# Patient Record
Sex: MALE | Race: BLACK OR AFRICAN AMERICAN | NOT HISPANIC OR LATINO | Employment: UNEMPLOYED | URBAN - NONMETROPOLITAN AREA
[De-identification: names, ages, dates, MRNs, and addresses within clinical notes are randomized per-mention and may not be internally consistent; named-entity substitution may affect disease eponyms.]

---

## 2021-04-08 ENCOUNTER — TELEPHONE (OUTPATIENT)
Dept: FAMILY MEDICINE | Facility: CLINIC | Age: 71
End: 2021-04-08

## 2021-04-08 RX ORDER — LISINOPRIL 5 MG/1
5 TABLET ORAL DAILY
Qty: 90 TABLET | Refills: 0 | Status: CANCELLED | OUTPATIENT
Start: 2021-04-08

## 2021-04-08 RX ORDER — LISINOPRIL 5 MG/1
5 TABLET ORAL DAILY
COMMUNITY
Start: 2021-04-08 | End: 2021-10-12 | Stop reason: SDUPTHER

## 2021-07-16 RX ORDER — AMLODIPINE BESYLATE 10 MG/1
10 TABLET ORAL DAILY
COMMUNITY
End: 2021-10-12 | Stop reason: SDUPTHER

## 2021-07-16 RX ORDER — ATORVASTATIN CALCIUM 40 MG/1
1 TABLET, FILM COATED ORAL DAILY
COMMUNITY
Start: 2021-03-09 | End: 2021-10-12 | Stop reason: SDUPTHER

## 2021-07-16 RX ORDER — METOPROLOL SUCCINATE 25 MG/1
25 TABLET, EXTENDED RELEASE ORAL DAILY
COMMUNITY
Start: 2021-03-09 | End: 2021-10-12 | Stop reason: SDUPTHER

## 2021-07-16 RX ORDER — ASPIRIN 81 MG/1
81 TABLET ORAL DAILY
Status: ON HOLD | COMMUNITY
End: 2023-08-16 | Stop reason: HOSPADM

## 2021-07-16 RX ORDER — FUROSEMIDE 40 MG/1
1 TABLET ORAL 2 TIMES DAILY
COMMUNITY
Start: 2021-04-08 | End: 2021-10-12 | Stop reason: SDUPTHER

## 2021-10-12 ENCOUNTER — OFFICE VISIT (OUTPATIENT)
Dept: FAMILY MEDICINE | Facility: CLINIC | Age: 71
End: 2021-10-12
Payer: COMMERCIAL

## 2021-10-12 VITALS
WEIGHT: 230 LBS | TEMPERATURE: 97 F | BODY MASS INDEX: 36.1 KG/M2 | HEIGHT: 67 IN | HEART RATE: 97 BPM | DIASTOLIC BLOOD PRESSURE: 80 MMHG | SYSTOLIC BLOOD PRESSURE: 140 MMHG | OXYGEN SATURATION: 98 %

## 2021-10-12 DIAGNOSIS — I25.10 CORONARY ARTERY DISEASE INVOLVING NATIVE CORONARY ARTERY OF NATIVE HEART WITHOUT ANGINA PECTORIS: ICD-10-CM

## 2021-10-12 DIAGNOSIS — I50.22 CHRONIC SYSTOLIC CONGESTIVE HEART FAILURE: Primary | ICD-10-CM

## 2021-10-12 DIAGNOSIS — I34.0 SEVERE MITRAL REGURGITATION: ICD-10-CM

## 2021-10-12 DIAGNOSIS — I10 ESSENTIAL HYPERTENSION: ICD-10-CM

## 2021-10-12 LAB
ANION GAP SERPL CALCULATED.3IONS-SCNC: 8 MMOL/L (ref 7–16)
ANISOCYTOSIS BLD QL SMEAR: ABNORMAL
BASOPHILS # BLD AUTO: 0.06 K/UL (ref 0–0.2)
BASOPHILS NFR BLD AUTO: 0.8 % (ref 0–1)
BUN SERPL-MCNC: 20 MG/DL (ref 7–18)
BUN/CREAT SERPL: 13 (ref 6–20)
CALCIUM SERPL-MCNC: 9.4 MG/DL (ref 8.5–10.1)
CHLORIDE SERPL-SCNC: 105 MMOL/L (ref 98–107)
CHOLEST SERPL-MCNC: 104 MG/DL (ref 0–200)
CHOLEST/HDLC SERPL: 2.7 {RATIO}
CO2 SERPL-SCNC: 32 MMOL/L (ref 21–32)
CREAT SERPL-MCNC: 1.53 MG/DL (ref 0.7–1.3)
DIFFERENTIAL METHOD BLD: ABNORMAL
EOSINOPHIL # BLD AUTO: 0.07 K/UL (ref 0–0.5)
EOSINOPHIL NFR BLD AUTO: 0.9 % (ref 1–4)
ERYTHROCYTE [DISTWIDTH] IN BLOOD BY AUTOMATED COUNT: 18.8 % (ref 11.5–14.5)
GLUCOSE SERPL-MCNC: 121 MG/DL (ref 74–106)
HCT VFR BLD AUTO: 50.8 % (ref 40–54)
HDLC SERPL-MCNC: 39 MG/DL (ref 40–60)
HGB BLD-MCNC: 16.7 G/DL (ref 13.5–18)
IMM GRANULOCYTES # BLD AUTO: 0.03 K/UL (ref 0–0.04)
IMM GRANULOCYTES NFR BLD: 0.4 % (ref 0–0.4)
LDLC SERPL CALC-MCNC: 47 MG/DL
LDLC/HDLC SERPL: 1.2 {RATIO}
LYMPHOCYTES # BLD AUTO: 1.35 K/UL (ref 1–4.8)
LYMPHOCYTES NFR BLD AUTO: 17.4 % (ref 27–41)
MCH RBC QN AUTO: 30.2 PG (ref 27–31)
MCHC RBC AUTO-ENTMCNC: 32.9 G/DL (ref 32–36)
MCV RBC AUTO: 91.9 FL (ref 80–96)
MONOCYTES # BLD AUTO: 0.54 K/UL (ref 0–0.8)
MONOCYTES NFR BLD AUTO: 6.9 % (ref 2–6)
MPC BLD CALC-MCNC: 13.5 FL (ref 9.4–12.4)
NEUTROPHILS # BLD AUTO: 5.72 K/UL (ref 1.8–7.7)
NEUTROPHILS NFR BLD AUTO: 73.6 % (ref 53–65)
NONHDLC SERPL-MCNC: 65 MG/DL
NRBC # BLD AUTO: 0 X10E3/UL
NRBC, AUTO (.00): 0 %
PLATELET # BLD AUTO: 139 K/UL (ref 150–400)
PLATELET MORPHOLOGY: ABNORMAL
POTASSIUM SERPL-SCNC: 3.6 MMOL/L (ref 3.5–5.1)
RBC # BLD AUTO: 5.53 M/UL (ref 4.6–6.2)
SODIUM SERPL-SCNC: 141 MMOL/L (ref 136–145)
TRIGL SERPL-MCNC: 88 MG/DL (ref 35–150)
VLDLC SERPL-MCNC: 18 MG/DL
WBC # BLD AUTO: 7.77 K/UL (ref 4.5–11)

## 2021-10-12 PROCEDURE — 85025 CBC WITH DIFFERENTIAL: ICD-10-PCS | Mod: ,,, | Performed by: CLINICAL MEDICAL LABORATORY

## 2021-10-12 PROCEDURE — 85025 COMPLETE CBC W/AUTO DIFF WBC: CPT | Mod: ,,, | Performed by: CLINICAL MEDICAL LABORATORY

## 2021-10-12 PROCEDURE — 80048 BASIC METABOLIC PNL TOTAL CA: CPT | Mod: ,,, | Performed by: CLINICAL MEDICAL LABORATORY

## 2021-10-12 PROCEDURE — 99214 OFFICE O/P EST MOD 30 MIN: CPT | Mod: ,,, | Performed by: FAMILY MEDICINE

## 2021-10-12 PROCEDURE — 80061 LIPID PANEL: CPT | Mod: ,,, | Performed by: CLINICAL MEDICAL LABORATORY

## 2021-10-12 PROCEDURE — 99214 PR OFFICE/OUTPT VISIT, EST, LEVL IV, 30-39 MIN: ICD-10-PCS | Mod: ,,, | Performed by: FAMILY MEDICINE

## 2021-10-12 PROCEDURE — 80061 LIPID PANEL: ICD-10-PCS | Mod: ,,, | Performed by: CLINICAL MEDICAL LABORATORY

## 2021-10-12 PROCEDURE — 80048 BASIC METABOLIC PANEL: ICD-10-PCS | Mod: ,,, | Performed by: CLINICAL MEDICAL LABORATORY

## 2021-10-12 RX ORDER — ATORVASTATIN CALCIUM 40 MG/1
40 TABLET, FILM COATED ORAL DAILY
Qty: 90 TABLET | Refills: 0 | Status: SHIPPED | OUTPATIENT
Start: 2021-10-12 | End: 2022-02-07 | Stop reason: SDUPTHER

## 2021-10-12 RX ORDER — FUROSEMIDE 40 MG/1
40 TABLET ORAL 2 TIMES DAILY
Qty: 180 TABLET | Refills: 0 | Status: SHIPPED | OUTPATIENT
Start: 2021-10-12 | End: 2022-02-07 | Stop reason: SDUPTHER

## 2021-10-12 RX ORDER — LISINOPRIL 5 MG/1
5 TABLET ORAL DAILY
Qty: 90 TABLET | Refills: 0 | Status: SHIPPED | OUTPATIENT
Start: 2021-10-12 | End: 2022-02-07 | Stop reason: SDUPTHER

## 2021-10-12 RX ORDER — AMLODIPINE BESYLATE 10 MG/1
10 TABLET ORAL DAILY
Qty: 90 TABLET | Refills: 0 | Status: ON HOLD | OUTPATIENT
Start: 2021-10-12 | End: 2023-06-09 | Stop reason: HOSPADM

## 2021-10-12 RX ORDER — METOPROLOL SUCCINATE 25 MG/1
25 TABLET, EXTENDED RELEASE ORAL DAILY
Qty: 90 TABLET | Refills: 0 | Status: SHIPPED | OUTPATIENT
Start: 2021-10-12 | End: 2022-02-07 | Stop reason: SDUPTHER

## 2021-10-14 ENCOUNTER — TELEPHONE (OUTPATIENT)
Dept: FAMILY MEDICINE | Facility: CLINIC | Age: 71
End: 2021-10-14
Payer: COMMERCIAL

## 2021-10-21 ENCOUNTER — TELEPHONE (OUTPATIENT)
Dept: FAMILY MEDICINE | Facility: CLINIC | Age: 71
End: 2021-10-21

## 2022-02-07 ENCOUNTER — OFFICE VISIT (OUTPATIENT)
Dept: FAMILY MEDICINE | Facility: CLINIC | Age: 72
End: 2022-02-07
Payer: MEDICARE

## 2022-02-07 VITALS
HEIGHT: 69 IN | WEIGHT: 219 LBS | BODY MASS INDEX: 32.44 KG/M2 | DIASTOLIC BLOOD PRESSURE: 84 MMHG | HEART RATE: 93 BPM | SYSTOLIC BLOOD PRESSURE: 120 MMHG | OXYGEN SATURATION: 94 % | TEMPERATURE: 97 F

## 2022-02-07 DIAGNOSIS — I50.22 CHRONIC SYSTOLIC CONGESTIVE HEART FAILURE: Primary | ICD-10-CM

## 2022-02-07 DIAGNOSIS — I10 ESSENTIAL HYPERTENSION: ICD-10-CM

## 2022-02-07 DIAGNOSIS — I34.0 SEVERE MITRAL REGURGITATION: ICD-10-CM

## 2022-02-07 LAB
ANION GAP SERPL CALCULATED.3IONS-SCNC: 10 MMOL/L (ref 7–16)
BUN SERPL-MCNC: 23 MG/DL (ref 7–18)
BUN/CREAT SERPL: 15 (ref 6–20)
CALCIUM SERPL-MCNC: 8.8 MG/DL (ref 8.5–10.1)
CHLORIDE SERPL-SCNC: 107 MMOL/L (ref 98–107)
CHOLEST SERPL-MCNC: 128 MG/DL (ref 0–200)
CHOLEST/HDLC SERPL: 2.2 {RATIO}
CO2 SERPL-SCNC: 29 MMOL/L (ref 21–32)
CREAT SERPL-MCNC: 1.56 MG/DL (ref 0.7–1.3)
GLUCOSE SERPL-MCNC: 102 MG/DL (ref 74–106)
HDLC SERPL-MCNC: 58 MG/DL (ref 40–60)
LDLC SERPL CALC-MCNC: 56 MG/DL
LDLC/HDLC SERPL: 1 {RATIO}
NONHDLC SERPL-MCNC: 70 MG/DL
POTASSIUM SERPL-SCNC: 3.6 MMOL/L (ref 3.5–5.1)
SODIUM SERPL-SCNC: 142 MMOL/L (ref 136–145)
TRIGL SERPL-MCNC: 72 MG/DL (ref 35–150)
VLDLC SERPL-MCNC: 14 MG/DL

## 2022-02-07 PROCEDURE — 80048 BASIC METABOLIC PANEL: ICD-10-PCS | Mod: ,,, | Performed by: CLINICAL MEDICAL LABORATORY

## 2022-02-07 PROCEDURE — 99214 OFFICE O/P EST MOD 30 MIN: CPT | Mod: ,,, | Performed by: FAMILY MEDICINE

## 2022-02-07 PROCEDURE — 99214 PR OFFICE/OUTPT VISIT, EST, LEVL IV, 30-39 MIN: ICD-10-PCS | Mod: ,,, | Performed by: FAMILY MEDICINE

## 2022-02-07 PROCEDURE — 80061 LIPID PANEL: ICD-10-PCS | Mod: ,,, | Performed by: CLINICAL MEDICAL LABORATORY

## 2022-02-07 PROCEDURE — 80048 BASIC METABOLIC PNL TOTAL CA: CPT | Mod: ,,, | Performed by: CLINICAL MEDICAL LABORATORY

## 2022-02-07 PROCEDURE — 80061 LIPID PANEL: CPT | Mod: ,,, | Performed by: CLINICAL MEDICAL LABORATORY

## 2022-02-07 RX ORDER — METOPROLOL SUCCINATE 25 MG/1
25 TABLET, EXTENDED RELEASE ORAL DAILY
Qty: 90 TABLET | Refills: 0 | Status: ON HOLD | OUTPATIENT
Start: 2022-02-07 | End: 2024-03-26 | Stop reason: HOSPADM

## 2022-02-07 RX ORDER — LISINOPRIL 5 MG/1
5 TABLET ORAL DAILY
Qty: 90 TABLET | Refills: 0 | Status: ON HOLD | OUTPATIENT
Start: 2022-02-07 | End: 2023-06-09 | Stop reason: HOSPADM

## 2022-02-07 RX ORDER — ATORVASTATIN CALCIUM 40 MG/1
40 TABLET, FILM COATED ORAL DAILY
Qty: 90 TABLET | Refills: 0 | Status: SHIPPED | OUTPATIENT
Start: 2022-02-07

## 2022-02-07 RX ORDER — FUROSEMIDE 40 MG/1
40 TABLET ORAL 2 TIMES DAILY
Qty: 180 TABLET | Refills: 0 | Status: ON HOLD | OUTPATIENT
Start: 2022-02-07 | End: 2023-06-09 | Stop reason: HOSPADM

## 2022-02-07 NOTE — PATIENT INSTRUCTIONS
- Take medications as prescribed  - Notify clinic or proceed to ED if symptoms persist or worsen  - Follow up with cardiology

## 2022-02-07 NOTE — PROGRESS NOTES
[facility]    Chief Complaint      Chief Complaint   Patient presents with    Medication Refill       History of Present Illness      Esthela Contreras is a 71 y.o. male with chronic conditions of CHF (EF=40% in 2019), CAD, HTN, severe mitral regurg, dyslipidemia and CVA (2018) who presents today for med refills. Pt states he took last of antihypertensives yesterday. Pt was referred to cardiology as last visit, but states he does not having working phone and never heard about appt. Noted worsening dyspnea with exertion and lower extremity edema.    Past Medical History:  Past Medical History:   Diagnosis Date    CHF (congestive heart failure)     Hyperlipidemia     Hypertension        Past Surgical History:   has a past surgical history that includes Appendectomy.    Social History:  Social History     Tobacco Use    Smoking status: Former Smoker    Smokeless tobacco: Never Used   Substance Use Topics    Alcohol use: Not Currently       I personally reviewed all past medical, surgical, and social.     Review of Systems   Constitutional: Negative for fatigue and fever.   HENT: Negative for ear pain.    Eyes: Negative for pain and visual disturbance.   Respiratory: Positive for shortness of breath. Negative for chest tightness.    Cardiovascular: Positive for leg swelling. Negative for chest pain.   Gastrointestinal: Negative for abdominal pain.   Genitourinary: Negative for difficulty urinating.   Musculoskeletal: Negative for gait problem and myalgias.   Skin: Negative for rash.   Neurological: Negative for dizziness and light-headedness.   Hematological: Does not bruise/bleed easily.        Medications:  Outpatient Encounter Medications as of 2/7/2022   Medication Sig Dispense Refill    amLODIPine (NORVASC) 10 MG tablet Take 1 tablet (10 mg total) by mouth once daily. 90 tablet 0    aspirin (ECOTRIN) 81 MG EC tablet Take 81 mg by mouth once daily.      [DISCONTINUED] atorvastatin (LIPITOR) 40 MG  "tablet Take 1 tablet (40 mg total) by mouth once daily. 90 tablet 0    [DISCONTINUED] furosemide (LASIX) 40 MG tablet Take 1 tablet (40 mg total) by mouth 2 (two) times a day. 180 tablet 0    [DISCONTINUED] lisinopriL (PRINIVIL,ZESTRIL) 5 MG tablet Take 1 tablet (5 mg total) by mouth once daily. 90 tablet 0    [DISCONTINUED] metoprolol succinate (TOPROL-XL) 25 MG 24 hr tablet Take 1 tablet (25 mg total) by mouth once daily. 90 tablet 0    atorvastatin (LIPITOR) 40 MG tablet Take 1 tablet (40 mg total) by mouth once daily. 90 tablet 0    furosemide (LASIX) 40 MG tablet Take 1 tablet (40 mg total) by mouth 2 (two) times a day. 180 tablet 0    lisinopriL (PRINIVIL,ZESTRIL) 5 MG tablet Take 1 tablet (5 mg total) by mouth once daily. 90 tablet 0    metoprolol succinate (TOPROL-XL) 25 MG 24 hr tablet Take 1 tablet (25 mg total) by mouth once daily. 90 tablet 0     No facility-administered encounter medications on file as of 2/7/2022.       Allergies:  Review of patient's allergies indicates:  No Known Allergies    Health Maintenance:    There is no immunization history on file for this patient.   Health Maintenance   Topic Date Due    Hepatitis C Screening  Never done    TETANUS VACCINE  Never done    Abdominal Aortic Aneurysm Screening  Never done    Lipid Panel  10/12/2022    High Dose Statin  02/07/2023        Physical Exam      Vital Signs  Temp: 97.1 °F (36.2 °C)  Pulse: 93  SpO2: (!) 94 %  BP: 120/84  BP Location: Left arm  Patient Position: Sitting  Height and Weight  Height: 5' 9" (175.3 cm)  Weight: 99.3 kg (219 lb)  BSA (Calculated - sq m): 2.2 sq meters  BMI (Calculated): 32.3  Weight in (lb) to have BMI = 25: 168.9]    Physical Exam  Constitutional:       Appearance: Normal appearance.   HENT:      Head: Normocephalic.   Eyes:      Conjunctiva/sclera: Conjunctivae normal.      Pupils: Pupils are equal, round, and reactive to light.   Cardiovascular:      Rate and Rhythm: Normal rate and regular " rhythm.      Pulses: Normal pulses.      Heart sounds: Murmur heard.       Pulmonary:      Effort: Pulmonary effort is normal.      Breath sounds: Normal breath sounds.   Abdominal:      General: Bowel sounds are normal. There is no distension.      Palpations: Abdomen is soft.   Musculoskeletal:         General: Normal range of motion.      Right lower le+ Edema present.      Left lower le+ Edema present.   Skin:     General: Skin is warm and dry.   Neurological:      General: No focal deficit present.      Mental Status: He is alert and oriented to person, place, and time.   Psychiatric:         Mood and Affect: Mood normal.          Laboratory:  CBC:  Recent Labs   Lab 10/12/21  08   WBC 7.77   RBC 5.53   Hemoglobin 16.7   Hematocrit 50.8   Platelet Count 139 L   MCV 91.9   MCH 30.2   MCHC 32.9     CMP:  Recent Labs   Lab 10/12/21  08   Glucose 121 H   Calcium 9.4   Sodium 141   Potassium 3.6   CO2 32   Chloride 105   BUN 20 H     LIPIDS:  Recent Labs   Lab 10/12/21  08   HDL Cholesterol 39 L   Cholesterol 104   Triglycerides 88   LDL Calculated 47   Cholesterol/HDL Ratio (Risk Factor) 2.7   Non-HDL 65     TSH:      A1C:        Assessment/Plan     Esthela Contreras is a 71 y.o.male with:     1. Chronic systolic congestive heart failure  - Concern for worsening CHF with his hx of severe MR  - Discussed importance of med compliance  - Advised to proceed to ED for evaluation if symptoms worsen  - Will have referral clerk send letter to pt with appt date and location with cardiology  - X-Ray Chest PA And Lateral; Future  - Ambulatory referral/consult to Cardiology; Future    2. Essential hypertension  - Basic Metabolic Panel; Future  - Lipid Panel; Future  - Ambulatory referral/consult to Cardiology; Future  - Basic Metabolic Panel  - Lipid Panel    3. Severe mitral regurgitation  - Ambulatory referral/consult to Cardiology; Future       Total time spent face-to-face and non-face-to-face coordinating care  for this encounter was: 30 min    Chronic conditions status updated as per HPI.  Other than changes above, cont current medications and maintain follow up with specialists.  Return to clinic in 3 months.    Flaco Aviles DO  [facility]

## 2022-02-10 ENCOUNTER — TELEPHONE (OUTPATIENT)
Dept: FAMILY MEDICINE | Facility: CLINIC | Age: 72
End: 2022-02-10
Payer: MEDICAID

## 2022-02-10 NOTE — TELEPHONE ENCOUNTER
----- Message from Flaco Aviles DO sent at 2/10/2022  8:35 AM CST -----  CXR shows small amount of fluid on lungs; needs to make sure he follows up with cardiology. Labwork normal.

## 2022-02-14 ENCOUNTER — TELEPHONE (OUTPATIENT)
Dept: FAMILY MEDICINE | Facility: CLINIC | Age: 72
End: 2022-02-14
Payer: MEDICAID

## 2022-02-25 ENCOUNTER — OFFICE VISIT (OUTPATIENT)
Dept: FAMILY MEDICINE | Facility: CLINIC | Age: 72
End: 2022-02-25
Payer: MEDICARE

## 2022-02-25 VITALS
TEMPERATURE: 98 F | HEART RATE: 83 BPM | OXYGEN SATURATION: 97 % | WEIGHT: 225 LBS | HEIGHT: 63 IN | DIASTOLIC BLOOD PRESSURE: 80 MMHG | BODY MASS INDEX: 39.87 KG/M2 | SYSTOLIC BLOOD PRESSURE: 134 MMHG

## 2022-02-25 DIAGNOSIS — I50.22 CHRONIC SYSTOLIC CONGESTIVE HEART FAILURE: ICD-10-CM

## 2022-02-25 DIAGNOSIS — I89.0 LYMPHEDEMA: ICD-10-CM

## 2022-02-25 DIAGNOSIS — L03.116 CELLULITIS OF LEFT LEG: Primary | ICD-10-CM

## 2022-02-25 PROCEDURE — 99214 PR OFFICE/OUTPT VISIT, EST, LEVL IV, 30-39 MIN: ICD-10-PCS | Mod: ,,, | Performed by: FAMILY MEDICINE

## 2022-02-25 PROCEDURE — 99214 OFFICE O/P EST MOD 30 MIN: CPT | Mod: ,,, | Performed by: FAMILY MEDICINE

## 2022-02-25 RX ORDER — SULFAMETHOXAZOLE AND TRIMETHOPRIM 800; 160 MG/1; MG/1
1 TABLET ORAL 2 TIMES DAILY
Qty: 14 TABLET | Refills: 0 | Status: ON HOLD | OUTPATIENT
Start: 2022-02-25 | End: 2023-06-09 | Stop reason: HOSPADM

## 2022-02-25 NOTE — PROGRESS NOTES
[facility]    Chief Complaint    No chief complaint on file.      History of Present Illness      Esthela Contreras is a 71 y.o. male with chronic conditions of CHF (EF=40% in 2019), CAD, HTN, severe mitral regurg, dyslipidemia and CVA (2018) who presents today for leg swelling and wound. Pt noted ulcerated skin lesion on lateral aspect of left leg. States wound developed after last visit 3 weeks ago. States he thought wound was related to Lasix so he's reduced dose down to one half tab daily. Denies fever, chills, n/v. Pt was to be referred to cardiology at last visit, but states he did not receive letter with appt date.    Past Medical History:  Past Medical History:   Diagnosis Date    CHF (congestive heart failure)     Hyperlipidemia     Hypertension        Past Surgical History:   has a past surgical history that includes Appendectomy.    Social History:  Social History     Tobacco Use    Smoking status: Former Smoker    Smokeless tobacco: Never Used   Substance Use Topics    Alcohol use: Not Currently       I personally reviewed all past medical, surgical, and social.     Review of Systems   Constitutional: Negative for fatigue and fever.   HENT: Negative for ear pain.    Eyes: Negative for pain and visual disturbance.   Respiratory: Negative for chest tightness and shortness of breath.    Cardiovascular: Positive for leg swelling. Negative for chest pain.   Gastrointestinal: Negative for abdominal pain.   Genitourinary: Negative for difficulty urinating.   Musculoskeletal: Negative for gait problem and myalgias.   Skin: Positive for wound. Negative for rash.   Neurological: Negative for dizziness and light-headedness.   Hematological: Does not bruise/bleed easily.        Medications:  Outpatient Encounter Medications as of 2/25/2022   Medication Sig Dispense Refill    amLODIPine (NORVASC) 10 MG tablet Take 1 tablet (10 mg total) by mouth once daily. 90 tablet 0    aspirin (ECOTRIN) 81 MG EC  "tablet Take 81 mg by mouth once daily.      atorvastatin (LIPITOR) 40 MG tablet Take 1 tablet (40 mg total) by mouth once daily. 90 tablet 0    furosemide (LASIX) 40 MG tablet Take 1 tablet (40 mg total) by mouth 2 (two) times a day. 180 tablet 0    lisinopriL (PRINIVIL,ZESTRIL) 5 MG tablet Take 1 tablet (5 mg total) by mouth once daily. 90 tablet 0    metoprolol succinate (TOPROL-XL) 25 MG 24 hr tablet Take 1 tablet (25 mg total) by mouth once daily. 90 tablet 0    sulfamethoxazole-trimethoprim 800-160mg (BACTRIM DS) 800-160 mg Tab Take 1 tablet by mouth 2 (two) times daily. 14 tablet 0     No facility-administered encounter medications on file as of 2/25/2022.       Allergies:  Review of patient's allergies indicates:  No Known Allergies    Health Maintenance:    There is no immunization history on file for this patient.   Health Maintenance   Topic Date Due    Hepatitis C Screening  Never done    TETANUS VACCINE  Never done    Abdominal Aortic Aneurysm Screening  Never done    Lipid Panel  02/07/2023    High Dose Statin  02/25/2023        Physical Exam      Vital Signs  Temp: 97.6 °F (36.4 °C)  Pulse: 83  SpO2: 97 %  BP: 134/80  BP Location: Left arm  Patient Position: Sitting  Height and Weight  Height: 5' 3" (160 cm)  Weight: 102.1 kg (225 lb)  BSA (Calculated - sq m): 2.13 sq meters  BMI (Calculated): 39.9  Weight in (lb) to have BMI = 25: 140.8]    Physical Exam  Constitutional:       Appearance: Normal appearance.   HENT:      Head: Normocephalic.   Eyes:      Conjunctiva/sclera: Conjunctivae normal.      Pupils: Pupils are equal, round, and reactive to light.   Cardiovascular:      Rate and Rhythm: Normal rate and regular rhythm.      Pulses: Normal pulses.   Pulmonary:      Effort: Pulmonary effort is normal.      Breath sounds: Normal breath sounds.   Abdominal:      General: Bowel sounds are normal. There is no distension.      Palpations: Abdomen is soft.   Musculoskeletal:         General: " Normal range of motion.      Right lower leg: 3+ Edema present.      Left lower leg: 3+ Edema present.   Skin:     General: Skin is warm and dry.          Neurological:      General: No focal deficit present.      Mental Status: He is alert and oriented to person, place, and time.   Psychiatric:         Mood and Affect: Mood normal.          Laboratory:  CBC:  Recent Labs   Lab 10/12/21  0805   WBC 7.77   RBC 5.53   Hemoglobin 16.7   Hematocrit 50.8   Platelet Count 139 L   MCV 91.9   MCH 30.2   MCHC 32.9     CMP:  Recent Labs   Lab 02/07/22  0746   Glucose 102   Calcium 8.8   Sodium 142   Potassium 3.6   CO2 29   Chloride 107   BUN 23 H     LIPIDS:  Recent Labs   Lab 10/12/21  0805 02/07/22  0746   HDL Cholesterol 39 L 58   Cholesterol 104 128   Triglycerides 88 72   LDL Calculated 47 56   Cholesterol/HDL Ratio (Risk Factor) 2.7 2.2   Non-HDL 65 70     TSH:      A1C:        Assessment/Plan     Esthela Contreras is a 71 y.o.male with:     1. Cellulitis of left leg  - Bactrim DS BID x7 days  - Advised to proceed to ED if symptoms worsen    2. Chronic systolic congestive heart failure  - Will follow up on cardiology referral    3. Lymphedema  - Ambulatory referral/consult to [facility]; Future       Total time spent face-to-face and non-face-to-face coordinating care for this encounter was: 30 min    Chronic conditions status updated as per HPI.  Other than changes above, cont current medications and maintain follow up with specialists.  Return to clinic as scheduled.    Flaco Aviles DO  [facility]

## 2022-11-09 DIAGNOSIS — Z71.89 COMPLEX CARE COORDINATION: ICD-10-CM

## 2023-04-14 ENCOUNTER — HOSPITAL ENCOUNTER (EMERGENCY)
Facility: HOSPITAL | Age: 73
Discharge: HOME | End: 2023-04-14
Payer: MEDICARE

## 2023-04-14 VITALS
OXYGEN SATURATION: 100 % | DIASTOLIC BLOOD PRESSURE: 89 MMHG | SYSTOLIC BLOOD PRESSURE: 136 MMHG | TEMPERATURE: 97 F | HEART RATE: 103 BPM | BODY MASS INDEX: 33.74 KG/M2 | HEIGHT: 67 IN | RESPIRATION RATE: 20 BRPM | WEIGHT: 215 LBS

## 2023-04-14 DIAGNOSIS — L08.9 WOUND INFECTION: ICD-10-CM

## 2023-04-14 DIAGNOSIS — I83.009 VENOUS STASIS ULCER, UNSPECIFIED SITE, UNSPECIFIED ULCER STAGE, UNSPECIFIED WHETHER VARICOSE VEINS PRESENT: Primary | ICD-10-CM

## 2023-04-14 DIAGNOSIS — R52 PAIN: ICD-10-CM

## 2023-04-14 DIAGNOSIS — M79.606 LEG PAIN: ICD-10-CM

## 2023-04-14 DIAGNOSIS — L97.909 VENOUS STASIS ULCER, UNSPECIFIED SITE, UNSPECIFIED ULCER STAGE, UNSPECIFIED WHETHER VARICOSE VEINS PRESENT: Primary | ICD-10-CM

## 2023-04-14 DIAGNOSIS — T14.8XXA WOUND INFECTION: ICD-10-CM

## 2023-04-14 DIAGNOSIS — R60.9 SWELLING: ICD-10-CM

## 2023-04-14 LAB
ALBUMIN SERPL BCP-MCNC: 2.6 G/DL (ref 3.5–5)
ALBUMIN/GLOB SERPL: 0.5 {RATIO}
ALP SERPL-CCNC: 122 U/L (ref 45–115)
ALT SERPL W P-5'-P-CCNC: 18 U/L (ref 16–61)
ANION GAP SERPL CALCULATED.3IONS-SCNC: 20 MMOL/L (ref 7–16)
AST SERPL W P-5'-P-CCNC: 26 U/L (ref 15–37)
BACTERIA #/AREA URNS HPF: ABNORMAL /HPF
BASOPHILS # BLD AUTO: 0.07 K/UL (ref 0–0.2)
BASOPHILS NFR BLD AUTO: 0.8 % (ref 0–1)
BILIRUB SERPL-MCNC: 1 MG/DL (ref ?–1.2)
BILIRUB UR QL STRIP: NEGATIVE
BUN SERPL-MCNC: 30 MG/DL (ref 7–18)
BUN/CREAT SERPL: 21 (ref 6–20)
CALCIUM SERPL-MCNC: 8.4 MG/DL (ref 8.5–10.1)
CHLORIDE SERPL-SCNC: 108 MMOL/L (ref 98–107)
CLARITY UR: CLEAR
CO2 SERPL-SCNC: 17 MMOL/L (ref 21–32)
COLOR UR: YELLOW
CREAT SERPL-MCNC: 1.43 MG/DL (ref 0.7–1.3)
CRP SERPL-MCNC: 5.4 MG/DL (ref 0–0.8)
DIFFERENTIAL METHOD BLD: ABNORMAL
EGFR (NO RACE VARIABLE) (RUSH/TITUS): 51 ML/MIN/1.73M²
EOSINOPHIL # BLD AUTO: 0.22 K/UL (ref 0–0.5)
EOSINOPHIL NFR BLD AUTO: 2.6 % (ref 1–4)
ERYTHROCYTE [DISTWIDTH] IN BLOOD BY AUTOMATED COUNT: 18.3 % (ref 11.5–14.5)
GLOBULIN SER-MCNC: 5.1 G/DL (ref 2–4)
GLUCOSE SERPL-MCNC: 77 MG/DL (ref 74–106)
GLUCOSE UR STRIP-MCNC: NORMAL MG/DL
HCT VFR BLD AUTO: 47.2 % (ref 40–54)
HGB BLD-MCNC: 14.2 G/DL (ref 13.5–18)
HYALINE CASTS #/AREA URNS LPF: ABNORMAL /LPF
IMM GRANULOCYTES # BLD AUTO: 0.08 K/UL (ref 0–0.04)
IMM GRANULOCYTES NFR BLD: 0.9 % (ref 0–0.4)
KETONES UR STRIP-SCNC: NEGATIVE MG/DL
LEUKOCYTE ESTERASE UR QL STRIP: NEGATIVE
LYMPHOCYTES # BLD AUTO: 1.15 K/UL (ref 1–4.8)
LYMPHOCYTES NFR BLD AUTO: 13.4 % (ref 27–41)
MCH RBC QN AUTO: 28.3 PG (ref 27–31)
MCHC RBC AUTO-ENTMCNC: 30.1 G/DL (ref 32–36)
MCV RBC AUTO: 94 FL (ref 80–96)
MONOCYTES # BLD AUTO: 0.7 K/UL (ref 0–0.8)
MONOCYTES NFR BLD AUTO: 8.2 % (ref 2–6)
MPC BLD CALC-MCNC: 9.4 FL (ref 9.4–12.4)
MUCOUS THREADS #/AREA URNS HPF: ABNORMAL /HPF
NEUTROPHILS # BLD AUTO: 6.34 K/UL (ref 1.8–7.7)
NEUTROPHILS NFR BLD AUTO: 74.1 % (ref 53–65)
NITRITE UR QL STRIP: NEGATIVE
NRBC # BLD AUTO: 0 X10E3/UL
NRBC, AUTO (.00): 0 %
NT-PROBNP SERPL-MCNC: 9028 PG/ML (ref 1–450)
PH UR STRIP: 5.5 PH UNITS
PLATELET # BLD AUTO: 216 K/UL (ref 150–400)
POTASSIUM SERPL-SCNC: 4.8 MMOL/L (ref 3.5–5.1)
PROT SERPL-MCNC: 7.7 G/DL (ref 6.4–8.2)
PROT UR QL STRIP: 50
RBC # BLD AUTO: 5.02 M/UL (ref 4.6–6.2)
RBC # UR STRIP: NEGATIVE /UL
RBC #/AREA URNS HPF: ABNORMAL /HPF
SARS-COV-2 RDRP RESP QL NAA+PROBE: NEGATIVE
SODIUM SERPL-SCNC: 140 MMOL/L (ref 136–145)
SP GR UR STRIP: 1.03
SQUAMOUS #/AREA URNS LPF: ABNORMAL /LPF
UROBILINOGEN UR STRIP-ACNC: 2 MG/DL
WBC # BLD AUTO: 8.56 K/UL (ref 4.5–11)
WBC #/AREA URNS HPF: ABNORMAL /HPF

## 2023-04-14 PROCEDURE — 80053 COMPREHEN METABOLIC PANEL: CPT | Performed by: NURSE PRACTITIONER

## 2023-04-14 PROCEDURE — 86140 C-REACTIVE PROTEIN: CPT | Performed by: NURSE PRACTITIONER

## 2023-04-14 PROCEDURE — 99285 EMERGENCY DEPT VISIT HI MDM: CPT | Mod: 25

## 2023-04-14 PROCEDURE — 96361 HYDRATE IV INFUSION ADD-ON: CPT

## 2023-04-14 PROCEDURE — 99285 PR EMERGENCY DEPT VISIT,LEVEL V: ICD-10-PCS | Mod: ,,, | Performed by: NURSE PRACTITIONER

## 2023-04-14 PROCEDURE — 25000003 PHARM REV CODE 250: Performed by: NURSE PRACTITIONER

## 2023-04-14 PROCEDURE — 83880 ASSAY OF NATRIURETIC PEPTIDE: CPT | Performed by: NURSE PRACTITIONER

## 2023-04-14 PROCEDURE — 96365 THER/PROPH/DIAG IV INF INIT: CPT

## 2023-04-14 PROCEDURE — 81001 URINALYSIS AUTO W/SCOPE: CPT | Performed by: NURSE PRACTITIONER

## 2023-04-14 PROCEDURE — 87635 SARS-COV-2 COVID-19 AMP PRB: CPT | Performed by: NURSE PRACTITIONER

## 2023-04-14 PROCEDURE — 85025 COMPLETE CBC W/AUTO DIFF WBC: CPT | Performed by: NURSE PRACTITIONER

## 2023-04-14 PROCEDURE — 99285 EMERGENCY DEPT VISIT HI MDM: CPT | Mod: ,,, | Performed by: NURSE PRACTITIONER

## 2023-04-14 RX ORDER — CLINDAMYCIN PHOSPHATE 600 MG/50ML
600 INJECTION, SOLUTION INTRAVENOUS
Status: COMPLETED | OUTPATIENT
Start: 2023-04-14 | End: 2023-04-14

## 2023-04-14 RX ORDER — CLINDAMYCIN HYDROCHLORIDE 150 MG/1
300 CAPSULE ORAL 4 TIMES DAILY
Qty: 56 CAPSULE | Refills: 0 | Status: SHIPPED | OUTPATIENT
Start: 2023-04-14 | End: 2023-04-21

## 2023-04-14 RX ORDER — HYDROCODONE BITARTRATE AND ACETAMINOPHEN 7.5; 325 MG/1; MG/1
1 TABLET ORAL EVERY 6 HOURS PRN
Qty: 15 TABLET | Refills: 0 | Status: ON HOLD | OUTPATIENT
Start: 2023-04-14 | End: 2023-06-09 | Stop reason: HOSPADM

## 2023-04-14 RX ORDER — SODIUM CHLORIDE 9 MG/ML
500 INJECTION, SOLUTION INTRAVENOUS
Status: COMPLETED | OUTPATIENT
Start: 2023-04-14 | End: 2023-04-14

## 2023-04-14 RX ADMIN — SODIUM CHLORIDE 500 ML: 9 INJECTION, SOLUTION INTRAVENOUS at 05:04

## 2023-04-14 RX ADMIN — CLINDAMYCIN PHOSPHATE 600 MG: 600 INJECTION, SOLUTION INTRAVENOUS at 05:04
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
## 2023-04-14 NOTE — LETTER
Patient: Esthela Contreras  YOB: 1950  Date: 4/14/2023 Time: 7:44 PM  Location: [facility]    Leaving the Hospital Against Medical Advice    Chart #:[id number]    This will certify that I, the undersigned,    ______________________________________________________________________    A patient in the above named medical center, having requested discharge and removal from the medical [place] against the advice of my attending physician(s), hereby release [facility], its physicians, officers and employees, severally and individually, from any and all liability of any nature whatsoever for any injury or harm or complication of any kind that may result directly or indirectly, by reason of my terminating my stay as a patient at [facility] and my departure from [facility], and hereby waive any and all rights of action I may now have or later acquire as a result of my voluntary departure from [facility] and the termination of my stay as a patient therein.    This release is made with the full knowledge of the danger that may result from the action which I am taking.      Date:_______________________                         ___________________________                                                                                    Patient/Legal Representative    Witness:        ____________________________                          ___________________________  Nurse                                                                        Physician

## 2023-06-01 ENCOUNTER — HOSPITAL ENCOUNTER (INPATIENT)
Facility: HOSPITAL | Age: 73
LOS: 8 days | Discharge: NURSING, REHAB OR LONG-TERM CARE | DRG: 872 | End: 2023-06-09
Attending: EMERGENCY MEDICINE | Admitting: INTERNAL MEDICINE
Payer: MEDICARE

## 2023-06-01 DIAGNOSIS — R00.0 TACHYCARDIA: ICD-10-CM

## 2023-06-01 DIAGNOSIS — I50.9 CHF (CONGESTIVE HEART FAILURE): ICD-10-CM

## 2023-06-01 DIAGNOSIS — R07.9 CHEST PAIN: ICD-10-CM

## 2023-06-01 DIAGNOSIS — A41.9 SEVERE SEPSIS WITH ACUTE ORGAN DYSFUNCTION: ICD-10-CM

## 2023-06-01 DIAGNOSIS — A41.9 SEVERE SEPSIS: Primary | ICD-10-CM

## 2023-06-01 DIAGNOSIS — R06.02 SOB (SHORTNESS OF BREATH): ICD-10-CM

## 2023-06-01 DIAGNOSIS — R65.20 SEVERE SEPSIS WITH ACUTE ORGAN DYSFUNCTION: ICD-10-CM

## 2023-06-01 DIAGNOSIS — L89.152 PRESSURE INJURY OF SACRAL REGION, STAGE 2: ICD-10-CM

## 2023-06-01 DIAGNOSIS — R65.20 SEVERE SEPSIS: Primary | ICD-10-CM

## 2023-06-01 PROBLEM — E87.5 HYPERKALEMIA: Status: ACTIVE | Noted: 2023-06-01

## 2023-06-01 PROBLEM — L03.119 CELLULITIS OF LOWER EXTREMITY: Status: ACTIVE | Noted: 2023-06-01

## 2023-06-01 PROBLEM — N17.9 ACUTE RENAL FAILURE: Status: ACTIVE | Noted: 2023-06-01

## 2023-06-01 PROBLEM — I50.20 HFREF (HEART FAILURE WITH REDUCED EJECTION FRACTION): Status: ACTIVE | Noted: 2021-10-12

## 2023-06-01 LAB
ALBUMIN SERPL BCP-MCNC: 3.1 G/DL (ref 3.5–5)
ALBUMIN/GLOB SERPL: 0.4 {RATIO}
ALP SERPL-CCNC: 189 U/L (ref 45–115)
ALT SERPL W P-5'-P-CCNC: 31 U/L (ref 16–61)
ANION GAP SERPL CALCULATED.3IONS-SCNC: 36 MMOL/L (ref 7–16)
ANISOCYTOSIS BLD QL SMEAR: ABNORMAL
AST SERPL W P-5'-P-CCNC: 50 U/L (ref 15–37)
BASOPHILS # BLD AUTO: 0.06 K/UL (ref 0–0.2)
BASOPHILS NFR BLD AUTO: 0.3 % (ref 0–1)
BILIRUB SERPL-MCNC: 2.1 MG/DL (ref ?–1.2)
BUN SERPL-MCNC: 110 MG/DL (ref 7–18)
BUN/CREAT SERPL: 16 (ref 6–20)
CALCIUM SERPL-MCNC: 9.9 MG/DL (ref 8.5–10.1)
CHLORIDE SERPL-SCNC: 92 MMOL/L (ref 98–107)
CO2 SERPL-SCNC: 7 MMOL/L (ref 21–32)
CREAT SERPL-MCNC: 7.03 MG/DL (ref 0.7–1.3)
CRENATED CELLS: ABNORMAL
DIFFERENTIAL METHOD BLD: ABNORMAL
EGFR (NO RACE VARIABLE) (RUSH/TITUS): 8 ML/MIN/1.73M2
EOSINOPHIL # BLD AUTO: 0 K/UL (ref 0–0.5)
EOSINOPHIL NFR BLD AUTO: 0 % (ref 1–4)
ERYTHROCYTE [DISTWIDTH] IN BLOOD BY AUTOMATED COUNT: 18 % (ref 11.5–14.5)
GLOBULIN SER-MCNC: 7 G/DL (ref 2–4)
GLUCOSE SERPL-MCNC: 109 MG/DL (ref 70–105)
GLUCOSE SERPL-MCNC: 169 MG/DL (ref 74–106)
HCO3 UR-SCNC: 4 MMOL/L (ref 21–28)
HCO3 UR-SCNC: 8 MMOL/L (ref 24–28)
HCT VFR BLD AUTO: 59.1 % (ref 40–54)
HCT VFR BLD CALC: 48 % (ref 35–51)
HCT VFR BLD CALC: 61 % (ref 35–51)
HGB BLD-MCNC: 18.4 G/DL (ref 13.5–18)
IMM GRANULOCYTES # BLD AUTO: 0.38 K/UL (ref 0–0.04)
IMM GRANULOCYTES NFR BLD: 1.6 % (ref 0–0.4)
INR BLD: 1.31
LACTATE SERPL-SCNC: 10.7 MMOL/L (ref 0.4–2)
LACTATE SERPL-SCNC: 9.7 MMOL/L (ref 0.4–2)
LDH SERPL L TO P-CCNC: 10.6 MMOL/L (ref 0.3–1.2)
LDH SERPL L TO P-CCNC: 9.1 MMOL/L (ref 0.3–1.2)
LYMPHOCYTES # BLD AUTO: 0.27 K/UL (ref 1–4.8)
LYMPHOCYTES NFR BLD AUTO: 1.1 % (ref 27–41)
LYMPHOCYTES NFR BLD MANUAL: 2 % (ref 27–41)
MCH RBC QN AUTO: 29.4 PG (ref 27–31)
MCHC RBC AUTO-ENTMCNC: 31.1 G/DL (ref 32–36)
MCV RBC AUTO: 94.6 FL (ref 80–96)
MONOCYTES # BLD AUTO: 0.86 K/UL (ref 0–0.8)
MONOCYTES NFR BLD AUTO: 3.6 % (ref 2–6)
MONOCYTES NFR BLD MANUAL: 4 % (ref 2–6)
MPC BLD CALC-MCNC: 10.4 FL (ref 9.4–12.4)
NEUTROPHILS # BLD AUTO: 22.13 K/UL (ref 1.8–7.7)
NEUTROPHILS NFR BLD AUTO: 93.4 % (ref 53–65)
NEUTS BAND NFR BLD MANUAL: 3 % (ref 1–5)
NEUTS SEG NFR BLD MANUAL: 91 % (ref 50–62)
NRBC # BLD AUTO: 0 X10E3/UL
NRBC, AUTO (.00): 0 %
NT-PROBNP SERPL-MCNC: ABNORMAL PG/ML (ref 1–450)
PCO2 BLDA: 12 MMHG (ref 35–48)
PCO2 BLDA: 29 MMHG (ref 41–51)
PH SMN: 7.05 [PH] (ref 7.32–7.42)
PH SMN: 7.13 [PH] (ref 7.35–7.45)
PLATELET # BLD AUTO: 350 K/UL (ref 150–400)
PLATELET MORPHOLOGY: ABNORMAL
PO2 BLDA: 135 MMHG (ref 83–108)
PO2 BLDA: 30 MMHG (ref 25–40)
POC BASE EXCESS: -21.3 MMOL/L (ref -2–3)
POC BASE EXCESS: -22.2 MMOL/L (ref -2–3)
POC CO2: 4.4 MMOL/L
POC CO2: 8.9 MMOL/L
POC IONIZED CALCIUM: 0.89 MMOL/L (ref 1.15–1.35)
POC IONIZED CALCIUM: 1.06 MMOL/L (ref 1.15–1.35)
POC SATURATED O2: 30 % (ref 40–70)
POC SATURATED O2: 98 % (ref 95–98)
POCT GLUCOSE: 160 MG/DL (ref 60–95)
POCT GLUCOSE: 237 MG/DL (ref 60–95)
POTASSIUM BLD-SCNC: 4.2 MMOL/L (ref 3.4–4.5)
POTASSIUM BLD-SCNC: 6.9 MMOL/L (ref 3.4–4.5)
POTASSIUM SERPL-SCNC: 6.9 MMOL/L (ref 3.5–5.1)
PROT SERPL-MCNC: 10.1 G/DL (ref 6.4–8.2)
PROTHROMBIN TIME: 15.8 SECONDS (ref 11.7–14.7)
RBC # BLD AUTO: 6.25 M/UL (ref 4.6–6.2)
SARS-COV-2 RDRP RESP QL NAA+PROBE: NEGATIVE
SODIUM BLD-SCNC: 123 MMOL/L (ref 136–145)
SODIUM BLD-SCNC: 132 MMOL/L (ref 136–145)
SODIUM SERPL-SCNC: 128 MMOL/L (ref 136–145)
TROPONIN I SERPL DL<=0.01 NG/ML-MCNC: 382.4 PG/ML
WBC # BLD AUTO: 23.7 K/UL (ref 4.5–11)

## 2023-06-01 PROCEDURE — 93005 ELECTROCARDIOGRAM TRACING: CPT

## 2023-06-01 PROCEDURE — 11000001 HC ACUTE MED/SURG PRIVATE ROOM

## 2023-06-01 PROCEDURE — 84132 ASSAY OF SERUM POTASSIUM: CPT

## 2023-06-01 PROCEDURE — 85025 COMPLETE CBC W/AUTO DIFF WBC: CPT | Performed by: EMERGENCY MEDICINE

## 2023-06-01 PROCEDURE — 93010 EKG 12-LEAD: ICD-10-PCS | Mod: ,,, | Performed by: INTERNAL MEDICINE

## 2023-06-01 PROCEDURE — 96365 THER/PROPH/DIAG IV INF INIT: CPT

## 2023-06-01 PROCEDURE — 99223 1ST HOSP IP/OBS HIGH 75: CPT | Mod: AI,,, | Performed by: INTERNAL MEDICINE

## 2023-06-01 PROCEDURE — 82947 ASSAY GLUCOSE BLOOD QUANT: CPT

## 2023-06-01 PROCEDURE — 87077 CULTURE AEROBIC IDENTIFY: CPT | Performed by: EMERGENCY MEDICINE

## 2023-06-01 PROCEDURE — 82962 GLUCOSE BLOOD TEST: CPT

## 2023-06-01 PROCEDURE — 99223 PR INITIAL HOSPITAL CARE,LEVL III: ICD-10-PCS | Mod: AI,,, | Performed by: INTERNAL MEDICINE

## 2023-06-01 PROCEDURE — 84295 ASSAY OF SERUM SODIUM: CPT

## 2023-06-01 PROCEDURE — 85610 PROTHROMBIN TIME: CPT | Performed by: EMERGENCY MEDICINE

## 2023-06-01 PROCEDURE — 87635 SARS-COV-2 COVID-19 AMP PRB: CPT

## 2023-06-01 PROCEDURE — 25000242 PHARM REV CODE 250 ALT 637 W/ HCPCS: Performed by: EMERGENCY MEDICINE

## 2023-06-01 PROCEDURE — 82803 BLOOD GASES ANY COMBINATION: CPT

## 2023-06-01 PROCEDURE — 82330 ASSAY OF CALCIUM: CPT

## 2023-06-01 PROCEDURE — 83605 ASSAY OF LACTIC ACID: CPT

## 2023-06-01 PROCEDURE — 83880 ASSAY OF NATRIURETIC PEPTIDE: CPT | Performed by: EMERGENCY MEDICINE

## 2023-06-01 PROCEDURE — 99285 EMERGENCY DEPT VISIT HI MDM: CPT | Mod: ,,, | Performed by: EMERGENCY MEDICINE

## 2023-06-01 PROCEDURE — 85014 HEMATOCRIT: CPT

## 2023-06-01 PROCEDURE — 25000003 PHARM REV CODE 250: Performed by: EMERGENCY MEDICINE

## 2023-06-01 PROCEDURE — 87149 DNA/RNA DIRECT PROBE: CPT | Performed by: EMERGENCY MEDICINE

## 2023-06-01 PROCEDURE — 96375 TX/PRO/DX INJ NEW DRUG ADDON: CPT

## 2023-06-01 PROCEDURE — 99285 EMERGENCY DEPT VISIT HI MDM: CPT | Mod: 25

## 2023-06-01 PROCEDURE — 84484 ASSAY OF TROPONIN QUANT: CPT | Performed by: EMERGENCY MEDICINE

## 2023-06-01 PROCEDURE — 96374 THER/PROPH/DIAG INJ IV PUSH: CPT | Mod: 59

## 2023-06-01 PROCEDURE — 83605 ASSAY OF LACTIC ACID: CPT | Performed by: EMERGENCY MEDICINE

## 2023-06-01 PROCEDURE — 84443 ASSAY THYROID STIM HORMONE: CPT | Performed by: INTERNAL MEDICINE

## 2023-06-01 PROCEDURE — 63600175 PHARM REV CODE 636 W HCPCS: Performed by: EMERGENCY MEDICINE

## 2023-06-01 PROCEDURE — 99285 PR EMERGENCY DEPT VISIT,LEVEL V: ICD-10-PCS | Mod: ,,, | Performed by: EMERGENCY MEDICINE

## 2023-06-01 PROCEDURE — 93010 ELECTROCARDIOGRAM REPORT: CPT | Mod: ,,, | Performed by: INTERNAL MEDICINE

## 2023-06-01 PROCEDURE — 87076 CULTURE ANAEROBE IDENT EACH: CPT | Performed by: EMERGENCY MEDICINE

## 2023-06-01 PROCEDURE — 80053 COMPREHEN METABOLIC PANEL: CPT | Performed by: EMERGENCY MEDICINE

## 2023-06-01 RX ORDER — SODIUM BICARBONATE 1 MEQ/ML
50 SYRINGE (ML) INTRAVENOUS
Status: COMPLETED | OUTPATIENT
Start: 2023-06-01 | End: 2023-06-01

## 2023-06-01 RX ORDER — ATORVASTATIN CALCIUM 40 MG/1
40 TABLET, FILM COATED ORAL DAILY
Status: DISCONTINUED | OUTPATIENT
Start: 2023-06-02 | End: 2023-06-09 | Stop reason: HOSPADM

## 2023-06-01 RX ORDER — TALC
6 POWDER (GRAM) TOPICAL NIGHTLY PRN
Status: DISCONTINUED | OUTPATIENT
Start: 2023-06-01 | End: 2023-06-09 | Stop reason: HOSPADM

## 2023-06-01 RX ORDER — CALCIUM GLUCONATE 20 MG/ML
1 INJECTION, SOLUTION INTRAVENOUS ONCE
Status: COMPLETED | OUTPATIENT
Start: 2023-06-01 | End: 2023-06-01

## 2023-06-01 RX ORDER — INSULIN ASPART 100 [IU]/ML
0-5 INJECTION, SOLUTION INTRAVENOUS; SUBCUTANEOUS
Status: DISCONTINUED | OUTPATIENT
Start: 2023-06-01 | End: 2023-06-09 | Stop reason: HOSPADM

## 2023-06-01 RX ORDER — ASPIRIN 81 MG/1
81 TABLET ORAL DAILY
Status: DISCONTINUED | OUTPATIENT
Start: 2023-06-02 | End: 2023-06-09 | Stop reason: HOSPADM

## 2023-06-01 RX ORDER — DEXTROSE 40 %
30 GEL (GRAM) ORAL
Status: DISCONTINUED | OUTPATIENT
Start: 2023-06-01 | End: 2023-06-09 | Stop reason: HOSPADM

## 2023-06-01 RX ORDER — SODIUM CHLORIDE 0.9 % (FLUSH) 0.9 %
10 SYRINGE (ML) INJECTION EVERY 12 HOURS PRN
Status: DISCONTINUED | OUTPATIENT
Start: 2023-06-01 | End: 2023-06-02

## 2023-06-01 RX ORDER — DEXTROSE 40 %
15 GEL (GRAM) ORAL
Status: DISCONTINUED | OUTPATIENT
Start: 2023-06-01 | End: 2023-06-09 | Stop reason: HOSPADM

## 2023-06-01 RX ORDER — GLUCAGON 1 MG
1 KIT INJECTION
Status: DISCONTINUED | OUTPATIENT
Start: 2023-06-01 | End: 2023-06-09 | Stop reason: HOSPADM

## 2023-06-01 RX ORDER — NALOXONE HCL 0.4 MG/ML
0.02 VIAL (ML) INJECTION
Status: DISCONTINUED | OUTPATIENT
Start: 2023-06-01 | End: 2023-06-09 | Stop reason: HOSPADM

## 2023-06-01 RX ORDER — HEPARIN SODIUM 5000 [USP'U]/ML
5000 INJECTION, SOLUTION INTRAVENOUS; SUBCUTANEOUS EVERY 12 HOURS
Status: DISCONTINUED | OUTPATIENT
Start: 2023-06-01 | End: 2023-06-02

## 2023-06-01 RX ORDER — ALBUTEROL SULFATE 0.83 MG/ML
2.5 SOLUTION RESPIRATORY (INHALATION)
Status: COMPLETED | OUTPATIENT
Start: 2023-06-01 | End: 2023-06-01

## 2023-06-01 RX ORDER — CALCIUM GLUCONATE 98 MG/ML
1 INJECTION, SOLUTION INTRAVENOUS ONCE
Status: DISCONTINUED | OUTPATIENT
Start: 2023-06-01 | End: 2023-06-01 | Stop reason: CLARIF

## 2023-06-01 RX ADMIN — DEXTROSE MONOHYDRATE 250 ML: 100 INJECTION, SOLUTION INTRAVENOUS at 09:06

## 2023-06-01 RX ADMIN — SODIUM BICARBONATE 50 MEQ: 84 INJECTION, SOLUTION INTRAVENOUS at 09:06

## 2023-06-01 RX ADMIN — SODIUM CHLORIDE, POTASSIUM CHLORIDE, SODIUM LACTATE AND CALCIUM CHLORIDE 2925 ML: 600; 310; 30; 20 INJECTION, SOLUTION INTRAVENOUS at 08:06

## 2023-06-01 RX ADMIN — CALCIUM GLUCONATE 1 G: 20 INJECTION, SOLUTION INTRAVENOUS at 09:06

## 2023-06-01 RX ADMIN — HUMAN INSULIN 10 UNITS: 100 INJECTION, SOLUTION SUBCUTANEOUS at 09:06

## 2023-06-01 RX ADMIN — PIPERACILLIN AND TAZOBACTAM 4.5 G: 4; .5 INJECTION, POWDER, FOR SOLUTION INTRAVENOUS; PARENTERAL at 09:06

## 2023-06-01 RX ADMIN — ALBUTEROL SULFATE 2.5 MG: 2.5 SOLUTION RESPIRATORY (INHALATION) at 09:06

## 2023-06-01 NOTE — Clinical Note
Diagnosis: SOB (shortness of breath) [979724]   Admitting Provider:: AMANDA WONG [[id number]]   Future Attending Provider: AMANDA WONG [[id number]]   Reason for IP Medical Treatment  (Clinical interventions that can only be accomplished in the IP setting? ) :: Life threatening medical illness   I certify that Inpatient services for greater than or equal to 2 midnights are medically necessary:: Yes   Plans for Post-Acute care--if anticipated (pick the single best option):: A. No post acute care anticipated at this time   Special Needs:: No Special Needs [1]

## 2023-06-02 PROBLEM — J44.9 COPD (CHRONIC OBSTRUCTIVE PULMONARY DISEASE): Status: ACTIVE | Noted: 2023-06-02

## 2023-06-02 PROBLEM — R06.02 SOB (SHORTNESS OF BREATH): Status: ACTIVE | Noted: 2023-06-02

## 2023-06-02 PROBLEM — E87.20 LACTIC ACIDOSIS: Status: ACTIVE | Noted: 2023-06-02

## 2023-06-02 PROBLEM — E87.29 HIGH ANION GAP METABOLIC ACIDOSIS: Status: ACTIVE | Noted: 2023-06-02

## 2023-06-02 PROBLEM — I48.91 NEW ONSET A-FIB: Status: ACTIVE | Noted: 2023-06-02

## 2023-06-02 LAB
ALBUMIN SERPL BCP-MCNC: 2 G/DL (ref 3.5–5)
ALBUMIN SERPL BCP-MCNC: 2.1 G/DL (ref 3.5–5)
ALBUMIN/GLOB SERPL: 0.5 {RATIO}
ALBUMIN/GLOB SERPL: 0.5 {RATIO}
ALP SERPL-CCNC: 146 U/L (ref 45–115)
ALP SERPL-CCNC: 156 U/L (ref 45–115)
ALT SERPL W P-5'-P-CCNC: 31 U/L (ref 16–61)
ALT SERPL W P-5'-P-CCNC: 35 U/L (ref 16–61)
ANION GAP SERPL CALCULATED.3IONS-SCNC: 25 MMOL/L (ref 7–16)
ANION GAP SERPL CALCULATED.3IONS-SCNC: 29 MMOL/L (ref 7–16)
ANISOCYTOSIS BLD QL SMEAR: ABNORMAL
ANISOCYTOSIS BLD QL SMEAR: ABNORMAL
AORTIC ROOT ANNULUS: 2.5 CM
AORTIC VALVE CUSP SEPERATION: 1.81 CM
AST SERPL W P-5'-P-CCNC: 64 U/L (ref 15–37)
AST SERPL W P-5'-P-CCNC: 83 U/L (ref 15–37)
AV INDEX (PROSTH): 0.75
AV MEAN GRADIENT: 2 MMHG
AV PEAK GRADIENT: 3 MMHG
AV VALVE AREA: 1.91 CM2
AV VELOCITY RATIO: 0.88
BACTERIA #/AREA URNS HPF: ABNORMAL /HPF
BASOPHILS # BLD AUTO: 0.05 K/UL (ref 0–0.2)
BASOPHILS # BLD AUTO: 0.05 K/UL (ref 0–0.2)
BASOPHILS NFR BLD AUTO: 0.2 % (ref 0–1)
BASOPHILS NFR BLD AUTO: 0.2 % (ref 0–1)
BILIRUB SERPL-MCNC: 1.5 MG/DL (ref ?–1.2)
BILIRUB SERPL-MCNC: 1.9 MG/DL (ref ?–1.2)
BILIRUB UR QL STRIP: NEGATIVE
BSA FOR ECHO PROCEDURE: 1.82 M2
BUN SERPL-MCNC: 109 MG/DL (ref 7–18)
BUN SERPL-MCNC: 111 MG/DL (ref 7–18)
BUN/CREAT SERPL: 19 (ref 6–20)
BUN/CREAT SERPL: 19 (ref 6–20)
CALCIUM SERPL-MCNC: 7.7 MG/DL (ref 8.5–10.1)
CALCIUM SERPL-MCNC: 8 MG/DL (ref 8.5–10.1)
CHLORIDE SERPL-SCNC: 95 MMOL/L (ref 98–107)
CHLORIDE SERPL-SCNC: 96 MMOL/L (ref 98–107)
CLARITY UR: ABNORMAL
CO2 SERPL-SCNC: 14 MMOL/L (ref 21–32)
CO2 SERPL-SCNC: 17 MMOL/L (ref 21–32)
COLOR UR: YELLOW
CREAT SERPL-MCNC: 5.75 MG/DL (ref 0.7–1.3)
CREAT SERPL-MCNC: 5.88 MG/DL (ref 0.7–1.3)
CV ECHO LV RWT: 0.47 CM
DIFFERENTIAL METHOD BLD: ABNORMAL
DIFFERENTIAL METHOD BLD: ABNORMAL
DOP CALC AO PEAK VEL: 0.8 M/S
DOP CALC AO VTI: 12 CM
DOP CALC LVOT AREA: 2.5 CM2
DOP CALC LVOT DIAMETER: 1.8 CM
DOP CALC LVOT PEAK VEL: 0.7 M/S
DOP CALC LVOT STROKE VOLUME: 22.89 CM3
DOP CALCLVOT PEAK VEL VTI: 9 CM
E WAVE DECELERATION TIME: 100 MSEC
ECHO EF ESTIMATED: 35 %
ECHO LV POSTERIOR WALL: 1.13 CM (ref 0.6–1.1)
EGFR (NO RACE VARIABLE) (RUSH/TITUS): 10 ML/MIN/1.73M2
EGFR (NO RACE VARIABLE) (RUSH/TITUS): 9 ML/MIN/1.73M2
EJECTION FRACTION: 35 %
EOSINOPHIL # BLD AUTO: 0 K/UL (ref 0–0.5)
EOSINOPHIL # BLD AUTO: 0 K/UL (ref 0–0.5)
EOSINOPHIL NFR BLD AUTO: 0 % (ref 1–4)
EOSINOPHIL NFR BLD AUTO: 0 % (ref 1–4)
ERYTHROCYTE [DISTWIDTH] IN BLOOD BY AUTOMATED COUNT: 16.5 % (ref 11.5–14.5)
ERYTHROCYTE [DISTWIDTH] IN BLOOD BY AUTOMATED COUNT: 16.8 % (ref 11.5–14.5)
EST. AVERAGE GLUCOSE BLD GHB EST-MCNC: 97 MG/DL
FRACTIONAL SHORTENING: 11 % (ref 28–44)
GLOBULIN SER-MCNC: 3.9 G/DL (ref 2–4)
GLOBULIN SER-MCNC: 4.2 G/DL (ref 2–4)
GLUCOSE SERPL-MCNC: 101 MG/DL (ref 74–106)
GLUCOSE SERPL-MCNC: 115 MG/DL (ref 74–106)
GLUCOSE SERPL-MCNC: 58 MG/DL (ref 70–105)
GLUCOSE SERPL-MCNC: 67 MG/DL (ref 70–105)
GLUCOSE SERPL-MCNC: 72 MG/DL (ref 70–105)
GLUCOSE SERPL-MCNC: 74 MG/DL (ref 70–105)
GLUCOSE SERPL-MCNC: 83 MG/DL (ref 70–105)
GLUCOSE UR STRIP-MCNC: NORMAL MG/DL
HBA1C MFR BLD HPLC: 5.5 % (ref 4.5–6.6)
HBV CORE IGM SER QL: NORMAL
HBV SURFACE AB SER-ACNC: NORMAL M[IU]/ML
HBV SURFACE AG SERPL QL IA: NORMAL
HCT VFR BLD AUTO: 45.7 % (ref 40–54)
HCT VFR BLD AUTO: 49 % (ref 40–54)
HCV AB SER QL: NORMAL
HGB BLD-MCNC: 15.2 G/DL (ref 13.5–18)
HGB BLD-MCNC: 15.8 G/DL (ref 13.5–18)
HIV 1+O+2 AB SERPL QL: NORMAL
IMM GRANULOCYTES # BLD AUTO: 0.21 K/UL (ref 0–0.04)
IMM GRANULOCYTES # BLD AUTO: 0.23 K/UL (ref 0–0.04)
IMM GRANULOCYTES NFR BLD: 0.7 % (ref 0–0.4)
IMM GRANULOCYTES NFR BLD: 0.8 % (ref 0–0.4)
INTERVENTRICULAR SEPTUM: 1.18 CM (ref 0.6–1.1)
IVC OSTIUM: 1.1 CM
KETONES UR STRIP-SCNC: NEGATIVE MG/DL
LACTATE SERPL-SCNC: 2.3 MMOL/L (ref 0.4–2)
LACTATE SERPL-SCNC: 4.6 MMOL/L (ref 0.4–2)
LACTATE SERPL-SCNC: 7.8 MMOL/L (ref 0.4–2)
LACTATE SERPL-SCNC: 8.7 MMOL/L (ref 0.4–2)
LEFT ATRIUM SIZE: 2.3 CM
LEFT INTERNAL DIMENSION IN SYSTOLE: 4.31 CM (ref 2.1–4)
LEFT VENTRICLE DIASTOLIC VOLUME INDEX: 60.55 ML/M2
LEFT VENTRICLE DIASTOLIC VOLUME: 109.6 ML
LEFT VENTRICLE MASS INDEX: 116 G/M2
LEFT VENTRICLE SYSTOLIC VOLUME INDEX: 46.1 ML/M2
LEFT VENTRICLE SYSTOLIC VOLUME: 83.5 ML
LEFT VENTRICULAR INTERNAL DIMENSION IN DIASTOLE: 4.84 CM (ref 3.5–6)
LEFT VENTRICULAR MASS: 210.39 G
LEUKOCYTE ESTERASE UR QL STRIP: ABNORMAL
LVOT MG: 1 MMHG
LYMPHOCYTES # BLD AUTO: 0.43 K/UL (ref 1–4.8)
LYMPHOCYTES # BLD AUTO: 0.52 K/UL (ref 1–4.8)
LYMPHOCYTES NFR BLD AUTO: 1.5 % (ref 27–41)
LYMPHOCYTES NFR BLD AUTO: 1.7 % (ref 27–41)
LYMPHOCYTES NFR BLD MANUAL: 1 % (ref 27–41)
LYMPHOCYTES NFR BLD MANUAL: 4 % (ref 27–41)
MCH RBC QN AUTO: 29.6 PG (ref 27–31)
MCH RBC QN AUTO: 29.8 PG (ref 27–31)
MCHC RBC AUTO-ENTMCNC: 32.2 G/DL (ref 32–36)
MCHC RBC AUTO-ENTMCNC: 33.3 G/DL (ref 32–36)
MCV RBC AUTO: 89.1 FL (ref 80–96)
MCV RBC AUTO: 92.3 FL (ref 80–96)
MONOCYTES # BLD AUTO: 1 K/UL (ref 0–0.8)
MONOCYTES # BLD AUTO: 1.13 K/UL (ref 0–0.8)
MONOCYTES NFR BLD AUTO: 3.6 % (ref 2–6)
MONOCYTES NFR BLD AUTO: 3.6 % (ref 2–6)
MONOCYTES NFR BLD MANUAL: 2 % (ref 2–6)
MONOCYTES NFR BLD MANUAL: 5 % (ref 2–6)
MPC BLD CALC-MCNC: 10.6 FL (ref 9.4–12.4)
MPC BLD CALC-MCNC: 9.6 FL (ref 9.4–12.4)
MV PEAK E VEL: 1.02 M/S
MYELOCYTES NFR BLD MANUAL: 1 %
NEUTROPHILS # BLD AUTO: 26.12 K/UL (ref 1.8–7.7)
NEUTROPHILS # BLD AUTO: 29.29 K/UL (ref 1.8–7.7)
NEUTROPHILS NFR BLD AUTO: 93.8 % (ref 53–65)
NEUTROPHILS NFR BLD AUTO: 93.9 % (ref 53–65)
NEUTS BAND NFR BLD MANUAL: 3 % (ref 1–5)
NEUTS BAND NFR BLD MANUAL: 4 % (ref 1–5)
NEUTS SEG NFR BLD MANUAL: 89 % (ref 50–62)
NEUTS SEG NFR BLD MANUAL: 91 % (ref 50–62)
NITRITE UR QL STRIP: NEGATIVE
NRBC # BLD AUTO: 0 X10E3/UL
NRBC # BLD AUTO: 0 X10E3/UL
NRBC, AUTO (.00): 0 %
NRBC, AUTO (.00): 0 %
OVALOCYTES BLD QL SMEAR: ABNORMAL
OVALOCYTES BLD QL SMEAR: ABNORMAL
PH UR STRIP: 5 PH UNITS
PISA TR MAX VEL: 2.2 M/S
PLATELET # BLD AUTO: 177 K/UL (ref 150–400)
PLATELET # BLD AUTO: 192 K/UL (ref 150–400)
PLATELET MORPHOLOGY: ABNORMAL
PLATELET MORPHOLOGY: NORMAL
POTASSIUM SERPL-SCNC: 4.8 MMOL/L (ref 3.5–5.1)
POTASSIUM SERPL-SCNC: 5.8 MMOL/L (ref 3.5–5.1)
PROT SERPL-MCNC: 5.9 G/DL (ref 6.4–8.2)
PROT SERPL-MCNC: 6.3 G/DL (ref 6.4–8.2)
PROT UR QL STRIP: 30
RA MAJOR: 2.9 CM
RA PRESSURE: 3 MMHG
RBC # BLD AUTO: 5.13 M/UL (ref 4.6–6.2)
RBC # BLD AUTO: 5.31 M/UL (ref 4.6–6.2)
RBC # UR STRIP: ABNORMAL /UL
RBC #/AREA URNS HPF: ABNORMAL /HPF
RIGHT VENTRICULAR END-DIASTOLIC DIMENSION: 3.1 CM
SODIUM SERPL-SCNC: 132 MMOL/L (ref 136–145)
SODIUM SERPL-SCNC: 133 MMOL/L (ref 136–145)
SP GR UR STRIP: 1.02
SQUAMOUS #/AREA URNS LPF: ABNORMAL /LPF
SYPHILIS AB INTERPRETATION: NORMAL
TR MAX PG: 19 MMHG
TRICUSPID ANNULAR PLANE SYSTOLIC EXCURSION: 1.8 CM
TROPONIN I SERPL DL<=0.01 NG/ML-MCNC: 377 PG/ML
TROPONIN I SERPL DL<=0.01 NG/ML-MCNC: 392.1 PG/ML
TSH SERPL DL<=0.005 MIU/L-ACNC: 4.1 UIU/ML (ref 0.36–3.74)
TV REST PULMONARY ARTERY PRESSURE: 22 MMHG
UROBILINOGEN UR STRIP-ACNC: NORMAL MG/DL
VERIGENE RESULT: ABNORMAL
WBC # BLD AUTO: 27.81 K/UL (ref 4.5–11)
WBC # BLD AUTO: 31.22 K/UL (ref 4.5–11)
WBC #/AREA URNS HPF: ABNORMAL /HPF
YEAST #/AREA URNS HPF: ABNORMAL /HPF

## 2023-06-02 PROCEDURE — 25000003 PHARM REV CODE 250: Performed by: INTERNAL MEDICINE

## 2023-06-02 PROCEDURE — 83036 HEMOGLOBIN GLYCOSYLATED A1C: CPT | Performed by: INTERNAL MEDICINE

## 2023-06-02 PROCEDURE — 99291 CRITICAL CARE FIRST HOUR: CPT | Mod: ,,, | Performed by: INTERNAL MEDICINE

## 2023-06-02 PROCEDURE — 99291 PR CRITICAL CARE, E/M 30-74 MINUTES: ICD-10-PCS | Mod: ,,, | Performed by: INTERNAL MEDICINE

## 2023-06-02 PROCEDURE — 86706 HEP B SURFACE ANTIBODY: CPT | Performed by: INTERNAL MEDICINE

## 2023-06-02 PROCEDURE — 86705 HEP B CORE ANTIBODY IGM: CPT | Performed by: INTERNAL MEDICINE

## 2023-06-02 PROCEDURE — 80053 COMPREHEN METABOLIC PANEL: CPT | Performed by: INTERNAL MEDICINE

## 2023-06-02 PROCEDURE — 63600175 PHARM REV CODE 636 W HCPCS

## 2023-06-02 PROCEDURE — 81001 URINALYSIS AUTO W/SCOPE: CPT | Performed by: EMERGENCY MEDICINE

## 2023-06-02 PROCEDURE — 27000190 HC CPAP FULL FACE MASK W/VALVE

## 2023-06-02 PROCEDURE — 63600175 PHARM REV CODE 636 W HCPCS: Performed by: INTERNAL MEDICINE

## 2023-06-02 PROCEDURE — A4217 STERILE WATER/SALINE, 500 ML: HCPCS | Performed by: INTERNAL MEDICINE

## 2023-06-02 PROCEDURE — 83605 ASSAY OF LACTIC ACID: CPT | Performed by: EMERGENCY MEDICINE

## 2023-06-02 PROCEDURE — 86803 HEPATITIS C AB TEST: CPT | Performed by: INTERNAL MEDICINE

## 2023-06-02 PROCEDURE — 20000000 HC ICU ROOM

## 2023-06-02 PROCEDURE — 86707 HEPATITIS BE ANTIBODY: CPT | Mod: 90 | Performed by: INTERNAL MEDICINE

## 2023-06-02 PROCEDURE — 99900035 HC TECH TIME PER 15 MIN (STAT)

## 2023-06-02 PROCEDURE — 99900031 HC PATIENT EDUCATION (STAT)

## 2023-06-02 PROCEDURE — 85025 COMPLETE CBC W/AUTO DIFF WBC: CPT | Performed by: INTERNAL MEDICINE

## 2023-06-02 PROCEDURE — 27000221 HC OXYGEN, UP TO 24 HOURS

## 2023-06-02 PROCEDURE — 87340 HEPATITIS B SURFACE AG IA: CPT | Performed by: INTERNAL MEDICINE

## 2023-06-02 PROCEDURE — 82962 GLUCOSE BLOOD TEST: CPT

## 2023-06-02 PROCEDURE — 94660 CPAP INITIATION&MGMT: CPT

## 2023-06-02 PROCEDURE — 87389 HIV-1 AG W/HIV-1&-2 AB AG IA: CPT | Performed by: INTERNAL MEDICINE

## 2023-06-02 PROCEDURE — 25000003 PHARM REV CODE 250: Performed by: SURGERY

## 2023-06-02 PROCEDURE — 25000003 PHARM REV CODE 250

## 2023-06-02 PROCEDURE — 86780 TREPONEMA PALLIDUM: CPT | Performed by: INTERNAL MEDICINE

## 2023-06-02 PROCEDURE — 84484 ASSAY OF TROPONIN QUANT: CPT | Performed by: INTERNAL MEDICINE

## 2023-06-02 PROCEDURE — 94761 N-INVAS EAR/PLS OXIMETRY MLT: CPT

## 2023-06-02 PROCEDURE — 87086 URINE CULTURE/COLONY COUNT: CPT | Performed by: EMERGENCY MEDICINE

## 2023-06-02 RX ORDER — MORPHINE SULFATE 2 MG/ML
2 INJECTION, SOLUTION INTRAMUSCULAR; INTRAVENOUS EVERY 4 HOURS PRN
Status: DISCONTINUED | OUTPATIENT
Start: 2023-06-02 | End: 2023-06-09 | Stop reason: HOSPADM

## 2023-06-02 RX ORDER — SODIUM CHLORIDE 0.9 % (FLUSH) 0.9 %
10 SYRINGE (ML) INJECTION
Status: DISCONTINUED | OUTPATIENT
Start: 2023-06-02 | End: 2023-06-09 | Stop reason: HOSPADM

## 2023-06-02 RX ORDER — OXYCODONE HYDROCHLORIDE 5 MG/1
5 TABLET ORAL EVERY 4 HOURS PRN
Status: DISCONTINUED | OUTPATIENT
Start: 2023-06-02 | End: 2023-06-09 | Stop reason: HOSPADM

## 2023-06-02 RX ORDER — MUPIROCIN 20 MG/G
OINTMENT TOPICAL 2 TIMES DAILY
Status: COMPLETED | OUTPATIENT
Start: 2023-06-02 | End: 2023-06-06

## 2023-06-02 RX ORDER — SILVER SULFADIAZINE 10 G/1000G
CREAM TOPICAL DAILY
Status: DISCONTINUED | OUTPATIENT
Start: 2023-06-02 | End: 2023-06-09 | Stop reason: HOSPADM

## 2023-06-02 RX ORDER — METOPROLOL SUCCINATE 25 MG/1
25 TABLET, EXTENDED RELEASE ORAL DAILY
Status: DISCONTINUED | OUTPATIENT
Start: 2023-06-02 | End: 2023-06-09 | Stop reason: HOSPADM

## 2023-06-02 RX ORDER — PROCHLORPERAZINE EDISYLATE 5 MG/ML
5 INJECTION INTRAMUSCULAR; INTRAVENOUS EVERY 6 HOURS PRN
Status: DISCONTINUED | OUTPATIENT
Start: 2023-06-02 | End: 2023-06-09 | Stop reason: HOSPADM

## 2023-06-02 RX ORDER — ONDANSETRON 2 MG/ML
4 INJECTION INTRAMUSCULAR; INTRAVENOUS EVERY 8 HOURS PRN
Status: DISCONTINUED | OUTPATIENT
Start: 2023-06-02 | End: 2023-06-08

## 2023-06-02 RX ORDER — IPRATROPIUM BROMIDE AND ALBUTEROL SULFATE 2.5; .5 MG/3ML; MG/3ML
3 SOLUTION RESPIRATORY (INHALATION) EVERY 4 HOURS PRN
Status: DISCONTINUED | OUTPATIENT
Start: 2023-06-02 | End: 2023-06-09 | Stop reason: HOSPADM

## 2023-06-02 RX ADMIN — PIPERACILLIN AND TAZOBACTAM 4.5 G: 4; .5 INJECTION, POWDER, FOR SOLUTION INTRAVENOUS; PARENTERAL at 05:06

## 2023-06-02 RX ADMIN — MUPIROCIN: 20 OINTMENT TOPICAL at 08:06

## 2023-06-02 RX ADMIN — VASOPRESSIN: 20 INJECTION, SOLUTION INTRAVENOUS at 12:06

## 2023-06-02 RX ADMIN — APIXABAN 5 MG: 5 TABLET, FILM COATED ORAL at 08:06

## 2023-06-02 RX ADMIN — VASOPRESSIN: 20 INJECTION, SOLUTION INTRAVENOUS at 05:06

## 2023-06-02 RX ADMIN — MORPHINE SULFATE 2 MG: 2 INJECTION, SOLUTION INTRAMUSCULAR; INTRAVENOUS at 03:06

## 2023-06-02 RX ADMIN — VANCOMYCIN HYDROCHLORIDE: 1 INJECTION, POWDER, LYOPHILIZED, FOR SOLUTION INTRAVENOUS at 01:06

## 2023-06-02 RX ADMIN — SILVER SULFADIAZINE: 10 CREAM TOPICAL at 12:06

## 2023-06-02 RX ADMIN — OXYCODONE HYDROCHLORIDE 5 MG: 5 TABLET ORAL at 08:06

## 2023-06-02 RX ADMIN — ASPIRIN 81 MG: 81 TABLET, COATED ORAL at 08:06

## 2023-06-02 RX ADMIN — HEPARIN SODIUM 5000 UNITS: 5000 INJECTION, SOLUTION INTRAVENOUS; SUBCUTANEOUS at 12:06

## 2023-06-02 RX ADMIN — PIPERACILLIN AND TAZOBACTAM 4.5 G: 4; .5 INJECTION, POWDER, FOR SOLUTION INTRAVENOUS; PARENTERAL at 06:06

## 2023-06-02 RX ADMIN — VASOPRESSIN: 20 INJECTION, SOLUTION INTRAVENOUS at 09:06

## 2023-06-02 RX ADMIN — OXYCODONE HYDROCHLORIDE 5 MG: 5 TABLET ORAL at 12:06

## 2023-06-02 RX ADMIN — ATORVASTATIN CALCIUM 40 MG: 40 TABLET, FILM COATED ORAL at 08:06

## 2023-06-02 NOTE — ASSESSMENT & PLAN NOTE
Likely due to a combination of lactic acidosis and acute on chronic renal failure.  Will monitor closely.
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
## 2023-06-02 NOTE — ASSESSMENT & PLAN NOTE
Likely related to extra respiratory efforts to compensate for a significant metabolic acidosis.  Patient has O2 dependent COPD but chest x-ray was clear and physical examination was unremarkable.  Patient will be started on DuoNeb on PRN basis.

## 2023-06-02 NOTE — HPI
Patient is a 72-year-old male with a history of unspecified CHF in the setting of CAD, essential hypertension, diabetes, oxygen-dependent COPD with baseline supplemental oxygen requirement of 2 L/min and BiPAP QHS along with CKD stage IIIA who presented to the emergency room complaining of dyspnea which started just a few days ago.  Patient otherwise denied any fever chills cough hemoptysis PND orthopnea chest pain palpitation or lower extremity edema in association.  Patient's niece stated that he has chronic wounds on both legs and felt that he has not been given proper care to address this.  Patient lives alone and when his niece visited him today, she found him slumped over on a recliner dyspneic prompting ED visit.     On initial presentation, patient was tachycardic and tachypneic but vital signs were otherwise stable and patient was afebrile.  Workup was notable for what appeared to be a new onset atrial fibrillation with RVR with elevated troponin and proBNP, elevation in total bilirubin level with mildly elevated transaminases and alkaline phosphatase, leukocytosis with left shift with WBC count of 23,000 and high anion gap metabolic acidosis with anion gap of 29 and bicarbonate of 7, acute on chronic renal failure with BUN of 110 and creatinine of 7.03 from a baseline serum creatinine of 1.43, significantly elevated lactic acid level with initial lactic acid level of 9.7 to 10.7 even after receiving 30 cc/kg of crystalloid IV fluid bolus.  CT of bilateral lower extremity demonstrated a presence of bilateral cellulitis without evidence of drainable abscess or necrotizing fasciitis. Patient will be admitted for further evaluation and intervention

## 2023-06-02 NOTE — ASSESSMENT & PLAN NOTE
>>ASSESSMENT AND PLAN FOR SEPSIS WRITTEN ON 6/2/2023 12:51 AM BY AMANDA WONG MD    This patient does have evidence of infective focus  My overall impression is sepsis.  Source: Skin and Soft Tissue (location Bilateral lower extremities )  Antibiotics given-Zosyn  Antibiotics (72h ago, onward)    Vancomycin and Zosyn        Latest lactate reviewed-  Recent Labs   Lab 06/01/23 1939   LACTATE 9.7*     Organ dysfunction indicated by Acute kidney injury     Fluid challenge Actual Body weight- Patient will receive 30ml/kg actual body weight to calculate fluid bolus for treatment of septic shock.     Post- resuscitation assessment Yes Perfusion exam was performed within 6 hours of septic shock presentation after bolus shows Adequate tissue perfusion assessed by non-invasive monitoring       Will Not start Pressors- Levophed for MAP of 65  Source control achieved by: Empiric ABX

## 2023-06-02 NOTE — HPI
72-year-old man presented with shortness of breath.  He was noted to have cellulitis of both lower extremities.  He was tachycardic and noted to have metabolic acidosis hyperkalemia and acute renal failure.

## 2023-06-02 NOTE — ASSESSMENT & PLAN NOTE
ARF secondary to volume depletion, sepsis and hypotension.  Continue IV fluid including bicarbonate.  Infection is being treated empirically.  Discontinue vancomycin if MRSA not cultured.

## 2023-06-02 NOTE — ASSESSMENT & PLAN NOTE
Potassium was 6.9 on admission.  Patient was treated in usual customary manner and currently potassium is down to 4.2.  Will monitor potassium level closely

## 2023-06-02 NOTE — ASSESSMENT & PLAN NOTE
Acute renal failure renal has been consulted he is getting fluids will review lab today and make a decision where we go from here

## 2023-06-02 NOTE — ASSESSMENT & PLAN NOTE
>>ASSESSMENT AND PLAN FOR SEPSIS WRITTEN ON 6/2/2023  7:03 AM BY TONY MUHAMMAD MD    In reviewing previous note thought to have a focus related to the legs will have surgery sees on vancomycin and Zosyn .

## 2023-06-02 NOTE — PROGRESS NOTES
06/02/23 1137   Wound Care Follow Up   Wound Care Follow-up? Yes   Wound Care- Next Visit Date 06/06/23   Follow Up Plan Yajaira POC
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
## 2023-06-02 NOTE — ED TRIAGE NOTES
Pt reports on ems with reported shortness of breath with hypoxeima. Typenic on triage in Afib rvr on montior with appears to be wounds on bl

## 2023-06-02 NOTE — PLAN OF CARE
[facility]  Initial Discharge Assessment       Primary Care Provider: Lor Patel MD    Admission Diagnosis: CHF (congestive heart failure) [I50.9]  SOB (shortness of breath) [R06.02]  Tachycardia [R00.0]  Chest pain [R07.9]  Severe sepsis [A41.9, R65.20]  Severe sepsis with acute organ dysfunction [A41.9, R65.20]    Admission Date: 6/1/2023  Expected Discharge Date:     Transition of Care Barriers: None    Payor: [employer] MEDICARE / Plan: [employer] HMO PPO SPECIAL NEEDS / Product Type: Medicare Advantage /     Extended Emergency Contact Information  Primary Emergency Contact: Hilda Bagley  Mobile Phone: [phone]  Relation: Relative    Discharge Plan A: Home  Discharge Plan B: Home       [facility] - [place], [place] - [address]  [address] [place] [address]  Phone: [phone] Fax: [phone]     [facility] [place] [address]  [address] [place] [address]  Phone: [phone] Fax: [phone]      Initial Assessment (most recent)       Adult Discharge Assessment - 06/02/23 1333          Discharge Assessment    Assessment Type Discharge Planning Assessment     Confirmed/corrected address, phone number and insurance Yes     Confirmed Demographics Correct on Facesheet     Source of Information patient;family     If unable to respond/provide information was family/caregiver contacted? Yes     Contact Name/Number Shelby Contreras  [phone] Aunt     Communicated MAKENNA with patient/caregiver Date not available/Unable to determine     People in Home other relative(s)     Facility Arrived From: home     Do you expect to return to your current living situation? Yes     Do you have help at home or someone to help you manage your care at home? No     Prior to hospitilization cognitive status: Unable to Assess     Current cognitive status: Unable to Assess     Home Layout Able to live on 1st floor     Equipment Currently Used at  Home oxygen     Readmission within 30 days? No     Patient currently being followed by outpatient case management? No     Do you currently have service(s) that help you manage your care at home? No     Who is going to help you get home at discharge? possible family     How do you get to doctors appointments? family or friend will provide     Are you on dialysis? No     Do you take coumadin? No     Discharge Plan A Home     Discharge Plan B Home     DME Needed Upon Discharge  none     Discharge Plan discussed with: --   spoke with Aunt Shelby Contreras    Transition of Care Barriers None                   Spoke with pt in room he is very hard to understand speech is slurred. Two family members came in while doing assessment, they assisted in answering questions. His Aunt Shelby said she is the primary contact for him and to please call her if any questions, pt agreed. Her number is [phone]. Per pt he would like to go home at dc, family is unsure. Will follow dc needs as arise.

## 2023-06-02 NOTE — ASSESSMENT & PLAN NOTE
At baseline patient is supplemental oxygen requirement is 4 liters/minute via nasal cannula with BiPAP q.h.s. currently patient is requiring 4 liters/minute via nasal cannula to achieve SpO2 of greater than 92%.  Continue present management with DuoNeb on PRN basis

## 2023-06-02 NOTE — PROGRESS NOTES
[facility]  Pulmonology  Progress Note    Patient Name: Esthela Contreras  MRN: [id number]  Admission Date: 6/1/2023  Hospital Length of Stay: 1 days  Code Status: Full Code  Attending Provider: Mak Brown MD  Primary Care Provider: Lor Patel MD   Principal Problem: Severe sepsis    Subjective:     Interval History:  Patient awake and alert       Objective:     Vital Signs (Most Recent):  Temp: 97 °F (36.1 °C) (06/02/23 0630)  Pulse: 95 (06/02/23 0630)  Resp: 14 (06/02/23 0630)  BP: (!) 93/57 (06/02/23 0630)  SpO2: 100 % (06/02/23 0630) Vital Signs (24h Range):  Temp:  [96.4 °F (35.8 °C)-99.1 °F (37.3 °C)] 97 °F (36.1 °C)  Pulse:  [] 95  Resp:  [9-37] 14  SpO2:  [95 %-100 %] 100 %  BP: ()/(51-80) 93/57     Weight: 70.2 kg (154 lb 12.2 oz)  Body mass index is 24.24 kg/m².      Intake/Output Summary (Last 24 hours) at 6/2/2023 0658  Last data filed at 6/2/2023 0050  Gross per 24 hour   Intake 3518.25 ml   Output --   Net 3518.25 ml        Physical Exam  Vitals reviewed.   Constitutional:       Appearance: Normal appearance.      Interventions: He is not intubated.  HENT:      Head: Normocephalic and atraumatic.      Nose: Nose normal.      Mouth/Throat:      Mouth: Mucous membranes are dry.      Pharynx: Oropharynx is clear.   Eyes:      Extraocular Movements: Extraocular movements intact.      Conjunctiva/sclera: Conjunctivae normal.      Pupils: Pupils are equal, round, and reactive to light.   Cardiovascular:      Rate and Rhythm: Normal rate.      Heart sounds: Normal heart sounds. No murmur heard.  Pulmonary:      Effort: Pulmonary effort is normal. He is not intubated.      Breath sounds: Normal breath sounds.   Abdominal:      General: Abdomen is flat. Bowel sounds are normal.      Palpations: Abdomen is soft.   Musculoskeletal:         General: Normal range of motion.      Cervical back: Normal range of motion and neck supple.      Right lower leg: No edema.       Left lower leg: No edema.   Skin:     General: Skin is warm and dry.      Capillary Refill: Capillary refill takes less than 2 seconds.   Neurological:      General: No focal deficit present.      Mental Status: He is alert and oriented to person, place, and time.   Psychiatric:         Mood and Affect: Mood normal.         Behavior: Behavior normal.         Review of Systems    Vents:  Oxygen Concentration (%): 30 (06/02/23 0400)    Lines/Drains/Airways       Peripheral Intravenous Line  Duration                  Peripheral IV - Single Lumen 06/01/23 2002 20 G Left Hand <1 day         Peripheral IV - Single Lumen 06/01/23 2042 20 G Right Antecubital <1 day                    Significant Labs:    CBC/Anemia Profile:  Recent Labs   Lab 06/01/23  1923 06/01/23  1939 06/01/23  2241   WBC  --  23.70*  --    HGB  --  18.4*  --    HCT 61* 59.1* 48   PLT  --  350  --    MCV  --  94.6  --    RDW  --  18.0*  --         Chemistries:  Recent Labs   Lab 06/01/23 1939   *   K 6.9*   CL 92*   CO2 7*   *   CREATININE 7.03*   CALCIUM 9.9   ALBUMIN 3.1*   PROT 10.1*   BILITOT 2.1*   ALKPHOS 189*   ALT 31   AST 50*       Recent Lab Results  (Last 5 results in the past 24 hours)        06/02/23  0342   06/02/23  0154   06/02/23  0114   06/02/23  0027   06/01/23  2348        ID NOW COVID-19, ([facility])               Estimated Avg Glucose 97               Hemoglobin A1C External 5.5  Comment:   Normal:               <5.7%  Pre-Diabetic:       5.7% to 6.4%  Diabetic:             >6.4%  Diabetic Goal:     <7%               Lactate, Telly 4.6  Comment: A repeat order for Lactic Acid has been placed for collection in 2 hours.     8.7  Comment: A repeat order for Lactic Acid has been placed for collection in 2 hours.     7.8  Comment: A repeat order for Lactic Acid has been placed for collection in 2 hours.       POC Glucose   72             Troponin I High Sensitivity 377.0       392.1                                Significant  Imaging:  I have reviewed all pertinent imaging results/findings within the past 24 hours.    Assessment/Plan:     Cardiac/Vascular  HFrEF (heart failure with reduced ejection fraction)  No signs of heart failure on x-ray    Renal/  High anion gap metabolic acidosis  Currently on bicarb  infusion repeat lab    Hyperkalemia  Treat with bicarbonate because acidosis and and calcium    Acute renal failure  Acute renal failure renal has been consulted he is getting fluids will review lab today and make a decision where we go from here    ID  * Severe sepsis  In reviewing previous note thought to have a focus related to the legs will have surgery sees on vancomycin and Zosyn .        31 minutes of critical care time including documentation medical record review physical examined bedside evaluation       Mak Brown MD  Pulmonology  [facility]

## 2023-06-02 NOTE — ASSESSMENT & PLAN NOTE
This patient does have evidence of infective focus  My overall impression is sepsis.  Source: Skin and Soft Tissue (location Bilateral lower extremities )  Antibiotics given-Zosyn  Antibiotics (72h ago, onward)    Vancomycin and Zosyn        Latest lactate reviewed-  Recent Labs   Lab 06/01/23 1939   LACTATE 9.7*     Organ dysfunction indicated by Acute kidney injury     Fluid challenge Actual Body weight- Patient will receive 30ml/kg actual body weight to calculate fluid bolus for treatment of septic shock.     Post- resuscitation assessment Yes Perfusion exam was performed within 6 hours of septic shock presentation after bolus shows Adequate tissue perfusion assessed by non-invasive monitoring       Will Not start Pressors- Levophed for MAP of 65  Source control achieved by: Empiric ABX
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
## 2023-06-02 NOTE — ASSESSMENT & PLAN NOTE
Continue home aspirin, BB, and high-intensity statin.  Patient was found to have elevated troponin level which is likely to represent type 2 MI. Will continue to trend troponin levels and obtain echocardiogram in a.m.

## 2023-06-02 NOTE — ASSESSMENT & PLAN NOTE
>>ASSESSMENT AND PLAN FOR CELLULITIS OF LOWER EXTREMITY WRITTEN ON 6/2/2023 12:54 AM BY AMANDA WONG MD      Patient has a history of chronic bilateral lower extremity wound complicated by frequent cellulitis.  Today, wound appears to be significantly lichenified with foul odoring and purulent drainage.  DP/PT could not be palpated secondary to presence of significant lichenification.  Will order bilateral lower extremity arterial Doppler to rule out a presence of PAD.  In the meantime patient will be started on empiric antibiotic regimen consisting of vancomycin and Zosyn

## 2023-06-02 NOTE — ASSESSMENT & PLAN NOTE
Per chart review, last ECHO in 2019 showed EF 40%.  Patient's proBNP was significantly elevated but initially patient appeared volume depleted.  Patient has tolerated 30 cc/kg of crystalloid IV fluid without any problems.  Will continue present management with isotonic bicarbonate infusion

## 2023-06-02 NOTE — CONSULTS
Pharmacy consulted to dose Vancomycin.    Based on pt of 97.5 kg and current acute renal failure, pharmacy to dose Vancomycin 2000 mg IV x 1 dose for now.    Pharmacy to follow up on labs and give further doses of Vancomycin as indicated.    Thank you.

## 2023-06-02 NOTE — ASSESSMENT & PLAN NOTE
Patient with new onset atrial fibrillation which is controlled currently with Beta Blocker. Patient is currently in atrial fibrillation.[id number] Score: 2. HASBLED Score: 2. Anticoagulation indicated. Anticoagulation done with Eliquis.

## 2023-06-02 NOTE — PLAN OF CARE
Problem: Adult Inpatient Plan of Care  Goal: Plan of Care Review  Outcome: Ongoing, Progressing  Goal: Patient-Specific Goal (Individualized)  Outcome: Ongoing, Progressing  Goal: Absence of Hospital-Acquired Illness or Injury  Outcome: Ongoing, Progressing  Goal: Optimal Comfort and Wellbeing  Outcome: Ongoing, Progressing  Goal: Readiness for Transition of Care  Outcome: Ongoing, Progressing     Problem: Adjustment to Illness (Sepsis/Septic Shock)  Goal: Optimal Coping  Outcome: Ongoing, Progressing     Problem: Bleeding (Sepsis/Septic Shock)  Goal: Absence of Bleeding  Outcome: Ongoing, Progressing     Problem: Glycemic Control Impaired (Sepsis/Septic Shock)  Goal: Blood Glucose Level Within Desired Range  Outcome: Ongoing, Progressing     Problem: Infection Progression (Sepsis/Septic Shock)  Goal: Absence of Infection Signs and Symptoms  Outcome: Ongoing, Progressing     Problem: Nutrition Impaired (Sepsis/Septic Shock)  Goal: Optimal Nutrition Intake  Outcome: Ongoing, Progressing     Problem: Fluid and Electrolyte Imbalance (Acute Kidney Injury/Impairment)  Goal: Fluid and Electrolyte Balance  Outcome: Ongoing, Progressing     Problem: Oral Intake Inadequate (Acute Kidney Injury/Impairment)  Goal: Optimal Nutrition Intake  Outcome: Ongoing, Progressing     Problem: Renal Function Impairment (Acute Kidney Injury/Impairment)  Goal: Effective Renal Function  Outcome: Ongoing, Progressing     Problem: Impaired Wound Healing  Goal: Optimal Wound Healing  Outcome: Ongoing, Progressing     Problem: Skin Injury Risk Increased  Goal: Skin Health and Integrity  Outcome: Ongoing, Progressing

## 2023-06-02 NOTE — HPI
72-year-old male with a history of CHF and COPD on home O2 presents to the emergency room limiting dyspnea which started a few days ago.  Also has problems with chronic leg wounds.  Workup in the ER showed white count 42369 high gap metabolic acidosis acute on top of chronic renal failure significantly elevated lactic acid the did not respond to fluids.  This morning he is more alert less short of breath

## 2023-06-02 NOTE — ASSESSMENT & PLAN NOTE
In reviewing previous note thought to have a focus related to the legs will have surgery sees on vancomycin and Zosyn .

## 2023-06-02 NOTE — SUBJECTIVE & OBJECTIVE
Interval History:  Patient awake and alert       Objective:     Vital Signs (Most Recent):  Temp: 97 °F (36.1 °C) (06/02/23 0630)  Pulse: 95 (06/02/23 0630)  Resp: 14 (06/02/23 0630)  BP: (!) 93/57 (06/02/23 0630)  SpO2: 100 % (06/02/23 0630) Vital Signs (24h Range):  Temp:  [96.4 °F (35.8 °C)-99.1 °F (37.3 °C)] 97 °F (36.1 °C)  Pulse:  [] 95  Resp:  [9-37] 14  SpO2:  [95 %-100 %] 100 %  BP: ()/(51-80) 93/57     Weight: 70.2 kg (154 lb 12.2 oz)  Body mass index is 24.24 kg/m².      Intake/Output Summary (Last 24 hours) at 6/2/2023 0658  Last data filed at 6/2/2023 0050  Gross per 24 hour   Intake 3518.25 ml   Output --   Net 3518.25 ml        Physical Exam  Vitals reviewed.   Constitutional:       Appearance: Normal appearance.      Interventions: He is not intubated.  HENT:      Head: Normocephalic and atraumatic.      Nose: Nose normal.      Mouth/Throat:      Mouth: Mucous membranes are dry.      Pharynx: Oropharynx is clear.   Eyes:      Extraocular Movements: Extraocular movements intact.      Conjunctiva/sclera: Conjunctivae normal.      Pupils: Pupils are equal, round, and reactive to light.   Cardiovascular:      Rate and Rhythm: Normal rate.      Heart sounds: Normal heart sounds. No murmur heard.  Pulmonary:      Effort: Pulmonary effort is normal. He is not intubated.      Breath sounds: Normal breath sounds.   Abdominal:      General: Abdomen is flat. Bowel sounds are normal.      Palpations: Abdomen is soft.   Musculoskeletal:         General: Normal range of motion.      Cervical back: Normal range of motion and neck supple.      Right lower leg: No edema.      Left lower leg: No edema.   Skin:     General: Skin is warm and dry.      Capillary Refill: Capillary refill takes less than 2 seconds.   Neurological:      General: No focal deficit present.      Mental Status: He is alert and oriented to person, place, and time.   Psychiatric:         Mood and Affect: Mood normal.         Behavior:  Behavior normal.         Review of Systems    Vents:  Oxygen Concentration (%): 30 (06/02/23 0400)    Lines/Drains/Airways       Peripheral Intravenous Line  Duration                  Peripheral IV - Single Lumen 06/01/23 2002 20 G Left Hand <1 day         Peripheral IV - Single Lumen 06/01/23 2042 20 G Right Antecubital <1 day                    Significant Labs:    CBC/Anemia Profile:  Recent Labs   Lab 06/01/23  1923 06/01/23  1939 06/01/23  2241   WBC  --  23.70*  --    HGB  --  18.4*  --    HCT 61* 59.1* 48   PLT  --  350  --    MCV  --  94.6  --    RDW  --  18.0*  --         Chemistries:  Recent Labs   Lab 06/01/23 1939   *   K 6.9*   CL 92*   CO2 7*   *   CREATININE 7.03*   CALCIUM 9.9   ALBUMIN 3.1*   PROT 10.1*   BILITOT 2.1*   ALKPHOS 189*   ALT 31   AST 50*       Recent Lab Results  (Last 5 results in the past 24 hours)        06/02/23  0342   06/02/23  0154   06/02/23  0114   06/02/23  0027   06/01/23  2348        ID NOW COVID-19, ([facility])               Estimated Avg Glucose 97               Hemoglobin A1C External 5.5  Comment:   Normal:               <5.7%  Pre-Diabetic:       5.7% to 6.4%  Diabetic:             >6.4%  Diabetic Goal:     <7%               Lactate, Telly 4.6  Comment: A repeat order for Lactic Acid has been placed for collection in 2 hours.     8.7  Comment: A repeat order for Lactic Acid has been placed for collection in 2 hours.     7.8  Comment: A repeat order for Lactic Acid has been placed for collection in 2 hours.       POC Glucose   72             Troponin I High Sensitivity 377.0       392.1                                Significant Imaging:  I have reviewed all pertinent imaging results/findings within the past 24 hours.
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
## 2023-06-02 NOTE — ASSESSMENT & PLAN NOTE
Patient has a history of chronic bilateral lower extremity wound complicated by frequent cellulitis.  Today, wound appears to be significantly lichenified with foul odoring and purulent drainage.  DP/PT could not be palpated secondary to presence of significant lichenification.  Will order bilateral lower extremity arterial Doppler to rule out a presence of PAD.  In the meantime patient will be started on empiric antibiotic regimen consisting of vancomycin and Zosyn

## 2023-06-02 NOTE — ASSESSMENT & PLAN NOTE
Patient has a significant lactic acidosis with lactic acid level of 9.7, bicarb level of 7 and anion gap of 29.  When bicarb level was repeated after having received 30 cc/kg of crystalloid IV fluid, patient's lactic acid level went up to 10.7 even with normal post resuscitation perfusion examination. With pH of 7.05.  Will start patient on isotonic bicarb infusion.

## 2023-06-02 NOTE — ASSESSMENT & PLAN NOTE
>>ASSESSMENT AND PLAN FOR SEPSIS WRITTEN ON 6/2/2023  5:01 PM BY MARCO HE MD      Antibiotics given-   Antibiotics (72h ago, onward)    Start     Stop Route Frequency Ordered    06/02/23 0930  silver sulfADIAZINE 1% cream         -- Top Daily 06/02/23 0815    06/02/23 0930  mupirocin 2 % ointment         06/07 0859 Nasl 2 times daily 06/02/23 0816    06/02/23 0500  piperacillin-tazobactam (ZOSYN) 4.5 g in dextrose 5 % in water (D5W) 5 % 100 mL IVPB (MB+)         -- IV Every 12 hours (non-standard times) 06/02/23 0048    06/02/23 0207  vancomycin - pharmacy to dose         -- IV pharmacy to manage frequency 06/02/23 0107        Latest lactate reviewed-  Recent Labs   Lab 06/02/23  0114 06/02/23  0342 06/02/23  0840   LACTATE 8.7* 4.6* 2.3*     Organ dysfunction indicated by Acute kidney injury

## 2023-06-03 PROBLEM — R78.81 GRAM-POSITIVE BACTEREMIA: Status: ACTIVE | Noted: 2023-06-03

## 2023-06-03 PROBLEM — I95.9 HYPOTENSION: Status: ACTIVE | Noted: 2023-06-03

## 2023-06-03 PROBLEM — S81.809D: Status: ACTIVE | Noted: 2023-06-03

## 2023-06-03 PROBLEM — E87.29 HIGH ANION GAP METABOLIC ACIDOSIS: Status: RESOLVED | Noted: 2023-06-02 | Resolved: 2023-06-03

## 2023-06-03 LAB
ALBUMIN SERPL BCP-MCNC: 1.7 G/DL (ref 3.5–5)
ALBUMIN/GLOB SERPL: 0.4 {RATIO}
ALP SERPL-CCNC: 135 U/L (ref 45–115)
ALT SERPL W P-5'-P-CCNC: 31 U/L (ref 16–61)
ANION GAP SERPL CALCULATED.3IONS-SCNC: 15 MMOL/L (ref 7–16)
ANISOCYTOSIS BLD QL SMEAR: ABNORMAL
AST SERPL W P-5'-P-CCNC: 62 U/L (ref 15–37)
BASOPHILS # BLD AUTO: 0.03 K/UL (ref 0–0.2)
BASOPHILS NFR BLD AUTO: 0.1 % (ref 0–1)
BILIRUB SERPL-MCNC: 1.2 MG/DL (ref ?–1.2)
BUN SERPL-MCNC: 94 MG/DL (ref 7–18)
BUN/CREAT SERPL: 22 (ref 6–20)
CALCIUM SERPL-MCNC: 7.6 MG/DL (ref 8.5–10.1)
CHLORIDE SERPL-SCNC: 95 MMOL/L (ref 98–107)
CO2 SERPL-SCNC: 28 MMOL/L (ref 21–32)
CREAT SERPL-MCNC: 4.29 MG/DL (ref 0.7–1.3)
CRENATED CELLS: ABNORMAL
DIFFERENTIAL METHOD BLD: ABNORMAL
EGFR (NO RACE VARIABLE) (RUSH/TITUS): 14 ML/MIN/1.73M2
EOSINOPHIL # BLD AUTO: 0 K/UL (ref 0–0.5)
EOSINOPHIL NFR BLD AUTO: 0 % (ref 1–4)
ERYTHROCYTE [DISTWIDTH] IN BLOOD BY AUTOMATED COUNT: 16.9 % (ref 11.5–14.5)
GLOBULIN SER-MCNC: 4.4 G/DL (ref 2–4)
GLUCOSE SERPL-MCNC: 102 MG/DL (ref 70–105)
GLUCOSE SERPL-MCNC: 103 MG/DL (ref 74–106)
GLUCOSE SERPL-MCNC: 105 MG/DL (ref 70–105)
GLUCOSE SERPL-MCNC: 124 MG/DL (ref 70–105)
GLUCOSE SERPL-MCNC: 51 MG/DL (ref 70–105)
GLUCOSE SERPL-MCNC: 59 MG/DL (ref 70–105)
GLUCOSE SERPL-MCNC: 59 MG/DL (ref 70–105)
GLUCOSE SERPL-MCNC: 63 MG/DL (ref 70–105)
GLUCOSE SERPL-MCNC: 88 MG/DL (ref 70–105)
GLUCOSE SERPL-MCNC: 91 MG/DL (ref 70–105)
GLUCOSE SERPL-MCNC: 92 MG/DL (ref 70–105)
GLUCOSE SERPL-MCNC: 97 MG/DL (ref 70–105)
HCT VFR BLD AUTO: 43.8 % (ref 40–54)
HGB BLD-MCNC: 14.2 G/DL (ref 13.5–18)
IMM GRANULOCYTES # BLD AUTO: 0.15 K/UL (ref 0–0.04)
IMM GRANULOCYTES NFR BLD: 0.7 % (ref 0–0.4)
INR BLD: 1.4
LYMPHOCYTES # BLD AUTO: 0.54 K/UL (ref 1–4.8)
LYMPHOCYTES NFR BLD AUTO: 2.6 % (ref 27–41)
LYMPHOCYTES NFR BLD MANUAL: 2 % (ref 27–41)
MAGNESIUM SERPL-MCNC: 2.5 MG/DL (ref 1.7–2.3)
MCH RBC QN AUTO: 29.5 PG (ref 27–31)
MCHC RBC AUTO-ENTMCNC: 32.4 G/DL (ref 32–36)
MCV RBC AUTO: 91.1 FL (ref 80–96)
MONOCYTES # BLD AUTO: 0.91 K/UL (ref 0–0.8)
MONOCYTES NFR BLD AUTO: 4.4 % (ref 2–6)
MONOCYTES NFR BLD MANUAL: 3 % (ref 2–6)
MPC BLD CALC-MCNC: 11.1 FL (ref 9.4–12.4)
NEUTROPHILS # BLD AUTO: 18.99 K/UL (ref 1.8–7.7)
NEUTROPHILS NFR BLD AUTO: 92.2 % (ref 53–65)
NEUTS BAND NFR BLD MANUAL: 2 % (ref 1–5)
NEUTS SEG NFR BLD MANUAL: 93 % (ref 50–62)
NRBC # BLD AUTO: 0 X10E3/UL
NRBC, AUTO (.00): 0 %
PLATELET # BLD AUTO: 111 K/UL (ref 150–400)
PLATELET MORPHOLOGY: ABNORMAL
POTASSIUM SERPL-SCNC: 4.3 MMOL/L (ref 3.5–5.1)
PROT SERPL-MCNC: 6.1 G/DL (ref 6.4–8.2)
PROTHROMBIN TIME: 16.6 SECONDS (ref 11.7–14.7)
RBC # BLD AUTO: 4.81 M/UL (ref 4.6–6.2)
SODIUM SERPL-SCNC: 134 MMOL/L (ref 136–145)
WBC # BLD AUTO: 20.62 K/UL (ref 4.5–11)

## 2023-06-03 PROCEDURE — 99291 PR CRITICAL CARE, E/M 30-74 MINUTES: ICD-10-PCS | Mod: ,,, | Performed by: NURSE PRACTITIONER

## 2023-06-03 PROCEDURE — 99291 CRITICAL CARE FIRST HOUR: CPT | Mod: ,,, | Performed by: NURSE PRACTITIONER

## 2023-06-03 PROCEDURE — 63600175 PHARM REV CODE 636 W HCPCS: Performed by: NURSE PRACTITIONER

## 2023-06-03 PROCEDURE — A4217 STERILE WATER/SALINE, 500 ML: HCPCS | Performed by: INTERNAL MEDICINE

## 2023-06-03 PROCEDURE — 83735 ASSAY OF MAGNESIUM: CPT | Performed by: INTERNAL MEDICINE

## 2023-06-03 PROCEDURE — 94660 CPAP INITIATION&MGMT: CPT

## 2023-06-03 PROCEDURE — 85610 PROTHROMBIN TIME: CPT | Performed by: NURSE PRACTITIONER

## 2023-06-03 PROCEDURE — 99900035 HC TECH TIME PER 15 MIN (STAT)

## 2023-06-03 PROCEDURE — 82962 GLUCOSE BLOOD TEST: CPT

## 2023-06-03 PROCEDURE — 20000000 HC ICU ROOM

## 2023-06-03 PROCEDURE — 27000221 HC OXYGEN, UP TO 24 HOURS

## 2023-06-03 PROCEDURE — 25000003 PHARM REV CODE 250: Performed by: INTERNAL MEDICINE

## 2023-06-03 PROCEDURE — 25000003 PHARM REV CODE 250: Performed by: EMERGENCY MEDICINE

## 2023-06-03 PROCEDURE — 25000003 PHARM REV CODE 250

## 2023-06-03 PROCEDURE — 25000003 PHARM REV CODE 250: Performed by: NURSE PRACTITIONER

## 2023-06-03 PROCEDURE — 63600175 PHARM REV CODE 636 W HCPCS: Mod: JG

## 2023-06-03 PROCEDURE — 63600175 PHARM REV CODE 636 W HCPCS: Performed by: INTERNAL MEDICINE

## 2023-06-03 PROCEDURE — 80053 COMPREHEN METABOLIC PANEL: CPT | Performed by: INTERNAL MEDICINE

## 2023-06-03 PROCEDURE — 85025 COMPLETE CBC W/AUTO DIFF WBC: CPT | Performed by: INTERNAL MEDICINE

## 2023-06-03 PROCEDURE — 94761 N-INVAS EAR/PLS OXIMETRY MLT: CPT

## 2023-06-03 RX ORDER — NOREPINEPHRINE BITARTRATE/D5W 4MG/250ML
0-3 PLASTIC BAG, INJECTION (ML) INTRAVENOUS CONTINUOUS
Status: DISCONTINUED | OUTPATIENT
Start: 2023-06-03 | End: 2023-06-07

## 2023-06-03 RX ORDER — PHENTOLAMINE MESYLATE 5 MG/1
0.5 INJECTION INTRAMUSCULAR; INTRAVENOUS ONCE
Status: COMPLETED | OUTPATIENT
Start: 2023-06-03 | End: 2023-06-03

## 2023-06-03 RX ORDER — DEXTROSE MONOHYDRATE AND SODIUM CHLORIDE 5; .9 G/100ML; G/100ML
INJECTION, SOLUTION INTRAVENOUS CONTINUOUS
Status: DISCONTINUED | OUTPATIENT
Start: 2023-06-03 | End: 2023-06-09 | Stop reason: HOSPADM

## 2023-06-03 RX ADMIN — MUPIROCIN: 20 OINTMENT TOPICAL at 09:06

## 2023-06-03 RX ADMIN — MUPIROCIN: 20 OINTMENT TOPICAL at 08:06

## 2023-06-03 RX ADMIN — PHENTOLAMINE MESYLATE 0.5 ML: 5 INJECTION INTRAMUSCULAR; INTRAVENOUS at 05:06

## 2023-06-03 RX ADMIN — DEXTROSE MONOHYDRATE 125 ML: 100 INJECTION, SOLUTION INTRAVENOUS at 03:06

## 2023-06-03 RX ADMIN — DEXTROSE AND SODIUM CHLORIDE: 5; 900 INJECTION, SOLUTION INTRAVENOUS at 11:06

## 2023-06-03 RX ADMIN — PIPERACILLIN AND TAZOBACTAM 4.5 G: 4; .5 INJECTION, POWDER, FOR SOLUTION INTRAVENOUS; PARENTERAL at 05:06

## 2023-06-03 RX ADMIN — APIXABAN 5 MG: 5 TABLET, FILM COATED ORAL at 08:06

## 2023-06-03 RX ADMIN — DEXTROSE AND SODIUM CHLORIDE: 5; 900 INJECTION, SOLUTION INTRAVENOUS at 07:06

## 2023-06-03 RX ADMIN — SILVER SULFADIAZINE: 10 CREAM TOPICAL at 09:06

## 2023-06-03 RX ADMIN — ATORVASTATIN CALCIUM 40 MG: 40 TABLET, FILM COATED ORAL at 09:06

## 2023-06-03 RX ADMIN — VASOPRESSIN: 20 INJECTION, SOLUTION INTRAVENOUS at 12:06

## 2023-06-03 RX ADMIN — DEXTROSE MONOHYDRATE 125 ML: 100 INJECTION, SOLUTION INTRAVENOUS at 08:06

## 2023-06-03 RX ADMIN — VASOPRESSIN: 20 INJECTION, SOLUTION INTRAVENOUS at 09:06

## 2023-06-03 RX ADMIN — MORPHINE SULFATE 2 MG: 2 INJECTION, SOLUTION INTRAMUSCULAR; INTRAVENOUS at 05:06

## 2023-06-03 RX ADMIN — METOPROLOL SUCCINATE 25 MG: 25 TABLET, EXTENDED RELEASE ORAL at 08:06

## 2023-06-03 RX ADMIN — DEXTROSE MONOHYDRATE 125 ML: 100 INJECTION, SOLUTION INTRAVENOUS at 09:06

## 2023-06-03 RX ADMIN — ASPIRIN 81 MG: 81 TABLET, COATED ORAL at 08:06

## 2023-06-03 RX ADMIN — NOREPINEPHRINE BITARTRATE 0.02 MCG/KG/MIN: 4 INJECTION, SOLUTION INTRAVENOUS at 01:06

## 2023-06-03 NOTE — PROGRESS NOTES
[facility]  Pulmonology  Progress Note    Patient Name: Esthela Contreras  MRN: [id number]  Admission Date: 6/1/2023  Hospital Length of Stay: 2 days  Code Status: Full Code  Attending Provider: Mak Brown MD  Primary Care Provider: Lor Patel MD   Principal Problem: Severe sepsis    Subjective:     Interval History:  BP on lower side today, issues with hypoglycemia. No complaints       Objective:     Vital Signs (Most Recent):  Temp: 97.7 °F (36.5 °C) (06/03/23 1300)  Pulse: 75 (06/03/23 1300)  Resp: 11 (06/03/23 1300)  BP: (!) 86/41 (06/03/23 1300)  SpO2: 100 % (06/03/23 1300) Vital Signs (24h Range):  Temp:  [97.3 °F (36.3 °C)-98.6 °F (37 °C)] 97.7 °F (36.5 °C)  Pulse:  [] 75  Resp:  [7-19] 11  SpO2:  [94 %-100 %] 100 %  BP: ()/(38-67) 86/41     Weight: 68.2 kg (150 lb 5.7 oz)  Body mass index is 23.55 kg/m².      Intake/Output Summary (Last 24 hours) at 6/3/2023 1602  Last data filed at 6/3/2023 1215  Gross per 24 hour   Intake 5165.71 ml   Output 625 ml   Net 4540.71 ml        Physical Exam  Vitals reviewed.   HENT:      Right Ear: External ear normal.      Left Ear: External ear normal.      Mouth/Throat:      Mouth: Mucous membranes are dry.   Eyes:      Pupils: Pupils are equal, round, and reactive to light.   Cardiovascular:      Rate and Rhythm: Normal rate and regular rhythm.   Pulmonary:      Effort: No respiratory distress.      Breath sounds: No wheezing or rales.   Abdominal:      Palpations: Abdomen is soft.      Tenderness: There is no abdominal tenderness. There is no guarding.   Musculoskeletal:         General: Normal range of motion.      Cervical back: Normal range of motion and neck supple.      Comments: Chronic wounds both lower extremities are dressed   Skin:     General: Skin is warm and dry.   Neurological:      General: No focal deficit present.      Mental Status: He is alert.         Review of Systems    Vents:  Oxygen Concentration (%): 30  (06/03/23 0350)    Lines/Drains/Airways       Peripheral Intravenous Line  Duration                  Peripheral IV - Single Lumen 06/01/23 2002 20 G Left Hand 1 day         Peripheral IV - Single Lumen 06/01/23 2042 20 G Right Antecubital 1 day         Peripheral IV - Single Lumen 06/03/23 1354 18 G Left Antecubital <1 day         Peripheral IV - Single Lumen 06/03/23 1355 18 G Anterior;Right Upper Arm <1 day                    Significant Labs:    CBC/Anemia Profile:  Recent Labs   Lab 06/02/23  0723 06/02/23  0840 06/03/23  0906   WBC 27.81* 31.22* 20.62*   HGB 15.8 15.2 14.2   HCT 49.0 45.7 43.8    192 111*   MCV 92.3 89.1 91.1   RDW 16.8* 16.5* 16.9*        Chemistries:  Recent Labs   Lab 06/02/23  0723 06/02/23  1008 06/03/23  0906   * 133* 134*   K 5.8* 4.8 4.3   CL 95* 96* 95*   CO2 14* 17* 28   * 111* 94*   CREATININE 5.88* 5.75* 4.29*   CALCIUM 8.0* 7.7* 7.6*   ALBUMIN 2.1* 2.0* 1.7*   PROT 6.3* 5.9* 6.1*   BILITOT 1.9* 1.5* 1.2   ALKPHOS 156* 146* 135*   ALT 35 31 31   AST 83* 64* 62*   MG  --   --  2.5*       All pertinent labs within the past 24 hours have been reviewed.    Significant Imaging:  I have reviewed all pertinent imaging results/findings within the past 24 hours.    Assessment/Plan:     Pulmonary  COPD (chronic obstructive pulmonary disease)  Stable     Cardiac/Vascular  Hypotension  Secondary to sepsis   Treating with IVFs - order levophed if needed     New onset a-fib  Hear rate controlled     Coronary artery disease involving native coronary artery of native heart without angina pectoris  Stable     HFrEF (heart failure with reduced ejection fraction)  No signs of heart failure on x-ray    Renal/  Lactic acidosis  Trended down     Acute renal failure  Improving with IV fluid hydration - Cr down to 4.29  - nephrology following     Hyperkalemia  Resolved - K 4.3 today     ID  * Severe sepsis  Secondary to bacteremia     Gram-positive bacteremia  BC x 2 with GPB -  continue IV antibiotics and follow cultures     Other  Multiple open wounds of lower extremity, unspecified laterality, subsequent encounter  Seen by surgery, recs reviewed       This includes 32 minutes of critical care time spent evaluating and managing patient. This includes time spent at bedside, reviewing labs, data, xrays and monitoring for acute decompensation.            Eli Lowery, AG-ACNP  Pulmonology  [facility]

## 2023-06-03 NOTE — CARE UPDATE
Entered room and noticed IV to left AC swollen. This was the IV levophed was infusing so we immediately disconnected and removed cannula and notified NP and pharmacy. Order set for extravascation placed and Phentolamine mixed and given SQ as ordered. Extremity elevated and warm compress applied as ordered. Will continue to monitor closely and report to oncoming shift about incident.

## 2023-06-03 NOTE — ASSESSMENT & PLAN NOTE
>>ASSESSMENT AND PLAN FOR SEPSIS WRITTEN ON 6/3/2023  4:05 PM BY NASH MUELLER AG-ACNP    Secondary to bacteremia
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
## 2023-06-03 NOTE — HOSPITAL COURSE
SS wounds on bilateral lower extremities from knee down he is a fairly superficial no fluctuance there is no crepitance appears to be no areas of undrained infection or abscess cavities

## 2023-06-03 NOTE — PLAN OF CARE
Problem: Adult Inpatient Plan of Care  Goal: Plan of Care Review  6/3/2023 0917 by Carie Holliday RN  Outcome: Ongoing, Progressing  6/3/2023 0915 by Carie Holliday RN  Outcome: Ongoing, Progressing  Goal: Patient-Specific Goal (Individualized)  6/3/2023 0917 by Carie Holliday RN  Outcome: Ongoing, Progressing  6/3/2023 0915 by Carie Holliday RN  Outcome: Ongoing, Progressing  Goal: Absence of Hospital-Acquired Illness or Injury  6/3/2023 0917 by Carie Holliday RN  Outcome: Ongoing, Progressing  6/3/2023 0915 by Carie Holliday RN  Outcome: Ongoing, Progressing  Goal: Optimal Comfort and Wellbeing  6/3/2023 0917 by Carie Holliday RN  Outcome: Ongoing, Progressing  6/3/2023 0915 by Carie Holliday RN  Outcome: Ongoing, Progressing  Goal: Readiness for Transition of Care  6/3/2023 0917 by Carie Holliday RN  Outcome: Ongoing, Progressing  6/3/2023 0915 by Carie Holliday RN  Outcome: Ongoing, Progressing     Problem: Adjustment to Illness (Sepsis/Septic Shock)  Goal: Optimal Coping  6/3/2023 0917 by Carie Holliday RN  Outcome: Ongoing, Progressing  6/3/2023 0915 by Carie Holliday RN  Outcome: Ongoing, Progressing     Problem: Bleeding (Sepsis/Septic Shock)  Goal: Absence of Bleeding  6/3/2023 0917 by Carie Holliday RN  Outcome: Ongoing, Progressing  6/3/2023 0915 by Carie Holliday RN  Outcome: Ongoing, Progressing     Problem: Glycemic Control Impaired (Sepsis/Septic Shock)  Goal: Blood Glucose Level Within Desired Range  6/3/2023 0917 by Carie Holliday RN  Outcome: Ongoing, Progressing  6/3/2023 0915 by Carie Holliday RN  Outcome: Ongoing, Progressing     Problem: Infection Progression (Sepsis/Septic Shock)  Goal: Absence of Infection Signs and Symptoms  6/3/2023 0917 by Carie Holliday RN  Outcome: Ongoing, Progressing  6/3/2023 0915 by Carie Holliday RN  Outcome: Ongoing, Progressing     Problem: Nutrition Impaired (Sepsis/Septic Shock)  Goal: Optimal Nutrition Intake  6/3/2023 0917 by Carie BYRNE  ANAHI Holliday  Outcome: Ongoing, Progressing  6/3/2023 0915 by Carie Holliday RN  Outcome: Ongoing, Progressing     Problem: Fluid and Electrolyte Imbalance (Acute Kidney Injury/Impairment)  Goal: Fluid and Electrolyte Balance  6/3/2023 0917 by Carie Holliday RN  Outcome: Ongoing, Progressing  6/3/2023 0915 by Carie Holliday RN  Outcome: Ongoing, Progressing     Problem: Oral Intake Inadequate (Acute Kidney Injury/Impairment)  Goal: Optimal Nutrition Intake  6/3/2023 0917 by Carie Holliday RN  Outcome: Ongoing, Progressing  6/3/2023 0915 by Carie Holliday RN  Outcome: Ongoing, Progressing     Problem: Renal Function Impairment (Acute Kidney Injury/Impairment)  Goal: Effective Renal Function  6/3/2023 0917 by Carie Holliday RN  Outcome: Ongoing, Progressing  6/3/2023 0915 by Carie Holliday RN  Outcome: Ongoing, Progressing     Problem: Impaired Wound Healing  Goal: Optimal Wound Healing  6/3/2023 0917 by Carie Holliday RN  Outcome: Ongoing, Progressing  6/3/2023 0915 by Carie Holliday RN  Outcome: Ongoing, Progressing     Problem: Skin Injury Risk Increased  Goal: Skin Health and Integrity  6/3/2023 0917 by Carie Holliday RN  Outcome: Ongoing, Progressing  6/3/2023 0915 by Carie Holliday RN  Outcome: Ongoing, Progressing

## 2023-06-03 NOTE — ASSESSMENT & PLAN NOTE
>>ASSESSMENT AND PLAN FOR CELLULITIS OF LOWER EXTREMITY WRITTEN ON 6/3/2023  2:22 PM BY MARCO EH MD    Continue antibiotics and wound care
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
## 2023-06-03 NOTE — ASSESSMENT & PLAN NOTE
>>ASSESSMENT AND PLAN FOR SEPSIS WRITTEN ON 6/3/2023  2:22 PM BY MARCO HE MD      Antibiotics given-   Antibiotics (72h ago, onward)    Start     Stop Route Frequency Ordered    06/02/23 0930  silver sulfADIAZINE 1% cream         -- Top Daily 06/02/23 0815    06/02/23 0930  mupirocin 2 % ointment         06/07 0859 Nasl 2 times daily 06/02/23 0816    06/02/23 0500  piperacillin-tazobactam (ZOSYN) 4.5 g in dextrose 5 % in water (D5W) 5 % 100 mL IVPB (MB+)         -- IV Every 12 hours (non-standard times) 06/02/23 0048        Latest lactate reviewed-  Recent Labs   Lab 06/02/23  0114 06/02/23  0342 06/02/23  0840   LACTATE 8.7* 4.6* 2.3*     Organ dysfunction indicated by Acute kidney injury

## 2023-06-03 NOTE — SUBJECTIVE & OBJECTIVE
Interval History:  Mental status unchanged from yesterday.  He is hypotensive, requiring pressors.    Review of patient's allergies indicates:  No Known Allergies  Current Facility-Administered Medications   Medication Frequency    albuterol-ipratropium 2.5 mg-0.5 mg/3 mL nebulizer solution 3 mL Q4H PRN    apixaban tablet 5 mg BID    aspirin EC tablet 81 mg Daily    atorvastatin tablet 40 mg Daily    dextrose 10% bolus 125 mL 125 mL PRN    dextrose 10% bolus 250 mL 250 mL PRN    dextrose 40 % gel 15,000 mg PRN    dextrose 40 % gel 30,000 mg PRN    dextrose 5 % and 0.9 % NaCl infusion Continuous    glucagon (human recombinant) injection 1 mg PRN    insulin aspart U-100 injection 0-5 Units QID (AC + HS) PRN    melatonin tablet 6 mg Nightly PRN    metoprolol succinate (TOPROL-XL) 24 hr tablet 25 mg Daily    morphine injection 2 mg Q4H PRN    mupirocin 2 % ointment BID    naloxone 0.4 mg/mL injection 0.02 mg PRN    NORepinephrine 4 mg in dextrose 5% 250 mL infusion (premix) (titrating) Continuous    ondansetron injection 4 mg Q8H PRN    oxyCODONE immediate release tablet 5 mg Q4H PRN    piperacillin-tazobactam (ZOSYN) 4.5 g in dextrose 5 % in water (D5W) 5 % 100 mL IVPB (MB+) Q12H    prochlorperazine injection Soln 5 mg Q6H PRN    silver sulfADIAZINE 1% cream Daily    sodium chloride 0.9% flush 10 mL PRN       Objective:     Vital Signs (Most Recent):  Temp: 97.7 °F (36.5 °C) (06/03/23 1300)  Pulse: 75 (06/03/23 1300)  Resp: 11 (06/03/23 1300)  BP: (!) 86/41 (06/03/23 1300)  SpO2: 100 % (06/03/23 1300) Vital Signs (24h Range):  Temp:  [97.3 °F (36.3 °C)-98.6 °F (37 °C)] 97.7 °F (36.5 °C)  Pulse:  [] 75  Resp:  [7-20] 11  SpO2:  [94 %-100 %] 100 %  BP: ()/(38-67) 86/41     Weight: 68.2 kg (150 lb 5.7 oz) (06/03/23 0245)  Body mass index is 23.55 kg/m².  Body surface area is 1.8 meters squared.    I/O last 3 completed shifts:  In: 7929 [P.O.:390; I.V.:3704.2; IV Piggyback:3834.8]  Out: 300 [Urine:300]      Physical Exam  HENT:      Mouth/Throat:      Mouth: Mucous membranes are dry.   Eyes:      Pupils: Pupils are equal, round, and reactive to light.   Cardiovascular:      Rate and Rhythm: Regular rhythm. Tachycardia present.   Pulmonary:      Effort: No respiratory distress.      Breath sounds: No wheezing or rales.   Abdominal:      Palpations: Abdomen is soft.      Tenderness: There is no abdominal tenderness. There is no guarding.   Musculoskeletal:      Cervical back: Neck supple.      Comments: Chronic wounds both lower extremities are dressed   Neurological:      General: No focal deficit present.      Mental Status: He is alert.        Significant Labs:  BMP:   Recent Labs   Lab 06/03/23  0906      *   K 4.3   CL 95*   CO2 28   BUN 94*   CREATININE 4.29*   CALCIUM 7.6*   MG 2.5*     CBC:   Recent Labs   Lab 06/03/23  0906   WBC 20.62*   RBC 4.81   HGB 14.2   HCT 43.8   *   MCV 91.1   MCH 29.5   MCHC 32.4        Significant Imaging:

## 2023-06-03 NOTE — ASSESSMENT & PLAN NOTE
>>ASSESSMENT AND PLAN FOR CELLULITIS OF LOWER EXTREMITY WRITTEN ON 6/3/2023  8:12 AM BY SOLE DUTTA MD    Recommend clean b.i.d. soap water scrub brush    Cover open areas with Silvadene

## 2023-06-03 NOTE — PROGRESS NOTES
[facility]  Nephrology  Progress Note    Patient Name: Esthela Contreras  MRN: [id number]  Admission Date: 6/1/2023  Hospital Length of Stay: 2 days  Attending Provider: Mak Brown MD   Primary Care Physician: Lor Patel MD  Principal Problem:Severe sepsis    Subjective:     HPI: 72-year-old man presented with shortness of breath.  He was noted to have cellulitis of both lower extremities.  He was tachycardic and noted to have metabolic acidosis hyperkalemia and acute renal failure.      Interval History:  Mental status unchanged from yesterday.  He is hypotensive, requiring pressors.    Review of patient's allergies indicates:  No Known Allergies  Current Facility-Administered Medications   Medication Frequency    albuterol-ipratropium 2.5 mg-0.5 mg/3 mL nebulizer solution 3 mL Q4H PRN    apixaban tablet 5 mg BID    aspirin EC tablet 81 mg Daily    atorvastatin tablet 40 mg Daily    dextrose 10% bolus 125 mL 125 mL PRN    dextrose 10% bolus 250 mL 250 mL PRN    dextrose 40 % gel 15,000 mg PRN    dextrose 40 % gel 30,000 mg PRN    dextrose 5 % and 0.9 % NaCl infusion Continuous    glucagon (human recombinant) injection 1 mg PRN    insulin aspart U-100 injection 0-5 Units QID (AC + HS) PRN    melatonin tablet 6 mg Nightly PRN    metoprolol succinate (TOPROL-XL) 24 hr tablet 25 mg Daily    morphine injection 2 mg Q4H PRN    mupirocin 2 % ointment BID    naloxone 0.4 mg/mL injection 0.02 mg PRN    NORepinephrine 4 mg in dextrose 5% 250 mL infusion (premix) (titrating) Continuous    ondansetron injection 4 mg Q8H PRN    oxyCODONE immediate release tablet 5 mg Q4H PRN    piperacillin-tazobactam (ZOSYN) 4.5 g in dextrose 5 % in water (D5W) 5 % 100 mL IVPB (MB+) Q12H    prochlorperazine injection Soln 5 mg Q6H PRN    silver sulfADIAZINE 1% cream Daily    sodium chloride 0.9% flush 10 mL PRN       Objective:     Vital Signs (Most Recent):  Temp: 97.7 °F (36.5 °C)  (06/03/23 1300)  Pulse: 75 (06/03/23 1300)  Resp: 11 (06/03/23 1300)  BP: (!) 86/41 (06/03/23 1300)  SpO2: 100 % (06/03/23 1300) Vital Signs (24h Range):  Temp:  [97.3 °F (36.3 °C)-98.6 °F (37 °C)] 97.7 °F (36.5 °C)  Pulse:  [] 75  Resp:  [7-20] 11  SpO2:  [94 %-100 %] 100 %  BP: ()/(38-67) 86/41     Weight: 68.2 kg (150 lb 5.7 oz) (06/03/23 0245)  Body mass index is 23.55 kg/m².  Body surface area is 1.8 meters squared.    I/O last 3 completed shifts:  In: 7929 [P.O.:390; I.V.:3704.2; IV Piggyback:3834.8]  Out: 300 [Urine:300]     Physical Exam  HENT:      Mouth/Throat:      Mouth: Mucous membranes are dry.   Eyes:      Pupils: Pupils are equal, round, and reactive to light.   Cardiovascular:      Rate and Rhythm: Regular rhythm. Tachycardia present.   Pulmonary:      Effort: No respiratory distress.      Breath sounds: No wheezing or rales.   Abdominal:      Palpations: Abdomen is soft.      Tenderness: There is no abdominal tenderness. There is no guarding.   Musculoskeletal:      Cervical back: Neck supple.      Comments: Chronic wounds both lower extremities are dressed   Neurological:      General: No focal deficit present.      Mental Status: He is alert.        Significant Labs:  BMP:   Recent Labs   Lab 06/03/23  0906      *   K 4.3   CL 95*   CO2 28   BUN 94*   CREATININE 4.29*   CALCIUM 7.6*   MG 2.5*     CBC:   Recent Labs   Lab 06/03/23  0906   WBC 20.62*   RBC 4.81   HGB 14.2   HCT 43.8   *   MCV 91.1   MCH 29.5   MCHC 32.4        Significant Imaging:      Assessment/Plan:     Cardiac/Vascular  Coronary artery disease involving native coronary artery of native heart without angina pectoris  No angina    HFrEF (heart failure with reduced ejection fraction)  No signs of decompensation    Renal/  High anion gap metabolic acidosis  Improved.  IV bicarbonate discontinued    Acute renal failure  Renal function beginning to improve.  Acidosis and hyperkalemia  improved.    Hyperkalemia  Resolved    ID  * Severe sepsis    Antibiotics given-   Antibiotics (72h ago, onward)    Start     Stop Route Frequency Ordered    06/02/23 0930  silver sulfADIAZINE 1% cream         -- Top Daily 06/02/23 0815    06/02/23 0930  mupirocin 2 % ointment         06/07 0859 Nasl 2 times daily 06/02/23 0816    06/02/23 0500  piperacillin-tazobactam (ZOSYN) 4.5 g in dextrose 5 % in water (D5W) 5 % 100 mL IVPB (MB+)         -- IV Every 12 hours (non-standard times) 06/02/23 0048        Latest lactate reviewed-  Recent Labs   Lab 06/02/23  0114 06/02/23  0342 06/02/23  0840   LACTATE 8.7* 4.6* 2.3*     Organ dysfunction indicated by Acute kidney injury        Cellulitis of lower extremity  Continue antibiotics and wound care        Thank you for your consult.     Dakota Cruz MD  Nephrology  [facility]

## 2023-06-03 NOTE — ASSESSMENT & PLAN NOTE
Antibiotics given-   Antibiotics (72h ago, onward)    Start     Stop Route Frequency Ordered    06/02/23 0930  silver sulfADIAZINE 1% cream         -- Top Daily 06/02/23 0815    06/02/23 0930  mupirocin 2 % ointment         06/07 0859 Nasl 2 times daily 06/02/23 0816    06/02/23 0500  piperacillin-tazobactam (ZOSYN) 4.5 g in dextrose 5 % in water (D5W) 5 % 100 mL IVPB (MB+)         -- IV Every 12 hours (non-standard times) 06/02/23 0048        Latest lactate reviewed-  Recent Labs   Lab 06/02/23  0114 06/02/23  0342 06/02/23  0840   LACTATE 8.7* 4.6* 2.3*     Organ dysfunction indicated by Acute kidney injury       Detailed message left for patient.  Referral made to cardiology.        ----- Message from Nilesh Montes De Oca MD sent at 11/22/2021 12:01 PM CST -----  Mild pulmonary HTN-needs Cardiology referral

## 2023-06-03 NOTE — SUBJECTIVE & OBJECTIVE
Interval History:  BP on lower side today, issues with hypoglycemia. No complaints       Objective:     Vital Signs (Most Recent):  Temp: 97.7 °F (36.5 °C) (06/03/23 1300)  Pulse: 75 (06/03/23 1300)  Resp: 11 (06/03/23 1300)  BP: (!) 86/41 (06/03/23 1300)  SpO2: 100 % (06/03/23 1300) Vital Signs (24h Range):  Temp:  [97.3 °F (36.3 °C)-98.6 °F (37 °C)] 97.7 °F (36.5 °C)  Pulse:  [] 75  Resp:  [7-19] 11  SpO2:  [94 %-100 %] 100 %  BP: ()/(38-67) 86/41     Weight: 68.2 kg (150 lb 5.7 oz)  Body mass index is 23.55 kg/m².      Intake/Output Summary (Last 24 hours) at 6/3/2023 1602  Last data filed at 6/3/2023 1215  Gross per 24 hour   Intake 5165.71 ml   Output 625 ml   Net 4540.71 ml        Physical Exam  Vitals reviewed.   HENT:      Right Ear: External ear normal.      Left Ear: External ear normal.      Mouth/Throat:      Mouth: Mucous membranes are dry.   Eyes:      Pupils: Pupils are equal, round, and reactive to light.   Cardiovascular:      Rate and Rhythm: Normal rate and regular rhythm.   Pulmonary:      Effort: No respiratory distress.      Breath sounds: No wheezing or rales.   Abdominal:      Palpations: Abdomen is soft.      Tenderness: There is no abdominal tenderness. There is no guarding.   Musculoskeletal:         General: Normal range of motion.      Cervical back: Normal range of motion and neck supple.      Comments: Chronic wounds both lower extremities are dressed   Skin:     General: Skin is warm and dry.   Neurological:      General: No focal deficit present.      Mental Status: He is alert.         Review of Systems    Vents:  Oxygen Concentration (%): 30 (06/03/23 0350)    Lines/Drains/Airways       Peripheral Intravenous Line  Duration                  Peripheral IV - Single Lumen 06/01/23 2002 20 G Left Hand 1 day         Peripheral IV - Single Lumen 06/01/23 2042 20 G Right Antecubital 1 day         Peripheral IV - Single Lumen 06/03/23 1354 18 G Left Antecubital <1 day          Peripheral IV - Single Lumen 06/03/23 1355 18 G Anterior;Right Upper Arm <1 day                    Significant Labs:    CBC/Anemia Profile:  Recent Labs   Lab 06/02/23  0723 06/02/23  0840 06/03/23  0906   WBC 27.81* 31.22* 20.62*   HGB 15.8 15.2 14.2   HCT 49.0 45.7 43.8    192 111*   MCV 92.3 89.1 91.1   RDW 16.8* 16.5* 16.9*        Chemistries:  Recent Labs   Lab 06/02/23  0723 06/02/23  1008 06/03/23  0906   * 133* 134*   K 5.8* 4.8 4.3   CL 95* 96* 95*   CO2 14* 17* 28   * 111* 94*   CREATININE 5.88* 5.75* 4.29*   CALCIUM 8.0* 7.7* 7.6*   ALBUMIN 2.1* 2.0* 1.7*   PROT 6.3* 5.9* 6.1*   BILITOT 1.9* 1.5* 1.2   ALKPHOS 156* 146* 135*   ALT 35 31 31   AST 83* 64* 62*   MG  --   --  2.5*       All pertinent labs within the past 24 hours have been reviewed.    Significant Imaging:  I have reviewed all pertinent imaging results/findings within the past 24 hours.
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
## 2023-06-04 LAB
GLUCOSE SERPL-MCNC: 101 MG/DL (ref 70–105)
GLUCOSE SERPL-MCNC: 104 MG/DL (ref 70–105)
GLUCOSE SERPL-MCNC: 117 MG/DL (ref 70–105)
GLUCOSE SERPL-MCNC: 131 MG/DL (ref 70–105)
UA COMPLETE W REFLEX CULTURE PNL UR: NO GROWTH

## 2023-06-04 PROCEDURE — 25000003 PHARM REV CODE 250

## 2023-06-04 PROCEDURE — 63600175 PHARM REV CODE 636 W HCPCS: Performed by: NURSE PRACTITIONER

## 2023-06-04 PROCEDURE — 99291 PR CRITICAL CARE, E/M 30-74 MINUTES: ICD-10-PCS | Mod: ,,, | Performed by: NURSE PRACTITIONER

## 2023-06-04 PROCEDURE — 25000003 PHARM REV CODE 250: Performed by: INTERNAL MEDICINE

## 2023-06-04 PROCEDURE — 82962 GLUCOSE BLOOD TEST: CPT

## 2023-06-04 PROCEDURE — 99900035 HC TECH TIME PER 15 MIN (STAT)

## 2023-06-04 PROCEDURE — 20000000 HC ICU ROOM

## 2023-06-04 PROCEDURE — 94660 CPAP INITIATION&MGMT: CPT

## 2023-06-04 PROCEDURE — 27000221 HC OXYGEN, UP TO 24 HOURS

## 2023-06-04 PROCEDURE — 94761 N-INVAS EAR/PLS OXIMETRY MLT: CPT

## 2023-06-04 PROCEDURE — 63600175 PHARM REV CODE 636 W HCPCS: Performed by: INTERNAL MEDICINE

## 2023-06-04 PROCEDURE — 25000003 PHARM REV CODE 250: Performed by: NURSE PRACTITIONER

## 2023-06-04 PROCEDURE — 99291 CRITICAL CARE FIRST HOUR: CPT | Mod: ,,, | Performed by: NURSE PRACTITIONER

## 2023-06-04 RX ORDER — LACTULOSE 10 G/15ML
20 SOLUTION ORAL 3 TIMES DAILY
Status: DISCONTINUED | OUTPATIENT
Start: 2023-06-04 | End: 2023-06-04

## 2023-06-04 RX ADMIN — APIXABAN 5 MG: 5 TABLET, FILM COATED ORAL at 08:06

## 2023-06-04 RX ADMIN — ATORVASTATIN CALCIUM 40 MG: 40 TABLET, FILM COATED ORAL at 08:06

## 2023-06-04 RX ADMIN — MUPIROCIN: 20 OINTMENT TOPICAL at 08:06

## 2023-06-04 RX ADMIN — NOREPINEPHRINE BITARTRATE 0.08 MCG/KG/MIN: 4 INJECTION, SOLUTION INTRAVENOUS at 03:06

## 2023-06-04 RX ADMIN — METOPROLOL SUCCINATE 25 MG: 25 TABLET, EXTENDED RELEASE ORAL at 08:06

## 2023-06-04 RX ADMIN — PIPERACILLIN AND TAZOBACTAM 4.5 G: 4; .5 INJECTION, POWDER, FOR SOLUTION INTRAVENOUS; PARENTERAL at 04:06

## 2023-06-04 RX ADMIN — SILVER SULFADIAZINE: 10 CREAM TOPICAL at 08:06

## 2023-06-04 RX ADMIN — ASPIRIN 81 MG: 81 TABLET, COATED ORAL at 08:06

## 2023-06-04 RX ADMIN — PIPERACILLIN AND TAZOBACTAM 4.5 G: 4; .5 INJECTION, POWDER, FOR SOLUTION INTRAVENOUS; PARENTERAL at 05:06

## 2023-06-04 RX ADMIN — MORPHINE SULFATE 2 MG: 2 INJECTION, SOLUTION INTRAMUSCULAR; INTRAVENOUS at 04:06

## 2023-06-04 RX ADMIN — DEXTROSE AND SODIUM CHLORIDE: 5; 900 INJECTION, SOLUTION INTRAVENOUS at 03:06

## 2023-06-04 NOTE — SUBJECTIVE & OBJECTIVE
Interval History:  He is alert.  He denies shortness of breath or nausea.  He is requiring pressors.    Review of patient's allergies indicates:  No Known Allergies  Current Facility-Administered Medications   Medication Frequency    albuterol-ipratropium 2.5 mg-0.5 mg/3 mL nebulizer solution 3 mL Q4H PRN    apixaban tablet 5 mg BID    aspirin EC tablet 81 mg Daily    atorvastatin tablet 40 mg Daily    dextrose 10% bolus 125 mL 125 mL PRN    dextrose 10% bolus 250 mL 250 mL PRN    dextrose 40 % gel 15,000 mg PRN    dextrose 40 % gel 30,000 mg PRN    dextrose 5 % and 0.9 % NaCl infusion Continuous    glucagon (human recombinant) injection 1 mg PRN    insulin aspart U-100 injection 0-5 Units QID (AC + HS) PRN    melatonin tablet 6 mg Nightly PRN    metoprolol succinate (TOPROL-XL) 24 hr tablet 25 mg Daily    morphine injection 2 mg Q4H PRN    mupirocin 2 % ointment BID    naloxone 0.4 mg/mL injection 0.02 mg PRN    NORepinephrine 4 mg in dextrose 5% 250 mL infusion (premix) (titrating) Continuous    ondansetron injection 4 mg Q8H PRN    oxyCODONE immediate release tablet 5 mg Q4H PRN    piperacillin-tazobactam (ZOSYN) 4.5 g in dextrose 5 % in water (D5W) 5 % 100 mL IVPB (MB+) Q12H    prochlorperazine injection Soln 5 mg Q6H PRN    silver sulfADIAZINE 1% cream Daily    sodium chloride 0.9% flush 10 mL PRN       Objective:     Vital Signs (Most Recent):  Temp: 97.6 °F (36.4 °C) (06/04/23 1130)  Pulse: 67 (06/04/23 1600)  Resp: 11 (06/04/23 1600)  BP: 96/64 (06/04/23 1600)  SpO2: 99 % (06/04/23 1600) Vital Signs (24h Range):  Temp:  [97.2 °F (36.2 °C)-98.6 °F (37 °C)] 97.6 °F (36.4 °C)  Pulse:  [61-95] 67  Resp:  [7-22] 11  SpO2:  [97 %-100 %] 99 %  BP: ()/(47-67) 96/64     Weight: 66.9 kg (147 lb 7.8 oz) (06/04/23 0230)  Body mass index is 23.1 kg/m².  Body surface area is 1.78 meters squared.    I/O last 3 completed shifts:  In: 4118.5 [I.V.:3686.5; IV Piggyback:432]  Out: 1365 [Urine:1365]     Physical  Exam  HENT:      Mouth/Throat:      Mouth: Mucous membranes are dry.   Eyes:      Pupils: Pupils are equal, round, and reactive to light.   Cardiovascular:      Rate and Rhythm: Regular rhythm. Tachycardia present.   Pulmonary:      Effort: No respiratory distress.      Breath sounds: No wheezing or rales.   Abdominal:      Palpations: Abdomen is soft.      Tenderness: There is no abdominal tenderness. There is no guarding.   Musculoskeletal:      Cervical back: Neck supple.      Comments: Chronic wounds both lower extremities are dressed   Neurological:      General: No focal deficit present.      Mental Status: He is alert.        Significant Labs:  BMP:   Recent Labs   Lab 06/03/23  0906      *   K 4.3   CL 95*   CO2 28   BUN 94*   CREATININE 4.29*   CALCIUM 7.6*   MG 2.5*        Significant Imaging:

## 2023-06-04 NOTE — PLAN OF CARE
Problem: Adult Inpatient Plan of Care  Goal: Plan of Care Review  Outcome: Ongoing, Progressing  Goal: Patient-Specific Goal (Individualized)  Outcome: Ongoing, Progressing  Goal: Absence of Hospital-Acquired Illness or Injury  Outcome: Ongoing, Progressing  Intervention: Identify and Manage Fall Risk  Flowsheets (Taken 6/4/2023 0605)  Safety Promotion/Fall Prevention:   room near unit station   bed alarm set  Intervention: Prevent Skin Injury  Flowsheets (Taken 6/4/2023 0605)  Skin Protection:   skin-to-skin areas padded   skin-to-device areas padded  Goal: Optimal Comfort and Wellbeing  Outcome: Ongoing, Progressing  Intervention: Provide Person-Centered Care  Flowsheets (Taken 6/4/2023 0605)  Trust Relationship/Rapport: care explained  Goal: Readiness for Transition of Care  Outcome: Ongoing, Progressing

## 2023-06-04 NOTE — PROGRESS NOTES
[facility]  Nephrology  Progress Note    Patient Name: Esthela Contreras  MRN: [id number]  Admission Date: 6/1/2023  Hospital Length of Stay: 3 days  Attending Provider: Mak Brown MD   Primary Care Physician: Lor Patel MD  Principal Problem:Severe sepsis    Subjective:     HPI: 72-year-old man presented with shortness of breath.  He was noted to have cellulitis of both lower extremities.  He was tachycardic and noted to have metabolic acidosis hyperkalemia and acute renal failure.      Interval History:  He is alert.  He denies shortness of breath or nausea.  He is requiring pressors.    Review of patient's allergies indicates:  No Known Allergies  Current Facility-Administered Medications   Medication Frequency    albuterol-ipratropium 2.5 mg-0.5 mg/3 mL nebulizer solution 3 mL Q4H PRN    apixaban tablet 5 mg BID    aspirin EC tablet 81 mg Daily    atorvastatin tablet 40 mg Daily    dextrose 10% bolus 125 mL 125 mL PRN    dextrose 10% bolus 250 mL 250 mL PRN    dextrose 40 % gel 15,000 mg PRN    dextrose 40 % gel 30,000 mg PRN    dextrose 5 % and 0.9 % NaCl infusion Continuous    glucagon (human recombinant) injection 1 mg PRN    insulin aspart U-100 injection 0-5 Units QID (AC + HS) PRN    melatonin tablet 6 mg Nightly PRN    metoprolol succinate (TOPROL-XL) 24 hr tablet 25 mg Daily    morphine injection 2 mg Q4H PRN    mupirocin 2 % ointment BID    naloxone 0.4 mg/mL injection 0.02 mg PRN    NORepinephrine 4 mg in dextrose 5% 250 mL infusion (premix) (titrating) Continuous    ondansetron injection 4 mg Q8H PRN    oxyCODONE immediate release tablet 5 mg Q4H PRN    piperacillin-tazobactam (ZOSYN) 4.5 g in dextrose 5 % in water (D5W) 5 % 100 mL IVPB (MB+) Q12H    prochlorperazine injection Soln 5 mg Q6H PRN    silver sulfADIAZINE 1% cream Daily    sodium chloride 0.9% flush 10 mL PRN       Objective:     Vital Signs (Most Recent):  Temp: 97.6 °F (36.4 °C)  (06/04/23 1130)  Pulse: 67 (06/04/23 1600)  Resp: 11 (06/04/23 1600)  BP: 96/64 (06/04/23 1600)  SpO2: 99 % (06/04/23 1600) Vital Signs (24h Range):  Temp:  [97.2 °F (36.2 °C)-98.6 °F (37 °C)] 97.6 °F (36.4 °C)  Pulse:  [61-95] 67  Resp:  [7-22] 11  SpO2:  [97 %-100 %] 99 %  BP: ()/(47-67) 96/64     Weight: 66.9 kg (147 lb 7.8 oz) (06/04/23 0230)  Body mass index is 23.1 kg/m².  Body surface area is 1.78 meters squared.    I/O last 3 completed shifts:  In: 4118.5 [I.V.:3686.5; IV Piggyback:432]  Out: 1365 [Urine:1365]     Physical Exam  HENT:      Mouth/Throat:      Mouth: Mucous membranes are dry.   Eyes:      Pupils: Pupils are equal, round, and reactive to light.   Cardiovascular:      Rate and Rhythm: Regular rhythm. Tachycardia present.   Pulmonary:      Effort: No respiratory distress.      Breath sounds: No wheezing or rales.   Abdominal:      Palpations: Abdomen is soft.      Tenderness: There is no abdominal tenderness. There is no guarding.   Musculoskeletal:      Cervical back: Neck supple.      Comments: Chronic wounds both lower extremities are dressed   Neurological:      General: No focal deficit present.      Mental Status: He is alert.        Significant Labs:  BMP:   Recent Labs   Lab 06/03/23  0906      *   K 4.3   CL 95*   CO2 28   BUN 94*   CREATININE 4.29*   CALCIUM 7.6*   MG 2.5*        Significant Imaging:      Assessment/Plan:     Cardiac/Vascular  Coronary artery disease involving native coronary artery of native heart without angina pectoris  No angina    HFrEF (heart failure with reduced ejection fraction)  No signs of decompensation    Renal/  Acute renal failure  Renal function has been improving.  Lab today pending.  Urine output 1365 mL    Hyperkalemia  Resolved    ID  * Severe sepsis    Antibiotics given-   Antibiotics (72h ago, onward)    Start     Stop Route Frequency Ordered    06/02/23 0930  silver sulfADIAZINE 1% cream         -- Top Daily 06/02/23 0815     06/02/23 0930  mupirocin 2 % ointment         06/07 0859 Nasl 2 times daily 06/02/23 0816    06/02/23 0500  piperacillin-tazobactam (ZOSYN) 4.5 g in dextrose 5 % in water (D5W) 5 % 100 mL IVPB (MB+)         -- IV Every 12 hours (non-standard times) 06/02/23 0048        Latest lactate reviewed-  Recent Labs   Lab 06/02/23  0114 06/02/23  0342 06/02/23  0840   LACTATE 8.7* 4.6* 2.3*     Organ dysfunction indicated by Acute kidney injury        Cellulitis of lower extremity  Continue antibiotics and wound care        Thank you for your consult.     Dakota Cruz MD  Nephrology  [facility]

## 2023-06-04 NOTE — ASSESSMENT & PLAN NOTE
Improving with IV fluid hydration - Cr down to 4.29  - nephrology following   -6/3 - lab pending today - UOP 1365cc in last 24 hours

## 2023-06-04 NOTE — SUBJECTIVE & OBJECTIVE
Interval History:  awake, denies any complaints or needs. On low dose levo       Objective:     Vital Signs (Most Recent):  Temp: 97.3 °F (36.3 °C) (06/04/23 0845)  Pulse: 88 (06/04/23 0845)  Resp: 12 (06/04/23 0845)  BP: (!) 90/49 (06/04/23 0845)  SpO2: 100 % (06/04/23 0845) Vital Signs (24h Range):  Temp:  [97.2 °F (36.2 °C)-98.6 °F (37 °C)] 97.3 °F (36.3 °C)  Pulse:  [61-95] 88  Resp:  [6-22] 12  SpO2:  [92 %-100 %] 100 %  BP: ()/(41-66) 90/49     Weight: 66.9 kg (147 lb 7.8 oz)  Body mass index is 23.1 kg/m².      Intake/Output Summary (Last 24 hours) at 6/4/2023 1130  Last data filed at 6/4/2023 0928  Gross per 24 hour   Intake 1811.51 ml   Output 1490 ml   Net 321.51 ml        Physical Exam  Vitals reviewed.   HENT:      Right Ear: External ear normal.      Left Ear: External ear normal.      Mouth/Throat:      Mouth: Mucous membranes are moist.   Eyes:      Pupils: Pupils are equal, round, and reactive to light.   Cardiovascular:      Rate and Rhythm: Normal rate and regular rhythm.   Pulmonary:      Effort: Pulmonary effort is normal.      Breath sounds: Normal breath sounds.   Abdominal:      Palpations: Abdomen is soft.   Musculoskeletal:         General: Normal range of motion.      Cervical back: Normal range of motion and neck supple.      Comments: Chronic wounds both lower extremities are dressed   Skin:     General: Skin is warm and dry.   Neurological:      General: No focal deficit present.      Mental Status: He is alert.   Psychiatric:         Mood and Affect: Mood normal.         Review of Systems    Vents:  Oxygen Concentration (%): 30 (06/03/23 0350)    Lines/Drains/Airways       Peripheral Intravenous Line  Duration                  Peripheral IV - Single Lumen 06/01/23 2042 20 G Right Antecubital 2 days         Peripheral IV - Single Lumen 06/03/23 1709 20 G Anterior;Left <1 day                    Significant Labs:    CBC/Anemia Profile:  Recent Labs   Lab 06/03/23  0906   WBC 20.62*    HGB 14.2   HCT 43.8   *   MCV 91.1   RDW 16.9*        Chemistries:  Recent Labs   Lab 06/03/23  0906   *   K 4.3   CL 95*   CO2 28   BUN 94*   CREATININE 4.29*   CALCIUM 7.6*   ALBUMIN 1.7*   PROT 6.1*   BILITOT 1.2   ALKPHOS 135*   ALT 31   AST 62*   MG 2.5*       All pertinent labs within the past 24 hours have been reviewed.    Significant Imaging:  I have reviewed all pertinent imaging results/findings within the past 24 hours.

## 2023-06-04 NOTE — ASSESSMENT & PLAN NOTE
>>ASSESSMENT AND PLAN FOR CELLULITIS OF LOWER EXTREMITY WRITTEN ON 6/4/2023  4:19 PM BY MARCO HE MD    Continue antibiotics and wound care

## 2023-06-04 NOTE — ASSESSMENT & PLAN NOTE
>>ASSESSMENT AND PLAN FOR SEPSIS WRITTEN ON 6/4/2023  4:18 PM BY MARCO HE MD      Antibiotics given-   Antibiotics (72h ago, onward)    Start     Stop Route Frequency Ordered    06/02/23 0930  silver sulfADIAZINE 1% cream         -- Top Daily 06/02/23 0815    06/02/23 0930  mupirocin 2 % ointment         06/07 0859 Nasl 2 times daily 06/02/23 0816    06/02/23 0500  piperacillin-tazobactam (ZOSYN) 4.5 g in dextrose 5 % in water (D5W) 5 % 100 mL IVPB (MB+)         -- IV Every 12 hours (non-standard times) 06/02/23 0048        Latest lactate reviewed-  Recent Labs   Lab 06/02/23  0114 06/02/23  0342 06/02/23  0840   LACTATE 8.7* 4.6* 2.3*     Organ dysfunction indicated by Acute kidney injury

## 2023-06-04 NOTE — ASSESSMENT & PLAN NOTE
>>ASSESSMENT AND PLAN FOR CELLULITIS OF LOWER EXTREMITY WRITTEN ON 6/4/2023 11:33 AM BY NASH MUELLER AG-ACNP    On antibiotics - evaluated by surgery

## 2023-06-04 NOTE — ASSESSMENT & PLAN NOTE
Antibiotics given-   Antibiotics (72h ago, onward)    Start     Stop Route Frequency Ordered    06/02/23 0930  silver sulfADIAZINE 1% cream         -- Top Daily 06/02/23 0815    06/02/23 0930  mupirocin 2 % ointment         06/07 0859 Nasl 2 times daily 06/02/23 0816    06/02/23 0500  piperacillin-tazobactam (ZOSYN) 4.5 g in dextrose 5 % in water (D5W) 5 % 100 mL IVPB (MB+)         -- IV Every 12 hours (non-standard times) 06/02/23 0048        Latest lactate reviewed-  Recent Labs   Lab 06/02/23  0114 06/02/23  0342 06/02/23  0840   LACTATE 8.7* 4.6* 2.3*     Organ dysfunction indicated by Acute kidney injury

## 2023-06-04 NOTE — ASSESSMENT & PLAN NOTE
1/2 BC with GPB -   1/2 BC with GNB   continue IV antibiotics and follow cultures   Repeat bc in AM

## 2023-06-04 NOTE — HOSPITAL COURSE
Admitted for sepsis and CHANDNI   6/6- restarted on levo overnight; adding midodrine today   6/7- levo stopped this AM; BP stable at present; continue midodrine   6/8- levo remains off; BP stable; continue midodrine- transfer to floor

## 2023-06-04 NOTE — ASSESSMENT & PLAN NOTE
>>ASSESSMENT AND PLAN FOR SEPSIS WRITTEN ON 6/4/2023 11:33 AM BY NASH MUELLER AG-ACNP    Secondary to bacteremia

## 2023-06-04 NOTE — PROGRESS NOTES
[facility]  Pulmonology  Progress Note    Patient Name: Esthela Contreras  MRN: [id number]  Admission Date: 6/1/2023  Hospital Length of Stay: 3 days  Code Status: Full Code  Attending Provider: Mak Brown MD  Primary Care Provider: Lor Patel MD   Principal Problem: Severe sepsis    Subjective:     Interval History:  awake, denies any complaints or needs. On low dose levo       Objective:     Vital Signs (Most Recent):  Temp: 97.3 °F (36.3 °C) (06/04/23 0845)  Pulse: 88 (06/04/23 0845)  Resp: 12 (06/04/23 0845)  BP: (!) 90/49 (06/04/23 0845)  SpO2: 100 % (06/04/23 0845) Vital Signs (24h Range):  Temp:  [97.2 °F (36.2 °C)-98.6 °F (37 °C)] 97.3 °F (36.3 °C)  Pulse:  [61-95] 88  Resp:  [6-22] 12  SpO2:  [92 %-100 %] 100 %  BP: ()/(41-66) 90/49     Weight: 66.9 kg (147 lb 7.8 oz)  Body mass index is 23.1 kg/m².      Intake/Output Summary (Last 24 hours) at 6/4/2023 1130  Last data filed at 6/4/2023 0928  Gross per 24 hour   Intake 1811.51 ml   Output 1490 ml   Net 321.51 ml        Physical Exam  Vitals reviewed.   HENT:      Right Ear: External ear normal.      Left Ear: External ear normal.      Mouth/Throat:      Mouth: Mucous membranes are moist.   Eyes:      Pupils: Pupils are equal, round, and reactive to light.   Cardiovascular:      Rate and Rhythm: Normal rate and regular rhythm.   Pulmonary:      Effort: Pulmonary effort is normal.      Breath sounds: Normal breath sounds.   Abdominal:      Palpations: Abdomen is soft.   Musculoskeletal:         General: Normal range of motion.      Cervical back: Normal range of motion and neck supple.      Comments: Chronic wounds both lower extremities are dressed   Skin:     General: Skin is warm and dry.   Neurological:      General: No focal deficit present.      Mental Status: He is alert.   Psychiatric:         Mood and Affect: Mood normal.         Review of Systems    Vents:  Oxygen Concentration (%): 30 (06/03/23  0350)    Lines/Drains/Airways       Peripheral Intravenous Line  Duration                  Peripheral IV - Single Lumen 06/01/23 2042 20 G Right Antecubital 2 days         Peripheral IV - Single Lumen 06/03/23 1709 20 G Anterior;Left <1 day                    Significant Labs:    CBC/Anemia Profile:  Recent Labs   Lab 06/03/23  0906   WBC 20.62*   HGB 14.2   HCT 43.8   *   MCV 91.1   RDW 16.9*        Chemistries:  Recent Labs   Lab 06/03/23  0906   *   K 4.3   CL 95*   CO2 28   BUN 94*   CREATININE 4.29*   CALCIUM 7.6*   ALBUMIN 1.7*   PROT 6.1*   BILITOT 1.2   ALKPHOS 135*   ALT 31   AST 62*   MG 2.5*       All pertinent labs within the past 24 hours have been reviewed.    Significant Imaging:  I have reviewed all pertinent imaging results/findings within the past 24 hours.    Assessment/Plan:     Pulmonary  COPD (chronic obstructive pulmonary disease)  Stable     SOB (shortness of breath)  Resolved     Cardiac/Vascular  Hypotension  Secondary to sepsis   Started on low dose levo.     New onset a-fib  Hear rate controlled --currently in SR     HFrEF (heart failure with reduced ejection fraction)  Monitor     Renal/  Lactic acidosis  Trended down     Acute renal failure  Improving with IV fluid hydration - Cr down to 4.29  - nephrology following   -6/3 - lab pending today - UOP 1365cc in last 24 hours     ID  * Severe sepsis  Secondary to bacteremia     Gram-positive bacteremia  1/2 BC with GPB -   1/2 BC with GNB   continue IV antibiotics and follow cultures   Repeat bc in AM     Cellulitis of lower extremity  On antibiotics - evaluated by surgery     Other  Multiple open wounds of lower extremity, unspecified laterality, subsequent encounter  Seen by surgery, recs reviewed   This includes 32 minutes of critical care time spent evaluating and managing patient. This includes time spent at bedside, reviewing labs, data, xrays and monitoring for acute decompensation.                Eli Lowery,  AG-ACNP  Pulmonology  [facility]

## 2023-06-05 LAB
ALBUMIN SERPL BCP-MCNC: 1.3 G/DL (ref 3.5–5)
ALBUMIN/GLOB SERPL: 0.3 {RATIO}
ALP SERPL-CCNC: 99 U/L (ref 45–115)
ALT SERPL W P-5'-P-CCNC: 29 U/L (ref 16–61)
ANION GAP SERPL CALCULATED.3IONS-SCNC: 13 MMOL/L (ref 7–16)
ANISOCYTOSIS BLD QL SMEAR: ABNORMAL
AST SERPL W P-5'-P-CCNC: 44 U/L (ref 15–37)
BASOPHILS # BLD AUTO: 0.01 K/UL (ref 0–0.2)
BASOPHILS NFR BLD AUTO: 0.1 % (ref 0–1)
BILIRUB SERPL-MCNC: 0.7 MG/DL (ref ?–1.2)
BUN SERPL-MCNC: 60 MG/DL (ref 7–18)
BUN/CREAT SERPL: 37 (ref 6–20)
CALCIUM SERPL-MCNC: 7.1 MG/DL (ref 8.5–10.1)
CHLORIDE SERPL-SCNC: 104 MMOL/L (ref 98–107)
CO2 SERPL-SCNC: 26 MMOL/L (ref 21–32)
CREAT SERPL-MCNC: 1.63 MG/DL (ref 0.7–1.3)
CRENATED CELLS: ABNORMAL
DIFFERENTIAL METHOD BLD: ABNORMAL
EGFR (NO RACE VARIABLE) (RUSH/TITUS): 44 ML/MIN/1.73M2
EOSINOPHIL # BLD AUTO: 0.18 K/UL (ref 0–0.5)
EOSINOPHIL NFR BLD AUTO: 1.9 % (ref 1–4)
EOSINOPHIL NFR BLD MANUAL: 2 % (ref 1–4)
ERYTHROCYTE [DISTWIDTH] IN BLOOD BY AUTOMATED COUNT: 16.3 % (ref 11.5–14.5)
GLOBULIN SER-MCNC: 3.9 G/DL (ref 2–4)
GLUCOSE SERPL-MCNC: 125 MG/DL (ref 70–105)
GLUCOSE SERPL-MCNC: 125 MG/DL (ref 70–105)
GLUCOSE SERPL-MCNC: 94 MG/DL (ref 70–105)
GLUCOSE SERPL-MCNC: 98 MG/DL (ref 70–105)
GLUCOSE SERPL-MCNC: 98 MG/DL (ref 74–106)
HCT VFR BLD AUTO: 39.3 % (ref 40–54)
HGB BLD-MCNC: 12.6 G/DL (ref 13.5–18)
IMM GRANULOCYTES # BLD AUTO: 0.04 K/UL (ref 0–0.04)
IMM GRANULOCYTES NFR BLD: 0.4 % (ref 0–0.4)
LYMPHOCYTES # BLD AUTO: 0.53 K/UL (ref 1–4.8)
LYMPHOCYTES NFR BLD AUTO: 5.7 % (ref 27–41)
LYMPHOCYTES NFR BLD MANUAL: 5 % (ref 27–41)
MCH RBC QN AUTO: 30.2 PG (ref 27–31)
MCHC RBC AUTO-ENTMCNC: 32.1 G/DL (ref 32–36)
MCV RBC AUTO: 94.2 FL (ref 80–96)
MONOCYTES # BLD AUTO: 0.9 K/UL (ref 0–0.8)
MONOCYTES NFR BLD AUTO: 9.6 % (ref 2–6)
MONOCYTES NFR BLD MANUAL: 9 % (ref 2–6)
MPC BLD CALC-MCNC: 9.7 FL (ref 9.4–12.4)
NEUTROPHILS # BLD AUTO: 7.67 K/UL (ref 1.8–7.7)
NEUTROPHILS NFR BLD AUTO: 82.3 % (ref 53–65)
NEUTS BAND NFR BLD MANUAL: 2 % (ref 1–5)
NEUTS SEG NFR BLD MANUAL: 82 % (ref 50–62)
NRBC # BLD AUTO: 0 X10E3/UL
NRBC, AUTO (.00): 0 %
PLATELET # BLD AUTO: 145 K/UL (ref 150–400)
PLATELET MORPHOLOGY: ABNORMAL
POTASSIUM SERPL-SCNC: 3.5 MMOL/L (ref 3.5–5.1)
PROT SERPL-MCNC: 5.2 G/DL (ref 6.4–8.2)
RBC # BLD AUTO: 4.17 M/UL (ref 4.6–6.2)
SODIUM SERPL-SCNC: 139 MMOL/L (ref 136–145)
VANCOMYCIN SERPL-MCNC: 9.7 ΜG/ML (ref 0–20)
WBC # BLD AUTO: 9.33 K/UL (ref 4.5–11)

## 2023-06-05 PROCEDURE — 25000003 PHARM REV CODE 250: Performed by: NURSE PRACTITIONER

## 2023-06-05 PROCEDURE — 82962 GLUCOSE BLOOD TEST: CPT

## 2023-06-05 PROCEDURE — 80202 ASSAY OF VANCOMYCIN: CPT | Performed by: INTERNAL MEDICINE

## 2023-06-05 PROCEDURE — 25000003 PHARM REV CODE 250: Performed by: INTERNAL MEDICINE

## 2023-06-05 PROCEDURE — 99233 SBSQ HOSP IP/OBS HIGH 50: CPT | Mod: ,,, | Performed by: INTERNAL MEDICINE

## 2023-06-05 PROCEDURE — 63600175 PHARM REV CODE 636 W HCPCS: Performed by: INTERNAL MEDICINE

## 2023-06-05 PROCEDURE — 87040 BLOOD CULTURE FOR BACTERIA: CPT | Performed by: NURSE PRACTITIONER

## 2023-06-05 PROCEDURE — 80053 COMPREHEN METABOLIC PANEL: CPT | Performed by: NURSE PRACTITIONER

## 2023-06-05 PROCEDURE — 25000003 PHARM REV CODE 250

## 2023-06-05 PROCEDURE — 99900035 HC TECH TIME PER 15 MIN (STAT)

## 2023-06-05 PROCEDURE — 20000000 HC ICU ROOM

## 2023-06-05 PROCEDURE — 99233 PR SUBSEQUENT HOSPITAL CARE,LEVL III: ICD-10-PCS | Mod: ,,, | Performed by: INTERNAL MEDICINE

## 2023-06-05 PROCEDURE — 63600175 PHARM REV CODE 636 W HCPCS: Performed by: NURSE PRACTITIONER

## 2023-06-05 PROCEDURE — 85025 COMPLETE CBC W/AUTO DIFF WBC: CPT | Performed by: NURSE PRACTITIONER

## 2023-06-05 PROCEDURE — 94761 N-INVAS EAR/PLS OXIMETRY MLT: CPT

## 2023-06-05 RX ADMIN — APIXABAN 5 MG: 5 TABLET, FILM COATED ORAL at 08:06

## 2023-06-05 RX ADMIN — APIXABAN 5 MG: 5 TABLET, FILM COATED ORAL at 09:06

## 2023-06-05 RX ADMIN — PIPERACILLIN AND TAZOBACTAM 4.5 G: 4; .5 INJECTION, POWDER, FOR SOLUTION INTRAVENOUS; PARENTERAL at 12:06

## 2023-06-05 RX ADMIN — DEXTROSE AND SODIUM CHLORIDE: 5; 900 INJECTION, SOLUTION INTRAVENOUS at 02:06

## 2023-06-05 RX ADMIN — DEXTROSE AND SODIUM CHLORIDE: 5; 900 INJECTION, SOLUTION INTRAVENOUS at 12:06

## 2023-06-05 RX ADMIN — PIPERACILLIN AND TAZOBACTAM 4.5 G: 4; .5 INJECTION, POWDER, FOR SOLUTION INTRAVENOUS; PARENTERAL at 04:06

## 2023-06-05 RX ADMIN — ASPIRIN 81 MG: 81 TABLET, COATED ORAL at 08:06

## 2023-06-05 RX ADMIN — MUPIROCIN: 20 OINTMENT TOPICAL at 09:06

## 2023-06-05 RX ADMIN — OXYCODONE HYDROCHLORIDE 5 MG: 5 TABLET ORAL at 04:06

## 2023-06-05 RX ADMIN — MUPIROCIN: 20 OINTMENT TOPICAL at 08:06

## 2023-06-05 RX ADMIN — NOREPINEPHRINE BITARTRATE 0.02 MCG/KG/MIN: 4 INJECTION, SOLUTION INTRAVENOUS at 10:06

## 2023-06-05 RX ADMIN — SILVER SULFADIAZINE: 10 CREAM TOPICAL at 08:06

## 2023-06-05 RX ADMIN — ATORVASTATIN CALCIUM 40 MG: 40 TABLET, FILM COATED ORAL at 08:06

## 2023-06-05 RX ADMIN — MORPHINE SULFATE 2 MG: 2 INJECTION, SOLUTION INTRAMUSCULAR; INTRAVENOUS at 08:06

## 2023-06-05 RX ADMIN — NOREPINEPHRINE BITARTRATE 0.06 MCG/KG/MIN: 4 INJECTION, SOLUTION INTRAVENOUS at 01:06

## 2023-06-05 RX ADMIN — PIPERACILLIN AND TAZOBACTAM 4.5 G: 4; .5 INJECTION, POWDER, FOR SOLUTION INTRAVENOUS; PARENTERAL at 09:06

## 2023-06-05 RX ADMIN — MORPHINE SULFATE 2 MG: 2 INJECTION, SOLUTION INTRAMUSCULAR; INTRAVENOUS at 02:06

## 2023-06-05 NOTE — SUBJECTIVE & OBJECTIVE
Interval History:  He is alert.  He denies shortness of breath or nausea.    Review of patient's allergies indicates:  No Known Allergies  Current Facility-Administered Medications   Medication Frequency    albuterol-ipratropium 2.5 mg-0.5 mg/3 mL nebulizer solution 3 mL Q4H PRN    apixaban tablet 5 mg BID    aspirin EC tablet 81 mg Daily    atorvastatin tablet 40 mg Daily    dextrose 10% bolus 125 mL 125 mL PRN    dextrose 10% bolus 250 mL 250 mL PRN    dextrose 40 % gel 15,000 mg PRN    dextrose 40 % gel 30,000 mg PRN    dextrose 5 % and 0.9 % NaCl infusion Continuous    glucagon (human recombinant) injection 1 mg PRN    insulin aspart U-100 injection 0-5 Units QID (AC + HS) PRN    melatonin tablet 6 mg Nightly PRN    metoprolol succinate (TOPROL-XL) 24 hr tablet 25 mg Daily    morphine injection 2 mg Q4H PRN    mupirocin 2 % ointment BID    naloxone 0.4 mg/mL injection 0.02 mg PRN    NORepinephrine 4 mg in dextrose 5% 250 mL infusion (premix) (titrating) Continuous    ondansetron injection 4 mg Q8H PRN    oxyCODONE immediate release tablet 5 mg Q4H PRN    piperacillin-tazobactam (ZOSYN) 4.5 g in dextrose 5 % in water (D5W) 5 % 100 mL IVPB (MB+) Q8H    prochlorperazine injection Soln 5 mg Q6H PRN    silver sulfADIAZINE 1% cream Daily    sodium chloride 0.9% flush 10 mL PRN       Objective:     Vital Signs (Most Recent):  Temp: 97.7 °F (36.5 °C) (06/05/23 1115)  Pulse: 89 (06/05/23 1200)  Resp: 12 (06/05/23 1200)  BP: 109/63 (06/05/23 1200)  SpO2: 100 % (06/05/23 1200) Vital Signs (24h Range):  Temp:  [97.6 °F (36.4 °C)-98.2 °F (36.8 °C)] 97.7 °F (36.5 °C)  Pulse:  [64-90] 89  Resp:  [0-16] 12  SpO2:  [97 %-100 %] 100 %  BP: ()/(39-67) 109/63     Weight: 65.8 kg (145 lb 1 oz) (06/05/23 0215)  Body mass index is 22.72 kg/m².  Body surface area is 1.76 meters squared.    I/O last 3 completed shifts:  In: 4077.5 [I.V.:3812.5; IV Piggyback:265]  Out: 2255 [Urine:2255]     Physical Exam  HENT:      Mouth/Throat:       Mouth: Mucous membranes are dry.   Eyes:      Pupils: Pupils are equal, round, and reactive to light.   Cardiovascular:      Rate and Rhythm: Regular rhythm. Tachycardia present.   Pulmonary:      Effort: No respiratory distress.      Breath sounds: No wheezing or rales.   Abdominal:      Palpations: Abdomen is soft.      Tenderness: There is no abdominal tenderness. There is no guarding.   Musculoskeletal:      Cervical back: Neck supple.      Comments: Chronic wounds both lower extremities are dressed   Neurological:      General: No focal deficit present.      Mental Status: He is alert.        Significant Labs:  BMP:   Recent Labs   Lab 06/03/23  0906 06/05/23  0720    98   * 139   K 4.3 3.5   CL 95* 104   CO2 28 26   BUN 94* 60*   CREATININE 4.29* 1.63*   CALCIUM 7.6* 7.1*   MG 2.5*  --         Significant Imaging:

## 2023-06-05 NOTE — PLAN OF CARE
Problem: Adjustment to Illness (Sepsis/Septic Shock)  Goal: Optimal Coping  Outcome: Ongoing, Progressing  Intervention: Optimize Psychosocial Adjustment to Illness  Flowsheets (Taken 6/4/2023 2232)  Supportive Measures: active listening utilized  Family/Support System Care: caregiver stress acknowledged     Problem: Renal Function Impairment (Acute Kidney Injury/Impairment)  Goal: Effective Renal Function  Outcome: Ongoing, Progressing  Intervention: Monitor and Support Renal Function  Flowsheets (Taken 6/4/2023 2232)  Stabilization Measures: airway opened  Medication Review/Management: medications reviewed

## 2023-06-05 NOTE — ASSESSMENT & PLAN NOTE
Antibiotics given-   Antibiotics (72h ago, onward)    Start     Stop Route Frequency Ordered    06/05/23 1224  piperacillin-tazobactam (ZOSYN) 4.5 g in dextrose 5 % in water (D5W) 5 % 100 mL IVPB (MB+)         -- IV Every 8 hours (non-standard times) 06/05/23 0916    06/02/23 0930  silver sulfADIAZINE 1% cream         -- Top Daily 06/02/23 0815    06/02/23 0930  mupirocin 2 % ointment         06/07 0859 Nasl 2 times daily 06/02/23 0816        Latest lactate reviewed-  No results for input(s): LACTATE in the last 72 hours.  Organ dysfunction indicated by Acute kidney injury

## 2023-06-05 NOTE — ASSESSMENT & PLAN NOTE
1/2 BC with GPB -   1/2 BC with GNB   continue IV antibiotics and follow cultures   Repeat bc today

## 2023-06-05 NOTE — PROGRESS NOTES
Pharmacist Renal Dose Adjustment Note    Esthela Contreras is a 72 y.o. male being treated with the medication Zosyn.    Patient Data:    Vital Signs (Most Recent):  Temp: 97.6 °F (36.4 °C) (06/05/23 0701)  Pulse: 88 (06/05/23 0815)  Resp: 12 (06/05/23 0851)  BP: (!) 92/54 (06/05/23 0815)  SpO2: 100 % (06/05/23 0815) Vital Signs (72h Range):  Temp:  [97.2 °F (36.2 °C)-98.6 °F (37 °C)]   Pulse:  []   Resp:  [0-29]   BP: ()/(38-75)   SpO2:  [92 %-100 %]      Recent Labs   Lab 06/02/23  1008 06/03/23  0906 06/05/23  0720   CREATININE 5.75* 4.29* 1.63*     Serum creatinine: 1.63 mg/dL (H) 06/05/23 0720  Estimated creatinine clearance: 37 mL/min (A)    Medication:Zosyn dose: 4.5 g frequency every 12 hours will be changed to medication:Zosyn dose:4.5 g frequency:every 8 hours.    Pharmacist's Name: Yuridia Maldonado  Pharmacist's Extension: [phone]

## 2023-06-05 NOTE — ASSESSMENT & PLAN NOTE
>>ASSESSMENT AND PLAN FOR SEPSIS WRITTEN ON 6/5/2023  3:31 PM BY NASH MUELLER AG-ACNP    Secondary to bacteremia

## 2023-06-05 NOTE — ASSESSMENT & PLAN NOTE
>>ASSESSMENT AND PLAN FOR SEPSIS WRITTEN ON 6/5/2023 12:37 PM BY MARCO HE MD      Antibiotics given-   Antibiotics (72h ago, onward)    Start     Stop Route Frequency Ordered    06/05/23 1224  piperacillin-tazobactam (ZOSYN) 4.5 g in dextrose 5 % in water (D5W) 5 % 100 mL IVPB (MB+)         -- IV Every 8 hours (non-standard times) 06/05/23 0916    06/02/23 0930  silver sulfADIAZINE 1% cream         -- Top Daily 06/02/23 0815    06/02/23 0930  mupirocin 2 % ointment         06/07 0859 Nasl 2 times daily 06/02/23 0816        Latest lactate reviewed-  No results for input(s): LACTATE in the last 72 hours.  Organ dysfunction indicated by Acute kidney injury

## 2023-06-05 NOTE — ASSESSMENT & PLAN NOTE
Improving with IV fluid hydration - Cr down to 4.29  - nephrology following   -6/3 - lab pending today - UOP 1365cc in last 24 hours   06/05- Cr down to 1.63 - UOP 1755cc

## 2023-06-05 NOTE — PROGRESS NOTES
[facility]  Nephrology  Progress Note    Patient Name: Esthela Contreras  MRN: [id number]  Admission Date: 6/1/2023  Hospital Length of Stay: 4 days  Attending Provider: Mak Brown MD   Primary Care Physician: Lor Patel MD  Principal Problem:Severe sepsis    Subjective:     HPI: 72-year-old man presented with shortness of breath.  He was noted to have cellulitis of both lower extremities.  He was tachycardic and noted to have metabolic acidosis hyperkalemia and acute renal failure.      Interval History:  He is alert.  He denies shortness of breath or nausea.    Review of patient's allergies indicates:  No Known Allergies  Current Facility-Administered Medications   Medication Frequency    albuterol-ipratropium 2.5 mg-0.5 mg/3 mL nebulizer solution 3 mL Q4H PRN    apixaban tablet 5 mg BID    aspirin EC tablet 81 mg Daily    atorvastatin tablet 40 mg Daily    dextrose 10% bolus 125 mL 125 mL PRN    dextrose 10% bolus 250 mL 250 mL PRN    dextrose 40 % gel 15,000 mg PRN    dextrose 40 % gel 30,000 mg PRN    dextrose 5 % and 0.9 % NaCl infusion Continuous    glucagon (human recombinant) injection 1 mg PRN    insulin aspart U-100 injection 0-5 Units QID (AC + HS) PRN    melatonin tablet 6 mg Nightly PRN    metoprolol succinate (TOPROL-XL) 24 hr tablet 25 mg Daily    morphine injection 2 mg Q4H PRN    mupirocin 2 % ointment BID    naloxone 0.4 mg/mL injection 0.02 mg PRN    NORepinephrine 4 mg in dextrose 5% 250 mL infusion (premix) (titrating) Continuous    ondansetron injection 4 mg Q8H PRN    oxyCODONE immediate release tablet 5 mg Q4H PRN    piperacillin-tazobactam (ZOSYN) 4.5 g in dextrose 5 % in water (D5W) 5 % 100 mL IVPB (MB+) Q8H    prochlorperazine injection Soln 5 mg Q6H PRN    silver sulfADIAZINE 1% cream Daily    sodium chloride 0.9% flush 10 mL PRN       Objective:     Vital Signs (Most Recent):  Temp: 97.7 °F (36.5 °C) (06/05/23 1115)  Pulse: 89  (06/05/23 1200)  Resp: 12 (06/05/23 1200)  BP: 109/63 (06/05/23 1200)  SpO2: 100 % (06/05/23 1200) Vital Signs (24h Range):  Temp:  [97.6 °F (36.4 °C)-98.2 °F (36.8 °C)] 97.7 °F (36.5 °C)  Pulse:  [64-90] 89  Resp:  [0-16] 12  SpO2:  [97 %-100 %] 100 %  BP: ()/(39-67) 109/63     Weight: 65.8 kg (145 lb 1 oz) (06/05/23 0215)  Body mass index is 22.72 kg/m².  Body surface area is 1.76 meters squared.    I/O last 3 completed shifts:  In: 4077.5 [I.V.:3812.5; IV Piggyback:265]  Out: 2255 [Urine:2255]     Physical Exam  HENT:      Mouth/Throat:      Mouth: Mucous membranes are dry.   Eyes:      Pupils: Pupils are equal, round, and reactive to light.   Cardiovascular:      Rate and Rhythm: Regular rhythm. Tachycardia present.   Pulmonary:      Effort: No respiratory distress.      Breath sounds: No wheezing or rales.   Abdominal:      Palpations: Abdomen is soft.      Tenderness: There is no abdominal tenderness. There is no guarding.   Musculoskeletal:      Cervical back: Neck supple.      Comments: Chronic wounds both lower extremities are dressed   Neurological:      General: No focal deficit present.      Mental Status: He is alert.        Significant Labs:  BMP:   Recent Labs   Lab 06/03/23  0906 06/05/23  0720    98   * 139   K 4.3 3.5   CL 95* 104   CO2 28 26   BUN 94* 60*   CREATININE 4.29* 1.63*   CALCIUM 7.6* 7.1*   MG 2.5*  --         Significant Imaging:      Assessment/Plan:     Cardiac/Vascular  Coronary artery disease involving native coronary artery of native heart without angina pectoris  No angina    HFrEF (heart failure with reduced ejection fraction)  No signs of decompensation    Renal/  Acute renal failure  Renal function continues to improve    Hyperkalemia  Resolved    ID  * Severe sepsis    Antibiotics given-   Antibiotics (72h ago, onward)    Start     Stop Route Frequency Ordered    06/05/23 1224  piperacillin-tazobactam (ZOSYN) 4.5 g in dextrose 5 % in water (D5W) 5 % 100  mL IVPB (MB+)         -- IV Every 8 hours (non-standard times) 06/05/23 0916    06/02/23 0930  silver sulfADIAZINE 1% cream         -- Top Daily 06/02/23 0815    06/02/23 0930  mupirocin 2 % ointment         06/07 0859 Nasl 2 times daily 06/02/23 0816        Latest lactate reviewed-  No results for input(s): LACTATE in the last 72 hours.  Organ dysfunction indicated by Acute kidney injury        Cellulitis of lower extremity  Continue antibiotics and wound care        Thank you for your consult.     Dakota Cruz MD  Nephrology  [facility]

## 2023-06-05 NOTE — SUBJECTIVE & OBJECTIVE
Interval History:  no complaints today. Weaned off levo at 4AM.       Objective:     Vital Signs (Most Recent):  Temp: 97.7 °F (36.5 °C) (06/05/23 1115)  Pulse: 68 (06/05/23 1330)  Resp: 10 (06/05/23 1330)  BP: 109/60 (06/05/23 1330)  SpO2: 100 % (06/05/23 1330) Vital Signs (24h Range):  Temp:  [97.6 °F (36.4 °C)-98.2 °F (36.8 °C)] 97.7 °F (36.5 °C)  Pulse:  [64-90] 68  Resp:  [0-16] 10  SpO2:  [97 %-100 %] 100 %  BP: ()/(39-65) 109/60     Weight: 65.8 kg (145 lb 1 oz)  Body mass index is 22.72 kg/m².      Intake/Output Summary (Last 24 hours) at 6/5/2023 1527  Last data filed at 6/5/2023 1300  Gross per 24 hour   Intake 2442.31 ml   Output 1775 ml   Net 667.31 ml        Physical Exam  HENT:      Right Ear: External ear normal.      Left Ear: External ear normal.      Mouth/Throat:      Mouth: Mucous membranes are moist.   Eyes:      Pupils: Pupils are equal, round, and reactive to light.   Cardiovascular:      Rate and Rhythm: Normal rate and regular rhythm.   Pulmonary:      Effort: No respiratory distress.      Breath sounds: No wheezing or rales.   Abdominal:      Palpations: Abdomen is soft.      Tenderness: There is no abdominal tenderness. There is no guarding.   Musculoskeletal:         General: Normal range of motion.      Cervical back: Neck supple.      Comments: Chronic wounds both lower extremities are dressed   Skin:     General: Skin is warm and dry.   Neurological:      General: No focal deficit present.      Mental Status: He is alert.   Psychiatric:         Mood and Affect: Mood normal.         Review of Systems    Vents:  Oxygen Concentration (%): 21 (06/05/23 1100)    Lines/Drains/Airways       Drain  Duration             Male External Urinary Catheter 06/05/23 0230 Small <1 day              Peripheral Intravenous Line  Duration                  Peripheral IV - Single Lumen 06/03/23 1709 20 G Anterior;Left 1 day         Peripheral IV - Single Lumen 06/05/23 0330 22 G Anterior;Right Upper  Arm <1 day                    Significant Labs:    CBC/Anemia Profile:  No results for input(s): WBC, HGB, HCT, PLT, MCV, RDW, IRON, FERRITIN, RETIC, FOLATE, [id number], OCCULTBLOOD in the last 48 hours.     Chemistries:  Recent Labs   Lab 06/05/23  0720      K 3.5      CO2 26   BUN 60*   CREATININE 1.63*   CALCIUM 7.1*   ALBUMIN 1.3*   PROT 5.2*   BILITOT 0.7   ALKPHOS 99   ALT 29   AST 44*       All pertinent labs within the past 24 hours have been reviewed.    Significant Imaging:  I have reviewed all pertinent imaging results/findings within the past 24 hours.

## 2023-06-05 NOTE — ASSESSMENT & PLAN NOTE
>>ASSESSMENT AND PLAN FOR CELLULITIS OF LOWER EXTREMITY WRITTEN ON 6/5/2023  3:30 PM BY NASH MUELLER AG-ACNP    On antibiotics - evaluated by surgery

## 2023-06-05 NOTE — PROGRESS NOTES
[facility]  Pulmonology  Progress Note    Patient Name: Esthela Contreras  MRN: [id number]  Admission Date: 6/1/2023  Hospital Length of Stay: 4 days  Code Status: Full Code  Attending Provider: Mak Brown MD  Primary Care Provider: Lor Patel MD   Principal Problem: Severe sepsis    Subjective:     Interval History:  no complaints today. Weaned off levo at 4AM.       Objective:     Vital Signs (Most Recent):  Temp: 97.7 °F (36.5 °C) (06/05/23 1115)  Pulse: 68 (06/05/23 1330)  Resp: 10 (06/05/23 1330)  BP: 109/60 (06/05/23 1330)  SpO2: 100 % (06/05/23 1330) Vital Signs (24h Range):  Temp:  [97.6 °F (36.4 °C)-98.2 °F (36.8 °C)] 97.7 °F (36.5 °C)  Pulse:  [64-90] 68  Resp:  [0-16] 10  SpO2:  [97 %-100 %] 100 %  BP: ()/(39-65) 109/60     Weight: 65.8 kg (145 lb 1 oz)  Body mass index is 22.72 kg/m².      Intake/Output Summary (Last 24 hours) at 6/5/2023 1527  Last data filed at 6/5/2023 1300  Gross per 24 hour   Intake 2442.31 ml   Output 1775 ml   Net 667.31 ml        Physical Exam  HENT:      Right Ear: External ear normal.      Left Ear: External ear normal.      Mouth/Throat:      Mouth: Mucous membranes are moist.   Eyes:      Pupils: Pupils are equal, round, and reactive to light.   Cardiovascular:      Rate and Rhythm: Normal rate and regular rhythm.   Pulmonary:      Effort: No respiratory distress.      Breath sounds: No wheezing or rales.   Abdominal:      Palpations: Abdomen is soft.      Tenderness: There is no abdominal tenderness. There is no guarding.   Musculoskeletal:         General: Normal range of motion.      Cervical back: Neck supple.      Comments: Chronic wounds both lower extremities are dressed   Skin:     General: Skin is warm and dry.   Neurological:      General: No focal deficit present.      Mental Status: He is alert.   Psychiatric:         Mood and Affect: Mood normal.         Review of Systems    Vents:  Oxygen Concentration (%): 21 (06/05/23  1100)    Lines/Drains/Airways       Drain  Duration             Male External Urinary Catheter 06/05/23 0230 Small <1 day              Peripheral Intravenous Line  Duration                  Peripheral IV - Single Lumen 06/03/23 1709 20 G Anterior;Left 1 day         Peripheral IV - Single Lumen 06/05/23 0330 22 G Anterior;Right Upper Arm <1 day                    Significant Labs:    CBC/Anemia Profile:  No results for input(s): WBC, HGB, HCT, PLT, MCV, RDW, IRON, FERRITIN, RETIC, FOLATE, [id number], OCCULTBLOOD in the last 48 hours.     Chemistries:  Recent Labs   Lab 06/05/23  0720      K 3.5      CO2 26   BUN 60*   CREATININE 1.63*   CALCIUM 7.1*   ALBUMIN 1.3*   PROT 5.2*   BILITOT 0.7   ALKPHOS 99   ALT 29   AST 44*       All pertinent labs within the past 24 hours have been reviewed.    Significant Imaging:  I have reviewed all pertinent imaging results/findings within the past 24 hours.    Assessment/Plan:     Pulmonary  COPD (chronic obstructive pulmonary disease)  Stable     Cardiac/Vascular  Hypotension  Secondary to sepsis   Weaned off levo at 4AM today     New onset a-fib  Hear rate controlled --currently in SR - on eliquis     Coronary artery disease involving native coronary artery of native heart without angina pectoris  Stable     HFrEF (heart failure with reduced ejection fraction)  Monitor     Renal/  Lactic acidosis  Trended down     Acute renal failure  Improving with IV fluid hydration - Cr down to 4.29  - nephrology following   -6/3 - lab pending today - UOP 1365cc in last 24 hours   06/05- Cr down to 1.63 - UOP 1755cc    ID  * Severe sepsis  Secondary to bacteremia     Gram-positive bacteremia  1/2 BC with GPB -   1/2 BC with GNB   continue IV antibiotics and follow cultures   Repeat bc today     Cellulitis of lower extremity  On antibiotics - evaluated by surgery     Other  Multiple open wounds of lower extremity, unspecified laterality, subsequent encounter  Seen by surgery,  recs reviewed       If BP remains stable through the day he can transfer to floor            CATA Ayon-ACNP  Pulmonology  [facility]

## 2023-06-05 NOTE — ASSESSMENT & PLAN NOTE
>>ASSESSMENT AND PLAN FOR CELLULITIS OF LOWER EXTREMITY WRITTEN ON 6/5/2023 12:37 PM BY MARCO HE MD    Continue antibiotics and wound care

## 2023-06-06 LAB
ALBUMIN SERPL BCP-MCNC: 1.3 G/DL (ref 3.5–5)
ALBUMIN/GLOB SERPL: 0.4 {RATIO}
ALP SERPL-CCNC: 93 U/L (ref 45–115)
ALT SERPL W P-5'-P-CCNC: 29 U/L (ref 16–61)
ANION GAP SERPL CALCULATED.3IONS-SCNC: 13 MMOL/L (ref 7–16)
ANISOCYTOSIS BLD QL SMEAR: ABNORMAL
AST SERPL W P-5'-P-CCNC: 28 U/L (ref 15–37)
BACTERIA BLD CULT: ABNORMAL
BACTERIA BLD CULT: ABNORMAL
BASOPHILS # BLD AUTO: 0.02 K/UL (ref 0–0.2)
BASOPHILS NFR BLD AUTO: 0.2 % (ref 0–1)
BILIRUB SERPL-MCNC: 0.7 MG/DL (ref ?–1.2)
BUN SERPL-MCNC: 40 MG/DL (ref 7–18)
BUN/CREAT SERPL: 36 (ref 6–20)
CALCIUM SERPL-MCNC: 6.5 MG/DL (ref 8.5–10.1)
CHLORIDE SERPL-SCNC: 107 MMOL/L (ref 98–107)
CO2 SERPL-SCNC: 25 MMOL/L (ref 21–32)
CREAT SERPL-MCNC: 1.1 MG/DL (ref 0.7–1.3)
DIFFERENTIAL METHOD BLD: ABNORMAL
EGFR (NO RACE VARIABLE) (RUSH/TITUS): 70 ML/MIN/1.73M2
EOSINOPHIL # BLD AUTO: 0.22 K/UL (ref 0–0.5)
EOSINOPHIL NFR BLD AUTO: 2.4 % (ref 1–4)
EOSINOPHIL NFR BLD MANUAL: 1 % (ref 1–4)
ERYTHROCYTE [DISTWIDTH] IN BLOOD BY AUTOMATED COUNT: 16.4 % (ref 11.5–14.5)
GLOBULIN SER-MCNC: 3.1 G/DL (ref 2–4)
GLUCOSE SERPL-MCNC: 104 MG/DL (ref 70–105)
GLUCOSE SERPL-MCNC: 105 MG/DL (ref 70–105)
GLUCOSE SERPL-MCNC: 109 MG/DL (ref 74–106)
GLUCOSE SERPL-MCNC: 128 MG/DL (ref 70–105)
GLUCOSE SERPL-MCNC: 88 MG/DL (ref 70–105)
GRAM STN SPEC: ABNORMAL
GRAM STN SPEC: ABNORMAL
HBV E IGG SER QL IA: NEGATIVE
HCT VFR BLD AUTO: 39.6 % (ref 40–54)
HGB BLD-MCNC: 12.5 G/DL (ref 13.5–18)
HYPOCHROMIA BLD QL SMEAR: ABNORMAL
IMM GRANULOCYTES # BLD AUTO: 0.03 K/UL (ref 0–0.04)
IMM GRANULOCYTES NFR BLD: 0.3 % (ref 0–0.4)
LYMPHOCYTES # BLD AUTO: 0.66 K/UL (ref 1–4.8)
LYMPHOCYTES NFR BLD AUTO: 7.3 % (ref 27–41)
LYMPHOCYTES NFR BLD MANUAL: 9 % (ref 27–41)
MCH RBC QN AUTO: 29.8 PG (ref 27–31)
MCHC RBC AUTO-ENTMCNC: 31.6 G/DL (ref 32–36)
MCV RBC AUTO: 94.5 FL (ref 80–96)
MONOCYTES # BLD AUTO: 0.94 K/UL (ref 0–0.8)
MONOCYTES NFR BLD AUTO: 10.5 % (ref 2–6)
MONOCYTES NFR BLD MANUAL: 10 % (ref 2–6)
MPC BLD CALC-MCNC: 10.9 FL (ref 9.4–12.4)
NEUTROPHILS # BLD AUTO: 7.12 K/UL (ref 1.8–7.7)
NEUTROPHILS NFR BLD AUTO: 79.3 % (ref 53–65)
NEUTS SEG NFR BLD MANUAL: 80 % (ref 50–62)
NRBC # BLD AUTO: 0 X10E3/UL
NRBC, AUTO (.00): 0 %
PLATELET # BLD AUTO: 118 K/UL (ref 150–400)
PLATELET MORPHOLOGY: ABNORMAL
POTASSIUM SERPL-SCNC: 3.2 MMOL/L (ref 3.5–5.1)
PROT SERPL-MCNC: 4.4 G/DL (ref 6.4–8.2)
RBC # BLD AUTO: 4.19 M/UL (ref 4.6–6.2)
SODIUM SERPL-SCNC: 142 MMOL/L (ref 136–145)
WBC # BLD AUTO: 8.99 K/UL (ref 4.5–11)

## 2023-06-06 PROCEDURE — 80053 COMPREHEN METABOLIC PANEL: CPT | Performed by: NURSE PRACTITIONER

## 2023-06-06 PROCEDURE — 97162 PT EVAL MOD COMPLEX 30 MIN: CPT

## 2023-06-06 PROCEDURE — 27000221 HC OXYGEN, UP TO 24 HOURS

## 2023-06-06 PROCEDURE — 97165 OT EVAL LOW COMPLEX 30 MIN: CPT

## 2023-06-06 PROCEDURE — 63600175 PHARM REV CODE 636 W HCPCS: Performed by: NURSE PRACTITIONER

## 2023-06-06 PROCEDURE — 85025 COMPLETE CBC W/AUTO DIFF WBC: CPT | Performed by: NURSE PRACTITIONER

## 2023-06-06 PROCEDURE — 20000000 HC ICU ROOM

## 2023-06-06 PROCEDURE — 25000003 PHARM REV CODE 250: Performed by: INTERNAL MEDICINE

## 2023-06-06 PROCEDURE — 25000003 PHARM REV CODE 250: Performed by: NURSE PRACTITIONER

## 2023-06-06 PROCEDURE — 82962 GLUCOSE BLOOD TEST: CPT

## 2023-06-06 PROCEDURE — 99233 SBSQ HOSP IP/OBS HIGH 50: CPT | Mod: ,,, | Performed by: INTERNAL MEDICINE

## 2023-06-06 PROCEDURE — 99233 PR SUBSEQUENT HOSPITAL CARE,LEVL III: ICD-10-PCS | Mod: ,,, | Performed by: INTERNAL MEDICINE

## 2023-06-06 PROCEDURE — 25000003 PHARM REV CODE 250

## 2023-06-06 PROCEDURE — 63600175 PHARM REV CODE 636 W HCPCS: Performed by: INTERNAL MEDICINE

## 2023-06-06 PROCEDURE — 94761 N-INVAS EAR/PLS OXIMETRY MLT: CPT

## 2023-06-06 RX ORDER — MIDODRINE HYDROCHLORIDE 5 MG/1
5 TABLET ORAL
Status: DISCONTINUED | OUTPATIENT
Start: 2023-06-06 | End: 2023-06-09 | Stop reason: HOSPADM

## 2023-06-06 RX ADMIN — APIXABAN 5 MG: 5 TABLET, FILM COATED ORAL at 09:06

## 2023-06-06 RX ADMIN — METOPROLOL SUCCINATE 25 MG: 25 TABLET, EXTENDED RELEASE ORAL at 09:06

## 2023-06-06 RX ADMIN — DEXTROSE AND SODIUM CHLORIDE: 5; 900 INJECTION, SOLUTION INTRAVENOUS at 05:06

## 2023-06-06 RX ADMIN — MORPHINE SULFATE 2 MG: 2 INJECTION, SOLUTION INTRAMUSCULAR; INTRAVENOUS at 02:06

## 2023-06-06 RX ADMIN — MUPIROCIN: 20 OINTMENT TOPICAL at 09:06

## 2023-06-06 RX ADMIN — PIPERACILLIN AND TAZOBACTAM 4.5 G: 4; .5 INJECTION, POWDER, FOR SOLUTION INTRAVENOUS; PARENTERAL at 08:06

## 2023-06-06 RX ADMIN — APIXABAN 5 MG: 5 TABLET, FILM COATED ORAL at 08:06

## 2023-06-06 RX ADMIN — MUPIROCIN: 20 OINTMENT TOPICAL at 08:06

## 2023-06-06 RX ADMIN — PIPERACILLIN AND TAZOBACTAM 4.5 G: 4; .5 INJECTION, POWDER, FOR SOLUTION INTRAVENOUS; PARENTERAL at 11:06

## 2023-06-06 RX ADMIN — ATORVASTATIN CALCIUM 40 MG: 40 TABLET, FILM COATED ORAL at 09:06

## 2023-06-06 RX ADMIN — NOREPINEPHRINE BITARTRATE 0.04 MCG/KG/MIN: 4 INJECTION, SOLUTION INTRAVENOUS at 11:06

## 2023-06-06 RX ADMIN — PIPERACILLIN AND TAZOBACTAM 4.5 G: 4; .5 INJECTION, POWDER, FOR SOLUTION INTRAVENOUS; PARENTERAL at 04:06

## 2023-06-06 RX ADMIN — POTASSIUM BICARBONATE 10 MEQ: 391 TABLET, EFFERVESCENT ORAL at 09:06

## 2023-06-06 RX ADMIN — MIDODRINE HYDROCHLORIDE 5 MG: 5 TABLET ORAL at 11:06

## 2023-06-06 RX ADMIN — MIDODRINE HYDROCHLORIDE 5 MG: 5 TABLET ORAL at 05:06

## 2023-06-06 RX ADMIN — SILVER SULFADIAZINE: 10 CREAM TOPICAL at 09:06

## 2023-06-06 RX ADMIN — ASPIRIN 81 MG: 81 TABLET, COATED ORAL at 09:06

## 2023-06-06 RX ADMIN — DEXTROSE AND SODIUM CHLORIDE: 5; 900 INJECTION, SOLUTION INTRAVENOUS at 03:06

## 2023-06-06 NOTE — SUBJECTIVE & OBJECTIVE
Interval History: Pt resting in bed. No complaints or needs voiced. Levo restarted overnight- adding midodrine today       Objective:     Vital Signs (Most Recent):  Temp: 99 °F (37.2 °C) (06/06/23 0715)  Pulse: 84 (06/06/23 0930)  Resp: 15 (06/06/23 0930)  BP: (!) 119/59 (06/06/23 0930)  SpO2: 100 % (06/06/23 0930) Vital Signs (24h Range):  Temp:  [97.4 °F (36.3 °C)-99 °F (37.2 °C)] 99 °F (37.2 °C)  Pulse:  [66-93] 84  Resp:  [8-19] 15  SpO2:  [97 %-100 %] 100 %  BP: ()/(43-75) 119/59     Weight: 80.4 kg (177 lb 4 oz)  Body mass index is 27.76 kg/m².      Intake/Output Summary (Last 24 hours) at 6/6/2023 1006  Last data filed at 6/6/2023 0800  Gross per 24 hour   Intake 2482.41 ml   Output 2150 ml   Net 332.41 ml        Physical Exam  Vitals and nursing note reviewed.   HENT:      Right Ear: External ear normal.      Left Ear: External ear normal.      Mouth/Throat:      Mouth: Mucous membranes are moist.   Eyes:      Pupils: Pupils are equal, round, and reactive to light.   Cardiovascular:      Rate and Rhythm: Normal rate and regular rhythm.   Pulmonary:      Effort: No respiratory distress.      Breath sounds: No wheezing or rales.   Abdominal:      Palpations: Abdomen is soft.      Tenderness: There is no abdominal tenderness. There is no guarding.   Musculoskeletal:         General: Normal range of motion.      Cervical back: Neck supple.      Comments: Chronic wounds both lower extremities are dressed   Skin:     General: Skin is warm and dry.   Neurological:      General: No focal deficit present.      Mental Status: He is alert.   Psychiatric:         Mood and Affect: Mood normal.         Review of Systems    Vents:  Oxygen Concentration (%): 21 (06/05/23 2100)    Lines/Drains/Airways       Drain  Duration             Male External Urinary Catheter 06/05/23 0230 Small 1 day              Peripheral Intravenous Line  Duration                  Peripheral IV - Single Lumen 06/03/23 1709 20 G Anterior;Left  2 days         Peripheral IV - Single Lumen 06/05/23 0330 22 G Anterior;Right Upper Arm 1 day                    Significant Labs:    CBC/Anemia Profile:  Recent Labs   Lab 06/05/23  2141 06/06/23  0437   WBC 9.33 8.99   HGB 12.6* 12.5*   HCT 39.3* 39.6*   * 118*   MCV 94.2 94.5   RDW 16.3* 16.4*        Chemistries:  Recent Labs   Lab 06/05/23  0720 06/06/23 0437    142   K 3.5 3.2*    107   CO2 26 25   BUN 60* 40*   CREATININE 1.63* 1.10   CALCIUM 7.1* 6.5*   ALBUMIN 1.3* 1.3*   PROT 5.2* 4.4*   BILITOT 0.7 0.7   ALKPHOS 99 93   ALT 29 29   AST 44* 28       All pertinent labs within the past 24 hours have been reviewed.    Significant Imaging:  I have reviewed all pertinent imaging results/findings within the past 24 hours.

## 2023-06-06 NOTE — ASSESSMENT & PLAN NOTE
>>ASSESSMENT AND PLAN FOR SEPSIS WRITTEN ON 6/6/2023  1:19 PM BY MARCO HE MD      Antibiotics given-   Antibiotics (72h ago, onward)    Start     Stop Route Frequency Ordered    06/05/23 1224  piperacillin-tazobactam (ZOSYN) 4.5 g in dextrose 5 % in water (D5W) 5 % 100 mL IVPB (MB+)         -- IV Every 8 hours (non-standard times) 06/05/23 0916    06/02/23 0930  silver sulfADIAZINE 1% cream         -- Top Daily 06/02/23 0815    06/02/23 0930  mupirocin 2 % ointment         06/07 0859 Nasl 2 times daily 06/02/23 0816        Latest lactate reviewed-  No results for input(s): LACTATE in the last 72 hours.  Organ dysfunction indicated by Acute kidney injury

## 2023-06-06 NOTE — ASSESSMENT & PLAN NOTE
>>ASSESSMENT AND PLAN FOR CELLULITIS OF LOWER EXTREMITY WRITTEN ON 6/6/2023 10:08 AM BY JUDITH SEBASTIAN FNP-LUIS CARLOS    On antibiotics - evaluated by surgery

## 2023-06-06 NOTE — ASSESSMENT & PLAN NOTE
Improving with IV fluid hydration - Cr down to 4.29  - nephrology following   -6/3 - lab pending today - UOP 1365cc in last 24 hours   06/05- Cr down to 1.63 - UOP 1755cc  Resolved

## 2023-06-06 NOTE — ASSESSMENT & PLAN NOTE
Resolved.  Urine output adequate.  Volume status normal.  I will sign off.  Please recall [email or web address].

## 2023-06-06 NOTE — PLAN OF CARE
Problem: Adult Inpatient Plan of Care  Goal: Plan of Care Review  Outcome: Ongoing, Progressing  Goal: Patient-Specific Goal (Individualized)  Outcome: Ongoing, Progressing  Goal: Absence of Hospital-Acquired Illness or Injury  Outcome: Ongoing, Progressing  Goal: Optimal Comfort and Wellbeing  Outcome: Ongoing, Progressing  Goal: Readiness for Transition of Care  Outcome: Ongoing, Progressing     Problem: Adjustment to Illness (Sepsis/Septic Shock)  Goal: Optimal Coping  Outcome: Ongoing, Progressing     Problem: Bleeding (Sepsis/Septic Shock)  Goal: Absence of Bleeding  Outcome: Ongoing, Progressing     Problem: Infection Progression (Sepsis/Septic Shock)  Goal: Absence of Infection Signs and Symptoms  Outcome: Ongoing, Progressing     Problem: Nutrition Impaired (Sepsis/Septic Shock)  Goal: Optimal Nutrition Intake  Outcome: Ongoing, Progressing     Problem: Fluid and Electrolyte Imbalance (Acute Kidney Injury/Impairment)  Goal: Fluid and Electrolyte Balance  Outcome: Ongoing, Progressing     Problem: Oral Intake Inadequate (Acute Kidney Injury/Impairment)  Goal: Optimal Nutrition Intake  Outcome: Ongoing, Progressing     Problem: Renal Function Impairment (Acute Kidney Injury/Impairment)  Goal: Effective Renal Function  Outcome: Ongoing, Progressing     Problem: Impaired Wound Healing  Goal: Optimal Wound Healing  Outcome: Ongoing, Progressing     Problem: Skin Injury Risk Increased  Goal: Skin Health and Integrity  Outcome: Ongoing, Progressing

## 2023-06-06 NOTE — ASSESSMENT & PLAN NOTE
>>ASSESSMENT AND PLAN FOR CELLULITIS OF LOWER EXTREMITY WRITTEN ON 6/6/2023  1:20 PM BY MARCO HE MD    Continue antibiotics and wound care

## 2023-06-06 NOTE — PROGRESS NOTES
[facility]  Pulmonology  Progress Note    Patient Name: Esthela Contreras  MRN: [id number]  Admission Date: 6/1/2023  Hospital Length of Stay: 5 days  Code Status: Full Code  Attending Provider: Milan Obrien DO  Primary Care Provider: Lor Patel MD   Principal Problem: Severe sepsis    Subjective:     Interval History: Pt resting in bed. No complaints or needs voiced. Levo restarted overnight- adding midodrine today       Objective:     Vital Signs (Most Recent):  Temp: 99 °F (37.2 °C) (06/06/23 0715)  Pulse: 84 (06/06/23 0930)  Resp: 15 (06/06/23 0930)  BP: (!) 119/59 (06/06/23 0930)  SpO2: 100 % (06/06/23 0930) Vital Signs (24h Range):  Temp:  [97.4 °F (36.3 °C)-99 °F (37.2 °C)] 99 °F (37.2 °C)  Pulse:  [66-93] 84  Resp:  [8-19] 15  SpO2:  [97 %-100 %] 100 %  BP: ()/(43-75) 119/59     Weight: 80.4 kg (177 lb 4 oz)  Body mass index is 27.76 kg/m².      Intake/Output Summary (Last 24 hours) at 6/6/2023 1006  Last data filed at 6/6/2023 0800  Gross per 24 hour   Intake 2482.41 ml   Output 2150 ml   Net 332.41 ml        Physical Exam  Vitals and nursing note reviewed.   HENT:      Right Ear: External ear normal.      Left Ear: External ear normal.      Mouth/Throat:      Mouth: Mucous membranes are moist.   Eyes:      Pupils: Pupils are equal, round, and reactive to light.   Cardiovascular:      Rate and Rhythm: Normal rate and regular rhythm.   Pulmonary:      Effort: No respiratory distress.      Breath sounds: No wheezing or rales.   Abdominal:      Palpations: Abdomen is soft.      Tenderness: There is no abdominal tenderness. There is no guarding.   Musculoskeletal:         General: Normal range of motion.      Cervical back: Neck supple.      Comments: Chronic wounds both lower extremities are dressed   Skin:     General: Skin is warm and dry.   Neurological:      General: No focal deficit present.      Mental Status: He is alert.   Psychiatric:         Mood and Affect: Mood  normal.         Review of Systems    Vents:  Oxygen Concentration (%): 21 (06/05/23 2100)    Lines/Drains/Airways       Drain  Duration             Male External Urinary Catheter 06/05/23 0230 Small 1 day              Peripheral Intravenous Line  Duration                  Peripheral IV - Single Lumen 06/03/23 1709 20 G Anterior;Left 2 days         Peripheral IV - Single Lumen 06/05/23 0330 22 G Anterior;Right Upper Arm 1 day                    Significant Labs:    CBC/Anemia Profile:  Recent Labs   Lab 06/05/23  2141 06/06/23  0437   WBC 9.33 8.99   HGB 12.6* 12.5*   HCT 39.3* 39.6*   * 118*   MCV 94.2 94.5   RDW 16.3* 16.4*        Chemistries:  Recent Labs   Lab 06/05/23  0720 06/06/23  0437    142   K 3.5 3.2*    107   CO2 26 25   BUN 60* 40*   CREATININE 1.63* 1.10   CALCIUM 7.1* 6.5*   ALBUMIN 1.3* 1.3*   PROT 5.2* 4.4*   BILITOT 0.7 0.7   ALKPHOS 99 93   ALT 29 29   AST 44* 28       All pertinent labs within the past 24 hours have been reviewed.    Significant Imaging:  I have reviewed all pertinent imaging results/findings within the past 24 hours.    Assessment/Plan:     Pulmonary  COPD (chronic obstructive pulmonary disease)  Stable     Cardiac/Vascular  Hypotension  Secondary to sepsis   Levo restarted overnight  Adding midodrine today     New onset a-fib  Hear rate controlled --currently in SR - on eliquis     Coronary artery disease involving native coronary artery of native heart without angina pectoris  Stable     HFrEF (heart failure with reduced ejection fraction)  Monitor, stable at present     Renal/  Acute renal failure  Improving with IV fluid hydration - Cr down to 4.29  - nephrology following   -6/3 - lab pending today - UOP 1365cc in last 24 hours   06/05- Cr down to 1.63 - UOP 1755cc  Resolved     ID  * Severe sepsis  Secondary to bacteremia     Gram-positive bacteremia  1/2 BC with GPB -   1/2 BC with GNB   continue IV antibiotics and follow cultures   Repeat bc today    1/2 BC with alpha streptoccous- likely contaminant   Repeat BC pending      Cellulitis of lower extremity  On antibiotics - evaluated by surgery     Other  Multiple open wounds of lower extremity, unspecified laterality, subsequent encounter  Seen by surgery, recs reviewed                  CHRISTIANE Sebastian-LUIS CARLOS  Pulmonology  [facility]

## 2023-06-06 NOTE — ASSESSMENT & PLAN NOTE
1/2 BC with GPB -   1/2 BC with GNB   continue IV antibiotics and follow cultures   Repeat bc today   1/2 BC with alpha streptoccous- likely contaminant   Repeat BC pending

## 2023-06-06 NOTE — PLAN OF CARE
Problem: Physical Therapy  Goal: Physical Therapy Goal  Description: Short term goals:  1. Supine to sit with Contact Guard Assistance  2. Rolling to Left and Right with Contact Guard Assistance.  3. Bed to chair transfer with Minimal Assistance using Slideboard  4. Sitting at edge of bed x15 minutes with [place]    Long term goals:  1. Supine to sit with Modified [place]  2. Rolling to Left and Right with Modified [place].  3. Bed to chair transfer with Modified [place] using Slideboard  4. Wheelchair propulsion x100 feet with Contact Guard Assistance using bilateral uppper extremities    Outcome: Ongoing, Progressing

## 2023-06-06 NOTE — PLAN OF CARE
Spoke with pt and aunt binh. Choice obtained for [facility] for [facility]. Awaiting PT/OT evaluation. Will follow.

## 2023-06-06 NOTE — ASSESSMENT & PLAN NOTE
>>ASSESSMENT AND PLAN FOR SEPSIS WRITTEN ON 6/6/2023 10:10 AM BY JUDITH SEBASTIAN FNP-AGACNP    Secondary to bacteremia
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
## 2023-06-06 NOTE — PROGRESS NOTES
[facility]  Nephrology  Progress Note    Patient Name: Esthela Contreras  MRN: [id number]  Admission Date: 6/1/2023  Hospital Length of Stay: 5 days  Attending Provider: Milan Obrien DO   Primary Care Physician: Lor Patel MD  Principal Problem:Severe sepsis    Subjective:     HPI: 72-year-old man presented with shortness of breath.  He was noted to have cellulitis of both lower extremities.  He was tachycardic and noted to have metabolic acidosis hyperkalemia and acute renal failure.      Interval History:  No SOB.  No new symptoms.  Pressors have been tapered off    Review of patient's allergies indicates:  No Known Allergies  Current Facility-Administered Medications   Medication Frequency    albuterol-ipratropium 2.5 mg-0.5 mg/3 mL nebulizer solution 3 mL Q4H PRN    apixaban tablet 5 mg BID    aspirin EC tablet 81 mg Daily    atorvastatin tablet 40 mg Daily    dextrose 10% bolus 125 mL 125 mL PRN    dextrose 10% bolus 250 mL 250 mL PRN    dextrose 40 % gel 15,000 mg PRN    dextrose 40 % gel 30,000 mg PRN    dextrose 5 % and 0.9 % NaCl infusion Continuous    glucagon (human recombinant) injection 1 mg PRN    insulin aspart U-100 injection 0-5 Units QID (AC + HS) PRN    melatonin tablet 6 mg Nightly PRN    metoprolol succinate (TOPROL-XL) 24 hr tablet 25 mg Daily    midodrine tablet 5 mg TID WM    morphine injection 2 mg Q4H PRN    mupirocin 2 % ointment BID    naloxone 0.4 mg/mL injection 0.02 mg PRN    NORepinephrine 4 mg in dextrose 5% 250 mL infusion (premix) (titrating) Continuous    ondansetron injection 4 mg Q8H PRN    oxyCODONE immediate release tablet 5 mg Q4H PRN    piperacillin-tazobactam (ZOSYN) 4.5 g in dextrose 5 % in water (D5W) 5 % 100 mL IVPB (MB+) Q8H    prochlorperazine injection Soln 5 mg Q6H PRN    silver sulfADIAZINE 1% cream Daily    sodium chloride 0.9% flush 10 mL PRN       Objective:     Vital Signs (Most Recent):  Temp: 98.2 °F (36.8  °C) (06/06/23 1101)  Pulse: 74 (06/06/23 1245)  Resp: 13 (06/06/23 1245)  BP: (!) 105/55 (06/06/23 1245)  SpO2: 100 % (06/06/23 1245) Vital Signs (24h Range):  Temp:  [97.4 °F (36.3 °C)-99 °F (37.2 °C)] 98.2 °F (36.8 °C)  Pulse:  [67-93] 74  Resp:  [8-19] 13  SpO2:  [97 %-100 %] 100 %  BP: ()/(48-75) 105/55     Weight: 80.4 kg (177 lb 4 oz) (06/06/23 0308)  Body mass index is 27.76 kg/m².  Body surface area is 1.95 meters squared.    I/O last 3 completed shifts:  In: 3850.2 [P.O.:490; I.V.:2947.2; IV Piggyback:412.9]  Out: 2675 [Urine:2675]     Physical Exam  HENT:      Mouth/Throat:      Mouth: Mucous membranes are dry.   Eyes:      Pupils: Pupils are equal, round, and reactive to light.   Cardiovascular:      Rate and Rhythm: Regular rhythm. Tachycardia present.   Pulmonary:      Effort: No respiratory distress.      Breath sounds: No wheezing or rales.   Abdominal:      Palpations: Abdomen is soft.      Tenderness: There is no abdominal tenderness. There is no guarding.   Musculoskeletal:      Cervical back: Neck supple.      Comments: Chronic wounds both lower extremities are dressed   Neurological:      General: No focal deficit present.      Mental Status: He is alert.        Significant Labs:  BMP:   Recent Labs   Lab 06/03/23  0906 06/05/23  0720 06/06/23  0437      < > 109*   *   < > 142   K 4.3   < > 3.2*   CL 95*   < > 107   CO2 28   < > 25   BUN 94*   < > 40*   CREATININE 4.29*   < > 1.10   CALCIUM 7.6*   < > 6.5*   MG 2.5*  --   --     < > = values in this interval not displayed.        Significant Imaging:      Assessment/Plan:     Cardiac/Vascular  Coronary artery disease involving native coronary artery of native heart without angina pectoris  No angina    HFrEF (heart failure with reduced ejection fraction)  No signs of decompensation    Renal/  Acute renal failure  Resolved.  Urine output adequate.  Volume status normal.  I will sign off.  Please recall  p.r.n.    Hyperkalemia  Resolved    ID  * Severe sepsis    Antibiotics given-   Antibiotics (72h ago, onward)    Start     Stop Route Frequency Ordered    06/05/23 1224  piperacillin-tazobactam (ZOSYN) 4.5 g in dextrose 5 % in water (D5W) 5 % 100 mL IVPB (MB+)         -- IV Every 8 hours (non-standard times) 06/05/23 0916    06/02/23 0930  silver sulfADIAZINE 1% cream         -- Top Daily 06/02/23 0815    06/02/23 0930  mupirocin 2 % ointment         06/07 0859 Nasl 2 times daily 06/02/23 0816        Latest lactate reviewed-  No results for input(s): LACTATE in the last 72 hours.  Organ dysfunction indicated by Acute kidney injury        Cellulitis of lower extremity  Continue antibiotics and wound care        Thank you for your consult. I will sign off. Please contact us if you have any additional questions.    Dakota Cruz MD  Nephrology  [facility]

## 2023-06-07 PROBLEM — L89.229 PRESSURE INJURY OF SKIN OF LEFT HIP: Status: ACTIVE | Noted: 2023-06-07

## 2023-06-07 PROBLEM — L89.152 PRESSURE INJURY OF SACRAL REGION, STAGE 2: Status: ACTIVE | Noted: 2023-06-07

## 2023-06-07 LAB
ALBUMIN SERPL BCP-MCNC: 1.4 G/DL (ref 3.5–5)
ALBUMIN/GLOB SERPL: 0.4 {RATIO}
ALP SERPL-CCNC: 93 U/L (ref 45–115)
ALT SERPL W P-5'-P-CCNC: 25 U/L (ref 16–61)
ANION GAP SERPL CALCULATED.3IONS-SCNC: 13 MMOL/L (ref 7–16)
ANISOCYTOSIS BLD QL SMEAR: ABNORMAL
AST SERPL W P-5'-P-CCNC: 17 U/L (ref 15–37)
BASOPHILS # BLD AUTO: 0.04 K/UL (ref 0–0.2)
BASOPHILS NFR BLD AUTO: 0.5 % (ref 0–1)
BILIRUB SERPL-MCNC: 0.6 MG/DL (ref ?–1.2)
BUN SERPL-MCNC: 26 MG/DL (ref 7–18)
BUN/CREAT SERPL: 25 (ref 6–20)
CALCIUM SERPL-MCNC: 6.3 MG/DL (ref 8.5–10.1)
CHLORIDE SERPL-SCNC: 106 MMOL/L (ref 98–107)
CO2 SERPL-SCNC: 25 MMOL/L (ref 21–32)
CREAT SERPL-MCNC: 1.05 MG/DL (ref 0.7–1.3)
DIFFERENTIAL METHOD BLD: ABNORMAL
EGFR (NO RACE VARIABLE) (RUSH/TITUS): 74 ML/MIN/1.73M2
EOSINOPHIL # BLD AUTO: 0.23 K/UL (ref 0–0.5)
EOSINOPHIL NFR BLD AUTO: 2.7 % (ref 1–4)
ERYTHROCYTE [DISTWIDTH] IN BLOOD BY AUTOMATED COUNT: 16.2 % (ref 11.5–14.5)
GLOBULIN SER-MCNC: 3.4 G/DL (ref 2–4)
GLUCOSE SERPL-MCNC: 111 MG/DL (ref 70–105)
GLUCOSE SERPL-MCNC: 117 MG/DL (ref 74–106)
GLUCOSE SERPL-MCNC: 70 MG/DL (ref 70–105)
GLUCOSE SERPL-MCNC: 93 MG/DL (ref 70–105)
GLUCOSE SERPL-MCNC: 95 MG/DL (ref 70–105)
HCT VFR BLD AUTO: 41.7 % (ref 40–54)
HGB BLD-MCNC: 12.8 G/DL (ref 13.5–18)
HYPOCHROMIA BLD QL SMEAR: ABNORMAL
IMM GRANULOCYTES # BLD AUTO: 0.03 K/UL (ref 0–0.04)
IMM GRANULOCYTES NFR BLD: 0.3 % (ref 0–0.4)
LYMPHOCYTES # BLD AUTO: 0.7 K/UL (ref 1–4.8)
LYMPHOCYTES NFR BLD AUTO: 8.1 % (ref 27–41)
MACROCYTES BLD QL SMEAR: ABNORMAL
MAGNESIUM SERPL-MCNC: 1.4 MG/DL (ref 1.7–2.3)
MCH RBC QN AUTO: 29.6 PG (ref 27–31)
MCHC RBC AUTO-ENTMCNC: 30.7 G/DL (ref 32–36)
MCV RBC AUTO: 96.3 FL (ref 80–96)
MONOCYTES # BLD AUTO: 0.82 K/UL (ref 0–0.8)
MONOCYTES NFR BLD AUTO: 9.5 % (ref 2–6)
MPC BLD CALC-MCNC: 11.8 FL (ref 9.4–12.4)
NEUTROPHILS # BLD AUTO: 6.78 K/UL (ref 1.8–7.7)
NEUTROPHILS NFR BLD AUTO: 78.9 % (ref 53–65)
NRBC # BLD AUTO: 0 X10E3/UL
NRBC, AUTO (.00): 0 %
OVALOCYTES BLD QL SMEAR: ABNORMAL
PLATELET # BLD AUTO: 123 K/UL (ref 150–400)
PLATELET MORPHOLOGY: ABNORMAL
POTASSIUM SERPL-SCNC: 3.3 MMOL/L (ref 3.5–5.1)
PROT SERPL-MCNC: 4.8 G/DL (ref 6.4–8.2)
RBC # BLD AUTO: 4.33 M/UL (ref 4.6–6.2)
SODIUM SERPL-SCNC: 141 MMOL/L (ref 136–145)
WBC # BLD AUTO: 8.6 K/UL (ref 4.5–11)

## 2023-06-07 PROCEDURE — P9047 ALBUMIN (HUMAN), 25%, 50ML: HCPCS | Mod: JZ,JG | Performed by: NURSE PRACTITIONER

## 2023-06-07 PROCEDURE — 97110 THERAPEUTIC EXERCISES: CPT

## 2023-06-07 PROCEDURE — 25000003 PHARM REV CODE 250

## 2023-06-07 PROCEDURE — 63600175 PHARM REV CODE 636 W HCPCS: Performed by: NURSE PRACTITIONER

## 2023-06-07 PROCEDURE — 83735 ASSAY OF MAGNESIUM: CPT | Performed by: NURSE PRACTITIONER

## 2023-06-07 PROCEDURE — 25000003 PHARM REV CODE 250: Performed by: NURSE PRACTITIONER

## 2023-06-07 PROCEDURE — 63600175 PHARM REV CODE 636 W HCPCS: Performed by: INTERNAL MEDICINE

## 2023-06-07 PROCEDURE — 94761 N-INVAS EAR/PLS OXIMETRY MLT: CPT

## 2023-06-07 PROCEDURE — 97530 THERAPEUTIC ACTIVITIES: CPT

## 2023-06-07 PROCEDURE — 80053 COMPREHEN METABOLIC PANEL: CPT | Performed by: NURSE PRACTITIONER

## 2023-06-07 PROCEDURE — 82962 GLUCOSE BLOOD TEST: CPT

## 2023-06-07 PROCEDURE — 20000000 HC ICU ROOM

## 2023-06-07 PROCEDURE — 25000003 PHARM REV CODE 250: Performed by: INTERNAL MEDICINE

## 2023-06-07 PROCEDURE — 99233 PR SUBSEQUENT HOSPITAL CARE,LEVL III: ICD-10-PCS | Mod: ,,, | Performed by: INTERNAL MEDICINE

## 2023-06-07 PROCEDURE — 85025 COMPLETE CBC W/AUTO DIFF WBC: CPT | Performed by: NURSE PRACTITIONER

## 2023-06-07 PROCEDURE — 99233 SBSQ HOSP IP/OBS HIGH 50: CPT | Mod: ,,, | Performed by: INTERNAL MEDICINE

## 2023-06-07 PROCEDURE — 63600175 PHARM REV CODE 636 W HCPCS: Mod: JZ,JG | Performed by: NURSE PRACTITIONER

## 2023-06-07 PROCEDURE — 25000003 PHARM REV CODE 250: Performed by: SURGERY

## 2023-06-07 RX ORDER — CALCIUM GLUCONATE 20 MG/ML
1 INJECTION, SOLUTION INTRAVENOUS
Status: COMPLETED | OUTPATIENT
Start: 2023-06-07 | End: 2023-06-07

## 2023-06-07 RX ORDER — MAGNESIUM SULFATE HEPTAHYDRATE 40 MG/ML
2 INJECTION, SOLUTION INTRAVENOUS ONCE
Status: COMPLETED | OUTPATIENT
Start: 2023-06-07 | End: 2023-06-07

## 2023-06-07 RX ORDER — NOREPINEPHRINE BITARTRATE/D5W 4MG/250ML
0-3 PLASTIC BAG, INJECTION (ML) INTRAVENOUS CONTINUOUS
Status: DISCONTINUED | OUTPATIENT
Start: 2023-06-07 | End: 2023-06-07

## 2023-06-07 RX ORDER — ALBUMIN HUMAN 250 G/1000ML
25 SOLUTION INTRAVENOUS ONCE
Status: COMPLETED | OUTPATIENT
Start: 2023-06-07 | End: 2023-06-07

## 2023-06-07 RX ADMIN — MIDODRINE HYDROCHLORIDE 5 MG: 5 TABLET ORAL at 11:06

## 2023-06-07 RX ADMIN — CALCIUM GLUCONATE 1 G: 20 INJECTION, SOLUTION INTRAVENOUS at 08:06

## 2023-06-07 RX ADMIN — PIPERACILLIN AND TAZOBACTAM 4.5 G: 4; .5 INJECTION, POWDER, FOR SOLUTION INTRAVENOUS; PARENTERAL at 01:06

## 2023-06-07 RX ADMIN — ATORVASTATIN CALCIUM 40 MG: 40 TABLET, FILM COATED ORAL at 08:06

## 2023-06-07 RX ADMIN — MIDODRINE HYDROCHLORIDE 5 MG: 5 TABLET ORAL at 05:06

## 2023-06-07 RX ADMIN — POTASSIUM BICARBONATE 20 MEQ: 782 TABLET, EFFERVESCENT ORAL at 08:06

## 2023-06-07 RX ADMIN — ALBUMIN (HUMAN) 25 G: 12.5 SOLUTION INTRAVENOUS at 08:06

## 2023-06-07 RX ADMIN — PIPERACILLIN AND TAZOBACTAM 4.5 G: 4; .5 INJECTION, POWDER, FOR SOLUTION INTRAVENOUS; PARENTERAL at 08:06

## 2023-06-07 RX ADMIN — APIXABAN 5 MG: 5 TABLET, FILM COATED ORAL at 08:06

## 2023-06-07 RX ADMIN — DEXTROSE AND SODIUM CHLORIDE: 5; 900 INJECTION, SOLUTION INTRAVENOUS at 11:06

## 2023-06-07 RX ADMIN — CALCIUM GLUCONATE 1 G: 20 INJECTION, SOLUTION INTRAVENOUS at 10:06

## 2023-06-07 RX ADMIN — SILVER SULFADIAZINE: 10 CREAM TOPICAL at 09:06

## 2023-06-07 RX ADMIN — ASPIRIN 81 MG: 81 TABLET, COATED ORAL at 08:06

## 2023-06-07 RX ADMIN — DEXTROSE AND SODIUM CHLORIDE: 5; 900 INJECTION, SOLUTION INTRAVENOUS at 05:06

## 2023-06-07 RX ADMIN — PIPERACILLIN AND TAZOBACTAM 4.5 G: 4; .5 INJECTION, POWDER, FOR SOLUTION INTRAVENOUS; PARENTERAL at 04:06

## 2023-06-07 RX ADMIN — MAGNESIUM SULFATE HEPTAHYDRATE 2 G: 40 INJECTION, SOLUTION INTRAVENOUS at 11:06

## 2023-06-07 RX ADMIN — MIDODRINE HYDROCHLORIDE 5 MG: 5 TABLET ORAL at 07:06

## 2023-06-07 NOTE — PROGRESS NOTES
[facility]  Pulmonology  Progress Note    Patient Name: Esthela Contreras  MRN: [id number]  Admission Date: 6/1/2023  Hospital Length of Stay: 6 days  Code Status: Full Code  Attending Provider: Milan Obrien DO  Primary Care Provider: Lor Patel MD   Principal Problem: Severe sepsis    Subjective:     Interval History: Pt resting in bed. Denies any needs or complaints. Levo has been off since this AM. Tolerating diet.       Objective:     Vital Signs (Most Recent):  Temp: 98.5 °F (36.9 °C) (06/07/23 0715)  Pulse: 70 (06/07/23 0915)  Resp: 12 (06/07/23 0915)  BP: (!) 101/49 (06/07/23 0915)  SpO2: 99 % (06/07/23 0915) Vital Signs (24h Range):  Temp:  [97.4 °F (36.3 °C)-98.8 °F (37.1 °C)] 98.5 °F (36.9 °C)  Pulse:  [60-92] 70  Resp:  [9-19] 12  SpO2:  [92 %-100 %] 99 %  BP: ()/(48-75) 101/49     Weight: 79.8 kg (175 lb 14.8 oz)  Body mass index is 27.55 kg/m².      Intake/Output Summary (Last 24 hours) at 6/7/2023 1032  Last data filed at 6/7/2023 0600  Gross per 24 hour   Intake 3271.08 ml   Output 1150 ml   Net 2121.08 ml        Physical Exam  Vitals and nursing note reviewed.   HENT:      Right Ear: External ear normal.      Left Ear: External ear normal.      Mouth/Throat:      Mouth: Mucous membranes are moist.   Eyes:      Pupils: Pupils are equal, round, and reactive to light.   Cardiovascular:      Rate and Rhythm: Normal rate and regular rhythm.   Pulmonary:      Effort: No respiratory distress.      Breath sounds: No wheezing or rales.   Abdominal:      Palpations: Abdomen is soft.      Tenderness: There is no abdominal tenderness. There is no guarding.   Musculoskeletal:         General: Normal range of motion.      Cervical back: Neck supple.      Comments: Chronic wounds both lower extremities are dressed   Skin:     General: Skin is warm and dry.   Neurological:      General: No focal deficit present.      Mental Status: He is alert.   Psychiatric:         Mood and  Affect: Mood normal.         Review of Systems    Vents:  Oxygen Concentration (%): 21 (06/07/23 0824)    Lines/Drains/Airways       Drain  Duration             Male External Urinary Catheter 06/05/23 0230 Small 2 days              Peripheral Intravenous Line  Duration                  Peripheral IV - Single Lumen 06/03/23 1709 20 G Anterior;Left 3 days         Peripheral IV - Single Lumen 06/05/23 0330 22 G Anterior;Right Upper Arm 2 days                    Significant Labs:    CBC/Anemia Profile:  Recent Labs   Lab 06/05/23  2141 06/06/23  0437 06/07/23  0440   WBC 9.33 8.99 8.60   HGB 12.6* 12.5* 12.8*   HCT 39.3* 39.6* 41.7   * 118* 123*   MCV 94.2 94.5 96.3*   RDW 16.3* 16.4* 16.2*        Chemistries:  Recent Labs   Lab 06/06/23 0437 06/07/23  0440    141   K 3.2* 3.3*    106   CO2 25 25   BUN 40* 26*   CREATININE 1.10 1.05   CALCIUM 6.5* 6.3*   ALBUMIN 1.3* 1.4*   PROT 4.4* 4.8*   BILITOT 0.7 0.6   ALKPHOS 93 93   ALT 29 25   AST 28 17   MG  --  1.4*       All pertinent labs within the past 24 hours have been reviewed.    Significant Imaging:  I have reviewed all pertinent imaging results/findings within the past 24 hours.    Assessment/Plan:     Pulmonary  COPD (chronic obstructive pulmonary disease)  Stable     Cardiac/Vascular  Hypotension  Secondary to sepsis   Levo restarted overnight  Adding midodrine today  -- levo has been weaned off this morning    Hypotension does not appear to be secondary to septic shock     New onset a-fib  Hear rate controlled --currently in SR - on eliquis     Coronary artery disease involving native coronary artery of native heart without angina pectoris  Stable     HFrEF (heart failure with reduced ejection fraction)  Monitor, stable at present     Renal/  Acute renal failure  Improving with IV fluid hydration - Cr down to 4.29  - nephrology following   -6/3 - lab pending today - UOP 1365cc in last 24 hours   06/05- Cr down to 1.63 - UOP 1755cc  Resolved      Hyperkalemia  Resolved - K 4.3 today     ID  * Severe sepsis  Secondary to bacteremia     Secondary to bacteremia     Gram-positive bacteremia  1/2 BC with GPB -   1/2 BC with GNB   continue IV antibiotics and follow cultures   Repeat bc today   1/2 BC with alpha streptoccous- likely contaminant   Repeat BC pending  NGTD at 24hrs       Cellulitis of lower extremity  On antibiotics - evaluated by surgery     Orthopedic  Pressure injury of skin of left hip  Wound care     Pressure injury of sacral region, stage 2  Wound care     Other  Multiple open wounds of lower extremity, unspecified laterality, subsequent encounter  Seen by surgery, recs reviewed                  CHRISTIANE Sebastian-AGACNP  Pulmonology  [facility]

## 2023-06-07 NOTE — ASSESSMENT & PLAN NOTE
1/2 BC with GPB -   1/2 BC with GNB   continue IV antibiotics and follow cultures   Repeat bc today   1/2 BC with alpha streptoccous- likely contaminant   Repeat BC pending  NGTD at 24hrs
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
## 2023-06-07 NOTE — PLAN OF CARE
Problem: Impaired Wound Healing  Goal: Optimal Wound Healing  Outcome: Ongoing, Progressing  Intervention: Promote Wound Healing  Flowsheets (Taken 6/6/2023 1945)  Oral Nutrition Promotion: rest periods promoted  Sleep/Rest Enhancement:   awakenings minimized   natural light exposure provided   room darkened   relaxation techniques promoted  Activity Management:   Arm raise - L1   Ankle pumps - L1  Pain Management Interventions:   position adjusted   pillow support provided   care clustered   medication offered   relaxation techniques promoted     Problem: Skin Injury Risk Increased  Goal: Skin Health and Integrity  Outcome: Ongoing, Progressing  Intervention: Optimize Skin Protection  Flowsheets (Taken 6/6/2023 1945)  Pressure Reduction Techniques: frequent weight shift encouraged  Pressure Reduction Devices: specialty bed utilized  Skin Protection:   adhesive use limited   skin-to-device areas padded   skin-to-skin areas padded  Head of Bed (HOB) Positioning: HOB at 30-45 degrees

## 2023-06-07 NOTE — ASSESSMENT & PLAN NOTE
>>ASSESSMENT AND PLAN FOR SEPSIS WRITTEN ON 6/7/2023 10:49 AM BY JUDITH SEBASTIAN FNP-AGACNP    Secondary to bacteremia     >>ASSESSMENT AND PLAN FOR SEPSIS WRITTEN ON 6/7/2023  1:19 PM BY JUDITH SEBASTIAN FNP-AGACNP    Secondary to bacteremia

## 2023-06-07 NOTE — ASSESSMENT & PLAN NOTE
>>ASSESSMENT AND PLAN FOR CELLULITIS OF LOWER EXTREMITY WRITTEN ON 6/7/2023 10:48 AM BY JUDITH SEBASTIAN FNP-LUIS CARLOS    On antibiotics - evaluated by surgery

## 2023-06-07 NOTE — ASSESSMENT & PLAN NOTE
Secondary to sepsis   Levo restarted overnight  Adding midodrine today  -- levo has been weaned off this morning    Hypotension does not appear to be secondary to septic shock

## 2023-06-07 NOTE — PLAN OF CARE
Problem: Occupational Therapy  Goal: Occupational Therapy Goal  Description: STG: (in 2 weeks)  Pt will perform grooming with setup  Pt will bathe with mod(A)  Pt will perform UE dressing with setup  Pt will perform LE dressing with moderate assistance  Pt will sit EOB x 7 min with SBA  Pt will transfer bed/chair/bsc with mod(A) with sliding board  Pt will tolerate 20 minutes of tx without fatigue      LTG: (In 6 weeks)  1.Restore to max I with self care and mobility.     Outcome: Ongoing, Progressing

## 2023-06-07 NOTE — PROGRESS NOTES
[facility]  Pulmonology  Progress Note    Patient Name: Esthela Contreras  MRN: [id number]  Admission Date: 6/1/2023  Hospital Length of Stay: 6 days  Code Status: Full Code  Attending Provider: Milan Obrien DO  Primary Care Provider: Lor Patel MD   Principal Problem: Severe sepsis    Subjective:     Interval History: Pt resting in bed. Denies any needs or complaints. Levo has been off since this AM. Tolerating diet.       Objective:     Vital Signs (Most Recent):  Temp: 98.5 °F (36.9 °C) (06/07/23 0715)  Pulse: 70 (06/07/23 0915)  Resp: 12 (06/07/23 0915)  BP: (!) 101/49 (06/07/23 0915)  SpO2: 99 % (06/07/23 0915) Vital Signs (24h Range):  Temp:  [97.4 °F (36.3 °C)-98.8 °F (37.1 °C)] 98.5 °F (36.9 °C)  Pulse:  [60-92] 70  Resp:  [9-19] 12  SpO2:  [92 %-100 %] 99 %  BP: ()/(48-75) 101/49     Weight: 79.8 kg (175 lb 14.8 oz)  Body mass index is 27.55 kg/m².      Intake/Output Summary (Last 24 hours) at 6/7/2023 1032  Last data filed at 6/7/2023 0600  Gross per 24 hour   Intake 3271.08 ml   Output 1150 ml   Net 2121.08 ml        Physical Exam  Vitals and nursing note reviewed.   HENT:      Right Ear: External ear normal.      Left Ear: External ear normal.      Mouth/Throat:      Mouth: Mucous membranes are moist.   Eyes:      Pupils: Pupils are equal, round, and reactive to light.   Cardiovascular:      Rate and Rhythm: Normal rate and regular rhythm.   Pulmonary:      Effort: No respiratory distress.      Breath sounds: No wheezing or rales.   Abdominal:      Palpations: Abdomen is soft.      Tenderness: There is no abdominal tenderness. There is no guarding.   Musculoskeletal:         General: Normal range of motion.      Cervical back: Neck supple.      Comments: Chronic wounds both lower extremities are dressed   Skin:     General: Skin is warm and dry.   Neurological:      General: No focal deficit present.      Mental Status: He is alert.   Psychiatric:         Mood and  Affect: Mood normal.         Review of Systems    Vents:  Oxygen Concentration (%): 21 (06/07/23 0824)    Lines/Drains/Airways       Drain  Duration             Male External Urinary Catheter 06/05/23 0230 Small 2 days              Peripheral Intravenous Line  Duration                  Peripheral IV - Single Lumen 06/03/23 1709 20 G Anterior;Left 3 days         Peripheral IV - Single Lumen 06/05/23 0330 22 G Anterior;Right Upper Arm 2 days                    Significant Labs:    CBC/Anemia Profile:  Recent Labs   Lab 06/05/23  2141 06/06/23  0437 06/07/23  0440   WBC 9.33 8.99 8.60   HGB 12.6* 12.5* 12.8*   HCT 39.3* 39.6* 41.7   * 118* 123*   MCV 94.2 94.5 96.3*   RDW 16.3* 16.4* 16.2*        Chemistries:  Recent Labs   Lab 06/06/23 0437 06/07/23  0440    141   K 3.2* 3.3*    106   CO2 25 25   BUN 40* 26*   CREATININE 1.10 1.05   CALCIUM 6.5* 6.3*   ALBUMIN 1.3* 1.4*   PROT 4.4* 4.8*   BILITOT 0.7 0.6   ALKPHOS 93 93   ALT 29 25   AST 28 17   MG  --  1.4*       All pertinent labs within the past 24 hours have been reviewed.    Significant Imaging:  I have reviewed all pertinent imaging results/findings within the past 24 hours.    Assessment/Plan:     Pulmonary  COPD (chronic obstructive pulmonary disease)  Stable     Cardiac/Vascular  Hypotension  Secondary to sepsis   Levo restarted overnight  Adding midodrine today  -- levo has been weaned off this morning      Coronary artery disease involving native coronary artery of native heart without angina pectoris  Stable     HFrEF (heart failure with reduced ejection fraction)  Monitor, stable at present     Renal/  Acute renal failure  Improving with IV fluid hydration - Cr down to 4.29  - nephrology following   -6/3 - lab pending today - UOP 1365cc in last 24 hours   06/05- Cr down to 1.63 - UOP 1755cc  Resolved     ID  * Severe sepsis  Secondary to bacteremia     Gram-positive bacteremia  1/2 BC with GPB -   1/2 BC with GNB   continue IV  antibiotics and follow cultures   Repeat bc today   1/2 BC with alpha streptoccous- likely contaminant   Repeat BC pending  NGTD at 24hrs       Cellulitis of lower extremity  On antibiotics - evaluated by surgery     Other  Multiple open wounds of lower extremity, unspecified laterality, subsequent encounter  Seen by surgery, recs reviewed           CHRISTIANE Sebastian-AGACNP  Pulmonology  [facility]

## 2023-06-07 NOTE — SUBJECTIVE & OBJECTIVE
Interval History: Pt resting in bed. Denies any needs or complaints. Levo has been off since this AM. Tolerating diet.       Objective:     Vital Signs (Most Recent):  Temp: 98.5 °F (36.9 °C) (06/07/23 0715)  Pulse: 70 (06/07/23 0915)  Resp: 12 (06/07/23 0915)  BP: (!) 101/49 (06/07/23 0915)  SpO2: 99 % (06/07/23 0915) Vital Signs (24h Range):  Temp:  [97.4 °F (36.3 °C)-98.8 °F (37.1 °C)] 98.5 °F (36.9 °C)  Pulse:  [60-92] 70  Resp:  [9-19] 12  SpO2:  [92 %-100 %] 99 %  BP: ()/(48-75) 101/49     Weight: 79.8 kg (175 lb 14.8 oz)  Body mass index is 27.55 kg/m².      Intake/Output Summary (Last 24 hours) at 6/7/2023 1032  Last data filed at 6/7/2023 0600  Gross per 24 hour   Intake 3271.08 ml   Output 1150 ml   Net 2121.08 ml        Physical Exam  Vitals and nursing note reviewed.   HENT:      Right Ear: External ear normal.      Left Ear: External ear normal.      Mouth/Throat:      Mouth: Mucous membranes are moist.   Eyes:      Pupils: Pupils are equal, round, and reactive to light.   Cardiovascular:      Rate and Rhythm: Normal rate and regular rhythm.   Pulmonary:      Effort: No respiratory distress.      Breath sounds: No wheezing or rales.   Abdominal:      Palpations: Abdomen is soft.      Tenderness: There is no abdominal tenderness. There is no guarding.   Musculoskeletal:         General: Normal range of motion.      Cervical back: Neck supple.      Comments: Chronic wounds both lower extremities are dressed   Skin:     General: Skin is warm and dry.   Neurological:      General: No focal deficit present.      Mental Status: He is alert.   Psychiatric:         Mood and Affect: Mood normal.         Review of Systems    Vents:  Oxygen Concentration (%): 21 (06/07/23 0824)    Lines/Drains/Airways       Drain  Duration             Male External Urinary Catheter 06/05/23 0230 Small 2 days              Peripheral Intravenous Line  Duration                  Peripheral IV - Single Lumen 06/03/23 1709 20 G  Anterior;Left 3 days         Peripheral IV - Single Lumen 06/05/23 0330 22 G Anterior;Right Upper Arm 2 days                    Significant Labs:    CBC/Anemia Profile:  Recent Labs   Lab 06/05/23  2141 06/06/23  0437 06/07/23  0440   WBC 9.33 8.99 8.60   HGB 12.6* 12.5* 12.8*   HCT 39.3* 39.6* 41.7   * 118* 123*   MCV 94.2 94.5 96.3*   RDW 16.3* 16.4* 16.2*        Chemistries:  Recent Labs   Lab 06/06/23 0437 06/07/23  0440    141   K 3.2* 3.3*    106   CO2 25 25   BUN 40* 26*   CREATININE 1.10 1.05   CALCIUM 6.5* 6.3*   ALBUMIN 1.3* 1.4*   PROT 4.4* 4.8*   BILITOT 0.7 0.6   ALKPHOS 93 93   ALT 29 25   AST 28 17   MG  --  1.4*       All pertinent labs within the past 24 hours have been reviewed.    Significant Imaging:  I have reviewed all pertinent imaging results/findings within the past 24 hours.

## 2023-06-07 NOTE — PT/OT/SLP PROGRESS
Physical Therapy Treatment    Patient Name:  Esthela Contreras   MRN:  [id number]    Recommendations:     Discharge Recommendations: LTACH (long-term acute care hospital), nursing facility, skilled  Discharge Equipment Recommendations: other (see comments) (to be determined)  Barriers to discharge: Inaccessible home and Decreased caregiver support    Assessment:     Esthela Contreras is a 72 y.o. male admitted with a medical diagnosis of Severe sepsis.  He presents with the following impairments/functional limitations: weakness, impaired functional mobility, impaired self care skills, impaired balance, decreased lower extremity function, pain, impaired skin, impaired cardiopulmonary response to activity Pt demonstrated better mobility and had less pain at rest. However, pt had more pain with LE in dependent position and began bleeding through LE dressing. He was able to stand with rolling walker but unable to transfer to chair. Will continue to progress mobility per pt tolerance.    Rehab Prognosis: Fair; patient would benefit from acute skilled PT services to address these deficits and reach maximum level of function.    Recent Surgery: * No surgery found *      Plan:     During this hospitalization, patient to be seen 5 x/week to address the identified rehab impairments via gait training, therapeutic activities, therapeutic exercises, neuromuscular re-education, wheelchair management/training and progress toward the following goals:    Plan of Care Expires:  07/06/23    Subjective     Chief Complaint: LE wounds  Patient/Family Comments/goals: Pt is agreeable to PT  Pain/Comfort:  Pain Rating 1: 0/10  Location - Side 1: Bilateral  Location - Orientation 1: distal  Location 1: leg  Pain Addressed 1: Cessation of Activity  Pain Rating Post-Intervention 1: 5/10      Objective:     Communicated with KALPESH Holliday RN prior to session.  Patient found supine with peripheral IV, pulse ox (continuous), telemetry, blood pressure cuff,  Condom Catheter upon PT entry to room.     General Precautions: Standard, fall  Orthopedic Precautions: N/A  Braces: N/A  Respiratory Status: Room air     Functional Mobility:  Bed Mobility:     Scooting: minimum assistance  Supine to Sit: minimum assistance  Sit to Supine: moderate assistance  Transfers:     Sit to Stand:  minimum assistance with rolling walker  Balance: good      AM-PAC 6 CLICK MOBILITY  Turning over in bed (including adjusting bedclothes, sheets and blankets)?: 3  Sitting down on and standing up from a chair with arms (e.g., wheelchair, bedside commode, etc.): 3  Moving from lying on back to sitting on the side of the bed?: 3  Moving to and from a bed to a chair (including a wheelchair)?: 2  Need to walk in hospital room?: 1  Climbing 3-5 steps with a railing?: 1  Basic Mobility Total Score: 13       Treatment & Education:  Pt performed bilateral LE: ankle pumps, short arc quads, heel slides, and hip abduction/adduction x 30 each  Supine to sit with minimal assistance for LE management, painful sitting at edge of bed, minimal assistance to stand with rolling walker, bled through LE dressing so returned to bed with minimal assistance     Patient left HOB elevated with all lines intact and call button in reach..    GOALS:   Multidisciplinary Problems       Physical Therapy Goals          Problem: Physical Therapy    Goal Priority Disciplines Outcome Goal Variances Interventions   Physical Therapy Goal     PT, PT/OT Ongoing, Progressing     Description: Short term goals:  1. Supine to sit with Contact Guard Assistance  2. Rolling to Left and Right with Contact Guard Assistance.  3. Bed to chair transfer with Minimal Assistance using Slideboard  4. Sitting at edge of bed x15 minutes with [place]    Long term goals:  1. Supine to sit with Modified [place]  2. Rolling to Left and Right with Modified [place].  3. Bed to chair transfer with Modified [place] using Slideboard  4.  Wheelchair propulsion x100 feet with Contact Guard Assistance using bilateral uppper extremities                         Time Tracking:     PT Received On: 06/07/23  PT Start Time: 1005     PT Stop Time: 1033  PT Total Time (min): 28 min     Billable Minutes: Therapeutic Activity 13 and Therapeutic Exercise 15    Treatment Type: Treatment  PT/PTA: PT     Number of PTA visits since last PT visit: 0     06/07/2023

## 2023-06-07 NOTE — PT/OT/SLP PROGRESS
Occupational Therapy      Patient Name:  Esthela Contreras   MRN:  [id number]    Patient not seen today secondary to Patient unwilling to participate, Patient fatigue (Pt states he was very fatiged after working with PT earlier today and declined all offers for treatment.). Will follow-up 6/8/2023.    6/7/2023

## 2023-06-08 LAB
ALBUMIN SERPL BCP-MCNC: 1.4 G/DL (ref 3.5–5)
ALBUMIN/GLOB SERPL: 0.4 {RATIO}
ALP SERPL-CCNC: 77 U/L (ref 45–115)
ALT SERPL W P-5'-P-CCNC: 16 U/L (ref 16–61)
ANION GAP SERPL CALCULATED.3IONS-SCNC: 9 MMOL/L (ref 7–16)
ANISOCYTOSIS BLD QL SMEAR: ABNORMAL
AST SERPL W P-5'-P-CCNC: 17 U/L (ref 15–37)
BASOPHILS # BLD AUTO: 0.06 K/UL (ref 0–0.2)
BASOPHILS NFR BLD AUTO: 1 % (ref 0–1)
BASOPHILS NFR BLD MANUAL: 1 % (ref 0–1)
BILIRUB SERPL-MCNC: 0.7 MG/DL (ref ?–1.2)
BUN SERPL-MCNC: 16 MG/DL (ref 7–18)
BUN/CREAT SERPL: 17 (ref 6–20)
CALCIUM SERPL-MCNC: 6.4 MG/DL (ref 8.5–10.1)
CHLORIDE SERPL-SCNC: 106 MMOL/L (ref 98–107)
CO2 SERPL-SCNC: 27 MMOL/L (ref 21–32)
CREAT SERPL-MCNC: 0.93 MG/DL (ref 0.7–1.3)
DIFFERENTIAL METHOD BLD: ABNORMAL
EGFR (NO RACE VARIABLE) (RUSH/TITUS): 86 ML/MIN/1.73M2
EOSINOPHIL # BLD AUTO: 0.3 K/UL (ref 0–0.5)
EOSINOPHIL NFR BLD AUTO: 4.8 % (ref 1–4)
EOSINOPHIL NFR BLD MANUAL: 7 % (ref 1–4)
ERYTHROCYTE [DISTWIDTH] IN BLOOD BY AUTOMATED COUNT: 16 % (ref 11.5–14.5)
GLOBULIN SER-MCNC: 3.2 G/DL (ref 2–4)
GLUCOSE SERPL-MCNC: 57 MG/DL (ref 70–105)
GLUCOSE SERPL-MCNC: 85 MG/DL (ref 70–105)
GLUCOSE SERPL-MCNC: 88 MG/DL (ref 74–106)
GLUCOSE SERPL-MCNC: 93 MG/DL (ref 70–105)
HCT VFR BLD AUTO: 38.3 % (ref 40–54)
HGB BLD-MCNC: 12.1 G/DL (ref 13.5–18)
IMM GRANULOCYTES # BLD AUTO: 0.06 K/UL (ref 0–0.04)
IMM GRANULOCYTES NFR BLD: 1 % (ref 0–0.4)
LYMPHOCYTES # BLD AUTO: 0.83 K/UL (ref 1–4.8)
LYMPHOCYTES NFR BLD AUTO: 13.3 % (ref 27–41)
LYMPHOCYTES NFR BLD MANUAL: 10 % (ref 27–41)
MCH RBC QN AUTO: 30 PG (ref 27–31)
MCHC RBC AUTO-ENTMCNC: 31.6 G/DL (ref 32–36)
MCV RBC AUTO: 95 FL (ref 80–96)
MONOCYTES # BLD AUTO: 0.77 K/UL (ref 0–0.8)
MONOCYTES NFR BLD AUTO: 12.3 % (ref 2–6)
MONOCYTES NFR BLD MANUAL: 9 % (ref 2–6)
MPC BLD CALC-MCNC: 9.9 FL (ref 9.4–12.4)
NEUTROPHILS # BLD AUTO: 4.22 K/UL (ref 1.8–7.7)
NEUTROPHILS NFR BLD AUTO: 67.6 % (ref 53–65)
NEUTS SEG NFR BLD MANUAL: 73 % (ref 50–62)
NRBC # BLD AUTO: 0 X10E3/UL
NRBC, AUTO (.00): 0 %
PLATELET # BLD AUTO: 134 K/UL (ref 150–400)
PLATELET MORPHOLOGY: ABNORMAL
POTASSIUM SERPL-SCNC: 3.1 MMOL/L (ref 3.5–5.1)
PROT SERPL-MCNC: 4.6 G/DL (ref 6.4–8.2)
RBC # BLD AUTO: 4.03 M/UL (ref 4.6–6.2)
SODIUM SERPL-SCNC: 139 MMOL/L (ref 136–145)
WBC # BLD AUTO: 6.24 K/UL (ref 4.5–11)

## 2023-06-08 PROCEDURE — 25000003 PHARM REV CODE 250: Performed by: INTERNAL MEDICINE

## 2023-06-08 PROCEDURE — 11000001 HC ACUTE MED/SURG PRIVATE ROOM

## 2023-06-08 PROCEDURE — 25000003 PHARM REV CODE 250: Performed by: HOSPITALIST

## 2023-06-08 PROCEDURE — 25000003 PHARM REV CODE 250: Performed by: NURSE PRACTITIONER

## 2023-06-08 PROCEDURE — 80053 COMPREHEN METABOLIC PANEL: CPT | Performed by: NURSE PRACTITIONER

## 2023-06-08 PROCEDURE — 63600175 PHARM REV CODE 636 W HCPCS: Performed by: INTERNAL MEDICINE

## 2023-06-08 PROCEDURE — 94761 N-INVAS EAR/PLS OXIMETRY MLT: CPT

## 2023-06-08 PROCEDURE — 63600175 PHARM REV CODE 636 W HCPCS: Performed by: NURSE PRACTITIONER

## 2023-06-08 PROCEDURE — 25000003 PHARM REV CODE 250

## 2023-06-08 PROCEDURE — 99900035 HC TECH TIME PER 15 MIN (STAT)

## 2023-06-08 PROCEDURE — 25000003 PHARM REV CODE 250: Performed by: EMERGENCY MEDICINE

## 2023-06-08 PROCEDURE — 97535 SELF CARE MNGMENT TRAINING: CPT

## 2023-06-08 PROCEDURE — 85025 COMPLETE CBC W/AUTO DIFF WBC: CPT | Performed by: NURSE PRACTITIONER

## 2023-06-08 PROCEDURE — 82962 GLUCOSE BLOOD TEST: CPT

## 2023-06-08 PROCEDURE — 99233 PR SUBSEQUENT HOSPITAL CARE,LEVL III: ICD-10-PCS | Mod: ,,, | Performed by: INTERNAL MEDICINE

## 2023-06-08 PROCEDURE — 99233 SBSQ HOSP IP/OBS HIGH 50: CPT | Mod: ,,, | Performed by: INTERNAL MEDICINE

## 2023-06-08 RX ORDER — TRAZODONE HYDROCHLORIDE 50 MG/1
50 TABLET ORAL NIGHTLY PRN
Status: DISCONTINUED | OUTPATIENT
Start: 2023-06-08 | End: 2023-06-09 | Stop reason: HOSPADM

## 2023-06-08 RX ORDER — ADHESIVE BANDAGE
30 BANDAGE TOPICAL DAILY PRN
Status: DISCONTINUED | OUTPATIENT
Start: 2023-06-08 | End: 2023-06-09 | Stop reason: HOSPADM

## 2023-06-08 RX ORDER — BISACODYL 5 MG
10 TABLET, DELAYED RELEASE (ENTERIC COATED) ORAL DAILY PRN
Status: DISCONTINUED | OUTPATIENT
Start: 2023-06-08 | End: 2023-06-09 | Stop reason: HOSPADM

## 2023-06-08 RX ORDER — ACETAMINOPHEN 500 MG
1000 TABLET ORAL EVERY 6 HOURS PRN
Status: DISCONTINUED | OUTPATIENT
Start: 2023-06-08 | End: 2023-06-09 | Stop reason: HOSPADM

## 2023-06-08 RX ORDER — SIMETHICONE 80 MG
1 TABLET,CHEWABLE ORAL 3 TIMES DAILY PRN
Status: DISCONTINUED | OUTPATIENT
Start: 2023-06-08 | End: 2023-06-09 | Stop reason: HOSPADM

## 2023-06-08 RX ORDER — ONDANSETRON 2 MG/ML
8 INJECTION INTRAMUSCULAR; INTRAVENOUS EVERY 6 HOURS PRN
Status: DISCONTINUED | OUTPATIENT
Start: 2023-06-08 | End: 2023-06-09 | Stop reason: HOSPADM

## 2023-06-08 RX ORDER — GUAIFENESIN/DEXTROMETHORPHAN 100-10MG/5
10 SYRUP ORAL EVERY 6 HOURS PRN
Status: DISCONTINUED | OUTPATIENT
Start: 2023-06-08 | End: 2023-06-09 | Stop reason: HOSPADM

## 2023-06-08 RX ORDER — POLYETHYLENE GLYCOL 3350 17 G/17G
17 POWDER, FOR SOLUTION ORAL DAILY
Status: DISCONTINUED | OUTPATIENT
Start: 2023-06-08 | End: 2023-06-09 | Stop reason: HOSPADM

## 2023-06-08 RX ADMIN — MIDODRINE HYDROCHLORIDE 5 MG: 5 TABLET ORAL at 08:06

## 2023-06-08 RX ADMIN — OXYCODONE HYDROCHLORIDE 5 MG: 5 TABLET ORAL at 04:06

## 2023-06-08 RX ADMIN — PIPERACILLIN AND TAZOBACTAM 4.5 G: 4; .5 INJECTION, POWDER, FOR SOLUTION INTRAVENOUS; PARENTERAL at 12:06

## 2023-06-08 RX ADMIN — APIXABAN 5 MG: 5 TABLET, FILM COATED ORAL at 08:06

## 2023-06-08 RX ADMIN — POTASSIUM BICARBONATE 40 MEQ: 782 TABLET, EFFERVESCENT ORAL at 08:06

## 2023-06-08 RX ADMIN — MAGNESIUM HYDROXIDE 2400 MG: 400 SUSPENSION ORAL at 03:06

## 2023-06-08 RX ADMIN — POLYETHYLENE GLYCOL 3350 17 G: 17 POWDER, FOR SOLUTION ORAL at 08:06

## 2023-06-08 RX ADMIN — ASPIRIN 81 MG: 81 TABLET, COATED ORAL at 08:06

## 2023-06-08 RX ADMIN — PIPERACILLIN AND TAZOBACTAM 4.5 G: 4; .5 INJECTION, POWDER, FOR SOLUTION INTRAVENOUS; PARENTERAL at 08:06

## 2023-06-08 RX ADMIN — MIDODRINE HYDROCHLORIDE 5 MG: 5 TABLET ORAL at 11:06

## 2023-06-08 RX ADMIN — ATORVASTATIN CALCIUM 40 MG: 40 TABLET, FILM COATED ORAL at 08:06

## 2023-06-08 RX ADMIN — SILVER SULFADIAZINE: 10 CREAM TOPICAL at 02:06

## 2023-06-08 RX ADMIN — TRAZODONE HYDROCHLORIDE 50 MG: 50 TABLET ORAL at 08:06

## 2023-06-08 RX ADMIN — MIDODRINE HYDROCHLORIDE 5 MG: 5 TABLET ORAL at 04:06

## 2023-06-08 RX ADMIN — METOPROLOL SUCCINATE 25 MG: 25 TABLET, EXTENDED RELEASE ORAL at 08:06

## 2023-06-08 RX ADMIN — DEXTROSE MONOHYDRATE 125 ML: 100 INJECTION, SOLUTION INTRAVENOUS at 11:06

## 2023-06-08 RX ADMIN — DEXTROSE AND SODIUM CHLORIDE: 5; 900 INJECTION, SOLUTION INTRAVENOUS at 08:06

## 2023-06-08 RX ADMIN — PIPERACILLIN AND TAZOBACTAM 4.5 G: 4; .5 INJECTION, POWDER, FOR SOLUTION INTRAVENOUS; PARENTERAL at 03:06

## 2023-06-08 RX ADMIN — OXYCODONE HYDROCHLORIDE 5 MG: 5 TABLET ORAL at 10:06

## 2023-06-08 NOTE — ASSESSMENT & PLAN NOTE
>>ASSESSMENT AND PLAN FOR CELLULITIS OF LOWER EXTREMITY WRITTEN ON 6/8/2023  9:17 AM BY JUDITH SEBASTIAN FNP-LUIS CARLOS    On antibiotics - evaluated by surgery

## 2023-06-08 NOTE — ASSESSMENT & PLAN NOTE
Improving with IV fluid hydration - Cr down to 4.29  - nephrology following   -6/3 - lab pending today - UOP 1365cc in last 24 hours   06/05- Cr down to 1.63 - UOP 1755cc  Resolved    Patient:  Li Celeste; 47 year old   MRN#:  [id number]  Date of Service:  5/18/2022  Primary Provider:  Verify Pcp      Chief Complaint:  Anxiety    Informants:  The patient, who is considered a fair historian, Staff, family, as well the patient's medical record.  Patient's medical, social and personal history are reviewed.  Pertinent labs and medication changes are noted.    Interval History/Subjective:  Patient reports she was able to sleep previous evening and anxiety is much improved. Feels more hopeful.     Psychiatric Review of Symptoms: reviewed 5/18/2022  Regarding symptoms of DEPRESSION, the patient endorses depressed mood, issues with sleep, anhedonia, feelings of guilt, low energy, poor concentration, change in appetite, psychomotor slowing.  Pt denies suicidal ideation.     Regarding symptoms of ROMANA, the patient denies decreased need for sleep, grandiosity, distractibility, flight of ideas, increased goal directed activities, pressured speech and inappropriate behavior.     With respect to PSYCHOSIS, the patient denies auditory hallucinations, visual hallucinations, delusions, ideas of reference, thought insertion and thought broadcasting.     When asked about symptoms of GENERALIZED ANXIETY, the patient denies increased worry, muscle tension, restlessness, poor concentration, and irritability.     Regarding episodes of PANIC, the patient denies episodes of impending doom, lost control or feelings of going crazy.  Furthermore, the patient denies symptoms of sweating, trembling, derealization, racing heart, shortness of breath, chest pain, nausea and tingling.     Addressing POST TRAUMATIC STRESS DISORDER, the patient denies a history of trauma with recurrent episodes of nightmares, flashbacks, hypervigilance, easy startle and elevated fear.     Regarding OBSESSIVE COMPULSIVE DISORDER, the patient denies a history of intrusive thoughts, repetitive behaviors used to allay intrusive thoughts, and  marked distress associated with intrusive thoughts.  VITALS  Visit Vitals  BP (!) 156/88 (BP Location: LUE - Left upper extremity, Patient Position: Semi-[place]'s)   Pulse 69   Temp 98.1 °F (36.7 °C) (Oral)   Resp 18   Ht 4' 11\" (1.499 m)   Wt 69.9 kg (154 lb 1.6 oz)   LMP 08/15/2015 (Approximate) Comment: irregular   SpO2 97%   BMI 31.12 kg/m²       Allergies:  ALLERGIES:   Allergen Reactions   • Naproxen    • Amoxicillin-Pot Clavulanate Other (See Comments)     Skin irritation, no appetite   • Cortisone SWELLING       Medications:  Current Facility-Administered Medications   Medication Dose Route Frequency Provider Last Rate Last Admin   • loperamide (IMODIUM) capsule 2 mg  2 mg Oral Q4H PRN Be Rowland MD   2 mg at 05/18/22 1059   • cefUROXime (CEFTIN) tablet 500 mg  500 mg Oral 2 times per day Ather H MD Edward       • vancomycin (VANCOCIN) 50 MG/ML (compounded) solution 125 mg  125 mg Oral 2 times per day Kassidy Stokes APNP   125 mg at 05/18/22 0925   • busPIRone (BUSPAR) tablet 5 mg  5 mg Oral TID POOL Knight   5 mg at 05/18/22 0924   • hydrOXYzine (ATARAX) tablet 50 mg  50 mg Oral Q6H PRN MARTELL KnightNP   50 mg at 05/18/22 1059   • metroNIDAZOLE (FLAGYL) tablet 500 mg  500 mg Oral TID Ather H MD Edward   500 mg at 05/18/22 0924   • gabapentin (NEURONTIN) capsule 900 mg  900 mg Oral BID Gagan Graham MD   900 mg at 05/18/22 0924   • cloNIDine (CATAPRES) tablet 0.1 mg  0.1 mg Oral 2 times per day Gagan Graham MD   0.1 mg at 05/18/22 0924   • amLODIPine (NORVASC) tablet 5 mg  5 mg Oral Daily Gagan Graham MD   5 mg at 05/18/22 0924   • sodium chloride (PF) 0.9 % injection 10 mL  10 mL Injection PRN Gagan Graham MD       • sodium chloride (PF) 0.9 % injection 10 mL  10 mL Injection 2 times per day Gagan Graham MD   10 mL at 05/18/22 0936   • carvedilol (COREG) tablet 6.25 mg  6.25 mg Oral 2 times per day Gagan Graham MD   6.25 mg at 05/18/22 0924   • albuterol inhaler 2 puff  2  puff Inhalation Q4H PRN Ezekiel Barr MD       • pantoprazole (PROTONIX) EC tablet 40 mg  40 mg Oral QAM AC Ezekiel Barr MD   40 mg at 05/18/22 0455   • sucralfate (CARAFATE) tablet 1 g  1 g Oral 4x Daily AC & HS Ezekiel Barr MD   1 g at 05/18/22 1059   • trimethobenzamide (TIGAN) injection 200 mg  200 mg Intramuscular Q6H PRN Ezekiel Barr MD   200 mg at 05/13/22 0545   • acetaminophen (TYLENOL) tablet 650 mg  650 mg Oral Q4H PRN Ezekiel Barr MD   650 mg at 05/14/22 0236   • docusate sodium-sennosides (SENOKOT S) 50-8.6 MG 2 tablet  2 tablet Oral Daily PRN Ezekiel Barr MD       • bisacodyl (DULCOLAX) suppository 10 mg  10 mg Rectal Daily PRN Ezekiel Barr MD       • sodium chloride 0.9 % flush bag 25 mL  25 mL Intravenous PRN Ezekiel Barr MD       • sodium chloride (NORMAL SALINE) 0.9 % bolus 500 mL  500 mL Intravenous PRN Ezekiel Barr MD       • oxyCODONE (IMM REL) (ROXICODONE) tablet 5 mg  5 mg Oral Q4H PRN Zahra Dumont MD   5 mg at 05/18/22 0924   • ondansetron (ZOFRAN) injection 4 mg  4 mg Intravenous Q12H PRN Ezekiel Barr MD   4 mg at 05/17/22 1051   • PARoxetine (PAXIL) tablet 20 mg  20 mg Oral QAM Ezekiel Barr MD   20 mg at 05/18/22 0925   • hydrALAZINE (APRESOLINE) injection 10 mg  10 mg Intravenous Q6H PRN Ezekiel Barr MD   10 mg at 05/13/22 1029   • sodium chloride 0.9 % flush bag 25 mL  25 mL Intravenous PRN Zahra Dumont MD       • Potassium Standard Replacement Protocol   Does not apply See Admin Instructions Zahra Dumont MD       • Magnesium Standard Replacement Protocol   Does not apply See Admin Instructions Zahra Dumont MD       • Phosphorus Standard Replacement Protocol   Does not apply See Admin Instructions Zahra Dumont MD       • traMADol (ULTRAM) tablet 50 mg  50 mg Oral Q6H PRN Donovan Eckert MD   50 mg at 05/18/22 0749   • sodium chloride 0.9 % flush bag 25 mL  25 mL Intravenous PRN Ezekiel Barr MD       • sodium  chloride (PF) 0.9 % injection 2 mL  2 mL Intracatheter 2 times per day Ezekiel Barr MD   2 mL at 05/17/22 1052       Mental Status Exam:  Orientation:  Alert and oriented to person, place and situation   Appearance:  Appropriately groomed and casually dressed   Speech:  Appropriate rate, rhythm, volume and inflection   Eye contact:  good  Behavior:  Cooperative   Psychomotor:  No agitation, tic, tremor, slowing or abnormal movement noted   Mood:  \" much better \"  Affect:  euthymic  Thought Process:  Linear, logical, goal oriented   Thought Content:  no homicidal ideation, no auditory and visual hallucinations, not actively responding to internal stimuli   Comprehensive Suicide Assessment:  completed  Prior Attempts: denies  Access to lethal means: none  Plan: none  Family history of completed suicides: none  Insight:  fair  Judgment:  fair  Sensorium:  Grossly intact   Cognition:  Was not formally tested and found to be without appreciable deficit   Concentration:  Fair   Attention: fair    Biopsychosocial Assessment:  Li Celeste is a 47 year old female Per consult note:  who presented to the ED via police transport on 5/12/2022 seeking evaluation of suicidal ideation, right foot pain and infection.  Pt has a history of right foot incision and drainage procedure.  Pt's foot appeared red and swollen.  Pt has a history of fibromyalgia, anxiety, bipolar disorder, alcohol use disorder.  Pt was medically admitted for further treatment of right foot cellulitis of fourth digit, suicidal ideation, hypokalemia.     Psychiatry is consulted regarding suicidal ideation, medication management, depression, anxiety.  Pt is known to the psychiatry team from past hospitalization, seen last in March.  Pt reports to staff on admission, that she was having suicidal ideation, but denies plan and intent to harm self.  Pt states she made those statements due to feeling \"overwhelmed\" related to problems with her family and  medical needs.5/17/2022: continues to feel that overwhelmed and feels the hydroxyzine was somewhat helpful but not long enough. Discussed trial of Buspar to assist with anxiety and depression. 5/18/2022: Patient with improvement in sleep and anxiety. No new sh         Diagnoses:    Unspecified Depression  Alcohol Use Disorder moderate    Plan:  1. New psychiatric provider: Dr Cole 7/20  2. [facility]: 5/24  3. New therapist: Jessi Mcdonald 8/02  4. Continue hydroxyzine 50 mg every 6 hours PRN  5. Continue Buspar 5 mg TID     Thank you for involving me in the care of this patient.  Please call [phone] with any questions.        Park LANZA, PMHNP-BC  Psychiatry

## 2023-06-08 NOTE — ASSESSMENT & PLAN NOTE
>>ASSESSMENT AND PLAN FOR SEPSIS WRITTEN ON 6/8/2023  9:21 AM BY JUDITH SEBASTIAN FNP-AGACNP    Secondary to bacteremia

## 2023-06-08 NOTE — PT/OT/SLP PROGRESS
Physical Therapy      Patient Name:  Esthela Contreras   MRN:  [id number]    Patient not seen today secondary to Patient unwilling to participate, Other (Comment) (Pt stated he took laxative this morning and has been having frequent bowel movements. States he does not want to attempt LE exercises for fear of initiating another BM). Will follow-up 6/9/23.

## 2023-06-08 NOTE — PROGRESS NOTES
[facility]  Pulmonology  Progress Note    Patient Name: Esthela Contreras  MRN: [id number]  Admission Date: 6/1/2023  Hospital Length of Stay: 7 days  Code Status: Full Code  Attending Provider: Milan Obrien DO  Primary Care Provider: Lor Patel MD   Principal Problem: Severe sepsis    Subjective:     Interval History: Pt resting in bed. Remains off levophed infusion. Denies any needs or complaints at present. NAEO.       Objective:     Vital Signs (Most Recent):  Temp: 98.2 °F (36.8 °C) (06/08/23 0315)  Pulse: 69 (06/08/23 0501)  Resp: 15 (06/08/23 0501)  BP: (!) 106/46 (06/08/23 0501)  SpO2: 99 % (06/08/23 0501) Vital Signs (24h Range):  Temp:  [97 °F (36.1 °C)-98.2 °F (36.8 °C)] 98.2 °F (36.8 °C)  Pulse:  [57-94] 69  Resp:  [12-31] 15  SpO2:  [96 %-100 %] 99 %  BP: ()/(31-67) 106/46     Weight: 82.7 kg (182 lb 5.1 oz)  Body mass index is 28.56 kg/m².      Intake/Output Summary (Last 24 hours) at 6/8/2023 0916  Last data filed at 6/8/2023 0600  Gross per 24 hour   Intake 2669.62 ml   Output 1200 ml   Net 1469.62 ml        Physical Exam  Vitals and nursing note reviewed.   HENT:      Right Ear: External ear normal.      Left Ear: External ear normal.      Mouth/Throat:      Mouth: Mucous membranes are moist.   Eyes:      Pupils: Pupils are equal, round, and reactive to light.   Cardiovascular:      Rate and Rhythm: Normal rate and regular rhythm.   Pulmonary:      Effort: No respiratory distress.      Breath sounds: No wheezing or rales.   Abdominal:      Palpations: Abdomen is soft.      Tenderness: There is no abdominal tenderness. There is no guarding.   Musculoskeletal:         General: Normal range of motion.      Cervical back: Neck supple.      Comments: Chronic wounds both lower extremities are dressed  Wounds to sacrum and L hip    Skin:     General: Skin is warm and dry.   Neurological:      General: No focal deficit present.      Mental Status: He is alert.    Psychiatric:         Mood and Affect: Mood normal.         Review of Systems    Vents:  Oxygen Concentration (%): 21 (06/07/23 0824)    Lines/Drains/Airways       Drain  Duration             Male External Urinary Catheter 06/05/23 0230 Small 3 days              Peripheral Intravenous Line  Duration                  Peripheral IV - Single Lumen 06/03/23 1709 20 G Anterior;Left 4 days         Peripheral IV - Single Lumen 06/05/23 0330 22 G Anterior;Right Upper Arm 3 days                    Significant Labs:    CBC/Anemia Profile:  Recent Labs   Lab 06/07/23 0440 06/08/23 0724   WBC 8.60 6.24   HGB 12.8* 12.1*   HCT 41.7 38.3*   * 134*   MCV 96.3* 95.0   RDW 16.2* 16.0*        Chemistries:  Recent Labs   Lab 06/07/23 0440 06/08/23 0523    139   K 3.3* 3.1*    106   CO2 25 27   BUN 26* 16   CREATININE 1.05 0.93   CALCIUM 6.3* 6.4*   ALBUMIN 1.4* 1.4*   PROT 4.8* 4.6*   BILITOT 0.6 0.7   ALKPHOS 93 77   ALT 25 16   AST 17 17   MG 1.4*  --        All pertinent labs within the past 24 hours have been reviewed.    Significant Imaging:  I have reviewed all pertinent imaging results/findings within the past 24 hours.    Assessment/Plan:     Pulmonary  COPD (chronic obstructive pulmonary disease)  Stable     Cardiac/Vascular  Hypotension  Secondary to sepsis   Levo restarted overnight  Adding midodrine today  -- levo has been weaned off this morning    Hypotension does not appear to be secondary to septic shock   Levo remains off at present--- continue midodrine     New onset a-fib  Hear rate controlled --currently in SR - on eliquis     Coronary artery disease involving native coronary artery of native heart without angina pectoris  Stable     HFrEF (heart failure with reduced ejection fraction)  Monitor, stable at present     Renal/  Acute renal failure  Improving with IV fluid hydration - Cr down to 4.29  - nephrology following   -6/3 - lab pending today - UOP 1365cc in last 24 hours   06/05- Cr  down to 1.63 - UOP 1755cc  Resolved     ID  * Severe sepsis  Secondary to bacteremia     Gram-positive bacteremia  1/2 BC with GPB -   1/2 BC with GNB   continue IV antibiotics and follow cultures   Repeat bc today   1/2 BC with alpha streptoccous- likely contaminant   Repeat BC pending  NGTD at 24hrs       Cellulitis of lower extremity  On antibiotics - evaluated by surgery     Orthopedic  Pressure injury of skin of left hip  Wound care     Pressure injury of sacral region, stage 2  Wound care     Other  Multiple open wounds of lower extremity, unspecified laterality, subsequent encounter  Seen by surgery, recs reviewed       Transfer to floor with hospitalist to assume care in the AM.         CHRISTIANE Sebastian-AGACNP  Pulmonology  [facility]

## 2023-06-08 NOTE — PLAN OF CARE
Problem: Impaired Wound Healing  Goal: Optimal Wound Healing  Outcome: Ongoing, Progressing  Intervention: Promote Wound Healing  Flowsheets (Taken 6/7/2023 1937)  Oral Nutrition Promotion: rest periods promoted  Sleep/Rest Enhancement:   awakenings minimized   consistent schedule promoted   noise level reduced  Activity Management:   Arm raise - L1   Ankle pumps - L1  Pain Management Interventions:   care clustered   position adjusted     Problem: Skin Injury Risk Increased  Goal: Skin Health and Integrity  Outcome: Ongoing, Progressing  Intervention: Optimize Skin Protection  Flowsheets (Taken 6/7/2023 1937)  Pressure Reduction Techniques: frequent weight shift encouraged  Pressure Reduction Devices: positioning supports utilized  Skin Protection: adhesive use limited  Head of Bed (HOB) Positioning: HOB at 30-45 degrees

## 2023-06-08 NOTE — PT/OT/SLP PROGRESS
Occupational Therapy   Treatment    Name: Esthela Contreras  MRN: [id number]  Admitting Diagnosis:  Severe sepsis       Recommendations:     Discharge Recommendations: LTACH (long-term acute care hospital), nursing facility, skilled  Discharge Equipment Recommendations:   (to be determined)  Barriers to discharge:  None    Assessment:     Esthela Contreras is a 72 y.o. male with a medical diagnosis of Severe sepsis.  He presents with alert and agreeable to performing ADLS. Performance deficits affecting function are weakness, impaired cardiopulmonary response to activity, impaired coordination, impaired endurance, impaired functional mobility, impaired self care skills, impaired skin, pain.     Rehab Prognosis:  Good; patient would benefit from acute skilled OT services to address these deficits and reach maximum level of function.       Plan:     Patient to be seen 5 x/week to address the above listed problems via self-care/home management, therapeutic activities, therapeutic exercises  Plan of Care Expires:    Plan of Care Reviewed with: patient    Subjective     Chief Complaint: (B) LE wounds  Patient/Family Comments/goals: Pt  is agreeable to treatment/ ADLs  Pain/Comfort:  Pain Rating 1: 3/10  Location - Side 1: Bilateral  Location - Orientation 1: distal  Location 1: leg  Pain Addressed 1: Cessation of Activity  Pain Rating Post-Intervention 1: 3/10    Objective:     Communicated with: ANAHI Holliday prior to session.  Patient found HOB elevated with peripheral IV, telemetry, blood pressure cuff, pulse ox (continuous), Condom Catheter upon OT entry to room.    General Precautions: Standard, fall    Orthopedic Precautions:N/A  Braces: N/A  Respiratory Status: Room air     Occupational Performance:     Bed Mobility:    Patient completed Rolling/Turning to Left with  minimum assistance  Patient completed Rolling/Turning to Right with minimum assistance     Functional Mobility/Transfers:  NT    Activities of Daily  Living:  Bathing: Pt washed upper body anterior with setup, perineum from bed level with setup, lower body with max(A)- Total (A) for posterior body  Upper Body Dressing: moderate assistance due to lines  Lower Body Dressing: NT    Toileting: dependence        [facility] 6 Click ADL: 13    Treatment & Education:  Pt participated well, was having loose stools due to medication, so pt opted to work from bed level.    Patient left HOB elevated with all lines intact, call button in reach, and RN Carie notified    GOALS:   Multidisciplinary Problems       Occupational Therapy Goals          Problem: Occupational Therapy    Goal Priority Disciplines Outcome Interventions   Occupational Therapy Goal     OT, PT/OT Ongoing, Progressing    Description: STG: (in 2 weeks)  Pt will perform grooming with setup  Pt will bathe with mod(A)  Pt will perform UE dressing with setup  Pt will perform LE dressing with moderate assistance  Pt will sit EOB x 7 min with SBA  Pt will transfer bed/chair/bsc with mod(A) with sliding board  Pt will tolerate 20 minutes of tx without fatigue      LTG: (In 6 weeks)  1.Restore to max I with self care and mobility.                          Time Tracking:     OT Date of Treatment: 06/08/23  OT Start Time: 1038  OT Stop Time: 1124  OT Total Time (min): 46 min    Billable Minutes:Self Care/Home Management 46 min    OT/KO: OT          6/8/2023

## 2023-06-08 NOTE — SUBJECTIVE & OBJECTIVE
Interval History: Pt resting in bed. Remains off levophed infusion. Denies any needs or complaints at present. NAEO.       Objective:     Vital Signs (Most Recent):  Temp: 98.2 °F (36.8 °C) (06/08/23 0315)  Pulse: 69 (06/08/23 0501)  Resp: 15 (06/08/23 0501)  BP: (!) 106/46 (06/08/23 0501)  SpO2: 99 % (06/08/23 0501) Vital Signs (24h Range):  Temp:  [97 °F (36.1 °C)-98.2 °F (36.8 °C)] 98.2 °F (36.8 °C)  Pulse:  [57-94] 69  Resp:  [12-31] 15  SpO2:  [96 %-100 %] 99 %  BP: ()/(31-67) 106/46     Weight: 82.7 kg (182 lb 5.1 oz)  Body mass index is 28.56 kg/m².      Intake/Output Summary (Last 24 hours) at 6/8/2023 0916  Last data filed at 6/8/2023 0600  Gross per 24 hour   Intake 2669.62 ml   Output 1200 ml   Net 1469.62 ml        Physical Exam  Vitals and nursing note reviewed.   HENT:      Right Ear: External ear normal.      Left Ear: External ear normal.      Mouth/Throat:      Mouth: Mucous membranes are moist.   Eyes:      Pupils: Pupils are equal, round, and reactive to light.   Cardiovascular:      Rate and Rhythm: Normal rate and regular rhythm.   Pulmonary:      Effort: No respiratory distress.      Breath sounds: No wheezing or rales.   Abdominal:      Palpations: Abdomen is soft.      Tenderness: There is no abdominal tenderness. There is no guarding.   Musculoskeletal:         General: Normal range of motion.      Cervical back: Neck supple.      Comments: Chronic wounds both lower extremities are dressed  Wounds to sacrum and L hip    Skin:     General: Skin is warm and dry.   Neurological:      General: No focal deficit present.      Mental Status: He is alert.   Psychiatric:         Mood and Affect: Mood normal.         Review of Systems    Vents:  Oxygen Concentration (%): 21 (06/07/23 0824)    Lines/Drains/Airways       Drain  Duration             Male External Urinary Catheter 06/05/23 0230 Small 3 days              Peripheral Intravenous Line  Duration                  Peripheral IV - Single  Lumen 06/03/23 1709 20 G Anterior;Left 4 days         Peripheral IV - Single Lumen 06/05/23 0330 22 G Anterior;Right Upper Arm 3 days                    Significant Labs:    CBC/Anemia Profile:  Recent Labs   Lab 06/07/23 0440 06/08/23 0724   WBC 8.60 6.24   HGB 12.8* 12.1*   HCT 41.7 38.3*   * 134*   MCV 96.3* 95.0   RDW 16.2* 16.0*        Chemistries:  Recent Labs   Lab 06/07/23 0440 06/08/23 0523    139   K 3.3* 3.1*    106   CO2 25 27   BUN 26* 16   CREATININE 1.05 0.93   CALCIUM 6.3* 6.4*   ALBUMIN 1.4* 1.4*   PROT 4.8* 4.6*   BILITOT 0.6 0.7   ALKPHOS 93 77   ALT 25 16   AST 17 17   MG 1.4*  --        All pertinent labs within the past 24 hours have been reviewed.    Significant Imaging:  I have reviewed all pertinent imaging results/findings within the past 24 hours.

## 2023-06-08 NOTE — ASSESSMENT & PLAN NOTE
Secondary to sepsis   Levo restarted overnight  Adding midodrine today  -- levo has been weaned off this morning    Hypotension does not appear to be secondary to septic shock   Levo remains off at present--- continue midodrine

## 2023-06-08 NOTE — PLAN OF CARE
Problem: Adult Inpatient Plan of Care  Goal: Plan of Care Review  6/8/2023 1333 by Carie Holliday RN  Outcome: Ongoing, Progressing  6/8/2023 1310 by Carie Holliday RN  Outcome: Ongoing, Progressing  Goal: Patient-Specific Goal (Individualized)  6/8/2023 1333 by Carie Holliday RN  Outcome: Ongoing, Progressing  6/8/2023 1310 by Carie Holliday RN  Outcome: Ongoing, Progressing  Goal: Absence of Hospital-Acquired Illness or Injury  6/8/2023 1333 by Carie Holliday RN  Outcome: Ongoing, Progressing  6/8/2023 1310 by Carie Holliday RN  Outcome: Ongoing, Progressing  Goal: Optimal Comfort and Wellbeing  6/8/2023 1333 by Carie Holliday RN  Outcome: Ongoing, Progressing  6/8/2023 1310 by Carie Holliday RN  Outcome: Ongoing, Progressing  Goal: Readiness for Transition of Care  6/8/2023 1333 by Carie Holliday RN  Outcome: Ongoing, Progressing  6/8/2023 1310 by Carie Holliday RN  Outcome: Ongoing, Progressing     Problem: Adjustment to Illness (Sepsis/Septic Shock)  Goal: Optimal Coping  6/8/2023 1333 by Carie Holliday RN  Outcome: Ongoing, Progressing  6/8/2023 1310 by Carie Holliday RN  Outcome: Ongoing, Progressing     Problem: Bleeding (Sepsis/Septic Shock)  Goal: Absence of Bleeding  6/8/2023 1333 by Carie Holliday RN  Outcome: Ongoing, Progressing  6/8/2023 1310 by Carie Holliday RN  Outcome: Ongoing, Progressing     Problem: Glycemic Control Impaired (Sepsis/Septic Shock)  Goal: Blood Glucose Level Within Desired Range  6/8/2023 1333 by Carie Holliday RN  Outcome: Ongoing, Progressing  6/8/2023 1310 by Carie Holliday RN  Outcome: Ongoing, Progressing     Problem: Infection Progression (Sepsis/Septic Shock)  Goal: Absence of Infection Signs and Symptoms  6/8/2023 1333 by Carie Holliday RN  Outcome: Ongoing, Progressing  6/8/2023 1310 by Carie Holliday RN  Outcome: Ongoing, Progressing     Problem: Nutrition Impaired (Sepsis/Septic Shock)  Goal: Optimal Nutrition Intake  6/8/2023 1333 by Carie BYRNE  ANAHI Holliday  Outcome: Ongoing, Progressing  6/8/2023 1310 by Carie Holliday RN  Outcome: Ongoing, Progressing     Problem: Fluid and Electrolyte Imbalance (Acute Kidney Injury/Impairment)  Goal: Fluid and Electrolyte Balance  6/8/2023 1333 by Carie Holliday RN  Outcome: Ongoing, Progressing  6/8/2023 1310 by Carie Holliday RN  Outcome: Ongoing, Progressing     Problem: Oral Intake Inadequate (Acute Kidney Injury/Impairment)  Goal: Optimal Nutrition Intake  6/8/2023 1333 by Carie Holliday RN  Outcome: Ongoing, Progressing  6/8/2023 1310 by Carie Holliday RN  Outcome: Ongoing, Progressing     Problem: Renal Function Impairment (Acute Kidney Injury/Impairment)  Goal: Effective Renal Function  6/8/2023 1333 by Carie Holliday RN  Outcome: Ongoing, Progressing  6/8/2023 1310 by Carie Holliday RN  Outcome: Ongoing, Progressing     Problem: Impaired Wound Healing  Goal: Optimal Wound Healing  6/8/2023 1333 by Carie Holliday RN  Outcome: Ongoing, Progressing  6/8/2023 1310 by Carie Holliday RN  Outcome: Ongoing, Progressing     Problem: Skin Injury Risk Increased  Goal: Skin Health and Integrity  6/8/2023 1333 by Carie Holliday RN  Outcome: Ongoing, Progressing  6/8/2023 1310 by Carie Holliday RN  Outcome: Ongoing, Progressing

## 2023-06-08 NOTE — PLAN OF CARE
[facility] denied pt as they do not feel he meets their criteria. Spoke with Aunt Shelby and pt, choice obtained for [facility] Santacruz. Spoke with Qian and she will start pre cert this day. Will follow.

## 2023-06-09 ENCOUNTER — HOSPITAL ENCOUNTER (INPATIENT)
Facility: HOSPITAL | Age: 73
LOS: 28 days | Discharge: HOME HEALTH | DRG: 603 | End: 2023-07-07
Attending: FAMILY MEDICINE | Admitting: FAMILY MEDICINE
Payer: MEDICARE

## 2023-06-09 VITALS
OXYGEN SATURATION: 97 % | WEIGHT: 182.31 LBS | RESPIRATION RATE: 19 BRPM | SYSTOLIC BLOOD PRESSURE: 100 MMHG | BODY MASS INDEX: 28.61 KG/M2 | DIASTOLIC BLOOD PRESSURE: 60 MMHG | HEIGHT: 67 IN | TEMPERATURE: 98 F | HEART RATE: 80 BPM

## 2023-06-09 DIAGNOSIS — R79.89 ELEVATED TROPONIN: ICD-10-CM

## 2023-06-09 DIAGNOSIS — S81.809D MULTIPLE OPEN WOUNDS OF LOWER EXTREMITY, UNSPECIFIED LATERALITY, SUBSEQUENT ENCOUNTER: ICD-10-CM

## 2023-06-09 DIAGNOSIS — R78.81 GRAM-POSITIVE BACTEREMIA: ICD-10-CM

## 2023-06-09 DIAGNOSIS — R06.00 DYSPNEA: ICD-10-CM

## 2023-06-09 DIAGNOSIS — R60.9 EDEMA: ICD-10-CM

## 2023-06-09 DIAGNOSIS — R53.1 WEAKNESS GENERALIZED: Primary | ICD-10-CM

## 2023-06-09 PROBLEM — R65.20 SEVERE SEPSIS: Status: RESOLVED | Noted: 2023-06-01 | Resolved: 2023-06-09

## 2023-06-09 PROBLEM — E11.9 DIABETES MELLITUS: Status: ACTIVE | Noted: 2023-06-09

## 2023-06-09 PROBLEM — E87.20 LACTIC ACIDOSIS: Status: RESOLVED | Noted: 2023-06-02 | Resolved: 2023-06-09

## 2023-06-09 PROBLEM — E87.5 HYPERKALEMIA: Status: RESOLVED | Noted: 2023-06-01 | Resolved: 2023-06-09

## 2023-06-09 PROBLEM — A41.9 SEVERE SEPSIS: Status: RESOLVED | Noted: 2023-06-01 | Resolved: 2023-06-09

## 2023-06-09 LAB
ANION GAP SERPL CALCULATED.3IONS-SCNC: 9 MMOL/L (ref 7–16)
ATYPICAL LYMPHOCYTES: ABNORMAL
BASOPHILS # BLD AUTO: 0.06 K/UL (ref 0–0.2)
BASOPHILS NFR BLD AUTO: 1.2 % (ref 0–1)
BILIRUB UR QL STRIP: NEGATIVE
BUN SERPL-MCNC: 13 MG/DL (ref 7–18)
BUN/CREAT SERPL: 13 (ref 6–20)
CALCIUM SERPL-MCNC: 6.5 MG/DL (ref 8.5–10.1)
CHLORIDE SERPL-SCNC: 104 MMOL/L (ref 98–107)
CLARITY UR: ABNORMAL
CO2 SERPL-SCNC: 27 MMOL/L (ref 21–32)
COLOR UR: ABNORMAL
CREAT SERPL-MCNC: 1.01 MG/DL (ref 0.7–1.3)
DIFFERENTIAL METHOD BLD: ABNORMAL
EGFR (NO RACE VARIABLE) (RUSH/TITUS): 78 ML/MIN/1.73M2
EOSINOPHIL # BLD AUTO: 0.24 K/UL (ref 0–0.5)
EOSINOPHIL NFR BLD AUTO: 4.8 % (ref 1–4)
EOSINOPHIL NFR BLD MANUAL: 3 % (ref 1–4)
ERYTHROCYTE [DISTWIDTH] IN BLOOD BY AUTOMATED COUNT: 15.9 % (ref 11.5–14.5)
GLUCOSE SERPL-MCNC: 70 MG/DL (ref 70–105)
GLUCOSE SERPL-MCNC: 74 MG/DL (ref 70–105)
GLUCOSE SERPL-MCNC: 77 MG/DL (ref 74–106)
GLUCOSE SERPL-MCNC: 80 MG/DL (ref 70–105)
GLUCOSE SERPL-MCNC: 82 MG/DL (ref 70–105)
GLUCOSE SERPL-MCNC: 85 MG/DL (ref 70–105)
GLUCOSE SERPL-MCNC: 98 MG/DL (ref 70–105)
GLUCOSE UR STRIP-MCNC: NEGATIVE MG/DL
HCT VFR BLD AUTO: 40.3 % (ref 40–54)
HGB BLD-MCNC: 12.5 G/DL (ref 13.5–18)
IMM GRANULOCYTES # BLD AUTO: 0.02 K/UL (ref 0–0.04)
IMM GRANULOCYTES NFR BLD: 0.4 % (ref 0–0.4)
KETONES UR STRIP-SCNC: NEGATIVE MG/DL
LEUKOCYTE ESTERASE UR QL STRIP: NEGATIVE
LYMPHOCYTES # BLD AUTO: 1.02 K/UL (ref 1–4.8)
LYMPHOCYTES NFR BLD AUTO: 20.3 % (ref 27–41)
LYMPHOCYTES NFR BLD MANUAL: 23 % (ref 27–41)
MCH RBC QN AUTO: 29.6 PG (ref 27–31)
MCHC RBC AUTO-ENTMCNC: 31 G/DL (ref 32–36)
MCV RBC AUTO: 95.5 FL (ref 80–96)
MONOCYTES # BLD AUTO: 0.76 K/UL (ref 0–0.8)
MONOCYTES NFR BLD AUTO: 15.1 % (ref 2–6)
MONOCYTES NFR BLD MANUAL: 2 % (ref 2–6)
MPC BLD CALC-MCNC: 12.2 FL (ref 9.4–12.4)
NEUTROPHILS # BLD AUTO: 2.93 K/UL (ref 1.8–7.7)
NEUTROPHILS NFR BLD AUTO: 58.2 % (ref 53–65)
NEUTS BAND NFR BLD MANUAL: 4 % (ref 1–5)
NEUTS SEG NFR BLD MANUAL: 68 % (ref 50–62)
NITRITE UR QL STRIP: NEGATIVE
NRBC # BLD AUTO: 0 X10E3/UL
NRBC, AUTO (.00): 0 %
PH UR STRIP: 6.5 PH UNITS
PLATELET # BLD AUTO: 118 K/UL (ref 150–400)
PLATELET MORPHOLOGY: ABNORMAL
POTASSIUM SERPL-SCNC: 3.1 MMOL/L (ref 3.5–5.1)
PROT UR QL STRIP: 30
RBC # BLD AUTO: 4.22 M/UL (ref 4.6–6.2)
RBC # UR STRIP: ABNORMAL /UL
RBC #/AREA URNS HPF: ABNORMAL /HPF
RBC MORPH BLD: NORMAL
SODIUM SERPL-SCNC: 137 MMOL/L (ref 136–145)
SP GR UR STRIP: 1.02
SQUAMOUS #/AREA URNS LPF: ABNORMAL /LPF
UROBILINOGEN UR STRIP-ACNC: 0.2 MG/DL
WBC # BLD AUTO: 5.03 K/UL (ref 4.5–11)
WBC #/AREA URNS HPF: ABNORMAL /HPF
YEAST #/AREA URNS HPF: ABNORMAL /HPF

## 2023-06-09 PROCEDURE — 63600175 PHARM REV CODE 636 W HCPCS: Performed by: NURSE PRACTITIONER

## 2023-06-09 PROCEDURE — 97110 THERAPEUTIC EXERCISES: CPT

## 2023-06-09 PROCEDURE — 63600175 PHARM REV CODE 636 W HCPCS: Performed by: INTERNAL MEDICINE

## 2023-06-09 PROCEDURE — 82962 GLUCOSE BLOOD TEST: CPT

## 2023-06-09 PROCEDURE — 25000003 PHARM REV CODE 250: Performed by: NURSE PRACTITIONER

## 2023-06-09 PROCEDURE — 94761 N-INVAS EAR/PLS OXIMETRY MLT: CPT

## 2023-06-09 PROCEDURE — 25000003 PHARM REV CODE 250

## 2023-06-09 PROCEDURE — 99239 HOSP IP/OBS DSCHRG MGMT >30: CPT | Mod: ,,, | Performed by: INTERNAL MEDICINE

## 2023-06-09 PROCEDURE — 1111F PR DISCHARGE MEDS RECONCILED W/ CURRENT OUTPATIENT MED LIST: ICD-10-PCS | Mod: CPTII,,, | Performed by: INTERNAL MEDICINE

## 2023-06-09 PROCEDURE — 99239 PR HOSPITAL DISCHARGE DAY,>30 MIN: ICD-10-PCS | Mod: ,,, | Performed by: INTERNAL MEDICINE

## 2023-06-09 PROCEDURE — 25000003 PHARM REV CODE 250: Performed by: INTERNAL MEDICINE

## 2023-06-09 PROCEDURE — 85025 COMPLETE CBC W/AUTO DIFF WBC: CPT | Performed by: NURSE PRACTITIONER

## 2023-06-09 PROCEDURE — 11000004 HC SNF PRIVATE

## 2023-06-09 PROCEDURE — 99900035 HC TECH TIME PER 15 MIN (STAT)

## 2023-06-09 PROCEDURE — 80048 BASIC METABOLIC PNL TOTAL CA: CPT | Performed by: NURSE PRACTITIONER

## 2023-06-09 PROCEDURE — 25000003 PHARM REV CODE 250: Performed by: FAMILY MEDICINE

## 2023-06-09 PROCEDURE — 81001 URINALYSIS AUTO W/SCOPE: CPT | Performed by: NURSE PRACTITIONER

## 2023-06-09 PROCEDURE — 97530 THERAPEUTIC ACTIVITIES: CPT

## 2023-06-09 PROCEDURE — 1111F DSCHRG MED/CURRENT MED MERGE: CPT | Mod: CPTII,,, | Performed by: INTERNAL MEDICINE

## 2023-06-09 PROCEDURE — 27000221 HC OXYGEN, UP TO 24 HOURS

## 2023-06-09 RX ORDER — IBUPROFEN 200 MG
16 TABLET ORAL
Status: DISCONTINUED | OUTPATIENT
Start: 2023-06-09 | End: 2023-06-14

## 2023-06-09 RX ORDER — ASPIRIN 81 MG/1
81 TABLET ORAL DAILY
Status: DISCONTINUED | OUTPATIENT
Start: 2023-06-10 | End: 2023-07-08 | Stop reason: HOSPADM

## 2023-06-09 RX ORDER — CALCIUM CARBONATE 200(500)MG
500 TABLET,CHEWABLE ORAL 2 TIMES DAILY PRN
Status: DISCONTINUED | OUTPATIENT
Start: 2023-06-09 | End: 2023-07-08 | Stop reason: HOSPADM

## 2023-06-09 RX ORDER — MUPIROCIN 20 MG/G
OINTMENT TOPICAL 2 TIMES DAILY
Status: DISPENSED | OUTPATIENT
Start: 2023-06-09 | End: 2023-06-14

## 2023-06-09 RX ORDER — GLUCAGON 1 MG
1 KIT INJECTION
Status: DISCONTINUED | OUTPATIENT
Start: 2023-06-09 | End: 2023-06-14

## 2023-06-09 RX ORDER — ATORVASTATIN CALCIUM 40 MG/1
40 TABLET, FILM COATED ORAL DAILY
Status: DISCONTINUED | OUTPATIENT
Start: 2023-06-10 | End: 2023-07-08 | Stop reason: HOSPADM

## 2023-06-09 RX ORDER — INSULIN ASPART 100 [IU]/ML
0-5 INJECTION, SOLUTION INTRAVENOUS; SUBCUTANEOUS
Status: ON HOLD
Start: 2023-06-09 | End: 2023-06-29 | Stop reason: HOSPADM

## 2023-06-09 RX ORDER — SILVER SULFADIAZINE 10 G/1000G
CREAM TOPICAL DAILY
Status: ON HOLD
Start: 2023-06-10 | End: 2023-06-29 | Stop reason: HOSPADM

## 2023-06-09 RX ORDER — METOPROLOL SUCCINATE 25 MG/1
25 TABLET, EXTENDED RELEASE ORAL DAILY
Status: DISCONTINUED | OUTPATIENT
Start: 2023-06-10 | End: 2023-07-08 | Stop reason: HOSPADM

## 2023-06-09 RX ORDER — POLYETHYLENE GLYCOL 3350 17 G/17G
17 POWDER, FOR SOLUTION ORAL DAILY
Refills: 0 | Status: ON HOLD
Start: 2023-06-10 | End: 2023-06-29 | Stop reason: HOSPADM

## 2023-06-09 RX ORDER — IPRATROPIUM BROMIDE AND ALBUTEROL SULFATE 2.5; .5 MG/3ML; MG/3ML
3 SOLUTION RESPIRATORY (INHALATION) EVERY 4 HOURS PRN
Status: DISCONTINUED | OUTPATIENT
Start: 2023-06-09 | End: 2023-07-08 | Stop reason: HOSPADM

## 2023-06-09 RX ORDER — HYDROCODONE BITARTRATE AND ACETAMINOPHEN 5; 325 MG/1; MG/1
1 TABLET ORAL EVERY 12 HOURS PRN
Status: DISCONTINUED | OUTPATIENT
Start: 2023-06-09 | End: 2023-07-08 | Stop reason: HOSPADM

## 2023-06-09 RX ORDER — SILVER SULFADIAZINE 10 G/1000G
CREAM TOPICAL DAILY
Status: DISCONTINUED | OUTPATIENT
Start: 2023-06-10 | End: 2023-06-16

## 2023-06-09 RX ORDER — ACETAMINOPHEN 325 MG/1
650 TABLET ORAL EVERY 6 HOURS PRN
Status: DISCONTINUED | OUTPATIENT
Start: 2023-06-09 | End: 2023-06-12

## 2023-06-09 RX ORDER — IPRATROPIUM BROMIDE AND ALBUTEROL SULFATE 2.5; .5 MG/3ML; MG/3ML
3 SOLUTION RESPIRATORY (INHALATION) EVERY 4 HOURS PRN
Qty: 75 ML | Refills: 0 | Status: ON HOLD
Start: 2023-06-09 | End: 2023-06-29 | Stop reason: HOSPADM

## 2023-06-09 RX ORDER — POLYETHYLENE GLYCOL 3350 17 G/17G
17 POWDER, FOR SOLUTION ORAL DAILY
Status: DISCONTINUED | OUTPATIENT
Start: 2023-06-10 | End: 2023-07-08 | Stop reason: HOSPADM

## 2023-06-09 RX ORDER — MIDODRINE HYDROCHLORIDE 5 MG/1
5 TABLET ORAL
Qty: 90 TABLET | Refills: 11
Start: 2023-06-09 | End: 2024-06-08

## 2023-06-09 RX ORDER — AMOXICILLIN 250 MG
1 CAPSULE ORAL 2 TIMES DAILY
Status: DISCONTINUED | OUTPATIENT
Start: 2023-06-09 | End: 2023-07-08 | Stop reason: HOSPADM

## 2023-06-09 RX ORDER — MIDODRINE HYDROCHLORIDE 2.5 MG/1
5 TABLET ORAL
Status: DISCONTINUED | OUTPATIENT
Start: 2023-06-10 | End: 2023-07-08 | Stop reason: HOSPADM

## 2023-06-09 RX ORDER — HYDROCODONE BITARTRATE AND ACETAMINOPHEN 10; 325 MG/1; MG/1
1 TABLET ORAL ONCE
Status: COMPLETED | OUTPATIENT
Start: 2023-06-09 | End: 2023-06-09

## 2023-06-09 RX ORDER — POTASSIUM CHLORIDE 20 MEQ/1
40 TABLET, EXTENDED RELEASE ORAL ONCE
Status: COMPLETED | OUTPATIENT
Start: 2023-06-09 | End: 2023-06-09

## 2023-06-09 RX ORDER — INSULIN ASPART 100 [IU]/ML
0-5 INJECTION, SOLUTION INTRAVENOUS; SUBCUTANEOUS
Status: DISCONTINUED | OUTPATIENT
Start: 2023-06-09 | End: 2023-06-14

## 2023-06-09 RX ORDER — TALC
6 POWDER (GRAM) TOPICAL NIGHTLY PRN
Status: DISCONTINUED | OUTPATIENT
Start: 2023-06-09 | End: 2023-07-08 | Stop reason: HOSPADM

## 2023-06-09 RX ORDER — IBUPROFEN 200 MG
24 TABLET ORAL
Status: DISCONTINUED | OUTPATIENT
Start: 2023-06-09 | End: 2023-06-14

## 2023-06-09 RX ADMIN — MIDODRINE HYDROCHLORIDE 5 MG: 5 TABLET ORAL at 08:06

## 2023-06-09 RX ADMIN — MUPIROCIN: 20 OINTMENT TOPICAL at 08:06

## 2023-06-09 RX ADMIN — APIXABAN 5 MG: 5 TABLET, FILM COATED ORAL at 08:06

## 2023-06-09 RX ADMIN — SENNOSIDES AND DOCUSATE SODIUM 1 TABLET: 50; 8.6 TABLET ORAL at 08:06

## 2023-06-09 RX ADMIN — ASPIRIN 81 MG: 81 TABLET, COATED ORAL at 08:06

## 2023-06-09 RX ADMIN — HYDROCODONE BITARTRATE AND ACETAMINOPHEN 1 TABLET: 10; 325 TABLET ORAL at 08:06

## 2023-06-09 RX ADMIN — POLYETHYLENE GLYCOL 3350 17 G: 17 POWDER, FOR SOLUTION ORAL at 08:06

## 2023-06-09 RX ADMIN — APIXABAN 5 MG: 2.5 TABLET, FILM COATED ORAL at 08:06

## 2023-06-09 RX ADMIN — PIPERACILLIN AND TAZOBACTAM 4.5 G: 4; .5 INJECTION, POWDER, FOR SOLUTION INTRAVENOUS; PARENTERAL at 04:06

## 2023-06-09 RX ADMIN — POTASSIUM CHLORIDE 40 MEQ: 1500 TABLET, EXTENDED RELEASE ORAL at 12:06

## 2023-06-09 RX ADMIN — SILVER SULFADIAZINE: 10 CREAM TOPICAL at 08:06

## 2023-06-09 RX ADMIN — METOPROLOL SUCCINATE 25 MG: 25 TABLET, EXTENDED RELEASE ORAL at 08:06

## 2023-06-09 RX ADMIN — ATORVASTATIN CALCIUM 40 MG: 40 TABLET, FILM COATED ORAL at 08:06

## 2023-06-09 RX ADMIN — DEXTROSE AND SODIUM CHLORIDE: 5; 900 INJECTION, SOLUTION INTRAVENOUS at 05:06

## 2023-06-09 RX ADMIN — CEFTRIAXONE SODIUM 2 G: 2 INJECTION, POWDER, FOR SOLUTION INTRAMUSCULAR; INTRAVENOUS at 12:06

## 2023-06-09 RX ADMIN — CEFTRIAXONE 2 G: 2 INJECTION, POWDER, FOR SOLUTION INTRAMUSCULAR; INTRAVENOUS at 11:06

## 2023-06-09 RX ADMIN — MIDODRINE HYDROCHLORIDE 5 MG: 5 TABLET ORAL at 12:06

## 2023-06-09 RX ADMIN — MIDODRINE HYDROCHLORIDE 5 MG: 5 TABLET ORAL at 05:06

## 2023-06-09 RX ADMIN — MELATONIN TAB 3 MG 6 MG: 3 TAB at 08:06

## 2023-06-09 NOTE — PT/OT/SLP PROGRESS
Physical Therapy Treatment    Patient Name:  Esthela Contreras   MRN:  [id number]    Recommendations:     Discharge Recommendations: nursing facility, skilled, LTACH (long-term acute care hospital)  Discharge Equipment Recommendations: other (see comments) (to be determined)  Barriers to discharge: Inaccessible home and Decreased caregiver support    Assessment:     Esthela Contreras is a 72 y.o. male admitted with a medical diagnosis of Severe sepsis.  He presents with the following impairments/functional limitations: weakness, impaired functional mobility, impaired self care skills, impaired balance, decreased lower extremity function, pain, impaired skin, impaired cardiopulmonary response to activity Pt continue to have LE bleeding when sitting at edge of bed and worsens with standing. He was able to stand briefly to change his pad but was limited due to pain. Plans for swingbed today.    Rehab Prognosis: Fair; patient would benefit from acute skilled PT services to address these deficits and reach maximum level of function.    Recent Surgery: * No surgery found *      Plan:     During this hospitalization, patient to be seen 5 x/week to address the identified rehab impairments via gait training, therapeutic activities, therapeutic exercises, neuromuscular re-education, wheelchair management/training and progress toward the following goals:    Plan of Care Expires:  07/06/23    Subjective     Chief Complaint: LE wounds  Patient/Family Comments/goals: Pt agreeable to PT  Pain/Comfort:  Pain Rating 1: 0/10  Location 1: leg  Pain Addressed 1: Reposition, Cessation of Activity  Pain Rating Post-Intervention 1: 4/10      Objective:     Communicated with JASS Lyons RN prior to session.  Patient found HOB elevated with peripheral IV, Condom Catheter, telemetry upon PT entry to room.     General Precautions: Standard, fall  Orthopedic Precautions: N/A  Braces: N/A  Respiratory Status: Room air     Functional Mobility:  Bed  Mobility:     Scooting: minimum assistance  Supine to Sit: minimum assistance  Sit to Supine: minimum assistance  Transfers:     Sit to Stand:  moderate assistance with rolling walker  Balance: fair      AM-PAC 6 CLICK MOBILITY  Turning over in bed (including adjusting bedclothes, sheets and blankets)?: 3  Sitting down on and standing up from a chair with arms (e.g., wheelchair, bedside commode, etc.): 3  Moving from lying on back to sitting on the side of the bed?: 3  Moving to and from a bed to a chair (including a wheelchair)?: 2  Need to walk in hospital room?: 1  Climbing 3-5 steps with a railing?: 1  Basic Mobility Total Score: 13       Treatment & Education:  Pt performed bilateral LE: short arc quads, heel slides, and hip abduction/adduction x 30 each  Edge of bed sitting x 10 minutes with contact guard assistance to promote LE circulation, bleeding noted to BLE    Patient left HOB elevated with all lines intact and call button in reach..    GOALS:   Multidisciplinary Problems       Physical Therapy Goals          Problem: Physical Therapy    Goal Priority Disciplines Outcome Goal Variances Interventions   Physical Therapy Goal     PT, PT/OT Ongoing, Progressing     Description: Short term goals:  1. Supine to sit with Contact Guard Assistance  2. Rolling to Left and Right with Contact Guard Assistance.  3. Bed to chair transfer with Minimal Assistance using Slideboard  4. Sitting at edge of bed x15 minutes with [place]    Long term goals:  1. Supine to sit with Modified [place]  2. Rolling to Left and Right with Modified [place].  3. Bed to chair transfer with Modified [place] using Slideboard  4. Wheelchair propulsion x100 feet with Contact Guard Assistance using bilateral uppper extremities                         Time Tracking:     PT Received On: 06/09/23  PT Start Time: 1315     PT Stop Time: 1340  PT Total Time (min): 25 min     Billable Minutes: Therapeutic Activity 15 and  Therapeutic Exercise 10    Treatment Type: Treatment  PT/PTA: PT     Number of PTA visits since last PT visit: 0     06/09/2023

## 2023-06-09 NOTE — PROGRESS NOTES
[facility] Hospital Medicine  Progress Note    Patient Name: Esthela Contreras  MRN: [id number]  Patient Class: IP- Inpatient   Admission Date: 6/1/2023  Length of Stay: 8 days  Attending Physician: Cortez Rico MD  Primary Care Provider: Lor Patel MD        Subjective:     Principal Problem:Severe sepsis        HPI:  Patient is a 72-year-old male with a history of unspecified CHF in the setting of CAD, essential hypertension, diabetes, oxygen-dependent COPD with baseline supplemental oxygen requirement of 2 L/min and BiPAP QHS along with CKD stage IIIA who presented to the emergency room complaining of dyspnea which started just a few days ago.  Patient otherwise denied any fever chills cough hemoptysis PND orthopnea chest pain palpitation or lower extremity edema in association.  Patient's niece stated that he has chronic wounds on both legs and felt that he has not been given proper care to address this.  Patient lives alone and when his niece visited him today, she found him slumped over on a recliner dyspneic prompting ED visit.     On initial presentation, patient was tachycardic and tachypneic but vital signs were otherwise stable and patient was afebrile.  Workup was notable for what appeared to be a new onset atrial fibrillation with RVR with elevated troponin and proBNP, elevation in total bilirubin level with mildly elevated transaminases and alkaline phosphatase, leukocytosis with left shift with WBC count of 23,000 and high anion gap metabolic acidosis with anion gap of 29 and bicarbonate of 7, acute on chronic renal failure with BUN of 110 and creatinine of 7.03 from a baseline serum creatinine of 1.43, significantly elevated lactic acid level with initial lactic acid level of 9.7 to 10.7 even after receiving 30 cc/kg of crystalloid IV fluid bolus.  CT of bilateral lower extremity demonstrated a presence of bilateral cellulitis without evidence of  drainable abscess or necrotizing fasciitis. Patient will be admitted for further evaluation and intervention      Overview/Hospital Course:  06/09/2023   Patient has no new complaints, leg swelling improving.    T-max 100.3°, blood pressure 93/53 pulse rate 84 respiratory rate 18 temperature 98.1°.  His labs white blood cell count down to 5 hemoglobin 12 platelets count 118 sodium 137 potassium 3.1 creatinine 1 calcium 6.5.    Echocardiogram showed ejection fraction of 35% with left ventricular diastolic dysfunction.    Blood culture on on 06/09/2023 showed no growth to date.    Blood cultures on 06/06/2023 grew Gram-negative bacilli, but chews fragilis, Streptococcus species.    Hypotension resolved, blood pressure borderline but does not need vasopressors.    Sepsis resolved.    New onset AFib, rate is controlled, started on Eliquis anticoagulation.    Acute renal failure presumably   Bacteremia, blood cultures questionable chills contamination, likely related to cellulitis of lower extremities, will continue antibiotics.    Surgery evaluated the patient, recommended to Continue local wound care.    Will deescalate antibiotics to Rocephin 2 g IV piggyback daily.    Patient awaiting placement.              Interval History: Pt resting in bed. Remains off levophed infusion. Denies any needs or complaints at present. NAEO.       Objective:     Vital Signs (Most Recent):  Temp: 98.1 °F (36.7 °C) (06/09/23 1100)  Pulse: 84 (06/09/23 1100)  Resp: 18 (06/09/23 1100)  BP: (!) 93/53 (06/09/23 1100)  SpO2: 97 % (06/09/23 1100) Vital Signs (24h Range):  Temp:  [97.7 °F (36.5 °C)-100.3 °F (37.9 °C)] 98.1 °F (36.7 °C)  Pulse:  [62-98] 84  Resp:  [10-20] 18  SpO2:  [95 %-100 %] 97 %  BP: ()/(44-67) 93/53     Weight: 82.7 kg (182 lb 5.1 oz)  Body mass index is 28.56 kg/m².      Intake/Output Summary (Last 24 hours) at 6/9/2023 1123  Last data filed at 6/9/2023 0856  Gross per 24 hour   Intake 390 ml   Output 900 ml   Net  -510 ml          Physical Exam  Vitals and nursing note reviewed.   HENT:      Right Ear: External ear normal.      Left Ear: External ear normal.      Mouth/Throat:      Mouth: Mucous membranes are moist.   Eyes:      Pupils: Pupils are equal, round, and reactive to light.   Cardiovascular:      Rate and Rhythm: Normal rate and regular rhythm.   Pulmonary:      Effort: No respiratory distress.      Breath sounds: No wheezing or rales.   Abdominal:      Palpations: Abdomen is soft.      Tenderness: There is no abdominal tenderness. There is no guarding.   Musculoskeletal:         General: Normal range of motion.      Cervical back: Neck supple.      Comments: Chronic wounds both lower extremities are dressed  Wounds to sacrum and L hip    Skin:     General: Skin is warm and dry.   Neurological:      General: No focal deficit present.      Mental Status: He is alert.   Psychiatric:         Mood and Affect: Mood normal.         Review of Systems    Vents:  Oxygen Concentration (%): 21 (06/07/23 0824)    Lines/Drains/Airways       Drain  Duration             Male External Urinary Catheter 06/05/23 0230 Small 4 days              Peripheral Intravenous Line  Duration                  Peripheral IV - Single Lumen 06/03/23 1709 20 G Anterior;Left Other 5 days         Peripheral IV - Single Lumen 06/05/23 0330 22 G Anterior;Right Upper Arm 4 days                    Significant Labs:    CBC/Anemia Profile:  Recent Labs   Lab 06/08/23  0724 06/09/23  0328   WBC 6.24 5.03   HGB 12.1* 12.5*   HCT 38.3* 40.3   * 118*   MCV 95.0 95.5   RDW 16.0* 15.9*          Chemistries:  Recent Labs   Lab 06/08/23  0523 06/09/23  0328    137   K 3.1* 3.1*    104   CO2 27 27   BUN 16 13   CREATININE 0.93 1.01   CALCIUM 6.4* 6.5*   ALBUMIN 1.4*  --    PROT 4.6*  --    BILITOT 0.7  --    ALKPHOS 77  --    ALT 16  --    AST 17  --          All pertinent labs within the past 24 hours have been reviewed.    Significant Imaging:  I  have reviewed all pertinent imaging results/findings within the past 24 hours.      Assessment/Plan:      * Severe sepsis  This patient does have evidence of infective focus  My overall impression is sepsis.  Source: Skin and Soft Tissue (location Bilateral lower extremities )  Antibiotics given-Zosyn  Antibiotics (72h ago, onward)    Vancomycin and Zosyn        Latest lactate reviewed-  Recent Labs   Lab 06/01/23 1939   LACTATE 9.7*     Organ dysfunction indicated by Acute kidney injury     Fluid challenge Actual Body weight- Patient will receive 30ml/kg actual body weight to calculate fluid bolus for treatment of septic shock.     Post- resuscitation assessment Yes Perfusion exam was performed within 6 hours of septic shock presentation after bolus shows Adequate tissue perfusion assessed by non-invasive monitoring       Will Not start Pressors- Levophed for MAP of 65  Source control achieved by: Empiric ABX    COPD (chronic obstructive pulmonary disease)    At baseline patient is supplemental oxygen requirement is 4 liters/minute via nasal cannula with BiPAP q.h.s. currently patient is requiring 4 liters/minute via nasal cannula to achieve SpO2 of greater than 92%.  Continue present management with DuoNeb on PRN basis      New onset a-fib  Patient with new onset atrial fibrillation which is controlled currently with Beta Blocker. Patient is currently in atrial fibrillation.[id number] Score: 2. HASBLED Score: 2. Anticoagulation indicated. Anticoagulation done with Eliquis.        SOB (shortness of breath)    Likely related to extra respiratory efforts to compensate for a significant metabolic acidosis.  Patient has O2 dependent COPD but chest x-ray was clear and physical examination was unremarkable.  Patient will be started on DuoNeb on PRN basis.      Lactic acidosis    Patient has a significant lactic acidosis with lactic acid level of 9.7, bicarb level of 7 and anion gap of 29.  When bicarb level was repeated  after having received 30 cc/kg of crystalloid IV fluid, patient's lactic acid level went up to 10.7 even with normal post resuscitation perfusion examination. With pH of 7.05.  Will start patient on isotonic bicarb infusion.       Cellulitis of lower extremity    Patient has a history of chronic bilateral lower extremity wound complicated by frequent cellulitis.  Today, wound appears to be significantly lichenified with foul odoring and purulent drainage.  DP/PT could not be palpated secondary to presence of significant lichenification.  Will order bilateral lower extremity arterial Doppler to rule out a presence of PAD.  In the meantime patient will be started on empiric antibiotic regimen consisting of vancomycin and Zosyn      Acute renal failure  - BUN/Cr 110/7, baseline Cr 1.5  - Will consult nephrology      Hyperkalemia    Potassium was 6.9 on admission.  Patient was treated in usual customary manner and currently potassium is down to 4.2.  Will monitor potassium level closely        Coronary artery disease involving native coronary artery of native heart without angina pectoris    Continue home aspirin, BB, and high-intensity statin.  Patient was found to have elevated troponin level which is likely to represent type 2 MI. Will continue to trend troponin levels and obtain echocardiogram in a.m.      HFrEF (heart failure with reduced ejection fraction)    Per chart review, last ECHO in 2019 showed EF 40%.  Patient's proBNP was significantly elevated but initially patient appeared volume depleted.  Patient has tolerated 30 cc/kg of crystalloid IV fluid without any problems.  Will continue present management with isotonic bicarbonate infusion        VTE Risk Mitigation (From admission, onward)         Ordered     apixaban tablet 5 mg  2 times daily         06/02/23 0120     IP VTE HIGH RISK PATIENT  Once         06/01/23 2220                Discharge Planning   MAKENNA:      Code Status: Full Code   Is the patient  medically ready for discharge?:     Reason for patient still in hospital (select all that apply): Pending disposition  Discharge Plan A: Home                  Cortez Rico MD  Department of Hospital Medicine   [facility]

## 2023-06-09 NOTE — NURSING
Ambulance here to transfer patient per stretcher. No resp distress. No complaint voiced at this time.

## 2023-06-09 NOTE — ASSESSMENT & PLAN NOTE
06/09/23 continue rocephin 2 gm ivpb daily x 12 more days, last dose 06/21/23    Blood culture from 06/01/23 shows   Streptococcus species     Not Specified     Azithromycin >2 µg/ml Resistant     Cefotaxime 0.5 µg/ml Sensitive     Ceftriaxone 0.5 µg/ml Sensitive     Clindamycin <=0.06 µg/ml Sensitive     Erythromycin >0.5 µg/ml Resistant     Levofloxacin 1 µg/ml Sensitive     Tetracycline 1 µg/ml Sensitive     Vancomycin 0.5 µg/ml Sensitive              Repeat blood cultures x from 06/05/23 show no growth

## 2023-06-09 NOTE — ASSESSMENT & PLAN NOTE
>>ASSESSMENT AND PLAN FOR CELLULITIS OF LOWER EXTREMITY WRITTEN ON 6/9/2023  2:54 PM BY MIKE LOBO FNP    06/09/23  wound cleansing with Vashe, apply silvadene daily; Border foam dressings to Trochanter and sacrum .

## 2023-06-09 NOTE — PLAN OF CARE
[facility] Tele[facility]  Discharge Final Note    Primary Care Provider: Lor Patel MD    Expected Discharge Date:     Final Discharge Note (most recent)       Final Note - 06/09/23 1422          Final Note    Assessment Type Final Discharge Note     Anticipated Discharge Disposition Swing Bed        Post-Acute Status    Post-Acute Authorization Placement     Post-Acute Placement Status Set-up Complete/Auth obtained     Patient choice form signed by patient/caregiver List with quality metrics by geographic area provided;List from CMS Compare;List from System Post-Acute Care     Discharge Delays None known at this time                     Pt approved for [facility].  Important Message from Medicare

## 2023-06-09 NOTE — DISCHARGE SUMMARY
[facility] Hospital Medicine  Discharge Summary      Patient Name: Esthela Contreras  MRN: [id number]  [facility]: [id number]  Patient Class: IP- Inpatient  Admission Date: 6/1/2023  Hospital Length of Stay: 8 days  Discharge Date and Time:  06/09/2023 2:30 PM  Attending Physician: Cortez Rico MD   Discharging Provider: Cortez Rico MD  Primary Care Provider: Lor Patel MD    Primary Care Team: Networked reference to record PCT     HPI:   Patient is a 72-year-old male with a history of unspecified CHF in the setting of CAD, essential hypertension, diabetes, oxygen-dependent COPD with baseline supplemental oxygen requirement of 2 L/min and BiPAP QHS along with CKD stage IIIA who presented to the emergency room complaining of dyspnea which started just a few days ago.  Patient otherwise denied any fever chills cough hemoptysis PND orthopnea chest pain palpitation or lower extremity edema in association.  Patient's niece stated that he has chronic wounds on both legs and felt that he has not been given proper care to address this.  Patient lives alone and when his niece visited him today, she found him slumped over on a recliner dyspneic prompting ED visit.     On initial presentation, patient was tachycardic and tachypneic but vital signs were otherwise stable and patient was afebrile.  Workup was notable for what appeared to be a new onset atrial fibrillation with RVR with elevated troponin and proBNP, elevation in total bilirubin level with mildly elevated transaminases and alkaline phosphatase, leukocytosis with left shift with WBC count of 23,000 and high anion gap metabolic acidosis with anion gap of 29 and bicarbonate of 7, acute on chronic renal failure with BUN of 110 and creatinine of 7.03 from a baseline serum creatinine of 1.43, significantly elevated lactic acid level with initial lactic acid level of 9.7 to 10.7 even after receiving 30 cc/kg of crystalloid IV  fluid bolus.  CT of bilateral lower extremity demonstrated a presence of bilateral cellulitis without evidence of drainable abscess or necrotizing fasciitis. Patient will be admitted for further evaluation and intervention      * No surgery found *      Hospital Course:   06/09/2023   Patient has no new complaints, leg swelling improving.    T-max 100.3°, blood pressure 93/53 pulse rate 84 respiratory rate 18 temperature 98.1°.  His labs white blood cell count down to 5 hemoglobin 12 platelets count 118 sodium 137 potassium 3.1 creatinine 1 calcium 6.5.    Echocardiogram showed ejection fraction of 35% with left ventricular diastolic dysfunction.    Blood culture on on 06/09/2023 showed no growth to date.    Blood cultures on 06/06/2023 grew Gram-negative bacilli, but chews fragilis, Streptococcus species.    Hypotension resolved, blood pressure borderline but does not need vasopressors.    Sepsis resolved.    New onset AFib, rate is controlled, started on Eliquis anticoagulation.    Acute renal failure presumably   Bacteremia, blood cultures questionable chills contamination, likely related to cellulitis of lower extremities, will continue antibiotics.    Surgery evaluated the patient, recommended to Continue local wound care.    Will deescalate antibiotics to Rocephin 2 g IV piggyback daily.    Patient awaiting placement.    [facility], we will continue with IV antibiotics, Rocephin 2 g IV piggyback daily for 12 more days, last dose 06/21/2023.    Continue local wound care and continue physical therapy.               Goals of Care Treatment Preferences:  Code Status: Full Code      Consults:   Consults (From admission, onward)        Status Ordering Provider     Inpatient consult to Registered Dietitian/Nutritionist  Once        Provider:  (Not yet assigned)    TONY Serna     Inpatient consult to Nephrology  Once        Provider:  Dakota Cruz MD    Acknowledged  NASH MUELLER     Inpatient consult to Nephrology  Once        Provider:  Beto Lyons MD    Acknowledged AMANDA WONG     Pharmacy to dose Vancomycin consult  Once        Provider:  (Not yet assigned)    Completed JUDITH, MIN S          Pulmonary  SOB (shortness of breath)    Oxygen and prn duonebs     Cardiac/Vascular  New onset a-fib    Patient with new onset atrial fibrillation which is controlled currently with Beta Blocker. Patient is currently in atrial fibrillation.[id number] Score: 2. HASBLED Score: 2. Anticoagulation indicated. Anticoagulation done with Eliquis.                 Final Active Diagnoses:    Diagnosis Date Noted POA    Pressure injury of sacral region, stage 2 [L89.152] 06/07/2023 Unknown    Pressure injury of skin of left hip [L89.229] 06/07/2023 Unknown    Multiple open wounds of lower extremity, unspecified laterality, subsequent encounter [S81.809D] 06/03/2023 Not Applicable    Gram-positive bacteremia [R78.81] 06/03/2023 Yes    Hypotension [I95.9] 06/03/2023 No    SOB (shortness of breath) [R06.02] 06/02/2023 Yes    New onset a-fib [I48.91] 06/02/2023 Yes    COPD (chronic obstructive pulmonary disease) [J44.9] 06/02/2023 Yes    Acute renal failure [N17.9] 06/01/2023 Yes    Cellulitis of lower extremity [L03.119] 06/01/2023 Yes    Coronary artery disease involving native coronary artery of native heart without angina pectoris [I25.10] 10/12/2021 Yes    HFrEF (heart failure with reduced ejection fraction) [I50.20] 10/12/2021 Yes      Problems Resolved During this Admission:    Diagnosis Date Noted Date Resolved POA    PRINCIPAL PROBLEM:  Severe sepsis [A41.9, R65.20] 06/01/2023 06/09/2023 Yes    High anion gap metabolic acidosis [E87.29] 06/02/2023 06/03/2023 Yes    Lactic acidosis [E87.20] 06/02/2023 06/09/2023 Yes    Hyperkalemia [E87.5] 06/01/2023 06/09/2023 Yes       Discharged Condition: good    Disposition: Rehab Facility    Follow Up:   Follow-up Information      Lor Patel MD Follow up in 2 week(s).    Specialty: Family Medicine  Contact information:  [address]  [facility] [place] [address]  [phone]                       Patient Instructions:      Diet diabetic       Significant Diagnostic Studies: N/A    Pending Diagnostic Studies:     Procedure Component Value Units Date/Time    EKG 12-lead [[id number]]     Order Status: Sent Lab Status: No result     EXTRA TUBES [[id number]] Collected: 06/05/23 1912    Order Status: Sent Lab Status: In process Updated: 06/06/23 0520    Specimen: Blood, Venous     Narrative:      The following orders were created for panel order EXTRA TUBES.  Procedure                               Abnormality         Status                     ---------                               -----------         ------                     Light Blue Top Hold[[id number]]                              In process                 Red Top Hold[[id number]]                                     In process                 Light Green Top Hold[[id number]]                                                          Please view results for these tests on the individual orders.    EXTRA TUBES [[id number]] Collected: 06/05/23 1015    Order Status: Sent Lab Status: In process Updated: 06/05/23 1100    Specimen: Blood, Venous     Narrative:      The following orders were created for panel order EXTRA TUBES.  Procedure                               Abnormality         Status                     ---------                               -----------         ------                     Gold Top Hold[[id number]]                                    In process                   Please view results for these tests on the individual orders.         Medications:  Reconciled Home Medications:      Medication List      START taking these medications    albuterol-ipratropium 2.5 mg-0.5 mg/3 mL nebulizer solution  Commonly known as: DUO-NEB  Take 3 mLs by nebulization every 4 (four) hours as  needed for Wheezing. Rescue     apixaban 5 mg Tab  Commonly known as: ELIQUIS  Take 1 tablet (5 mg total) by mouth 2 (two) times daily.     dextrose 5 % in water (D5W) 5 % PgBk 100 mL with cefTRIAXone 2 gram SolR 2 g  Inject 2 g into the vein once daily.     insulin aspart U-100 100 unit/mL injection  Commonly known as: NovoLOG  Inject 0-5 Units into the skin before meals and at bedtime as needed.     midodrine 5 MG Tab  Commonly known as: PROAMATINE  Take 1 tablet (5 mg total) by mouth 3 (three) times daily with meals.     polyethylene glycol 17 gram Pwpk  Commonly known as: GLYCOLAX  Take 17 g by mouth once daily.  Start taking on: Viktoria 10, 2023     silver sulfADIAZINE 1% 1 % cream  Commonly known as: SILVADENE  Apply topically once daily.  Start taking on: Viktoria 10, 2023        CONTINUE taking these medications    aspirin 81 MG EC tablet  Commonly known as: ECOTRIN  Take 81 mg by mouth once daily.     atorvastatin 40 MG tablet  Commonly known as: LIPITOR  Take 1 tablet (40 mg total) by mouth once daily.     metoprolol succinate 25 MG 24 hr tablet  Commonly known as: TOPROL-XL  Take 1 tablet (25 mg total) by mouth once daily.        STOP taking these medications    amLODIPine 10 MG tablet  Commonly known as: NORVASC     furosemide 40 MG tablet  Commonly known as: LASIX     HYDROcodone-acetaminophen 7.5-325 mg per tablet  Commonly known as: NORCO     lisinopriL 5 MG tablet  Commonly known as: PRINIVIL,ZESTRIL     sulfamethoxazole-trimethoprim 800-160mg 800-160 mg Tab  Commonly known as: BACTRIM DS            Indwelling Lines/Drains at time of discharge:   Lines/Drains/Airways     Drain  Duration           Male External Urinary Catheter 06/05/23 0230 Small 4 days                Time spent on the discharge of patient: 45 minutes         Cortez Rico MD  Department of Hospital Medicine  [facility]

## 2023-06-09 NOTE — HPI
HPI:  Patient is a 72-year-old male with a history of unspecified CHF in the setting of CAD, essential hypertension, diabetes, oxygen-dependent COPD with baseline supplemental oxygen requirement of 2 L/min and BiPAP QHS along with CKD stage IIIA who presented to the emergency room complaining of dyspnea which started just a few days ago.  Patient otherwise denied any fever chills cough hemoptysis PND orthopnea chest pain palpitation or lower extremity edema in association.  Patient's niece stated that he has chronic wounds on both legs and felt that he has not been given proper care to address this.  Patient lives alone and when his niece visited him today, she found him slumped over on a recliner dyspneic prompting ED visit.      On initial presentation, patient was tachycardic and tachypneic but vital signs were otherwise stable and patient was afebrile.  Workup was notable for what appeared to be a new onset atrial fibrillation with RVR with elevated troponin and proBNP, elevation in total bilirubin level with mildly elevated transaminases and alkaline phosphatase, leukocytosis with left shift with WBC count of 23,000 and high anion gap metabolic acidosis with anion gap of 29 and bicarbonate of 7, acute on chronic renal failure with BUN of 110 and creatinine of 7.03 from a baseline serum creatinine of 1.43, significantly elevated lactic acid level with initial lactic acid level of 9.7 to 10.7 even after receiving 30 cc/kg of crystalloid IV fluid bolus.  CT of bilateral lower extremity demonstrated a presence of bilateral cellulitis without evidence of drainable abscess or necrotizing fasciitis. Patient will be admitted for further evaluation and intervention        Overview/Hospital Course:  06/09/2023   Patient has no new complaints, leg swelling improving.    T-max 100.3°, blood pressure 93/53 pulse rate 84 respiratory rate 18 temperature 98.1°.  His labs white blood cell count down to 5 hemoglobin 12 platelets  count 118 sodium 137 potassium 3.1 creatinine 1 calcium 6.5.    Echocardiogram showed ejection fraction of 35% with left ventricular diastolic dysfunction.    Blood culture on on 06/09/2023 showed no growth to date.    Blood cultures on 06/06/2023 grew Gram-negative bacilli, but chews fragilis, Streptococcus species.    Hypotension resolved, blood pressure borderline but does not need vasopressors.    Sepsis resolved.    New onset AFib, rate is controlled, started on Eliquis anticoagulation.    Acute renal failure presumably   Bacteremia, blood cultures questionable chills contamination, likely related to cellulitis of lower extremities, will continue antibiotics.    Surgery evaluated the patient, recommended to Continue local wound care.    Will deescalate antibiotics to Rocephin 2 g IV piggyback daily.    Patient awaiting placement.      Will need to continue antibiotics , rocephin 2 g IVPB daily for 12 more days, last dose 06/21/23. Continue local wound care and therapy.    Above information is from [facility] acute care stay 06/01/23- 06/09/23. Mr. Contreras has been accepted to swing bed services at [facility]. His arrival is expected today.     Mr. Contreras is a 72 year-old  male who is admitted to [facility] swing bed services for daily wound care and continuation of IV Rocephin through 6/21/23. Upon arrival here tonight, he is alert and oriented. Dressings from lower legs were removed for assessment. He reported severe pain with manipulation of legs. He states that he has had poor appetite and notes some constipation during hospitalization. Labs done at [facility] today were reviewed and are noted below. His albumin was 1.4 on 6/8. Dietician and wound care are consulted. He was accepted by Dr. Rosario to admission to Dr. Contreras today. DIEGO Alcocer completed medication reconciliation and admission orders.

## 2023-06-09 NOTE — SUBJECTIVE & OBJECTIVE
Interval History: Pt resting in bed. Remains off levophed infusion. Denies any needs or complaints at present. NAEO.       Objective:     Vital Signs (Most Recent):  Temp: 98.1 °F (36.7 °C) (06/09/23 1100)  Pulse: 84 (06/09/23 1100)  Resp: 18 (06/09/23 1100)  BP: (!) 93/53 (06/09/23 1100)  SpO2: 97 % (06/09/23 1100) Vital Signs (24h Range):  Temp:  [97.7 °F (36.5 °C)-100.3 °F (37.9 °C)] 98.1 °F (36.7 °C)  Pulse:  [62-98] 84  Resp:  [10-20] 18  SpO2:  [95 %-100 %] 97 %  BP: ()/(44-67) 93/53     Weight: 82.7 kg (182 lb 5.1 oz)  Body mass index is 28.56 kg/m².      Intake/Output Summary (Last 24 hours) at 6/9/2023 1127  Last data filed at 6/9/2023 0856  Gross per 24 hour   Intake 390 ml   Output 900 ml   Net -510 ml          Physical Exam  Vitals and nursing note reviewed.   HENT:      Right Ear: External ear normal.      Left Ear: External ear normal.      Mouth/Throat:      Mouth: Mucous membranes are moist.   Eyes:      Pupils: Pupils are equal, round, and reactive to light.   Cardiovascular:      Rate and Rhythm: Normal rate and regular rhythm.   Pulmonary:      Effort: No respiratory distress.      Breath sounds: No wheezing or rales.   Abdominal:      Palpations: Abdomen is soft.      Tenderness: There is no abdominal tenderness. There is no guarding.   Musculoskeletal:         General: Normal range of motion.      Cervical back: Neck supple.      Comments: Chronic wounds both lower extremities are dressed  Wounds to sacrum and L hip    Skin:     General: Skin is warm and dry.   Neurological:      General: No focal deficit present.      Mental Status: He is alert.   Psychiatric:         Mood and Affect: Mood normal.         Review of Systems    Vents:  Oxygen Concentration (%): 21 (06/07/23 0824)    Lines/Drains/Airways       Drain  Duration             Male External Urinary Catheter 06/05/23 0230 Small 4 days              Peripheral Intravenous Line  Duration                  Peripheral IV - Single Lumen  06/03/23 1709 20 G Anterior;Left Other 5 days         Peripheral IV - Single Lumen 06/05/23 0330 22 G Anterior;Right Upper Arm 4 days                    Significant Labs:    CBC/Anemia Profile:  Recent Labs   Lab 06/08/23  0724 06/09/23  0328   WBC 6.24 5.03   HGB 12.1* 12.5*   HCT 38.3* 40.3   * 118*   MCV 95.0 95.5   RDW 16.0* 15.9*          Chemistries:  Recent Labs   Lab 06/08/23 0523 06/09/23 0328    137   K 3.1* 3.1*    104   CO2 27 27   BUN 16 13   CREATININE 0.93 1.01   CALCIUM 6.4* 6.5*   ALBUMIN 1.4*  --    PROT 4.6*  --    BILITOT 0.7  --    ALKPHOS 77  --    ALT 16  --    AST 17  --          All pertinent labs within the past 24 hours have been reviewed.    Significant Imaging:  I have reviewed all pertinent imaging results/findings within the past 24 hours.

## 2023-06-09 NOTE — ASSESSMENT & PLAN NOTE
06/09/23  wound cleansing with Vashe, apply silvadene daily; Border foam dressings to Trochanter and sacrum .

## 2023-06-09 NOTE — PT/OT/SLP PROGRESS
Occupational Therapy   Treatment    Name: Esthela Contreras  MRN: [id number]  Admitting Diagnosis:  Severe sepsis       Recommendations:     Discharge Recommendations: LTACH (long-term acute care hospital), nursing facility, skilled  Discharge Equipment Recommendations:   (to be determined)  Barriers to discharge:  None    Assessment:     Esthela Contreras is a 72 y.o. male with a medical diagnosis of Severe sepsis.  He presents with no complaints.Pt reports having worked with PT.  Performance deficits affecting function are weakness, impaired endurance.     Rehab Prognosis:  Good; patient would benefit from acute skilled OT services to address these deficits and reach maximum level of function.       Plan:     Patient to be seen 5 x/week to address the above listed problems via self-care/home management, therapeutic activities, therapeutic exercises  Plan of Care Expires:    Plan of Care Reviewed with: patient    Subjective     Chief Complaint: Severe Sepsis  Patient/Family Comments/goals: Pt agreed to OT treatment  Pain/Comfort:  Pain Rating 1: 0/10  Pain Rating Post-Intervention 1: 0/10    Objective:     Communicated with: ANAHI Lyons prior to session.  Patient found HOB elevated with peripheral IV, telemetry upon OT entry to room.    General Precautions: Standard, fall    Orthopedic Precautions:N/A  Braces: N/A  Respiratory Status: Room air     Occupational Performance:     Bed Mobility:         Functional Mobility/Transfers:    Functional Mobility:     Activities of Daily Living:        [facility] 6 Click ADL:      Treatment & Education:  Pt performed UE strengthening exercises. Red theraband x 2 sets of 15 reps (R) shoulder flexion/extension, adduction/abduction and elbow flexion/extension. AAROM x 2 sets of 15 reps (L) shoulder flexion/extension, adduction/abduction and elbow and wrist flexion/extension. Hand exerciser x 2 sets of 25 reps (B) x 3 bands. 2 lb hand wt x 2 sets of 15 reps (R) elbow and wrist  flexion/extension.    Pt participated well with exercises.Rest breaks provided as needed. (L) UE supported on a pillow at the end of tx. Plan is to continue tx as tolerated.    Patient left HOB elevated with all lines intact and call button in reach    GOALS:   Multidisciplinary Problems       Occupational Therapy Goals          Problem: Occupational Therapy    Goal Priority Disciplines Outcome Interventions   Occupational Therapy Goal     OT, PT/OT Ongoing, Progressing    Description: STG: (in 2 weeks)  Pt will perform grooming with setup  Pt will bathe with mod(A)  Pt will perform UE dressing with setup  Pt will perform LE dressing with moderate assistance  Pt will sit EOB x 7 min with SBA  Pt will transfer bed/chair/bsc with mod(A) with sliding board  Pt will tolerate 20 minutes of tx without fatigue      LTG: (In 6 weeks)  1.Restore to max I with self care and mobility.                          Time Tracking:     OT Date of Treatment: 06/09/23  OT Start Time: 1411  OT Stop Time: 1432  OT Total Time (min): 21 min    Billable Minutes:Therapeutic Exercise 19    OT/KO: OT          6/9/2023

## 2023-06-09 NOTE — PLAN OF CARE
Problem: Adult Inpatient Plan of Care  Goal: Plan of Care Review  Outcome: Adequate for Care Transition  Goal: Patient-Specific Goal (Individualized)  Outcome: Adequate for Care Transition  Goal: Absence of Hospital-Acquired Illness or Injury  Outcome: Adequate for Care Transition  Goal: Optimal Comfort and Wellbeing  Outcome: Adequate for Care Transition  Goal: Readiness for Transition of Care  Outcome: Adequate for Care Transition     Problem: Adjustment to Illness (Sepsis/Septic Shock)  Goal: Optimal Coping  Outcome: Adequate for Care Transition     Problem: Bleeding (Sepsis/Septic Shock)  Goal: Absence of Bleeding  Outcome: Adequate for Care Transition     Problem: Glycemic Control Impaired (Sepsis/Septic Shock)  Goal: Blood Glucose Level Within Desired Range  Outcome: Adequate for Care Transition     Problem: Infection Progression (Sepsis/Septic Shock)  Goal: Absence of Infection Signs and Symptoms  Outcome: Adequate for Care Transition     Problem: Nutrition Impaired (Sepsis/Septic Shock)  Goal: Optimal Nutrition Intake  Outcome: Adequate for Care Transition     Problem: Fluid and Electrolyte Imbalance (Acute Kidney Injury/Impairment)  Goal: Fluid and Electrolyte Balance  Outcome: Adequate for Care Transition     Problem: Oral Intake Inadequate (Acute Kidney Injury/Impairment)  Goal: Optimal Nutrition Intake  Outcome: Adequate for Care Transition     Problem: Renal Function Impairment (Acute Kidney Injury/Impairment)  Goal: Effective Renal Function  Outcome: Adequate for Care Transition     Problem: Impaired Wound Healing  Goal: Optimal Wound Healing  Outcome: Adequate for Care Transition     Problem: Skin Injury Risk Increased  Goal: Skin Health and Integrity  Outcome: Adequate for Care Transition     Problem: Diabetes Comorbidity  Goal: Blood Glucose Level Within Targeted Range  Outcome: Adequate for Care Transition

## 2023-06-09 NOTE — ASSESSMENT & PLAN NOTE
Patient with new onset atrial fibrillation which is controlled currently with Beta Blocker. Patient is currently in atrial fibrillation.[id number] Score: 2. HASBLED Score: 2. Anticoagulation indicated. Anticoagulation done with Eliquis.

## 2023-06-09 NOTE — ASSESSMENT & PLAN NOTE
Summary     The left ventricle is normal in size with mild concentric hypertrophy and moderately decreased systolic function.   The estimated ejection fraction is 35%.   Left ventricular diastolic dysfunction.   Atrial fibrillation not observed.   Normal right ventricular size.   Normal central venous pressure (3 mmHg).   The estimated PA systolic pressure is 22 mmHg.   Trivial pericardial effusion.     Echo done 06/02/23

## 2023-06-09 NOTE — HOSPITAL COURSE
06/09/2023   Patient has no new complaints, leg swelling improving.    T-max 100.3°, blood pressure 93/53 pulse rate 84 respiratory rate 18 temperature 98.1°.  His labs white blood cell count down to 5 hemoglobin 12 platelets count 118 sodium 137 potassium 3.1 creatinine 1 calcium 6.5.    Echocardiogram showed ejection fraction of 35% with left ventricular diastolic dysfunction.    Blood culture on on 06/09/2023 showed no growth to date.    Blood cultures on 06/06/2023 grew Gram-negative bacilli, but chews fragilis, Streptococcus species.    Hypotension resolved, blood pressure borderline but does not need vasopressors.    Sepsis resolved.    New onset AFib, rate is controlled, started on Eliquis anticoagulation.    Acute renal failure presumably   Bacteremia, blood cultures questionable chills contamination, likely related to cellulitis of lower extremities, will continue antibiotics.    Surgery evaluated the patient, recommended to Continue local wound care.    Will deescalate antibiotics to Rocephin 2 g IV piggyback daily.    Patient awaiting placement.    [facility], we will continue with IV antibiotics, Rocephin 2 g IV piggyback daily for 12 more days, last dose 06/21/2023.    Continue local wound care and continue physical therapy.

## 2023-06-10 LAB
GLUCOSE SERPL-MCNC: 100 MG/DL (ref 70–105)
GLUCOSE SERPL-MCNC: 56 MG/DL (ref 70–105)
GLUCOSE SERPL-MCNC: 70 MG/DL (ref 70–105)
GLUCOSE SERPL-MCNC: 79 MG/DL (ref 70–105)
GLUCOSE SERPL-MCNC: 83 MG/DL (ref 70–105)

## 2023-06-10 PROCEDURE — 27000221 HC OXYGEN, UP TO 24 HOURS

## 2023-06-10 PROCEDURE — 99305 1ST NF CARE MODERATE MDM 35: CPT | Mod: AI,,, | Performed by: FAMILY MEDICINE

## 2023-06-10 PROCEDURE — 82962 GLUCOSE BLOOD TEST: CPT

## 2023-06-10 PROCEDURE — 94761 N-INVAS EAR/PLS OXIMETRY MLT: CPT

## 2023-06-10 PROCEDURE — 25000003 PHARM REV CODE 250: Performed by: NURSE PRACTITIONER

## 2023-06-10 PROCEDURE — 11000004 HC SNF PRIVATE

## 2023-06-10 PROCEDURE — 99305 PR NURSING FACILITY CARE, INIT, MOD SEVERITY: ICD-10-PCS | Mod: AI,,, | Performed by: FAMILY MEDICINE

## 2023-06-10 RX ORDER — ASCORBIC ACID 500 MG
500 TABLET ORAL DAILY
Status: DISCONTINUED | OUTPATIENT
Start: 2023-06-10 | End: 2023-07-08 | Stop reason: HOSPADM

## 2023-06-10 RX ADMIN — SENNOSIDES AND DOCUSATE SODIUM 1 TABLET: 50; 8.6 TABLET ORAL at 12:06

## 2023-06-10 RX ADMIN — MIDODRINE HYDROCHLORIDE 5 MG: 2.5 TABLET ORAL at 01:06

## 2023-06-10 RX ADMIN — MUPIROCIN: 20 OINTMENT TOPICAL at 12:06

## 2023-06-10 RX ADMIN — SILVER SULFADIAZINE: 10 CREAM TOPICAL at 12:06

## 2023-06-10 RX ADMIN — ASPIRIN 81 MG: 81 TABLET, COATED ORAL at 12:06

## 2023-06-10 RX ADMIN — MUPIROCIN: 20 OINTMENT TOPICAL at 08:06

## 2023-06-10 RX ADMIN — METOPROLOL SUCCINATE 25 MG: 25 TABLET, FILM COATED, EXTENDED RELEASE ORAL at 12:06

## 2023-06-10 RX ADMIN — ATORVASTATIN CALCIUM 40 MG: 40 TABLET, FILM COATED ORAL at 12:06

## 2023-06-10 RX ADMIN — OXYCODONE HYDROCHLORIDE AND ACETAMINOPHEN 500 MG: 500 TABLET ORAL at 12:06

## 2023-06-10 RX ADMIN — APIXABAN 5 MG: 2.5 TABLET, FILM COATED ORAL at 08:06

## 2023-06-10 RX ADMIN — HYDROCODONE BITARTRATE AND ACETAMINOPHEN 1 TABLET: 5; 325 TABLET ORAL at 08:06

## 2023-06-10 RX ADMIN — SENNOSIDES AND DOCUSATE SODIUM 1 TABLET: 50; 8.6 TABLET ORAL at 08:06

## 2023-06-10 RX ADMIN — MIDODRINE HYDROCHLORIDE 5 MG: 2.5 TABLET ORAL at 04:06

## 2023-06-10 RX ADMIN — POLYETHYLENE GLYCOL 3350 17 G: 17 POWDER, FOR SOLUTION ORAL at 12:06

## 2023-06-10 RX ADMIN — APIXABAN 5 MG: 2.5 TABLET, FILM COATED ORAL at 12:06

## 2023-06-10 RX ADMIN — MIDODRINE HYDROCHLORIDE 5 MG: 2.5 TABLET ORAL at 08:06
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
## 2023-06-10 NOTE — NURSING
Rec'd pt to room 1104 per [facility] from [facility] room 559. PT AAOX3. Resp even. PT c/o pain to Jack lower legs. PT oriented to room and surroundings.Bed low. SR up X 3. CB within reach. Report handoff given to ANAHI Jorge at this time

## 2023-06-10 NOTE — NURSING
Night shift informed me that patients heart monitor has been missing since he discharged yesterday. Patient discharged to [facility] for swing bed. Called them this morning and they do have the monitor. Spoke with Suleiman Villa who says she works here too and she will bring it with her Tuesday when she comes in to work on [employer].

## 2023-06-11 LAB
BACTERIA BLD CULT: NORMAL
BACTERIA BLD CULT: NORMAL
GLUCOSE SERPL-MCNC: 100 MG/DL (ref 70–105)
GLUCOSE SERPL-MCNC: 104 MG/DL (ref 70–105)
GLUCOSE SERPL-MCNC: 69 MG/DL (ref 70–105)
GLUCOSE SERPL-MCNC: 73 MG/DL (ref 70–105)

## 2023-06-11 PROCEDURE — 25000003 PHARM REV CODE 250: Performed by: NURSE PRACTITIONER

## 2023-06-11 PROCEDURE — 63600175 PHARM REV CODE 636 W HCPCS: Performed by: NURSE PRACTITIONER

## 2023-06-11 PROCEDURE — 11000004 HC SNF PRIVATE

## 2023-06-11 PROCEDURE — 82962 GLUCOSE BLOOD TEST: CPT

## 2023-06-11 PROCEDURE — 94761 N-INVAS EAR/PLS OXIMETRY MLT: CPT

## 2023-06-11 PROCEDURE — 27000221 HC OXYGEN, UP TO 24 HOURS

## 2023-06-11 RX ORDER — BISACODYL 10 MG
10 SUPPOSITORY, RECTAL RECTAL DAILY PRN
Status: DISCONTINUED | OUTPATIENT
Start: 2023-06-11 | End: 2023-07-08 | Stop reason: HOSPADM

## 2023-06-11 RX ADMIN — SENNOSIDES AND DOCUSATE SODIUM 1 TABLET: 50; 8.6 TABLET ORAL at 09:06

## 2023-06-11 RX ADMIN — APIXABAN 5 MG: 2.5 TABLET, FILM COATED ORAL at 09:06

## 2023-06-11 RX ADMIN — OXYCODONE HYDROCHLORIDE AND ACETAMINOPHEN 500 MG: 500 TABLET ORAL at 09:06

## 2023-06-11 RX ADMIN — MIDODRINE HYDROCHLORIDE 5 MG: 2.5 TABLET ORAL at 05:06

## 2023-06-11 RX ADMIN — SILVER SULFADIAZINE: 10 CREAM TOPICAL at 09:06

## 2023-06-11 RX ADMIN — MIDODRINE HYDROCHLORIDE 5 MG: 2.5 TABLET ORAL at 08:06

## 2023-06-11 RX ADMIN — CEFTRIAXONE 2 G: 2 INJECTION, POWDER, FOR SOLUTION INTRAMUSCULAR; INTRAVENOUS at 12:06

## 2023-06-11 RX ADMIN — ASPIRIN 81 MG: 81 TABLET, COATED ORAL at 09:06

## 2023-06-11 RX ADMIN — METOPROLOL SUCCINATE 25 MG: 25 TABLET, FILM COATED, EXTENDED RELEASE ORAL at 09:06

## 2023-06-11 RX ADMIN — MUPIROCIN: 20 OINTMENT TOPICAL at 09:06

## 2023-06-11 RX ADMIN — ATORVASTATIN CALCIUM 40 MG: 40 TABLET, FILM COATED ORAL at 09:06

## 2023-06-11 RX ADMIN — ACETAMINOPHEN 650 MG: 325 TABLET ORAL at 09:06

## 2023-06-11 RX ADMIN — MIDODRINE HYDROCHLORIDE 5 MG: 2.5 TABLET ORAL at 01:06

## 2023-06-11 RX ADMIN — POLYETHYLENE GLYCOL 3350 17 G: 17 POWDER, FOR SOLUTION ORAL at 09:06
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
## 2023-06-11 NOTE — PLAN OF CARE
Problem: Adult Inpatient Plan of Care  Goal: Patient-Specific Goal (Individualized)  Outcome: Ongoing, Progressing     Problem: Diabetes Comorbidity  Goal: Blood Glucose Level Within Targeted Range  Outcome: Ongoing, Progressing  Intervention: Monitor and Manage Glycemia  Flowsheets (Taken 6/11/2023 0250)  Glycemic Management:   blood glucose monitored   supplemental insulin given     Problem: Oral Intake Inadequate (Acute Kidney Injury/Impairment)  Goal: Optimal Nutrition Intake  Outcome: Ongoing, Progressing  Intervention: Promote and Optimize Nutrition  Flowsheets (Taken 6/11/2023 0250)  Oral Nutrition Promotion: rest periods promoted     Problem: Impaired Wound Healing  Goal: Optimal Wound Healing  Outcome: Ongoing, Progressing  Intervention: Promote Wound Healing  Flowsheets (Taken 6/11/2023 0250)  Oral Nutrition Promotion: rest periods promoted  Sleep/Rest Enhancement:   noise level reduced   regular sleep/rest pattern promoted  Activity Management: Patient unable to perform activities  Pain Management Interventions: medication offered

## 2023-06-11 NOTE — ASSESSMENT & PLAN NOTE
Summary     The left ventricle is normal in size with mild concentric hypertrophy and moderately decreased systolic function.   The estimated ejection fraction is 35%.   Left ventricular diastolic dysfunction.   Atrial fibrillation not observed.   Normal right ventricular size.   Normal central venous pressure (3 mmHg).   The estimated PA systolic pressure is 22 mmHg.   Trivial pericardial effusion.     Echo done 06/02/23     6/10 - Appears compensated.
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
## 2023-06-12 LAB
25(OH)D3 SERPL-MCNC: 12.9 NG/ML
ANION GAP SERPL CALCULATED.3IONS-SCNC: 13 MMOL/L (ref 7–16)
ANISOCYTOSIS BLD QL SMEAR: ABNORMAL
ATYPICAL LYMPHOCYTES: ABNORMAL
BASOPHILS # BLD AUTO: 0.1 K/UL (ref 0–0.2)
BASOPHILS NFR BLD AUTO: 1.5 % (ref 0–1)
BUN SERPL-MCNC: 12 MG/DL (ref 7–18)
BUN/CREAT SERPL: 14 (ref 6–20)
CALCIUM SERPL-MCNC: 7.1 MG/DL (ref 8.5–10.1)
CHLORIDE SERPL-SCNC: 108 MMOL/L (ref 98–107)
CO2 SERPL-SCNC: 25 MMOL/L (ref 21–32)
CREAT SERPL-MCNC: 0.85 MG/DL (ref 0.7–1.3)
DIFFERENTIAL METHOD BLD: ABNORMAL
EGFR (NO RACE VARIABLE) (RUSH/TITUS): 91 ML/MIN/1.73M2
EOSINOPHIL # BLD AUTO: 0.08 K/UL (ref 0–0.5)
EOSINOPHIL NFR BLD AUTO: 1.2 % (ref 1–4)
EOSINOPHIL NFR BLD MANUAL: 2 % (ref 1–4)
ERYTHROCYTE [DISTWIDTH] IN BLOOD BY AUTOMATED COUNT: 16.2 % (ref 11.5–14.5)
GLUCOSE SERPL-MCNC: 104 MG/DL (ref 70–105)
GLUCOSE SERPL-MCNC: 123 MG/DL (ref 70–105)
GLUCOSE SERPL-MCNC: 70 MG/DL (ref 70–105)
GLUCOSE SERPL-MCNC: 73 MG/DL (ref 70–105)
GLUCOSE SERPL-MCNC: 75 MG/DL (ref 74–106)
HCT VFR BLD AUTO: 33.3 % (ref 40–54)
HGB BLD-MCNC: 10.9 G/DL (ref 13.5–18)
HYPOCHROMIA BLD QL SMEAR: ABNORMAL
LYMPHOCYTES # BLD AUTO: 0.8 K/UL (ref 1–4.8)
LYMPHOCYTES NFR BLD AUTO: 11.8 % (ref 27–41)
LYMPHOCYTES NFR BLD MANUAL: 15 % (ref 27–41)
MAGNESIUM SERPL-MCNC: 1.6 MG/DL (ref 1.7–2.3)
MCH RBC QN AUTO: 29.1 PG (ref 27–31)
MCHC RBC AUTO-ENTMCNC: 32.7 G/DL (ref 32–36)
MCV RBC AUTO: 89 FL (ref 80–96)
MONOCYTES # BLD AUTO: 0.51 K/UL (ref 0–0.8)
MONOCYTES NFR BLD AUTO: 7.5 % (ref 2–6)
MONOCYTES NFR BLD MANUAL: 8 % (ref 2–6)
MPC BLD CALC-MCNC: 11.1 FL (ref 9.4–12.4)
NEUTROPHILS # BLD AUTO: 5.27 K/UL (ref 1.8–7.7)
NEUTROPHILS NFR BLD AUTO: 78 % (ref 53–65)
NEUTS BAND NFR BLD MANUAL: 5 % (ref 1–5)
NEUTS SEG NFR BLD MANUAL: 70 % (ref 50–62)
NRBC BLD MANUAL-RTO: ABNORMAL %
PHOSPHATE SERPL-MCNC: 1.8 MG/DL (ref 2.5–4.5)
PLATELET # BLD AUTO: 97 K/UL (ref 150–400)
PLATELET MORPHOLOGY: ABNORMAL
POTASSIUM SERPL-SCNC: 3.6 MMOL/L (ref 3.5–5.1)
RBC # BLD AUTO: 3.74 M/UL (ref 4.6–6.2)
REACTIVE LYMPHOCYTES: ABNORMAL
ROULEAUX BLD QL SMEAR: ABNORMAL
SODIUM SERPL-SCNC: 142 MMOL/L (ref 136–145)
WBC # BLD AUTO: 6.76 K/UL (ref 4.5–11)

## 2023-06-12 PROCEDURE — 85025 COMPLETE CBC W/AUTO DIFF WBC: CPT | Performed by: NURSE PRACTITIONER

## 2023-06-12 PROCEDURE — 25000003 PHARM REV CODE 250: Performed by: NURSE PRACTITIONER

## 2023-06-12 PROCEDURE — 97162 PT EVAL MOD COMPLEX 30 MIN: CPT

## 2023-06-12 PROCEDURE — 99308 SBSQ NF CARE LOW MDM 20: CPT | Mod: ,,, | Performed by: NURSE PRACTITIONER

## 2023-06-12 PROCEDURE — 94761 N-INVAS EAR/PLS OXIMETRY MLT: CPT

## 2023-06-12 PROCEDURE — 83735 ASSAY OF MAGNESIUM: CPT | Performed by: NURSE PRACTITIONER

## 2023-06-12 PROCEDURE — 11000004 HC SNF PRIVATE

## 2023-06-12 PROCEDURE — 82962 GLUCOSE BLOOD TEST: CPT

## 2023-06-12 PROCEDURE — 84100 ASSAY OF PHOSPHORUS: CPT | Performed by: NURSE PRACTITIONER

## 2023-06-12 PROCEDURE — 27000221 HC OXYGEN, UP TO 24 HOURS

## 2023-06-12 PROCEDURE — 82306 VITAMIN D 25 HYDROXY: CPT | Performed by: NURSE PRACTITIONER

## 2023-06-12 PROCEDURE — 63600175 PHARM REV CODE 636 W HCPCS: Performed by: NURSE PRACTITIONER

## 2023-06-12 PROCEDURE — 27000944

## 2023-06-12 PROCEDURE — 99308 PR NURSING FAC CARE, SUBSEQ, MINOR COMPLIC: ICD-10-PCS | Mod: ,,, | Performed by: NURSE PRACTITIONER

## 2023-06-12 PROCEDURE — 80048 BASIC METABOLIC PNL TOTAL CA: CPT | Performed by: NURSE PRACTITIONER

## 2023-06-12 RX ORDER — BISACODYL 5 MG
10 TABLET, DELAYED RELEASE (ENTERIC COATED) ORAL ONCE
Status: COMPLETED | OUTPATIENT
Start: 2023-06-12 | End: 2023-06-12

## 2023-06-12 RX ORDER — ACETAMINOPHEN 325 MG/1
650 TABLET ORAL EVERY 6 HOURS PRN
Status: DISCONTINUED | OUTPATIENT
Start: 2023-06-12 | End: 2023-06-27

## 2023-06-12 RX ORDER — LACTOBACILLUS ACIDOPHILUS 500MM CELL
1 CAPSULE ORAL
Status: DISCONTINUED | OUTPATIENT
Start: 2023-06-12 | End: 2023-07-08 | Stop reason: HOSPADM

## 2023-06-12 RX ADMIN — Medication 1 CAPSULE: at 12:06

## 2023-06-12 RX ADMIN — OXYCODONE HYDROCHLORIDE AND ACETAMINOPHEN 500 MG: 500 TABLET ORAL at 09:06

## 2023-06-12 RX ADMIN — ATORVASTATIN CALCIUM 40 MG: 40 TABLET, FILM COATED ORAL at 09:06

## 2023-06-12 RX ADMIN — CEFTRIAXONE 2 G: 2 INJECTION, POWDER, FOR SOLUTION INTRAMUSCULAR; INTRAVENOUS at 12:06

## 2023-06-12 RX ADMIN — ASPIRIN 81 MG: 81 TABLET, COATED ORAL at 09:06

## 2023-06-12 RX ADMIN — MIDODRINE HYDROCHLORIDE 5 MG: 2.5 TABLET ORAL at 09:06

## 2023-06-12 RX ADMIN — SILVER SULFADIAZINE: 10 CREAM TOPICAL at 09:06

## 2023-06-12 RX ADMIN — BISACODYL 10 MG: 5 TABLET, COATED ORAL at 12:06

## 2023-06-12 RX ADMIN — CEFTRIAXONE 2 G: 2 INJECTION, POWDER, FOR SOLUTION INTRAMUSCULAR; INTRAVENOUS at 11:06

## 2023-06-12 RX ADMIN — MIDODRINE HYDROCHLORIDE 5 MG: 2.5 TABLET ORAL at 12:06

## 2023-06-12 RX ADMIN — HYDROCODONE BITARTRATE AND ACETAMINOPHEN 1 TABLET: 5; 325 TABLET ORAL at 11:06

## 2023-06-12 RX ADMIN — APIXABAN 5 MG: 2.5 TABLET, FILM COATED ORAL at 09:06

## 2023-06-12 RX ADMIN — HYDROCODONE BITARTRATE AND ACETAMINOPHEN 1 TABLET: 5; 325 TABLET ORAL at 10:06

## 2023-06-12 RX ADMIN — ACETAMINOPHEN 650 MG: 325 TABLET ORAL at 04:06

## 2023-06-12 RX ADMIN — MUPIROCIN: 20 OINTMENT TOPICAL at 09:06

## 2023-06-12 RX ADMIN — METOPROLOL SUCCINATE 25 MG: 25 TABLET, FILM COATED, EXTENDED RELEASE ORAL at 09:06

## 2023-06-12 NOTE — ASSESSMENT & PLAN NOTE
06/09/23 continue rocephin 2 gm ivpb daily x 12 more days, last dose 06/21/23    Blood culture from 06/01/23 shows   Streptococcus species     Not Specified     Azithromycin >2 µg/ml Resistant     Cefotaxime 0.5 µg/ml Sensitive     Ceftriaxone 0.5 µg/ml Sensitive     Clindamycin <=0.06 µg/ml Sensitive     Erythromycin >0.5 µg/ml Resistant     Levofloxacin 1 µg/ml Sensitive     Tetracycline 1 µg/ml Sensitive     Vancomycin 0.5 µg/ml Sensitive              Repeat blood cultures x from 06/05/23 show no growth    06/12/23 continue rocephin

## 2023-06-12 NOTE — HOSPITAL COURSE
"06/12/23 Awake and resting in bed. Complains of having gas but no BM in past 5 days. Complains of occasional pain to ankles. He has dressings intact to BLE from knees down.  Continue rocephin IV and wound care to BLE. Continue therapy for strengthening.  06/13/23 refusing to take po meds. Talked with him re possible adverse effects of doing so and he states "When the good lord takes me, I am ready." Talked with him re code status and he states he wants to be Full code.  06/14/23 continues to refuse po meds- states meds " make him feel funny, just don't feel right ."Talked with him re purpose of meds and he is agreeable to resuming meds. Will monitor on telemetry. CNA reports small amount of bright red blood noted with BM. Talked him re any history of hemorrhoids or constipation. He denies both. Will recheck CBC and check stool for FOB.   06/14/23 Talked with Mr. Contreras re possibility of him being depressed. He allowed me to do PHQ 9 screening. He scored 11 showing moderate depression. He began to cry at end of screen. I talked with him re antidepressant and he is agreeable. Will start remeron 15 mg po daily.  06/15/23 Awake and resting in bed. No complaints. Reports sleeping better last night. H&H stable at 12/37. Talked with him again re positive FOB. He does report having burning and itching to rectum for past few days. No external hemorrhoids noted. Will treat with anusol hc. Resume eliquis. Dressing to bilateral lower legs.   06/19/23 Awake and resting in bed. Reports feeling better and sleeping better. Denies pain. States he has not had BM in 2-3 days but that is normal for him. Denies feeling constipated. Continue rocephin and wound care. Blood glucose levels have improved. Continue to monitor for hypoglycemia.  06/23/23 Awake and sitting up in chair. States he was able to get up and walk with therapy yesterday and his legs did not hurt him. States he plans to walk further today. Continue wound care. BLE " "dressings intact.  06/24/23 Nurse reports that patient has increased swelling in left arm with swelling now to left flank and abdomen noted this am. She also reports that pt has been refusing his "heart" meds including Eliquis because he thinks they make him feel bad. Upon evaluation, Mr. Contreras notes the swelling that is present but reports "I feel fine" and "better than I have been." Pt has PMH of CHF with EF of 35% and has not been on diuretic.  He denies chest pain and SOB. Pitting edema is noted to left flank, abd, and hip with nonpitting to left arm. No open wound, erythema or weeping. His lungs are clear. O2 sat 96-99% on RA. Discussed need for labs and xray today. He refused initially but finally agreed. Troponin 146, d dimer 0.99, BNP > 95770, BUN 13, creatinine 0.84. EKG ordered. It shows LBBB with 1st degree AVB with no afib. No STEMI and no changes from previous on 6/1. DIScussed findings with pt and explained need for CTA chest and doppler left arm and transfer to [facility] for further evaluation. He refused stating that the only place he is going is home. Discussed risk of worsening condition or death due to MI, fluid overload, and VTE. He acknowledged risk stating that it was ok and continues to refuse transfer. He does agree with IV, IV lasix x 1 dose and CTA chest. CTA chest shows no PE, small bilat pleural effusions.  Nurse reports that pt is still refusing medications. Discussed findings again and risk of refusing meds and evaluation with pt. He acknowledges and states that he does not want "all of that." Further discussed code status with pt and he wants to change to DNR status.  06/26/23 Mr. Contreras is awake, alert and resting in bed. Talked with him re CTA findings of no PE. Talked with him re his refusal to take meds. He states eliquis makes him feel funny. Explained to him what it was for and danger of not taking it. Talked with him re alternatives to eliquis and he is not receptive. States "when the " "good lord gets ready to take me , just let me go."   06/28/23 has edema to left arm and to left flank. Talked with him lasix and need to give it to remove fluid. He refuses stating " I do not want that, I do not need that." Just let me go home Friday. Probable dc home Friday.  06/28/23 refused us left arm   06/30/23 discharge is scheduled for today. Mr. Contreras states he wants to appeal and see if he can stay longer. He however refuses to place the call for appeal . Stating to "just discharge me."  Discharge home with home health to follow. Medication reconciliation done.  06/30/23 Mr. Contreras now says that he is going to call for appeal with the help of his niece. Discharge dcd.    07/02/23--17:30--Notified by nursing staff that patient c/o shortness of breath and palpitations after taking "blood thinner." Patient lying in bed watching TV in NAD. He reports that he feels short of breath and experiences palpitations every time he takes a blood thinner and he doesn't want to take anymore. He denies dizziness, nausea, chest pain, or diaphoresis. Explained that we need to obtain troponin and bnp r/t current symptoms to be sure he isn't having any cardiac type episodes. He agreed for labs and EKG to be obtained. Labs noted for elevated troponin --174--and BNP 20,000---he has refused any labs since 6/24/23 but at that time troponin was 146 and bnp 13,000---he has refused lasix several times since then and is essentially non-compliant with his med regimen. I've advised that we need to diurese with lasix and we will need to repeat labs at least once in the next  12 hours or so. He is agreeable with this.  He has significant heart murmur---the elevations are most likely due to muscle stress r/t not taking his medications. We will diurese and trend labs.      07/02/23--19:00--Lying in bed and reports he is feeling better. He has removed his oxygen and says he is breathing " a lot better." Denies chest pain or palpitations at " this time. Advised to leave oxygen on to decrease workload on his heart. Advised that we will need to set him up to see cardiology when he is discharged.     07/05/23 Awake and resting in bed. Continues to complain of edema to left arm. Continues to refuse venous doppler. Refuses meds. Has appealed dc . Awaiting to hear decision re appeal.      07/06/23 case management checked status of appeal and was told there was no appeal done. Discharge home with home health to follow with PT and OT to follow. Appt made for follow up with wound care . Appt made to follow up with PCP of choice within one week. Medication reconciliation done - he has refused meds and therapy while here.

## 2023-06-12 NOTE — SUBJECTIVE & OBJECTIVE
Interval History: BLE wounds, bactremia    Review of Systems   Constitutional:  Negative for appetite change.   Respiratory:  Negative for cough and shortness of breath.    Cardiovascular:  Negative for chest pain.   Gastrointestinal:  Positive for constipation.   Genitourinary:  Negative for difficulty urinating.   Musculoskeletal:  Positive for arthralgias.        Occasional pain to ankles   Skin:  Positive for wound.        Ble wounds - see pics in media   Neurological:  Positive for weakness.   Objective:     Vital Signs (Most Recent):  Temp: 98.7 °F (37.1 °C) (06/12/23 0700)  Pulse: 67 (06/12/23 0700)  Resp: 18 (06/12/23 0700)  BP: (!) 109/53 (06/12/23 0700)  SpO2: 97 % (06/12/23 0700) Vital Signs (24h Range):  Temp:  [98 °F (36.7 °C)-99.6 °F (37.6 °C)] 98.7 °F (37.1 °C)  Pulse:  [67-76] 67  Resp:  [16-20] 18  SpO2:  [97 %-99 %] 97 %  BP: ()/(50-56) 109/53     Weight: 80.3 kg (177 lb)  Body mass index is 24.01 kg/m².    Intake/Output Summary (Last 24 hours) at 6/12/2023 1007  Last data filed at 6/12/2023 0915  Gross per 24 hour   Intake 1180 ml   Output 1200 ml   Net -20 ml         Physical Exam  HENT:      Mouth/Throat:      Mouth: Mucous membranes are moist.   Cardiovascular:      Rate and Rhythm: Normal rate and regular rhythm.   Pulmonary:      Effort: Pulmonary effort is normal.      Breath sounds: Normal breath sounds.   Abdominal:      General: Bowel sounds are normal. There is no distension.      Palpations: Abdomen is soft.      Tenderness: There is no abdominal tenderness.   Skin:     General: Skin is dry.      Comments: Dry , flaky skin to ble - dressings intact   Wounds to ble - see pics in media     Neurological:      Mental Status: He is alert.           Significant Labs: All pertinent labs within the past 24 hours have been reviewed.  Recent Lab Results  (Last 5 results in the past 24 hours)        06/12/23  0740   06/12/23  0605   06/11/23 2029 06/11/23  1711   06/11/23  1143         Anion Gap 13               Aniso 1+               Atypical Lymphocytes Few               Bands 5               Baso # 0.10               Basophil % 1.5               BUN 12               BUN/CREAT RATIO 14               Calcium 7.1               Chloride 108               CO2 25               Creatinine 0.85               Differential Type Manual               eGFR 91               Eos # 0.08               Eosinophil % 1.2                2               Glucose 75               Hematocrit 33.3               Hemoglobin 10.9               Hypo Few               Lymph # 0.80               Lymph % 11.8                15               Magnesium 1.6               MCH 29.1               MCHC 32.7               MCV 89.0               Mono # 0.51               Mono % 7.5                8               MPV 11.1               Neutrophils, Abs 5.27               Neutrophils Relative 78.0               nRBC               Phosphorus 1.8               PLATELET MORPHOLOGY Decreased               Platelets 97               POC Glucose   73   73   100   104       Potassium 3.6               RBC 3.74               RDW 16.2               Reactive Lymphocytes Few               Rouleaux 1+               Segmented Neutrophils, Man % 70               Sodium 142               WBC 6.76                                      Significant Imaging: I have reviewed all pertinent imaging results/findings within the past 24 hours.

## 2023-06-12 NOTE — PLAN OF CARE
Problem: Adult Inpatient Plan of Care  Goal: Plan of Care Review  Outcome: Ongoing, Progressing  Goal: Patient-Specific Goal (Individualized)  Outcome: Ongoing, Progressing  Goal: Absence of Hospital-Acquired Illness or Injury  Outcome: Ongoing, Progressing  Goal: Optimal Comfort and Wellbeing  Outcome: Ongoing, Progressing  Goal: Readiness for Transition of Care  Outcome: Ongoing, Progressing     Problem: Diabetes Comorbidity  Goal: Blood Glucose Level Within Targeted Range  Outcome: Ongoing, Progressing     Problem: Fluid and Electrolyte Imbalance (Acute Kidney Injury/Impairment)  Goal: Fluid and Electrolyte Balance  Outcome: Ongoing, Progressing     Problem: Oral Intake Inadequate (Acute Kidney Injury/Impairment)  Goal: Optimal Nutrition Intake  Outcome: Ongoing, Progressing     Problem: Renal Function Impairment (Acute Kidney Injury/Impairment)  Goal: Effective Renal Function  Outcome: Ongoing, Progressing     Problem: Impaired Wound Healing  Goal: Optimal Wound Healing  Outcome: Ongoing, Progressing     Problem: Skin Injury Risk Increased  Goal: Skin Health and Integrity  Outcome: Ongoing, Progressing     Problem: Fall Injury Risk  Goal: Absence of Fall and Fall-Related Injury  Outcome: Ongoing, Progressing

## 2023-06-12 NOTE — ASSESSMENT & PLAN NOTE
>>ASSESSMENT AND PLAN FOR CELLULITIS OF LOWER EXTREMITY WRITTEN ON 6/12/2023 10:13 AM BY MIKE LOBO FNP    06/09/23  wound cleansing with Vashe, apply silvadene daily; Border foam dressings to Trochanter and sacrum .        06/12/23 continue wound care to BLE

## 2023-06-12 NOTE — PLAN OF CARE
[facility] - Medical Surgical Unit  Initial Discharge Assessment       Primary Care Provider: CHRISTIANE Bardales    Admission Diagnosis: Weakness generalized [R53.1]    Admission Date: 6/9/2023  Expected Discharge Date:     Transition of Care Barriers: (P) None    Payor: [employer] MANAGED MEDICARE / Plan: [employer] SNP HMO PPO SPECIAL NEEDS / Product Type: Medicare Advantage /     Extended Emergency Contact Information  Primary Emergency Contact: Shelby Contreras  Mobile Phone: [phone]  Relation: Relative  Secondary Emergency Contact: Gretchen Bagley  Mobile Phone: [phone]  Relation: Relative    Discharge Plan A: (P) Home  Discharge Plan B: (P) Home, Home Health      [facility] - [place], [place] - [address]  [address]  [place] [address]  Phone: [phone] Fax: [phone]    [facility] [place] [address]  [address]  [place] [address]  Phone: [phone] Fax: [phone]      Initial Assessment (most recent)       Adult Discharge Assessment - 06/12/23 1128          Discharge Assessment    Assessment Type Discharge Planning Assessment (P)      Confirmed/corrected address, phone number and insurance Yes (P)      Confirmed Demographics Correct on Facesheet (P)      Source of Information patient (P)      Contact Name/Number aunt Gutierrez [phone] (P)      Reason For Admission Generalized Weakness (P)      People in Home alone (P)      Do you expect to return to your current living situation? Yes (P)      Do you have help at home or someone to help you manage your care at home? Yes (P)      Who are your caregiver(s) and their phone number(s)? gretchen mattson (P)      Current cognitive status: Alert/Oriented (P)      Home Layout Able to live on 1st floor (P)      Equipment Currently Used at Home bedside commode;walker, rolling;wheelchair (P)      Patient currently being followed by outpatient case management? No (P)      Do you currently  "have service(s) that help you manage your care at home? No (P)      Who is going to help you get home at discharge? Hilda Bagley (P)      How do you get to doctors appointments? family or friend will provide (P)      Discharge Plan A Home (P)      Discharge Plan B Home;Home Health (P)      DME Needed Upon Discharge  none (P)      Discharge Plan discussed with: Patient (P)      Transition of Care Barriers None (P)                    Spoke with Mr. Contreras earlier this morning.  Mr. Contreras states, "My brother was living with me prior to me going to the hospital, but he's been doing things I don't like so I told him to get out of my house.  When I go home my niece,Hilda, and my aunt, Shelby,  will come over and see about me.  They always help me get around and do whatever I need."  Mr. Contreras stated at one point he had home health but did know which agency but they stopped coming because he was noncompliant with wound care.  He has a BSC, walker and w/c at home.             "

## 2023-06-12 NOTE — ASSESSMENT & PLAN NOTE
06/09/23  wound cleansing with Vashe, apply silvadene daily; Border foam dressings to Trochanter and sacrum .        06/12/23 continue wound care to BLE

## 2023-06-12 NOTE — PLAN OF CARE
Problem: Adult Inpatient Plan of Care  Goal: Patient-Specific Goal (Individualized)  Outcome: Ongoing, Progressing     Problem: Diabetes Comorbidity  Goal: Blood Glucose Level Within Targeted Range  Outcome: Ongoing, Progressing  Intervention: Monitor and Manage Glycemia  Flowsheets (Taken 6/12/2023 0348)  Glycemic Management: blood glucose monitored     Problem: Impaired Wound Healing  Goal: Optimal Wound Healing  Outcome: Ongoing, Progressing     Problem: Fall Injury Risk  Goal: Absence of Fall and Fall-Related Injury  Outcome: Ongoing, Progressing  Intervention: Promote Injury-Free Environment  Flowsheets (Taken 6/12/2023 2318)  Safety Promotion/Fall Prevention:   assistive device/personal item within reach   instructed to call staff for mobility   side rails raised x 2
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
## 2023-06-12 NOTE — PLAN OF CARE
Problem: Physical Therapy  Goal: Physical Therapy Goal  Description: Short-term Goals: 1.5 weeks  1. Patient will perform supine to/from sit with minimal assistance to improve independence and safety with bed mobility.  2. Patient will perform a sliding board transfer from the bed to/from the chair/wheelchair with minimal assist to improve independence and safety with transfers.   3. Patient will tolerate 15 minutes of physical therapy intervention to improve endurance and activity tolerance.    Long-term goals: 3 weeks  1. Patient will perform supine to/from sit with contact guard assist to improve independence and safety with bed mobility.  2. Patient will perform a sliding board transfer from the bed to/from the chair/wheelchair with contact guard assist to improve independence and safety with transfers.  3. Patient will tolerate 30 minutes of physical therapy intervention to improve endurance and activity tolerance.    Outcome: Ongoing, Progressing     Raymundo Pride, PT, DPT

## 2023-06-12 NOTE — PROGRESS NOTES
[facility] - Medical Surgical Unit  Hospital Medicine  Progress Note    Patient Name: Esthela Contreras  MRN: [id number]  Patient Class: IP- Swing   Admission Date: 6/9/2023  Length of Stay: 3 days  Attending Physician: Blue Rosario Jr., MD  Primary Care Provider: CHRISTIANE Bardales        Subjective:     Principal Problem:Gram-positive bacteremia        HPI:  HPI:  Patient is a 72-year-old male with a history of unspecified CHF in the setting of CAD, essential hypertension, diabetes, oxygen-dependent COPD with baseline supplemental oxygen requirement of 2 L/min and BiPAP QHS along with CKD stage IIIA who presented to the emergency room complaining of dyspnea which started just a few days ago.  Patient otherwise denied any fever chills cough hemoptysis PND orthopnea chest pain palpitation or lower extremity edema in association.  Patient's niece stated that he has chronic wounds on both legs and felt that he has not been given proper care to address this.  Patient lives alone and when his niece visited him today, she found him slumped over on a recliner dyspneic prompting ED visit.      On initial presentation, patient was tachycardic and tachypneic but vital signs were otherwise stable and patient was afebrile.  Workup was notable for what appeared to be a new onset atrial fibrillation with RVR with elevated troponin and proBNP, elevation in total bilirubin level with mildly elevated transaminases and alkaline phosphatase, leukocytosis with left shift with WBC count of 23,000 and high anion gap metabolic acidosis with anion gap of 29 and bicarbonate of 7, acute on chronic renal failure with BUN of 110 and creatinine of 7.03 from a baseline serum creatinine of 1.43, significantly elevated lactic acid level with initial lactic acid level of 9.7 to 10.7 even after receiving 30 cc/kg of crystalloid IV fluid bolus.  CT of bilateral lower extremity demonstrated a presence of bilateral cellulitis without  evidence of drainable abscess or necrotizing fasciitis. Patient will be admitted for further evaluation and intervention        Overview/Hospital Course:  06/09/2023   Patient has no new complaints, leg swelling improving.    T-max 100.3°, blood pressure 93/53 pulse rate 84 respiratory rate 18 temperature 98.1°.  His labs white blood cell count down to 5 hemoglobin 12 platelets count 118 sodium 137 potassium 3.1 creatinine 1 calcium 6.5.    Echocardiogram showed ejection fraction of 35% with left ventricular diastolic dysfunction.    Blood culture on on 06/09/2023 showed no growth to date.    Blood cultures on 06/06/2023 grew Gram-negative bacilli, but chews fragilis, Streptococcus species.    Hypotension resolved, blood pressure borderline but does not need vasopressors.    Sepsis resolved.    New onset AFib, rate is controlled, started on Eliquis anticoagulation.    Acute renal failure presumably   Bacteremia, blood cultures questionable chills contamination, likely related to cellulitis of lower extremities, will continue antibiotics.    Surgery evaluated the patient, recommended to Continue local wound care.    Will deescalate antibiotics to Rocephin 2 g IV piggyback daily.    Patient awaiting placement.      Will need to continue antibiotics , rocephin 2 g IVPB daily for 12 more days, last dose 06/21/23. Continue local wound care and therapy.    Above information is from [facility] acute care stay 06/01/23- 06/09/23. Mr. Contreras has been accepted to swing bed services at [facility]. His arrival is expected today.     Mr. Contreras is a 72 year-old  male who is admitted to [facility] swing bed services for daily wound care and continuation of IV Rocephin through 6/21/23. Upon arrival here tonight, he is alert and oriented. Dressings from lower legs were removed for assessment. He reported severe pain with manipulation of legs. He states that he has had poor appetite and notes some  constipation during hospitalization. Labs done at [facility] today were reviewed and are noted below. His albumin was 1.4 on 6/8. Dietician and wound care are consulted. He was accepted by Dr. Rosario to admission to Dr. Contreras today. DIEGO Alcocer completed medication reconciliation and admission orders.      Overview/Hospital Course:  06/12/23 Awake and resting in bed. Complains of having gas but no BM in past 5 days. Complains of occasional pain to ankles. He has dressings intact to BLE from knees down.  Continue rocephin IV and wound care to BLE. Continue therapy for strengthening.      Interval History: BLE wounds, bactremia    Review of Systems   Constitutional:  Negative for appetite change.   Respiratory:  Negative for cough and shortness of breath.    Cardiovascular:  Negative for chest pain.   Gastrointestinal:  Positive for constipation.   Genitourinary:  Negative for difficulty urinating.   Musculoskeletal:  Positive for arthralgias.        Occasional pain to ankles   Skin:  Positive for wound.        Ble wounds - see pics in media   Neurological:  Positive for weakness.   Objective:     Vital Signs (Most Recent):  Temp: 98.7 °F (37.1 °C) (06/12/23 0700)  Pulse: 67 (06/12/23 0700)  Resp: 18 (06/12/23 0700)  BP: (!) 109/53 (06/12/23 0700)  SpO2: 97 % (06/12/23 0700) Vital Signs (24h Range):  Temp:  [98 °F (36.7 °C)-99.6 °F (37.6 °C)] 98.7 °F (37.1 °C)  Pulse:  [67-76] 67  Resp:  [16-20] 18  SpO2:  [97 %-99 %] 97 %  BP: ()/(50-56) 109/53     Weight: 80.3 kg (177 lb)  Body mass index is 24.01 kg/m².    Intake/Output Summary (Last 24 hours) at 6/12/2023 1007  Last data filed at 6/12/2023 0915  Gross per 24 hour   Intake 1180 ml   Output 1200 ml   Net -20 ml         Physical Exam  HENT:      Mouth/Throat:      Mouth: Mucous membranes are moist.   Cardiovascular:      Rate and Rhythm: Normal rate and regular rhythm.   Pulmonary:      Effort: Pulmonary effort is normal.      Breath sounds: Normal breath sounds.    Abdominal:      General: Bowel sounds are normal. There is no distension.      Palpations: Abdomen is soft.      Tenderness: There is no abdominal tenderness.   Skin:     General: Skin is dry.      Comments: Dry , flaky skin to ble - dressings intact   Wounds to ble - see pics in media     Neurological:      Mental Status: He is alert.           Significant Labs: All pertinent labs within the past 24 hours have been reviewed.  Recent Lab Results  (Last 5 results in the past 24 hours)        06/12/23  0740   06/12/23  0605   06/11/23  2029   06/11/23  1711   06/11/23  1143        Anion Gap 13               Aniso 1+               Atypical Lymphocytes Few               Bands 5               Baso # 0.10               Basophil % 1.5               BUN 12               BUN/CREAT RATIO 14               Calcium 7.1               Chloride 108               CO2 25               Creatinine 0.85               Differential Type Manual               eGFR 91               Eos # 0.08               Eosinophil % 1.2                2               Glucose 75               Hematocrit 33.3               Hemoglobin 10.9               Hypo Few               Lymph # 0.80               Lymph % 11.8                15               Magnesium 1.6               MCH 29.1               MCHC 32.7               MCV 89.0               Mono # 0.51               Mono % 7.5                8               MPV 11.1               Neutrophils, Abs 5.27               Neutrophils Relative 78.0               nRBC               Phosphorus 1.8               PLATELET MORPHOLOGY Decreased               Platelets 97               POC Glucose   73   73   100   104       Potassium 3.6               RBC 3.74               RDW 16.2               Reactive Lymphocytes Few               Rouleaux 1+               Segmented Neutrophils, Man % 70               Sodium 142               WBC 6.76                                      Significant Imaging: I have reviewed all  pertinent imaging results/findings within the past 24 hours.      Assessment/Plan:      * Gram-positive bacteremia  06/09/23 continue rocephin 2 gm ivpb daily x 12 more days, last dose 06/21/23    Blood culture from 06/01/23 shows   Streptococcus species     Not Specified     Azithromycin >2 µg/ml Resistant     Cefotaxime 0.5 µg/ml Sensitive     Ceftriaxone 0.5 µg/ml Sensitive     Clindamycin <=0.06 µg/ml Sensitive     Erythromycin >0.5 µg/ml Resistant     Levofloxacin 1 µg/ml Sensitive     Tetracycline 1 µg/ml Sensitive     Vancomycin 0.5 µg/ml Sensitive              Repeat blood cultures x from 06/05/23 show no growth    06/12/23 continue rocephin     Coronary artery disease involving native coronary artery of native heart without angina pectoris     06/09/23 Continue home aspirin, BB, and high-intensity statin.           Essential hypertension  06/09/23 continue metoprolol succinate 25 mg daily     06/12/23 stable     SOB (shortness of breath)    06/09/23 oxygen at 2 liters   duonebs every 4 hours prn     HFrEF (heart failure with reduced ejection fraction)    Summary     The left ventricle is normal in size with mild concentric hypertrophy and moderately decreased systolic function.   The estimated ejection fraction is 35%.   Left ventricular diastolic dysfunction.   Atrial fibrillation not observed.   Normal right ventricular size.   Normal central venous pressure (3 mmHg).   The estimated PA systolic pressure is 22 mmHg.   Trivial pericardial effusion.     Echo done 06/02/23     6/10 - Appears compensated.    Diabetes mellitus    Lab Results   Component Value Date    HGBA1C 5.5 06/02/2023     Low dose ssi coverage     Pressure injury of sacral region, stage 2     Border foam dressings to Trochanter and sacrum .     COPD (chronic obstructive pulmonary disease)  06/09/23 02 per nc at 2 liters  duonebs every 4 hours prn       New onset a-fib  06/09/23 eliquis 5 mg po BID       Cellulitis of lower  extremity  06/09/23  wound cleansing with Vashe, apply silvadene daily; Border foam dressings to Trochanter and sacrum .        06/12/23 continue wound care to BLE       VTE Risk Mitigation (From admission, onward)         Ordered     apixaban tablet 5 mg  2 times daily         06/09/23 1945                Discharge Planning   MAKENNA:      Code Status: Full Code   Is the patient medically ready for discharge?:     Reason for patient still in hospital (select all that apply): Treatment                     CHRISTIANE Mcelroy  Department of Hospital Medicine   [facility] - Medical Surgical Unit

## 2023-06-13 LAB
GLUCOSE SERPL-MCNC: 113 MG/DL (ref 70–105)
GLUCOSE SERPL-MCNC: 53 MG/DL (ref 70–105)
GLUCOSE SERPL-MCNC: 59 MG/DL (ref 70–105)
GLUCOSE SERPL-MCNC: 61 MG/DL (ref 70–105)
GLUCOSE SERPL-MCNC: 62 MG/DL (ref 70–105)
GLUCOSE SERPL-MCNC: 66 MG/DL (ref 70–105)
GLUCOSE SERPL-MCNC: 70 MG/DL (ref 70–105)
GLUCOSE SERPL-MCNC: 80 MG/DL (ref 70–105)
GLUCOSE SERPL-MCNC: 84 MG/DL (ref 70–105)

## 2023-06-13 PROCEDURE — 25000003 PHARM REV CODE 250: Performed by: NURSE PRACTITIONER

## 2023-06-13 PROCEDURE — 27000982 HC MATTRESS, MATRIX LAL RENTAL

## 2023-06-13 PROCEDURE — 11000004 HC SNF PRIVATE

## 2023-06-13 PROCEDURE — 94761 N-INVAS EAR/PLS OXIMETRY MLT: CPT

## 2023-06-13 PROCEDURE — 27000944

## 2023-06-13 PROCEDURE — 63600175 PHARM REV CODE 636 W HCPCS: Performed by: NURSE PRACTITIONER

## 2023-06-13 PROCEDURE — 82962 GLUCOSE BLOOD TEST: CPT

## 2023-06-13 RX ADMIN — SILVER SULFADIAZINE: 10 CREAM TOPICAL at 09:06

## 2023-06-13 RX ADMIN — APIXABAN 5 MG: 2.5 TABLET, FILM COATED ORAL at 09:06

## 2023-06-13 RX ADMIN — Medication 1 CAPSULE: at 08:06

## 2023-06-13 RX ADMIN — CEFTRIAXONE 2 G: 2 INJECTION, POWDER, FOR SOLUTION INTRAMUSCULAR; INTRAVENOUS at 11:06

## 2023-06-13 RX ADMIN — MIDODRINE HYDROCHLORIDE 5 MG: 2.5 TABLET ORAL at 11:06

## 2023-06-13 RX ADMIN — MIDODRINE HYDROCHLORIDE 5 MG: 2.5 TABLET ORAL at 08:06

## 2023-06-13 NOTE — PLAN OF CARE
Problem: Fall Injury Risk  Goal: Absence of Fall and Fall-Related Injury  Outcome: Ongoing, Progressing  Intervention: Promote Injury-Free Environment  Flowsheets (Taken 6/13/2023 5327)  Safety Promotion/Fall Prevention:   assistive device/personal item within reach   commode/urinal/bedpan at bedside   side rails raised x 3   instructed to call staff for mobility   nonskid shoes/socks when out of bed

## 2023-06-13 NOTE — PT/OT/SLP PROGRESS
"Physical Therapy      Patient Name:  Esthela Contreras   MRN:  [id number]    Patient not seen today secondary to Patient fatigue, Pain. Patient reported, "I am not feeling myself because I am drowsy from all the medicine I have been taking along with the pain I am having in my legs". Patient reported having 8/10 in bilateral lower extremities. Patient was agreeable to do therapy tomorrow. Will follow-up 06/14/2023.    CAMRYN Low  06/13/2023    "

## 2023-06-13 NOTE — PLAN OF CARE
Problem: Adult Inpatient Plan of Care  Goal: Optimal Comfort and Wellbeing  Outcome: Ongoing, Progressing     Problem: Diabetes Comorbidity  Goal: Blood Glucose Level Within Targeted Range  Outcome: Ongoing, Progressing     Problem: Impaired Wound Healing  Goal: Optimal Wound Healing  Outcome: Ongoing, Progressing     Problem: Skin Injury Risk Increased  Goal: Skin Health and Integrity  Outcome: Ongoing, Progressing     Problem: Fall Injury Risk  Goal: Absence of Fall and Fall-Related Injury  Outcome: Ongoing, Progressing

## 2023-06-14 PROBLEM — F32.A DEPRESSION: Status: ACTIVE | Noted: 2023-06-14

## 2023-06-14 LAB
ANISOCYTOSIS BLD QL SMEAR: ABNORMAL
BASOPHILS # BLD AUTO: 0.1 K/UL (ref 0–0.2)
BASOPHILS NFR BLD AUTO: 1.5 % (ref 0–1)
DIFFERENTIAL METHOD BLD: ABNORMAL
EOSINOPHIL # BLD AUTO: 0.12 K/UL (ref 0–0.5)
EOSINOPHIL NFR BLD AUTO: 1.8 % (ref 1–4)
ERYTHROCYTE [DISTWIDTH] IN BLOOD BY AUTOMATED COUNT: 16.4 % (ref 11.5–14.5)
GLUCOSE SERPL-MCNC: 102 MG/DL (ref 70–105)
GLUCOSE SERPL-MCNC: 50 MG/DL (ref 70–105)
GLUCOSE SERPL-MCNC: 60 MG/DL (ref 70–105)
GLUCOSE SERPL-MCNC: 64 MG/DL (ref 70–105)
GLUCOSE SERPL-MCNC: 69 MG/DL (ref 70–105)
GLUCOSE SERPL-MCNC: 71 MG/DL (ref 70–105)
GLUCOSE SERPL-MCNC: 73 MG/DL (ref 70–105)
GLUCOSE SERPL-MCNC: 82 MG/DL (ref 70–105)
HCT VFR BLD AUTO: 40.5 % (ref 40–54)
HGB BLD-MCNC: 12.7 G/DL (ref 13.5–18)
LYMPHOCYTES # BLD AUTO: 1.05 K/UL (ref 1–4.8)
LYMPHOCYTES NFR BLD AUTO: 15.5 % (ref 27–41)
LYMPHOCYTES NFR BLD MANUAL: 16 % (ref 27–41)
MCH RBC QN AUTO: 29.1 PG (ref 27–31)
MCHC RBC AUTO-ENTMCNC: 31.4 G/DL (ref 32–36)
MCV RBC AUTO: 92.7 FL (ref 80–96)
MONOCYTES # BLD AUTO: 1.05 K/UL (ref 0–0.8)
MONOCYTES NFR BLD AUTO: 15.5 % (ref 2–6)
MONOCYTES NFR BLD MANUAL: 16 % (ref 2–6)
MPC BLD CALC-MCNC: 10.3 FL (ref 9.4–12.4)
NEUTROPHILS # BLD AUTO: 4.45 K/UL (ref 1.8–7.7)
NEUTROPHILS NFR BLD AUTO: 65.7 % (ref 53–65)
NEUTS SEG NFR BLD MANUAL: 68 % (ref 50–62)
NRBC BLD MANUAL-RTO: ABNORMAL %
OCCULT BLOOD: POSITIVE
PLATELET # BLD AUTO: 155 K/UL (ref 150–400)
PLATELET MORPHOLOGY: NORMAL
RBC # BLD AUTO: 4.37 M/UL (ref 4.6–6.2)
WBC # BLD AUTO: 6.77 K/UL (ref 4.5–11)

## 2023-06-14 PROCEDURE — 99900035 HC TECH TIME PER 15 MIN (STAT)

## 2023-06-14 PROCEDURE — 63600175 PHARM REV CODE 636 W HCPCS: Performed by: NURSE PRACTITIONER

## 2023-06-14 PROCEDURE — 85025 COMPLETE CBC W/AUTO DIFF WBC: CPT | Performed by: NURSE PRACTITIONER

## 2023-06-14 PROCEDURE — 84630 ASSAY OF ZINC: CPT | Mod: 90 | Performed by: FAMILY MEDICINE

## 2023-06-14 PROCEDURE — 82962 GLUCOSE BLOOD TEST: CPT

## 2023-06-14 PROCEDURE — 94761 N-INVAS EAR/PLS OXIMETRY MLT: CPT

## 2023-06-14 PROCEDURE — 25000003 PHARM REV CODE 250: Performed by: NURSE PRACTITIONER

## 2023-06-14 PROCEDURE — 27000982 HC MATTRESS, MATRIX LAL RENTAL

## 2023-06-14 PROCEDURE — 82272 OCCULT BLD FECES 1-3 TESTS: CPT | Performed by: NURSE PRACTITIONER

## 2023-06-14 PROCEDURE — 11000004 HC SNF PRIVATE

## 2023-06-14 PROCEDURE — 27000944

## 2023-06-14 RX ORDER — MIRTAZAPINE 15 MG/1
15 TABLET, FILM COATED ORAL NIGHTLY
Status: DISCONTINUED | OUTPATIENT
Start: 2023-06-14 | End: 2023-07-08 | Stop reason: HOSPADM

## 2023-06-14 RX ADMIN — MIDODRINE HYDROCHLORIDE 5 MG: 2.5 TABLET ORAL at 05:06

## 2023-06-14 RX ADMIN — MIRTAZAPINE 15 MG: 15 TABLET, FILM COATED ORAL at 08:06

## 2023-06-14 RX ADMIN — MUPIROCIN: 20 OINTMENT TOPICAL at 11:06

## 2023-06-14 RX ADMIN — CEFTRIAXONE 2 G: 2 INJECTION, POWDER, FOR SOLUTION INTRAMUSCULAR; INTRAVENOUS at 11:06

## 2023-06-14 RX ADMIN — OXYCODONE HYDROCHLORIDE AND ACETAMINOPHEN 500 MG: 500 TABLET ORAL at 11:06

## 2023-06-14 RX ADMIN — ATORVASTATIN CALCIUM 40 MG: 40 TABLET, FILM COATED ORAL at 11:06

## 2023-06-14 RX ADMIN — METOPROLOL SUCCINATE 25 MG: 25 TABLET, FILM COATED, EXTENDED RELEASE ORAL at 10:06

## 2023-06-14 RX ADMIN — SILVER SULFADIAZINE: 10 CREAM TOPICAL at 11:06

## 2023-06-14 RX ADMIN — Medication 1 CAPSULE: at 05:06

## 2023-06-14 RX ADMIN — ASPIRIN 81 MG: 81 TABLET, COATED ORAL at 11:06

## 2023-06-14 NOTE — NURSING
Shift began with patient refusing medications. Stated that he would consent to dressing changes later in the day.     1100 Patient consents to take some morning medications, but not all. See chart for medication administration. Accucheck at 1143 is 50. NP ONUR Alcocre notified. Patient refuses snacks offered and juice, consents to eat two pieces of candy from personal supply. Accucheck at 1208 is 60. Patient eats lunch and is offered a cup of ice cream. Accucheck at 1301 is 82.

## 2023-06-14 NOTE — PLAN OF CARE
[facility] - Medical Surgical Unit - Swing Bed   Interdisciplinary Team Meeting    Patient: Esthela Contreras   Today's Date: 6/14/2023   Estimated D/C Date: 7/3/2023        Physician:  Dr. Blue Rosario Nurse Practitioner: Fang Crowder NP   Pharmacy: Linda Branstetter, PharmD Unit Director: BRYANT Milian   :  Crys Dwyer RN Physical/Occupational Therapy:  Raymundo Pride PT   Speech Therapy: NA Activity Therapy: Swati Espino LPN   Nursing: BRYANT Milian  Respiratory: Ruslan Mandujano, RT Dietary: Gael Aguilar RD  Other:      Nursing  New Symptoms/Problems: Constipation  Last Bowel Movement: 06/13/23  Urine: continent Diarrhea: No   Constipated: Yes Bowel: incontinent Rivero: No   Isolation: No     Device (Oxygen Therapy): room air Flow (L/min): 2  SpO2: 100 %       Speech/Swallowing: No current speech or swallowing issues  Aspiration Precautions: No  Cognition: WNL    Physical Therapy  Physical Therapy/Gait: Patient has been unable to work with therapy since eval ELOS: Plan to DC 7/3/2023    Transfers: Activity did not occur Range of Motion/Restrictions: None     Occupational Therapy  Eating/Grooming: Independent Toileting: Moderate Assistance   Bathing: Moderate Assistance Dressing (Upper Body): Moderate Assistance   Dressing (Lower Body): Moderate Assistance      Activity Therapy  Level of participation: Active participation      Tx Plan/Recommendations reviewed with family and/or patient on (date) 06/12/2023.  Additional family Conference/Training: d/c planning needs  D/C Plan/Recommendations:  Intends to go home alone w/niece bringing meals 2x/day    MAKENNA: 7/3/2023    Pharmacy  Medication Changes (see MD orders in chart): Yes  MD/NP: Fang Crowder NP Labs Reviewed: Yes New Lab Orders: Yes   Lab Concerns: Q M/Th

## 2023-06-14 NOTE — CONSULTS
[facility] - Medical Surgical Unit  Adult Nutrition  Consult Note    SUMMARY     Recommendations    Recommendation/Intervention: 1. Abel one packet mixed with 240ml water po BID 2. MVM daily 3. Mechanical soft diet with chopped meats and gravy 4. Check Zinc level and supplement as needed  Goals: stabilize wt, Meet EEN to aid in healing WNDs and improve nutritional status  Nutrition Goal Status: new    Assessment and Plan    No new Assessment & Plan notes have been filed under this hospital service since the last note was generated.  Service: Nutrition       Malnutrition Assessment  Malnutrition Context: social/environmental circumstances, chronic illness  Malnutrition Level: moderate  Skin (Micronutrient): cuts unhealed, red  Teeth (Micronutrient): broken dentition       Weight Loss (Malnutrition): greater than 20% in 1 year (28% loss)  Energy Intake (Malnutrition): less than or equal to 50% for greater than or equal to 1 month  Subcutaneous Fat (Malnutrition): moderate depletion  Muscle Mass (Malnutrition): moderate depletion   Orbital Region (Subcutaneous Fat Loss): moderate depletion  Upper Arm Region (Subcutaneous Fat Loss): moderate depletion  Thoracic and Lumbar Region: moderate depletion   [place] Region (Muscle Loss): moderate depletion  Clavicle Bone Region (Muscle Loss): moderate depletion                 Reason for Assessment    Reason For Assessment: consult (swing bed pt)  Diagnosis:  (recent sepsis, weakness, WNDs)  Relevant Medical History: DM, COPD, CRF stage 3, CHF, CAD, HTN, A fib  Interdisciplinary Rounds: did not attend  General Information Comments: RDN visited pt this a.m.and reviewed POC. PTA pt had a poor appetite and his mobility was limited thus limiting his access to proper intake.  Meal intake since admit ~60%.  His BG has been WNL, Hgb A1C 5.5 and he is not receiving any routine meds for DM.  He likes Ensure Enlive.  RDN educated pt on role of nutrition and healing.  He  has lost ~60# in a year.  He has poor dentition    Nutrition Risk Screen    Nutrition Risk Screen: large or nonhealing wound, burn or pressure injury, unintentional loss of 10 lbs or more in the past 2 months    Nutrition/Diet History    Patient Reported Diet/Restrictions/Preferences: general  Spiritual, Cultural Beliefs, Mormon Practices, Values that Affect Care: no  Food Allergies: NKFA  Factors Affecting Nutritional Intake: altered taste, chewing difficulties/inability to chew food, decreased appetite    Anthropometrics    Temp: 97.8 °F (36.6 °C)  Height Method: Stated  Height: 6' (182.9 cm)  Height (inches): 72 in  Weight Method: Bed Scale  Weight: 72.1 kg (159 lb)  Weight (lb): 159 lb  Ideal Body Weight (IBW), Male: 178 lb  % Ideal Body Weight, Male (lb): 89.33 %  BMI (Calculated): 21.6  BMI Grade: 18.5-24.9 - normal  Usual Body Weight (UBW), k.5 kg  % Usual Body Weight: 72.64  % Weight Change From Usual Weight: -27.52 %       Lab/Procedures/Meds    Pertinent Labs Reviewed: reviewed  Pertinent Labs Comments: Phos 1.8, Alb 1.4, Mg 1.6,Hgb A1C 5.5, Ca corrected 9.18, B-123  Pertinent Medications Reviewed: reviewed  Pertinent Medications Comments: Vit C, Lipitor, Rocephin, Lactobacillus    Physical Findings/Assessment         Estimated/Assessed Needs    Weight Used For Calorie Calculations: 72.1 kg (159 lb)  Energy Calorie Requirements (kcal): 1937-1705  Energy Need Method: Kcal/kg  Protein Requirements: 72-87  Weight Used For Protein Calculations: 72.1 kg (159 lb)     Estimated Fluid Requirement Method: RDA Method  RDA Method (mL): 2163         Nutrition Prescription Ordered    Current Diet Order: CON CHO  Oral Nutrition Supplement: Ensure Enlive BID    Evaluation of Received Nutrient/Fluid Intake    Energy Calories Required: not meeting needs  Protein Required: not meeting needs  Fluid Required: not meeting needs  Tolerance: not tolerating  % Intake of Estimated Energy Needs: 50 - 75 %  % Meal  Intake: 50 - 75 %    Nutrition Risk    Level of Risk/Frequency of Follow-up: high       Monitor and Evaluation    Food and Nutrient Intake: food and beverage intake  Anthropometric Measurements: weight  Biochemical Data, Medical Tests and Procedures: electrolyte and renal panel  Nutrition-Focused Physical Findings: overall appearance, skin       Nutrition Follow-Up    RD Follow-up?: Yes

## 2023-06-14 NOTE — SUBJECTIVE & OBJECTIVE
"Interval History: 06/14/23 continues to refuse po meds- states meds " make him feel funny, just don't feel right ."Talked with him re purpose of meds and he is agreeable to resuming meds. Will monitor on telemetry. CNA reports small amount of bright red blood noted with BM. Talked him re any history of hemorrhoids or constipation. Will recheck CBC and check stool for FOB.     Review of Systems   Respiratory:  Negative for cough and shortness of breath.    Cardiovascular:  Negative for chest pain.   Gastrointestinal:  Positive for rectal pain. Negative for constipation, diarrhea, nausea and vomiting.        Burning and itching to rectum past few days   Skin:  Positive for wound.        Ble wounds -    Neurological:  Positive for weakness.   Objective:     Vital Signs (Most Recent):  Temp: 97.8 °F (36.6 °C) (06/14/23 0743)  Pulse: 72 (06/14/23 0743)  Resp: 20 (06/14/23 0743)  BP: 122/61 (06/14/23 0743)  SpO2: 100 % (06/14/23 0743) Vital Signs (24h Range):  Temp:  [97.4 °F (36.3 °C)-99.2 °F (37.3 °C)] 97.8 °F (36.6 °C)  Pulse:  [58-89] 72  Resp:  [18-20] 20  SpO2:  [94 %-100 %] 100 %  BP: (101-122)/(49-62) 122/61     Weight: 72.2 kg (159 lb 3.2 oz)  Body mass index is 21.59 kg/m².    Intake/Output Summary (Last 24 hours) at 6/14/2023 1030  Last data filed at 6/13/2023 1715  Gross per 24 hour   Intake 240 ml   Output --   Net 240 ml         Physical Exam  HENT:      Head: Normocephalic.      Mouth/Throat:      Mouth: Mucous membranes are moist.   Cardiovascular:      Rate and Rhythm: Normal rate and regular rhythm.      Pulses: Normal pulses.      Heart sounds: Normal heart sounds.   Pulmonary:      Effort: Pulmonary effort is normal.      Breath sounds: Normal breath sounds.   Abdominal:      General: Bowel sounds are normal.      Palpations: Abdomen is soft.   Musculoskeletal:         General: Normal range of motion.   Skin:     Comments: Dressings to BLE intact   Dry scaly skin  Lymphedema to BLE   See pics in media "   Neurological:      Mental Status: He is alert.   Psychiatric:         Mood and Affect: Mood normal.           Significant Labs: All pertinent labs within the past 24 hours have been reviewed.  Recent Lab Results  (Last 5 results in the past 24 hours)        06/14/23  0612   06/14/23  0305   06/13/23  2112   06/13/23  1957   06/13/23  1817        POC Glucose 73   71   84   61   113                              Significant Imaging: I have reviewed all pertinent imaging results/findings within the past 24 hours.

## 2023-06-14 NOTE — ASSESSMENT & PLAN NOTE
06/09/23 continue rocephin 2 gm ivpb daily x 12 more days, last dose 06/21/23    Blood culture from 06/01/23 shows   Streptococcus species     Not Specified     Azithromycin >2 µg/ml Resistant     Cefotaxime 0.5 µg/ml Sensitive     Ceftriaxone 0.5 µg/ml Sensitive     Clindamycin <=0.06 µg/ml Sensitive     Erythromycin >0.5 µg/ml Resistant     Levofloxacin 1 µg/ml Sensitive     Tetracycline 1 µg/ml Sensitive     Vancomycin 0.5 µg/ml Sensitive              Repeat blood cultures x from 06/05/23 show no growth    06/12/23 continue rocephin       06/14/23 continue rocephin

## 2023-06-14 NOTE — PT/OT/SLP PROGRESS
"Physical Therapy      Patient Name:  Esthela Contreras   MRN:  [id number]    Patient not seen today secondary to Patient unwilling to participate. Therapist with patient from 0904-0926. Patient stated he was receiving stool softeners and has already had "two blowouts this morning" and "is too embarrassed because I do not feel like using the bathroom in front of women is what men do". Patient expressed his worries of performing exercises due to having a "surprised bathroom break" in front of therapist.  Patient reported he will be willing to attempt therapy tomorrow or when "God will give me the ada to take steps". Patient was unwilling to participate with therapist despite maximum encouragement and is only willing to participate after the "stool softener is out" of him. Will follow-up 06/15/2023.    CAMRYN Low  06/14/2023    "

## 2023-06-15 ENCOUNTER — CLINICAL SUPPORT (OUTPATIENT)
Dept: WOUND CARE | Facility: HOSPITAL | Age: 73
End: 2023-06-15
Payer: MEDICARE

## 2023-06-15 DIAGNOSIS — L97.922 NON-PRESSURE CHRONIC ULCER OF LEFT LOWER LEG WITH FAT LAYER EXPOSED: ICD-10-CM

## 2023-06-15 DIAGNOSIS — L89.323 PRESSURE INJURY OF LEFT ISCHIUM, STAGE 3: ICD-10-CM

## 2023-06-15 DIAGNOSIS — L97.421 DIABETIC ULCER OF LEFT HEEL ASSOCIATED WITH TYPE 2 DIABETES MELLITUS, LIMITED TO BREAKDOWN OF SKIN: ICD-10-CM

## 2023-06-15 DIAGNOSIS — I87.2 STASIS DERMATITIS OF BOTH LEGS: ICD-10-CM

## 2023-06-15 DIAGNOSIS — L97.912 NON-PRESSURE CHRONIC ULCER OF RIGHT LOWER LEG WITH FAT LAYER EXPOSED: ICD-10-CM

## 2023-06-15 DIAGNOSIS — E11.621 DIABETIC ULCER OF LEFT HEEL ASSOCIATED WITH TYPE 2 DIABETES MELLITUS, WITH FAT LAYER EXPOSED: ICD-10-CM

## 2023-06-15 DIAGNOSIS — L89.153 PRESSURE INJURY OF SACRAL REGION, STAGE 3: ICD-10-CM

## 2023-06-15 DIAGNOSIS — L97.422 DIABETIC ULCER OF LEFT HEEL ASSOCIATED WITH TYPE 2 DIABETES MELLITUS, WITH FAT LAYER EXPOSED: ICD-10-CM

## 2023-06-15 DIAGNOSIS — E11.621 DIABETIC ULCER OF LEFT HEEL ASSOCIATED WITH TYPE 2 DIABETES MELLITUS, LIMITED TO BREAKDOWN OF SKIN: ICD-10-CM

## 2023-06-15 DIAGNOSIS — L97.312 NON-PRESSURE CHRONIC ULCER OF RIGHT ANKLE WITH FAT LAYER EXPOSED: Primary | ICD-10-CM

## 2023-06-15 PROBLEM — E83.42 HYPOMAGNESEMIA: Status: ACTIVE | Noted: 2023-06-15

## 2023-06-15 PROBLEM — E16.2 HYPOGLYCEMIA: Status: ACTIVE | Noted: 2023-06-15

## 2023-06-15 PROBLEM — E87.6 HYPOKALEMIA: Status: ACTIVE | Noted: 2023-06-15

## 2023-06-15 LAB
ANION GAP SERPL CALCULATED.3IONS-SCNC: 12 MMOL/L (ref 7–16)
BASOPHILS # BLD AUTO: 0.11 K/UL (ref 0–0.2)
BASOPHILS NFR BLD AUTO: 2.3 % (ref 0–1)
BUN SERPL-MCNC: 16 MG/DL (ref 7–18)
BUN/CREAT SERPL: 19 (ref 6–20)
CALCIUM SERPL-MCNC: 7.5 MG/DL (ref 8.5–10.1)
CHLORIDE SERPL-SCNC: 108 MMOL/L (ref 98–107)
CO2 SERPL-SCNC: 25 MMOL/L (ref 21–32)
CREAT SERPL-MCNC: 0.85 MG/DL (ref 0.7–1.3)
DIFFERENTIAL METHOD BLD: ABNORMAL
EGFR (NO RACE VARIABLE) (RUSH/TITUS): 91 ML/MIN/1.73M2
EOSINOPHIL # BLD AUTO: 0.11 K/UL (ref 0–0.5)
EOSINOPHIL NFR BLD AUTO: 2.3 % (ref 1–4)
ERYTHROCYTE [DISTWIDTH] IN BLOOD BY AUTOMATED COUNT: 16.2 % (ref 11.5–14.5)
GLUCOSE SERPL-MCNC: 104 MG/DL (ref 70–105)
GLUCOSE SERPL-MCNC: 112 MG/DL (ref 74–106)
GLUCOSE SERPL-MCNC: 130 MG/DL (ref 70–105)
GLUCOSE SERPL-MCNC: 62 MG/DL (ref 70–105)
GLUCOSE SERPL-MCNC: 90 MG/DL (ref 70–105)
GLUCOSE SERPL-MCNC: 91 MG/DL (ref 70–105)
HCT VFR BLD AUTO: 37.7 % (ref 40–54)
HGB BLD-MCNC: 12 G/DL (ref 13.5–18)
LYMPHOCYTES # BLD AUTO: 1.05 K/UL (ref 1–4.8)
LYMPHOCYTES NFR BLD AUTO: 22 % (ref 27–41)
MAGNESIUM SERPL-MCNC: 1.5 MG/DL (ref 1.7–2.3)
MCH RBC QN AUTO: 29.3 PG (ref 27–31)
MCHC RBC AUTO-ENTMCNC: 31.8 G/DL (ref 32–36)
MCV RBC AUTO: 92 FL (ref 80–96)
MONOCYTES # BLD AUTO: 0.59 K/UL (ref 0–0.8)
MONOCYTES NFR BLD AUTO: 12.3 % (ref 2–6)
MPC BLD CALC-MCNC: 10.9 FL (ref 9.4–12.4)
NEUTROPHILS # BLD AUTO: 2.92 K/UL (ref 1.8–7.7)
NEUTROPHILS NFR BLD AUTO: 61.1 % (ref 53–65)
PHOSPHATE SERPL-MCNC: 2 MG/DL (ref 2.5–4.5)
PLATELET # BLD AUTO: 139 K/UL (ref 150–400)
POTASSIUM SERPL-SCNC: 3.2 MMOL/L (ref 3.5–5.1)
RBC # BLD AUTO: 4.1 M/UL (ref 4.6–6.2)
SODIUM SERPL-SCNC: 142 MMOL/L (ref 136–145)
WBC # BLD AUTO: 4.78 K/UL (ref 4.5–11)

## 2023-06-15 PROCEDURE — 27000944

## 2023-06-15 PROCEDURE — 97598 DBRDMT OPN WND ADDL 20CM/<: CPT

## 2023-06-15 PROCEDURE — 25000003 PHARM REV CODE 250: Performed by: NURSE PRACTITIONER

## 2023-06-15 PROCEDURE — 84100 ASSAY OF PHOSPHORUS: CPT | Performed by: NURSE PRACTITIONER

## 2023-06-15 PROCEDURE — 27000982 HC MATTRESS, MATRIX LAL RENTAL

## 2023-06-15 PROCEDURE — 99308 SBSQ NF CARE LOW MDM 20: CPT | Mod: ,,, | Performed by: NURSE PRACTITIONER

## 2023-06-15 PROCEDURE — 99214 OFFICE O/P EST MOD 30 MIN: CPT | Mod: 25

## 2023-06-15 PROCEDURE — 97110 THERAPEUTIC EXERCISES: CPT | Mod: CQ

## 2023-06-15 PROCEDURE — 82533 TOTAL CORTISOL: CPT | Performed by: NURSE PRACTITIONER

## 2023-06-15 PROCEDURE — 97597 DBRDMT OPN WND 1ST 20 CM/<: CPT

## 2023-06-15 PROCEDURE — 80048 BASIC METABOLIC PNL TOTAL CA: CPT | Performed by: NURSE PRACTITIONER

## 2023-06-15 PROCEDURE — 11000004 HC SNF PRIVATE

## 2023-06-15 PROCEDURE — 63600175 PHARM REV CODE 636 W HCPCS: Performed by: NURSE PRACTITIONER

## 2023-06-15 PROCEDURE — 85025 COMPLETE CBC W/AUTO DIFF WBC: CPT | Performed by: NURSE PRACTITIONER

## 2023-06-15 PROCEDURE — 94761 N-INVAS EAR/PLS OXIMETRY MLT: CPT

## 2023-06-15 PROCEDURE — 82962 GLUCOSE BLOOD TEST: CPT

## 2023-06-15 PROCEDURE — 99308 PR NURSING FAC CARE, SUBSEQ, MINOR COMPLIC: ICD-10-PCS | Mod: ,,, | Performed by: NURSE PRACTITIONER

## 2023-06-15 PROCEDURE — 83735 ASSAY OF MAGNESIUM: CPT | Performed by: NURSE PRACTITIONER

## 2023-06-15 PROCEDURE — 99900035 HC TECH TIME PER 15 MIN (STAT)

## 2023-06-15 RX ORDER — HYDROCORTISONE 25 MG/G
CREAM TOPICAL 2 TIMES DAILY
Status: DISCONTINUED | OUTPATIENT
Start: 2023-06-15 | End: 2023-06-27

## 2023-06-15 RX ORDER — POTASSIUM CHLORIDE 20 MEQ/1
40 TABLET, EXTENDED RELEASE ORAL ONCE
Status: COMPLETED | OUTPATIENT
Start: 2023-06-15 | End: 2023-06-15

## 2023-06-15 RX ORDER — MAGNESIUM SULFATE 1 G/100ML
1 INJECTION INTRAVENOUS ONCE
Status: COMPLETED | OUTPATIENT
Start: 2023-06-15 | End: 2023-06-15

## 2023-06-15 RX ADMIN — Medication 1 CAPSULE: at 11:06

## 2023-06-15 RX ADMIN — POTASSIUM CHLORIDE 40 MEQ: 1500 TABLET, EXTENDED RELEASE ORAL at 11:06

## 2023-06-15 RX ADMIN — APIXABAN 5 MG: 2.5 TABLET, FILM COATED ORAL at 11:06

## 2023-06-15 RX ADMIN — HYDROCORTISONE 2.5%: 25 CREAM TOPICAL at 08:06

## 2023-06-15 RX ADMIN — OXYCODONE HYDROCHLORIDE AND ACETAMINOPHEN 500 MG: 500 TABLET ORAL at 09:06

## 2023-06-15 RX ADMIN — MAGNESIUM SULFATE 1 G: 1 INJECTION INTRAVENOUS at 11:06

## 2023-06-15 RX ADMIN — ASPIRIN 81 MG: 81 TABLET, COATED ORAL at 09:06

## 2023-06-15 RX ADMIN — ATORVASTATIN CALCIUM 40 MG: 40 TABLET, FILM COATED ORAL at 09:06

## 2023-06-15 RX ADMIN — HYDROCORTISONE 2.5%: 25 CREAM TOPICAL at 11:06

## 2023-06-15 RX ADMIN — MIDODRINE HYDROCHLORIDE 5 MG: 2.5 TABLET ORAL at 04:06

## 2023-06-15 RX ADMIN — MIDODRINE HYDROCHLORIDE 5 MG: 2.5 TABLET ORAL at 11:06

## 2023-06-15 RX ADMIN — SILVER SULFADIAZINE: 10 CREAM TOPICAL at 09:06

## 2023-06-15 RX ADMIN — Medication 1 CAPSULE: at 04:06

## 2023-06-15 RX ADMIN — MIDODRINE HYDROCHLORIDE 5 MG: 2.5 TABLET ORAL at 09:06

## 2023-06-15 RX ADMIN — THERA TABS 1 TABLET: TAB at 09:06

## 2023-06-15 RX ADMIN — HYDROCODONE BITARTRATE AND ACETAMINOPHEN 1 TABLET: 5; 325 TABLET ORAL at 01:06

## 2023-06-15 RX ADMIN — Medication 1 CAPSULE: at 09:06

## 2023-06-15 RX ADMIN — MIRTAZAPINE 15 MG: 15 TABLET, FILM COATED ORAL at 08:06

## 2023-06-15 NOTE — ASSESSMENT & PLAN NOTE
>>ASSESSMENT AND PLAN FOR CELLULITIS OF LOWER EXTREMITY WRITTEN ON 6/15/2023 10:27 AM BY MIKE LOBO FNP    06/09/23  wound cleansing with Vashe, apply silvadene daily; Border foam dressings to Trochanter and sacrum .        06/12/23 continue wound care to BLE     06/15/23 wound care consult

## 2023-06-15 NOTE — PLAN OF CARE
"[facility] - [facility] Surgical Unit  Discharge Assessment    Talked with Mr. Contreras about discharge plans.  I asked him would a nursing home be an option for continued therapy and he states, "No, I'll rot at home before I go to a nursing home."  Informed him for short term for continued therapy and he says no, it's not an option.  Will continue to visit with Mr. Contreras and encourage therapy participation.            "

## 2023-06-15 NOTE — PROGRESS NOTES
[facility] - Medical Surgical Unit  Hospital Medicine  Progress Note    Patient Name: Esthela Contreras  MRN: [id number]  Patient Class: IP- Swing   Admission Date: 6/9/2023  Length of Stay: 6 days  Attending Physician: Blue Rosario Jr., MD  Primary Care Provider: CHRISTIANE Bardales        Subjective:     Principal Problem:Gram-positive bacteremia        HPI:  HPI:  Patient is a 72-year-old male with a history of unspecified CHF in the setting of CAD, essential hypertension, diabetes, oxygen-dependent COPD with baseline supplemental oxygen requirement of 2 L/min and BiPAP QHS along with CKD stage IIIA who presented to the emergency room complaining of dyspnea which started just a few days ago.  Patient otherwise denied any fever chills cough hemoptysis PND orthopnea chest pain palpitation or lower extremity edema in association.  Patient's niece stated that he has chronic wounds on both legs and felt that he has not been given proper care to address this.  Patient lives alone and when his niece visited him today, she found him slumped over on a recliner dyspneic prompting ED visit.      On initial presentation, patient was tachycardic and tachypneic but vital signs were otherwise stable and patient was afebrile.  Workup was notable for what appeared to be a new onset atrial fibrillation with RVR with elevated troponin and proBNP, elevation in total bilirubin level with mildly elevated transaminases and alkaline phosphatase, leukocytosis with left shift with WBC count of 23,000 and high anion gap metabolic acidosis with anion gap of 29 and bicarbonate of 7, acute on chronic renal failure with BUN of 110 and creatinine of 7.03 from a baseline serum creatinine of 1.43, significantly elevated lactic acid level with initial lactic acid level of 9.7 to 10.7 even after receiving 30 cc/kg of crystalloid IV fluid bolus.  CT of bilateral lower extremity demonstrated a presence of bilateral cellulitis without  evidence of drainable abscess or necrotizing fasciitis. Patient will be admitted for further evaluation and intervention        Overview/Hospital Course:  06/09/2023   Patient has no new complaints, leg swelling improving.    T-max 100.3°, blood pressure 93/53 pulse rate 84 respiratory rate 18 temperature 98.1°.  His labs white blood cell count down to 5 hemoglobin 12 platelets count 118 sodium 137 potassium 3.1 creatinine 1 calcium 6.5.    Echocardiogram showed ejection fraction of 35% with left ventricular diastolic dysfunction.    Blood culture on on 06/09/2023 showed no growth to date.    Blood cultures on 06/06/2023 grew Gram-negative bacilli, but chews fragilis, Streptococcus species.    Hypotension resolved, blood pressure borderline but does not need vasopressors.    Sepsis resolved.    New onset AFib, rate is controlled, started on Eliquis anticoagulation.    Acute renal failure presumably   Bacteremia, blood cultures questionable chills contamination, likely related to cellulitis of lower extremities, will continue antibiotics.    Surgery evaluated the patient, recommended to Continue local wound care.    Will deescalate antibiotics to Rocephin 2 g IV piggyback daily.    Patient awaiting placement.      Will need to continue antibiotics , rocephin 2 g IVPB daily for 12 more days, last dose 06/21/23. Continue local wound care and therapy.    Above information is from [facility] acute care stay 06/01/23- 06/09/23. Mr. Contreras has been accepted to swing bed services at [facility]. His arrival is expected today.     Mr. Contreras is a 72 year-old  male who is admitted to [facility] swing bed services for daily wound care and continuation of IV Rocephin through 6/21/23. Upon arrival here tonight, he is alert and oriented. Dressings from lower legs were removed for assessment. He reported severe pain with manipulation of legs. He states that he has had poor appetite and notes some  "constipation during hospitalization. Labs done at [facility] today were reviewed and are noted below. His albumin was 1.4 on 6/8. Dietician and wound care are consulted. He was accepted by Dr. Rosario to admission to Dr. Contreras today. DIEGO Alcocer completed medication reconciliation and admission orders.      Overview/Hospital Course:  06/12/23 Awake and resting in bed. Complains of having gas but no BM in past 5 days. Complains of occasional pain to ankles. He has dressings intact to BLE from knees down.  Continue rocephin IV and wound care to BLE. Continue therapy for strengthening.  06/13/23 refusing to take po meds. Talked with him re possible adverse effects of doing so and he states "When the good lord takes me, I am ready." Talked with him re code status and he states he wants to be Full code.  06/14/23 continues to refuse po meds- states meds " make him feel funny, just don't feel right ."Talked with him re purpose of meds and he is agreeable to resuming meds. Will monitor on telemetry. CNA reports small amount of bright red blood noted with BM. Talked him re any history of hemorrhoids or constipation. He denies both. Will recheck CBC and check stool for FOB.   06/14/23 Talked with Mr. Contreras re possibility of him being depressed. He allowed me to do PHQ 9 screening. He scored 11 showing moderate depression. He began to cry at end of screen. I talked with him re antidepressant and he is agreeable. Will start remeron 15 mg po daily.  06/15/23 Awake and resting in bed. No complaints. Reports sleeping better last night. H&H stable at 12/37. Talked with him again re positive FOB. He does report having burning and itching to rectum for past few days. No external hemorrhoids noted. Will treat with anusol hc. Resume eliquis. Dressing to bilateral lower legs.       Interval History: 06/14/23 continues to refuse po meds- states meds " make him feel funny, just don't feel right ."Talked with him re purpose of meds and he is " agreeable to resuming meds. Will monitor on telemetry. CNA reports small amount of bright red blood noted with BM. Talked him re any history of hemorrhoids or constipation. Will recheck CBC and check stool for FOB.     Review of Systems   Respiratory:  Negative for cough and shortness of breath.    Cardiovascular:  Negative for chest pain.   Gastrointestinal:  Positive for rectal pain. Negative for constipation, diarrhea, nausea and vomiting.        Burning and itching to rectum past few days   Skin:  Positive for wound.        Ble wounds -    Neurological:  Positive for weakness.   Objective:     Vital Signs (Most Recent):  Temp: 97.8 °F (36.6 °C) (06/14/23 0743)  Pulse: 72 (06/14/23 0743)  Resp: 20 (06/14/23 0743)  BP: 122/61 (06/14/23 0743)  SpO2: 100 % (06/14/23 0743) Vital Signs (24h Range):  Temp:  [97.4 °F (36.3 °C)-99.2 °F (37.3 °C)] 97.8 °F (36.6 °C)  Pulse:  [58-89] 72  Resp:  [18-20] 20  SpO2:  [94 %-100 %] 100 %  BP: (101-122)/(49-62) 122/61     Weight: 72.2 kg (159 lb 3.2 oz)  Body mass index is 21.59 kg/m².    Intake/Output Summary (Last 24 hours) at 6/14/2023 1030  Last data filed at 6/13/2023 1715  Gross per 24 hour   Intake 240 ml   Output --   Net 240 ml         Physical Exam  HENT:      Head: Normocephalic.      Mouth/Throat:      Mouth: Mucous membranes are moist.   Cardiovascular:      Rate and Rhythm: Normal rate and regular rhythm.      Pulses: Normal pulses.      Heart sounds: Normal heart sounds.   Pulmonary:      Effort: Pulmonary effort is normal.      Breath sounds: Normal breath sounds.   Abdominal:      General: Bowel sounds are normal.      Palpations: Abdomen is soft.   Musculoskeletal:         General: Normal range of motion.   Skin:     Comments: Dressings to BLE intact   Dry scaly skin  Lymphedema to BLE   See pics in media   Neurological:      Mental Status: He is alert.   Psychiatric:         Mood and Affect: Mood normal.           Significant Labs: All pertinent labs within the  past 24 hours have been reviewed.  Recent Lab Results  (Last 5 results in the past 24 hours)        06/14/23  0612   06/14/23  0305   06/13/23  2112   06/13/23  1957   06/13/23  1817        POC Glucose 73   71   84   61   113                              Significant Imaging: I have reviewed all pertinent imaging results/findings within the past 24 hours.      Assessment/Plan:      * Gram-positive bacteremia  06/09/23 continue rocephin 2 gm ivpb daily x 12 more days, last dose 06/21/23    Blood culture from 06/01/23 shows   Streptococcus species     Not Specified     Azithromycin >2 µg/ml Resistant     Cefotaxime 0.5 µg/ml Sensitive     Ceftriaxone 0.5 µg/ml Sensitive     Clindamycin <=0.06 µg/ml Sensitive     Erythromycin >0.5 µg/ml Resistant     Levofloxacin 1 µg/ml Sensitive     Tetracycline 1 µg/ml Sensitive     Vancomycin 0.5 µg/ml Sensitive              Repeat blood cultures x from 06/05/23 show no growth    06/12/23 continue rocephin       06/14/23 continue rocephin     Coronary artery disease involving native coronary artery of native heart without angina pectoris     06/09/23 Continue home aspirin, BB, and high-intensity statin.           Essential hypertension  06/09/23 continue metoprolol succinate 25 mg daily     06/12/23 stable     SOB (shortness of breath)    06/09/23 oxygen at 2 liters   duonebs every 4 hours prn     HFrEF (heart failure with reduced ejection fraction)    Summary     The left ventricle is normal in size with mild concentric hypertrophy and moderately decreased systolic function.   The estimated ejection fraction is 35%.   Left ventricular diastolic dysfunction.   Atrial fibrillation not observed.   Normal right ventricular size.   Normal central venous pressure (3 mmHg).   The estimated PA systolic pressure is 22 mmHg.   Trivial pericardial effusion.     Echo done 06/02/23     6/10 - Appears compensated.    Hypoglycemia    06/15/23 dropping blood glucose into 60s over past few  days  Is not on any hypoglycemics  Started on remeron for depression and to help increase appetite  HS snack ordered.     Hypomagnesemia    06/15/23 magnesium 1.5- one gram magnesium sulfate       Hypokalemia    06/15/23 replacing magnesium -  Replacing potassium po     Depression    06/14/23 PHQ 9 screen shows moderate depression   Reports having trouble falling asleep, feeling down most days   Will start on mirtazapine 15 mg po at hs       Diabetes mellitus    Lab Results   Component Value Date    HGBA1C 5.5 06/02/2023     Low dose ssi coverage     Pressure injury of sacral region, stage 2     Border foam dressings to Trochanter and sacrum .     COPD (chronic obstructive pulmonary disease)  06/09/23 02 per nc at 2 liters  duonebs every 4 hours prn       New onset a-fib  06/09/23 eliquis 5 mg po BID           Cellulitis of lower extremity  06/09/23  wound cleansing with Vashe, apply silvadene daily; Border foam dressings to Trochanter and sacrum .        06/12/23 continue wound care to BLE     06/15/23 wound care consult       VTE Risk Mitigation (From admission, onward)         Ordered     apixaban tablet 5 mg  2 times daily         06/15/23 1030                Discharge Planning   MAKENNA: 7/3/2023     Code Status: Full Code   Is the patient medically ready for discharge?:     Reason for patient still in hospital (select all that apply): Treatment  Discharge Plan A: Home                  CHRISTIANE Mcelroy  Department of Hospital Medicine   [facility] - Medical Surgical Unit

## 2023-06-15 NOTE — ASSESSMENT & PLAN NOTE
06/14/23 PHQ 9 screen shows moderate depression   Reports having trouble falling asleep, feeling down most days   Will start on mirtazapine 15 mg po at hs

## 2023-06-15 NOTE — PT/OT/SLP PROGRESS
"Physical Therapy Treatment    Patient Name:  Esthela Contreras   MRN:  [id number]    Recommendations:     Discharge Recommendations: nursing facility, skilled, home health PT  Discharge Equipment Recommendations: none, slide board  Barriers to discharge: Decreased caregiver support    Assessment:     Esthela Contreras is a 72 y.o. male admitted with a medical diagnosis of Gram-positive bacteremia.  He presents with the following impairments/functional limitations: weakness, impaired endurance, impaired functional mobility, decreased lower extremity function, decreased safety awareness, pain Patient was agreeable to perform therapy today. Patient reported "that is enough for the day, better not do too much on the first day" after performing four exercises. Despite maximum encouragement, patient refused further exercises by stating that it "feels a lot better in theses legs now".    Rehab Prognosis: Fair; patient would benefit from acute skilled PT services to address these deficits and reach maximum level of function.    Recent Surgery: * No surgery found *      Plan:     During this hospitalization, patient to be seen 5 x/week to address the identified rehab impairments via gait training, therapeutic activities, therapeutic exercises, neuromuscular re-education and progress toward the following goals:    Plan of Care Expires:  07/03/23    Subjective     Chief Complaint: weakness and pain  Patient/Family Comments/goals: return home   Pain/Comfort:  Pain Rating 1: other (see comments) (Patient reported having pain, but did not give a rating of pain)      Objective:     Communicated with PT prior to session.  Patient found HOB elevated with   upon PT entry to room.     General Precautions: Standard, fall  Orthopedic Precautions: N/A  Braces: N/A  Respiratory Status: Room air     Functional Mobility:  Bed Mobility:    bed exercises only      AM-PAC 6 CLICK MOBILITY  Turning over in bed (including adjusting bedclothes, sheets " "and blankets)?: 2  Sitting down on and standing up from a chair with arms (e.g., wheelchair, bedside commode, etc.): 1  Moving from lying on back to sitting on the side of the bed?: 2  Moving to and from a bed to a chair (including a wheelchair)?: 2  Need to walk in hospital room?: 1  Climbing 3-5 steps with a railing?: 1  Basic Mobility Total Score: 9       Treatment & Education:  Ankle pumps 2 x 10   Hip flexion 2 x 10  Hip abduction 2 x 10   Hip adduction 2 x 10   Patient refused any further activities by stating, "that is enough for the day, better not do too much on the first day".     Patient left HOB elevated with call button in reach..    GOALS:   Multidisciplinary Problems       Physical Therapy Goals          Problem: Physical Therapy    Goal Priority Disciplines Outcome Goal Variances Interventions   Physical Therapy Goal     PT, PT/OT Ongoing, Progressing     Description: Short-term Goals: 1.5 weeks  1. Patient will perform supine to/from sit with minimal assistance to improve independence and safety with bed mobility.  2. Patient will perform a sliding board transfer from the bed to/from the chair/wheelchair with minimal assist to improve independence and safety with transfers.   3. Patient will tolerate 15 minutes of physical therapy intervention to improve endurance and activity tolerance.    Long-term goals: 3 weeks  1. Patient will perform supine to/from sit with contact guard assist to improve independence and safety with bed mobility.  2. Patient will perform a sliding board transfer from the bed to/from the chair/wheelchair with contact guard assist to improve independence and safety with transfers.  3. Patient will tolerate 30 minutes of physical therapy intervention to improve endurance and activity tolerance.                         Time Tracking:     PT Received On: 06/15/23  PT Start Time: 0941     PT Stop Time: 1006  PT Total Time (min): 25 min     Billable Minutes: Therapeutic Exercise " 25    Treatment Type: Treatment  PT/PTA: PTA     Number of PTA visits since last PT visit: 1   CAMRYN Low   06/15/2023

## 2023-06-15 NOTE — ASSESSMENT & PLAN NOTE
06/09/23  wound cleansing with Vashe, apply silvadene daily; Border foam dressings to Trochanter and sacrum .        06/12/23 continue wound care to BLE     06/15/23 wound care consult

## 2023-06-16 LAB
CORTIS AM PEAK SERPL-MCNC: 19.8 ΜG/DL (ref 4.3–22.4)
GLUCOSE SERPL-MCNC: 102 MG/DL (ref 70–105)
GLUCOSE SERPL-MCNC: 50 MG/DL (ref 70–105)
GLUCOSE SERPL-MCNC: 65 MG/DL (ref 70–105)
GLUCOSE SERPL-MCNC: 91 MG/DL (ref 70–105)
ZINC SERPL-MCNC: 51 MCG/DL (ref 60–106)

## 2023-06-16 PROCEDURE — 27000982 HC MATTRESS, MATRIX LAL RENTAL

## 2023-06-16 PROCEDURE — 97110 THERAPEUTIC EXERCISES: CPT | Mod: CQ

## 2023-06-16 PROCEDURE — 27000944

## 2023-06-16 PROCEDURE — 25000003 PHARM REV CODE 250: Performed by: NURSE PRACTITIONER

## 2023-06-16 PROCEDURE — 97530 THERAPEUTIC ACTIVITIES: CPT | Mod: CQ

## 2023-06-16 PROCEDURE — 94761 N-INVAS EAR/PLS OXIMETRY MLT: CPT

## 2023-06-16 PROCEDURE — 82962 GLUCOSE BLOOD TEST: CPT

## 2023-06-16 PROCEDURE — 11000004 HC SNF PRIVATE

## 2023-06-16 PROCEDURE — 63600175 PHARM REV CODE 636 W HCPCS: Performed by: NURSE PRACTITIONER

## 2023-06-16 RX ADMIN — MIRTAZAPINE 15 MG: 15 TABLET, FILM COATED ORAL at 09:06

## 2023-06-16 RX ADMIN — APIXABAN 5 MG: 2.5 TABLET, FILM COATED ORAL at 09:06

## 2023-06-16 RX ADMIN — Medication 1 CAPSULE: at 04:06

## 2023-06-16 RX ADMIN — THERA TABS 1 TABLET: TAB at 09:06

## 2023-06-16 RX ADMIN — HYDROCODONE BITARTRATE AND ACETAMINOPHEN 1 TABLET: 5; 325 TABLET ORAL at 10:06

## 2023-06-16 RX ADMIN — SENNOSIDES AND DOCUSATE SODIUM 1 TABLET: 50; 8.6 TABLET ORAL at 09:06

## 2023-06-16 RX ADMIN — ASPIRIN 81 MG: 81 TABLET, COATED ORAL at 09:06

## 2023-06-16 RX ADMIN — CEFTRIAXONE 2 G: 2 INJECTION, POWDER, FOR SOLUTION INTRAMUSCULAR; INTRAVENOUS at 12:06

## 2023-06-16 RX ADMIN — ATORVASTATIN CALCIUM 40 MG: 40 TABLET, FILM COATED ORAL at 09:06

## 2023-06-16 RX ADMIN — METOPROLOL SUCCINATE 25 MG: 25 TABLET, FILM COATED, EXTENDED RELEASE ORAL at 09:06

## 2023-06-16 RX ADMIN — MIDODRINE HYDROCHLORIDE 5 MG: 2.5 TABLET ORAL at 08:06

## 2023-06-16 RX ADMIN — HYDROCORTISONE 2.5%: 25 CREAM TOPICAL at 09:06

## 2023-06-16 RX ADMIN — MIDODRINE HYDROCHLORIDE 5 MG: 2.5 TABLET ORAL at 04:06

## 2023-06-16 RX ADMIN — OXYCODONE HYDROCHLORIDE AND ACETAMINOPHEN 500 MG: 500 TABLET ORAL at 09:06

## 2023-06-16 RX ADMIN — Medication 1 CAPSULE: at 08:06

## 2023-06-16 NOTE — PT/OT/SLP PROGRESS
"Physical Therapy Treatment    Patient Name:  Esthela Contreras   MRN:  [id number]    Recommendations:     Discharge Recommendations: nursing facility, skilled, home health PT  Discharge Equipment Recommendations: none, slide board  Barriers to discharge: Decreased caregiver support    Assessment:     Esthela Contreras is a 72 y.o. male admitted with a medical diagnosis of Gram-positive bacteremia.  He presents with the following impairments/functional limitations: weakness, impaired endurance, impaired functional mobility, decreased lower extremity function, decreased safety awareness, pain Patient was agreeable to perform therapy today. Patient reluctantly agreed to sit edge of bed for therapist. Patient was able to sit himself edge of bed with stand by assist from therapist. Patient did require maximum assistance x 2 to return to supine due to patient not wanting to follow commands from therapist and throw himself back saying, "I just cant you going to have to get someone else".     Rehab Prognosis: Fair; patient would benefit from acute skilled PT services to address these deficits and reach maximum level of function.    Recent Surgery: * No surgery found *      Plan:     During this hospitalization, patient to be seen 5 x/week to address the identified rehab impairments via gait training, therapeutic activities, therapeutic exercises, neuromuscular re-education and progress toward the following goals:    Plan of Care Expires:  07/03/23    Subjective     Chief Complaint: weakness and pain  Patient/Family Comments/goals: return home   Pain/Comfort:  Pain Rating 1: 0/10  Pain Rating Post-Intervention 1: 0/10      Objective:     Communicated with PT prior to session.  Patient found HOB elevated with   upon PT entry to room.     General Precautions: Standard, fall  Orthopedic Precautions: N/A  Braces: N/A  Respiratory Status: Room air     Functional Mobility:  Bed Mobility:     Scooting: maximal assistance and of 2 " persons  Supine to Sit: stand by assistance  Sit to Supine: maximal assistance and of 2 persons      AM-PAC 6 CLICK MOBILITY  Turning over in bed (including adjusting bedclothes, sheets and blankets)?: 2  Sitting down on and standing up from a chair with arms (e.g., wheelchair, bedside commode, etc.): 1  Moving from lying on back to sitting on the side of the bed?: 2  Moving to and from a bed to a chair (including a wheelchair)?: 2  Need to walk in hospital room?: 1  Climbing 3-5 steps with a railing?: 1  Basic Mobility Total Score: 9       Treatment & Education:  Transfer training - patient requires constant verbal cues in order to stay focused and follow therapist commands   Ankle pumps 2 x 10   Hip flexion 2 x 10  Hip abduction 3 x 10   Hip adduction 3 x 10   LAQ 3 x 10     Patient left HOB elevated with call button in reach..    GOALS:   Multidisciplinary Problems       Physical Therapy Goals          Problem: Physical Therapy    Goal Priority Disciplines Outcome Goal Variances Interventions   Physical Therapy Goal     PT, PT/OT Ongoing, Progressing     Description: Short-term Goals: 1.5 weeks  1. Patient will perform supine to/from sit with minimal assistance to improve independence and safety with bed mobility.  2. Patient will perform a sliding board transfer from the bed to/from the chair/wheelchair with minimal assist to improve independence and safety with transfers.   3. Patient will tolerate 15 minutes of physical therapy intervention to improve endurance and activity tolerance.    Long-term goals: 3 weeks  1. Patient will perform supine to/from sit with contact guard assist to improve independence and safety with bed mobility.  2. Patient will perform a sliding board transfer from the bed to/from the chair/wheelchair with contact guard assist to improve independence and safety with transfers.  3. Patient will tolerate 30 minutes of physical therapy intervention to improve endurance and activity  tolerance.                         Time Tracking:     PT Received On: 06/16/23  PT Start Time: 1000     PT Stop Time: 1039  PT Total Time (min): 39 min     Billable Minutes: Therapeutic Activity 15 and Therapeutic Exercise 24    Treatment Type: Treatment  PT/PTA: PTA     Number of PTA visits since last PT visit: 2   CAMRYN Low   06/16/2023

## 2023-06-17 LAB
GLUCOSE SERPL-MCNC: 101 MG/DL (ref 70–105)
GLUCOSE SERPL-MCNC: 125 MG/DL (ref 70–105)
GLUCOSE SERPL-MCNC: 66 MG/DL (ref 70–105)
GLUCOSE SERPL-MCNC: 87 MG/DL (ref 70–105)

## 2023-06-17 PROCEDURE — 63600175 PHARM REV CODE 636 W HCPCS: Performed by: NURSE PRACTITIONER

## 2023-06-17 PROCEDURE — 27000944

## 2023-06-17 PROCEDURE — 99900035 HC TECH TIME PER 15 MIN (STAT)

## 2023-06-17 PROCEDURE — 82962 GLUCOSE BLOOD TEST: CPT

## 2023-06-17 PROCEDURE — 11000004 HC SNF PRIVATE

## 2023-06-17 PROCEDURE — 25000003 PHARM REV CODE 250: Performed by: NURSE PRACTITIONER

## 2023-06-17 PROCEDURE — 94761 N-INVAS EAR/PLS OXIMETRY MLT: CPT

## 2023-06-17 RX ADMIN — SENNOSIDES AND DOCUSATE SODIUM 1 TABLET: 50; 8.6 TABLET ORAL at 08:06

## 2023-06-17 RX ADMIN — Medication 1 CAPSULE: at 07:06

## 2023-06-17 RX ADMIN — ASPIRIN 81 MG: 81 TABLET, COATED ORAL at 08:06

## 2023-06-17 RX ADMIN — ACETAMINOPHEN 650 MG: 325 TABLET ORAL at 09:06

## 2023-06-17 RX ADMIN — APIXABAN 5 MG: 2.5 TABLET, FILM COATED ORAL at 09:06

## 2023-06-17 RX ADMIN — HYDROCODONE BITARTRATE AND ACETAMINOPHEN 1 TABLET: 5; 325 TABLET ORAL at 12:06

## 2023-06-17 RX ADMIN — MIDODRINE HYDROCHLORIDE 5 MG: 2.5 TABLET ORAL at 07:06

## 2023-06-17 RX ADMIN — ATORVASTATIN CALCIUM 40 MG: 40 TABLET, FILM COATED ORAL at 08:06

## 2023-06-17 RX ADMIN — MIRTAZAPINE 15 MG: 15 TABLET, FILM COATED ORAL at 09:06

## 2023-06-17 RX ADMIN — APIXABAN 5 MG: 2.5 TABLET, FILM COATED ORAL at 08:06

## 2023-06-17 RX ADMIN — CEFTRIAXONE 2 G: 2 INJECTION, POWDER, FOR SOLUTION INTRAMUSCULAR; INTRAVENOUS at 12:06

## 2023-06-17 RX ADMIN — OXYCODONE HYDROCHLORIDE AND ACETAMINOPHEN 500 MG: 500 TABLET ORAL at 09:06

## 2023-06-17 RX ADMIN — THERA TABS 1 TABLET: TAB at 08:06

## 2023-06-17 RX ADMIN — Medication 1 CAPSULE: at 12:06

## 2023-06-17 NOTE — PLAN OF CARE
Problem: Adult Inpatient Plan of Care  Goal: Optimal Comfort and Wellbeing  Outcome: Ongoing, Progressing     Problem: Diabetes Comorbidity  Goal: Blood Glucose Level Within Targeted Range  Outcome: Ongoing, Progressing     Problem: Fluid and Electrolyte Imbalance (Acute Kidney Injury/Impairment)  Goal: Fluid and Electrolyte Balance  Outcome: Ongoing, Progressing     Problem: Renal Function Impairment (Acute Kidney Injury/Impairment)  Goal: Effective Renal Function  Outcome: Ongoing, Progressing

## 2023-06-18 LAB
GLUCOSE SERPL-MCNC: 110 MG/DL (ref 70–105)
GLUCOSE SERPL-MCNC: 117 MG/DL (ref 70–105)
GLUCOSE SERPL-MCNC: 129 MG/DL (ref 70–105)
GLUCOSE SERPL-MCNC: 74 MG/DL (ref 70–105)

## 2023-06-18 PROCEDURE — 82962 GLUCOSE BLOOD TEST: CPT

## 2023-06-18 PROCEDURE — 11000004 HC SNF PRIVATE

## 2023-06-18 PROCEDURE — 27000982 HC MATTRESS, MATRIX LAL RENTAL

## 2023-06-18 PROCEDURE — 25000003 PHARM REV CODE 250: Performed by: NURSE PRACTITIONER

## 2023-06-18 PROCEDURE — 94761 N-INVAS EAR/PLS OXIMETRY MLT: CPT

## 2023-06-18 PROCEDURE — 63600175 PHARM REV CODE 636 W HCPCS: Performed by: NURSE PRACTITIONER

## 2023-06-18 PROCEDURE — 27000944

## 2023-06-18 RX ADMIN — ACETAMINOPHEN 650 MG: 325 TABLET ORAL at 09:06

## 2023-06-18 RX ADMIN — ATORVASTATIN CALCIUM 40 MG: 40 TABLET, FILM COATED ORAL at 08:06

## 2023-06-18 RX ADMIN — OXYCODONE HYDROCHLORIDE AND ACETAMINOPHEN 500 MG: 500 TABLET ORAL at 08:06

## 2023-06-18 RX ADMIN — ASPIRIN 81 MG: 81 TABLET, COATED ORAL at 08:06

## 2023-06-18 RX ADMIN — SENNOSIDES AND DOCUSATE SODIUM 1 TABLET: 50; 8.6 TABLET ORAL at 08:06

## 2023-06-18 RX ADMIN — ACETAMINOPHEN 650 MG: 325 TABLET ORAL at 08:06

## 2023-06-18 RX ADMIN — APIXABAN 5 MG: 2.5 TABLET, FILM COATED ORAL at 08:06

## 2023-06-18 RX ADMIN — APIXABAN 5 MG: 2.5 TABLET, FILM COATED ORAL at 09:06

## 2023-06-18 RX ADMIN — HYDROCORTISONE 2.5%: 25 CREAM TOPICAL at 09:06

## 2023-06-18 RX ADMIN — CEFTRIAXONE 2 G: 2 INJECTION, POWDER, FOR SOLUTION INTRAMUSCULAR; INTRAVENOUS at 12:06

## 2023-06-18 RX ADMIN — METOPROLOL SUCCINATE 25 MG: 25 TABLET, FILM COATED, EXTENDED RELEASE ORAL at 08:06

## 2023-06-18 RX ADMIN — MIRTAZAPINE 15 MG: 15 TABLET, FILM COATED ORAL at 09:06

## 2023-06-18 RX ADMIN — HYDROCODONE BITARTRATE AND ACETAMINOPHEN 1 TABLET: 5; 325 TABLET ORAL at 02:06

## 2023-06-18 RX ADMIN — THERA TABS 1 TABLET: TAB at 08:06

## 2023-06-18 NOTE — NURSING
Mr. Contreras's accucheck is 72. Gave Ensure to drink. Teaching done on diabetes. Needs further teaching/instruction/education.

## 2023-06-19 LAB
ANION GAP SERPL CALCULATED.3IONS-SCNC: 9 MMOL/L (ref 7–16)
BASOPHILS # BLD AUTO: 0.09 K/UL (ref 0–0.2)
BASOPHILS NFR BLD AUTO: 1.6 % (ref 0–1)
BUN SERPL-MCNC: 16 MG/DL (ref 7–18)
BUN/CREAT SERPL: 16 (ref 6–20)
CALCIUM SERPL-MCNC: 7.3 MG/DL (ref 8.5–10.1)
CHLORIDE SERPL-SCNC: 109 MMOL/L (ref 98–107)
CO2 SERPL-SCNC: 28 MMOL/L (ref 21–32)
CREAT SERPL-MCNC: 1 MG/DL (ref 0.7–1.3)
DIFFERENTIAL METHOD BLD: ABNORMAL
EGFR (NO RACE VARIABLE) (RUSH/TITUS): 79 ML/MIN/1.73M2
EOSINOPHIL # BLD AUTO: 0.17 K/UL (ref 0–0.5)
EOSINOPHIL NFR BLD AUTO: 3 % (ref 1–4)
ERYTHROCYTE [DISTWIDTH] IN BLOOD BY AUTOMATED COUNT: 16.1 % (ref 11.5–14.5)
GLUCOSE SERPL-MCNC: 102 MG/DL (ref 74–106)
GLUCOSE SERPL-MCNC: 105 MG/DL (ref 70–105)
GLUCOSE SERPL-MCNC: 158 MG/DL (ref 70–105)
GLUCOSE SERPL-MCNC: 69 MG/DL (ref 70–105)
GLUCOSE SERPL-MCNC: 94 MG/DL (ref 70–105)
GLUCOSE SERPL-MCNC: 95 MG/DL (ref 70–105)
HCT VFR BLD AUTO: 33.3 % (ref 40–54)
HGB BLD-MCNC: 10.4 G/DL (ref 13.5–18)
LYMPHOCYTES # BLD AUTO: 1.31 K/UL (ref 1–4.8)
LYMPHOCYTES NFR BLD AUTO: 23.1 % (ref 27–41)
MCH RBC QN AUTO: 28.8 PG (ref 27–31)
MCHC RBC AUTO-ENTMCNC: 31.2 G/DL (ref 32–36)
MCV RBC AUTO: 92.2 FL (ref 80–96)
MONOCYTES # BLD AUTO: 0.79 K/UL (ref 0–0.8)
MONOCYTES NFR BLD AUTO: 14 % (ref 2–6)
MPC BLD CALC-MCNC: 11.2 FL (ref 9.4–12.4)
NEUTROPHILS # BLD AUTO: 3.3 K/UL (ref 1.8–7.7)
NEUTROPHILS NFR BLD AUTO: 58.3 % (ref 53–65)
PLATELET # BLD AUTO: 147 K/UL (ref 150–400)
POTASSIUM SERPL-SCNC: 3.8 MMOL/L (ref 3.5–5.1)
RBC # BLD AUTO: 3.61 M/UL (ref 4.6–6.2)
SODIUM SERPL-SCNC: 142 MMOL/L (ref 136–145)
WBC # BLD AUTO: 5.66 K/UL (ref 4.5–11)

## 2023-06-19 PROCEDURE — 97110 THERAPEUTIC EXERCISES: CPT | Mod: CQ

## 2023-06-19 PROCEDURE — 82962 GLUCOSE BLOOD TEST: CPT

## 2023-06-19 PROCEDURE — 25000003 PHARM REV CODE 250: Performed by: NURSE PRACTITIONER

## 2023-06-19 PROCEDURE — 94761 N-INVAS EAR/PLS OXIMETRY MLT: CPT

## 2023-06-19 PROCEDURE — 85025 COMPLETE CBC W/AUTO DIFF WBC: CPT | Performed by: NURSE PRACTITIONER

## 2023-06-19 PROCEDURE — 80048 BASIC METABOLIC PNL TOTAL CA: CPT | Performed by: NURSE PRACTITIONER

## 2023-06-19 PROCEDURE — 63600175 PHARM REV CODE 636 W HCPCS: Performed by: NURSE PRACTITIONER

## 2023-06-19 PROCEDURE — 11000004 HC SNF PRIVATE

## 2023-06-19 PROCEDURE — A6212 FOAM DRG <=16 SQ IN W/BORDER: HCPCS

## 2023-06-19 RX ADMIN — HYDROCORTISONE 2.5%: 25 CREAM TOPICAL at 09:06

## 2023-06-19 RX ADMIN — MIDODRINE HYDROCHLORIDE 5 MG: 2.5 TABLET ORAL at 05:06

## 2023-06-19 RX ADMIN — ASPIRIN 81 MG: 81 TABLET, COATED ORAL at 09:06

## 2023-06-19 RX ADMIN — HYDROCODONE BITARTRATE AND ACETAMINOPHEN 1 TABLET: 5; 325 TABLET ORAL at 06:06

## 2023-06-19 RX ADMIN — CEFTRIAXONE 2 G: 2 INJECTION, POWDER, FOR SOLUTION INTRAMUSCULAR; INTRAVENOUS at 11:06

## 2023-06-19 RX ADMIN — OXYCODONE HYDROCHLORIDE AND ACETAMINOPHEN 500 MG: 500 TABLET ORAL at 09:06

## 2023-06-19 RX ADMIN — MIDODRINE HYDROCHLORIDE 5 MG: 2.5 TABLET ORAL at 09:06

## 2023-06-19 RX ADMIN — ACETAMINOPHEN 650 MG: 325 TABLET ORAL at 09:06

## 2023-06-19 RX ADMIN — MIDODRINE HYDROCHLORIDE 5 MG: 2.5 TABLET ORAL at 12:06

## 2023-06-19 RX ADMIN — CEFTRIAXONE 2 G: 2 INJECTION, POWDER, FOR SOLUTION INTRAMUSCULAR; INTRAVENOUS at 12:06

## 2023-06-19 RX ADMIN — METOPROLOL SUCCINATE 25 MG: 25 TABLET, FILM COATED, EXTENDED RELEASE ORAL at 09:06

## 2023-06-19 RX ADMIN — APIXABAN 5 MG: 2.5 TABLET, FILM COATED ORAL at 09:06

## 2023-06-19 NOTE — PT/OT/SLP PROGRESS
Physical Therapy Treatment    Patient Name:  Esthela Contreras   MRN:  [id number]    Recommendations:     Discharge Recommendations: home health PT, nursing facility, basic  Discharge Equipment Recommendations: none, slide board  Barriers to discharge: Inaccessible home and Decreased caregiver support    Assessment:     Esthela Contreras is a 72 y.o. male admitted with a medical diagnosis of Gram-positive bacteremia.  He presents with the following impairments/functional limitations: weakness, impaired endurance, impaired functional mobility, decreased lower extremity function, decreased safety awareness, pain. Pt. With good participation with bed exercises this date, declines to put his feet on the floor.     Rehab Prognosis: Fair; patient would benefit from acute skilled PT services to address these deficits and reach maximum level of function.    Recent Surgery: * No surgery found *      Plan:     During this hospitalization, patient to be seen 5 x/week to address the identified rehab impairments via gait training, therapeutic activities, therapeutic exercises and progress toward the following goals:    Plan of Care Expires:  07/03/23    Subjective     Chief Complaint: Pt. States wt. Bearing on his feet is too painful.   Patient/Family Comments/goals: Pt. States it feels good to participate in LE exercises, agrees to increase mobility as he is able.    Pain/Comfort:  Pain Rating 1: 0/10      Objective:     Communicated with Raymundo Pride PT for POC and Roxane Petty RN prior to session.  Patient found HOB elevated with peripheral IV, telemetry upon PT entry to room.     General Precautions: Standard, fall  Orthopedic Precautions: N/A  Braces: N/A  Respiratory Status: Room air     Functional Mobility:  Pt. Performed Scooting in bed with Min A.       AM-PAC 6 CLICK MOBILITY  Turning over in bed (including adjusting bedclothes, sheets and blankets)?: 2  Sitting down on and standing up from a chair with arms (e.g.,  wheelchair, bedside commode, etc.): 1  Moving from lying on back to sitting on the side of the bed?: 2  Moving to and from a bed to a chair (including a wheelchair)?: 2  Need to walk in hospital room?: 1  Climbing 3-5 steps with a railing?: 1  Basic Mobility Total Score: 9       Treatment & Education:  Pt. Participated in Bilateral LE PREs in Supine with frequent rests required to complete 2 x 10 reps each of Toe curls, Ankle pumps, hip IE/ ER, SLR, Hip ABD/ ADD with knee ext and Heelslides with MRE.     Patient left HOB elevated with all lines intact, call button in reach, and bed alarm on..    GOALS:   Multidisciplinary Problems       Physical Therapy Goals          Problem: Physical Therapy    Goal Priority Disciplines Outcome Goal Variances Interventions   Physical Therapy Goal     PT, PT/OT Ongoing, Progressing     Description: Short-term Goals: 1.5 weeks  1. Patient will perform supine to/from sit with minimal assistance to improve independence and safety with bed mobility.  2. Patient will perform a sliding board transfer from the bed to/from the chair/wheelchair with minimal assist to improve independence and safety with transfers.   3. Patient will tolerate 15 minutes of physical therapy intervention to improve endurance and activity tolerance.    Long-term goals: 3 weeks  1. Patient will perform supine to/from sit with contact guard assist to improve independence and safety with bed mobility.  2. Patient will perform a sliding board transfer from the bed to/from the chair/wheelchair with contact guard assist to improve independence and safety with transfers.  3. Patient will tolerate 30 minutes of physical therapy intervention to improve endurance and activity tolerance.                         Time Tracking:     PT Received On: 06/19/23  PT Start Time: 0951     PT Stop Time: 1023  PT Total Time (min): 32 min     Billable Minutes: Therapeutic Exercise 30    Treatment Type: Treatment  PT/PTA: PTA     Number  of PTA visits since last PT visit: 3     06/19/2023

## 2023-06-19 NOTE — SUBJECTIVE & OBJECTIVE
Interval History: 06/19/23 Awake and resting in bed. Reports feeling better and sleeping better. Denies pain. States he has not had BM in 2-3 days but that is normal for him. Denies feeling constipated. Continue rocephin and wound care. Blood glucose levels have improved. Continue to monitor for hypoglycemia.    Review of Systems   Respiratory:  Negative for cough and shortness of breath.    Cardiovascular:  Negative for chest pain.   Gastrointestinal:  Negative for constipation, diarrhea, nausea and vomiting.   Skin:         Dressings to BLE    Neurological:  Positive for weakness.   Psychiatric/Behavioral:          Mood improving   Objective:     Vital Signs (Most Recent):  Temp: 98.8 °F (37.1 °C) (06/19/23 1201)  Pulse: 70 (06/19/23 1201)  Resp: 18 (06/19/23 1201)  BP: (!) 99/51 (06/19/23 1201)  SpO2: 97 % (06/19/23 1201) Vital Signs (24h Range):  Temp:  [98.1 °F (36.7 °C)-98.9 °F (37.2 °C)] 98.8 °F (37.1 °C)  Pulse:  [69-86] 70  Resp:  [18-20] 18  SpO2:  [97 %-100 %] 97 %  BP: ()/(47-59) 99/51     Weight: 72.1 kg (159 lb)  Body mass index is 21.56 kg/m².    Intake/Output Summary (Last 24 hours) at 6/19/2023 1231  Last data filed at 6/19/2023 1214  Gross per 24 hour   Intake 720 ml   Output 425 ml   Net 295 ml         Physical Exam  HENT:      Mouth/Throat:      Mouth: Mucous membranes are moist.   Cardiovascular:      Rate and Rhythm: Normal rate and regular rhythm.      Pulses: Normal pulses.      Heart sounds: Normal heart sounds.   Pulmonary:      Effort: Pulmonary effort is normal.      Breath sounds: Normal breath sounds.   Abdominal:      General: Bowel sounds are normal.      Palpations: Abdomen is soft.   Skin:     General: Skin is warm and dry.      Comments: Scaly skin , dresing to BLE- see pics in media   Neurological:      Mental Status: He is alert and oriented to person, place, and time.           Significant Labs: All pertinent labs within the past 24 hours have been reviewed.  Recent Lab  Results  (Last 5 results in the past 24 hours)        06/19/23  1125   06/19/23  0631   06/19/23  0556   06/18/23  2118   06/18/23  1626        POC Glucose 105   95   69   110   129                              Significant Imaging: I have reviewed all pertinent imaging results/findings within the past 24 hours.

## 2023-06-19 NOTE — ASSESSMENT & PLAN NOTE
06/09/23 continue rocephin 2 gm ivpb daily x 12 more days, last dose 06/21/23    Blood culture from 06/01/23 shows   Streptococcus species     Not Specified     Azithromycin >2 µg/ml Resistant     Cefotaxime 0.5 µg/ml Sensitive     Ceftriaxone 0.5 µg/ml Sensitive     Clindamycin <=0.06 µg/ml Sensitive     Erythromycin >0.5 µg/ml Resistant     Levofloxacin 1 µg/ml Sensitive     Tetracycline 1 µg/ml Sensitive     Vancomycin 0.5 µg/ml Sensitive              Repeat blood cultures x from 06/05/23 show no growth    06/12/23 continue rocephin       06/14/23 continue rocephin     06/19/23 continue rocephin

## 2023-06-19 NOTE — PROGRESS NOTES
This note has been moved to another encounter. If you have any questions, please contact HIM Chart Correction at [phone].

## 2023-06-19 NOTE — PROGRESS NOTES
"NEW WOUND CARE CONSULT          Patient Name: Esthela Contreras is a 72 y.o. male       Chief Complaint:  New Wound Care Consult    Review of patient's allergies indicates:  No Known Allergies     I have reviewed the patient's medical, surgical, family and social history.      Subjective:    HPI     71 YO AAM seen today for multiple wounds. Pt is currently in swing bed. He was admitted to [facility] on 6/1/23 due to sepsis and new onset A fib. Arterial U/S on 6/2/23 revealed "trickle flow"to right DP, and left PT and DP. Right PT was monophasic. Otherwise, there were triphasic and biphasic waveforms. Dr. Mo was consulted while at [facility], but no interventions were deemed necessary at that time.   Wounds are as follows:  Right lateral ankle- arterial ulcer  Right posterior lower leg- arterial ulcer  Left plantar heel- diabetic foot ulcer  Left medial heel- diabetic foot ulcer  Left posterior lower leg- arterial ulcer  Sacrum- pressure   Left ischium- pressure    Onset of wounds is unknown, best estimate is 4/1/23. In addition, patient has severe stasis dermatitis to BLE.         Review of Systems   Constitutional:  Positive for activity change. Negative for fever.   Cardiovascular:  Negative for leg swelling.   Musculoskeletal:  Positive for leg pain.   Integumentary:  Positive for rash and wound. Negative for mole/lesion.   Neurological:  Positive for weakness (generalized weakness). Negative for numbness.   Hematological:  Bruises/bleeds easily (anticoagulants).   Psychiatric/Behavioral:  The patient is not nervous/anxious.       Medications:    Current Facility-Administered Medications on File Prior to Visit   Medication Dose Route Frequency Provider Last Rate Last Admin    acetaminophen tablet 650 mg  650 mg Oral Q6H PRN Love L Alcocer, FNP   650 mg at 06/19/23 0922    albuterol-ipratropium 2.5 mg-0.5 mg/3 mL nebulizer solution 3 mL  3 mL Nebulization Q4H PRN Love L Alcocer, FNP        apixaban tablet 5 mg  5 " mg Oral BID Love L Alcocer, FNP   5 mg at 06/19/23 0915    ascorbic acid (vitamin C) tablet 500 mg  500 mg Oral Daily Roseanna Perez, NP   500 mg at 06/19/23 0915    aspirin EC tablet 81 mg  81 mg Oral Daily Love L Alcocer, FNP   81 mg at 06/19/23 0915    atorvastatin tablet 40 mg  40 mg Oral Daily Love L Alcocer, FNP   40 mg at 06/18/23 0845    bisacodyL suppository 10 mg  10 mg Rectal Daily PRN Roseanna Perez, DIEGO        calcium carbonate 200 mg calcium (500 mg) chewable tablet 500 mg  500 mg Oral BID PRN Love L Alcocer, FNP        cefTRIAXone (ROCEPHIN) 2 g in dextrose 5 % in water (D5W) 5 % 100 mL IVPB (MB+)  2 g Intravenous Q24H Love L Alcocer, FNP   Stopped at 06/19/23 0035    HYDROcodone-acetaminophen 5-325 mg per tablet 1 tablet  1 tablet Oral Q12H PRN Roseanna Perez NP   1 tablet at 06/19/23 0606    hydrocortisone 2.5 % rectal cream   Rectal BID Love L Alcocer, FNP   Given at 06/18/23 2124    Lactobacillus acidophilus capsule 1 capsule  1 capsule Oral TID WM Love L Alcocer, FNP   1 capsule at 06/17/23 1210    melatonin tablet 6 mg  6 mg Oral Nightly PRN Love L Alcocer, FNP   6 mg at 06/09/23 2035    metoprolol succinate (TOPROL-XL) 24 hr tablet 25 mg  25 mg Oral Daily Love L Alcocer, FNP   25 mg at 06/19/23 0915    midodrine tablet 5 mg  5 mg Oral TID WM Love L Alcocer, FNP   5 mg at 06/19/23 0915    mirtazapine tablet 15 mg  15 mg Oral QHS Love L Alcocer, FNP   15 mg at 06/18/23 2125    multivitamin tablet  1 tablet Oral Daily Love L Alcocer, FNP   1 tablet at 06/18/23 0845    polyethylene glycol packet 17 g  17 g Oral Daily Love L Alcocer, FNP   17 g at 06/11/23 0940    senna-docusate 8.6-50 mg per tablet 1 tablet  1 tablet Oral BID Love L Alcocer, FNP   1 tablet at 06/18/23 0825     Current Outpatient Medications on File Prior to Visit   Medication Sig Dispense Refill    albuterol-ipratropium (DUO-NEB) 2.5 mg-0.5 mg/3 mL nebulizer solution Take 3 mLs by nebulization every 4 (four)  hours as needed for Wheezing. Rescue 75 mL 0    apixaban (ELIQUIS) 5 mg Tab Take 1 tablet (5 mg total) by mouth 2 (two) times daily.      aspirin (ECOTRIN) 81 MG EC tablet Take 81 mg by mouth once daily.      atorvastatin (LIPITOR) 40 MG tablet Take 1 tablet (40 mg total) by mouth once daily. 90 tablet 0    dextrose 5 % in water (D5W) 5 % PgBk 100 mL with cefTRIAXone 2 gram SolR 2 g Inject 2 g into the vein once daily.      insulin aspart U-100 (NOVOLOG) 100 unit/mL injection Inject 0-5 Units into the skin before meals and at bedtime as needed.      metoprolol succinate (TOPROL-XL) 25 MG 24 hr tablet Take 1 tablet (25 mg total) by mouth once daily. 90 tablet 0    midodrine (PROAMATINE) 5 MG Tab Take 1 tablet (5 mg total) by mouth 3 (three) times daily with meals. 90 tablet 11    polyethylene glycol (GLYCOLAX) 17 gram PwPk Take 17 g by mouth once daily.  0    silver sulfADIAZINE 1% (SILVADENE) 1 % cream Apply topically once daily.            Physical Exam  Vitals reviewed.   Constitutional:       Appearance: Normal appearance.   HENT:      Head: Normocephalic and atraumatic.      Right Ear: External ear normal.      Left Ear: External ear normal.   Cardiovascular:      Rate and Rhythm: Normal rate.      Pulses:           Dorsalis pedis pulses are detected w/ Doppler on the right side and detected w/ Doppler on the left side.   Pulmonary:      Effort: Pulmonary effort is normal.   Abdominal:      Palpations: Abdomen is soft.   Musculoskeletal:      Right lower leg: No edema.      Left lower leg: No edema.        Feet:    Feet:      Left foot:      Skin integrity: Ulcer present.   Skin:     General: Skin is warm and dry.      Findings: Wound present.          Neurological:      Mental Status: He is alert.        Please see Wound Docs for complete Wound Assessment and lower extremity assessment when applicable.    Documentation of any procedures can be viewed in Wound Docs if applicable.         Assessment and  Plan:    1. Non-pressure chronic ulcer of right ankle with fat layer exposed  Clean with mild soap and water or vashe. Apply Xeroform gauze to wounds and cover with dry gauze and roll gauze. Change daily and PRN    2. Non-pressure chronic ulcer of right lower leg with fat layer exposed  Clean with mild soap and water or vashe. Apply Xeroform gauze to wounds and cover with dry gauze and roll gauze. Change daily and PRN    3. Diabetic ulcer of left heel associated with type 2 diabetes mellitus, with fat layer exposed  Clean with mild soap and water or vashe. Apply Xeroform gauze to wounds and cover with dry gauze and roll gauze. Change daily and PRN    4. Diabetic ulcer of left heel associated with type 2 diabetes mellitus, limited to breakdown of skin  Clean with mild soap and water or vashe. Apply Xeroform gauze to wounds and cover with dry gauze and roll gauze. Change daily and PRN    5. Non-pressure chronic ulcer of left lower leg with fat layer exposed  Clean with mild soap and water or vashe. Apply Xeroform gauze to wounds and cover with dry gauze and roll gauze. Change daily and PRN    6. Pressure injury of sacral region, stage 3  Clean with mild soap and water or Vashe, and cover with bordered foam dressing. Change QOD and PRN    7. Pressure injury of left ischium, stage 3  Clean with mild soap and water or Vashe, and cover with bordered foam dressing. Change QOD and PRN    8. Stasis dermatitis of both legs  Urea cream to intact skin areas daily  Wash with mild soap and water daily.          Please see Wound Docs for complete plan including patient instructions.     Follow Up & Goals:     Follow up in about 1 week (around 6/22/2023).     Short term goals  Reduction of devitalized tissue  Reduction of bacterial burden  Stimulate and/or maintain acute phases of wound healing  Promote granulation formation  Promote epithelization  Promote compliance with plan of care  Address factors affecting wound  healing  Optimize nutritional status  Optimize vascular status    Long term goals  Prevent loss of limb  Prevent infection  Conservative Wound Treatment  Prevent recurrent ulcerations  Resolve wounds

## 2023-06-19 NOTE — PLAN OF CARE
Problem: Diabetes Comorbidity  Goal: Blood Glucose Level Within Targeted Range  Outcome: Ongoing, Progressing  Intervention: Monitor and Manage Glycemia  Flowsheets (Taken 6/19/2023 1653)  Glycemic Management: blood glucose monitored     Problem: Impaired Wound Healing  Goal: Optimal Wound Healing  Outcome: Ongoing, Progressing  Intervention: Promote Wound Healing  Flowsheets (Taken 6/19/2023 1653)  Pain Management Interventions:   care clustered   diversional activity provided   pillow support provided     Problem: Fall Injury Risk  Goal: Absence of Fall and Fall-Related Injury  Outcome: Ongoing, Progressing  Intervention: Identify and Manage Contributors  Flowsheets (Taken 6/19/2023 1653)  Self-Care Promotion:   independence encouraged   BADL personal objects within reach   BADL personal routines maintained   meal set-up provided   safe use of adaptive equipment encouraged

## 2023-06-19 NOTE — PROGRESS NOTES
[facility] - Medical Surgical Unit  Hospital Medicine  Progress Note    Patient Name: Esthela Contreras  MRN: [id number]  Patient Class: IP- Swing   Admission Date: 6/9/2023  Length of Stay: 10 days  Attending Physician: Liam Contreras IV, DO  Primary Care Provider: CHRISTIANE Bardales        Subjective:     Principal Problem:Gram-positive bacteremia        HPI:  HPI:  Patient is a 72-year-old male with a history of unspecified CHF in the setting of CAD, essential hypertension, diabetes, oxygen-dependent COPD with baseline supplemental oxygen requirement of 2 L/min and BiPAP QHS along with CKD stage IIIA who presented to the emergency room complaining of dyspnea which started just a few days ago.  Patient otherwise denied any fever chills cough hemoptysis PND orthopnea chest pain palpitation or lower extremity edema in association.  Patient's niece stated that he has chronic wounds on both legs and felt that he has not been given proper care to address this.  Patient lives alone and when his niece visited him today, she found him slumped over on a recliner dyspneic prompting ED visit.      On initial presentation, patient was tachycardic and tachypneic but vital signs were otherwise stable and patient was afebrile.  Workup was notable for what appeared to be a new onset atrial fibrillation with RVR with elevated troponin and proBNP, elevation in total bilirubin level with mildly elevated transaminases and alkaline phosphatase, leukocytosis with left shift with WBC count of 23,000 and high anion gap metabolic acidosis with anion gap of 29 and bicarbonate of 7, acute on chronic renal failure with BUN of 110 and creatinine of 7.03 from a baseline serum creatinine of 1.43, significantly elevated lactic acid level with initial lactic acid level of 9.7 to 10.7 even after receiving 30 cc/kg of crystalloid IV fluid bolus.  CT of bilateral lower extremity demonstrated a presence of bilateral cellulitis without  evidence of drainable abscess or necrotizing fasciitis. Patient will be admitted for further evaluation and intervention        Overview/Hospital Course:  06/09/2023   Patient has no new complaints, leg swelling improving.    T-max 100.3°, blood pressure 93/53 pulse rate 84 respiratory rate 18 temperature 98.1°.  His labs white blood cell count down to 5 hemoglobin 12 platelets count 118 sodium 137 potassium 3.1 creatinine 1 calcium 6.5.    Echocardiogram showed ejection fraction of 35% with left ventricular diastolic dysfunction.    Blood culture on on 06/09/2023 showed no growth to date.    Blood cultures on 06/06/2023 grew Gram-negative bacilli, but chews fragilis, Streptococcus species.    Hypotension resolved, blood pressure borderline but does not need vasopressors.    Sepsis resolved.    New onset AFib, rate is controlled, started on Eliquis anticoagulation.    Acute renal failure presumably   Bacteremia, blood cultures questionable chills contamination, likely related to cellulitis of lower extremities, will continue antibiotics.    Surgery evaluated the patient, recommended to Continue local wound care.    Will deescalate antibiotics to Rocephin 2 g IV piggyback daily.    Patient awaiting placement.      Will need to continue antibiotics , rocephin 2 g IVPB daily for 12 more days, last dose 06/21/23. Continue local wound care and therapy.    Above information is from [facility] acute care stay 06/01/23- 06/09/23. Mr. Contreras has been accepted to swing bed services at [facility]. His arrival is expected today.     Mr. Contreras is a 72 year-old  male who is admitted to [facility] swing bed services for daily wound care and continuation of IV Rocephin through 6/21/23. Upon arrival here tonight, he is alert and oriented. Dressings from lower legs were removed for assessment. He reported severe pain with manipulation of legs. He states that he has had poor appetite and notes some  "constipation during hospitalization. Labs done at [facility] today were reviewed and are noted below. His albumin was 1.4 on 6/8. Dietician and wound care are consulted. He was accepted by Dr. Rosario to admission to Dr. Contreras today. DIEGO Alcocer completed medication reconciliation and admission orders.      Overview/Hospital Course:  06/12/23 Awake and resting in bed. Complains of having gas but no BM in past 5 days. Complains of occasional pain to ankles. He has dressings intact to BLE from knees down.  Continue rocephin IV and wound care to BLE. Continue therapy for strengthening.  06/13/23 refusing to take po meds. Talked with him re possible adverse effects of doing so and he states "When the good lord takes me, I am ready." Talked with him re code status and he states he wants to be Full code.  06/14/23 continues to refuse po meds- states meds " make him feel funny, just don't feel right ."Talked with him re purpose of meds and he is agreeable to resuming meds. Will monitor on telemetry. CNA reports small amount of bright red blood noted with BM. Talked him re any history of hemorrhoids or constipation. He denies both. Will recheck CBC and check stool for FOB.   06/14/23 Talked with Mr. Contreras re possibility of him being depressed. He allowed me to do PHQ 9 screening. He scored 11 showing moderate depression. He began to cry at end of screen. I talked with him re antidepressant and he is agreeable. Will start remeron 15 mg po daily.  06/15/23 Awake and resting in bed. No complaints. Reports sleeping better last night. H&H stable at 12/37. Talked with him again re positive FOB. He does report having burning and itching to rectum for past few days. No external hemorrhoids noted. Will treat with anusol hc. Resume eliquis. Dressing to bilateral lower legs.   06/19/23 Awake and resting in bed. Reports feeling better and sleeping better. Denies pain. States he has not had BM in 2-3 days but that is normal for him. Denies " feeling constipated. Continue rocephin and wound care. Blood glucose levels have improved. Continue to monitor for hypoglycemia.      Interval History: 06/19/23 Awake and resting in bed. Reports feeling better and sleeping better. Denies pain. States he has not had BM in 2-3 days but that is normal for him. Denies feeling constipated. Continue rocephin and wound care. Blood glucose levels have improved. Continue to monitor for hypoglycemia.    Review of Systems   Respiratory:  Negative for cough and shortness of breath.    Cardiovascular:  Negative for chest pain.   Gastrointestinal:  Negative for constipation, diarrhea, nausea and vomiting.   Skin:         Dressings to BLE    Neurological:  Positive for weakness.   Psychiatric/Behavioral:          Mood improving   Objective:     Vital Signs (Most Recent):  Temp: 98.8 °F (37.1 °C) (06/19/23 1201)  Pulse: 70 (06/19/23 1201)  Resp: 18 (06/19/23 1201)  BP: (!) 99/51 (06/19/23 1201)  SpO2: 97 % (06/19/23 1201) Vital Signs (24h Range):  Temp:  [98.1 °F (36.7 °C)-98.9 °F (37.2 °C)] 98.8 °F (37.1 °C)  Pulse:  [69-86] 70  Resp:  [18-20] 18  SpO2:  [97 %-100 %] 97 %  BP: ()/(47-59) 99/51     Weight: 72.1 kg (159 lb)  Body mass index is 21.56 kg/m².    Intake/Output Summary (Last 24 hours) at 6/19/2023 1231  Last data filed at 6/19/2023 1214  Gross per 24 hour   Intake 720 ml   Output 425 ml   Net 295 ml         Physical Exam  HENT:      Mouth/Throat:      Mouth: Mucous membranes are moist.   Cardiovascular:      Rate and Rhythm: Normal rate and regular rhythm.      Pulses: Normal pulses.      Heart sounds: Normal heart sounds.   Pulmonary:      Effort: Pulmonary effort is normal.      Breath sounds: Normal breath sounds.   Abdominal:      General: Bowel sounds are normal.      Palpations: Abdomen is soft.   Skin:     General: Skin is warm and dry.      Comments: Scaly skin , dresing to BLE- see pics in media   Neurological:      Mental Status: He is alert and oriented  to person, place, and time.           Significant Labs: All pertinent labs within the past 24 hours have been reviewed.  Recent Lab Results  (Last 5 results in the past 24 hours)        06/19/23  1125   06/19/23  0631   06/19/23  0556   06/18/23  2118   06/18/23  1626        POC Glucose 105   95   69   110   129                              Significant Imaging: I have reviewed all pertinent imaging results/findings within the past 24 hours.      Assessment/Plan:      * Gram-positive bacteremia  06/09/23 continue rocephin 2 gm ivpb daily x 12 more days, last dose 06/21/23    Blood culture from 06/01/23 shows   Streptococcus species     Not Specified     Azithromycin >2 µg/ml Resistant     Cefotaxime 0.5 µg/ml Sensitive     Ceftriaxone 0.5 µg/ml Sensitive     Clindamycin <=0.06 µg/ml Sensitive     Erythromycin >0.5 µg/ml Resistant     Levofloxacin 1 µg/ml Sensitive     Tetracycline 1 µg/ml Sensitive     Vancomycin 0.5 µg/ml Sensitive              Repeat blood cultures x from 06/05/23 show no growth    06/12/23 continue rocephin       06/14/23 continue rocephin     06/19/23 continue rocephin     Coronary artery disease involving native coronary artery of native heart without angina pectoris     06/09/23 Continue home aspirin, BB, and high-intensity statin.           Essential hypertension  06/09/23 continue metoprolol succinate 25 mg daily     06/12/23 stable     SOB (shortness of breath)    06/09/23 oxygen at 2 liters   duonebs every 4 hours prn     HFrEF (heart failure with reduced ejection fraction)    Summary     The left ventricle is normal in size with mild concentric hypertrophy and moderately decreased systolic function.   The estimated ejection fraction is 35%.   Left ventricular diastolic dysfunction.   Atrial fibrillation not observed.   Normal right ventricular size.   Normal central venous pressure (3 mmHg).   The estimated PA systolic pressure is 22 mmHg.   Trivial pericardial effusion.     Echo  done 06/02/23     6/10 - Appears compensated.    Hypoglycemia    06/15/23 dropping blood glucose into 60s over past few days  Is not on any hypoglycemics  Started on remeron for depression and to help increase appetite  HS snack ordered.     Hypomagnesemia    06/15/23 magnesium 1.5- one gram magnesium sulfate       Hypokalemia    06/15/23 replacing magnesium -  Replacing potassium po     Depression    06/14/23 PHQ 9 screen shows moderate depression   Reports having trouble falling asleep, feeling down most days   Will start on mirtazapine 15 mg po at hs       Diabetes mellitus    Lab Results   Component Value Date    HGBA1C 5.5 06/02/2023     Low dose ssi coverage     Pressure injury of sacral region, stage 2     Border foam dressings to Trochanter and sacrum .     COPD (chronic obstructive pulmonary disease)  06/09/23 02 per nc at 2 liters  duonebs every 4 hours prn       New onset a-fib  06/09/23 eliquis 5 mg po BID           Cellulitis of lower extremity  06/09/23  wound cleansing with Vashe, apply silvadene daily; Border foam dressings to Trochanter and sacrum .        06/12/23 continue wound care to BLE     06/15/23 wound care consult       VTE Risk Mitigation (From admission, onward)         Ordered     apixaban tablet 5 mg  2 times daily         06/15/23 1030                Discharge Planning   MAKENNA: 7/3/2023     Code Status: Full Code   Is the patient medically ready for discharge?:     Reason for patient still in hospital (select all that apply): Treatment  Discharge Plan A: Home                  CHRISTIANE Mcelroy  Department of Hospital Medicine   [facility] - Medical Surgical Unit

## 2023-06-20 LAB
GLUCOSE SERPL-MCNC: 102 MG/DL (ref 70–105)
GLUCOSE SERPL-MCNC: 150 MG/DL (ref 70–105)
GLUCOSE SERPL-MCNC: 156 MG/DL (ref 70–105)
GLUCOSE SERPL-MCNC: 83 MG/DL (ref 70–105)

## 2023-06-20 PROCEDURE — 11000004 HC SNF PRIVATE

## 2023-06-20 PROCEDURE — 82962 GLUCOSE BLOOD TEST: CPT

## 2023-06-20 PROCEDURE — 25000003 PHARM REV CODE 250: Performed by: NURSE PRACTITIONER

## 2023-06-20 PROCEDURE — 94761 N-INVAS EAR/PLS OXIMETRY MLT: CPT

## 2023-06-20 PROCEDURE — 97110 THERAPEUTIC EXERCISES: CPT | Mod: CQ

## 2023-06-20 PROCEDURE — A6212 FOAM DRG <=16 SQ IN W/BORDER: HCPCS

## 2023-06-20 RX ADMIN — ATORVASTATIN CALCIUM 40 MG: 40 TABLET, FILM COATED ORAL at 08:06

## 2023-06-20 RX ADMIN — MIDODRINE HYDROCHLORIDE 5 MG: 2.5 TABLET ORAL at 08:06

## 2023-06-20 RX ADMIN — OXYCODONE HYDROCHLORIDE AND ACETAMINOPHEN 500 MG: 500 TABLET ORAL at 08:06

## 2023-06-20 RX ADMIN — MIDODRINE HYDROCHLORIDE 5 MG: 2.5 TABLET ORAL at 05:06

## 2023-06-20 RX ADMIN — SENNOSIDES AND DOCUSATE SODIUM 1 TABLET: 50; 8.6 TABLET ORAL at 11:06

## 2023-06-20 RX ADMIN — APIXABAN 5 MG: 2.5 TABLET, FILM COATED ORAL at 08:06

## 2023-06-20 RX ADMIN — MIDODRINE HYDROCHLORIDE 5 MG: 2.5 TABLET ORAL at 11:06

## 2023-06-20 RX ADMIN — METOPROLOL SUCCINATE 25 MG: 25 TABLET, FILM COATED, EXTENDED RELEASE ORAL at 08:06

## 2023-06-20 RX ADMIN — ASPIRIN 81 MG: 81 TABLET, COATED ORAL at 08:06

## 2023-06-20 RX ADMIN — Medication 1 CAPSULE: at 08:06

## 2023-06-20 RX ADMIN — SENNOSIDES AND DOCUSATE SODIUM 1 TABLET: 50; 8.6 TABLET ORAL at 08:06

## 2023-06-20 RX ADMIN — THERA TABS 1 TABLET: TAB at 08:06

## 2023-06-20 NOTE — PLAN OF CARE
"[facility] - [facility] Surgical Unit  Discharge Assessment    Spoke with Mr. Proctor this morning.  He states, "I'm not giving up.  I'm going to keep pushing through this! "  Mr. Contreras continues to state that he will be going home alone with family checking in on him and bringing hm food.      "

## 2023-06-20 NOTE — PLAN OF CARE
Problem: Diabetes Comorbidity  Goal: Blood Glucose Level Within Targeted Range  Outcome: Ongoing, Progressing  Intervention: Monitor and Manage Glycemia  Flowsheets (Taken 6/20/2023 1513)  Glycemic Management: blood glucose monitored     Problem: Fall Injury Risk  Goal: Absence of Fall and Fall-Related Injury  Outcome: Ongoing, Progressing  Intervention: Identify and Manage Contributors  Flowsheets (Taken 6/20/2023 1513)  Self-Care Promotion:   independence encouraged   BADL personal objects within reach   BADL personal routines maintained   meal set-up provided   safe use of adaptive equipment encouraged  Intervention: Promote Injury-Free Environment  Flowsheets (Taken 6/20/2023 1513)  Safety Promotion/Fall Prevention:   assistive device/personal item within reach   bed alarm set   diversional activities provided   Fall Risk reviewed with patient/family   Fall Risk signage in place   lighting adjusted   nonskid shoes/socks when out of bed   instructed to call staff for mobility   side rails raised x 3

## 2023-06-20 NOTE — PLAN OF CARE
Problem: Adult Inpatient Plan of Care  Goal: Optimal Comfort and Wellbeing  Outcome: Ongoing, Progressing     Problem: Diabetes Comorbidity  Goal: Blood Glucose Level Within Targeted Range  Outcome: Ongoing, Progressing     Problem: Fluid and Electrolyte Imbalance (Acute Kidney Injury/Impairment)  Goal: Fluid and Electrolyte Balance  Outcome: Ongoing, Progressing     Problem: Oral Intake Inadequate (Acute Kidney Injury/Impairment)  Goal: Optimal Nutrition Intake  Outcome: Ongoing, Progressing     Problem: Impaired Wound Healing  Goal: Optimal Wound Healing  Outcome: Ongoing, Progressing

## 2023-06-21 LAB
GLUCOSE SERPL-MCNC: 110 MG/DL (ref 70–105)
GLUCOSE SERPL-MCNC: 121 MG/DL (ref 70–105)
GLUCOSE SERPL-MCNC: 157 MG/DL (ref 70–105)
GLUCOSE SERPL-MCNC: 95 MG/DL (ref 70–105)

## 2023-06-21 PROCEDURE — 99900035 HC TECH TIME PER 15 MIN (STAT)

## 2023-06-21 PROCEDURE — 94761 N-INVAS EAR/PLS OXIMETRY MLT: CPT

## 2023-06-21 PROCEDURE — 11000004 HC SNF PRIVATE

## 2023-06-21 PROCEDURE — 97530 THERAPEUTIC ACTIVITIES: CPT | Mod: CQ

## 2023-06-21 PROCEDURE — 25000003 PHARM REV CODE 250: Performed by: NURSE PRACTITIONER

## 2023-06-21 PROCEDURE — 27000982 HC MATTRESS, MATRIX LAL RENTAL

## 2023-06-21 PROCEDURE — 27000944

## 2023-06-21 PROCEDURE — 82962 GLUCOSE BLOOD TEST: CPT

## 2023-06-21 PROCEDURE — 97110 THERAPEUTIC EXERCISES: CPT | Mod: CQ

## 2023-06-21 RX ADMIN — HYDROCODONE BITARTRATE AND ACETAMINOPHEN 1 TABLET: 5; 325 TABLET ORAL at 05:06

## 2023-06-21 NOTE — PT/OT/SLP PROGRESS
"Physical Therapy Treatment    Patient Name:  Esthela Contreras   MRN:  [id number]    Recommendations:     Discharge Recommendations: home health PT, nursing facility, basic  Discharge Equipment Recommendations: none, slide board  Barriers to discharge: Decreased caregiver support    Assessment:     Esthela Contreras is a 72 y.o. male admitted with a medical diagnosis of Gram-positive bacteremia.  He presents with the following impairments/functional limitations: weakness, impaired endurance, impaired functional mobility, decreased lower extremity function, decreased safety awareness Patient was agreeable to perform therapy today, but was not agreeable to performing transfers due to "these legs feel heavy, I need to rest, we need to hold off till tomorrow". Patient was unwilling to participate in any more and requested therapy be held till tomorrow.     Rehab Prognosis: Fair; patient would benefit from acute skilled PT services to address these deficits and reach maximum level of function.    Recent Surgery: * No surgery found *      Plan:     During this hospitalization, patient to be seen 5 x/week to address the identified rehab impairments via gait training, therapeutic activities, therapeutic exercises and progress toward the following goals:    Plan of Care Expires:  07/03/23    Subjective     Chief Complaint: weakness and pain  Patient/Family Comments/goals: return home   Pain/Comfort:         Objective:     Communicated with PT prior to session.  Patient found HOB elevated with   upon PT entry to room.     General Precautions: Standard, fall  Orthopedic Precautions: N/A  Braces: N/A  Respiratory Status: Room air     Functional Mobility:  Bed Mobility:   patient only agreeable to perform exercises in supine       AM-PAC 6 CLICK MOBILITY          Treatment & Education:  Ankle pumps 2 x 10   Hip flexion 2 x 10  Hip abduction 2 x 10   Hip adduction 2 x 10   Ankle inversion 2 x 10   Patient reported, "these legs feel " "heavy, I need to rest, we need to hold off till tomorrow"    Patient left HOB elevated with call button in reach..    GOALS:   Multidisciplinary Problems       Physical Therapy Goals          Problem: Physical Therapy    Goal Priority Disciplines Outcome Goal Variances Interventions   Physical Therapy Goal     PT, PT/OT Ongoing, Progressing     Description: Short-term Goals: 1.5 weeks  1. Patient will perform supine to/from sit with minimal assistance to improve independence and safety with bed mobility.  2. Patient will perform a sliding board transfer from the bed to/from the chair/wheelchair with minimal assist to improve independence and safety with transfers.   3. Patient will tolerate 15 minutes of physical therapy intervention to improve endurance and activity tolerance.    Long-term goals: 3 weeks  1. Patient will perform supine to/from sit with contact guard assist to improve independence and safety with bed mobility.  2. Patient will perform a sliding board transfer from the bed to/from the chair/wheelchair with contact guard assist to improve independence and safety with transfers.  3. Patient will tolerate 30 minutes of physical therapy intervention to improve endurance and activity tolerance.                         Time Tracking:     PT Received On: 06/21/23  PT Start Time: 1405     PT Stop Time: 1444  PT Total Time (min): 39 min     Billable Minutes: Therapeutic Exercise 20    Treatment Type: Treatment  PT/PTA: PTA     Number of PTA visits since last PT visit: 4  CAMRYN Low   06/20/2023  "

## 2023-06-21 NOTE — PLAN OF CARE
[facility] - Medical Surgical Unit - Swing Bed   Interdisciplinary Team Meeting    Patient: Esthela Contreras   Today's Date: 6/21/2023   Estimated D/C Date: 7/3/2023        Physician: Liam Contreras MD Nurse Practitioner: Fang Crowder NP   Pharmacy: Linda Almazan, PharmD Unit Director: BRYANT Milian   :  Crys Dwyer RN Physical/Occupational Therapy:  Raymundo Pride PT   Speech Therapy: ANGELIKA Activity Therapy: Swati Espino LPN   Nursing: BRYANT Milian  Respiratory: Ruslan Mandujano, RT Dietary: Gael Aguilar RD  Other:      Nursing  New Symptoms/Problems: N/A  Last Bowel Movement: 06/19/23  Urine: continent Diarrhea: No   Constipated: No Bowel: incontinent Rivero: No   Isolation: No     Device (Oxygen Therapy): room air Flow (L/min): 2  SpO2: 98 %       Speech/Swallowing: No current speech or swallowing issues  Aspiration Precautions: No  Cognition: Memory issues    Physical Therapy  Physical Therapy/Gait: unable ELOS: Plan to DC 7/3/2023    Transfers: Moderate Assistance with lateral scoot from bed to chair Range of Motion/Restrictions: NONE     Occupational Therapy patient doesn't receive Occupational Therapy asst. Given per nursing staff  Eating/Grooming: Supervision or Set-up Assistance Toileting: Supervision or Set-up Assistance   Bathing: Minimal Assistance Dressing (Upper Body): Minimal Assistance   Dressing (Lower Body): Moderate Assistance      Activity Therapy  Level of participation: Active participation      Tx Plan/Recommendations reviewed with family and/or patient on (date) 06/20/2023.  Additional family Conference/Training: Safety with patient at home  D/C Plan/Recommendations: Home with HH and Home with family  MAKENNA: 7/3/2023    Pharmacy  Medication Changes (see MD orders in chart): Yes  MD/NP: Liam Contreras MD Labs Reviewed: Yes New Lab Orders: Yes   Lab Concerns: Q M/Th

## 2023-06-21 NOTE — NURSING
"Attempted to started pt IV antibiotic. PT refused and stated "I tried of you all waking me up at night" Tried to inform pt the importance of his medication. PT refused  "

## 2023-06-21 NOTE — PT/OT/SLP PROGRESS
Physical Therapy Treatment    Patient Name:  Esthela Contreras   MRN:  [id number]    Recommendations:     Discharge Recommendations: home health PT, nursing facility, basic  Discharge Equipment Recommendations: none, slide board  Barriers to discharge: Decreased caregiver support    Assessment:     Esthela Contreras is a 72 y.o. male admitted with a medical diagnosis of Gram-positive bacteremia.  He presents with the following impairments/functional limitations: weakness, impaired endurance, impaired functional mobility, decreased lower extremity function, decreased safety awareness Patient was agreeable to perform therapy today.     Rehab Prognosis: Fair; patient would benefit from acute skilled PT services to address these deficits and reach maximum level of function.    Recent Surgery: * No surgery found *      Plan:     During this hospitalization, patient to be seen 5 x/week to address the identified rehab impairments via gait training, therapeutic activities, therapeutic exercises and progress toward the following goals:    Plan of Care Expires:  07/03/23    Subjective     Chief Complaint: weakness and pain  Patient/Family Comments/goals: return home   Pain/Comfort:  Pain Rating 1: 0/10  Pain Rating Post-Intervention 1: 0/10      Objective:     Communicated with PT prior to session.  Patient found HOB elevated with   upon PT entry to room.     General Precautions: Standard, fall  Orthopedic Precautions: N/A  Braces: N/A  Respiratory Status: Room air     Functional Mobility:  Bed Mobility:     Scooting: stand by assistance  Supine to Sit: modified independence  Sit to Supine: stand by assistance      AM-PAC 6 CLICK MOBILITY  Turning over in bed (including adjusting bedclothes, sheets and blankets)?: 2  Sitting down on and standing up from a chair with arms (e.g., wheelchair, bedside commode, etc.): 1  Moving from lying on back to sitting on the side of the bed?: 2  Moving to and from a bed to a chair (including a  wheelchair)?: 2  Need to walk in hospital room?: 1  Climbing 3-5 steps with a railing?: 1  Basic Mobility Total Score: 9       Treatment & Education:  Transfer training    Ankle pumps 3 x 10   Hip flexion 2 x 10  Hip abduction 3 x 10   Hip adduction 3 x 10   LAQ 3 x 10     Patient left HOB elevated with call button in reach..    GOALS:   Multidisciplinary Problems       Physical Therapy Goals          Problem: Physical Therapy    Goal Priority Disciplines Outcome Goal Variances Interventions   Physical Therapy Goal     PT, PT/OT Ongoing, Progressing     Description: Short-term Goals: 1.5 weeks  1. Patient will perform supine to/from sit with minimal assistance to improve independence and safety with bed mobility.  2. Patient will perform a sliding board transfer from the bed to/from the chair/wheelchair with minimal assist to improve independence and safety with transfers.   3. Patient will tolerate 15 minutes of physical therapy intervention to improve endurance and activity tolerance.    Long-term goals: 3 weeks  1. Patient will perform supine to/from sit with contact guard assist to improve independence and safety with bed mobility.  2. Patient will perform a sliding board transfer from the bed to/from the chair/wheelchair with contact guard assist to improve independence and safety with transfers.  3. Patient will tolerate 30 minutes of physical therapy intervention to improve endurance and activity tolerance.                         Time Tracking:     PT Received On: 06/21/23  PT Start Time: 1405     PT Stop Time: 1444  PT Total Time (min): 39 min     Billable Minutes: Therapeutic Activity 15 and Therapeutic Exercise 24    Treatment Type: Treatment  PT/PTA: PTA     Number of PTA visits since last PT visit: 5   CAMRYN Low   06/21/2023

## 2023-06-22 ENCOUNTER — CLINICAL SUPPORT (OUTPATIENT)
Dept: WOUND CARE | Facility: HOSPITAL | Age: 73
End: 2023-06-22
Payer: MEDICARE

## 2023-06-22 DIAGNOSIS — L97.509 TYPE 2 DIABETES MELLITUS WITH FOOT ULCER, UNSPECIFIED WHETHER LONG TERM INSULIN USE: ICD-10-CM

## 2023-06-22 DIAGNOSIS — L97.421 ULCER OF LEFT HEEL, LIMITED TO BREAKDOWN OF SKIN: ICD-10-CM

## 2023-06-22 DIAGNOSIS — I87.2 CHRONIC VENOUS INSUFFICIENCY: ICD-10-CM

## 2023-06-22 DIAGNOSIS — L97.422 ULCER OF LEFT HEEL, WITH FAT LAYER EXPOSED: ICD-10-CM

## 2023-06-22 DIAGNOSIS — E11.621 TYPE 2 DIABETES MELLITUS WITH FOOT ULCER, UNSPECIFIED WHETHER LONG TERM INSULIN USE: ICD-10-CM

## 2023-06-22 DIAGNOSIS — L97.312 NON-PRESSURE CHRONIC ULCER OF RIGHT ANKLE WITH FAT LAYER EXPOSED: Primary | ICD-10-CM

## 2023-06-22 DIAGNOSIS — L97.912 NON-PRESSURE CHRONIC ULCER OF RIGHT LOWER LEG WITH FAT LAYER EXPOSED: ICD-10-CM

## 2023-06-22 DIAGNOSIS — L97.922 NON-PRESSURE CHRONIC ULCER OF LEFT LOWER LEG WITH FAT LAYER EXPOSED: ICD-10-CM

## 2023-06-22 LAB
ANION GAP SERPL CALCULATED.3IONS-SCNC: 8 MMOL/L (ref 7–16)
ANISOCYTOSIS BLD QL SMEAR: ABNORMAL
BASOPHILS # BLD AUTO: 0.11 K/UL (ref 0–0.2)
BASOPHILS NFR BLD AUTO: 1.7 % (ref 0–1)
BUN SERPL-MCNC: 15 MG/DL (ref 7–18)
BUN/CREAT SERPL: 19 (ref 6–20)
CALCIUM SERPL-MCNC: 7.1 MG/DL (ref 8.5–10.1)
CHLORIDE SERPL-SCNC: 110 MMOL/L (ref 98–107)
CO2 SERPL-SCNC: 27 MMOL/L (ref 21–32)
CREAT SERPL-MCNC: 0.79 MG/DL (ref 0.7–1.3)
DIFFERENTIAL METHOD BLD: ABNORMAL
EGFR (NO RACE VARIABLE) (RUSH/TITUS): 93 ML/MIN/1.73M2
EOSINOPHIL # BLD AUTO: 0.26 K/UL (ref 0–0.5)
EOSINOPHIL NFR BLD AUTO: 4.1 % (ref 1–4)
EOSINOPHIL NFR BLD MANUAL: 5 % (ref 1–4)
ERYTHROCYTE [DISTWIDTH] IN BLOOD BY AUTOMATED COUNT: 16.4 % (ref 11.5–14.5)
GLUCOSE SERPL-MCNC: 103 MG/DL (ref 70–105)
GLUCOSE SERPL-MCNC: 116 MG/DL (ref 70–105)
GLUCOSE SERPL-MCNC: 120 MG/DL (ref 74–106)
GLUCOSE SERPL-MCNC: 127 MG/DL (ref 70–105)
GLUCOSE SERPL-MCNC: 83 MG/DL (ref 70–105)
HCT VFR BLD AUTO: 31.2 % (ref 40–54)
HGB BLD-MCNC: 9.6 G/DL (ref 13.5–18)
LYMPHOCYTES # BLD AUTO: 1.55 K/UL (ref 1–4.8)
LYMPHOCYTES NFR BLD AUTO: 24.5 % (ref 27–41)
LYMPHOCYTES NFR BLD MANUAL: 29 % (ref 27–41)
MCH RBC QN AUTO: 28.8 PG (ref 27–31)
MCHC RBC AUTO-ENTMCNC: 30.8 G/DL (ref 32–36)
MCV RBC AUTO: 93.7 FL (ref 80–96)
MONOCYTES # BLD AUTO: 1.01 K/UL (ref 0–0.8)
MONOCYTES NFR BLD AUTO: 16 % (ref 2–6)
MONOCYTES NFR BLD MANUAL: 15 % (ref 2–6)
MPC BLD CALC-MCNC: 10.2 FL (ref 9.4–12.4)
NEUTROPHILS # BLD AUTO: 3.39 K/UL (ref 1.8–7.7)
NEUTROPHILS NFR BLD AUTO: 53.7 % (ref 53–65)
NEUTS BAND NFR BLD MANUAL: 8 % (ref 1–5)
NEUTS SEG NFR BLD MANUAL: 43 % (ref 50–62)
NRBC BLD MANUAL-RTO: ABNORMAL %
PLATELET # BLD AUTO: 179 K/UL (ref 150–400)
PLATELET MORPHOLOGY: NORMAL
POTASSIUM SERPL-SCNC: 3.3 MMOL/L (ref 3.5–5.1)
RBC # BLD AUTO: 3.33 M/UL (ref 4.6–6.2)
SODIUM SERPL-SCNC: 142 MMOL/L (ref 136–145)
WBC # BLD AUTO: 6.32 K/UL (ref 4.5–11)

## 2023-06-22 PROCEDURE — 94761 N-INVAS EAR/PLS OXIMETRY MLT: CPT

## 2023-06-22 PROCEDURE — 82962 GLUCOSE BLOOD TEST: CPT

## 2023-06-22 PROCEDURE — 80048 BASIC METABOLIC PNL TOTAL CA: CPT | Performed by: NURSE PRACTITIONER

## 2023-06-22 PROCEDURE — 27000982 HC MATTRESS, MATRIX LAL RENTAL

## 2023-06-22 PROCEDURE — 11000004 HC SNF PRIVATE

## 2023-06-22 PROCEDURE — 25000003 PHARM REV CODE 250: Performed by: NURSE PRACTITIONER

## 2023-06-22 PROCEDURE — 85025 COMPLETE CBC W/AUTO DIFF WBC: CPT | Performed by: NURSE PRACTITIONER

## 2023-06-22 PROCEDURE — 99214 OFFICE O/P EST MOD 30 MIN: CPT

## 2023-06-22 PROCEDURE — 99900035 HC TECH TIME PER 15 MIN (STAT)

## 2023-06-22 PROCEDURE — 27000944

## 2023-06-22 PROCEDURE — 97116 GAIT TRAINING THERAPY: CPT

## 2023-06-22 RX ADMIN — MIDODRINE HYDROCHLORIDE 5 MG: 2.5 TABLET ORAL at 07:06

## 2023-06-22 RX ADMIN — HYDROCODONE BITARTRATE AND ACETAMINOPHEN 1 TABLET: 5; 325 TABLET ORAL at 02:06

## 2023-06-22 RX ADMIN — Medication 1 CAPSULE: at 07:06

## 2023-06-22 NOTE — PLAN OF CARE
Problem: Adult Inpatient Plan of Care  Goal: Patient-Specific Goal (Individualized)  Outcome: Ongoing, Progressing     Problem: Diabetes Comorbidity  Goal: Blood Glucose Level Within Targeted Range  Outcome: Ongoing, Progressing  Intervention: Monitor and Manage Glycemia  Flowsheets (Taken 6/22/2023 0445)  Glycemic Management: blood glucose monitored     Problem: Impaired Wound Healing  Goal: Optimal Wound Healing  Outcome: Ongoing, Progressing  Intervention: Promote Wound Healing  Flowsheets (Taken 6/22/2023 0445)  Oral Nutrition Promotion: rest periods promoted  Sleep/Rest Enhancement: awakenings minimized  Pain Management Interventions: care clustered     Problem: Fall Injury Risk  Goal: Absence of Fall and Fall-Related Injury  Outcome: Ongoing, Progressing  Intervention: Promote Injury-Free Environment  Flowsheets (Taken 6/22/2023 0445)  Safety Promotion/Fall Prevention:   side rails raised x 2   instructed to call staff for mobility

## 2023-06-22 NOTE — PT/OT/SLP PROGRESS
"Physical Therapy Treatment    Patient Name:  Esthela Contreras   MRN:  [id number]    Recommendations:     Discharge Recommendations: home health PT, nursing facility, basic  Discharge Equipment Recommendations: none, slide board  Barriers to discharge: Decreased caregiver support    Assessment:     Esthela Contreras is a 72 y.o. male admitted with a medical diagnosis of Gram-positive bacteremia.  He presents with the following impairments/functional limitations: weakness, impaired endurance, impaired functional mobility, decreased lower extremity function, decreased safety awareness. Patient with a willingness (with heavy encouragement from Physical Therapist and ANAHI Helms) to participate in transfer and gait training this treatment and with great performance/effort. Patient was very proud of himself, even emotional, post-treatment. Patient reports he has "heard from the Lord" and seems to have found significantly more intrinsic motivation to improve functionally so that he can return home. Patient reports he is looking forward to physical therapy treatment tomorrow.     Rehab Prognosis: Good; patient would benefit from acute skilled PT services to address these deficits and reach maximum level of function.    Recent Surgery: * No surgery found *      Plan:     During this hospitalization, patient to be seen 5 x/week to address the identified rehab impairments via gait training, therapeutic activities, therapeutic exercises and progress toward the following goals:    Plan of Care Expires:  07/03/23    Subjective     Chief Complaint: bilateral lower extremity pain  Patient/Family Comments/goals: Patient's goal is to return home alone when functionally able.   Pain/Comfort:  Pain Rating 1: 8/10  Location - Side 1: Bilateral  Location 1: leg  Pain Addressed 1: Other (see comments) (functional mobility)  Pain Rating Post-Intervention 1: 6/10    Objective:     Communicated with ANAHI Helms prior to session. Patient found " supine asleep upon PT entry to room.     General Precautions: Standard, fall  Orthopedic Precautions: N/A  Braces: N/A  Respiratory Status: Room air     Functional Mobility:  Bed Mobility:     Supine to Sit: supervision  Transfers:     Sit to Stand:  contact guard assistance with rolling walker x 2 trials  Gait: x 46 feet with rolling walker with contact guard assist; decreased step lengths bilaterally; decreased gait speed; no significant loss of balance    AM-PAC 6 CLICK MOBILITY  Turning over in bed (including adjusting bedclothes, sheets and blankets)?: 4  Sitting down on and standing up from a chair with arms (e.g., wheelchair, bedside commode, etc.): 3  Moving from lying on back to sitting on the side of the bed?: 4  Moving to and from a bed to a chair (including a wheelchair)?: 3  Need to walk in hospital room?: 3  Climbing 3-5 steps with a railing?: 2  Basic Mobility Total Score: 19     Treatment & Education:    Bed mobility, transfers, and gait performed as listed above.     Patient left up in chair with call button in reach.    GOALS:   Multidisciplinary Problems       Physical Therapy Goals          Problem: Physical Therapy    Goal Priority Disciplines Outcome Goal Variances Interventions   Physical Therapy Goal     PT, PT/OT Ongoing, Progressing     Description: Short-term Goals: 1.5 weeks  1. Patient will perform supine to/from sit with minimal assistance to improve independence and safety with bed mobility.  2. Patient will perform a sliding board transfer from the bed to/from the chair/wheelchair with minimal assist to improve independence and safety with transfers.   3. Patient will tolerate 15 minutes of physical therapy intervention to improve endurance and activity tolerance.    Long-term goals: 3 weeks  1. Patient will perform supine to/from sit with contact guard assist to improve independence and safety with bed mobility.  2. Patient will perform a sliding board transfer from the bed to/from  the chair/wheelchair with contact guard assist to improve independence and safety with transfers.  3. Patient will tolerate 30 minutes of physical therapy intervention to improve endurance and activity tolerance.                       Time Tracking:     PT Received On: 06/22/23  PT Start Time: 1441     PT Stop Time: 1459  PT Total Time (min): 18 min     Billable Minutes: Gait Training 18 minutes    Treatment Type: Treatment, 6th Visit  PT/PTA: PT     Number of PTA visits since last PT visit: 0     06/22/2023

## 2023-06-22 NOTE — PROGRESS NOTES
This note has been moved to another encounter. If you have any questions, please contact HIM Chart Correction at [phone].

## 2023-06-22 NOTE — PROGRESS NOTES
Allergies  No Known Drug Allergies    HPI  This information was obtained from the patient    Additional Information  What was the patient's initial hospital admission date?: 6/9/2023  Wound Assessment(s)  Wound #1 Right, Lateral Ankle is a chronic Full Thickness Arterial Ulcer and has received a status of Not Healed. Initial wound encounter measurements are 0.3cm length x 1cm width x 0.1cm depth, with an area of 0.3 sq cm and a volume of 0.03 cubic cm. Adipose is exposed. No tunneling has been noted. No sinus tract has been noted. No undermining has been noted. There is a moderate amount of serosanguineous drainage noted which has no odor. The patient reports a wound pain of level 5/10. The wound margin is well defined. Wound bed has Yes epithelialization, No eschar, Yes slough, Yes pink, firm granulation.  The periwound skin exhibited: Edema, Dry/Scaly. The periwound skin did not exhibit: Brawny Induration, Excoriation, Induration, Callus, Crepitus, Fluctuance, Friable, Rash, Denuded, Moist, Maceration, Atrophie [place], Cyanosis, Ecchymosis, Erythema, Hemosiderosis, Pallor, Rubor. The temperature of the periwound skin is WNL. Periwound skin does not exhibit signs or symptoms of infection. Local Pulse is Weak.    Wound #2 Right, Posterior Leg - lower is a chronic Full Thickness Arterial Ulcer and has received a status of Not Healed. Initial wound encounter measurements are 2.4cm length x 2cm width x 0.1cm depth, with an area of 4.8 sq cm and a volume of 0.48 cubic cm. Adipose is exposed. No tunneling has been noted. No sinus tract has been noted. No undermining has been noted. There is a moderate amount of serosanguineous drainage noted which has no odor. The patient reports a wound pain of level 5/10. The wound margin is well defined. Wound bed has Yes epithelialization, No eschar, Yes slough, Yes pink, firm granulation.  The periwound skin exhibited: Edema, Dry/Scaly. The periwound skin did not exhibit: Brawny  Induration, Excoriation, Induration, Callus, Crepitus, Fluctuance, Friable, Rash, Denuded, Moist, Maceration, Atrophie [place], Cyanosis, Ecchymosis, Erythema, Hemosiderosis, Pallor, Rubor. The temperature of the periwound skin is WNL. Periwound skin does not exhibit signs or symptoms of infection. Local Pulse is Doppler.    Wound #3 Left, Plantar Heel is a chronic [place]azco Grade 2 Diabetic Ulcer and has received an outcome of Healed - no new wound(s). Initial wound encounter measurements are 0cm length x 0cm width x 0cm depth, with an area of 0 sq cm and a volume of 0 cubic cm. No tunneling has been noted. No sinus tract has been noted. No undermining has been noted. There was no drainage noted. Wound bed has Yes epithelialization, No eschar, No slough, No granulation.  The periwound skin exhibited: Edema, Dry/Scaly. The periwound skin did not exhibit: Brawny Induration, Excoriation, Induration, Callus, Crepitus, Fluctuance, Friable, Rash, Denuded, Moist, Maceration, Atrophie [place], Cyanosis, Ecchymosis, Erythema, Hemosiderosis, Pallor, Rubor. The temperature of the periwound skin is Warm. Periwound skin does not exhibit signs or symptoms of infection. Local Pulse is Normal.    Wound #4 Left, Medial Heel is a chronic Velazco Grade 1 Diabetic Ulcer and has received a status of Not Healed. Initial wound encounter measurements are 2.4cm length x 2.3cm width x 0.1cm depth, with an area of 5.52 sq cm and a volume of 0.552 cubic cm. Adipose is exposed. No tunneling has been noted. No sinus tract has been noted. No undermining has been noted. There is a moderate amount of serosanguineous drainage noted which has no odor. The patient reports a wound pain of level 5/10. The wound margin is flat and intact. Wound bed has Yes epithelialization, No eschar, Yes slough, Yes pink, firm granulation.  The periwound skin exhibited: Dry/Scaly. The periwound skin did not exhibit: Brawny Induration, Edema, Excoriation, Induration,  Callus, Crepitus, Fluctuance, Friable, Rash, Denuded, Moist, Maceration, Atrophie [place]ef[place], Cyanosis, Ecchymosis, Erythema, Hemosiderosis, Pallor, Rubor. The temperature of the periwound skin is WNL. Periwound skin does not exhibit signs or symptoms of infection. Local Pulse is Weak.    Wound #5 Left, Posterior Leg - lower is a chronic Full Thickness Arterial Ulcer and has received a status of Not Healed. Initial wound encounter measurements are 4.1cm length x 3.5cm width x 0.1cm depth, with an area of 14.35 sq cm and a volume of 1.435 cubic cm. Adipose is exposed. No tunneling has been noted. No sinus tract has been noted. No undermining has been noted. There is a moderate amount of serosanguineous drainage noted which has no odor. The patient reports a wound pain of level 5/10. The wound margin is flat and intact. Wound bed has Yes epithelialization, No eschar, Yes slough, Yes pink, firm granulation.  The periwound skin exhibited: Dry/Scaly. The periwound skin did not exhibit: Brawny Induration, Edema, Excoriation, Induration, Callus, Crepitus, Fluctuance, Friable, Rash, Denuded, Moist, Maceration, Atrophie [place], Cyanosis, Ecchymosis, Erythema, Hemosiderosis, Pallor, Rubor. The temperature of the periwound skin is WNL. Periwound skin does not exhibit signs or symptoms of infection. Local Pulse is Weak.    Wound #6 Sacral is a chronic Stage 3 Pressure Injury Pressure Ulcer and has received an outcome of Healed - no new wound(s). Initial wound encounter measurements are 0cm length x 0cm width x 0cm depth, with an area of 0 sq cm and a volume of 0 cubic cm. No tunneling has been noted. No sinus tract has been noted. No undermining has been noted. There was no drainage noted. The patient reports a wound pain of level 0/10. Wound bed has Yes epithelialization, No eschar, No slough, No granulation.  The periwound skin did not exhibit: Brawny Induration, Edema, Excoriation, Induration, Callus, Crepitus, Fluctuance,  Friable, Rash, Denuded, Dry/Scaly, Moist, Maceration, Atrophie [place]ef[place], Cyanosis, Ecchymosis, Erythema, Hemosiderosis, Pallor, Rubor. The temperature of the periwound skin is WNL. Periwound skin does not exhibit signs or symptoms of infection. Local Pulse is Normal.    Wound #7 Left Ischial is a chronic Stage 3 Pressure Injury Pressure Ulcer and has received an outcome of Healed - no new wound(s). Initial wound encounter measurements are 0cm length x 0cm width with no measurable depth, with an area of 0 sq cm . No tunneling has been noted. No sinus tract has been noted. No undermining has been noted. There was no drainage noted. The patient reports a wound pain of level 0/10. Wound bed has Yes epithelialization, No eschar, No slough, No granulation.  The periwound skin did not exhibit: Brawny Induration, Edema, Excoriation, Induration, Callus, Crepitus, Fluctuance, Friable, Rash, Denuded, Dry/Scaly, Moist, Maceration, Atrophie [place], Cyanosis, Ecchymosis, Erythema, Hemosiderosis, Pallor, Rubor. The temperature of the periwound skin is WNL. Periwound skin does not exhibit signs or symptoms of infection.    Vitals  Height/Length: 72 in (182.88 cm), Weight: 159 lbs (72.27 kgs), BMI: 21.6, Temperature: 98.4 °F (36.89 °C), Pulse: 85 bpm, Respiratory Rate: 16 breaths/min, Blood Pressure: 105/60 mmHg.    Additional Information  Limited to Skin Breakdown?: No  Wound Orders:  Wound #1 Right, Lateral Ankle    Cleanser  Cleanse Wound with Normal Saline  Cleanse wound with Soap and Water. - Cleanse BLE with mild soap and water daily.    Topical Treatments  Moisturizing lotion to surround skin. - Apply Lac Hydrin 12% to intact skin to BLE twice daily.    Dressings  Apply Primary dressing. - Apply Xeroform gauze to wound bed, cover with dry gauze, conforming roll gauze and secure with paper tape. Change daily and as needed for soiled or dislodged dressing.    Compression/Edema Control  Elevate leg(s) as much as possible. Avoid  standing in one position for more than 10 minutes. Avoid settings with legs down. Do not cross legs when sitting.    Dietary  Increased protein intake  Supplement with daily multivitamin.  Vitamin C 500 mg. twice a day.  Nutritional supplement: Boost, Ensure, Abel or other - Abel one packet by mouth twice daily    Follow-Up Appointments  Return Appointment 1 week  Return for Nurse visit on date noted below for wound assessment, mechanical debridement as necessary, and dressing change as ordered during today's visit: Complete for diagnosis of: - Nurse visit today for wound assessment, mechanical debridement as necessary, and dressing change as ordered during last visit: Complete for diagnosis per problem list:    Hand Hygiene/Smoking Cessation  Wash hands before and after wound care. Call the Wound Center at [phone] if you have signs or symptoms of infection, increased drainage, increasing odor, or unusual redness. After Wound Care hours, please notify your PCP or go to the ER.    Off-Loading  Use/Wear when in Bed: - Pillows or foam wedge for positioning to assist with pressure redistribution  Wheelchair Cushion.  Specialty Bed/Mattress for Pressure Reduction. - TRANG Mattress for pressure reduction  Turn every 2 hours. Avoid position directing pressure to Wound site. Limit side lying to 30 degree tilt. Limit HOB  elevation to 30 degrees in bed.    Physician Review:  Reviewed and evaluated labs.  Reviewed hospital records.    Scribing Attestation  I attest, as the nurse, that I scribed these orders for the physician.  I, as the physician, have reviewed the orders scribed by the center RN's and agree.    Wound #2 Right, Posterior Leg - lower    Cleanser  Cleanse Wound with Normal Saline  Cleanse wound with Soap and Water. - Cleanse BLE with mild soap and water daily.    Topical Treatments  Moisturizing lotion to surround skin. - Apply Lac Hydrin 12% to intact skin to BLE twice daily.    Dressings  Apply Primary  dressing. - Apply Xeroform gauze to wound bed, cover with dry gauze, conforming roll gauze and secure with paper tape. Change daily and as needed for soiled or dislodged dressing.    Compression/Edema Control  Elevate leg(s) as much as possible. Avoid standing in one position for more than 10 minutes. Avoid settings with legs down. Do not cross legs when sitting.    Dietary  Increased protein intake  Supplement with daily multivitamin.  Vitamin C 500 mg. twice a day.  Nutritional supplement: Boost, Ensure, Abel or other - Abel one packet by mouth twice daily    Follow-Up Appointments  Return Appointment 1 week  Return for Nurse visit on date noted below for wound assessment, mechanical debridement as necessary, and dressing change as ordered during today's visit: Complete for diagnosis of: - Nurse visit today for wound assessment, mechanical debridement as necessary, and dressing change as ordered during last visit: Complete for diagnosis per problem list:    Hand Hygiene/Smoking Cessation  Wash hands before and after wound care. Call the Wound Center at [phone] if you have signs or symptoms of infection, increased drainage, increasing odor, or unusual redness. After Wound Care hours, please notify your PCP or go to the ER.    Off-Loading  Use/Wear when in Bed: - Pillows or foam wedge for positioning to assist with pressure redistribution  Wheelchair Cushion.  Specialty Bed/Mattress for Pressure Reduction. - TRANG Mattress for pressure reduction  Turn every 2 hours. Avoid position directing pressure to Wound site. Limit side lying to 30 degree tilt. Limit HOB  elevation to 30 degrees in bed.    Physician Review:  Reviewed and evaluated labs.  Reviewed hospital records.    Scribing Attestation  I attest, as the nurse, that I scribed these orders for the physician.  I, as the physician, have reviewed the orders scribed by the center RN's and agree.    Wound #4 Left, Medial Heel    Cleanser  Cleanse Wound with  Normal Saline  Cleanse wound with Soap and Water. - Cleanse BLE with mild soap and water daily.    Topical Treatments  Moisturizing lotion to surround skin. - Apply Lac Hydrin 12% to intact skin to BLE twice daily.    Dressings  Apply Primary dressing. - Apply Xeroform gauze to wound bed, cover with dry gauze, conforming roll gauze and secure with paper tape. Change daily and as needed for soiled or dislodged dressing.    Compression/Edema Control  Elevate leg(s) as much as possible. Avoid standing in one position for more than 10 minutes. Avoid settings with legs down. Do not cross legs when sitting.    Dietary  Increased protein intake  Supplement with daily multivitamin.  Vitamin C 500 mg. twice a day.  Nutritional supplement: Boost, Ensure, Abel or other - Abel one packet by mouth twice daily    Follow-Up Appointments  Return Appointment 1 week  Return for Nurse visit on date noted below for wound assessment, mechanical debridement as necessary, and dressing change as ordered during today's visit: Complete for diagnosis of: - Nurse visit today for wound assessment, mechanical debridement as necessary, and dressing change as ordered during last visit: Complete for diagnosis per problem list:    Hand Hygiene/Smoking Cessation  Wash hands before and after wound care. Call the Wound Center at [phone] if you have signs or symptoms of infection, increased drainage, increasing odor, or unusual redness. After Wound Care hours, please notify your PCP or go to the ER.    Off-Loading  Use/Wear when in Bed: - Pillows or foam wedge for positioning to assist with pressure redistribution  Wheelchair Cushion.  Specialty Bed/Mattress for Pressure Reduction. - TRANG Mattress for pressure reduction  Turn every 2 hours. Avoid position directing pressure to Wound site. Limit side lying to 30 degree tilt. Limit HOB  elevation to 30 degrees in bed.    Physician Review:  Reviewed and evaluated labs.  Reviewed hospital  records.    Scribing Attestation  I attest, as the nurse, that I scribed these orders for the physician.  I, as the physician, have reviewed the orders scribed by the center RN's and agree.    Wound #5 Left, Posterior Leg - lower    Cleanser  Cleanse Wound with Normal Saline  Cleanse wound with Soap and Water. - Cleanse BLE with mild soap and water daily.    Topical Treatments  Moisturizing lotion to surround skin. - Apply Lac Hydrin 12% to intact skin to BLE twice daily.    Dressings  Apply Primary dressing. - Apply Xeroform gauze to wound bed, cover with dry gauze, conforming roll gauze and secure with paper tape. Change daily and as needed for soiled or dislodged dressing.    Compression/Edema Control  Elevate leg(s) as much as possible. Avoid standing in one position for more than 10 minutes. Avoid settings with legs down. Do not cross legs when sitting.    Dietary  Increased protein intake  Supplement with daily multivitamin.  Vitamin C 500 mg. twice a day.  Nutritional supplement: Boost, Ensure, Abel or other - Abel one packet by mouth twice daily    Follow-Up Appointments  Return Appointment 1 week  Return for Nurse visit on date noted below for wound assessment, mechanical debridement as necessary, and dressing change as ordered during today's visit: Complete for diagnosis of: - Nurse visit today for wound assessment, mechanical debridement as necessary, and dressing change as ordered during last visit: Complete for diagnosis per problem list:    Hand Hygiene/Smoking Cessation  Wash hands before and after wound care. Call the Wound Center at [phone] if you have signs or symptoms of infection, increased drainage, increasing odor, or unusual redness. After Wound Care hours, please notify your PCP or go to the ER.    Off-Loading  Use/Wear when in Bed: - Pillows or foam wedge for positioning to assist with pressure redistribution  Wheelchair Cushion.  Specialty Bed/Mattress for Pressure Reduction. - TRANG  Mattress for pressure reduction  Turn every 2 hours. Avoid position directing pressure to Wound site. Limit side lying to 30 degree tilt. Limit HOB  elevation to 30 degrees in bed.        Scribing Attestation  I attest, as the nurse, that I scribed these orders for the physician.      Additional Orders:    Follow-Up Appointments  Return Appointment 1 week

## 2023-06-23 LAB
GLUCOSE SERPL-MCNC: 103 MG/DL (ref 70–105)
GLUCOSE SERPL-MCNC: 117 MG/DL (ref 70–105)
GLUCOSE SERPL-MCNC: 173 MG/DL (ref 70–105)
GLUCOSE SERPL-MCNC: 79 MG/DL (ref 70–105)

## 2023-06-23 PROCEDURE — 27000944

## 2023-06-23 PROCEDURE — 25000003 PHARM REV CODE 250: Performed by: NURSE PRACTITIONER

## 2023-06-23 PROCEDURE — 99307 PR NURSING FAC CARE, SUBSEQ, IMPROVING: ICD-10-PCS | Mod: ,,, | Performed by: NURSE PRACTITIONER

## 2023-06-23 PROCEDURE — 99307 SBSQ NF CARE SF MDM 10: CPT | Mod: ,,, | Performed by: NURSE PRACTITIONER

## 2023-06-23 PROCEDURE — 99900035 HC TECH TIME PER 15 MIN (STAT)

## 2023-06-23 PROCEDURE — 97110 THERAPEUTIC EXERCISES: CPT

## 2023-06-23 PROCEDURE — 11000004 HC SNF PRIVATE

## 2023-06-23 PROCEDURE — 82962 GLUCOSE BLOOD TEST: CPT

## 2023-06-23 PROCEDURE — 27000982 HC MATTRESS, MATRIX LAL RENTAL

## 2023-06-23 PROCEDURE — 97116 GAIT TRAINING THERAPY: CPT

## 2023-06-23 PROCEDURE — 94761 N-INVAS EAR/PLS OXIMETRY MLT: CPT

## 2023-06-23 RX ORDER — ZINC SULFATE 50(220)MG
220 CAPSULE ORAL DAILY
Status: DISCONTINUED | OUTPATIENT
Start: 2023-06-24 | End: 2023-07-08 | Stop reason: HOSPADM

## 2023-06-23 RX ORDER — POTASSIUM CHLORIDE 20 MEQ/1
40 TABLET, EXTENDED RELEASE ORAL ONCE
Status: COMPLETED | OUTPATIENT
Start: 2023-06-23 | End: 2023-06-23

## 2023-06-23 RX ADMIN — SENNOSIDES AND DOCUSATE SODIUM 1 TABLET: 50; 8.6 TABLET ORAL at 08:06

## 2023-06-23 RX ADMIN — ASPIRIN 81 MG: 81 TABLET, COATED ORAL at 08:06

## 2023-06-23 RX ADMIN — MIDODRINE HYDROCHLORIDE 5 MG: 2.5 TABLET ORAL at 07:06

## 2023-06-23 RX ADMIN — POLYETHYLENE GLYCOL 3350 17 G: 17 POWDER, FOR SOLUTION ORAL at 08:06

## 2023-06-23 RX ADMIN — OXYCODONE HYDROCHLORIDE AND ACETAMINOPHEN 500 MG: 500 TABLET ORAL at 08:06

## 2023-06-23 RX ADMIN — POTASSIUM CHLORIDE 40 MEQ: 1500 TABLET, EXTENDED RELEASE ORAL at 08:06

## 2023-06-23 RX ADMIN — Medication 1 CAPSULE: at 07:06

## 2023-06-23 RX ADMIN — HYDROCORTISONE 2.5%: 25 CREAM TOPICAL at 08:06

## 2023-06-23 RX ADMIN — MIDODRINE HYDROCHLORIDE 5 MG: 2.5 TABLET ORAL at 11:06

## 2023-06-23 RX ADMIN — APIXABAN 5 MG: 2.5 TABLET, FILM COATED ORAL at 08:06

## 2023-06-23 RX ADMIN — ATORVASTATIN CALCIUM 40 MG: 40 TABLET, FILM COATED ORAL at 08:06

## 2023-06-23 RX ADMIN — Medication 1 CAPSULE: at 11:06

## 2023-06-23 RX ADMIN — METOPROLOL SUCCINATE 25 MG: 25 TABLET, FILM COATED, EXTENDED RELEASE ORAL at 08:06

## 2023-06-23 RX ADMIN — THERA TABS 1 TABLET: TAB at 08:06

## 2023-06-23 NOTE — ASSESSMENT & PLAN NOTE
Lab Results   Component Value Date    HGBA1C 5.5 06/02/2023     Low dose ssi coverage     06/23/23 stable

## 2023-06-23 NOTE — ASSESSMENT & PLAN NOTE
06/15/23 replacing magnesium -  Replacing potassium po     06/23/23 potassium is 3.3 - will replace

## 2023-06-23 NOTE — PT/OT/SLP PROGRESS
Physical Therapy Treatment    Patient Name:  Esthela Contreras   MRN:  [id number]    Recommendations:     Discharge Recommendations: home health PT, nursing facility, basic  Discharge Equipment Recommendations: none, slide board  Barriers to discharge: Inaccessible home and Decreased caregiver support    Assessment:     Esthela Contreras is a 72 y.o. male admitted with a medical diagnosis of Gram-positive bacteremia. He presents with the following impairments/functional limitations: weakness, impaired endurance, impaired functional mobility, decreased lower extremity function, decreased safety awareness. Patient with ability to participate in therapeutic exercise this treatment. Patient with decreased gait distance this treatment; however, gait training was completed following therapeutic exercise, and patient's ankles/feet had begun to hurt by that time. Patient, again, remains in good spirits about wanting to improve so that he can return home.     Rehab Prognosis: Good; patient would benefit from acute skilled PT services to address these deficits and reach maximum level of function.    Recent Surgery: * No surgery found *      Plan:     During this hospitalization, patient to be seen 5 x/week to address the identified rehab impairments via gait training, therapeutic activities, therapeutic exercises and progress toward the following goals:    Plan of Care Expires:  07/03/23    Subjective     Chief Complaint: no complaints this morning  Patient/Family Comments/goals: Patient's goal is to return home alone when functionally able.   Pain/Comfort:  Pain Rating 1: 0/10  Location - Side 1: Bilateral  Location 1: foot  Pain Rating Post-Intervention 1: 5/10    Objective:     Communicated with CHRISTIANE Ramos prior to session. Patient found up in chair upon PT entry to room. Patient reports he has been up in the chair since his physical therapy treatment yesterday, by choice.    General Precautions: Standard, fall  Orthopedic  Precautions: N/A  Braces: N/A  Respiratory Status: Room air     Functional Mobility:  Bed Mobility:     Sit to Supine: minimum assistance  Transfers:     Sit to Stand:  contact guard assistance with rolling walker  Bed to Chair: contact guard assistance with  rolling walker  using  Stand Pivot  Gait: x 16 feet and x 8 feet with rolling walker with contact guard assist vs minimal assist; decreased right step length (able to increase with verbal cues); slow gait speed; reports of bilateral ankle/foot pain    AM-PAC 6 CLICK MOBILITY  Turning over in bed (including adjusting bedclothes, sheets and blankets)?: 4  Sitting down on and standing up from a chair with arms (e.g., wheelchair, bedside commode, etc.): 3  Moving from lying on back to sitting on the side of the bed?: 3  Moving to and from a bed to a chair (including a wheelchair)?: 3  Need to walk in hospital room?: 3  Climbing 3-5 steps with a railing?: 2  Basic Mobility Total Score: 18     Treatment & Education:    Bed mobility, transfers, and gait performed as listed above.    NuStep: x 3.5 minutes and x 2.5 minutes    Seated marching: x 20 reps each    Patient left supine with call button in reach and bed alarm on..    GOALS:   Multidisciplinary Problems       Physical Therapy Goals          Problem: Physical Therapy    Goal Priority Disciplines Outcome Goal Variances Interventions   Physical Therapy Goal     PT, PT/OT Ongoing, Progressing     Description: Short-term Goals: 1.5 weeks  1. Patient will perform supine to/from sit with minimal assistance to improve independence and safety with bed mobility.  2. Patient will perform a sliding board transfer from the bed to/from the chair/wheelchair with minimal assist to improve independence and safety with transfers.   3. Patient will tolerate 15 minutes of physical therapy intervention to improve endurance and activity tolerance.    Long-term goals: 3 weeks  1. Patient will perform supine to/from sit with contact  guard assist to improve independence and safety with bed mobility.  2. Patient will perform a sliding board transfer from the bed to/from the chair/wheelchair with contact guard assist to improve independence and safety with transfers.  3. Patient will tolerate 30 minutes of physical therapy intervention to improve endurance and activity tolerance.                         Time Tracking:     PT Received On: 06/23/23  PT Start Time: 1033     PT Stop Time: 1105  PT Total Time (min): 32 min     Billable Minutes: Gait Training 15 minutes and Therapeutic Exercise 17 minutes    Treatment Type: Treatment, 6th Visit  PT/PTA: PT     Number of PTA visits since last PT visit: 0     06/23/2023

## 2023-06-23 NOTE — PROGRESS NOTES
Wt: 160#  Meal intake ~90%.  Pt is R Abel and Ensure.  Multiple WNDs with 2 healed.  Pt has a dx of DM but is not on DM meds. He has been having some hypoglycemia. HS snack ordered.  B-173.  Pt is receiving MVM daily and Vit C BID R/T WNDs.  Zinc level is 51-will supplement.  K 3.3, Hgb 9.6, Hct 31.2 noted.  Last BM .  Rec 1. D/C Abel and Ensure R/T refusal 2. 220mg Zinc sulfate po daily x 1 4 days.

## 2023-06-23 NOTE — PLAN OF CARE
[facility] - Medical Surgical Unit  Discharge Assessment      Received Mr. Contreras sitting up in the recliner.  Mr. Contreras was excited to share with me the fact that he had walked yesterday.  He says I'm going even further today.  Mr. Contreras will be going home alone with his niece and aunt will check in with him.  His niece normally brings him breakfast and dinner.

## 2023-06-23 NOTE — SUBJECTIVE & OBJECTIVE
Interval History: 06/23/23 Awake and sitting up in chair. States he was able to get up and walk with therapy yesterday and his legs did not hurt him. States he plans to walk further today. Continue wound care. BLE dressings intact.    Review of Systems   Respiratory:  Negative for chest tightness and shortness of breath.    Cardiovascular:  Negative for chest pain.   Gastrointestinal:  Negative for constipation, diarrhea, nausea and vomiting.   Genitourinary:  Negative for difficulty urinating.   Musculoskeletal:  Negative for arthralgias.   Skin:         Dressing intact to ble   Objective:     Vital Signs (Most Recent):  Temp: 98.1 °F (36.7 °C) (06/23/23 0715)  Pulse: 90 (06/23/23 0715)  Resp: 18 (06/23/23 0715)  BP: (!) 95/53 (06/23/23 0715)  SpO2: 100 % (06/23/23 0715) Vital Signs (24h Range):  Temp:  [98.1 °F (36.7 °C)-98.4 °F (36.9 °C)] 98.1 °F (36.7 °C)  Pulse:  [85-97] 90  Resp:  [18-20] 18  SpO2:  [96 %-100 %] 100 %  BP: ()/(50-54) 95/53     Weight: 72.7 kg (160 lb 3.2 oz)  Body mass index is 21.73 kg/m².    Intake/Output Summary (Last 24 hours) at 6/23/2023 0824  Last data filed at 6/23/2023 0745  Gross per 24 hour   Intake 1440 ml   Output 801 ml   Net 639 ml         Physical Exam  HENT:      Head: Normocephalic.      Mouth/Throat:      Mouth: Mucous membranes are moist.   Cardiovascular:      Rate and Rhythm: Normal rate and regular rhythm.      Pulses: Normal pulses.      Heart sounds: Normal heart sounds.   Pulmonary:      Effort: Pulmonary effort is normal.      Breath sounds: Normal breath sounds.   Abdominal:      General: Bowel sounds are normal.      Palpations: Abdomen is soft.   Musculoskeletal:         General: Normal range of motion.   Skin:     Comments: Dressing to ble    Neurological:      Mental Status: He is alert and oriented to person, place, and time.   Psychiatric:      Comments: More optimistic- upbeat            Significant Labs: All pertinent labs within the past 24 hours have  been reviewed.  Recent Lab Results  (Last 5 results in the past 24 hours)        06/23/23  0641   06/22/23  2026   06/22/23  1644   06/22/23  1145   06/22/23  1129        Anion Gap         8       Aniso         1+       Bands         8       Baso #         0.11       Basophil %         1.7       BUN         15       BUN/CREAT RATIO         19       Calcium         7.1       Chloride         110       CO2         27       Creatinine         0.79       Differential Type         Manual       eGFR         93       Eos #         0.26       Eosinophil %         4.1                5       Glucose         120       Hematocrit         31.2       Hemoglobin         9.6       Lymph #         1.55       Lymph %         24.5                29       MCH         28.8       MCHC         30.8       MCV         93.7       Mono #         1.01       Mono %         16.0                15       MPV         10.2       Neutrophils, Abs         3.39       Neutrophils Relative         53.7       nRBC               PLATELET MORPHOLOGY         Normal       Platelets         179       POC Glucose 79   103   127   116         Potassium         3.3       RBC         3.33       RDW         16.4       Segmented Neutrophils, Man %         43       Sodium         142       WBC         6.32                              Significant Imaging: I have reviewed all pertinent imaging results/findings within the past 24 hours.

## 2023-06-23 NOTE — ASSESSMENT & PLAN NOTE
06/15/23 dropping blood glucose into 60s over past few days  Is not on any hypoglycemics  Started on remeron for depression and to help increase appetite  HS snack ordered.     06/23/23 appetite has improved , stable

## 2023-06-23 NOTE — PLAN OF CARE
Problem: Adult Inpatient Plan of Care  Goal: Patient-Specific Goal (Individualized)  Outcome: Ongoing, Progressing     Problem: Diabetes Comorbidity  Goal: Blood Glucose Level Within Targeted Range  Outcome: Ongoing, Progressing  Intervention: Monitor and Manage Glycemia  Flowsheets (Taken 6/23/2023 0501)  Glycemic Management: blood glucose monitored     Problem: Fall Injury Risk  Goal: Absence of Fall and Fall-Related Injury  Outcome: Ongoing, Progressing  Intervention: Promote Injury-Free Environment  Flowsheets (Taken 6/23/2023 0501)  Safety Promotion/Fall Prevention:   assistive device/personal item within reach   instructed to call staff for mobility

## 2023-06-23 NOTE — ASSESSMENT & PLAN NOTE
06/14/23 PHQ 9 screen shows moderate depression   Reports having trouble falling asleep, feeling down most days   Will start on mirtazapine 15 mg po at hs       06/23 more optimistic, upbeat   Ready to work with therapy today

## 2023-06-23 NOTE — PROGRESS NOTES
[facility] - Medical Surgical Unit  Hospital Medicine  Progress Note    Patient Name: Esthela Contreras  MRN: [id number]  Patient Class: IP- Swing   Admission Date: 6/9/2023  Length of Stay: 14 days  Attending Physician: Liam Contreras IV, DO  Primary Care Provider: CHRISTIANE Bardales        Subjective:     Principal Problem:Gram-positive bacteremia        HPI:  HPI:  Patient is a 72-year-old male with a history of unspecified CHF in the setting of CAD, essential hypertension, diabetes, oxygen-dependent COPD with baseline supplemental oxygen requirement of 2 L/min and BiPAP QHS along with CKD stage IIIA who presented to the emergency room complaining of dyspnea which started just a few days ago.  Patient otherwise denied any fever chills cough hemoptysis PND orthopnea chest pain palpitation or lower extremity edema in association.  Patient's niece stated that he has chronic wounds on both legs and felt that he has not been given proper care to address this.  Patient lives alone and when his niece visited him today, she found him slumped over on a recliner dyspneic prompting ED visit.      On initial presentation, patient was tachycardic and tachypneic but vital signs were otherwise stable and patient was afebrile.  Workup was notable for what appeared to be a new onset atrial fibrillation with RVR with elevated troponin and proBNP, elevation in total bilirubin level with mildly elevated transaminases and alkaline phosphatase, leukocytosis with left shift with WBC count of 23,000 and high anion gap metabolic acidosis with anion gap of 29 and bicarbonate of 7, acute on chronic renal failure with BUN of 110 and creatinine of 7.03 from a baseline serum creatinine of 1.43, significantly elevated lactic acid level with initial lactic acid level of 9.7 to 10.7 even after receiving 30 cc/kg of crystalloid IV fluid bolus.  CT of bilateral lower extremity demonstrated a presence of bilateral cellulitis without  evidence of drainable abscess or necrotizing fasciitis. Patient will be admitted for further evaluation and intervention        Overview/Hospital Course:  06/09/2023   Patient has no new complaints, leg swelling improving.    T-max 100.3°, blood pressure 93/53 pulse rate 84 respiratory rate 18 temperature 98.1°.  His labs white blood cell count down to 5 hemoglobin 12 platelets count 118 sodium 137 potassium 3.1 creatinine 1 calcium 6.5.    Echocardiogram showed ejection fraction of 35% with left ventricular diastolic dysfunction.    Blood culture on on 06/09/2023 showed no growth to date.    Blood cultures on 06/06/2023 grew Gram-negative bacilli, but chews fragilis, Streptococcus species.    Hypotension resolved, blood pressure borderline but does not need vasopressors.    Sepsis resolved.    New onset AFib, rate is controlled, started on Eliquis anticoagulation.    Acute renal failure presumably   Bacteremia, blood cultures questionable chills contamination, likely related to cellulitis of lower extremities, will continue antibiotics.    Surgery evaluated the patient, recommended to Continue local wound care.    Will deescalate antibiotics to Rocephin 2 g IV piggyback daily.    Patient awaiting placement.      Will need to continue antibiotics , rocephin 2 g IVPB daily for 12 more days, last dose 06/21/23. Continue local wound care and therapy.    Above information is from [facility] acute care stay 06/01/23- 06/09/23. Mr. Contreras has been accepted to swing bed services at [facility]. His arrival is expected today.     Mr. Contreras is a 72 year-old  male who is admitted to [facility] swing bed services for daily wound care and continuation of IV Rocephin through 6/21/23. Upon arrival here tonight, he is alert and oriented. Dressings from lower legs were removed for assessment. He reported severe pain with manipulation of legs. He states that he has had poor appetite and notes some  "constipation during hospitalization. Labs done at [facility] today were reviewed and are noted below. His albumin was 1.4 on 6/8. Dietician and wound care are consulted. He was accepted by Dr. Rosario to admission to Dr. Contreras today. DIEGO Alcocer completed medication reconciliation and admission orders.      Overview/Hospital Course:  06/12/23 Awake and resting in bed. Complains of having gas but no BM in past 5 days. Complains of occasional pain to ankles. He has dressings intact to BLE from knees down.  Continue rocephin IV and wound care to BLE. Continue therapy for strengthening.  06/13/23 refusing to take po meds. Talked with him re possible adverse effects of doing so and he states "When the good lord takes me, I am ready." Talked with him re code status and he states he wants to be Full code.  06/14/23 continues to refuse po meds- states meds " make him feel funny, just don't feel right ."Talked with him re purpose of meds and he is agreeable to resuming meds. Will monitor on telemetry. CNA reports small amount of bright red blood noted with BM. Talked him re any history of hemorrhoids or constipation. He denies both. Will recheck CBC and check stool for FOB.   06/14/23 Talked with Mr. Contreras re possibility of him being depressed. He allowed me to do PHQ 9 screening. He scored 11 showing moderate depression. He began to cry at end of screen. I talked with him re antidepressant and he is agreeable. Will start remeron 15 mg po daily.  06/15/23 Awake and resting in bed. No complaints. Reports sleeping better last night. H&H stable at 12/37. Talked with him again re positive FOB. He does report having burning and itching to rectum for past few days. No external hemorrhoids noted. Will treat with anusol hc. Resume eliquis. Dressing to bilateral lower legs.   06/19/23 Awake and resting in bed. Reports feeling better and sleeping better. Denies pain. States he has not had BM in 2-3 days but that is normal for him. Denies " feeling constipated. Continue rocephin and wound care. Blood glucose levels have improved. Continue to monitor for hypoglycemia.  06/23/23 Awake and sitting up in chair. States he was able to get up and walk with therapy yesterday and his legs did not hurt him. States he plans to walk further today. Continue wound care. BLE dressings intact.      Interval History: 06/23/23 Awake and sitting up in chair. States he was able to get up and walk with therapy yesterday and his legs did not hurt him. States he plans to walk further today. Continue wound care. BLE dressings intact.    Review of Systems   Respiratory:  Negative for chest tightness and shortness of breath.    Cardiovascular:  Negative for chest pain.   Gastrointestinal:  Negative for constipation, diarrhea, nausea and vomiting.   Genitourinary:  Negative for difficulty urinating.   Musculoskeletal:  Negative for arthralgias.   Skin:         Dressing intact to ble   Objective:     Vital Signs (Most Recent):  Temp: 98.1 °F (36.7 °C) (06/23/23 0715)  Pulse: 90 (06/23/23 0715)  Resp: 18 (06/23/23 0715)  BP: (!) 95/53 (06/23/23 0715)  SpO2: 100 % (06/23/23 0715) Vital Signs (24h Range):  Temp:  [98.1 °F (36.7 °C)-98.4 °F (36.9 °C)] 98.1 °F (36.7 °C)  Pulse:  [85-97] 90  Resp:  [18-20] 18  SpO2:  [96 %-100 %] 100 %  BP: ()/(50-54) 95/53     Weight: 72.7 kg (160 lb 3.2 oz)  Body mass index is 21.73 kg/m².    Intake/Output Summary (Last 24 hours) at 6/23/2023 0824  Last data filed at 6/23/2023 0745  Gross per 24 hour   Intake 1440 ml   Output 801 ml   Net 639 ml         Physical Exam  HENT:      Head: Normocephalic.      Mouth/Throat:      Mouth: Mucous membranes are moist.   Cardiovascular:      Rate and Rhythm: Normal rate and regular rhythm.      Pulses: Normal pulses.      Heart sounds: Normal heart sounds.   Pulmonary:      Effort: Pulmonary effort is normal.      Breath sounds: Normal breath sounds.   Abdominal:      General: Bowel sounds are normal.       Palpations: Abdomen is soft.   Musculoskeletal:         General: Normal range of motion.   Skin:     Comments: Dressing to ble    Neurological:      Mental Status: He is alert and oriented to person, place, and time.   Psychiatric:      Comments: More optimistic- upbeat            Significant Labs: All pertinent labs within the past 24 hours have been reviewed.  Recent Lab Results  (Last 5 results in the past 24 hours)        06/23/23  0641   06/22/23  2026   06/22/23  1644   06/22/23  1145   06/22/23  1129        Anion Gap         8       Aniso         1+       Bands         8       Baso #         0.11       Basophil %         1.7       BUN         15       BUN/CREAT RATIO         19       Calcium         7.1       Chloride         110       CO2         27       Creatinine         0.79       Differential Type         Manual       eGFR         93       Eos #         0.26       Eosinophil %         4.1                5       Glucose         120       Hematocrit         31.2       Hemoglobin         9.6       Lymph #         1.55       Lymph %         24.5                29       MCH         28.8       MCHC         30.8       MCV         93.7       Mono #         1.01       Mono %         16.0                15       MPV         10.2       Neutrophils, Abs         3.39       Neutrophils Relative         53.7       nRBC               PLATELET MORPHOLOGY         Normal       Platelets         179       POC Glucose 79   103   127   116         Potassium         3.3       RBC         3.33       RDW         16.4       Segmented Neutrophils, Man %         43       Sodium         142       WBC         6.32                              Significant Imaging: I have reviewed all pertinent imaging results/findings within the past 24 hours.      Assessment/Plan:      * Gram-positive bacteremia  06/09/23 continue rocephin 2 gm ivpb daily x 12 more days, last dose 06/21/23    Blood culture from 06/01/23 shows   Streptococcus species      Not Specified     Azithromycin >2 µg/ml Resistant     Cefotaxime 0.5 µg/ml Sensitive     Ceftriaxone 0.5 µg/ml Sensitive     Clindamycin <=0.06 µg/ml Sensitive     Erythromycin >0.5 µg/ml Resistant     Levofloxacin 1 µg/ml Sensitive     Tetracycline 1 µg/ml Sensitive     Vancomycin 0.5 µg/ml Sensitive              Repeat blood cultures x from 06/05/23 show no growth    06/12/23 continue rocephin       06/14/23 continue rocephin     06/19/23 continue rocephin     Coronary artery disease involving native coronary artery of native heart without angina pectoris     06/09/23 Continue home aspirin, BB, and high-intensity statin.           Essential hypertension  06/09/23 continue metoprolol succinate 25 mg daily     06/12/23 stable     06/23 stable    SOB (shortness of breath)    06/09/23 oxygen at 2 liters   duonebs every 4 hours prn     HFrEF (heart failure with reduced ejection fraction)    Summary     The left ventricle is normal in size with mild concentric hypertrophy and moderately decreased systolic function.   The estimated ejection fraction is 35%.   Left ventricular diastolic dysfunction.   Atrial fibrillation not observed.   Normal right ventricular size.   Normal central venous pressure (3 mmHg).   The estimated PA systolic pressure is 22 mmHg.   Trivial pericardial effusion.     Echo done 06/02/23     6/10 - Appears compensated.    Hypoglycemia    06/15/23 dropping blood glucose into 60s over past few days  Is not on any hypoglycemics  Started on remeron for depression and to help increase appetite  HS snack ordered.     06/23/23 appetite has improved , stable    Hypomagnesemia    06/15/23 magnesium 1.5- one gram magnesium sulfate       Hypokalemia    06/15/23 replacing magnesium -  Replacing potassium po     06/23/23 potassium is 3.3 - will replace    Depression    06/14/23 PHQ 9 screen shows moderate depression   Reports having trouble falling asleep, feeling down most days   Will start on  mirtazapine 15 mg po at hs       06/23 more optimistic, upbeat   Ready to work with therapy today    Diabetes mellitus    Lab Results   Component Value Date    HGBA1C 5.5 06/02/2023     Low dose ssi coverage     06/23/23 stable    Pressure injury of sacral region, stage 2     Border foam dressings to Trochanter and sacrum .     COPD (chronic obstructive pulmonary disease)  06/09/23 02 per nc at 2 liters  duonebs every 4 hours prn       New onset a-fib  06/09/23 eliquis 5 mg po BID           Cellulitis of lower extremity  06/09/23  wound cleansing with Vashe, apply silvadene daily; Border foam dressings to Trochanter and sacrum .        06/12/23 continue wound care to BLE     06/15/23 wound care consult       VTE Risk Mitigation (From admission, onward)         Ordered     apixaban tablet 5 mg  2 times daily         06/15/23 1030                Discharge Planning   MAKENNA: 7/3/2023     Code Status: Full Code   Is the patient medically ready for discharge?:     Reason for patient still in hospital (select all that apply): Treatment  Discharge Plan A: Home                  CHRISTIANE Mcelroy  Department of Hospital Medicine   [facility] - Medical Surgical Unit

## 2023-06-23 NOTE — PLAN OF CARE
Problem: Fall Injury Risk  Goal: Absence of Fall and Fall-Related Injury  Outcome: Ongoing, Progressing  Intervention: Promote Injury-Free Environment  Flowsheets (Taken 6/23/2023 7723)  Safety Promotion/Fall Prevention:   assistive device/personal item within reach   nonskid shoes/socks when out of bed   side rails raised x 3   instructed to call staff for mobility   commode/urinal/bedpan at bedside

## 2023-06-24 PROBLEM — E16.2 HYPOGLYCEMIA: Status: RESOLVED | Noted: 2023-06-15 | Resolved: 2023-06-24

## 2023-06-24 PROBLEM — R78.81 GRAM-POSITIVE BACTEREMIA: Status: RESOLVED | Noted: 2023-06-03 | Resolved: 2023-06-24

## 2023-06-24 PROBLEM — E87.6 HYPOKALEMIA: Status: RESOLVED | Noted: 2023-06-15 | Resolved: 2023-06-24

## 2023-06-24 LAB
ALBUMIN SERPL BCP-MCNC: 1.5 G/DL (ref 3.5–5)
ALBUMIN/GLOB SERPL: 0.3 {RATIO}
ALP SERPL-CCNC: 150 U/L (ref 45–115)
ALT SERPL W P-5'-P-CCNC: 31 U/L (ref 16–61)
ANION GAP SERPL CALCULATED.3IONS-SCNC: 10 MMOL/L (ref 7–16)
ANISOCYTOSIS BLD QL SMEAR: ABNORMAL
AST SERPL W P-5'-P-CCNC: 42 U/L (ref 15–37)
BASOPHILS # BLD AUTO: 0.14 K/UL (ref 0–0.2)
BASOPHILS NFR BLD AUTO: 1.9 % (ref 0–1)
BASOPHILS NFR BLD MANUAL: 1 % (ref 0–1)
BILIRUB SERPL-MCNC: 0.5 MG/DL (ref ?–1.2)
BUN SERPL-MCNC: 13 MG/DL (ref 7–18)
BUN/CREAT SERPL: 15 (ref 6–20)
CALCIUM SERPL-MCNC: 8 MG/DL (ref 8.5–10.1)
CHLORIDE SERPL-SCNC: 110 MMOL/L (ref 98–107)
CO2 SERPL-SCNC: 27 MMOL/L (ref 21–32)
CREAT SERPL-MCNC: 0.84 MG/DL (ref 0.7–1.3)
D DIMER PPP FEU-MCNC: 0.99 ΜG/ML (ref 0–0.47)
DIFFERENTIAL METHOD BLD: ABNORMAL
EGFR (NO RACE VARIABLE) (RUSH/TITUS): 92 ML/MIN/1.73M2
EOSINOPHIL # BLD AUTO: 0.27 K/UL (ref 0–0.5)
EOSINOPHIL NFR BLD AUTO: 3.7 % (ref 1–4)
EOSINOPHIL NFR BLD MANUAL: 3 % (ref 1–4)
ERYTHROCYTE [DISTWIDTH] IN BLOOD BY AUTOMATED COUNT: 16.9 % (ref 11.5–14.5)
GLOBULIN SER-MCNC: 4.6 G/DL (ref 2–4)
GLUCOSE SERPL-MCNC: 105 MG/DL (ref 70–105)
GLUCOSE SERPL-MCNC: 107 MG/DL (ref 70–105)
GLUCOSE SERPL-MCNC: 113 MG/DL (ref 70–105)
GLUCOSE SERPL-MCNC: 127 MG/DL (ref 70–105)
GLUCOSE SERPL-MCNC: 79 MG/DL (ref 74–106)
HCT VFR BLD AUTO: 35.5 % (ref 40–54)
HGB BLD-MCNC: 10.7 G/DL (ref 13.5–18)
HYPOCHROMIA BLD QL SMEAR: ABNORMAL
LYMPHOCYTES # BLD AUTO: 1.98 K/UL (ref 1–4.8)
LYMPHOCYTES NFR BLD AUTO: 27 % (ref 27–41)
LYMPHOCYTES NFR BLD MANUAL: 27 % (ref 27–41)
MCH RBC QN AUTO: 28.7 PG (ref 27–31)
MCHC RBC AUTO-ENTMCNC: 30.1 G/DL (ref 32–36)
MCV RBC AUTO: 95.2 FL (ref 80–96)
MONOCYTES # BLD AUTO: 1.05 K/UL (ref 0–0.8)
MONOCYTES NFR BLD AUTO: 14.3 % (ref 2–6)
MONOCYTES NFR BLD MANUAL: 13 % (ref 2–6)
MPC BLD CALC-MCNC: 9.3 FL (ref 9.4–12.4)
NEUTROPHILS # BLD AUTO: 3.89 K/UL (ref 1.8–7.7)
NEUTROPHILS NFR BLD AUTO: 53.1 % (ref 53–65)
NEUTS BAND NFR BLD MANUAL: 6 % (ref 1–5)
NEUTS SEG NFR BLD MANUAL: 50 % (ref 50–62)
NRBC BLD MANUAL-RTO: ABNORMAL %
NT-PROBNP SERPL-MCNC: ABNORMAL PG/ML (ref 1–450)
PLATELET # BLD AUTO: 237 K/UL (ref 150–400)
PLATELET MORPHOLOGY: NORMAL
POTASSIUM SERPL-SCNC: 4.4 MMOL/L (ref 3.5–5.1)
PROT SERPL-MCNC: 6.1 G/DL (ref 6.4–8.2)
RBC # BLD AUTO: 3.73 M/UL (ref 4.6–6.2)
SODIUM SERPL-SCNC: 143 MMOL/L (ref 136–145)
TROPONIN I SERPL DL<=0.01 NG/ML-MCNC: 146.4 PG/ML
WBC # BLD AUTO: 7.33 K/UL (ref 4.5–11)

## 2023-06-24 PROCEDURE — 83880 ASSAY OF NATRIURETIC PEPTIDE: CPT | Performed by: NURSE PRACTITIONER

## 2023-06-24 PROCEDURE — 93010 EKG 12-LEAD: ICD-10-PCS | Mod: ,,, | Performed by: HOSPITALIST

## 2023-06-24 PROCEDURE — 27000982 HC MATTRESS, MATRIX LAL RENTAL

## 2023-06-24 PROCEDURE — 85379 FIBRIN DEGRADATION QUANT: CPT | Performed by: NURSE PRACTITIONER

## 2023-06-24 PROCEDURE — 27000944

## 2023-06-24 PROCEDURE — 93005 ELECTROCARDIOGRAM TRACING: CPT

## 2023-06-24 PROCEDURE — 93010 ELECTROCARDIOGRAM REPORT: CPT | Mod: ,,, | Performed by: HOSPITALIST

## 2023-06-24 PROCEDURE — 82962 GLUCOSE BLOOD TEST: CPT

## 2023-06-24 PROCEDURE — A6212 FOAM DRG <=16 SQ IN W/BORDER: HCPCS

## 2023-06-24 PROCEDURE — 25500020 PHARM REV CODE 255: Performed by: FAMILY MEDICINE

## 2023-06-24 PROCEDURE — 11000004 HC SNF PRIVATE

## 2023-06-24 PROCEDURE — 63600175 PHARM REV CODE 636 W HCPCS: Performed by: NURSE PRACTITIONER

## 2023-06-24 PROCEDURE — 80053 COMPREHEN METABOLIC PANEL: CPT | Performed by: NURSE PRACTITIONER

## 2023-06-24 PROCEDURE — 84484 ASSAY OF TROPONIN QUANT: CPT | Performed by: NURSE PRACTITIONER

## 2023-06-24 PROCEDURE — 94761 N-INVAS EAR/PLS OXIMETRY MLT: CPT

## 2023-06-24 PROCEDURE — 85025 COMPLETE CBC W/AUTO DIFF WBC: CPT | Performed by: NURSE PRACTITIONER

## 2023-06-24 PROCEDURE — 25000003 PHARM REV CODE 250: Performed by: NURSE PRACTITIONER

## 2023-06-24 RX ORDER — FUROSEMIDE 10 MG/ML
20 INJECTION INTRAMUSCULAR; INTRAVENOUS ONCE
Status: COMPLETED | OUTPATIENT
Start: 2023-06-24 | End: 2023-06-24

## 2023-06-24 RX ADMIN — Medication 1 CAPSULE: at 11:06

## 2023-06-24 RX ADMIN — Medication 1 CAPSULE: at 04:06

## 2023-06-24 RX ADMIN — MIDODRINE HYDROCHLORIDE 5 MG: 2.5 TABLET ORAL at 04:06

## 2023-06-24 RX ADMIN — FUROSEMIDE 20 MG: 20 INJECTION, SOLUTION INTRAMUSCULAR; INTRAVENOUS at 09:06

## 2023-06-24 RX ADMIN — MIDODRINE HYDROCHLORIDE 5 MG: 2.5 TABLET ORAL at 11:06

## 2023-06-24 RX ADMIN — IOPAMIDOL 100 ML: 755 INJECTION, SOLUTION INTRAVENOUS at 10:06

## 2023-06-24 NOTE — PLAN OF CARE
Problem: Adult Inpatient Plan of Care  Goal: Patient-Specific Goal (Individualized)  Outcome: Ongoing, Progressing     Problem: Diabetes Comorbidity  Goal: Blood Glucose Level Within Targeted Range  Outcome: Ongoing, Progressing  Intervention: Monitor and Manage Glycemia  Flowsheets (Taken 6/24/2023 0505)  Glycemic Management: blood glucose monitored     Problem: Impaired Wound Healing  Goal: Optimal Wound Healing  Outcome: Ongoing, Progressing  Intervention: Promote Wound Healing  Flowsheets (Taken 6/24/2023 0505)  Activity Management:   Standing - L3   Rolling - L1

## 2023-06-24 NOTE — NURSING
PT refused all of morning medication including IV lasix. PT refused IV access. Notified NP of refusal of all meds

## 2023-06-24 NOTE — NURSING
Notified DIEGO Zuniga of swelling noted to Left arm and firmness to left side of abdomen. New orders noted

## 2023-06-25 LAB
GLUCOSE SERPL-MCNC: 129 MG/DL (ref 70–105)
GLUCOSE SERPL-MCNC: 166 MG/DL (ref 70–105)
GLUCOSE SERPL-MCNC: 95 MG/DL (ref 70–105)

## 2023-06-25 PROCEDURE — 11000004 HC SNF PRIVATE

## 2023-06-25 PROCEDURE — 99900035 HC TECH TIME PER 15 MIN (STAT)

## 2023-06-25 PROCEDURE — 82962 GLUCOSE BLOOD TEST: CPT

## 2023-06-25 PROCEDURE — 27000944

## 2023-06-25 PROCEDURE — 25000003 PHARM REV CODE 250: Performed by: NURSE PRACTITIONER

## 2023-06-25 PROCEDURE — 94761 N-INVAS EAR/PLS OXIMETRY MLT: CPT

## 2023-06-25 PROCEDURE — 27000982 HC MATTRESS, MATRIX LAL RENTAL

## 2023-06-25 RX ADMIN — THERA TABS 1 TABLET: TAB at 08:06

## 2023-06-25 RX ADMIN — MIDODRINE HYDROCHLORIDE 5 MG: 2.5 TABLET ORAL at 11:06

## 2023-06-25 RX ADMIN — APIXABAN 5 MG: 2.5 TABLET, FILM COATED ORAL at 09:06

## 2023-06-25 RX ADMIN — MIDODRINE HYDROCHLORIDE 5 MG: 2.5 TABLET ORAL at 08:06

## 2023-06-25 RX ADMIN — ASPIRIN 81 MG: 81 TABLET, COATED ORAL at 08:06

## 2023-06-25 RX ADMIN — APIXABAN 5 MG: 2.5 TABLET, FILM COATED ORAL at 08:06

## 2023-06-25 RX ADMIN — ATORVASTATIN CALCIUM 40 MG: 40 TABLET, FILM COATED ORAL at 08:06

## 2023-06-25 RX ADMIN — Medication 1 CAPSULE: at 08:06

## 2023-06-25 RX ADMIN — SENNOSIDES AND DOCUSATE SODIUM 1 TABLET: 50; 8.6 TABLET ORAL at 08:06

## 2023-06-25 RX ADMIN — POLYETHYLENE GLYCOL 3350 17 G: 17 POWDER, FOR SOLUTION ORAL at 08:06

## 2023-06-25 RX ADMIN — OXYCODONE HYDROCHLORIDE AND ACETAMINOPHEN 500 MG: 500 TABLET ORAL at 08:06

## 2023-06-25 RX ADMIN — MIRTAZAPINE 15 MG: 15 TABLET, FILM COATED ORAL at 09:06

## 2023-06-25 RX ADMIN — HYDROCODONE BITARTRATE AND ACETAMINOPHEN 1 TABLET: 5; 325 TABLET ORAL at 04:06

## 2023-06-25 RX ADMIN — ZINC SULFATE 220 MG (50 MG) CAPSULE 220 MG: CAPSULE at 08:06

## 2023-06-25 NOTE — ASSESSMENT & PLAN NOTE
>>ASSESSMENT AND PLAN FOR CELLULITIS OF LOWER EXTREMITY WRITTEN ON 6/24/2023 10:22 PM BY CLAUDIA PERKINS NP    06/09/23  wound cleansing with Vashe, apply silvadene daily; Border foam dressings to Trochanter and sacrum .        06/12/23 continue wound care to BLE     06/15/23 wound care consult

## 2023-06-25 NOTE — ASSESSMENT & PLAN NOTE
Summary     The left ventricle is normal in size with mild concentric hypertrophy and moderately decreased systolic function.   The estimated ejection fraction is 35%.   Left ventricular diastolic dysfunction.   Atrial fibrillation not observed.   Normal right ventricular size.   Normal central venous pressure (3 mmHg).   The estimated PA systolic pressure is 22 mmHg.   Trivial pericardial effusion.     Echo done 06/02/23     6/10 - Appears compensated.    06/24/23 BNP 82479. Troponin 146.  Swelling to left arm, abd, flank  CXR neg, CTA neg  Lasix 20 mg IVP given x 1 dose.   Pt is refusing all other meds

## 2023-06-25 NOTE — NURSING
Patient has been cooperative and calm.  Did not take all of his meds today but did take most of them without any problems.  Resting in no distress. Call light near.  Bed alarm set and on.

## 2023-06-25 NOTE — PLAN OF CARE
Problem: Adult Inpatient Plan of Care  Goal: Patient-Specific Goal (Individualized)  Outcome: Ongoing, Progressing     Problem: Diabetes Comorbidity  Goal: Blood Glucose Level Within Targeted Range  Outcome: Ongoing, Progressing     Problem: Fall Injury Risk  Goal: Absence of Fall and Fall-Related Injury  Outcome: Ongoing, Progressing

## 2023-06-25 NOTE — SUBJECTIVE & OBJECTIVE
"Interval History: Discussed pt's case with Dr. Melo this am. Pt is refusing transfer, further labs and medications as noted.    Review of Systems   Respiratory:  Negative for chest tightness and shortness of breath.    Cardiovascular:  Positive for palpitations (at times due to "heart" meds). Negative for chest pain.   Gastrointestinal:  Negative for constipation, diarrhea, nausea and vomiting.   Genitourinary:  Negative for difficulty urinating.   Musculoskeletal:  Negative for arthralgias.   Skin:         Dressing intact to ble   Objective:     Vital Signs (Most Recent):  Temp: 99.6 °F (37.6 °C) (06/24/23 2000)  Pulse: 92 (06/24/23 2000)  Resp: 20 (06/24/23 2000)  BP: 114/69 (06/24/23 2000)  SpO2: 96 % (06/24/23 2000) Vital Signs (24h Range):  Temp:  [97.4 °F (36.3 °C)-99.6 °F (37.6 °C)] 99.6 °F (37.6 °C)  Pulse:  [88-95] 92  Resp:  [18-20] 20  SpO2:  [96 %-99 %] 96 %  BP: (107-114)/(65-69) 114/69     Weight: 72.7 kg (160 lb 3.2 oz)  Body mass index is 21.73 kg/m².    Intake/Output Summary (Last 24 hours) at 6/24/2023 2205  Last data filed at 6/24/2023 2007  Gross per 24 hour   Intake 540 ml   Output 2177 ml   Net -1637 ml         Physical Exam  Vitals and nursing note reviewed.   Constitutional:       Appearance: He is ill-appearing.   HENT:      Head: Normocephalic.      Mouth/Throat:      Mouth: Mucous membranes are moist.   Cardiovascular:      Rate and Rhythm: Normal rate and regular rhythm.      Pulses: Normal pulses.      Heart sounds: Normal heart sounds.   Pulmonary:      Effort: Pulmonary effort is normal.      Breath sounds: Normal breath sounds.   Abdominal:      General: Bowel sounds are normal.      Palpations: Abdomen is soft.   Musculoskeletal:         General: Swelling (left arm, left abd, left flank) present. Normal range of motion.   Skin:     General: Skin is warm.      Capillary Refill: Capillary refill takes less than 2 seconds.      Comments: Dressing to ble    Neurological:      Mental " Status: He is alert and oriented to person, place, and time.   Psychiatric:      Comments: Refusing medications, evaluation           Significant Labs: All pertinent labs within the past 24 hours have been reviewed.  CBC:   Recent Labs   Lab 06/24/23  0748   WBC 7.33   HGB 10.7*   HCT 35.5*        CMP:   Recent Labs   Lab 06/24/23  0748      K 4.4   *   CO2 27   GLU 79   BUN 13   CREATININE 0.84   CALCIUM 8.0*   PROT 6.1*   ALBUMIN 1.5*   BILITOT 0.5   ALKPHOS 150*   AST 42*   ALT 31   ANIONGAP 10     Cardiac Markers: No results for input(s): CKMB, MYOGLOBIN, BNP, TROPISTAT in the last 48 hours.    Significant Imaging: I have reviewed all pertinent imaging results/findings within the past 24 hours.  CT: I have reviewed all pertinent results/findings within the past 24 hours and my personal findings are:  as noted  CXR: I have reviewed all pertinent results/findings within the past 24 hours and my personal findings are:  as noted

## 2023-06-25 NOTE — PROGRESS NOTES
[facility] - Medical Surgical Unit  Hospital Medicine  Progress Note    Patient Name: Esthela Contreras  MRN: [id number]  Patient Class: IP- Swing   Admission Date: 6/9/2023  Length of Stay: 15 days  Attending Physician: Liam Contreras IV, DO  Primary Care Provider: CHRISTIANE Bardales        Subjective:     Principal Problem:Gram-positive bacteremia        HPI:  HPI:  Patient is a 72-year-old male with a history of unspecified CHF in the setting of CAD, essential hypertension, diabetes, oxygen-dependent COPD with baseline supplemental oxygen requirement of 2 L/min and BiPAP QHS along with CKD stage IIIA who presented to the emergency room complaining of dyspnea which started just a few days ago.  Patient otherwise denied any fever chills cough hemoptysis PND orthopnea chest pain palpitation or lower extremity edema in association.  Patient's niece stated that he has chronic wounds on both legs and felt that he has not been given proper care to address this.  Patient lives alone and when his niece visited him today, she found him slumped over on a recliner dyspneic prompting ED visit.      On initial presentation, patient was tachycardic and tachypneic but vital signs were otherwise stable and patient was afebrile.  Workup was notable for what appeared to be a new onset atrial fibrillation with RVR with elevated troponin and proBNP, elevation in total bilirubin level with mildly elevated transaminases and alkaline phosphatase, leukocytosis with left shift with WBC count of 23,000 and high anion gap metabolic acidosis with anion gap of 29 and bicarbonate of 7, acute on chronic renal failure with BUN of 110 and creatinine of 7.03 from a baseline serum creatinine of 1.43, significantly elevated lactic acid level with initial lactic acid level of 9.7 to 10.7 even after receiving 30 cc/kg of crystalloid IV fluid bolus.  CT of bilateral lower extremity demonstrated a presence of bilateral cellulitis without  evidence of drainable abscess or necrotizing fasciitis. Patient will be admitted for further evaluation and intervention        Overview/Hospital Course:  06/09/2023   Patient has no new complaints, leg swelling improving.    T-max 100.3°, blood pressure 93/53 pulse rate 84 respiratory rate 18 temperature 98.1°.  His labs white blood cell count down to 5 hemoglobin 12 platelets count 118 sodium 137 potassium 3.1 creatinine 1 calcium 6.5.    Echocardiogram showed ejection fraction of 35% with left ventricular diastolic dysfunction.    Blood culture on on 06/09/2023 showed no growth to date.    Blood cultures on 06/06/2023 grew Gram-negative bacilli, but chews fragilis, Streptococcus species.    Hypotension resolved, blood pressure borderline but does not need vasopressors.    Sepsis resolved.    New onset AFib, rate is controlled, started on Eliquis anticoagulation.    Acute renal failure presumably   Bacteremia, blood cultures questionable chills contamination, likely related to cellulitis of lower extremities, will continue antibiotics.    Surgery evaluated the patient, recommended to Continue local wound care.    Will deescalate antibiotics to Rocephin 2 g IV piggyback daily.    Patient awaiting placement.      Will need to continue antibiotics , rocephin 2 g IVPB daily for 12 more days, last dose 06/21/23. Continue local wound care and therapy.    Above information is from [facility] acute care stay 06/01/23- 06/09/23. Mr. Contreras has been accepted to swing bed services at [facility]. His arrival is expected today.     Mr. Contreras is a 72 year-old  male who is admitted to [facility] swing bed services for daily wound care and continuation of IV Rocephin through 6/21/23. Upon arrival here tonight, he is alert and oriented. Dressings from lower legs were removed for assessment. He reported severe pain with manipulation of legs. He states that he has had poor appetite and notes some  "constipation during hospitalization. Labs done at [facility] today were reviewed and are noted below. His albumin was 1.4 on 6/8. Dietician and wound care are consulted. He was accepted by Dr. Rosario to admission to Dr. Contreras today. DIEGO Alcocer completed medication reconciliation and admission orders.      Overview/Hospital Course:  06/12/23 Awake and resting in bed. Complains of having gas but no BM in past 5 days. Complains of occasional pain to ankles. He has dressings intact to BLE from knees down.  Continue rocephin IV and wound care to BLE. Continue therapy for strengthening.  06/13/23 refusing to take po meds. Talked with him re possible adverse effects of doing so and he states "When the good lord takes me, I am ready." Talked with him re code status and he states he wants to be Full code.  06/14/23 continues to refuse po meds- states meds " make him feel funny, just don't feel right ."Talked with him re purpose of meds and he is agreeable to resuming meds. Will monitor on telemetry. CNA reports small amount of bright red blood noted with BM. Talked him re any history of hemorrhoids or constipation. He denies both. Will recheck CBC and check stool for FOB.   06/14/23 Talked with Mr. Contreras re possibility of him being depressed. He allowed me to do PHQ 9 screening. He scored 11 showing moderate depression. He began to cry at end of screen. I talked with him re antidepressant and he is agreeable. Will start remeron 15 mg po daily.  06/15/23 Awake and resting in bed. No complaints. Reports sleeping better last night. H&H stable at 12/37. Talked with him again re positive FOB. He does report having burning and itching to rectum for past few days. No external hemorrhoids noted. Will treat with anusol hc. Resume eliquis. Dressing to bilateral lower legs.   06/19/23 Awake and resting in bed. Reports feeling better and sleeping better. Denies pain. States he has not had BM in 2-3 days but that is normal for him. Denies " "feeling constipated. Continue rocephin and wound care. Blood glucose levels have improved. Continue to monitor for hypoglycemia.  06/23/23 Awake and sitting up in chair. States he was able to get up and walk with therapy yesterday and his legs did not hurt him. States he plans to walk further today. Continue wound care. BLE dressings intact.  06/24/23 Nurse reports that patient has increased swelling in left arm with swelling now to left flank and abdomen noted this am. She also reports that pt has been refusing his "heart" meds including Eliquis because he thinks they make him feel bad. Upon evaluation, Mr. Contreras notes the swelling that is present but reports "I feel fine" and "better than I have been." Pt has PMH of CHF with EF of 35% and has not been on diuretic.  He denies chest pain and SOB. Pitting edema is noted to left flank, abd, and hip with nonpitting to left arm. No open wound, erythema or weeping. His lungs are clear. O2 sat 96-99% on RA. Discussed need for labs and xray today. He refused initially but finally agreed. Troponin 146, d dimer 0.99, BNP > 65243, BUN 13, creatinine 0.84. EKG ordered. It shows LBBB with 1st degree AVB with no afib. No STEMI and no changes from previous on 6/1. DIScussed findings with pt and explained need for CTA chest and doppler left arm and transfer to [facility] for further evaluation. He refused stating that the only place he is going is home. Discussed risk of worsening condition or death due to MI, fluid overload, and VTE. He acknowledged risk stating that it was ok and continues to refuse transfer. He does agree with IV, IV lasix x 1 dose and CTA chest. CTA chest shows no PE, small bilat pleural effusions.  Nurse reports that pt is still refusing medications. Discussed findings again and risk of refusing meds and evaluation with pt. He acknowledges and states that he does not want "all of that." Further discussed code status with pt and he wants to change to DNR " "status.      Interval History: Discussed pt's case with Dr. Melo this am. Pt is refusing transfer, further labs and medications as noted.    Review of Systems   Respiratory:  Negative for chest tightness and shortness of breath.    Cardiovascular:  Positive for palpitations (at times due to "heart" meds). Negative for chest pain.   Gastrointestinal:  Negative for constipation, diarrhea, nausea and vomiting.   Genitourinary:  Negative for difficulty urinating.   Musculoskeletal:  Negative for arthralgias.   Skin:         Dressing intact to ble   Objective:     Vital Signs (Most Recent):  Temp: 99.6 °F (37.6 °C) (06/24/23 2000)  Pulse: 92 (06/24/23 2000)  Resp: 20 (06/24/23 2000)  BP: 114/69 (06/24/23 2000)  SpO2: 96 % (06/24/23 2000) Vital Signs (24h Range):  Temp:  [97.4 °F (36.3 °C)-99.6 °F (37.6 °C)] 99.6 °F (37.6 °C)  Pulse:  [88-95] 92  Resp:  [18-20] 20  SpO2:  [96 %-99 %] 96 %  BP: (107-114)/(65-69) 114/69     Weight: 72.7 kg (160 lb 3.2 oz)  Body mass index is 21.73 kg/m².    Intake/Output Summary (Last 24 hours) at 6/24/2023 2205  Last data filed at 6/24/2023 2007  Gross per 24 hour   Intake 540 ml   Output 2177 ml   Net -1637 ml         Physical Exam  Vitals and nursing note reviewed.   Constitutional:       Appearance: He is ill-appearing.   HENT:      Head: Normocephalic.      Mouth/Throat:      Mouth: Mucous membranes are moist.   Cardiovascular:      Rate and Rhythm: Normal rate and regular rhythm.      Pulses: Normal pulses.      Heart sounds: Normal heart sounds.   Pulmonary:      Effort: Pulmonary effort is normal.      Breath sounds: Normal breath sounds.   Abdominal:      General: Bowel sounds are normal.      Palpations: Abdomen is soft.   Musculoskeletal:         General: Swelling (left arm, left abd, left flank) present. Normal range of motion.   Skin:     General: Skin is warm.      Capillary Refill: Capillary refill takes less than 2 seconds.      Comments: Dressing to ble    Neurological: "      Mental Status: He is alert and oriented to person, place, and time.   Psychiatric:      Comments: Refusing medications, evaluation           Significant Labs: All pertinent labs within the past 24 hours have been reviewed.  CBC:   Recent Labs   Lab 06/24/23  0748   WBC 7.33   HGB 10.7*   HCT 35.5*        CMP:   Recent Labs   Lab 06/24/23  0748      K 4.4   *   CO2 27   GLU 79   BUN 13   CREATININE 0.84   CALCIUM 8.0*   PROT 6.1*   ALBUMIN 1.5*   BILITOT 0.5   ALKPHOS 150*   AST 42*   ALT 31   ANIONGAP 10     Cardiac Markers: No results for input(s): CKMB, MYOGLOBIN, BNP, TROPISTAT in the last 48 hours.    Significant Imaging: I have reviewed all pertinent imaging results/findings within the past 24 hours.  CT: I have reviewed all pertinent results/findings within the past 24 hours and my personal findings are:  as noted  CXR: I have reviewed all pertinent results/findings within the past 24 hours and my personal findings are:  as noted      Assessment/Plan:      Hypomagnesemia    06/15/23 magnesium 1.5- one gram magnesium sulfate       Depression    06/14/23 PHQ 9 screen shows moderate depression   Reports having trouble falling asleep, feeling down most days   Will start on mirtazapine 15 mg po at hs       06/23 more optimistic, upbeat   Ready to work with therapy today    Diabetes mellitus    Lab Results   Component Value Date    HGBA1C 5.5 06/02/2023     Low dose ssi coverage     06/23/23 stable    Pressure injury of sacral region, stage 2     Border foam dressings to Trochanter and sacrum .     COPD (chronic obstructive pulmonary disease)  06/09/23 02 per nc at 2 liters  duonebs every 4 hours prn       New onset a-fib  06/09/23 eliquis 5 mg po BID           SOB (shortness of breath)    06/09/23 oxygen at 2 liters   duonebs every 4 hours prn     Cellulitis of lower extremity  06/09/23  wound cleansing with Vashe, apply silvadene daily; Border foam dressings to Trochanter and sacrum .         06/12/23 continue wound care to BLE     06/15/23 wound care consult       Essential hypertension  06/09/23 continue metoprolol succinate 25 mg daily     06/12/23 stable     06/23 stable    Coronary artery disease involving native coronary artery of native heart without angina pectoris     06/09/23 Continue home aspirin, BB, and high-intensity statin.           HFrEF (heart failure with reduced ejection fraction)    Summary     The left ventricle is normal in size with mild concentric hypertrophy and moderately decreased systolic function.   The estimated ejection fraction is 35%.   Left ventricular diastolic dysfunction.   Atrial fibrillation not observed.   Normal right ventricular size.   Normal central venous pressure (3 mmHg).   The estimated PA systolic pressure is 22 mmHg.   Trivial pericardial effusion.     Echo done 06/02/23     6/10 - Appears compensated.    06/24/23 BNP 72944. Troponin 146.  Swelling to left arm, abd, flank  CXR neg, CTA neg  Lasix 20 mg IVP given x 1 dose.   Pt is refusing all other meds      VTE Risk Mitigation (From admission, onward)         Ordered     apixaban tablet 5 mg  2 times daily         06/15/23 1030                Discharge Planning   MAKENNA: 7/3/2023     Code Status: DNR   Is the patient medically ready for discharge?:     Reason for patient still in hospital (select all that apply): Treatment  Discharge Plan A: Home                  Roseanna Perez NP  Department of Hospital Medicine   [facility] - Medical Surgical Unit

## 2023-06-25 NOTE — PROGRESS NOTES
Patient dressing changed to right leg.  Washed with soap and water, wound cleanser.  Removed dry flaky skin from feet and leg, toes and patted dry.  Applied xeroform gauze to open areas and covered with 4x4s and wrapped with Kerlex gauze.  Secured with paper tape.

## 2023-06-25 NOTE — PROGRESS NOTES
Dressing changed to left leg.  Patient tolerated it well.  Cleaned and removed lots of dead dry scaly skin.  Odor noted.  Patted dry, applied xeroform gauze to open areas and  4x4sm covered with kerlex wrap. Secured with paper tape.

## 2023-06-26 LAB
GLUCOSE SERPL-MCNC: 117 MG/DL (ref 70–105)
GLUCOSE SERPL-MCNC: 120 MG/DL (ref 70–105)
GLUCOSE SERPL-MCNC: 124 MG/DL (ref 70–105)
GLUCOSE SERPL-MCNC: 96 MG/DL (ref 70–105)

## 2023-06-26 PROCEDURE — 99308 PR NURSING FAC CARE, SUBSEQ, MINOR COMPLIC: ICD-10-PCS | Mod: ,,, | Performed by: FAMILY MEDICINE

## 2023-06-26 PROCEDURE — 27000944

## 2023-06-26 PROCEDURE — 99900035 HC TECH TIME PER 15 MIN (STAT)

## 2023-06-26 PROCEDURE — 25000003 PHARM REV CODE 250: Performed by: NURSE PRACTITIONER

## 2023-06-26 PROCEDURE — 97116 GAIT TRAINING THERAPY: CPT | Mod: CQ

## 2023-06-26 PROCEDURE — 99308 SBSQ NF CARE LOW MDM 20: CPT | Mod: ,,, | Performed by: FAMILY MEDICINE

## 2023-06-26 PROCEDURE — 97110 THERAPEUTIC EXERCISES: CPT | Mod: CQ

## 2023-06-26 PROCEDURE — 82962 GLUCOSE BLOOD TEST: CPT

## 2023-06-26 PROCEDURE — 11000004 HC SNF PRIVATE

## 2023-06-26 PROCEDURE — 27000982 HC MATTRESS, MATRIX LAL RENTAL

## 2023-06-26 PROCEDURE — 94761 N-INVAS EAR/PLS OXIMETRY MLT: CPT

## 2023-06-26 RX ADMIN — HYDROCODONE BITARTRATE AND ACETAMINOPHEN 1 TABLET: 5; 325 TABLET ORAL at 04:06

## 2023-06-26 RX ADMIN — ASPIRIN 81 MG: 81 TABLET, COATED ORAL at 08:06

## 2023-06-26 RX ADMIN — APIXABAN 5 MG: 2.5 TABLET, FILM COATED ORAL at 08:06

## 2023-06-26 RX ADMIN — OXYCODONE HYDROCHLORIDE AND ACETAMINOPHEN 500 MG: 500 TABLET ORAL at 08:06

## 2023-06-26 RX ADMIN — ATORVASTATIN CALCIUM 40 MG: 40 TABLET, FILM COATED ORAL at 09:06

## 2023-06-26 RX ADMIN — MIDODRINE HYDROCHLORIDE 5 MG: 2.5 TABLET ORAL at 09:06

## 2023-06-26 RX ADMIN — APIXABAN 5 MG: 2.5 TABLET, FILM COATED ORAL at 09:06

## 2023-06-26 NOTE — ASSESSMENT & PLAN NOTE
Summary     The left ventricle is normal in size with mild concentric hypertrophy and moderately decreased systolic function.   The estimated ejection fraction is 35%.   Left ventricular diastolic dysfunction.   Atrial fibrillation not observed.   Normal right ventricular size.   Normal central venous pressure (3 mmHg).   The estimated PA systolic pressure is 22 mmHg.   Trivial pericardial effusion.     Echo done 06/02/23     6/10 - Appears compensated.    06/24/23 BNP 24740. Troponin 146.  Swelling to left arm, abd, flank  CXR neg, CTA neg  Lasix 20 mg IVP given x 1 dose.   Pt is refusing all other meds    06/26/23 continues to refuse meds

## 2023-06-26 NOTE — PLAN OF CARE
[facility] - Medical Surgical Unit  Discharge Assessment    Spoke with Mr. Contreras this morning.  He continues to state he's going home alone with family checking in on him.  Made him aware of his MAKENNA.  He verbalized understanding.

## 2023-06-26 NOTE — ASSESSMENT & PLAN NOTE
06/09/23 oxygen at 2 liters   duonebs every 4 hours prn       06/26/23 denies sob   Well appearing, awake, alert, oriented and in no apparent distress. normal...

## 2023-06-26 NOTE — ASSESSMENT & PLAN NOTE
>>ASSESSMENT AND PLAN FOR CELLULITIS OF LOWER EXTREMITY WRITTEN ON 6/26/2023  9:46 AM BY MIKE LOBO FNP    06/09/23  wound cleansing with Vashe, apply silvadene daily; Border foam dressings to Trochanter and sacrum .        06/12/23 continue wound care to BLE     06/15/23 wound care consult       06/26/23 dressings to BLE

## 2023-06-26 NOTE — PROGRESS NOTES
[facility] - Medical Surgical Unit  Hospital Medicine  Progress Note    Patient Name: Esthela Contreras  MRN: [id number]  Patient Class: IP- Swing   Admission Date: 6/9/2023  Length of Stay: 17 days  Attending Physician: Liam Contreras IV, DO  Primary Care Provider: CHRISTIANE Bardales        Subjective:     Principal Problem:Gram-positive bacteremia        HPI:  HPI:  Patient is a 72-year-old male with a history of unspecified CHF in the setting of CAD, essential hypertension, diabetes, oxygen-dependent COPD with baseline supplemental oxygen requirement of 2 L/min and BiPAP QHS along with CKD stage IIIA who presented to the emergency room complaining of dyspnea which started just a few days ago.  Patient otherwise denied any fever chills cough hemoptysis PND orthopnea chest pain palpitation or lower extremity edema in association.  Patient's niece stated that he has chronic wounds on both legs and felt that he has not been given proper care to address this.  Patient lives alone and when his niece visited him today, she found him slumped over on a recliner dyspneic prompting ED visit.      On initial presentation, patient was tachycardic and tachypneic but vital signs were otherwise stable and patient was afebrile.  Workup was notable for what appeared to be a new onset atrial fibrillation with RVR with elevated troponin and proBNP, elevation in total bilirubin level with mildly elevated transaminases and alkaline phosphatase, leukocytosis with left shift with WBC count of 23,000 and high anion gap metabolic acidosis with anion gap of 29 and bicarbonate of 7, acute on chronic renal failure with BUN of 110 and creatinine of 7.03 from a baseline serum creatinine of 1.43, significantly elevated lactic acid level with initial lactic acid level of 9.7 to 10.7 even after receiving 30 cc/kg of crystalloid IV fluid bolus.  CT of bilateral lower extremity demonstrated a presence of bilateral cellulitis without  evidence of drainable abscess or necrotizing fasciitis. Patient will be admitted for further evaluation and intervention        Overview/Hospital Course:  06/09/2023   Patient has no new complaints, leg swelling improving.    T-max 100.3°, blood pressure 93/53 pulse rate 84 respiratory rate 18 temperature 98.1°.  His labs white blood cell count down to 5 hemoglobin 12 platelets count 118 sodium 137 potassium 3.1 creatinine 1 calcium 6.5.    Echocardiogram showed ejection fraction of 35% with left ventricular diastolic dysfunction.    Blood culture on on 06/09/2023 showed no growth to date.    Blood cultures on 06/06/2023 grew Gram-negative bacilli, but chews fragilis, Streptococcus species.    Hypotension resolved, blood pressure borderline but does not need vasopressors.    Sepsis resolved.    New onset AFib, rate is controlled, started on Eliquis anticoagulation.    Acute renal failure presumably   Bacteremia, blood cultures questionable chills contamination, likely related to cellulitis of lower extremities, will continue antibiotics.    Surgery evaluated the patient, recommended to Continue local wound care.    Will deescalate antibiotics to Rocephin 2 g IV piggyback daily.    Patient awaiting placement.      Will need to continue antibiotics , rocephin 2 g IVPB daily for 12 more days, last dose 06/21/23. Continue local wound care and therapy.    Above information is from [facility] acute care stay 06/01/23- 06/09/23. Mr. Contreras has been accepted to swing bed services at [facility]. His arrival is expected today.     Mr. Contreras is a 72 year-old  male who is admitted to [facility] swing bed services for daily wound care and continuation of IV Rocephin through 6/21/23. Upon arrival here tonight, he is alert and oriented. Dressings from lower legs were removed for assessment. He reported severe pain with manipulation of legs. He states that he has had poor appetite and notes some  "constipation during hospitalization. Labs done at [facility] today were reviewed and are noted below. His albumin was 1.4 on 6/8. Dietician and wound care are consulted. He was accepted by Dr. Rosario to admission to Dr. Contreras today. DIEGO Alcocer completed medication reconciliation and admission orders.      Overview/Hospital Course:  06/12/23 Awake and resting in bed. Complains of having gas but no BM in past 5 days. Complains of occasional pain to ankles. He has dressings intact to BLE from knees down.  Continue rocephin IV and wound care to BLE. Continue therapy for strengthening.  06/13/23 refusing to take po meds. Talked with him re possible adverse effects of doing so and he states "When the good lord takes me, I am ready." Talked with him re code status and he states he wants to be Full code.  06/14/23 continues to refuse po meds- states meds " make him feel funny, just don't feel right ."Talked with him re purpose of meds and he is agreeable to resuming meds. Will monitor on telemetry. CNA reports small amount of bright red blood noted with BM. Talked him re any history of hemorrhoids or constipation. He denies both. Will recheck CBC and check stool for FOB.   06/14/23 Talked with Mr. Contreras re possibility of him being depressed. He allowed me to do PHQ 9 screening. He scored 11 showing moderate depression. He began to cry at end of screen. I talked with him re antidepressant and he is agreeable. Will start remeron 15 mg po daily.  06/15/23 Awake and resting in bed. No complaints. Reports sleeping better last night. H&H stable at 12/37. Talked with him again re positive FOB. He does report having burning and itching to rectum for past few days. No external hemorrhoids noted. Will treat with anusol hc. Resume eliquis. Dressing to bilateral lower legs.   06/19/23 Awake and resting in bed. Reports feeling better and sleeping better. Denies pain. States he has not had BM in 2-3 days but that is normal for him. Denies " "feeling constipated. Continue rocephin and wound care. Blood glucose levels have improved. Continue to monitor for hypoglycemia.  06/23/23 Awake and sitting up in chair. States he was able to get up and walk with therapy yesterday and his legs did not hurt him. States he plans to walk further today. Continue wound care. BLE dressings intact.  06/24/23 Nurse reports that patient has increased swelling in left arm with swelling now to left flank and abdomen noted this am. She also reports that pt has been refusing his "heart" meds including Eliquis because he thinks they make him feel bad. Upon evaluation, Mr. Contreras notes the swelling that is present but reports "I feel fine" and "better than I have been." Pt has PMH of CHF with EF of 35% and has not been on diuretic.  He denies chest pain and SOB. Pitting edema is noted to left flank, abd, and hip with nonpitting to left arm. No open wound, erythema or weeping. His lungs are clear. O2 sat 96-99% on RA. Discussed need for labs and xray today. He refused initially but finally agreed. Troponin 146, d dimer 0.99, BNP > 20674, BUN 13, creatinine 0.84. EKG ordered. It shows LBBB with 1st degree AVB with no afib. No STEMI and no changes from previous on 6/1. DIScussed findings with pt and explained need for CTA chest and doppler left arm and transfer to [facility] for further evaluation. He refused stating that the only place he is going is home. Discussed risk of worsening condition or death due to MI, fluid overload, and VTE. He acknowledged risk stating that it was ok and continues to refuse transfer. He does agree with IV, IV lasix x 1 dose and CTA chest. CTA chest shows no PE, small bilat pleural effusions.  Nurse reports that pt is still refusing medications. Discussed findings again and risk of refusing meds and evaluation with pt. He acknowledges and states that he does not want "all of that." Further discussed code status with pt and he wants to change to DNR " "status.  06/26/23 Mr. Contreras is awake, alert and resting in bed. Talked with him re CTA findings of no PE. Talked with him re his refusal to take meds. He states eliquis makes him feel funny. Explained to him what it was for and danger of not taking it. Talked with him re alternatives to eliquis and he is not receptive. States "when the good lord gets ready to take me , just let me go."       Interval History: weakness    Review of Systems   Constitutional:  Negative for appetite change.   Respiratory:  Negative for cough, choking, shortness of breath and wheezing.    Cardiovascular:  Negative for chest pain and palpitations.   Gastrointestinal:  Negative for constipation, diarrhea, nausea and vomiting.   Genitourinary:  Negative for difficulty urinating.   Skin:  Positive for wound.        Dressings to BLE   Neurological:  Positive for weakness.   Objective:     Vital Signs (Most Recent):  Temp: 97.9 °F (36.6 °C) (06/26/23 0733)  Pulse: 90 (06/26/23 0733)  Resp: 18 (06/26/23 0733)  BP: 116/63 (06/26/23 0733)  SpO2: 99 % (06/26/23 0733) Vital Signs (24h Range):  Temp:  [97.9 °F (36.6 °C)-98.5 °F (36.9 °C)] 97.9 °F (36.6 °C)  Pulse:  [76-90] 90  Resp:  [18-20] 18  SpO2:  [97 %-99 %] 99 %  BP: (100-116)/(55-63) 116/63     Weight: 72.7 kg (160 lb 3.2 oz)  Body mass index is 21.73 kg/m².    Intake/Output Summary (Last 24 hours) at 6/26/2023 0937  Last data filed at 6/26/2023 0905  Gross per 24 hour   Intake 890 ml   Output 250 ml   Net 640 ml         Physical Exam  HENT:      Head: Normocephalic.      Mouth/Throat:      Mouth: Mucous membranes are moist.   Cardiovascular:      Rate and Rhythm: Normal rate and regular rhythm.      Pulses: Normal pulses.      Heart sounds: Murmur heard.   Pulmonary:      Effort: Pulmonary effort is normal.      Breath sounds: Normal breath sounds.   Abdominal:      General: Bowel sounds are normal.      Palpations: Abdomen is soft.   Musculoskeletal:         General: Normal range of " motion.   Skin:     Comments: Dressings to ble   Edema to left upper arm   Neurological:      Mental Status: He is alert and oriented to person, place, and time.   Psychiatric:         Mood and Affect: Mood normal.           Significant Labs: All pertinent labs within the past 24 hours have been reviewed.  Recent Lab Results         06/26/23 0623 06/25/23 2127   06/25/23  1217        POC Glucose 120   95   129               Significant Imaging: I have reviewed all pertinent imaging results/findings within the past 24 hours.      Assessment/Plan:      Coronary artery disease involving native coronary artery of native heart without angina pectoris     06/09/23 Continue home aspirin, BB, and high-intensity statin.           Essential hypertension  06/09/23 continue metoprolol succinate 25 mg daily     06/12/23 stable     06/23 stable    SOB (shortness of breath)    06/09/23 oxygen at 2 liters   duonebs every 4 hours prn       06/26/23 denies sob    HFrEF (heart failure with reduced ejection fraction)    Summary    The left ventricle is normal in size with mild concentric hypertrophy and moderately decreased systolic function.  The estimated ejection fraction is 35%.  Left ventricular diastolic dysfunction.  Atrial fibrillation not observed.  Normal right ventricular size.  Normal central venous pressure (3 mmHg).  The estimated PA systolic pressure is 22 mmHg.  Trivial pericardial effusion.     Echo done 06/02/23     6/10 - Appears compensated.    06/24/23 BNP 55313. Troponin 146.  Swelling to left arm, abd, flank  CXR neg, CTA neg  Lasix 20 mg IVP given x 1 dose.   Pt is refusing all other meds    06/26/23 continues to refuse meds    Hypomagnesemia    06/15/23 magnesium 1.5- one gram magnesium sulfate       Depression    06/14/23 PHQ 9 screen shows moderate depression   Reports having trouble falling asleep, feeling down most days   Will start on mirtazapine 15 mg po at hs       06/23 more optimistic, upbeat    Ready to work with therapy today    Diabetes mellitus    Lab Results   Component Value Date    HGBA1C 5.5 06/02/2023     Low dose ssi coverage     06/23/23 stable    06/26/23 stable    Pressure injury of sacral region, stage 2     Border foam dressings to Trochanter and sacrum .     COPD (chronic obstructive pulmonary disease)  06/09/23 02 per nc at 2 liters  duonebs every 4 hours prn       New onset a-fib  06/09/23 eliquis 5 mg po BID     06/26/23 refuses eliquis - talked with him re reason he is on it, he continues to refuse. Talked with him re alternatives to eliquis - he is not receptive.      Cellulitis of lower extremity  06/09/23  wound cleansing with Vashe, apply silvadene daily; Border foam dressings to Trochanter and sacrum .        06/12/23 continue wound care to BLE     06/15/23 wound care consult       06/26/23 dressings to BLE       VTE Risk Mitigation (From admission, onward)           Ordered     apixaban tablet 5 mg  2 times daily         06/15/23 1030                    Discharge Planning   MAKENNA: 7/3/2023     Code Status: DNR   Is the patient medically ready for discharge?:     Reason for patient still in hospital (select all that apply): Treatment  Discharge Plan A: Home                  Liam Contreras IV, DO  Department of Hospital Medicine   [facility] - Medical Surgical Unit

## 2023-06-26 NOTE — PLAN OF CARE
Problem: Adult Inpatient Plan of Care  Goal: Patient-Specific Goal (Individualized)  Outcome: Ongoing, Progressing  Goal: Absence of Hospital-Acquired Illness or Injury  Outcome: Ongoing, Progressing  Intervention: Prevent Infection  Flowsheets (Taken 6/26/2023 0227)  Infection Prevention:   hand hygiene promoted   rest/sleep promoted     Problem: Diabetes Comorbidity  Goal: Blood Glucose Level Within Targeted Range  Outcome: Ongoing, Progressing  Intervention: Monitor and Manage Glycemia  Flowsheets (Taken 6/26/2023 0227)  Glycemic Management:   blood glucose monitored   oral hydration promoted

## 2023-06-26 NOTE — ASSESSMENT & PLAN NOTE
06/09/23 eliquis 5 mg po BID     06/26/23 refuses eliquis - talked with him re reason he is on it, he continues to refuse. Talked with him re alternatives to eliquis - he is not receptive.

## 2023-06-26 NOTE — SUBJECTIVE & OBJECTIVE
Interval History: weakness    Review of Systems   Constitutional:  Negative for appetite change.   Respiratory:  Negative for cough, choking, shortness of breath and wheezing.    Cardiovascular:  Negative for chest pain and palpitations.   Gastrointestinal:  Negative for constipation, diarrhea, nausea and vomiting.   Genitourinary:  Negative for difficulty urinating.   Skin:  Positive for wound.        Dressings to BLE   Neurological:  Positive for weakness.   Objective:     Vital Signs (Most Recent):  Temp: 97.9 °F (36.6 °C) (06/26/23 0733)  Pulse: 90 (06/26/23 0733)  Resp: 18 (06/26/23 0733)  BP: 116/63 (06/26/23 0733)  SpO2: 99 % (06/26/23 0733) Vital Signs (24h Range):  Temp:  [97.9 °F (36.6 °C)-98.5 °F (36.9 °C)] 97.9 °F (36.6 °C)  Pulse:  [76-90] 90  Resp:  [18-20] 18  SpO2:  [97 %-99 %] 99 %  BP: (100-116)/(55-63) 116/63     Weight: 72.7 kg (160 lb 3.2 oz)  Body mass index is 21.73 kg/m².    Intake/Output Summary (Last 24 hours) at 6/26/2023 0937  Last data filed at 6/26/2023 0905  Gross per 24 hour   Intake 890 ml   Output 250 ml   Net 640 ml         Physical Exam  HENT:      Head: Normocephalic.      Mouth/Throat:      Mouth: Mucous membranes are moist.   Cardiovascular:      Rate and Rhythm: Normal rate and regular rhythm.      Pulses: Normal pulses.      Heart sounds: Murmur heard.   Pulmonary:      Effort: Pulmonary effort is normal.      Breath sounds: Normal breath sounds.   Abdominal:      General: Bowel sounds are normal.      Palpations: Abdomen is soft.   Musculoskeletal:         General: Normal range of motion.   Skin:     Comments: Dressings to ble   Edema to left upper arm   Neurological:      Mental Status: He is alert and oriented to person, place, and time.   Psychiatric:         Mood and Affect: Mood normal.           Significant Labs: All pertinent labs within the past 24 hours have been reviewed.  Recent Lab Results         06/26/23  0623   06/25/23 2127   06/25/23  1217        POC  Glucose 120   95   129               Significant Imaging: I have reviewed all pertinent imaging results/findings within the past 24 hours.

## 2023-06-26 NOTE — ASSESSMENT & PLAN NOTE
Lab Results   Component Value Date    HGBA1C 5.5 06/02/2023     Low dose ssi coverage     06/23/23 stable    06/26/23 stable

## 2023-06-26 NOTE — ASSESSMENT & PLAN NOTE
06/09/23  wound cleansing with Vashe, apply silvadene daily; Border foam dressings to Trochanter and sacrum .        06/12/23 continue wound care to BLE     06/15/23 wound care consult       06/26/23 dressings to BLE

## 2023-06-26 NOTE — PT/OT/SLP PROGRESS
"Physical Therapy Treatment    Patient Name:  Esthela Contreras   MRN:  [id number]    Recommendations:     Discharge Recommendations: home health PT, nursing facility, basic  Discharge Equipment Recommendations: none  Barriers to discharge: Decreased caregiver support    Assessment:     Esthela Contreras is a 72 y.o. male admitted with a medical diagnosis of Gram-positive bacteremia.  He presents with the following impairments/functional limitations: impaired endurance, weakness, impaired functional mobility, decreased lower extremity function, decreased safety awareness Patient reluctantly agreed to therapy due to not having trust in therapist because of therapist size. Patient would not follow commands and continued to try to get out of bed while therapist was out of room. Patient was very agitated and short with therapist due to his "distrust" in the therapist for being a "small female". Patient was demand therapist to follow his commands along with refusing any ambulation. Therapist educated patient on the importance of getting up and the help the therapist is able to provide to patient as well as keeping his safety a top priority. Patient reluctantly agreed to ambulation where he was able to stand mod I and ambulated 30' with no LOB and only having CGA - supervision from therapist.     Rehab Prognosis: Fair; patient would benefit from acute skilled PT services to address these deficits and reach maximum level of function.    Recent Surgery: * No surgery found *      Plan:     During this hospitalization, patient to be seen 5 x/week to address the identified rehab impairments via gait training, therapeutic activities, therapeutic exercises and progress toward the following goals:    Plan of Care Expires:  07/03/23    Subjective     Chief Complaint: weakness and pain  Patient/Family Comments/goals: return home   Pain/Comfort:  Pain Rating 1: 0/10  Pain Rating Post-Intervention 2: 0/10      Objective:     Communicated with " "PT prior to session.  Patient found HOB elevated with   upon PT entry to room.     General Precautions: Standard, fall  Orthopedic Precautions: N/A  Braces: N/A  Respiratory Status: Room air     Functional Mobility:  Bed Mobility:     Supine to Sit: modified independence  Sit to Supine: stand by assistance  Transfers:     Sit to Stand:  modified independence with rolling walker  Gait: Patient reluctantly agreed to ambulation due to therapist being "a small girl and I can not trust you". Therapist informed patient on the importance of ambulation and the help he will receive from therapist. Patient ambulated 30' with RW and CGA - supervision with no LOB      AM-PAC 6 CLICK MOBILITY  Turning over in bed (including adjusting bedclothes, sheets and blankets)?: 4  Sitting down on and standing up from a chair with arms (e.g., wheelchair, bedside commode, etc.): 4  Moving from lying on back to sitting on the side of the bed?: 3  Moving to and from a bed to a chair (including a wheelchair)?: 3  Need to walk in hospital room?: 3  Climbing 3-5 steps with a railing?: 2  Basic Mobility Total Score: 19       Treatment & Education:   Ankle pumps 3 x 10   Hip flexion 2 x 10  Hip abduction 3 x 10   Hip adduction 3 x 10   LAQ 3 x 10     Patient left HOB elevated with call button in reach..    GOALS:   Multidisciplinary Problems       Physical Therapy Goals          Problem: Physical Therapy    Goal Priority Disciplines Outcome Goal Variances Interventions   Physical Therapy Goal     PT, PT/OT Ongoing, Progressing     Description: Short-term Goals: 1.5 weeks  1. Patient will perform supine to/from sit with minimal assistance to improve independence and safety with bed mobility.  2. Patient will perform a sliding board transfer from the bed to/from the chair/wheelchair with minimal assist to improve independence and safety with transfers.   3. Patient will tolerate 15 minutes of physical therapy intervention to improve endurance and " activity tolerance.    Long-term goals: 3 weeks  1. Patient will perform supine to/from sit with contact guard assist to improve independence and safety with bed mobility.  2. Patient will perform a sliding board transfer from the bed to/from the chair/wheelchair with contact guard assist to improve independence and safety with transfers.  3. Patient will tolerate 30 minutes of physical therapy intervention to improve endurance and activity tolerance.                         Time Tracking:     PT Received On: 06/26/23  PT Start Time: 1500     PT Stop Time: 1524  PT Total Time (min): 24 min     Billable Minutes: Gait Training 10 and Therapeutic Exercise 14    Treatment Type: Treatment  PT/PTA: PTA     Number of PTA visits since last PT visit: 1   CAMRYN Low   06/26/2023

## 2023-06-27 LAB
GLUCOSE SERPL-MCNC: 106 MG/DL (ref 70–105)
GLUCOSE SERPL-MCNC: 116 MG/DL (ref 70–105)
GLUCOSE SERPL-MCNC: 133 MG/DL (ref 70–105)
GLUCOSE SERPL-MCNC: 85 MG/DL (ref 70–105)

## 2023-06-27 PROCEDURE — 94761 N-INVAS EAR/PLS OXIMETRY MLT: CPT

## 2023-06-27 PROCEDURE — 82962 GLUCOSE BLOOD TEST: CPT

## 2023-06-27 PROCEDURE — 27000982 HC MATTRESS, MATRIX LAL RENTAL

## 2023-06-27 PROCEDURE — 27000944

## 2023-06-27 PROCEDURE — 25000003 PHARM REV CODE 250: Performed by: NURSE PRACTITIONER

## 2023-06-27 PROCEDURE — 97530 THERAPEUTIC ACTIVITIES: CPT | Mod: CQ

## 2023-06-27 PROCEDURE — 11000004 HC SNF PRIVATE

## 2023-06-27 PROCEDURE — 97116 GAIT TRAINING THERAPY: CPT | Mod: CQ

## 2023-06-27 RX ORDER — ACETAMINOPHEN 325 MG/1
650 TABLET ORAL EVERY 6 HOURS PRN
Status: DISCONTINUED | OUTPATIENT
Start: 2023-06-27 | End: 2023-07-08 | Stop reason: HOSPADM

## 2023-06-27 RX ADMIN — HYDROCORTISONE 2.5%: 25 CREAM TOPICAL at 09:06

## 2023-06-27 RX ADMIN — OXYCODONE HYDROCHLORIDE AND ACETAMINOPHEN 500 MG: 500 TABLET ORAL at 08:06

## 2023-06-27 RX ADMIN — ASPIRIN 81 MG: 81 TABLET, COATED ORAL at 08:06

## 2023-06-27 RX ADMIN — ATORVASTATIN CALCIUM 40 MG: 40 TABLET, FILM COATED ORAL at 08:06

## 2023-06-27 NOTE — PT/OT/SLP PROGRESS
Physical Therapy Treatment    Patient Name:  Esthela Contreras   MRN:  [id number]    Recommendations:     Discharge Recommendations: home health PT, nursing facility, basic  Discharge Equipment Recommendations: none  Barriers to discharge: Decreased caregiver support    Assessment:     Esthela Contreras is a 72 y.o. male admitted with a medical diagnosis of Gram-positive bacteremia.  He presents with the following impairments/functional limitations: impaired endurance, weakness, impaired functional mobility, decreased lower extremity function, decreased safety awareness.    Rehab Prognosis: Fair; patient would benefit from acute skilled PT services to address these deficits and reach maximum level of function.    Recent Surgery: * No surgery found *      Plan:     During this hospitalization, patient to be seen 5 x/week to address the identified rehab impairments via gait training, therapeutic activities, therapeutic exercises, neuromuscular re-education and progress toward the following goals:    Plan of Care Expires:  07/03/23    Subjective     Chief Complaint: weakness and pain  Patient/Family Comments/goals: return home  Pain/Comfort:  Pain Rating 1: 0/10  Pain Rating Post-Intervention 1: 0/10      Objective:     Communicated with PT prior to session.  Patient found supine with   upon PT entry to room.     General Precautions: Standard, fall  Orthopedic Precautions: N/A  Braces: N/A  Respiratory Status: Room air     Functional Mobility:  Bed Mobility:     Scooting: modified independence  Supine to Sit: stand by assistance  Sit to Supine: stand by assistance  Transfers:     Sit to Stand:  minimum assistance with rolling walker  Toilet Transfer: minimum assistance with  rolling walker  using  Step Transfer  Gait: Patient ambulated 115 feet using rolling walker and contact guard assist with a couple standing rest breaks.      AM-PAC 6 CLICK MOBILITY  Turning over in bed (including adjusting bedclothes, sheets and  blankets)?: 4  Sitting down on and standing up from a chair with arms (e.g., wheelchair, bedside commode, etc.): 3  Moving from lying on back to sitting on the side of the bed?: 4  Moving to and from a bed to a chair (including a wheelchair)?: 3  Need to walk in hospital room?: 3  Climbing 3-5 steps with a railing?: 2  Basic Mobility Total Score: 19       Treatment & Education:  Patient performed bed mobility, transfers, and gait as listed above.    Patient left supine with call button in reach..    GOALS:   Multidisciplinary Problems       Physical Therapy Goals          Problem: Physical Therapy    Goal Priority Disciplines Outcome Goal Variances Interventions   Physical Therapy Goal     PT, PT/OT Ongoing, Progressing     Description: Short-term Goals: 1.5 weeks  1. Patient will perform supine to/from sit with minimal assistance to improve independence and safety with bed mobility.  2. Patient will perform a sliding board transfer from the bed to/from the chair/wheelchair with minimal assist to improve independence and safety with transfers.   3. Patient will tolerate 15 minutes of physical therapy intervention to improve endurance and activity tolerance.    Long-term goals: 3 weeks  1. Patient will perform supine to/from sit with contact guard assist to improve independence and safety with bed mobility.  2. Patient will perform a sliding board transfer from the bed to/from the chair/wheelchair with contact guard assist to improve independence and safety with transfers.  3. Patient will tolerate 30 minutes of physical therapy intervention to improve endurance and activity tolerance.                         Time Tracking:     PT Received On: 06/27/23  PT Start Time: 0904     PT Stop Time: 0928  PT Total Time (min): 24 min     Billable Minutes: Gait Training 14 and Therapeutic Activity 10    Treatment Type: Treatment  PT/PTA: PTA     Number of PTA visits since last PT visit: 2     06/27/2023

## 2023-06-27 NOTE — PLAN OF CARE
Problem: Adult Inpatient Plan of Care  Goal: Plan of Care Review  Outcome: Ongoing, Progressing     Problem: Adult Inpatient Plan of Care  Goal: Patient-Specific Goal (Individualized)  Outcome: Ongoing, Progressing     Problem: Adult Inpatient Plan of Care  Goal: Absence of Hospital-Acquired Illness or Injury  Outcome: Ongoing, Progressing     Problem: Adult Inpatient Plan of Care  Goal: Optimal Comfort and Wellbeing  Outcome: Ongoing, Progressing     Problem: Adult Inpatient Plan of Care  Goal: Readiness for Transition of Care  Outcome: Ongoing, Progressing     Problem: Diabetes Comorbidity  Goal: Blood Glucose Level Within Targeted Range  Outcome: Ongoing, Progressing     Problem: Renal Function Impairment (Acute Kidney Injury/Impairment)  Goal: Effective Renal Function  Outcome: Ongoing, Progressing     Problem: Impaired Wound Healing  Goal: Optimal Wound Healing  Outcome: Ongoing, Progressing     Problem: Fall Injury Risk  Goal: Absence of Fall and Fall-Related Injury  Outcome: Ongoing, Progressing     Problem: Skin Injury Risk Increased  Goal: Skin Health and Integrity  Outcome: Ongoing, Progressing

## 2023-06-28 LAB
GLUCOSE SERPL-MCNC: 105 MG/DL (ref 70–105)
GLUCOSE SERPL-MCNC: 109 MG/DL (ref 70–105)
GLUCOSE SERPL-MCNC: 111 MG/DL (ref 70–105)
GLUCOSE SERPL-MCNC: 99 MG/DL (ref 70–105)

## 2023-06-28 PROCEDURE — 25000003 PHARM REV CODE 250: Performed by: NURSE PRACTITIONER

## 2023-06-28 PROCEDURE — 94761 N-INVAS EAR/PLS OXIMETRY MLT: CPT

## 2023-06-28 PROCEDURE — 82962 GLUCOSE BLOOD TEST: CPT

## 2023-06-28 PROCEDURE — 27000944

## 2023-06-28 PROCEDURE — 11000004 HC SNF PRIVATE

## 2023-06-28 PROCEDURE — 27000982 HC MATTRESS, MATRIX LAL RENTAL

## 2023-06-28 PROCEDURE — 27000221 HC OXYGEN, UP TO 24 HOURS

## 2023-06-28 RX ADMIN — MIDODRINE HYDROCHLORIDE 5 MG: 2.5 TABLET ORAL at 11:06

## 2023-06-28 RX ADMIN — MIDODRINE HYDROCHLORIDE 5 MG: 2.5 TABLET ORAL at 07:06

## 2023-06-28 RX ADMIN — Medication 1 CAPSULE: at 07:06

## 2023-06-28 RX ADMIN — ZINC SULFATE 220 MG (50 MG) CAPSULE 220 MG: CAPSULE at 09:06

## 2023-06-28 RX ADMIN — THERA TABS 1 TABLET: TAB at 09:06

## 2023-06-28 RX ADMIN — SENNOSIDES AND DOCUSATE SODIUM 1 TABLET: 50; 8.6 TABLET ORAL at 09:06

## 2023-06-28 RX ADMIN — APIXABAN 5 MG: 2.5 TABLET, FILM COATED ORAL at 09:06

## 2023-06-28 RX ADMIN — OXYCODONE HYDROCHLORIDE AND ACETAMINOPHEN 500 MG: 500 TABLET ORAL at 09:06

## 2023-06-28 NOTE — PLAN OF CARE
[facility] - Medical Surgical Unit - Swing Bed   Interdisciplinary Team Meeting    Patient: Esthela Contreras   Today's Date: 6/28/2023   Estimated D/C Date: 7/3/2023        Physician: Liam Contreras MD Nurse Practitioner: Fang Crowder NP   Pharmacy: Linda Branstetter, PharmD Unit Director: BRYANT Milian   :  Cyrs Dwyer RN Physical/Occupational Therapy:  Raymundo Pride PT   Speech Therapy: NA Activity Therapy: Swati Espino LPN   Nursing: BRYANT Milian  Respiratory: Ruslan Mandujano, RT Dietary: Gael Aguilar RD  Other:      Nursing  New Symptoms/Problems: N/A  Last Bowel Movement: 06/24/23  Urine: continent Diarrhea: No   Constipated: No Bowel: incontinent Rivero: No   Isolation: No     Device (Oxygen Therapy): room air Flow (L/min): 2  SpO2: 98 %    Diet/Nutrition Received: mechanical/dental soft  Speech/Swallowing: Swallowing difficulty  Aspiration Precautions: Yes  Cognition: Memory issues    Physical Therapy  Physical Therapy/Gait: 115' w/RW w/ CGA ELOS: Plan to DC 7/3/2023    Transfers: Minimal Assistance Range of Motion/Restrictions: N/A     Occupational Therapy Patient doesn't receive this service    Activity Therapy  Level of participation: Active participation      Tx Plan/Recommendations reviewed with family and/or patient on (date) 06/26/2023.  Additional family Conference/Training:   D/C Plan/Recommendations:   MAKENNA: 7/3/2023    Pharmacy  Medication Changes (see MD orders in chart): Yes  MD/NP: Liam Contreras MD Labs Reviewed: Yes New Lab Orders: Yes   Lab Concerns: Q M/Th

## 2023-06-28 NOTE — PROGRESS NOTES
[facility] - Medical Surgical Unit  Hospital Medicine  Progress Note    Patient Name: Esthela Contreras  MRN: [id number]  Patient Class: IP- Swing   Admission Date: 6/9/2023  Length of Stay: 19 days  Attending Physician: Liam Contreras IV, DO  Primary Care Provider: CHRISTIANE Bardales        Subjective:     Principal Problem:Gram-positive bacteremia        HPI:  HPI:  Patient is a 72-year-old male with a history of unspecified CHF in the setting of CAD, essential hypertension, diabetes, oxygen-dependent COPD with baseline supplemental oxygen requirement of 2 L/min and BiPAP QHS along with CKD stage IIIA who presented to the emergency room complaining of dyspnea which started just a few days ago.  Patient otherwise denied any fever chills cough hemoptysis PND orthopnea chest pain palpitation or lower extremity edema in association.  Patient's niece stated that he has chronic wounds on both legs and felt that he has not been given proper care to address this.  Patient lives alone and when his niece visited him today, she found him slumped over on a recliner dyspneic prompting ED visit.      On initial presentation, patient was tachycardic and tachypneic but vital signs were otherwise stable and patient was afebrile.  Workup was notable for what appeared to be a new onset atrial fibrillation with RVR with elevated troponin and proBNP, elevation in total bilirubin level with mildly elevated transaminases and alkaline phosphatase, leukocytosis with left shift with WBC count of 23,000 and high anion gap metabolic acidosis with anion gap of 29 and bicarbonate of 7, acute on chronic renal failure with BUN of 110 and creatinine of 7.03 from a baseline serum creatinine of 1.43, significantly elevated lactic acid level with initial lactic acid level of 9.7 to 10.7 even after receiving 30 cc/kg of crystalloid IV fluid bolus.  CT of bilateral lower extremity demonstrated a presence of bilateral cellulitis without  evidence of drainable abscess or necrotizing fasciitis. Patient will be admitted for further evaluation and intervention        Overview/Hospital Course:  06/09/2023   Patient has no new complaints, leg swelling improving.    T-max 100.3°, blood pressure 93/53 pulse rate 84 respiratory rate 18 temperature 98.1°.  His labs white blood cell count down to 5 hemoglobin 12 platelets count 118 sodium 137 potassium 3.1 creatinine 1 calcium 6.5.    Echocardiogram showed ejection fraction of 35% with left ventricular diastolic dysfunction.    Blood culture on on 06/09/2023 showed no growth to date.    Blood cultures on 06/06/2023 grew Gram-negative bacilli, but chews fragilis, Streptococcus species.    Hypotension resolved, blood pressure borderline but does not need vasopressors.    Sepsis resolved.    New onset AFib, rate is controlled, started on Eliquis anticoagulation.    Acute renal failure presumably   Bacteremia, blood cultures questionable chills contamination, likely related to cellulitis of lower extremities, will continue antibiotics.    Surgery evaluated the patient, recommended to Continue local wound care.    Will deescalate antibiotics to Rocephin 2 g IV piggyback daily.    Patient awaiting placement.      Will need to continue antibiotics , rocephin 2 g IVPB daily for 12 more days, last dose 06/21/23. Continue local wound care and therapy.    Above information is from [facility] acute care stay 06/01/23- 06/09/23. Mr. Contreras has been accepted to swing bed services at [facility]. His arrival is expected today.     Mr. Contreras is a 72 year-old  male who is admitted to [facility] swing bed services for daily wound care and continuation of IV Rocephin through 6/21/23. Upon arrival here tonight, he is alert and oriented. Dressings from lower legs were removed for assessment. He reported severe pain with manipulation of legs. He states that he has had poor appetite and notes some  "constipation during hospitalization. Labs done at [facility] today were reviewed and are noted below. His albumin was 1.4 on 6/8. Dietician and wound care are consulted. He was accepted by Dr. Rosario to admission to Dr. Contreras today. DIEGO Alcocer completed medication reconciliation and admission orders.      Overview/Hospital Course:  06/12/23 Awake and resting in bed. Complains of having gas but no BM in past 5 days. Complains of occasional pain to ankles. He has dressings intact to BLE from knees down.  Continue rocephin IV and wound care to BLE. Continue therapy for strengthening.  06/13/23 refusing to take po meds. Talked with him re possible adverse effects of doing so and he states "When the good lord takes me, I am ready." Talked with him re code status and he states he wants to be Full code.  06/14/23 continues to refuse po meds- states meds " make him feel funny, just don't feel right ."Talked with him re purpose of meds and he is agreeable to resuming meds. Will monitor on telemetry. CNA reports small amount of bright red blood noted with BM. Talked him re any history of hemorrhoids or constipation. He denies both. Will recheck CBC and check stool for FOB.   06/14/23 Talked with Mr. Contreras re possibility of him being depressed. He allowed me to do PHQ 9 screening. He scored 11 showing moderate depression. He began to cry at end of screen. I talked with him re antidepressant and he is agreeable. Will start remeron 15 mg po daily.  06/15/23 Awake and resting in bed. No complaints. Reports sleeping better last night. H&H stable at 12/37. Talked with him again re positive FOB. He does report having burning and itching to rectum for past few days. No external hemorrhoids noted. Will treat with anusol hc. Resume eliquis. Dressing to bilateral lower legs.   06/19/23 Awake and resting in bed. Reports feeling better and sleeping better. Denies pain. States he has not had BM in 2-3 days but that is normal for him. Denies " "feeling constipated. Continue rocephin and wound care. Blood glucose levels have improved. Continue to monitor for hypoglycemia.  06/23/23 Awake and sitting up in chair. States he was able to get up and walk with therapy yesterday and his legs did not hurt him. States he plans to walk further today. Continue wound care. BLE dressings intact.  06/24/23 Nurse reports that patient has increased swelling in left arm with swelling now to left flank and abdomen noted this am. She also reports that pt has been refusing his "heart" meds including Eliquis because he thinks they make him feel bad. Upon evaluation, Mr. Contreras notes the swelling that is present but reports "I feel fine" and "better than I have been." Pt has PMH of CHF with EF of 35% and has not been on diuretic.  He denies chest pain and SOB. Pitting edema is noted to left flank, abd, and hip with nonpitting to left arm. No open wound, erythema or weeping. His lungs are clear. O2 sat 96-99% on RA. Discussed need for labs and xray today. He refused initially but finally agreed. Troponin 146, d dimer 0.99, BNP > 33746, BUN 13, creatinine 0.84. EKG ordered. It shows LBBB with 1st degree AVB with no afib. No STEMI and no changes from previous on 6/1. DIScussed findings with pt and explained need for CTA chest and doppler left arm and transfer to [facility] for further evaluation. He refused stating that the only place he is going is home. Discussed risk of worsening condition or death due to MI, fluid overload, and VTE. He acknowledged risk stating that it was ok and continues to refuse transfer. He does agree with IV, IV lasix x 1 dose and CTA chest. CTA chest shows no PE, small bilat pleural effusions.  Nurse reports that pt is still refusing medications. Discussed findings again and risk of refusing meds and evaluation with pt. He acknowledges and states that he does not want "all of that." Further discussed code status with pt and he wants to change to DNR " "status.  06/26/23 Mr. Contreras is awake, alert and resting in bed. Talked with him re CTA findings of no PE. Talked with him re his refusal to take meds. He states eliquis makes him feel funny. Explained to him what it was for and danger of not taking it. Talked with him re alternatives to eliquis and he is not receptive. States "when the good lord gets ready to take me , just let me go."   06/28/23 has edema to left arm and to left flank. Talked with him lasix and need to give it to remove fluid. He refuses stating " I do not want that, I do not need that." Just let me go home Friday. Probable dc home Friday.      No new subjective & objective note has been filed under this hospital service since the last note was generated.      Assessment/Plan:      Coronary artery disease involving native coronary artery of native heart without angina pectoris     06/09/23 Continue home aspirin, BB, and high-intensity statin.           Essential hypertension  06/09/23 continue metoprolol succinate 25 mg daily     06/12/23 stable     06/23 stable    SOB (shortness of breath)    06/09/23 oxygen at 2 liters   duonebs every 4 hours prn       06/26/23 denies sob    HFrEF (heart failure with reduced ejection fraction)    Summary    The left ventricle is normal in size with mild concentric hypertrophy and moderately decreased systolic function.  The estimated ejection fraction is 35%.  Left ventricular diastolic dysfunction.  Atrial fibrillation not observed.  Normal right ventricular size.  Normal central venous pressure (3 mmHg).  The estimated PA systolic pressure is 22 mmHg.  Trivial pericardial effusion.     Echo done 06/02/23     6/10 - Appears compensated.    06/24/23 BNP 90719. Troponin 146.  Swelling to left arm, abd, flank  CXR neg, CTA neg  Lasix 20 mg IVP given x 1 dose.   Pt is refusing all other meds    06/26/23 continues to refuse meds    Hypomagnesemia    06/15/23 magnesium 1.5- one gram magnesium sulfate "       Depression    06/14/23 PHQ 9 screen shows moderate depression   Reports having trouble falling asleep, feeling down most days   Will start on mirtazapine 15 mg po at hs       06/23 more optimistic, upbeat   Ready to work with therapy today    Diabetes mellitus    Lab Results   Component Value Date    HGBA1C 5.5 06/02/2023     Low dose ssi coverage     06/23/23 stable    06/26/23 stable    Pressure injury of sacral region, stage 2     Border foam dressings to Trochanter and sacrum .     COPD (chronic obstructive pulmonary disease)  06/09/23 02 per nc at 2 liters  duonebs every 4 hours prn       New onset a-fib  06/09/23 eliquis 5 mg po BID     06/26/23 refuses eliquis - talked with him re reason he is on it, he continues to refuse. Talked with him re alternatives to eliquis - he is not receptive.      06/28/23 continues to refuse meds    Cellulitis of lower extremity  06/09/23  wound cleansing with Vashe, apply silvadene daily; Border foam dressings to Trochanter and sacrum .        06/12/23 continue wound care to BLE     06/15/23 wound care consult       06/26/23 dressings to BLE       VTE Risk Mitigation (From admission, onward)           Ordered     apixaban tablet 5 mg  2 times daily         06/15/23 1030                    Discharge Planning   MAKENNA: 7/3/2023     Code Status: DNR   Is the patient medically ready for discharge?:     Reason for patient still in hospital (select all that apply): Treatment  Discharge Plan A: Home                  CHRISTIANE Mcelroy  Department of Hospital Medicine   [facility] - Medical Surgical Unit

## 2023-06-28 NOTE — PLAN OF CARE
Problem: Adult Inpatient Plan of Care  Goal: Plan of Care Review  Outcome: Ongoing, Progressing  Flowsheets (Taken 6/28/2023 0519)  Plan of Care Reviewed With: patient  Goal: Patient-Specific Goal (Individualized)  Outcome: Ongoing, Progressing  Goal: Absence of Hospital-Acquired Illness or Injury  Outcome: Ongoing, Progressing  Intervention: Identify and Manage Fall Risk  Flowsheets (Taken 6/28/2023 0519)  Safety Promotion/Fall Prevention:   assistive device/personal item within reach   commode/urinal/bedpan at bedside   diversional activities provided   Fall Risk reviewed with patient/family   medications reviewed   nonskid shoes/socks when out of bed   side rails raised x 2   instructed to call staff for mobility  Intervention: Prevent Skin Injury  Flowsheets (Taken 6/28/2023 0519)  Body Position:   position changed independently   weight shifting  Skin Protection: incontinence pads utilized  Intervention: Prevent and Manage VTE (Venous Thromboembolism) Risk  Flowsheets (Taken 6/28/2023 0519)  Activity Management:   Arm raise - L1   Rolling - L1  VTE Prevention/Management:   ambulation promoted   fluids promoted  Range of Motion: active ROM (range of motion) encouraged  Intervention: Prevent Infection  Flowsheets (Taken 6/28/2023 0519)  Infection Prevention:   equipment surfaces disinfected   hand hygiene promoted   personal protective equipment utilized  Goal: Optimal Comfort and Wellbeing  Outcome: Ongoing, Progressing  Intervention: Monitor Pain and Promote Comfort  Flowsheets (Taken 6/28/2023 0519)  Pain Management Interventions:   diversional activity provided   quiet environment facilitated  Intervention: Provide Person-Centered Care  Flowsheets (Taken 6/28/2023 0519)  Trust Relationship/Rapport: care explained  Goal: Readiness for Transition of Care  Outcome: Ongoing, Progressing     Problem: Diabetes Comorbidity  Goal: Blood Glucose Level Within Targeted Range  Outcome: Ongoing,  Progressing  Intervention: Monitor and Manage Glycemia  Flowsheets (Taken 6/28/2023 0519)  Glycemic Management:   blood glucose monitored   oral hydration promoted     Problem: Fluid and Electrolyte Imbalance (Acute Kidney Injury/Impairment)  Goal: Fluid and Electrolyte Balance  Outcome: Ongoing, Progressing  Intervention: Monitor and Manage Fluid and Electrolyte Balance  Flowsheets (Taken 6/28/2023 0519)  Fluid/Electrolyte Management: fluids provided     Problem: Oral Intake Inadequate (Acute Kidney Injury/Impairment)  Goal: Optimal Nutrition Intake  Outcome: Ongoing, Progressing     Problem: Renal Function Impairment (Acute Kidney Injury/Impairment)  Goal: Effective Renal Function  Outcome: Ongoing, Progressing  Intervention: Monitor and Support Renal Function  Flowsheets (Taken 6/28/2023 0519)  Medication Review/Management: medications reviewed     Problem: Impaired Wound Healing  Goal: Optimal Wound Healing  Outcome: Ongoing, Progressing  Intervention: Promote Wound Healing  Flowsheets (Taken 6/28/2023 0519)  Sleep/Rest Enhancement:   awakenings minimized   room darkened  Activity Management:   Arm raise - L1   Rolling - L1  Pain Management Interventions:   diversional activity provided   quiet environment facilitated     Problem: Skin Injury Risk Increased  Goal: Skin Health and Integrity  Outcome: Ongoing, Progressing  Intervention: Optimize Skin Protection  Flowsheets (Taken 6/28/2023 0519)  Pressure Reduction Techniques: frequent weight shift encouraged  Pressure Reduction Devices: positioning supports utilized  Skin Protection: incontinence pads utilized  Head of Bed (HOB) Positioning: HOB elevated     Problem: Fall Injury Risk  Goal: Absence of Fall and Fall-Related Injury  Outcome: Ongoing, Progressing  Intervention: Identify and Manage Contributors  Flowsheets (Taken 6/28/2023 0519)  Self-Care Promotion: independence encouraged  Medication Review/Management: medications reviewed  Intervention: Promote  Injury-Free Environment  Flowsheets (Taken 6/28/2023 9756)  Safety Promotion/Fall Prevention:   assistive device/personal item within reach   commode/urinal/bedpan at bedside   diversional activities provided   Fall Risk reviewed with patient/family   medications reviewed   nonskid shoes/socks when out of bed   side rails raised x 2   instructed to call staff for mobility

## 2023-06-28 NOTE — PROGRESS NOTES
[facility] - Medical Surgical Unit  Hospital Medicine  Progress Note    Patient Name: Esthela Contreras  MRN: [id number]  Patient Class: IP- Swing   Admission Date: 6/9/2023  Length of Stay: 19 days  Attending Physician: Liam Contreras IV, DO  Primary Care Provider: CHRISTIANE Bardales        Subjective:     Principal Problem:Gram-positive bacteremia        HPI:  HPI:  Patient is a 72-year-old male with a history of unspecified CHF in the setting of CAD, essential hypertension, diabetes, oxygen-dependent COPD with baseline supplemental oxygen requirement of 2 L/min and BiPAP QHS along with CKD stage IIIA who presented to the emergency room complaining of dyspnea which started just a few days ago.  Patient otherwise denied any fever chills cough hemoptysis PND orthopnea chest pain palpitation or lower extremity edema in association.  Patient's niece stated that he has chronic wounds on both legs and felt that he has not been given proper care to address this.  Patient lives alone and when his niece visited him today, she found him slumped over on a recliner dyspneic prompting ED visit.      On initial presentation, patient was tachycardic and tachypneic but vital signs were otherwise stable and patient was afebrile.  Workup was notable for what appeared to be a new onset atrial fibrillation with RVR with elevated troponin and proBNP, elevation in total bilirubin level with mildly elevated transaminases and alkaline phosphatase, leukocytosis with left shift with WBC count of 23,000 and high anion gap metabolic acidosis with anion gap of 29 and bicarbonate of 7, acute on chronic renal failure with BUN of 110 and creatinine of 7.03 from a baseline serum creatinine of 1.43, significantly elevated lactic acid level with initial lactic acid level of 9.7 to 10.7 even after receiving 30 cc/kg of crystalloid IV fluid bolus.  CT of bilateral lower extremity demonstrated a presence of bilateral cellulitis without  evidence of drainable abscess or necrotizing fasciitis. Patient will be admitted for further evaluation and intervention        Overview/Hospital Course:  06/09/2023   Patient has no new complaints, leg swelling improving.    T-max 100.3°, blood pressure 93/53 pulse rate 84 respiratory rate 18 temperature 98.1°.  His labs white blood cell count down to 5 hemoglobin 12 platelets count 118 sodium 137 potassium 3.1 creatinine 1 calcium 6.5.    Echocardiogram showed ejection fraction of 35% with left ventricular diastolic dysfunction.    Blood culture on on 06/09/2023 showed no growth to date.    Blood cultures on 06/06/2023 grew Gram-negative bacilli, but chews fragilis, Streptococcus species.    Hypotension resolved, blood pressure borderline but does not need vasopressors.    Sepsis resolved.    New onset AFib, rate is controlled, started on Eliquis anticoagulation.    Acute renal failure presumably   Bacteremia, blood cultures questionable chills contamination, likely related to cellulitis of lower extremities, will continue antibiotics.    Surgery evaluated the patient, recommended to Continue local wound care.    Will deescalate antibiotics to Rocephin 2 g IV piggyback daily.    Patient awaiting placement.      Will need to continue antibiotics , rocephin 2 g IVPB daily for 12 more days, last dose 06/21/23. Continue local wound care and therapy.    Above information is from [facility] acute care stay 06/01/23- 06/09/23. Mr. Contreras has been accepted to swing bed services at [facility]. His arrival is expected today.     Mr. Contreras is a 72 year-old  male who is admitted to [facility] swing bed services for daily wound care and continuation of IV Rocephin through 6/21/23. Upon arrival here tonight, he is alert and oriented. Dressings from lower legs were removed for assessment. He reported severe pain with manipulation of legs. He states that he has had poor appetite and notes some  "constipation during hospitalization. Labs done at [facility] today were reviewed and are noted below. His albumin was 1.4 on 6/8. Dietician and wound care are consulted. He was accepted by Dr. Rosario to admission to Dr. Contreras today. DIEGO Alcocer completed medication reconciliation and admission orders.      Overview/Hospital Course:  06/12/23 Awake and resting in bed. Complains of having gas but no BM in past 5 days. Complains of occasional pain to ankles. He has dressings intact to BLE from knees down.  Continue rocephin IV and wound care to BLE. Continue therapy for strengthening.  06/13/23 refusing to take po meds. Talked with him re possible adverse effects of doing so and he states "When the good lord takes me, I am ready." Talked with him re code status and he states he wants to be Full code.  06/14/23 continues to refuse po meds- states meds " make him feel funny, just don't feel right ."Talked with him re purpose of meds and he is agreeable to resuming meds. Will monitor on telemetry. CNA reports small amount of bright red blood noted with BM. Talked him re any history of hemorrhoids or constipation. He denies both. Will recheck CBC and check stool for FOB.   06/14/23 Talked with Mr. Contreras re possibility of him being depressed. He allowed me to do PHQ 9 screening. He scored 11 showing moderate depression. He began to cry at end of screen. I talked with him re antidepressant and he is agreeable. Will start remeron 15 mg po daily.  06/15/23 Awake and resting in bed. No complaints. Reports sleeping better last night. H&H stable at 12/37. Talked with him again re positive FOB. He does report having burning and itching to rectum for past few days. No external hemorrhoids noted. Will treat with anusol hc. Resume eliquis. Dressing to bilateral lower legs.   06/19/23 Awake and resting in bed. Reports feeling better and sleeping better. Denies pain. States he has not had BM in 2-3 days but that is normal for him. Denies " "feeling constipated. Continue rocephin and wound care. Blood glucose levels have improved. Continue to monitor for hypoglycemia.  06/23/23 Awake and sitting up in chair. States he was able to get up and walk with therapy yesterday and his legs did not hurt him. States he plans to walk further today. Continue wound care. BLE dressings intact.  06/24/23 Nurse reports that patient has increased swelling in left arm with swelling now to left flank and abdomen noted this am. She also reports that pt has been refusing his "heart" meds including Eliquis because he thinks they make him feel bad. Upon evaluation, Mr. Contreras notes the swelling that is present but reports "I feel fine" and "better than I have been." Pt has PMH of CHF with EF of 35% and has not been on diuretic.  He denies chest pain and SOB. Pitting edema is noted to left flank, abd, and hip with nonpitting to left arm. No open wound, erythema or weeping. His lungs are clear. O2 sat 96-99% on RA. Discussed need for labs and xray today. He refused initially but finally agreed. Troponin 146, d dimer 0.99, BNP > 94107, BUN 13, creatinine 0.84. EKG ordered. It shows LBBB with 1st degree AVB with no afib. No STEMI and no changes from previous on 6/1. DIScussed findings with pt and explained need for CTA chest and doppler left arm and transfer to [facility] for further evaluation. He refused stating that the only place he is going is home. Discussed risk of worsening condition or death due to MI, fluid overload, and VTE. He acknowledged risk stating that it was ok and continues to refuse transfer. He does agree with IV, IV lasix x 1 dose and CTA chest. CTA chest shows no PE, small bilat pleural effusions.  Nurse reports that pt is still refusing medications. Discussed findings again and risk of refusing meds and evaluation with pt. He acknowledges and states that he does not want "all of that." Further discussed code status with pt and he wants to change to DNR " "status.  06/26/23 Mr. Contreras is awake, alert and resting in bed. Talked with him re CTA findings of no PE. Talked with him re his refusal to take meds. He states eliquis makes him feel funny. Explained to him what it was for and danger of not taking it. Talked with him re alternatives to eliquis and he is not receptive. States "when the good lord gets ready to take me , just let me go."   06/28/23 has edema to left arm and to left flank. Talked with him lasix and need to give it to remove fluid. He refuses stating " I do not want that, I do not need that." Just let me go home Friday. Probable dc home Friday.        Interval History: refusing meds    Review of Systems   Constitutional:  Negative for appetite change.   Respiratory:  Negative for chest tightness and shortness of breath.    Cardiovascular:  Negative for chest pain.   Gastrointestinal:  Negative for constipation, diarrhea, nausea and vomiting.   Neurological:  Negative for weakness.   Objective:     Vital Signs (Most Recent):  Temp: 97.6 °F (36.4 °C) (06/28/23 1201)  Pulse: 98 (06/28/23 1201)  Resp: 18 (06/28/23 1201)  BP: 115/78 (06/28/23 1201)  SpO2: 97 % (06/28/23 1201) Vital Signs (24h Range):  Temp:  [97.4 °F (36.3 °C)-98.4 °F (36.9 °C)] 97.6 °F (36.4 °C)  Pulse:  [] 98  Resp:  [16-20] 18  SpO2:  [96 %-100 %] 97 %  BP: (102-118)/(60-79) 115/78     Weight: 78.9 kg (174 lb)  Body mass index is 23.6 kg/m².    Intake/Output Summary (Last 24 hours) at 6/28/2023 1456  Last data filed at 6/28/2023 1441  Gross per 24 hour   Intake 600 ml   Output 250 ml   Net 350 ml         Physical Exam  HENT:      Mouth/Throat:      Mouth: Mucous membranes are moist.   Cardiovascular:      Rate and Rhythm: Normal rate and regular rhythm.      Heart sounds: Normal heart sounds.      Comments: Edema to left arm and flank- refuses lasix   Pulmonary:      Effort: Pulmonary effort is normal.      Breath sounds: Normal breath sounds.   Abdominal:      General: Bowel sounds " are normal.      Palpations: Abdomen is soft.   Skin:     General: Skin is warm and dry.      Comments: Dressings to ble   Neurological:      Mental Status: He is alert and oriented to person, place, and time.   Psychiatric:         Mood and Affect: Mood normal.           Significant Labs: All pertinent labs within the past 24 hours have been reviewed.  Recent Lab Results         06/28/23  1108   06/28/23  0616   06/27/23  2036   06/27/23  1628        POC Glucose 109   105   133   116               Significant Imaging: I have reviewed all pertinent imaging results/findings within the past 24 hours.      Assessment/Plan:      Coronary artery disease involving native coronary artery of native heart without angina pectoris     06/09/23 Continue home aspirin, BB, and high-intensity statin.           Essential hypertension  06/09/23 continue metoprolol succinate 25 mg daily     06/12/23 stable     06/23 stable    SOB (shortness of breath)    06/09/23 oxygen at 2 liters   duonebs every 4 hours prn       06/26/23 denies sob    HFrEF (heart failure with reduced ejection fraction)    Summary     The left ventricle is normal in size with mild concentric hypertrophy and moderately decreased systolic function.   The estimated ejection fraction is 35%.   Left ventricular diastolic dysfunction.   Atrial fibrillation not observed.   Normal right ventricular size.   Normal central venous pressure (3 mmHg).   The estimated PA systolic pressure is 22 mmHg.   Trivial pericardial effusion.     Echo done 06/02/23     6/10 - Appears compensated.    06/24/23 BNP 46127. Troponin 146.  Swelling to left arm, abd, flank  CXR neg, CTA neg  Lasix 20 mg IVP given x 1 dose.   Pt is refusing all other meds    06/26/23 continues to refuse meds    Hypomagnesemia    06/15/23 magnesium 1.5- one gram magnesium sulfate       Depression    06/14/23 PHQ 9 screen shows moderate depression   Reports having trouble falling asleep, feeling down most  days   Will start on mirtazapine 15 mg po at hs       06/23 more optimistic, upbeat   Ready to work with therapy today    Diabetes mellitus    Lab Results   Component Value Date    HGBA1C 5.5 06/02/2023     Low dose ssi coverage     06/23/23 stable    06/26/23 stable    Pressure injury of sacral region, stage 2     Border foam dressings to Trochanter and sacrum .     COPD (chronic obstructive pulmonary disease)  06/09/23 02 per nc at 2 liters  duonebs every 4 hours prn       New onset a-fib  06/09/23 eliquis 5 mg po BID     06/26/23 refuses eliquis - talked with him re reason he is on it, he continues to refuse. Talked with him re alternatives to eliquis - he is not receptive.      06/28/23 continues to refuse meds    Cellulitis of lower extremity  06/09/23  wound cleansing with Vashe, apply silvadene daily; Border foam dressings to Trochanter and sacrum .        06/12/23 continue wound care to BLE     06/15/23 wound care consult       06/26/23 dressings to BLE       VTE Risk Mitigation (From admission, onward)         Ordered     apixaban tablet 5 mg  2 times daily         06/15/23 1030                Discharge Planning   MAKENNA: 7/3/2023     Code Status: DNR   Is the patient medically ready for discharge?:     Reason for patient still in hospital (select all that apply): Treatment  Discharge Plan A: Home                  CHRISTIANE Mcelroy  Department of Hospital Medicine   [facility] - Medical Surgical Unit

## 2023-06-28 NOTE — SUBJECTIVE & OBJECTIVE
Interval History: refusing meds    Review of Systems   Constitutional:  Negative for appetite change.   Respiratory:  Negative for chest tightness and shortness of breath.    Cardiovascular:  Negative for chest pain.   Gastrointestinal:  Negative for constipation, diarrhea, nausea and vomiting.   Neurological:  Negative for weakness.   Objective:     Vital Signs (Most Recent):  Temp: 97.6 °F (36.4 °C) (06/28/23 1201)  Pulse: 98 (06/28/23 1201)  Resp: 18 (06/28/23 1201)  BP: 115/78 (06/28/23 1201)  SpO2: 97 % (06/28/23 1201) Vital Signs (24h Range):  Temp:  [97.4 °F (36.3 °C)-98.4 °F (36.9 °C)] 97.6 °F (36.4 °C)  Pulse:  [] 98  Resp:  [16-20] 18  SpO2:  [96 %-100 %] 97 %  BP: (102-118)/(60-79) 115/78     Weight: 78.9 kg (174 lb)  Body mass index is 23.6 kg/m².    Intake/Output Summary (Last 24 hours) at 6/28/2023 1456  Last data filed at 6/28/2023 1441  Gross per 24 hour   Intake 600 ml   Output 250 ml   Net 350 ml         Physical Exam  HENT:      Mouth/Throat:      Mouth: Mucous membranes are moist.   Cardiovascular:      Rate and Rhythm: Normal rate and regular rhythm.      Heart sounds: Normal heart sounds.      Comments: Edema to left arm and flank- refuses lasix   Pulmonary:      Effort: Pulmonary effort is normal.      Breath sounds: Normal breath sounds.   Abdominal:      General: Bowel sounds are normal.      Palpations: Abdomen is soft.   Skin:     General: Skin is warm and dry.      Comments: Dressings to ble   Neurological:      Mental Status: He is alert and oriented to person, place, and time.   Psychiatric:         Mood and Affect: Mood normal.           Significant Labs: All pertinent labs within the past 24 hours have been reviewed.  Recent Lab Results         06/28/23  1108   06/28/23  0616   06/27/23  2036   06/27/23  1628        POC Glucose 109   105   133   116               Significant Imaging: I have reviewed all pertinent imaging results/findings within the past 24 hours.

## 2023-06-28 NOTE — PROGRESS NOTES
Wt: 174# (wt up ~14# since ~ one week ago) Pt has been R meds. He has swelling to the L side of his body.  Meal intake ~75%.  Overall skin is better.  He is receiving Vit C BID MVM, ZnSO4 to treat skin breakdown. Pt denied requests or complaints.  B-166. Alb 1.5-complicated by WNDs and chronic illness-nutritional status is improving.

## 2023-06-28 NOTE — ASSESSMENT & PLAN NOTE
06/09/23 eliquis 5 mg po BID     06/26/23 refuses eliquis - talked with him re reason he is on it, he continues to refuse. Talked with him re alternatives to eliquis - he is not receptive.      06/28/23 continues to refuse meds

## 2023-06-28 NOTE — PT/OT/SLP PROGRESS
"Physical Therapy      Patient Name:  Esthela Contreras   MRN:  [id number]    Patient not seen today secondary to Patient unwilling to participate. Patient stated "they've been in and out of my room all day. I'm just tired and I don't feel like doing it today." After encouragement to participate, patient still refused PT treatment. Will follow-up 6/29/2023.      CAMRYN Acuna  6/28/2023    "

## 2023-06-28 NOTE — PLAN OF CARE
Problem: Fall Injury Risk  Goal: Absence of Fall and Fall-Related Injury  Outcome: Ongoing, Progressing  Intervention: Promote Injury-Free Environment  Flowsheets (Taken 6/28/2023 1445)  Safety Promotion/Fall Prevention:   assistive device/personal item within reach   nonskid shoes/socks when out of bed   instructed to call staff for mobility   commode/urinal/bedpan at bedside   side rails raised x 3

## 2023-06-29 ENCOUNTER — CLINICAL SUPPORT (OUTPATIENT)
Dept: WOUND CARE | Facility: HOSPITAL | Age: 73
End: 2023-06-29
Payer: MEDICARE

## 2023-06-29 DIAGNOSIS — L97.422 ULCER OF LEFT HEEL, WITH FAT LAYER EXPOSED: Primary | ICD-10-CM

## 2023-06-29 DIAGNOSIS — I87.2 STASIS DERMATITIS OF BOTH LEGS: ICD-10-CM

## 2023-06-29 LAB
GLUCOSE SERPL-MCNC: 150 MG/DL (ref 70–105)
GLUCOSE SERPL-MCNC: 82 MG/DL (ref 70–105)
GLUCOSE SERPL-MCNC: 91 MG/DL (ref 70–105)
GLUCOSE SERPL-MCNC: 99 MG/DL (ref 70–105)

## 2023-06-29 PROCEDURE — 27000982 HC MATTRESS, MATRIX LAL RENTAL

## 2023-06-29 PROCEDURE — 25000003 PHARM REV CODE 250: Performed by: NURSE PRACTITIONER

## 2023-06-29 PROCEDURE — 99214 OFFICE O/P EST MOD 30 MIN: CPT

## 2023-06-29 PROCEDURE — 82962 GLUCOSE BLOOD TEST: CPT

## 2023-06-29 PROCEDURE — 11000004 HC SNF PRIVATE

## 2023-06-29 PROCEDURE — 27000944

## 2023-06-29 PROCEDURE — 94761 N-INVAS EAR/PLS OXIMETRY MLT: CPT

## 2023-06-29 RX ORDER — FUROSEMIDE 40 MG/1
40 TABLET ORAL ONCE
Status: COMPLETED | OUTPATIENT
Start: 2023-06-29 | End: 2023-06-29

## 2023-06-29 RX ORDER — ACETAMINOPHEN 325 MG/1
650 TABLET ORAL EVERY 6 HOURS PRN
Refills: 0
Start: 2023-06-29

## 2023-06-29 RX ORDER — ASCORBIC ACID 500 MG
500 TABLET ORAL DAILY
Qty: 30 TABLET | Refills: 0 | Status: ON HOLD | OUTPATIENT
Start: 2023-06-30 | End: 2023-08-16 | Stop reason: HOSPADM

## 2023-06-29 RX ORDER — MIRTAZAPINE 15 MG/1
15 TABLET, FILM COATED ORAL NIGHTLY
Qty: 30 TABLET | Refills: 0 | Status: SHIPPED | OUTPATIENT
Start: 2023-06-29 | End: 2023-07-29

## 2023-06-29 RX ORDER — AMOXICILLIN 250 MG
1 CAPSULE ORAL 2 TIMES DAILY
Qty: 60 TABLET | Refills: 0 | Status: ON HOLD | OUTPATIENT
Start: 2023-06-29 | End: 2023-08-16 | Stop reason: HOSPADM

## 2023-06-29 RX ADMIN — FUROSEMIDE 40 MG: 40 TABLET ORAL at 12:06

## 2023-06-29 RX ADMIN — Medication 1 CAPSULE: at 07:06

## 2023-06-29 RX ADMIN — MIDODRINE HYDROCHLORIDE 5 MG: 2.5 TABLET ORAL at 12:06

## 2023-06-29 RX ADMIN — MIDODRINE HYDROCHLORIDE 5 MG: 2.5 TABLET ORAL at 07:06

## 2023-06-29 RX ADMIN — Medication 1 CAPSULE: at 12:06

## 2023-06-29 NOTE — PROGRESS NOTES
"  WOUND CARE CONSULT          Patient Name: Esthela Contreras is a 72 y.o. male       Chief Complaint:   Wound Care Consult    Review of patient's allergies indicates:  No Known Allergies     I have reviewed the patient's medical, surgical, family and social history.      Subjective:    HPI     71 YO AAM seen today for multiple wounds. Pt is currently in swing bed. He was admitted to [facility] on 6/1/23 due to sepsis and new onset A fib. Arterial U/S on 6/2/23 revealed "trickle flow"to right DP, and left PT and DP. Right PT was monophasic. Otherwise, there were triphasic and biphasic waveforms. Dr. Mo was consulted while at [facility], but no interventions were deemed necessary at that time.   Wounds are as follows:  Right lateral ankle- arterial ulcer- RESOLVED  Right posterior lower leg- arterial ulcer- RESOLVED  Left plantar heel- diabetic foot ulcer- RESOLVED  Left medial heel- diabetic foot ulcer  Left posterior lower leg- arterial ulcer- RESOLVED  Sacrum- pressure - RESOLVED  Left ischium- pressure- RESOLVED    Onset of wounds is unknown, best estimate is 4/1/23.   Pt also continues to have severe stasis dermatitis to BLE, however it is some improved.   The majority of wounds are resolved, and no new issues are reported. Plan is for D/C home tomorrow.         Medications:    Current Facility-Administered Medications on File Prior to Visit   Medication Dose Route Frequency Provider Last Rate Last Admin    acetaminophen tablet 650 mg  650 mg Oral Q6H PRN Love L Alcocer, FNP        albuterol-ipratropium 2.5 mg-0.5 mg/3 mL nebulizer solution 3 mL  3 mL Nebulization Q4H PRN Love L Alcocer, FNP        apixaban tablet 5 mg  5 mg Oral BID Love L Alcocer, FNP   5 mg at 06/28/23 0903    ascorbic acid (vitamin C) tablet 500 mg  500 mg Oral Daily Roseanna Perez, NP   500 mg at 06/28/23 0902    aspirin EC tablet 81 mg  81 mg Oral Daily Love L Alcocer, FNP   81 mg at 06/27/23 0858    atorvastatin tablet 40 mg  40 mg Oral " Daily Love L Alcocer, FNP   40 mg at 06/27/23 0858    bisacodyL suppository 10 mg  10 mg Rectal Daily PRN Roseanna Perez NP        calcium carbonate 200 mg calcium (500 mg) chewable tablet 500 mg  500 mg Oral BID PRN Love L Alcocer, FNP        [COMPLETED] furosemide tablet 40 mg  40 mg Oral Once Love L Alcocer, FNP   40 mg at 06/29/23 1212    HYDROcodone-acetaminophen 5-325 mg per tablet 1 tablet  1 tablet Oral Q12H PRN Roseanna Perez NP   1 tablet at 06/26/23 1621    Lactobacillus acidophilus capsule 1 capsule  1 capsule Oral TID WM Love L Alcocer, FNP   1 capsule at 06/29/23 1212    melatonin tablet 6 mg  6 mg Oral Nightly PRN Love L Alcocer, FNP   6 mg at 06/09/23 2035    metoprolol succinate (TOPROL-XL) 24 hr tablet 25 mg  25 mg Oral Daily Love L Alcocer, FNP   25 mg at 06/23/23 0851    midodrine tablet 5 mg  5 mg Oral TID WM Love L Alcocer, FNP   5 mg at 06/29/23 1212    mirtazapine tablet 15 mg  15 mg Oral QHS Love L Alcocer, FNP   15 mg at 06/25/23 2120    multivitamin tablet  1 tablet Oral Daily Love L Alcocer, FNP   1 tablet at 06/28/23 0902    polyethylene glycol packet 17 g  17 g Oral Daily Love L Alcocer, FNP   17 g at 06/25/23 0845    senna-docusate 8.6-50 mg per tablet 1 tablet  1 tablet Oral BID Love L Alcocer, FNP   1 tablet at 06/28/23 0901    zinc sulfate capsule 220 mg  220 mg Oral Daily Love L Alcocer, FNP   220 mg at 06/28/23 0905     Current Outpatient Medications on File Prior to Visit   Medication Sig Dispense Refill    acetaminophen (TYLENOL) 325 MG tablet Take 2 tablets (650 mg total) by mouth every 6 (six) hours as needed for Pain or Temperature greater than.  0    apixaban (ELIQUIS) 5 mg Tab Take 1 tablet (5 mg total) by mouth 2 (two) times daily.      [START ON 6/30/2023] ascorbic acid, vitamin C, (VITAMIN C) 500 MG tablet Take 1 tablet (500 mg total) by mouth once daily. 30 tablet 0    aspirin (ECOTRIN) 81 MG EC tablet Take 81 mg by mouth once daily.       atorvastatin (LIPITOR) 40 MG tablet Take 1 tablet (40 mg total) by mouth once daily. 90 tablet 0    metoprolol succinate (TOPROL-XL) 25 MG 24 hr tablet Take 1 tablet (25 mg total) by mouth once daily. 90 tablet 0    midodrine (PROAMATINE) 5 MG Tab Take 1 tablet (5 mg total) by mouth 3 (three) times daily with meals. 90 tablet 11    mirtazapine (REMERON) 15 MG tablet Take 1 tablet (15 mg total) by mouth every evening. 30 tablet 0    [START ON 6/30/2023] multivitamin Tab Take 1 tablet by mouth once daily. 30 tablet 0    senna-docusate 8.6-50 mg (PERICOLACE) 8.6-50 mg per tablet Take 1 tablet by mouth 2 (two) times daily. 60 tablet 0    [DISCONTINUED] albuterol-ipratropium (DUO-NEB) 2.5 mg-0.5 mg/3 mL nebulizer solution Take 3 mLs by nebulization every 4 (four) hours as needed for Wheezing. Rescue 75 mL 0    [DISCONTINUED] dextrose 5 % in water (D5W) 5 % PgBk 100 mL with cefTRIAXone 2 gram SolR 2 g Inject 2 g into the vein once daily.      [DISCONTINUED] insulin aspart U-100 (NOVOLOG) 100 unit/mL injection Inject 0-5 Units into the skin before meals and at bedtime as needed.      [DISCONTINUED] polyethylene glycol (GLYCOLAX) 17 gram PwPk Take 17 g by mouth once daily.  0    [DISCONTINUED] silver sulfADIAZINE 1% (SILVADENE) 1 % cream Apply topically once daily.            Physical Exam  Vitals reviewed.   Constitutional:       Appearance: Normal appearance.   HENT:      Head: Normocephalic and atraumatic.      Right Ear: External ear normal.      Left Ear: External ear normal.   Cardiovascular:      Rate and Rhythm: Normal rate.   Pulmonary:      Effort: Pulmonary effort is normal.   Abdominal:      Palpations: Abdomen is soft.   Musculoskeletal:      Right lower leg: No edema.      Left lower leg: No edema.        Feet:    Feet:      Left foot:      Skin integrity: Ulcer present.   Skin:     General: Skin is warm and dry.      Findings: Wound present.   Neurological:      Mental Status: He is alert.        Please see  Wound Docs for complete Wound Assessment and lower extremity assessment when applicable.    Documentation of any procedures can be viewed in Wound Docs if applicable.         Assessment and Plan:    1. Diabetic ulcer of left heel associated with type 2 diabetes mellitus, limited to breakdown of skin  Clean with mild soap and water or vashe. Apply Xeroform gauze to wounds and cover with dry gauze and roll gauze. Change daily and PRN    2. Stasis dermatitis of both legs  Urea cream to intact skin areas daily  Wash with mild soap and water daily.     Continue to apply Xeroform to areas of healed wounds to BLE because skin is very fragile. This will aid in skin protection.          Please see Wound Docs for complete plan including patient instructions.     Follow Up & Goals:     Follow up in about 1 week (around 7/6/2023).     Short term goals  Reduction of devitalized tissue  Reduction of bacterial burden  Stimulate and/or maintain acute phases of wound healing  Promote granulation formation  Promote epithelization  Promote compliance with plan of care  Address factors affecting wound healing  Optimize nutritional status  Optimize vascular status    Long term goals  Prevent loss of limb  Prevent infection  Conservative Wound Treatment  Prevent recurrent ulcerations  Resolve wounds

## 2023-06-29 NOTE — PLAN OF CARE
[facility]  Discharge Assessment    Primary Care Provider: Yolande Spring, FNP     Discharge Assessment (most recent)       BRIEF DISCHARGE ASSESSMENT - 06/29/23 1105          Discharge Planning    Assessment Type Discharge Planning Brief Assessment (P)      Patient/Family Anticipates Transition to home (P)                    Mr. Contreras will discharge on Friday, 06/30/2023.  I spoke with [facility] and Bernie states we can't take him back at this time r/t noncompliance with care.  I called [facility] and spoke with Simon.  Made Simon aware of pt being noncompliant with previous home health services and he has agreed to take Mr. Contreras.

## 2023-06-30 LAB
GLUCOSE SERPL-MCNC: 115 MG/DL (ref 70–105)
GLUCOSE SERPL-MCNC: 129 MG/DL (ref 70–105)
GLUCOSE SERPL-MCNC: 165 MG/DL (ref 70–105)
GLUCOSE SERPL-MCNC: 88 MG/DL (ref 70–105)

## 2023-06-30 PROCEDURE — 97116 GAIT TRAINING THERAPY: CPT | Mod: CQ

## 2023-06-30 PROCEDURE — 25000003 PHARM REV CODE 250: Performed by: NURSE PRACTITIONER

## 2023-06-30 PROCEDURE — 11000004 HC SNF PRIVATE

## 2023-06-30 PROCEDURE — 94761 N-INVAS EAR/PLS OXIMETRY MLT: CPT

## 2023-06-30 PROCEDURE — 82962 GLUCOSE BLOOD TEST: CPT

## 2023-06-30 PROCEDURE — 99900035 HC TECH TIME PER 15 MIN (STAT)

## 2023-06-30 PROCEDURE — 27000944

## 2023-06-30 PROCEDURE — 97110 THERAPEUTIC EXERCISES: CPT | Mod: CQ

## 2023-06-30 RX ADMIN — MIRTAZAPINE 15 MG: 15 TABLET, FILM COATED ORAL at 08:06

## 2023-06-30 RX ADMIN — APIXABAN 5 MG: 2.5 TABLET, FILM COATED ORAL at 08:06

## 2023-06-30 RX ADMIN — Medication 1 CAPSULE: at 05:06

## 2023-06-30 RX ADMIN — SENNOSIDES AND DOCUSATE SODIUM 1 TABLET: 50; 8.6 TABLET ORAL at 08:06

## 2023-06-30 RX ADMIN — Medication 1 CAPSULE: at 08:06

## 2023-06-30 RX ADMIN — MIDODRINE HYDROCHLORIDE 5 MG: 2.5 TABLET ORAL at 05:06

## 2023-06-30 NOTE — ASSESSMENT & PLAN NOTE
06/09/23 eliquis 5 mg po BID     06/26/23 refuses eliquis - talked with him re reason he is on it, he continues to refuse. Talked with him re alternatives to eliquis - he is not receptive.      06/28/23 continues to refuse meds      06/30/23 has refused meds, explained use of meds to him again

## 2023-06-30 NOTE — DISCHARGE INSTRUCTIONS
1. Diabetic ulcer of left heel associated with type 2 diabetes mellitus, limited to breakdown of skin   Clean with mild soap and water or vashe. Apply Xeroform gauze to wounds and cover with dry gauze and roll gauze. Change daily and PRN       2. Stasis dermatitis of both legs   Urea cream to intact skin areas daily   Wash with mild soap and water daily.       Continue to apply Xeroform to areas of healed wounds to BLE because skin is very fragile. This will aid in skin protection.

## 2023-06-30 NOTE — ASSESSMENT & PLAN NOTE
Lab Results   Component Value Date    HGBA1C 5.5 06/02/2023     Low dose ssi coverage     06/23/23 stable    06/26/23 stable    06/30/23 stable

## 2023-06-30 NOTE — PLAN OF CARE
Problem: Adult Inpatient Plan of Care  Goal: Plan of Care Review  Outcome: Ongoing, Progressing  Goal: Patient-Specific Goal (Individualized)  Outcome: Ongoing, Progressing  Goal: Absence of Hospital-Acquired Illness or Injury  Outcome: Ongoing, Progressing  Intervention: Prevent and Manage VTE (Venous Thromboembolism) Risk  Flowsheets (Taken 6/30/2023 1329)  VTE Prevention/Management:   ambulation promoted   fluids promoted  Goal: Optimal Comfort and Wellbeing  Outcome: Ongoing, Progressing  Goal: Readiness for Transition of Care  Outcome: Ongoing, Progressing  Intervention: Mutually Develop Transition Plan  Flowsheets (Taken 6/30/2023 1329)  Transportation Anticipated: family or friend will provide     Problem: Diabetes Comorbidity  Goal: Blood Glucose Level Within Targeted Range  Outcome: Ongoing, Progressing     Problem: Fluid and Electrolyte Imbalance (Acute Kidney Injury/Impairment)  Goal: Fluid and Electrolyte Balance  Outcome: Ongoing, Progressing  Intervention: Monitor and Manage Fluid and Electrolyte Balance  Flowsheets (Taken 6/30/2023 1329)  Fluid/Electrolyte Management: fluids provided     Problem: Oral Intake Inadequate (Acute Kidney Injury/Impairment)  Goal: Optimal Nutrition Intake  Outcome: Ongoing, Progressing  Intervention: Promote and Optimize Nutrition  Flowsheets (Taken 6/30/2023 1329)  Oral Nutrition Promotion: safe use of adaptive equipment encouraged     Problem: Renal Function Impairment (Acute Kidney Injury/Impairment)  Goal: Effective Renal Function  Outcome: Ongoing, Progressing  Intervention: Monitor and Support Renal Function  Flowsheets (Taken 6/30/2023 1329)  Stabilization Measures: airway opened  Medication Review/Management: medications reviewed     Problem: Impaired Wound Healing  Goal: Optimal Wound Healing  Outcome: Ongoing, Progressing  Intervention: Promote Wound Healing  Flowsheets (Taken 6/30/2023 1329)  Oral Nutrition Promotion: safe use of adaptive equipment encouraged      Problem: Skin Injury Risk Increased  Goal: Skin Health and Integrity  Outcome: Ongoing, Progressing  Intervention: Promote and Optimize Oral Intake  Flowsheets (Taken 6/30/2023 1329)  Oral Nutrition Promotion: safe use of adaptive equipment encouraged     Problem: Fall Injury Risk  Goal: Absence of Fall and Fall-Related Injury  Outcome: Ongoing, Progressing  Intervention: Identify and Manage Contributors  Flowsheets (Taken 6/30/2023 1329)  Medication Review/Management: medications reviewed

## 2023-06-30 NOTE — PROGRESS NOTES
[facility]  Hospital Medicine  Progress Note    Patient Name: Esthela Contreras  MRN: [id number]  Patient Class: IP- Swing   Admission Date: 6/9/2023  Length of Stay: 21 days  Attending Physician: Liam Contreras IV, DO  Primary Care Provider: CHRISTIANE Bardales        Subjective:     Principal Problem:Gram-positive bacteremia        HPI:  HPI:  Patient is a 72-year-old male with a history of unspecified CHF in the setting of CAD, essential hypertension, diabetes, oxygen-dependent COPD with baseline supplemental oxygen requirement of 2 L/min and BiPAP QHS along with CKD stage IIIA who presented to the emergency room complaining of dyspnea which started just a few days ago.  Patient otherwise denied any fever chills cough hemoptysis PND orthopnea chest pain palpitation or lower extremity edema in association.  Patient's niece stated that he has chronic wounds on both legs and felt that he has not been given proper care to address this.  Patient lives alone and when his niece visited him today, she found him slumped over on a recliner dyspneic prompting ED visit.      On initial presentation, patient was tachycardic and tachypneic but vital signs were otherwise stable and patient was afebrile.  Workup was notable for what appeared to be a new onset atrial fibrillation with RVR with elevated troponin and proBNP, elevation in total bilirubin level with mildly elevated transaminases and alkaline phosphatase, leukocytosis with left shift with WBC count of 23,000 and high anion gap metabolic acidosis with anion gap of 29 and bicarbonate of 7, acute on chronic renal failure with BUN of 110 and creatinine of 7.03 from a baseline serum creatinine of 1.43, significantly elevated lactic acid level with initial lactic acid level of 9.7 to 10.7 even after receiving 30 cc/kg of crystalloid IV fluid bolus.  CT of bilateral lower extremity demonstrated a presence of bilateral cellulitis without  evidence of drainable abscess or necrotizing fasciitis. Patient will be admitted for further evaluation and intervention        Overview/Hospital Course:  06/09/2023   Patient has no new complaints, leg swelling improving.    T-max 100.3°, blood pressure 93/53 pulse rate 84 respiratory rate 18 temperature 98.1°.  His labs white blood cell count down to 5 hemoglobin 12 platelets count 118 sodium 137 potassium 3.1 creatinine 1 calcium 6.5.    Echocardiogram showed ejection fraction of 35% with left ventricular diastolic dysfunction.    Blood culture on on 06/09/2023 showed no growth to date.    Blood cultures on 06/06/2023 grew Gram-negative bacilli, but chews fragilis, Streptococcus species.    Hypotension resolved, blood pressure borderline but does not need vasopressors.    Sepsis resolved.    New onset AFib, rate is controlled, started on Eliquis anticoagulation.    Acute renal failure presumably   Bacteremia, blood cultures questionable chills contamination, likely related to cellulitis of lower extremities, will continue antibiotics.    Surgery evaluated the patient, recommended to Continue local wound care.    Will deescalate antibiotics to Rocephin 2 g IV piggyback daily.    Patient awaiting placement.      Will need to continue antibiotics , rocephin 2 g IVPB daily for 12 more days, last dose 06/21/23. Continue local wound care and therapy.    Above information is from [facility] acute care stay 06/01/23- 06/09/23. Mr. Contreras has been accepted to swing bed services at [facility]. His arrival is expected today.     Mr. Contreras is a 72 year-old  male who is admitted to [facility] swing bed services for daily wound care and continuation of IV Rocephin through 6/21/23. Upon arrival here tonight, he is alert and oriented. Dressings from lower legs were removed for assessment. He reported severe pain with manipulation of legs. He states that he has had poor appetite and notes some  "constipation during hospitalization. Labs done at [facility] today were reviewed and are noted below. His albumin was 1.4 on 6/8. Dietician and wound care are consulted. He was accepted by Dr. Rosario to admission to Dr. Contreras today. DIEGO Alcocer completed medication reconciliation and admission orders.      Overview/Hospital Course:  06/12/23 Awake and resting in bed. Complains of having gas but no BM in past 5 days. Complains of occasional pain to ankles. He has dressings intact to BLE from knees down.  Continue rocephin IV and wound care to BLE. Continue therapy for strengthening.  06/13/23 refusing to take po meds. Talked with him re possible adverse effects of doing so and he states "When the good lord takes me, I am ready." Talked with him re code status and he states he wants to be Full code.  06/14/23 continues to refuse po meds- states meds " make him feel funny, just don't feel right ."Talked with him re purpose of meds and he is agreeable to resuming meds. Will monitor on telemetry. CNA reports small amount of bright red blood noted with BM. Talked him re any history of hemorrhoids or constipation. He denies both. Will recheck CBC and check stool for FOB.   06/14/23 Talked with Mr. Contreras re possibility of him being depressed. He allowed me to do PHQ 9 screening. He scored 11 showing moderate depression. He began to cry at end of screen. I talked with him re antidepressant and he is agreeable. Will start remeron 15 mg po daily.  06/15/23 Awake and resting in bed. No complaints. Reports sleeping better last night. H&H stable at 12/37. Talked with him again re positive FOB. He does report having burning and itching to rectum for past few days. No external hemorrhoids noted. Will treat with anusol hc. Resume eliquis. Dressing to bilateral lower legs.   06/19/23 Awake and resting in bed. Reports feeling better and sleeping better. Denies pain. States he has not had BM in 2-3 days but that is normal for him. Denies " "feeling constipated. Continue rocephin and wound care. Blood glucose levels have improved. Continue to monitor for hypoglycemia.  06/23/23 Awake and sitting up in chair. States he was able to get up and walk with therapy yesterday and his legs did not hurt him. States he plans to walk further today. Continue wound care. BLE dressings intact.  06/24/23 Nurse reports that patient has increased swelling in left arm with swelling now to left flank and abdomen noted this am. She also reports that pt has been refusing his "heart" meds including Eliquis because he thinks they make him feel bad. Upon evaluation, Mr. Contreras notes the swelling that is present but reports "I feel fine" and "better than I have been." Pt has PMH of CHF with EF of 35% and has not been on diuretic.  He denies chest pain and SOB. Pitting edema is noted to left flank, abd, and hip with nonpitting to left arm. No open wound, erythema or weeping. His lungs are clear. O2 sat 96-99% on RA. Discussed need for labs and xray today. He refused initially but finally agreed. Troponin 146, d dimer 0.99, BNP > 73372, BUN 13, creatinine 0.84. EKG ordered. It shows LBBB with 1st degree AVB with no afib. No STEMI and no changes from previous on 6/1. DIScussed findings with pt and explained need for CTA chest and doppler left arm and transfer to [facility] for further evaluation. He refused stating that the only place he is going is home. Discussed risk of worsening condition or death due to MI, fluid overload, and VTE. He acknowledged risk stating that it was ok and continues to refuse transfer. He does agree with IV, IV lasix x 1 dose and CTA chest. CTA chest shows no PE, small bilat pleural effusions.  Nurse reports that pt is still refusing medications. Discussed findings again and risk of refusing meds and evaluation with pt. He acknowledges and states that he does not want "all of that." Further discussed code status with pt and he wants to change to DNR " "status.  06/26/23 Mr. Contreras is awake, alert and resting in bed. Talked with him re CTA findings of no PE. Talked with him re his refusal to take meds. He states eliquis makes him feel funny. Explained to him what it was for and danger of not taking it. Talked with him re alternatives to eliquis and he is not receptive. States "when the good lord gets ready to take me , just let me go."   06/28/23 has edema to left arm and to left flank. Talked with him lasix and need to give it to remove fluid. He refuses stating " I do not want that, I do not need that." Just let me go home Friday. Probable dc home Friday.  06/28/23 refused us left arm   06/30/23 discharge is scheduled for today. Mr. Contreras states he wants to appeal and see if he can stay longer. He however refuses to place the call for appeal . Stating to "just discharge me."  Discharge home with home health to follow. Medication reconciliation done.  06/30/23 Mr. Contreras now says that he is going to call for appeal with the help of his niece. Discharge dcd.      No new subjective & objective note has been filed under this hospital service since the last note was generated.      Assessment/Plan:      Coronary artery disease involving native coronary artery of native heart without angina pectoris     06/09/23 Continue home aspirin, BB, and high-intensity statin.           Essential hypertension  06/09/23 continue metoprolol succinate 25 mg daily     06/12/23 stable     06/23 stable    SOB (shortness of breath)    06/09/23 oxygen at 2 liters   duonebs every 4 hours prn       06/26/23 denies sob    HFrEF (heart failure with reduced ejection fraction)    Summary     The left ventricle is normal in size with mild concentric hypertrophy and moderately decreased systolic function.   The estimated ejection fraction is 35%.   Left ventricular diastolic dysfunction.   Atrial fibrillation not observed.   Normal right ventricular size.   Normal central venous pressure (3 " mmHg).   The estimated PA systolic pressure is 22 mmHg.   Trivial pericardial effusion.     Echo done 06/02/23     6/10 - Appears compensated.    06/24/23 BNP 37257. Troponin 146.  Swelling to left arm, abd, flank  CXR neg, CTA neg  Lasix 20 mg IVP given x 1 dose.   Pt is refusing all other meds    06/26/23 continues to refuse meds    Hypomagnesemia    06/15/23 magnesium 1.5- one gram magnesium sulfate       Depression    06/14/23 PHQ 9 screen shows moderate depression   Reports having trouble falling asleep, feeling down most days   Will start on mirtazapine 15 mg po at hs       06/23 more optimistic, upbeat   Ready to work with therapy today    Diabetes mellitus    Lab Results   Component Value Date    HGBA1C 5.5 06/02/2023     Low dose ssi coverage     06/23/23 stable    06/26/23 stable    06/30/23 stable    Pressure injury of sacral region, stage 2     Border foam dressings to Trochanter and sacrum .     Multiple open wounds of lower extremity, unspecified laterality, subsequent encounter  06/30/23 resolved      COPD (chronic obstructive pulmonary disease)  06/09/23 02 per nc at 2 liters  duonebs every 4 hours prn       New onset a-fib  06/09/23 eliquis 5 mg po BID     06/26/23 refuses eliquis - talked with him re reason he is on it, he continues to refuse. Talked with him re alternatives to eliquis - he is not receptive.      06/28/23 continues to refuse meds      06/30/23 has refused meds, explained use of meds to him again    Cellulitis of lower extremity  06/09/23  wound cleansing with Vashe, apply silvadene daily; Border foam dressings to Trochanter and sacrum .        06/12/23 continue wound care to BLE     06/15/23 wound care consult       06/26/23 dressings to BLE     06/30 cellulitis resolved , continue wound care      VTE Risk Mitigation (From admission, onward)         Ordered     apixaban tablet 5 mg  2 times daily         06/15/23 1030                Discharge Planning   MAKENNA: 6/30/2023     Code  Status: DNR   Is the patient medically ready for discharge?:     Reason for patient still in hospital (select all that apply): Treatment  Discharge Plan A: Home   Discharge Delays: None known at this time              CHRISTIANE Mcelroy  Department of Hospital Medicine   [facility]

## 2023-06-30 NOTE — PT/OT/SLP PROGRESS
Physical Therapy Treatment    Patient Name:  Esthela Contreras   MRN:  [id number]    Recommendations:     Discharge Recommendations: home health PT, nursing facility, basic  Discharge Equipment Recommendations: none  Barriers to discharge: Decreased caregiver support    Assessment:     Esthela Contreras is a 72 y.o. male admitted with a medical diagnosis of Gram-positive bacteremia.  He presents with the following impairments/functional limitations: impaired endurance, weakness, impaired functional mobility, decreased lower extremity function, decreased safety awareness.    Rehab Prognosis: Fair; patient would benefit from acute skilled PT services to address these deficits and reach maximum level of function.    Recent Surgery: * No surgery found *      Plan:     During this hospitalization, patient to be seen 5 x/week to address the identified rehab impairments via therapeutic exercises, therapeutic activities, gait training and progress toward the following goals:    Plan of Care Expires:  07/03/23    Subjective     Chief Complaint: weakness  Patient/Family Comments/goals: Patient agreeable to PT tx  Pain/Comfort:  Pain Rating 1: 0/10  Pain Rating Post-Intervention 1: 0/10      Objective:     Communicated with PT prior to session.  Patient found supine with   upon PT entry to room.     General Precautions: Standard, fall  Orthopedic Precautions: N/A  Braces: N/A  Respiratory Status: Room air     Functional Mobility:  Bed Mobility:     Supine to Sit: supervision  Sit to Supine: supervision  Transfers:     Sit to Stand:  contact guard assistance with rolling walker  Gait: Patient ambulated 55 feet using rolling walker and contact guard assist to stand by assist.      AM-PAC 6 CLICK MOBILITY  Turning over in bed (including adjusting bedclothes, sheets and blankets)?: 4  Sitting down on and standing up from a chair with arms (e.g., wheelchair, bedside commode, etc.): 3  Moving from lying on back to sitting on the side of the  bed?: 4  Moving to and from a bed to a chair (including a wheelchair)?: 3  Need to walk in hospital room?: 3  Climbing 3-5 steps with a railing?: 2  Basic Mobility Total Score: 19       Treatment & Education:  Patient performed bed mobility, transfers, and gait as listed above.    NuStep x 8 minutes    Patient left supine with call button in reach..    GOALS:   Multidisciplinary Problems       Physical Therapy Goals          Problem: Physical Therapy    Goal Priority Disciplines Outcome Goal Variances Interventions   Physical Therapy Goal     PT, PT/OT Ongoing, Progressing     Description: Short-term Goals: 1.5 weeks  1. Patient will perform supine to/from sit with minimal assistance to improve independence and safety with bed mobility.  2. Patient will perform a sliding board transfer from the bed to/from the chair/wheelchair with minimal assist to improve independence and safety with transfers.   3. Patient will tolerate 15 minutes of physical therapy intervention to improve endurance and activity tolerance.    Long-term goals: 3 weeks  1. Patient will perform supine to/from sit with contact guard assist to improve independence and safety with bed mobility.  2. Patient will perform a sliding board transfer from the bed to/from the chair/wheelchair with contact guard assist to improve independence and safety with transfers.  3. Patient will tolerate 30 minutes of physical therapy intervention to improve endurance and activity tolerance.                         Time Tracking:     PT Received On: 06/30/23  PT Start Time: 1254     PT Stop Time: 1317  PT Total Time (min): 23 min     Billable Minutes: Gait Training 15 and Therapeutic Exercise 8    Treatment Type: Treatment  PT/PTA: PTA     Number of PTA visits since last PT visit: 3     06/30/2023

## 2023-06-30 NOTE — ASSESSMENT & PLAN NOTE
06/09/23  wound cleansing with Vashe, apply silvadene daily; Border foam dressings to Trochanter and sacrum .        06/12/23 continue wound care to BLE     06/15/23 wound care consult       06/26/23 dressings to BLE     06/30 cellulitis resolved , continue wound care

## 2023-06-30 NOTE — ASSESSMENT & PLAN NOTE
>>ASSESSMENT AND PLAN FOR CELLULITIS OF LOWER EXTREMITY WRITTEN ON 6/30/2023  6:59 AM BY MIKE LOBO FNP    06/09/23  wound cleansing with Vashe, apply silvadene daily; Border foam dressings to Trochanter and sacrum .        06/12/23 continue wound care to BLE     06/15/23 wound care consult       06/26/23 dressings to BLE     06/30 cellulitis resolved , continue wound care

## 2023-06-30 NOTE — PLAN OF CARE
[facility]  Discharge Final Note    Primary Care Provider: CHRISTIANE Bardales    Expected Discharge Date: 6/30/2023    Final Discharge Note (most recent)       Final Note - 06/30/23 1141          Final Note    Assessment Type Final Discharge Note (P)      Anticipated Discharge Disposition Home-Health Care Svc (P)      What phone number can be called within the next 1-3 days to see how you are doing after discharge? [phone] (P)      Hospital Resources/Appts/Education Provided Provided patient/caregiver with written discharge plan information (P)         Post-Acute Status    Post-Acute Authorization Home Health (P)      Post-Acute Placement Status Referrals Sent (P)      Home Health Status Referrals Sent (P)      Patient choice form signed by patient/caregiver List with quality metrics by geographic area provided (P)      Discharge Delays None known at this time (P)                      Important Message from Medicare  Important Message from Medicare regarding Discharge Appeal Rights: Given to patient/caregiver, Explained to patient/caregiver, Signed/date by patient/caregiver     Date IMM was signed: 06/29/23  Time IMM was signed: 0850    Contact Info       CHRISTIANE Bardales   Specialty: Family Medicine   Relationship: PCP - General    [address] [facility] [address]   Phone: [phone]       Next Steps: Follow up in 1 week(s)    [facility]   Specialty: Wound Care    [address] [place] [id number]   Phone: [phone]       Next Steps: Follow up in 1 week(s)        Mr. Contreras will discharge home today with [facility] to follow.

## 2023-07-01 LAB
GLUCOSE SERPL-MCNC: 118 MG/DL (ref 70–105)
GLUCOSE SERPL-MCNC: 120 MG/DL (ref 70–105)
GLUCOSE SERPL-MCNC: 144 MG/DL (ref 70–105)
GLUCOSE SERPL-MCNC: 82 MG/DL (ref 70–105)

## 2023-07-01 PROCEDURE — 27000982 HC MATTRESS, MATRIX LAL RENTAL

## 2023-07-01 PROCEDURE — A6212 FOAM DRG <=16 SQ IN W/BORDER: HCPCS

## 2023-07-01 PROCEDURE — 82962 GLUCOSE BLOOD TEST: CPT

## 2023-07-01 PROCEDURE — 25000003 PHARM REV CODE 250: Performed by: NURSE PRACTITIONER

## 2023-07-01 PROCEDURE — 27000944

## 2023-07-01 PROCEDURE — 11000004 HC SNF PRIVATE

## 2023-07-01 PROCEDURE — 99900035 HC TECH TIME PER 15 MIN (STAT)

## 2023-07-01 PROCEDURE — 94761 N-INVAS EAR/PLS OXIMETRY MLT: CPT

## 2023-07-01 RX ADMIN — ATORVASTATIN CALCIUM 40 MG: 40 TABLET, FILM COATED ORAL at 08:07

## 2023-07-01 RX ADMIN — Medication 1 CAPSULE: at 08:07

## 2023-07-01 RX ADMIN — Medication 1 CAPSULE: at 12:07

## 2023-07-01 RX ADMIN — OXYCODONE HYDROCHLORIDE AND ACETAMINOPHEN 500 MG: 500 TABLET ORAL at 08:07

## 2023-07-01 RX ADMIN — ZINC SULFATE 220 MG (50 MG) CAPSULE 220 MG: CAPSULE at 08:07

## 2023-07-01 RX ADMIN — ASPIRIN 81 MG: 81 TABLET, COATED ORAL at 08:07

## 2023-07-01 RX ADMIN — Medication 1 CAPSULE: at 04:07

## 2023-07-01 RX ADMIN — MIDODRINE HYDROCHLORIDE 5 MG: 2.5 TABLET ORAL at 04:07

## 2023-07-01 RX ADMIN — SENNOSIDES AND DOCUSATE SODIUM 1 TABLET: 50; 8.6 TABLET ORAL at 08:07

## 2023-07-01 RX ADMIN — MIDODRINE HYDROCHLORIDE 5 MG: 2.5 TABLET ORAL at 08:07

## 2023-07-01 RX ADMIN — MIRTAZAPINE 15 MG: 15 TABLET, FILM COATED ORAL at 08:07

## 2023-07-01 RX ADMIN — THERA TABS 1 TABLET: TAB at 08:07

## 2023-07-01 RX ADMIN — MIDODRINE HYDROCHLORIDE 5 MG: 2.5 TABLET ORAL at 12:07

## 2023-07-01 RX ADMIN — APIXABAN 5 MG: 2.5 TABLET, FILM COATED ORAL at 08:07

## 2023-07-01 NOTE — PLAN OF CARE
Problem: Adult Inpatient Plan of Care  Goal: Plan of Care Review  Outcome: Ongoing, Progressing  Goal: Patient-Specific Goal (Individualized)  Outcome: Ongoing, Progressing  Goal: Absence of Hospital-Acquired Illness or Injury  Outcome: Ongoing, Progressing  Goal: Optimal Comfort and Wellbeing  Outcome: Ongoing, Progressing  Goal: Readiness for Transition of Care  Outcome: Ongoing, Progressing  Intervention: Mutually Develop Transition Plan  Flowsheets (Taken 7/1/2023 1444)  Transportation Anticipated: family or friend will provide     Problem: Diabetes Comorbidity  Goal: Blood Glucose Level Within Targeted Range  Outcome: Ongoing, Progressing  Intervention: Monitor and Manage Glycemia  Flowsheets (Taken 7/1/2023 1444)  Glycemic Management: blood glucose monitored     Problem: Fluid and Electrolyte Imbalance (Acute Kidney Injury/Impairment)  Goal: Fluid and Electrolyte Balance  Outcome: Ongoing, Progressing  Intervention: Monitor and Manage Fluid and Electrolyte Balance  Flowsheets (Taken 7/1/2023 1444)  Fluid/Electrolyte Management: fluids adjusted     Problem: Oral Intake Inadequate (Acute Kidney Injury/Impairment)  Goal: Optimal Nutrition Intake  Outcome: Ongoing, Progressing  Intervention: Promote and Optimize Nutrition  Flowsheets (Taken 7/1/2023 1444)  Oral Nutrition Promotion: safe use of adaptive equipment encouraged     Problem: Renal Function Impairment (Acute Kidney Injury/Impairment)  Goal: Effective Renal Function  Outcome: Ongoing, Progressing  Intervention: Monitor and Support Renal Function  Flowsheets (Taken 7/1/2023 1444)  Stabilization Measures: airway opened  Medication Review/Management: medications reviewed     Problem: Impaired Wound Healing  Goal: Optimal Wound Healing  Outcome: Ongoing, Progressing  Intervention: Promote Wound Healing  Flowsheets (Taken 7/1/2023 1444)  Oral Nutrition Promotion: safe use of adaptive equipment encouraged     Problem: Skin Injury Risk Increased  Goal: Skin  Health and Integrity  Outcome: Ongoing, Progressing  Intervention: Promote and Optimize Oral Intake  Flowsheets (Taken 7/1/2023 1444)  Oral Nutrition Promotion: safe use of adaptive equipment encouraged     Problem: Fall Injury Risk  Goal: Absence of Fall and Fall-Related Injury  Outcome: Ongoing, Progressing  Intervention: Identify and Manage Contributors  Flowsheets (Taken 7/1/2023 1444)  Self-Care Promotion: safe use of adaptive equipment encouraged  Medication Review/Management: medications reviewed

## 2023-07-01 NOTE — PLAN OF CARE
Problem: Adult Inpatient Plan of Care  Goal: Optimal Comfort and Wellbeing  Outcome: Ongoing, Progressing     Problem: Diabetes Comorbidity  Goal: Blood Glucose Level Within Targeted Range  Outcome: Ongoing, Progressing     Problem: Fluid and Electrolyte Imbalance (Acute Kidney Injury/Impairment)  Goal: Fluid and Electrolyte Balance  Outcome: Ongoing, Progressing     Problem: Impaired Wound Healing  Goal: Optimal Wound Healing  Outcome: Ongoing, Progressing     Problem: Skin Injury Risk Increased  Goal: Skin Health and Integrity  Outcome: Ongoing, Progressing

## 2023-07-02 LAB
ANION GAP SERPL CALCULATED.3IONS-SCNC: 17 MMOL/L (ref 7–16)
BASOPHILS # BLD AUTO: 0.13 K/UL (ref 0–0.2)
BASOPHILS NFR BLD AUTO: 1.4 % (ref 0–1)
BUN SERPL-MCNC: 16 MG/DL (ref 7–18)
BUN/CREAT SERPL: 13 (ref 6–20)
CALCIUM SERPL-MCNC: 7.9 MG/DL (ref 8.5–10.1)
CHLORIDE SERPL-SCNC: 105 MMOL/L (ref 98–107)
CO2 SERPL-SCNC: 22 MMOL/L (ref 21–32)
CREAT SERPL-MCNC: 1.25 MG/DL (ref 0.7–1.3)
DIFFERENTIAL METHOD BLD: ABNORMAL
EGFR (NO RACE VARIABLE) (RUSH/TITUS): 60 ML/MIN/1.73M2
EOSINOPHIL # BLD AUTO: 0.47 K/UL (ref 0–0.5)
EOSINOPHIL NFR BLD AUTO: 5.1 % (ref 1–4)
ERYTHROCYTE [DISTWIDTH] IN BLOOD BY AUTOMATED COUNT: 18.9 % (ref 11.5–14.5)
GLUCOSE SERPL-MCNC: 112 MG/DL (ref 70–105)
GLUCOSE SERPL-MCNC: 112 MG/DL (ref 70–105)
GLUCOSE SERPL-MCNC: 168 MG/DL (ref 74–106)
GLUCOSE SERPL-MCNC: 183 MG/DL (ref 70–105)
GLUCOSE SERPL-MCNC: 86 MG/DL (ref 70–105)
HCT VFR BLD AUTO: 33.6 % (ref 40–54)
HGB BLD-MCNC: 10.2 G/DL (ref 13.5–18)
LYMPHOCYTES # BLD AUTO: 1.87 K/UL (ref 1–4.8)
LYMPHOCYTES NFR BLD AUTO: 20.1 % (ref 27–41)
MAGNESIUM SERPL-MCNC: 1.8 MG/DL (ref 1.7–2.3)
MCH RBC QN AUTO: 29.1 PG (ref 27–31)
MCHC RBC AUTO-ENTMCNC: 30.4 G/DL (ref 32–36)
MCV RBC AUTO: 96 FL (ref 80–96)
MONOCYTES # BLD AUTO: 0.79 K/UL (ref 0–0.8)
MONOCYTES NFR BLD AUTO: 8.5 % (ref 2–6)
MPC BLD CALC-MCNC: 11.2 FL (ref 9.4–12.4)
NEUTROPHILS # BLD AUTO: 6.03 K/UL (ref 1.8–7.7)
NEUTROPHILS NFR BLD AUTO: 64.9 % (ref 53–65)
NT-PROBNP SERPL-MCNC: ABNORMAL PG/ML (ref 1–450)
PHOSPHATE SERPL-MCNC: 3.6 MG/DL (ref 2.5–4.5)
PLATELET # BLD AUTO: 236 K/UL (ref 150–400)
POTASSIUM SERPL-SCNC: 3.6 MMOL/L (ref 3.5–5.1)
RBC # BLD AUTO: 3.5 M/UL (ref 4.6–6.2)
SODIUM SERPL-SCNC: 140 MMOL/L (ref 136–145)
TROPONIN I SERPL DL<=0.01 NG/ML-MCNC: 175.6 PG/ML
WBC # BLD AUTO: 9.29 K/UL (ref 4.5–11)

## 2023-07-02 PROCEDURE — 93010 ELECTROCARDIOGRAM REPORT: CPT | Mod: ,,, | Performed by: HOSPITALIST

## 2023-07-02 PROCEDURE — 84100 ASSAY OF PHOSPHORUS: CPT | Performed by: NURSE PRACTITIONER

## 2023-07-02 PROCEDURE — 27000982 HC MATTRESS, MATRIX LAL RENTAL

## 2023-07-02 PROCEDURE — 93005 ELECTROCARDIOGRAM TRACING: CPT

## 2023-07-02 PROCEDURE — 11000004 HC SNF PRIVATE

## 2023-07-02 PROCEDURE — 83880 ASSAY OF NATRIURETIC PEPTIDE: CPT | Performed by: NURSE PRACTITIONER

## 2023-07-02 PROCEDURE — 82962 GLUCOSE BLOOD TEST: CPT

## 2023-07-02 PROCEDURE — 83735 ASSAY OF MAGNESIUM: CPT | Performed by: NURSE PRACTITIONER

## 2023-07-02 PROCEDURE — 80048 BASIC METABOLIC PNL TOTAL CA: CPT | Performed by: NURSE PRACTITIONER

## 2023-07-02 PROCEDURE — 85025 COMPLETE CBC W/AUTO DIFF WBC: CPT | Performed by: NURSE PRACTITIONER

## 2023-07-02 PROCEDURE — 63600175 PHARM REV CODE 636 W HCPCS: Performed by: NURSE PRACTITIONER

## 2023-07-02 PROCEDURE — 93010 EKG 12-LEAD: ICD-10-PCS | Mod: ,,, | Performed by: HOSPITALIST

## 2023-07-02 PROCEDURE — 99900035 HC TECH TIME PER 15 MIN (STAT)

## 2023-07-02 PROCEDURE — 25000003 PHARM REV CODE 250: Performed by: NURSE PRACTITIONER

## 2023-07-02 PROCEDURE — 94761 N-INVAS EAR/PLS OXIMETRY MLT: CPT

## 2023-07-02 PROCEDURE — 84484 ASSAY OF TROPONIN QUANT: CPT | Performed by: NURSE PRACTITIONER

## 2023-07-02 PROCEDURE — A6212 FOAM DRG <=16 SQ IN W/BORDER: HCPCS

## 2023-07-02 PROCEDURE — 27000944

## 2023-07-02 RX ORDER — FUROSEMIDE 10 MG/ML
40 INJECTION INTRAMUSCULAR; INTRAVENOUS
Status: COMPLETED | OUTPATIENT
Start: 2023-07-02 | End: 2023-07-02

## 2023-07-02 RX ADMIN — Medication 1 CAPSULE: at 04:07

## 2023-07-02 RX ADMIN — Medication 1 CAPSULE: at 11:07

## 2023-07-02 RX ADMIN — MIDODRINE HYDROCHLORIDE 5 MG: 2.5 TABLET ORAL at 04:07

## 2023-07-02 RX ADMIN — MIDODRINE HYDROCHLORIDE 5 MG: 2.5 TABLET ORAL at 11:07

## 2023-07-02 RX ADMIN — FUROSEMIDE 40 MG: 10 INJECTION, SOLUTION INTRAMUSCULAR; INTRAVENOUS at 09:07

## 2023-07-02 NOTE — PLAN OF CARE
Problem: Adult Inpatient Plan of Care  Goal: Plan of Care Review  Outcome: Ongoing, Progressing  Flowsheets (Taken 7/1/2023 2021)  Plan of Care Reviewed With: patient  Goal: Patient-Specific Goal (Individualized)  Outcome: Ongoing, Progressing  Flowsheets (Taken 7/1/2023 2021)  Anxieties, Fears or Concerns: NONE  Individualized Care Needs: TO BE ABLE TO GAIN MORE STRENTH THIS WEEK BY WORKING WITH PHYSICAL THERAPY X 2 WEEKS

## 2023-07-02 NOTE — NURSING
Went in patients room this morning with medication - patient started telling me meds he was not going to take - said he was not going to take blood thinner - instructed he would run the risk of having a blood clot - told me It's my body - also instructed him his insurance com. Would be looking at all the meds he is refusing - patient then told me he was not taking anything from me - all meds [facility] to [facility] [place]

## 2023-07-02 NOTE — PLAN OF CARE
Problem: Adult Inpatient Plan of Care  Goal: Plan of Care Review  Outcome: Ongoing, Progressing  Goal: Patient-Specific Goal (Individualized)  Outcome: Ongoing, Progressing  Goal: Absence of Hospital-Acquired Illness or Injury  Outcome: Ongoing, Progressing  Goal: Optimal Comfort and Wellbeing  Outcome: Ongoing, Progressing  Goal: Readiness for Transition of Care  Outcome: Ongoing, Progressing  Intervention: Mutually Develop Transition Plan  Flowsheets (Taken 7/2/2023 1423)  Transportation Anticipated: family or friend will provide     Problem: Diabetes Comorbidity  Goal: Blood Glucose Level Within Targeted Range  Outcome: Ongoing, Progressing  Intervention: Monitor and Manage Glycemia  Flowsheets (Taken 7/2/2023 1423)  Glycemic Management: blood glucose monitored     Problem: Fluid and Electrolyte Imbalance (Acute Kidney Injury/Impairment)  Goal: Fluid and Electrolyte Balance  Outcome: Ongoing, Progressing  Intervention: Monitor and Manage Fluid and Electrolyte Balance  Flowsheets (Taken 7/2/2023 1423)  Fluid/Electrolyte Management: oral rehydration therapy initiated     Problem: Oral Intake Inadequate (Acute Kidney Injury/Impairment)  Goal: Optimal Nutrition Intake  Outcome: Ongoing, Progressing  Intervention: Promote and Optimize Nutrition  Flowsheets (Taken 7/2/2023 1423)  Oral Nutrition Promotion: safe use of adaptive equipment encouraged     Problem: Renal Function Impairment (Acute Kidney Injury/Impairment)  Goal: Effective Renal Function  Outcome: Ongoing, Progressing  Intervention: Monitor and Support Renal Function  Flowsheets (Taken 7/2/2023 1423)  Stabilization Measures: airway opened  Medication Review/Management: medications reviewed     Problem: Impaired Wound Healing  Goal: Optimal Wound Healing  Outcome: Ongoing, Progressing  Intervention: Promote Wound Healing  Flowsheets (Taken 7/2/2023 1423)  Oral Nutrition Promotion: safe use of adaptive equipment encouraged  Sleep/Rest Enhancement: awakenings  minimized     Problem: Skin Injury Risk Increased  Goal: Skin Health and Integrity  Outcome: Ongoing, Progressing  Intervention: Promote and Optimize Oral Intake  Flowsheets (Taken 7/2/2023 1423)  Oral Nutrition Promotion: safe use of adaptive equipment encouraged     Problem: Fall Injury Risk  Goal: Absence of Fall and Fall-Related Injury  Outcome: Ongoing, Progressing  Intervention: Identify and Manage Contributors  Flowsheets (Taken 7/2/2023 1423)  Self-Care Promotion: safe use of adaptive equipment encouraged  Medication Review/Management: medications reviewed

## 2023-07-03 LAB
GLUCOSE SERPL-MCNC: 105 MG/DL (ref 70–105)
GLUCOSE SERPL-MCNC: 211 MG/DL (ref 70–105)
GLUCOSE SERPL-MCNC: 82 MG/DL (ref 70–105)
NT-PROBNP SERPL-MCNC: ABNORMAL PG/ML (ref 1–450)
TROPONIN I SERPL DL<=0.01 NG/ML-MCNC: 173.8 PG/ML

## 2023-07-03 PROCEDURE — 27000982 HC MATTRESS, MATRIX LAL RENTAL

## 2023-07-03 PROCEDURE — 63600175 PHARM REV CODE 636 W HCPCS: Performed by: NURSE PRACTITIONER

## 2023-07-03 PROCEDURE — 25000003 PHARM REV CODE 250: Performed by: NURSE PRACTITIONER

## 2023-07-03 PROCEDURE — 82962 GLUCOSE BLOOD TEST: CPT

## 2023-07-03 PROCEDURE — 83880 ASSAY OF NATRIURETIC PEPTIDE: CPT | Performed by: NURSE PRACTITIONER

## 2023-07-03 PROCEDURE — 84484 ASSAY OF TROPONIN QUANT: CPT | Performed by: NURSE PRACTITIONER

## 2023-07-03 PROCEDURE — 94761 N-INVAS EAR/PLS OXIMETRY MLT: CPT

## 2023-07-03 PROCEDURE — 27000944

## 2023-07-03 PROCEDURE — 11000004 HC SNF PRIVATE

## 2023-07-03 RX ORDER — FUROSEMIDE 10 MG/ML
20 INJECTION INTRAMUSCULAR; INTRAVENOUS DAILY
Status: DISCONTINUED | OUTPATIENT
Start: 2023-07-03 | End: 2023-07-08 | Stop reason: HOSPADM

## 2023-07-03 RX ADMIN — METOPROLOL SUCCINATE 25 MG: 25 TABLET, FILM COATED, EXTENDED RELEASE ORAL at 09:07

## 2023-07-03 RX ADMIN — MIDODRINE HYDROCHLORIDE 5 MG: 2.5 TABLET ORAL at 09:07

## 2023-07-03 RX ADMIN — ATORVASTATIN CALCIUM 40 MG: 40 TABLET, FILM COATED ORAL at 09:07

## 2023-07-03 RX ADMIN — FUROSEMIDE 20 MG: 20 INJECTION, SOLUTION INTRAMUSCULAR; INTRAVENOUS at 09:07

## 2023-07-03 RX ADMIN — ASPIRIN 81 MG: 81 TABLET, COATED ORAL at 09:07

## 2023-07-03 NOTE — PLAN OF CARE
[facility] - Medical Surgical Unit  Discharge Assessment    Mr. Contreras appealed his discharge on Friday, June 30, 2023.  When he discharges, he intends to go home alone with his niece checking in on him twice a day. Will consult [facility] at that time.

## 2023-07-03 NOTE — PLAN OF CARE
Problem: Diabetes Comorbidity  Goal: Blood Glucose Level Within Targeted Range  Outcome: Ongoing, Progressing  Intervention: Monitor and Manage Glycemia  Flowsheets (Taken 7/3/2023 1818)  Glycemic Management: blood glucose monitored     Problem: Renal Function Impairment (Acute Kidney Injury/Impairment)  Goal: Effective Renal Function  Intervention: Monitor and Support Renal Function  Flowsheets (Taken 7/3/2023 1818)  Medication Review/Management: medications reviewed     Problem: Fall Injury Risk  Goal: Absence of Fall and Fall-Related Injury  Outcome: Ongoing, Progressing  Intervention: Identify and Manage Contributors  Flowsheets (Taken 7/3/2023 1818)  Self-Care Promotion:   independence encouraged   BADL personal objects within reach   BADL personal routines maintained   meal set-up provided   safe use of adaptive equipment encouraged  Medication Review/Management: medications reviewed  Intervention: Promote Injury-Free Environment  Flowsheets (Taken 7/3/2023 1818)  Safety Promotion/Fall Prevention:   assistive device/personal item within reach   bed alarm set   diversional activities provided   Fall Risk reviewed with patient/family   Fall Risk signage in place   lighting adjusted   nonskid shoes/socks when out of bed   side rails raised x 3   instructed to call staff for mobility

## 2023-07-03 NOTE — NURSING
"Teaching done on importance of IV Lasix dose. Patient consents to receive dose of IV lasix but states, "I know it won't do any good. And I ain't taking no more." Patient also refusing all pm medications, stating, "I ain't taking no more pills." Education done on importance of medications, but patient continued to refuse. NP notified and refusal documented in MAR. CB in reach. Safety reinforced.   "

## 2023-07-03 NOTE — PT/OT/SLP PROGRESS
"Physical Therapy      Patient Name:  Esthela Contreras   MRN:  [id number]    Patient refused PT treatment today secondary to Patient unwilling to participate. Patient stated, "I can't do it today. They got me on all these meds that take it out of me. Maybe we can do it next time, but not today." Will follow-up 7/5/2023.    CAMRYN Acuna  7/3/2023    "

## 2023-07-03 NOTE — NURSING
Upon entering room IV catheter seen on the floor. Cannula intact. Patient states he removed it himself because the tape was loose. Refuses to have a new IV started. Education provided on importance of IV access and the need for IV lasix in the AM. Continues to refuse to have IV restarted.

## 2023-07-04 LAB
BILIRUB UR QL STRIP: NEGATIVE
CLARITY UR: ABNORMAL
COLOR UR: ABNORMAL
GLUCOSE SERPL-MCNC: 101 MG/DL (ref 70–105)
GLUCOSE SERPL-MCNC: 83 MG/DL (ref 70–105)
GLUCOSE SERPL-MCNC: 84 MG/DL (ref 70–105)
GLUCOSE UR STRIP-MCNC: NEGATIVE MG/DL
KETONES UR STRIP-SCNC: NEGATIVE MG/DL
LEUKOCYTE ESTERASE UR QL STRIP: NEGATIVE
NITRITE UR QL STRIP: NEGATIVE
PH UR STRIP: 5.5 PH UNITS
PROT UR QL STRIP: 100
RBC # UR STRIP: ABNORMAL /UL
RBC #/AREA URNS HPF: ABNORMAL /HPF
SP GR UR STRIP: >=1.03
SQUAMOUS #/AREA URNS LPF: ABNORMAL /LPF
UROBILINOGEN UR STRIP-ACNC: 1 MG/DL
WBC #/AREA URNS HPF: ABNORMAL /HPF

## 2023-07-04 PROCEDURE — 27000982 HC MATTRESS, MATRIX LAL RENTAL

## 2023-07-04 PROCEDURE — 81001 URINALYSIS AUTO W/SCOPE: CPT | Performed by: NURSE PRACTITIONER

## 2023-07-04 PROCEDURE — 25000003 PHARM REV CODE 250: Performed by: NURSE PRACTITIONER

## 2023-07-04 PROCEDURE — 82962 GLUCOSE BLOOD TEST: CPT

## 2023-07-04 PROCEDURE — 27000944

## 2023-07-04 PROCEDURE — 94761 N-INVAS EAR/PLS OXIMETRY MLT: CPT

## 2023-07-04 PROCEDURE — 99900035 HC TECH TIME PER 15 MIN (STAT)

## 2023-07-04 PROCEDURE — 11000004 HC SNF PRIVATE

## 2023-07-04 RX ADMIN — Medication 1 CAPSULE: at 08:07

## 2023-07-04 RX ADMIN — SENNOSIDES AND DOCUSATE SODIUM 1 TABLET: 50; 8.6 TABLET ORAL at 08:07

## 2023-07-04 RX ADMIN — OXYCODONE HYDROCHLORIDE AND ACETAMINOPHEN 500 MG: 500 TABLET ORAL at 08:07

## 2023-07-04 RX ADMIN — THERA TABS 1 TABLET: TAB at 08:07

## 2023-07-04 RX ADMIN — ASPIRIN 81 MG: 81 TABLET, COATED ORAL at 08:07

## 2023-07-04 RX ADMIN — ZINC SULFATE 220 MG (50 MG) CAPSULE 220 MG: CAPSULE at 08:07

## 2023-07-04 NOTE — NURSING
"Client notified PCT that after urinating, he wiped blood off the end of his penis. RN assessed small amount of bright red blood on toilet paper in trash can. 200 ml of estevan urine, same as patient's baseline, noted in urinal. No active bleeding or trauma noted to tip of penis. Client states, "I just know it's my kidneys. That Lasix caused it. That's why I ain't taking anymore of it." Teaching attempted; NP notified. Client notified of new order for a urine sample. Voices understanding.   "

## 2023-07-04 NOTE — NURSING
Attempted to start an IV on client; he refused. Also stated that he will not be taking any more lasix or heart medication, even if he did have an IV. Teaching done on importance of medication. Voices understanding, but continues to refused. NP notified.

## 2023-07-04 NOTE — PLAN OF CARE
Problem: Adult Inpatient Plan of Care  Goal: Optimal Comfort and Wellbeing  Outcome: Ongoing, Progressing     Problem: Diabetes Comorbidity  Goal: Blood Glucose Level Within Targeted Range  Outcome: Ongoing, Progressing     Problem: Oral Intake Inadequate (Acute Kidney Injury/Impairment)  Goal: Optimal Nutrition Intake  Outcome: Ongoing, Progressing     Problem: Impaired Wound Healing  Goal: Optimal Wound Healing  Outcome: Ongoing, Progressing     Problem: Skin Injury Risk Increased  Goal: Skin Health and Integrity  Outcome: Ongoing, Progressing

## 2023-07-04 NOTE — PLAN OF CARE
Problem: Fall Injury Risk  Goal: Absence of Fall and Fall-Related Injury  Outcome: Ongoing, Progressing  Intervention: Promote Injury-Free Environment  Flowsheets (Taken 7/4/2023 1816)  Safety Promotion/Fall Prevention:   assistive device/personal item within reach   commode/urinal/bedpan at bedside   nonskid shoes/socks when out of bed   side rails raised x 3   instructed to call staff for mobility   bed alarm set

## 2023-07-05 LAB
GLUCOSE SERPL-MCNC: 92 MG/DL (ref 70–105)
GLUCOSE SERPL-MCNC: 94 MG/DL (ref 70–105)
GLUCOSE SERPL-MCNC: 99 MG/DL (ref 70–105)

## 2023-07-05 PROCEDURE — A6212 FOAM DRG <=16 SQ IN W/BORDER: HCPCS

## 2023-07-05 PROCEDURE — 11000004 HC SNF PRIVATE

## 2023-07-05 PROCEDURE — 99900035 HC TECH TIME PER 15 MIN (STAT)

## 2023-07-05 PROCEDURE — 82962 GLUCOSE BLOOD TEST: CPT

## 2023-07-05 PROCEDURE — 27000864 HC WOUND CARE CREAM, ANY

## 2023-07-05 PROCEDURE — 25000003 PHARM REV CODE 250: Performed by: NURSE PRACTITIONER

## 2023-07-05 PROCEDURE — 94761 N-INVAS EAR/PLS OXIMETRY MLT: CPT

## 2023-07-05 PROCEDURE — 27000944

## 2023-07-05 PROCEDURE — 27000982 HC MATTRESS, MATRIX LAL RENTAL

## 2023-07-05 RX ADMIN — ZINC SULFATE 220 MG (50 MG) CAPSULE 220 MG: CAPSULE at 09:07

## 2023-07-05 RX ADMIN — ASPIRIN 81 MG: 81 TABLET, COATED ORAL at 09:07

## 2023-07-05 RX ADMIN — ATORVASTATIN CALCIUM 40 MG: 40 TABLET, FILM COATED ORAL at 09:07

## 2023-07-05 RX ADMIN — THERA TABS 1 TABLET: TAB at 09:07

## 2023-07-05 RX ADMIN — Medication 1 CAPSULE: at 09:07

## 2023-07-05 RX ADMIN — OXYCODONE HYDROCHLORIDE AND ACETAMINOPHEN 500 MG: 500 TABLET ORAL at 09:07

## 2023-07-05 RX ADMIN — SENNOSIDES AND DOCUSATE SODIUM 1 TABLET: 50; 8.6 TABLET ORAL at 09:07

## 2023-07-05 NOTE — PLAN OF CARE
[facility] - Medical Surgical Unit - Swing Bed   Interdisciplinary Team Meeting    Patient: Esthela Contreras   Today's Date: 7/5/2023   Estimated D/C Date:         Physician: Liam Contreras MD Nurse Practitioner: Fang Crowder NP   Pharmacy: [facility] Unit Director: BRYANT Milian   :  Crys Dwyer RN Physical/Occupational Therapy:  Raymundo Pride PT   Speech Therapy: ANGELIKA Activity Therapy: Swati Espino LPN   Nursing: BRYANT Milian  Respiratory: Ruslan Mandujano, RT Dietary: Gael Aguilar RD  Other:      Nursing  New Symptoms/Problems: increased generalized edema  Last Bowel Movement: 07/04/23  Urine: continent Diarrhea: No   Constipated: No Bowel: continent Rivero: No   Isolation: No     Device (Oxygen Therapy): room air Flow (L/min): 2  SpO2: 100 %    Diet/Nutrition Received: mechanical/dental soft  Speech/Swallowing: Swallowing difficulty  Aspiration Precautions: Yes  Cognition: Memory issues    Physical Therapy  Patient not receiving this therapy    Occupational Therapy Patient not receiving this therapy    Activity Therapy  Level of participation: Active participation      Tx Plan/Recommendations reviewed with family and/or patient on (date) 07/03/2023.  Additional family Conference/Training:   D/C Plan/Recommendations:   MAKENNA:     Pharmacy  Medication Changes (see MD orders in chart):   MD/NP: Liam Contreras MD Labs Reviewed: Yes New Lab Orders: Yes   Lab Concerns: Q M/Th

## 2023-07-05 NOTE — PLAN OF CARE
Problem: Physical Therapy  Goal: Physical Therapy Goal  Description: Short-term Goals: 1.5 weeks  1. Patient will perform supine to/from sit with minimal assistance to improve independence and safety with bed mobility.  2. Patient will perform a sliding board transfer from the bed to/from the chair/wheelchair with minimal assist to improve independence and safety with transfers.   3. Patient will tolerate 15 minutes of physical therapy intervention to improve endurance and activity tolerance.    Long-term goals: 3 weeks  1. Patient will perform supine to/from sit with contact guard assist to improve independence and safety with bed mobility.  2. Patient will perform a sliding board transfer from the bed to/from the chair/wheelchair with contact guard assist to improve independence and safety with transfers.  3. Patient will tolerate 30 minutes of physical therapy intervention to improve endurance and activity tolerance.    Outcome: Met     Patient has met all goals, reached the maximum benefit from swingbed physical therapy treatment at the present time, and is ready for discharge with the recommendation for further physical therapy treatment via home health services.    Raymundo Pride, PT, DPT  7/5/2023  2:16 PM

## 2023-07-05 NOTE — PLAN OF CARE
Problem: Adult Inpatient Plan of Care  Goal: Plan of Care Review  Outcome: Ongoing, Progressing  Flowsheets (Taken 7/5/2023 0318)  Plan of Care Reviewed With: patient  Goal: Patient-Specific Goal (Individualized)  Outcome: Ongoing, Progressing  Goal: Absence of Hospital-Acquired Illness or Injury  Outcome: Ongoing, Progressing  Intervention: Identify and Manage Fall Risk  Flowsheets (Taken 7/5/2023 0318)  Safety Promotion/Fall Prevention:   assistive device/personal item within reach   commode/urinal/bedpan at bedside   diversional activities provided   medications reviewed   lighting adjusted   nonskid shoes/socks when out of bed   side rails raised x 2   instructed to call staff for mobility  Intervention: Prevent Skin Injury  Flowsheets (Taken 7/5/2023 0318)  Body Position: position changed independently  Skin Protection: incontinence pads utilized  Intervention: Prevent and Manage VTE (Venous Thromboembolism) Risk  Flowsheets (Taken 7/5/2023 0318)  Activity Management: Arm raise - L1  VTE Prevention/Management: fluids promoted  Range of Motion: active ROM (range of motion) encouraged  Intervention: Prevent Infection  Flowsheets (Taken 7/5/2023 0318)  Infection Prevention:   hand hygiene promoted   personal protective equipment utilized  Goal: Optimal Comfort and Wellbeing  Outcome: Ongoing, Progressing  Intervention: Monitor Pain and Promote Comfort  Flowsheets (Taken 7/5/2023 0318)  Pain Management Interventions: pain management plan reviewed with patient/caregiver  Intervention: Provide Person-Centered Care  Flowsheets (Taken 7/5/2023 0318)  Trust Relationship/Rapport: care explained  Goal: Readiness for Transition of Care  Outcome: Ongoing, Progressing     Problem: Diabetes Comorbidity  Goal: Blood Glucose Level Within Targeted Range  Outcome: Ongoing, Progressing  Intervention: Monitor and Manage Glycemia  Flowsheets (Taken 7/5/2023 0318)  Glycemic Management: blood glucose monitored     Problem: Fluid and  Electrolyte Imbalance (Acute Kidney Injury/Impairment)  Goal: Fluid and Electrolyte Balance  Outcome: Ongoing, Progressing     Problem: Oral Intake Inadequate (Acute Kidney Injury/Impairment)  Goal: Optimal Nutrition Intake  Outcome: Ongoing, Progressing     Problem: Renal Function Impairment (Acute Kidney Injury/Impairment)  Goal: Effective Renal Function  Outcome: Ongoing, Progressing  Intervention: Monitor and Support Renal Function  Flowsheets (Taken 7/5/2023 0318)  Medication Review/Management: medications reviewed     Problem: Impaired Wound Healing  Goal: Optimal Wound Healing  Outcome: Ongoing, Progressing  Intervention: Promote Wound Healing  Flowsheets (Taken 7/5/2023 0318)  Sleep/Rest Enhancement: awakenings minimized  Activity Management: Arm raise - L1  Pain Management Interventions: pain management plan reviewed with patient/caregiver     Problem: Skin Injury Risk Increased  Goal: Skin Health and Integrity  Outcome: Ongoing, Progressing  Intervention: Optimize Skin Protection  Flowsheets (Taken 7/5/2023 0318)  Pressure Reduction Techniques: frequent weight shift encouraged  Skin Protection: incontinence pads utilized  Head of Bed (HOB) Positioning: HOB elevated     Problem: Fall Injury Risk  Goal: Absence of Fall and Fall-Related Injury  Outcome: Ongoing, Progressing  Intervention: Identify and Manage Contributors  Flowsheets (Taken 7/5/2023 0318)  Self-Care Promotion: independence encouraged  Medication Review/Management: medications reviewed  Intervention: Promote Injury-Free Environment  Flowsheets (Taken 7/5/2023 0318)  Safety Promotion/Fall Prevention:   assistive device/personal item within reach   commode/urinal/bedpan at bedside   diversional activities provided   medications reviewed   lighting adjusted   nonskid shoes/socks when out of bed   side rails raised x 2   instructed to call staff for mobility

## 2023-07-05 NOTE — PT/OT/SLP DISCHARGE
Physical Therapy Discharge Summary    Name: Esthela Contreras  MRN: [id number]   Principal Problem: Gram-positive bacteremia     Patient Discharged from acute Physical Therapy on 7/5/2023.  Please refer to prior PT noted date on 6/30/2023 for functional status.     Assessment:     Patient has met all goals and is not appropriate for therapy.    Objective:     GOALS:   Multidisciplinary Problems       Physical Therapy Goals       Not on file              Multidisciplinary Problems (Resolved)          Problem: Physical Therapy    Goal Priority Disciplines Outcome Goal Variances Interventions   Physical Therapy Goal   (Resolved)     PT, PT/OT Met     Description: Short-term Goals: 1.5 weeks  1. Patient will perform supine to/from sit with minimal assistance to improve independence and safety with bed mobility.  2. Patient will perform a sliding board transfer from the bed to/from the chair/wheelchair with minimal assist to improve independence and safety with transfers.   3. Patient will tolerate 15 minutes of physical therapy intervention to improve endurance and activity tolerance.    Long-term goals: 3 weeks  1. Patient will perform supine to/from sit with contact guard assist to improve independence and safety with bed mobility.  2. Patient will perform a sliding board transfer from the bed to/from the chair/wheelchair with contact guard assist to improve independence and safety with transfers.  3. Patient will tolerate 30 minutes of physical therapy intervention to improve endurance and activity tolerance.                       Reasons for Discontinuation of Therapy Services  Satisfactory goal achievement.      Plan:     Patient Discharged to:  nursing services with the recommendation for further physical therapy treatment via home health services .      7/5/2023

## 2023-07-05 NOTE — SUBJECTIVE & OBJECTIVE
Interval History: edema left arm     Review of Systems   Constitutional:  Negative for appetite change.   Respiratory:  Positive for shortness of breath. Negative for cough.         On exertion    Cardiovascular:  Negative for chest pain.   Gastrointestinal:  Negative for constipation, diarrhea, nausea and vomiting.   Neurological:  Positive for weakness.   Objective:     Vital Signs (Most Recent):  Temp: 98 °F (36.7 °C) (07/05/23 0727)  Pulse: 98 (07/05/23 0727)  Resp: 20 (07/05/23 0727)  BP: 123/83 (07/05/23 0727)  SpO2: 100 % (07/05/23 0727) Vital Signs (24h Range):  Temp:  [97.4 °F (36.3 °C)-98.4 °F (36.9 °C)] 98 °F (36.7 °C)  Pulse:  [] 98  Resp:  [20] 20  SpO2:  [92 %-100 %] 100 %  BP: (105-123)/(76-86) 123/83     Weight: 80.5 kg (177 lb 6.4 oz)  Body mass index is 24.06 kg/m².    Intake/Output Summary (Last 24 hours) at 7/5/2023 1018  Last data filed at 7/5/2023 0930  Gross per 24 hour   Intake 780 ml   Output 100 ml   Net 680 ml         Physical Exam  HENT:      Head: Normocephalic.      Mouth/Throat:      Mouth: Mucous membranes are moist.   Cardiovascular:      Rate and Rhythm: Normal rate and regular rhythm.      Heart sounds: Murmur heard.      Comments: Edema to left arm   Pulmonary:      Breath sounds: Normal breath sounds.   Abdominal:      General: Bowel sounds are normal.      Palpations: Abdomen is soft.   Musculoskeletal:         General: Normal range of motion.   Skin:     General: Skin is warm and dry.      Comments: Dressing to BLE      Neurological:      Mental Status: He is alert.           Significant Labs: All pertinent labs within the past 24 hours have been reviewed.  Recent Lab Results         07/05/23  0548   07/04/23  1658   07/04/23  1513   07/04/23  1116        Appearance, UA     Cloudy         Bilirubin (UA)     Negative         Color, UA     Dark Yellow         Glucose, UA     Negative         Ketones, UA     Negative         Leukocytes, UA     Negative         NITRITE UA      Negative         Occult Blood UA     Moderate         pH, UA     5.5         POC Glucose 92   84     83       Protein, UA     100         RBC, UA     25-50         Specific Gravity, UA     >=1.030         Squam Epithel, UA     Rare         UROBILINOGEN UA     1.0         WBC, UA     0-5                 Significant Imaging: I have reviewed all pertinent imaging results/findings within the past 24 hours.

## 2023-07-05 NOTE — PROGRESS NOTES
[facility] - Medical Surgical Unit  Hospital Medicine  Progress Note    Patient Name: Esthela Contreras  MRN: [id number]  Patient Class: IP- Swing   Admission Date: 6/9/2023  Length of Stay: 26 days  Attending Physician: Liam Contreras IV, DO  Primary Care Provider: CHRISTIANE Bardales        Subjective:     Principal Problem:Gram-positive bacteremia        HPI:  HPI:  Patient is a 72-year-old male with a history of unspecified CHF in the setting of CAD, essential hypertension, diabetes, oxygen-dependent COPD with baseline supplemental oxygen requirement of 2 L/min and BiPAP QHS along with CKD stage IIIA who presented to the emergency room complaining of dyspnea which started just a few days ago.  Patient otherwise denied any fever chills cough hemoptysis PND orthopnea chest pain palpitation or lower extremity edema in association.  Patient's niece stated that he has chronic wounds on both legs and felt that he has not been given proper care to address this.  Patient lives alone and when his niece visited him today, she found him slumped over on a recliner dyspneic prompting ED visit.      On initial presentation, patient was tachycardic and tachypneic but vital signs were otherwise stable and patient was afebrile.  Workup was notable for what appeared to be a new onset atrial fibrillation with RVR with elevated troponin and proBNP, elevation in total bilirubin level with mildly elevated transaminases and alkaline phosphatase, leukocytosis with left shift with WBC count of 23,000 and high anion gap metabolic acidosis with anion gap of 29 and bicarbonate of 7, acute on chronic renal failure with BUN of 110 and creatinine of 7.03 from a baseline serum creatinine of 1.43, significantly elevated lactic acid level with initial lactic acid level of 9.7 to 10.7 even after receiving 30 cc/kg of crystalloid IV fluid bolus.  CT of bilateral lower extremity demonstrated a presence of bilateral cellulitis without  evidence of drainable abscess or necrotizing fasciitis. Patient will be admitted for further evaluation and intervention        Overview/Hospital Course:  06/09/2023   Patient has no new complaints, leg swelling improving.    T-max 100.3°, blood pressure 93/53 pulse rate 84 respiratory rate 18 temperature 98.1°.  His labs white blood cell count down to 5 hemoglobin 12 platelets count 118 sodium 137 potassium 3.1 creatinine 1 calcium 6.5.    Echocardiogram showed ejection fraction of 35% with left ventricular diastolic dysfunction.    Blood culture on on 06/09/2023 showed no growth to date.    Blood cultures on 06/06/2023 grew Gram-negative bacilli, but chews fragilis, Streptococcus species.    Hypotension resolved, blood pressure borderline but does not need vasopressors.    Sepsis resolved.    New onset AFib, rate is controlled, started on Eliquis anticoagulation.    Acute renal failure presumably   Bacteremia, blood cultures questionable chills contamination, likely related to cellulitis of lower extremities, will continue antibiotics.    Surgery evaluated the patient, recommended to Continue local wound care.    Will deescalate antibiotics to Rocephin 2 g IV piggyback daily.    Patient awaiting placement.      Will need to continue antibiotics , rocephin 2 g IVPB daily for 12 more days, last dose 06/21/23. Continue local wound care and therapy.    Above information is from [facility] acute care stay 06/01/23- 06/09/23. Mr. Contreras has been accepted to swing bed services at [facility]. His arrival is expected today.     Mr. Contreras is a 72 year-old  male who is admitted to [facility] swing bed services for daily wound care and continuation of IV Rocephin through 6/21/23. Upon arrival here tonight, he is alert and oriented. Dressings from lower legs were removed for assessment. He reported severe pain with manipulation of legs. He states that he has had poor appetite and notes some  "constipation during hospitalization. Labs done at [facility] today were reviewed and are noted below. His albumin was 1.4 on 6/8. Dietician and wound care are consulted. He was accepted by Dr. Rosario to admission to Dr. Contreras today. DIEGO Alcocer completed medication reconciliation and admission orders.      Overview/Hospital Course:  06/12/23 Awake and resting in bed. Complains of having gas but no BM in past 5 days. Complains of occasional pain to ankles. He has dressings intact to BLE from knees down.  Continue rocephin IV and wound care to BLE. Continue therapy for strengthening.  06/13/23 refusing to take po meds. Talked with him re possible adverse effects of doing so and he states "When the good lord takes me, I am ready." Talked with him re code status and he states he wants to be Full code.  06/14/23 continues to refuse po meds- states meds " make him feel funny, just don't feel right ."Talked with him re purpose of meds and he is agreeable to resuming meds. Will monitor on telemetry. CNA reports small amount of bright red blood noted with BM. Talked him re any history of hemorrhoids or constipation. He denies both. Will recheck CBC and check stool for FOB.   06/14/23 Talked with Mr. Contreras re possibility of him being depressed. He allowed me to do PHQ 9 screening. He scored 11 showing moderate depression. He began to cry at end of screen. I talked with him re antidepressant and he is agreeable. Will start remeron 15 mg po daily.  06/15/23 Awake and resting in bed. No complaints. Reports sleeping better last night. H&H stable at 12/37. Talked with him again re positive FOB. He does report having burning and itching to rectum for past few days. No external hemorrhoids noted. Will treat with anusol hc. Resume eliquis. Dressing to bilateral lower legs.   06/19/23 Awake and resting in bed. Reports feeling better and sleeping better. Denies pain. States he has not had BM in 2-3 days but that is normal for him. Denies " "feeling constipated. Continue rocephin and wound care. Blood glucose levels have improved. Continue to monitor for hypoglycemia.  06/23/23 Awake and sitting up in chair. States he was able to get up and walk with therapy yesterday and his legs did not hurt him. States he plans to walk further today. Continue wound care. BLE dressings intact.  06/24/23 Nurse reports that patient has increased swelling in left arm with swelling now to left flank and abdomen noted this am. She also reports that pt has been refusing his "heart" meds including Eliquis because he thinks they make him feel bad. Upon evaluation, Mr. Contreras notes the swelling that is present but reports "I feel fine" and "better than I have been." Pt has PMH of CHF with EF of 35% and has not been on diuretic.  He denies chest pain and SOB. Pitting edema is noted to left flank, abd, and hip with nonpitting to left arm. No open wound, erythema or weeping. His lungs are clear. O2 sat 96-99% on RA. Discussed need for labs and xray today. He refused initially but finally agreed. Troponin 146, d dimer 0.99, BNP > 48086, BUN 13, creatinine 0.84. EKG ordered. It shows LBBB with 1st degree AVB with no afib. No STEMI and no changes from previous on 6/1. DIScussed findings with pt and explained need for CTA chest and doppler left arm and transfer to [facility] for further evaluation. He refused stating that the only place he is going is home. Discussed risk of worsening condition or death due to MI, fluid overload, and VTE. He acknowledged risk stating that it was ok and continues to refuse transfer. He does agree with IV, IV lasix x 1 dose and CTA chest. CTA chest shows no PE, small bilat pleural effusions.  Nurse reports that pt is still refusing medications. Discussed findings again and risk of refusing meds and evaluation with pt. He acknowledges and states that he does not want "all of that." Further discussed code status with pt and he wants to change to DNR " "status.  06/26/23 Mr. Contreras is awake, alert and resting in bed. Talked with him re CTA findings of no PE. Talked with him re his refusal to take meds. He states eliquis makes him feel funny. Explained to him what it was for and danger of not taking it. Talked with him re alternatives to eliquis and he is not receptive. States "when the good lord gets ready to take me , just let me go."   06/28/23 has edema to left arm and to left flank. Talked with him lasix and need to give it to remove fluid. He refuses stating " I do not want that, I do not need that." Just let me go home Friday. Probable dc home Friday.  06/28/23 refused us left arm   06/30/23 discharge is scheduled for today. Mr. Contreras states he wants to appeal and see if he can stay longer. He however refuses to place the call for appeal . Stating to "just discharge me."  Discharge home with home health to follow. Medication reconciliation done.  06/30/23 Mr. Contreras now says that he is going to call for appeal with the help of his niece. Discharge dcd.    07/02/23--17:30--Notified by nursing staff that patient c/o shortness of breath and palpitations after taking "blood thinner." Patient lying in bed watching TV in NAD. He reports that he feels short of breath and experiences palpitations every time he takes a blood thinner and he doesn't want to take anymore. He denies dizziness, nausea, chest pain, or diaphoresis. Explained that we need to obtain troponin and bnp r/t current symptoms to be sure he isn't having any cardiac type episodes. He agreed for labs and EKG to be obtained. Labs noted for elevated troponin --174--and BNP 20,000---he has refused any labs since 6/24/23 but at that time troponin was 146 and bnp 13,000---he has refused lasix several times since then and is essentially non-compliant with his med regimen. I've advised that we need to diurese with lasix and we will need to repeat labs at least once in the next  12 hours or so. He is agreeable " "with this.  He has significant heart murmur---the elevations are most likely due to muscle stress r/t not taking his medications. We will diurese and trend labs.      07/02/23--19:00--Lying in bed and reports he is feeling better. He has removed his oxygen and says he is breathing " a lot better." Denies chest pain or palpitations at this time. Advised to leave oxygen on to decrease workload on his heart. Advised that we will need to set him up to see cardiology when he is discharged.     07/05/23 Awake and resting in bed. Continues to complain of edema to left arm. Continues to refuse venous doppler. Refuses meds. Has appealed dc . Awaiting to hear decision re appeal.      Interval History: edema left arm     Review of Systems   Constitutional:  Negative for appetite change.   Respiratory:  Positive for shortness of breath. Negative for cough.         On exertion    Cardiovascular:  Negative for chest pain.   Gastrointestinal:  Negative for constipation, diarrhea, nausea and vomiting.   Neurological:  Positive for weakness.   Objective:     Vital Signs (Most Recent):  Temp: 98 °F (36.7 °C) (07/05/23 0727)  Pulse: 98 (07/05/23 0727)  Resp: 20 (07/05/23 0727)  BP: 123/83 (07/05/23 0727)  SpO2: 100 % (07/05/23 0727) Vital Signs (24h Range):  Temp:  [97.4 °F (36.3 °C)-98.4 °F (36.9 °C)] 98 °F (36.7 °C)  Pulse:  [] 98  Resp:  [20] 20  SpO2:  [92 %-100 %] 100 %  BP: (105-123)/(76-86) 123/83     Weight: 80.5 kg (177 lb 6.4 oz)  Body mass index is 24.06 kg/m².    Intake/Output Summary (Last 24 hours) at 7/5/2023 1018  Last data filed at 7/5/2023 0930  Gross per 24 hour   Intake 780 ml   Output 100 ml   Net 680 ml         Physical Exam  HENT:      Head: Normocephalic.      Mouth/Throat:      Mouth: Mucous membranes are moist.   Cardiovascular:      Rate and Rhythm: Normal rate and regular rhythm.      Heart sounds: Murmur heard.      Comments: Edema to left arm   Pulmonary:      Breath sounds: Normal breath sounds. "   Abdominal:      General: Bowel sounds are normal.      Palpations: Abdomen is soft.   Musculoskeletal:         General: Normal range of motion.   Skin:     General: Skin is warm and dry.      Comments: Dressing to BLE      Neurological:      Mental Status: He is alert.           Significant Labs: All pertinent labs within the past 24 hours have been reviewed.  Recent Lab Results         07/05/23  0548   07/04/23  1658   07/04/23  1513   07/04/23  1116        Appearance, UA     Cloudy         Bilirubin (UA)     Negative         Color, UA     Dark Yellow         Glucose, UA     Negative         Ketones, UA     Negative         Leukocytes, UA     Negative         NITRITE UA     Negative         Occult Blood UA     Moderate         pH, UA     5.5         POC Glucose 92   84     83       Protein, UA     100         RBC, UA     25-50         Specific Gravity, UA     >=1.030         Squam Epithel, UA     Rare         UROBILINOGEN UA     1.0         WBC, UA     0-5                 Significant Imaging: I have reviewed all pertinent imaging results/findings within the past 24 hours.      Assessment/Plan:      Coronary artery disease involving native coronary artery of native heart without angina pectoris     06/09/23 Continue home aspirin, BB, and high-intensity statin.           Essential hypertension  06/09/23 continue metoprolol succinate 25 mg daily     06/12/23 stable     06/23 stable    SOB (shortness of breath)    06/09/23 oxygen at 2 liters   duonebs every 4 hours prn       06/26/23 denies sob    HFrEF (heart failure with reduced ejection fraction)    Summary     The left ventricle is normal in size with mild concentric hypertrophy and moderately decreased systolic function.   The estimated ejection fraction is 35%.   Left ventricular diastolic dysfunction.   Atrial fibrillation not observed.   Normal right ventricular size.   Normal central venous pressure (3 mmHg).   The estimated PA systolic pressure is 22  mmHg.   Trivial pericardial effusion.     Echo done 06/02/23     6/10 - Appears compensated.    06/24/23 BNP 78121. Troponin 146.  Swelling to left arm, abd, flank  CXR neg, CTA neg  Lasix 20 mg IVP given x 1 dose.   Pt is refusing all other meds    06/26/23 continues to refuse meds    Hypomagnesemia    06/15/23 magnesium 1.5- one gram magnesium sulfate       Depression    06/14/23 PHQ 9 screen shows moderate depression   Reports having trouble falling asleep, feeling down most days   Will start on mirtazapine 15 mg po at hs       06/23 more optimistic, upbeat   Ready to work with therapy today    Diabetes mellitus    Lab Results   Component Value Date    HGBA1C 5.5 06/02/2023     Low dose ssi coverage     06/23/23 stable    06/26/23 stable    06/30/23 stable    Pressure injury of sacral region, stage 2     Border foam dressings to Trochanter and sacrum .     Multiple open wounds of lower extremity, unspecified laterality, subsequent encounter  06/30/23 resolved      COPD (chronic obstructive pulmonary disease)  06/09/23 02 per nc at 2 liters  duonebs every 4 hours prn       New onset a-fib  06/09/23 eliquis 5 mg po BID     06/26/23 refuses eliquis - talked with him re reason he is on it, he continues to refuse. Talked with him re alternatives to eliquis - he is not receptive.      06/28/23 continues to refuse meds      06/30/23 has refused meds, explained use of meds to him again    Cellulitis of lower extremity  06/09/23  wound cleansing with Vashe, apply silvadene daily; Border foam dressings to Trochanter and sacrum .        06/12/23 continue wound care to BLE     06/15/23 wound care consult       06/26/23 dressings to BLE     06/30 cellulitis resolved , continue wound care      VTE Risk Mitigation (From admission, onward)         Ordered     apixaban tablet 5 mg  2 times daily         06/15/23 1030                Discharge Planning   MAKENNA: 6/30/2023     Code Status: DNR   Is the patient medically ready for  discharge?:     Reason for patient still in hospital (select all that apply): appeal pending  Discharge Plan A: Home   Discharge Delays: None known at this time              CHRISTIANE Mcelroy  Department of Hospital Medicine   [facility] - Medical Surgical Unit

## 2023-07-05 NOTE — PLAN OF CARE
Problem: Adult Inpatient Plan of Care  Goal: Plan of Care Review  Outcome: Ongoing, Progressing  Goal: Patient-Specific Goal (Individualized)  Outcome: Ongoing, Progressing  Goal: Absence of Hospital-Acquired Illness or Injury  Outcome: Ongoing, Progressing  Goal: Optimal Comfort and Wellbeing  Outcome: Ongoing, Progressing  Goal: Readiness for Transition of Care  Outcome: Ongoing, Progressing  Intervention: Mutually Develop Transition Plan  Flowsheets (Taken 7/5/2023 1326)  Transportation Anticipated: family or friend will provide     Problem: Diabetes Comorbidity  Goal: Blood Glucose Level Within Targeted Range  Outcome: Ongoing, Progressing     Problem: Fluid and Electrolyte Imbalance (Acute Kidney Injury/Impairment)  Goal: Fluid and Electrolyte Balance  Outcome: Ongoing, Progressing     Problem: Oral Intake Inadequate (Acute Kidney Injury/Impairment)  Goal: Optimal Nutrition Intake  Outcome: Ongoing, Progressing  Intervention: Promote and Optimize Nutrition  Flowsheets (Taken 7/5/2023 1326)  Oral Nutrition Promotion: safe use of adaptive equipment encouraged     Problem: Renal Function Impairment (Acute Kidney Injury/Impairment)  Goal: Effective Renal Function  Outcome: Ongoing, Progressing  Intervention: Monitor and Support Renal Function  Flowsheets (Taken 7/5/2023 1326)  Stabilization Measures: airway opened  Medication Review/Management: medications reviewed     Problem: Impaired Wound Healing  Goal: Optimal Wound Healing  Outcome: Ongoing, Progressing  Intervention: Promote Wound Healing  Flowsheets (Taken 7/5/2023 1326)  Oral Nutrition Promotion: safe use of adaptive equipment encouraged     Problem: Skin Injury Risk Increased  Goal: Skin Health and Integrity  Outcome: Ongoing, Progressing  Intervention: Promote and Optimize Oral Intake  Flowsheets (Taken 7/5/2023 1326)  Oral Nutrition Promotion: safe use of adaptive equipment encouraged     Problem: Fall Injury Risk  Goal: Absence of Fall and  Fall-Related Injury  Outcome: Ongoing, Progressing  Intervention: Identify and Manage Contributors  Flowsheets (Taken 7/5/2023 6776)  Medication Review/Management: medications reviewed

## 2023-07-06 ENCOUNTER — CLINICAL SUPPORT (OUTPATIENT)
Dept: WOUND CARE | Facility: HOSPITAL | Age: 73
End: 2023-07-06
Payer: MEDICARE

## 2023-07-06 DIAGNOSIS — I87.2 STASIS DERMATITIS OF BOTH LEGS: Primary | ICD-10-CM

## 2023-07-06 DIAGNOSIS — L97.421 ULCER OF LEFT HEEL, LIMITED TO BREAKDOWN OF SKIN: ICD-10-CM

## 2023-07-06 LAB
GLUCOSE SERPL-MCNC: 111 MG/DL (ref 70–105)
GLUCOSE SERPL-MCNC: 94 MG/DL (ref 70–105)

## 2023-07-06 PROCEDURE — 27000982 HC MATTRESS, MATRIX LAL RENTAL

## 2023-07-06 PROCEDURE — 99900035 HC TECH TIME PER 15 MIN (STAT)

## 2023-07-06 PROCEDURE — 99316 PR NURSING FAC DISCHRGE DAY,MORE 30 MIN: ICD-10-PCS | Mod: ,,, | Performed by: FAMILY MEDICINE

## 2023-07-06 PROCEDURE — 99212 OFFICE O/P EST SF 10 MIN: CPT

## 2023-07-06 PROCEDURE — 27000944

## 2023-07-06 PROCEDURE — 82962 GLUCOSE BLOOD TEST: CPT

## 2023-07-06 PROCEDURE — 94761 N-INVAS EAR/PLS OXIMETRY MLT: CPT

## 2023-07-06 PROCEDURE — 11000004 HC SNF PRIVATE

## 2023-07-06 PROCEDURE — 99316 NF DSCHRG MGMT 30 MIN+: CPT | Mod: ,,, | Performed by: FAMILY MEDICINE

## 2023-07-06 NOTE — PROGRESS NOTES
"  WOUND CARE CONSULT          Patient Name: Esthela Contreras is a 72 y.o. male       Chief Complaint:   Wound Care Consult    Review of patient's allergies indicates:  No Known Allergies     I have reviewed the patient's medical, surgical, family and social history.      Subjective:    HPI     71 YO AAM seen today for multiple wounds. Pt is currently in swing bed. He was admitted to [facility] on 6/1/23 due to sepsis and new onset A fib. Arterial U/S on 6/2/23 revealed "trickle flow"to right DP, and left PT and DP. Right PT was monophasic. Otherwise, there were triphasic and biphasic waveforms. Dr. Mo was consulted while at [facility], but no interventions were deemed necessary at that time.   Wounds are as follows:  Right lateral ankle- arterial ulcer- RESOLVED  Right posterior lower leg- arterial ulcer- RESOLVED  Left plantar heel- diabetic foot ulcer- RESOLVED  Left medial heel- diabetic foot ulcer  Left posterior lower leg- arterial ulcer- RESOLVED  Sacrum- pressure - RESOLVED  Left ischium- pressure- RESOLVED    Onset of wounds is unknown, best estimate is 4/1/23.   Pt also continues to have severe stasis dermatitis to BLE, however it is some improved.   The majority of wounds are resolved, and no new issues are reported. Plan is for D/C home tomorrow.     7/6/23- Pt seen for follow up. The only remaining open wound is left heel, and it is nearly resolved. Dermatitis is improving. Pt to D/C home today with HH.       Medications:    Current Facility-Administered Medications on File Prior to Visit   Medication Dose Route Frequency Provider Last Rate Last Admin    acetaminophen tablet 650 mg  650 mg Oral Q6H PRN Love L Alcocer, FNP        albuterol-ipratropium 2.5 mg-0.5 mg/3 mL nebulizer solution 3 mL  3 mL Nebulization Q4H PRN Love L Alcocer, FNP        apixaban tablet 5 mg  5 mg Oral BID Love L Alcocer, FNP   5 mg at 07/01/23 2027    ascorbic acid (vitamin C) tablet 500 mg  500 mg Oral Daily Roseanna Perez NP  "  500 mg at 07/05/23 0904    aspirin EC tablet 81 mg  81 mg Oral Daily Love L Alcocer, FNP   81 mg at 07/05/23 0905    atorvastatin tablet 40 mg  40 mg Oral Daily Love L Alcocer, FNP   40 mg at 07/05/23 0905    bisacodyL suppository 10 mg  10 mg Rectal Daily PRN Roseanna Perez, DIEGO        calcium carbonate 200 mg calcium (500 mg) chewable tablet 500 mg  500 mg Oral BID PRN Love L Alcocer, FNP        furosemide injection 20 mg  20 mg Intravenous Daily Bud Treadwell NP   20 mg at 07/03/23 0926    HYDROcodone-acetaminophen 5-325 mg per tablet 1 tablet  1 tablet Oral Q12H PRN Roseanna Perez NP   1 tablet at 06/26/23 1621    Lactobacillus acidophilus capsule 1 capsule  1 capsule Oral TID WM Love L Alcocer, FNP   1 capsule at 07/05/23 0905    melatonin tablet 6 mg  6 mg Oral Nightly PRN Love L Alcocer, FNP   6 mg at 06/09/23 2035    metoprolol succinate (TOPROL-XL) 24 hr tablet 25 mg  25 mg Oral Daily Love L Alcocer, FNP   25 mg at 07/03/23 0924    midodrine tablet 5 mg  5 mg Oral TID WM Love L Alcocer, FNP   5 mg at 07/03/23 0924    mirtazapine tablet 15 mg  15 mg Oral QHS Love L Alcocer, FNP   15 mg at 07/01/23 2027    multivitamin tablet  1 tablet Oral Daily Love L Alcocer, FNP   1 tablet at 07/05/23 0904    polyethylene glycol packet 17 g  17 g Oral Daily Love L Alcocer, FNP   17 g at 06/25/23 0845    senna-docusate 8.6-50 mg per tablet 1 tablet  1 tablet Oral BID Love L Alcocer, FNP   1 tablet at 07/05/23 0905    zinc sulfate capsule 220 mg  220 mg Oral Daily Love L Alcocer, FNP   220 mg at 07/05/23 0905     Current Outpatient Medications on File Prior to Visit   Medication Sig Dispense Refill    acetaminophen (TYLENOL) 325 MG tablet Take 2 tablets (650 mg total) by mouth every 6 (six) hours as needed for Pain or Temperature greater than.  0    apixaban (ELIQUIS) 5 mg Tab Take 1 tablet (5 mg total) by mouth 2 (two) times daily.      ascorbic acid, vitamin C, (VITAMIN C) 500 MG tablet Take 1  tablet (500 mg total) by mouth once daily. 30 tablet 0    aspirin (ECOTRIN) 81 MG EC tablet Take 81 mg by mouth once daily.      atorvastatin (LIPITOR) 40 MG tablet Take 1 tablet (40 mg total) by mouth once daily. 90 tablet 0    metoprolol succinate (TOPROL-XL) 25 MG 24 hr tablet Take 1 tablet (25 mg total) by mouth once daily. 90 tablet 0    midodrine (PROAMATINE) 5 MG Tab Take 1 tablet (5 mg total) by mouth 3 (three) times daily with meals. 90 tablet 11    mirtazapine (REMERON) 15 MG tablet Take 1 tablet (15 mg total) by mouth every evening. 30 tablet 0    multivitamin Tab Take 1 tablet by mouth once daily. 30 tablet 0    senna-docusate 8.6-50 mg (PERICOLACE) 8.6-50 mg per tablet Take 1 tablet by mouth 2 (two) times daily. 60 tablet 0          Physical Exam  Vitals reviewed.   Constitutional:       Appearance: Normal appearance.   HENT:      Head: Normocephalic and atraumatic.      Right Ear: External ear normal.      Left Ear: External ear normal.   Cardiovascular:      Rate and Rhythm: Normal rate.   Pulmonary:      Effort: Pulmonary effort is normal.   Abdominal:      Palpations: Abdomen is soft.   Musculoskeletal:      Right lower leg: Edema present.      Left lower leg: Edema present.        Feet:    Feet:      Left foot:      Skin integrity: Ulcer present.   Skin:     General: Skin is warm and dry.      Findings: Wound present.      Comments: Stasis dermatitis to BLE   Neurological:      Mental Status: He is alert.        Please see Wound Docs for complete Wound Assessment and lower extremity assessment when applicable.    Documentation of any procedures can be viewed in Wound Docs if applicable.         Assessment and Plan:    1. Diabetic ulcer of left heel associated with type 2 diabetes mellitus, limited to breakdown of skin  Clean with mild soap and water, and paint with betadine daily    2. Stasis dermatitis of both legs  Urea cream to intact skin areas daily  Wash with mild soap and water daily.            Please see Wound Docs for complete plan including patient instructions.     Follow Up & Goals:     Follow up in about 1 week (around 7/13/2023).  [facility] wound [facility] in [place]    Short term goals  Reduction of devitalized tissue  Reduction of bacterial burden  Stimulate and/or maintain acute phases of wound healing  Promote granulation formation  Promote epithelization  Promote compliance with plan of care  Address factors affecting wound healing  Optimize nutritional status  Optimize vascular status    Long term goals  Prevent loss of limb  Prevent infection  Conservative Wound Treatment  Prevent recurrent ulcerations  Resolve wounds

## 2023-07-06 NOTE — NURSING
Asked patient if he was going to take his medication today and said they are making me go home today and I'm not going to take anything - also asked patient about primary MD and he refused to answer any questions - asked if he was going to let me check his blood sugar and he refused

## 2023-07-06 NOTE — PLAN OF CARE
[facility] - Medical Surgical Unit  Discharge Final Note    Primary Care Provider: CHRISTIANE Bardales    Expected Discharge Date: 7/6/2023    Final Discharge Note (most recent)       Final Note - 07/06/23 1046          Final Note    Assessment Type Final Discharge Note (P)      Anticipated Discharge Disposition Home or Self Care (P)      What phone number can be called within the next 1-3 days to see how you are doing after discharge? [phone] (P)      Hospital Resources/Appts/Education Provided Provided patient/caregiver with written discharge plan information (P)         Post-Acute Status    Patient choice form signed by patient/caregiver List with quality metrics by geographic area provided (P)      Discharge Delays Personal Transportation (P)                      Important Message from Medicare  Important Message from Medicare regarding Discharge Appeal Rights: Given to patient/caregiver, Explained to patient/caregiver, Signed/date by patient/caregiver     Date IMM was signed: 06/29/23  Time IMM was signed: 0850    Contact Info       [facility]   Specialty: Wound Care    [address] [place] [id number]   Phone: [phone]       Next Steps: Follow up in 1 week(s)    Instructions: July 10th 2023 at 10:00am        Mr.. Contreras will discharge home today.  Attempted to get IMM and Patient Choice Form signed but he refused to sign either form.

## 2023-07-06 NOTE — ASSESSMENT & PLAN NOTE
06/14/23 PHQ 9 screen shows moderate depression   Reports having trouble falling asleep, feeling down most days   Will start on mirtazapine 15 mg po at hs       06/23 more optimistic, upbeat   Ready to work with therapy today      07/06/23 continue remeron

## 2023-07-06 NOTE — ASSESSMENT & PLAN NOTE
06/09/23 continue metoprolol succinate 25 mg daily     06/12/23 stable     06/23 stable    07/06/23 continue metoprolol

## 2023-07-06 NOTE — ASSESSMENT & PLAN NOTE
>>ASSESSMENT AND PLAN FOR CELLULITIS OF LOWER EXTREMITY WRITTEN ON 7/6/2023  9:14 AM BY MIKE LOBO FNP    06/09/23  wound cleansing with Vashe, apply silvadene daily; Border foam dressings to Trochanter and sacrum .        06/12/23 continue wound care to BLE     06/15/23 wound care consult       06/26/23 dressings to BLE     06/30 cellulitis resolved , continue wound care

## 2023-07-06 NOTE — DISCHARGE SUMMARY
[facility] - Medical Surgical Unit  Hospital Medicine  Discharge Summary      Patient Name: Esthela Contreras  MRN: [id number]  [facility]: [id number]  Patient Class: IP- Swing  Admission Date: 6/9/2023  Hospital Length of Stay: 27 days  Discharge Date and Time:  07/06/2023 9:17 AM  Attending Physician: Liam Contreras IV, DO   Discharging Provider: CHRISTIANE Mcelroy  Primary Care Provider: CHRISTIANE Bardales    Primary Care Team: Networked reference to record PCT     HPI:   HPI:  Patient is a 72-year-old male with a history of unspecified CHF in the setting of CAD, essential hypertension, diabetes, oxygen-dependent COPD with baseline supplemental oxygen requirement of 2 L/min and BiPAP QHS along with CKD stage IIIA who presented to the emergency room complaining of dyspnea which started just a few days ago.  Patient otherwise denied any fever chills cough hemoptysis PND orthopnea chest pain palpitation or lower extremity edema in association.  Patient's niece stated that he has chronic wounds on both legs and felt that he has not been given proper care to address this.  Patient lives alone and when his niece visited him today, she found him slumped over on a recliner dyspneic prompting ED visit.      On initial presentation, patient was tachycardic and tachypneic but vital signs were otherwise stable and patient was afebrile.  Workup was notable for what appeared to be a new onset atrial fibrillation with RVR with elevated troponin and proBNP, elevation in total bilirubin level with mildly elevated transaminases and alkaline phosphatase, leukocytosis with left shift with WBC count of 23,000 and high anion gap metabolic acidosis with anion gap of 29 and bicarbonate of 7, acute on chronic renal failure with BUN of 110 and creatinine of 7.03 from a baseline serum creatinine of 1.43, significantly elevated lactic acid level with initial lactic acid level of 9.7 to 10.7 even after receiving 30 cc/kg of crystalloid  IV fluid bolus.  CT of bilateral lower extremity demonstrated a presence of bilateral cellulitis without evidence of drainable abscess or necrotizing fasciitis. Patient will be admitted for further evaluation and intervention        Overview/Hospital Course:  06/09/2023   Patient has no new complaints, leg swelling improving.    T-max 100.3°, blood pressure 93/53 pulse rate 84 respiratory rate 18 temperature 98.1°.  His labs white blood cell count down to 5 hemoglobin 12 platelets count 118 sodium 137 potassium 3.1 creatinine 1 calcium 6.5.    Echocardiogram showed ejection fraction of 35% with left ventricular diastolic dysfunction.    Blood culture on on 06/09/2023 showed no growth to date.    Blood cultures on 06/06/2023 grew Gram-negative bacilli, but chews fragilis, Streptococcus species.    Hypotension resolved, blood pressure borderline but does not need vasopressors.    Sepsis resolved.    New onset AFib, rate is controlled, started on Eliquis anticoagulation.    Acute renal failure presumably   Bacteremia, blood cultures questionable chills contamination, likely related to cellulitis of lower extremities, will continue antibiotics.    Surgery evaluated the patient, recommended to Continue local wound care.    Will deescalate antibiotics to Rocephin 2 g IV piggyback daily.    Patient awaiting placement.      Will need to continue antibiotics , rocephin 2 g IVPB daily for 12 more days, last dose 06/21/23. Continue local wound care and therapy.    Above information is from [facility] acute care stay 06/01/23- 06/09/23. Mr. Contreras has been accepted to swing bed services at [facility]. His arrival is expected today.     Mr. Contreras is a 72 year-old  male who is admitted to [facility] swing bed services for daily wound care and continuation of IV Rocephin through 6/21/23. Upon arrival here tonight, he is alert and oriented. Dressings from lower legs were removed for assessment. He  "reported severe pain with manipulation of legs. He states that he has had poor appetite and notes some constipation during hospitalization. Labs done at [facility] today were reviewed and are noted below. His albumin was 1.4 on 6/8. Dietician and wound care are consulted. He was accepted by Dr. Rosario to admission to Dr. Contreras today. DIEGO Alcocer completed medication reconciliation and admission orders.      * No surgery found *      Hospital Course:   06/12/23 Awake and resting in bed. Complains of having gas but no BM in past 5 days. Complains of occasional pain to ankles. He has dressings intact to BLE from knees down.  Continue rocephin IV and wound care to BLE. Continue therapy for strengthening.  06/13/23 refusing to take po meds. Talked with him re possible adverse effects of doing so and he states "When the good lord takes me, I am ready." Talked with him re code status and he states he wants to be Full code.  06/14/23 continues to refuse po meds- states meds " make him feel funny, just don't feel right ."Talked with him re purpose of meds and he is agreeable to resuming meds. Will monitor on telemetry. CNA reports small amount of bright red blood noted with BM. Talked him re any history of hemorrhoids or constipation. He denies both. Will recheck CBC and check stool for FOB.   06/14/23 Talked with Mr. Contreras re possibility of him being depressed. He allowed me to do PHQ 9 screening. He scored 11 showing moderate depression. He began to cry at end of screen. I talked with him re antidepressant and he is agreeable. Will start remeron 15 mg po daily.  06/15/23 Awake and resting in bed. No complaints. Reports sleeping better last night. H&H stable at 12/37. Talked with him again re positive FOB. He does report having burning and itching to rectum for past few days. No external hemorrhoids noted. Will treat with anusol hc. Resume eliquis. Dressing to bilateral lower legs.   06/19/23 Awake and resting in bed. Reports " "feeling better and sleeping better. Denies pain. States he has not had BM in 2-3 days but that is normal for him. Denies feeling constipated. Continue rocephin and wound care. Blood glucose levels have improved. Continue to monitor for hypoglycemia.  06/23/23 Awake and sitting up in chair. States he was able to get up and walk with therapy yesterday and his legs did not hurt him. States he plans to walk further today. Continue wound care. BLE dressings intact.  06/24/23 Nurse reports that patient has increased swelling in left arm with swelling now to left flank and abdomen noted this am. She also reports that pt has been refusing his "heart" meds including Eliquis because he thinks they make him feel bad. Upon evaluation, Mr. Contreras notes the swelling that is present but reports "I feel fine" and "better than I have been." Pt has PMH of CHF with EF of 35% and has not been on diuretic.  He denies chest pain and SOB. Pitting edema is noted to left flank, abd, and hip with nonpitting to left arm. No open wound, erythema or weeping. His lungs are clear. O2 sat 96-99% on RA. Discussed need for labs and xray today. He refused initially but finally agreed. Troponin 146, d dimer 0.99, BNP > 56982, BUN 13, creatinine 0.84. EKG ordered. It shows LBBB with 1st degree AVB with no afib. No STEMI and no changes from previous on 6/1. DIScussed findings with pt and explained need for CTA chest and doppler left arm and transfer to [facility] for further evaluation. He refused stating that the only place he is going is home. Discussed risk of worsening condition or death due to MI, fluid overload, and VTE. He acknowledged risk stating that it was ok and continues to refuse transfer. He does agree with IV, IV lasix x 1 dose and CTA chest. CTA chest shows no PE, small bilat pleural effusions.  Nurse reports that pt is still refusing medications. Discussed findings again and risk of refusing meds and evaluation with pt. He acknowledges and " "states that he does not want "all of that." Further discussed code status with pt and he wants to change to DNR status.  06/26/23 Mr. Contreras is awake, alert and resting in bed. Talked with him re CTA findings of no PE. Talked with him re his refusal to take meds. He states eliquis makes him feel funny. Explained to him what it was for and danger of not taking it. Talked with him re alternatives to eliquis and he is not receptive. States "when the good lord gets ready to take me , just let me go."   06/28/23 has edema to left arm and to left flank. Talked with him lasix and need to give it to remove fluid. He refuses stating " I do not want that, I do not need that." Just let me go home Friday. Probable dc home Friday.  06/28/23 refused us left arm   06/30/23 discharge is scheduled for today. Mr. Contreras states he wants to appeal and see if he can stay longer. He however refuses to place the call for appeal . Stating to "just discharge me."  Discharge home with home health to follow. Medication reconciliation done.  06/30/23 Mr. Contreras now says that he is going to call for appeal with the help of his niece. Discharge dcd.    07/02/23--17:30--Notified by nursing staff that patient c/o shortness of breath and palpitations after taking "blood thinner." Patient lying in bed watching TV in [facility]. He reports that he feels short of breath and experiences palpitations every time he takes a blood thinner and he doesn't want to take anymore. He denies dizziness, nausea, chest pain, or diaphoresis. Explained that we need to obtain troponin and bnp r/t current symptoms to be sure he isn't having any cardiac type episodes. He agreed for labs and EKG to be obtained. Labs noted for elevated troponin --174--and BNP 20,000---he has refused any labs since 6/24/23 but at that time troponin was 146 and bnp 13,000---he has refused lasix several times since then and is essentially non-compliant with his med regimen. I've advised that we need to " "diurese with lasix and we will need to repeat labs at least once in the next  12 hours or so. He is agreeable with this.  He has significant heart murmur---the elevations are most likely due to muscle stress r/t not taking his medications. We will diurese and trend labs.      07/02/23--19:00--Lying in bed and reports he is feeling better. He has removed his oxygen and says he is breathing " a lot better." Denies chest pain or palpitations at this time. Advised to leave oxygen on to decrease workload on his heart. Advised that we will need to set him up to see cardiology when he is discharged.     07/05/23 Awake and resting in bed. Continues to complain of edema to left arm. Continues to refuse venous doppler. Refuses meds. Has appealed dc . Awaiting to hear decision re appeal.      07/06/23 case management checked status of appeal and was told there was no appeal done. Discharge home with home health to follow with PT and OT to follow. Appt made for follow up with wound care . Appt made to follow up with PCP of choice within one week. Medication reconciliation done - he has refused meds and therapy while here.  Patient has reached maximal benefit from hospitalization our facility and will be discharged today       Goals of Care Treatment Preferences:  Code Status: DNR      Consults:   Consults (From admission, onward)          Status Ordering Provider     Inpatient consult to Registered Dietitian/Nutritionist  Once        Provider:  (Not yet assigned)    Completed MIKE LOBO            Psychiatric  Depression    06/14/23 PHQ 9 screen shows moderate depression   Reports having trouble falling asleep, feeling down most days   Will start on mirtazapine 15 mg po at hs       06/23 more optimistic, upbeat   Ready to work with therapy today      07/06/23 continue remeron    Cardiac/Vascular  Essential hypertension  06/09/23 continue metoprolol succinate 25 mg daily     06/12/23 stable     06/23 stable    07/06/23 " continue metoprolol    ID  Cellulitis of lower extremity  06/09/23  wound cleansing with Vashe, apply silvadene daily; Border foam dressings to Trochanter and sacrum .        06/12/23 continue wound care to BLE     06/15/23 wound care consult       06/26/23 dressings to BLE     06/30 cellulitis resolved , continue wound care      Final Active Diagnoses:    Diagnosis Date Noted POA    Coronary artery disease involving native coronary artery of native heart without angina pectoris [I25.10] 10/12/2021 Yes    Essential hypertension [I10] 10/12/2021 Yes    SOB (shortness of breath) [R06.02] 06/02/2023 Yes    HFrEF (heart failure with reduced ejection fraction) [I50.20] 10/12/2021 Yes    Hypomagnesemia [E83.42] 06/15/2023 Unknown    Depression [F32.A] 06/14/2023 Unknown    Diabetes mellitus [E11.9] 06/09/2023 Yes    Pressure injury of sacral region, stage 2 [L89.152] 06/07/2023 Yes    New onset a-fib [I48.91] 06/02/2023 Yes    COPD (chronic obstructive pulmonary disease) [J44.9] 06/02/2023 Yes    Cellulitis of lower extremity [L03.119] 06/01/2023 Yes      Problems Resolved During this Admission:    Diagnosis Date Noted Date Resolved POA    PRINCIPAL PROBLEM:  Gram-positive bacteremia [R78.81] 06/03/2023 06/24/2023 Yes    Hypokalemia [E87.6] 06/15/2023 06/24/2023 Yes    Hypoglycemia [E16.2] 06/15/2023 06/24/2023 No       Discharged Condition: stable    Disposition: Home or Self Care    Follow Up:   Follow-up Information       [facility] Follow up in 1 week(s).    Specialty: Wound Care  Why: July 10th 2023 at 10:00am  Contact information:  [address] [place] [id number] [phone]                         Patient Instructions:      Diet Adult Regular       Significant Diagnostic Studies: Labs: All labs within the past 24 hours have been reviewed    Pending Diagnostic Studies:       Procedure Component Value Units Date/Time    Occult blood x 3, stool [[id number]]     Order Status:  Sent Lab Status: No result     Specimen: Stool            Medications:  Reconciled Home Medications:      Medication List        START taking these medications      acetaminophen 325 MG tablet  Commonly known as: TYLENOL  Take 2 tablets (650 mg total) by mouth every 6 (six) hours as needed for Pain or Temperature greater than.     ascorbic acid (vitamin C) 500 MG tablet  Commonly known as: VITAMIN C  Take 1 tablet (500 mg total) by mouth once daily.     mirtazapine 15 MG tablet  Commonly known as: REMERON  Take 1 tablet (15 mg total) by mouth every evening.     multivitamin Tab  Take 1 tablet by mouth once daily.     senna-docusate 8.6-50 mg 8.6-50 mg per tablet  Commonly known as: PERICOLACE  Take 1 tablet by mouth 2 (two) times daily.            CONTINUE taking these medications      apixaban 5 mg Tab  Commonly known as: ELIQUIS  Take 1 tablet (5 mg total) by mouth 2 (two) times daily.     aspirin 81 MG EC tablet  Commonly known as: ECOTRIN  Take 81 mg by mouth once daily.     atorvastatin 40 MG tablet  Commonly known as: LIPITOR  Take 1 tablet (40 mg total) by mouth once daily.     metoprolol succinate 25 MG 24 hr tablet  Commonly known as: TOPROL-XL  Take 1 tablet (25 mg total) by mouth once daily.     midodrine 5 MG Tab  Commonly known as: PROAMATINE  Take 1 tablet (5 mg total) by mouth 3 (three) times daily with meals.            STOP taking these medications      albuterol-ipratropium 2.5 mg-0.5 mg/3 mL nebulizer solution  Commonly known as: DUO-NEB     dextrose 5 % in water (D5W) 5 % PgBk 100 mL with cefTRIAXone 2 gram SolR 2 g     insulin aspart U-100 100 unit/mL injection  Commonly known as: NovoLOG     polyethylene glycol 17 gram Pwpk  Commonly known as: GLYCOLAX     silver sulfADIAZINE 1% 1 % cream  Commonly known as: SILVADENE              Indwelling Lines/Drains at time of discharge:   Lines/Drains/Airways       None                   Time spent on the discharge of patient: 32 minutes         Liam BYRNE  Ben HECTOR,   Department of Hospital Medicine  [facility] - Medical Surgical Unit

## 2023-07-06 NOTE — PLAN OF CARE
Problem: Adult Inpatient Plan of Care  Goal: Plan of Care Review  Outcome: Ongoing, Progressing  Flowsheets (Taken 7/6/2023 0536)  Plan of Care Reviewed With: patient  Goal: Patient-Specific Goal (Individualized)  Outcome: Ongoing, Progressing  Goal: Absence of Hospital-Acquired Illness or Injury  Outcome: Ongoing, Progressing  Intervention: Identify and Manage Fall Risk  Flowsheets (Taken 7/6/2023 0536)  Safety Promotion/Fall Prevention:   assistive device/personal item within reach   commode/urinal/bedpan at bedside   Fall Risk reviewed with patient/family   lighting adjusted   medications reviewed   nonskid shoes/socks when out of bed   side rails raised x 2   instructed to call staff for mobility  Intervention: Prevent Skin Injury  Flowsheets (Taken 7/6/2023 0536)  Skin Protection: incontinence pads utilized  Intervention: Prevent and Manage VTE (Venous Thromboembolism) Risk  Flowsheets (Taken 7/6/2023 0536)  Activity Management: Arm raise - L1  VTE Prevention/Management: fluids promoted  Range of Motion: active ROM (range of motion) encouraged  Intervention: Prevent Infection  Flowsheets (Taken 7/6/2023 0536)  Infection Prevention:   equipment surfaces disinfected   hand hygiene promoted   personal protective equipment utilized  Goal: Optimal Comfort and Wellbeing  Outcome: Ongoing, Progressing  Intervention: Monitor Pain and Promote Comfort  Flowsheets (Taken 7/6/2023 0536)  Pain Management Interventions: pain management plan reviewed with patient/caregiver  Intervention: Provide Person-Centered Care  Flowsheets (Taken 7/6/2023 0536)  Trust Relationship/Rapport: care explained  Goal: Readiness for Transition of Care  Outcome: Ongoing, Progressing     Problem: Diabetes Comorbidity  Goal: Blood Glucose Level Within Targeted Range  Outcome: Ongoing, Progressing  Intervention: Monitor and Manage Glycemia  Flowsheets (Taken 7/6/2023 0536)  Glycemic Management: blood glucose monitored     Problem: Fluid and  Electrolyte Imbalance (Acute Kidney Injury/Impairment)  Goal: Fluid and Electrolyte Balance  Outcome: Ongoing, Progressing     Problem: Oral Intake Inadequate (Acute Kidney Injury/Impairment)  Goal: Optimal Nutrition Intake  Outcome: Ongoing, Progressing     Problem: Renal Function Impairment (Acute Kidney Injury/Impairment)  Goal: Effective Renal Function  Outcome: Ongoing, Progressing  Intervention: Monitor and Support Renal Function  Flowsheets (Taken 7/6/2023 0536)  Medication Review/Management: medications reviewed     Problem: Impaired Wound Healing  Goal: Optimal Wound Healing  Outcome: Ongoing, Progressing  Intervention: Promote Wound Healing  Flowsheets (Taken 7/6/2023 0536)  Sleep/Rest Enhancement: awakenings minimized  Activity Management: Arm raise - L1  Pain Management Interventions: pain management plan reviewed with patient/caregiver     Problem: Skin Injury Risk Increased  Goal: Skin Health and Integrity  Outcome: Ongoing, Progressing  Intervention: Optimize Skin Protection  Flowsheets (Taken 7/6/2023 0536)  Pressure Reduction Techniques: frequent weight shift encouraged  Skin Protection: incontinence pads utilized  Head of Bed (HOB) Positioning: HOB elevated     Problem: Fall Injury Risk  Goal: Absence of Fall and Fall-Related Injury  Outcome: Ongoing, Progressing  Intervention: Identify and Manage Contributors  Flowsheets (Taken 7/6/2023 0536)  Self-Care Promotion: independence encouraged  Medication Review/Management: medications reviewed  Intervention: Promote Injury-Free Environment  Flowsheets (Taken 7/6/2023 0536)  Safety Promotion/Fall Prevention:   assistive device/personal item within reach   commode/urinal/bedpan at bedside   Fall Risk reviewed with patient/family   lighting adjusted   medications reviewed   nonskid shoes/socks when out of bed   side rails raised x 2   instructed to call staff for mobility

## 2023-07-07 VITALS
DIASTOLIC BLOOD PRESSURE: 85 MMHG | RESPIRATION RATE: 18 BRPM | WEIGHT: 177.38 LBS | OXYGEN SATURATION: 98 % | BODY MASS INDEX: 24.03 KG/M2 | HEIGHT: 72 IN | SYSTOLIC BLOOD PRESSURE: 134 MMHG | HEART RATE: 98 BPM | TEMPERATURE: 98 F

## 2023-07-07 LAB
GLUCOSE SERPL-MCNC: 102 MG/DL (ref 70–105)
GLUCOSE SERPL-MCNC: 75 MG/DL (ref 70–105)
GLUCOSE SERPL-MCNC: 81 MG/DL (ref 70–105)

## 2023-07-07 PROCEDURE — 82962 GLUCOSE BLOOD TEST: CPT

## 2023-07-07 PROCEDURE — 27000982 HC MATTRESS, MATRIX LAL RENTAL

## 2023-07-07 PROCEDURE — 94761 N-INVAS EAR/PLS OXIMETRY MLT: CPT

## 2023-07-07 PROCEDURE — 27000944

## 2023-07-07 PROCEDURE — 11000004 HC SNF PRIVATE

## 2023-07-07 NOTE — PLAN OF CARE
Problem: Adult Inpatient Plan of Care  Goal: Plan of Care Review  Outcome: Adequate for Care Transition  Goal: Patient-Specific Goal (Individualized)  Outcome: Adequate for Care Transition  Goal: Absence of Hospital-Acquired Illness or Injury  Outcome: Adequate for Care Transition  Goal: Optimal Comfort and Wellbeing  Outcome: Adequate for Care Transition  Goal: Readiness for Transition of Care  Outcome: Adequate for Care Transition     Problem: Diabetes Comorbidity  Goal: Blood Glucose Level Within Targeted Range  Outcome: Adequate for Care Transition     Problem: Fluid and Electrolyte Imbalance (Acute Kidney Injury/Impairment)  Goal: Fluid and Electrolyte Balance  Outcome: Adequate for Care Transition     Problem: Oral Intake Inadequate (Acute Kidney Injury/Impairment)  Goal: Optimal Nutrition Intake  Outcome: Adequate for Care Transition     Problem: Renal Function Impairment (Acute Kidney Injury/Impairment)  Goal: Effective Renal Function  Outcome: Adequate for Care Transition     Problem: Impaired Wound Healing  Goal: Optimal Wound Healing  Outcome: Adequate for Care Transition     Problem: Skin Injury Risk Increased  Goal: Skin Health and Integrity  Outcome: Adequate for Care Transition     Problem: Fall Injury Risk  Goal: Absence of Fall and Fall-Related Injury  Outcome: Adequate for Care Transition

## 2023-07-07 NOTE — NURSING
Called Shelby Contreras who states she's the aunt. Asked about the niece who is suppose to pick him up. Ms. Contreras states she knows nothing about it and doesn't know the niece's name. Attempted to call Hilda Bagley with no answer and no voicemail setup.

## 2023-07-07 NOTE — PLAN OF CARE
[facility]  Discharge Final Note    Primary Care Provider: CHRISTIANE Bardales    Expected Discharge Date: 7/6/2023    Final Discharge Note (most recent)       Final Note - 07/06/23 1046          Final Note    Assessment Type Final Discharge Note     Anticipated Discharge Disposition Home or Self Care     What phone number can be called within the next 1-3 days to see how you are doing after discharge? [phone]     Hospital Resources/Appts/Education Provided Provided patient/caregiver with written discharge plan information        Post-Acute Status    Patient choice form signed by patient/caregiver List with quality metrics by geographic area provided     Discharge Delays Personal Transportation                     Important Message from Medicare  Important Message from Medicare regarding Discharge Appeal Rights:  (Patient refused to sign)     Date IMM was signed: 06/29/23  Time IMM was signed: 0850    Spoke with Mr. Contreras's niece, Hilda and she stated as soon as I get off I will be there to get him.  She says last night, it was late and he said to not come because it was too late to be getting out and no one here had any paper work for him to just wait until tomorrow.  I informed her if she have any problems tonight to call and speak with the nurse because he is discharged and he has all the paperwork he will need.  Mr. Contreras signed Important Medicare Message but continues to say he doesn't want home health.  I told him I would check in with him next week and if he changed his mind, I would get him a nurse to come out to see hime.

## 2023-07-07 NOTE — PLAN OF CARE
Problem: Adult Inpatient Plan of Care  Goal: Plan of Care Review  7/7/2023 0502 by Anu Feliz RN  Outcome: Ongoing, Progressing  7/7/2023 0502 by Anu Feliz RN  Outcome: Ongoing, Progressing  Flowsheets (Taken 7/7/2023 0502)  Plan of Care Reviewed With: patient  Goal: Patient-Specific Goal (Individualized)  7/7/2023 0502 by Anu Feliz RN  Outcome: Ongoing, Progressing  7/7/2023 0502 by Anu Feliz RN  Outcome: Ongoing, Progressing  Goal: Absence of Hospital-Acquired Illness or Injury  7/7/2023 0502 by Anu Feliz RN  Outcome: Ongoing, Progressing  7/7/2023 0502 by Anu Feliz RN  Outcome: Ongoing, Progressing  Goal: Optimal Comfort and Wellbeing  7/7/2023 0502 by Anu Feliz RN  Outcome: Ongoing, Progressing  7/7/2023 0502 by Anu Feliz RN  Outcome: Ongoing, Progressing  Goal: Readiness for Transition of Care  7/7/2023 0502 by Anu Feliz RN  Outcome: Ongoing, Progressing  7/7/2023 0502 by Anu Feliz RN  Outcome: Ongoing, Progressing     Problem: Diabetes Comorbidity  Goal: Blood Glucose Level Within Targeted Range  7/7/2023 0502 by Anu Feliz RN  Outcome: Ongoing, Progressing  7/7/2023 0502 by Anu Feliz RN  Outcome: Ongoing, Progressing     Problem: Fluid and Electrolyte Imbalance (Acute Kidney Injury/Impairment)  Goal: Fluid and Electrolyte Balance  7/7/2023 0502 by Anu Feliz RN  Outcome: Ongoing, Progressing  7/7/2023 0502 by Anu Feliz RN  Outcome: Ongoing, Progressing     Problem: Oral Intake Inadequate (Acute Kidney Injury/Impairment)  Goal: Optimal Nutrition Intake  7/7/2023 0502 by Anu Feliz RN  Outcome: Ongoing, Progressing  7/7/2023 0502 by Anu Feliz RN  Outcome: Ongoing, Progressing     Problem: Renal Function Impairment (Acute Kidney Injury/Impairment)  Goal: Effective Renal Function  7/7/2023 0502 by Anu Feliz RN  Outcome: Ongoing,  Progressing  7/7/2023 0502 by Anu Feliz RN  Outcome: Ongoing, Progressing     Problem: Impaired Wound Healing  Goal: Optimal Wound Healing  7/7/2023 0502 by Anu Feliz RN  Outcome: Ongoing, Progressing  7/7/2023 0502 by Anu Feliz RN  Outcome: Ongoing, Progressing     Problem: Skin Injury Risk Increased  Goal: Skin Health and Integrity  7/7/2023 0502 by Anu Feliz RN  Outcome: Ongoing, Progressing  7/7/2023 0502 by Anu Feliz RN  Outcome: Ongoing, Progressing     Problem: Fall Injury Risk  Goal: Absence of Fall and Fall-Related Injury  7/7/2023 0502 by Anu Feliz RN  Outcome: Ongoing, Progressing  7/7/2023 0502 by Anu Feliz RN  Outcome: Ongoing, Progressing

## 2023-07-10 ENCOUNTER — PATIENT OUTREACH (OUTPATIENT)
Dept: ADMINISTRATIVE | Facility: CLINIC | Age: 73
End: 2023-07-10

## 2023-07-10 NOTE — PROGRESS NOTES
C3 nurse attempted to contact Esthela Contreras  for a TCC post hospital discharge follow up call. No answer. The patient does not have a scheduled HOSFU appointment, and the pt does not have an [facility] PCP.

## 2023-07-17 ENCOUNTER — TELEPHONE (OUTPATIENT)
Dept: MEDSURG UNIT | Facility: HOSPITAL | Age: 73
End: 2023-07-17
Payer: MEDICARE

## 2023-07-17 NOTE — TELEPHONE ENCOUNTER
The number for Mr. Contreras is no longer in service.  Spoke with Ms. Shelby Contreras and she states I saw him last week and he's not doing good.  He's full of fluid and needs to go to the doctor but he won't go, I'll talk to Hilda, Mr. Contreras's niece, when she get off today and see if she can convince him to go see about himself.

## 2023-07-27 ENCOUNTER — HOSPITAL ENCOUNTER (INPATIENT)
Facility: HOSPITAL | Age: 73
LOS: 21 days | Discharge: HOME HEALTH | DRG: 871 | End: 2023-08-18
Attending: EMERGENCY MEDICINE | Admitting: INTERNAL MEDICINE
Payer: MEDICARE

## 2023-07-27 DIAGNOSIS — E86.0 DEHYDRATION WITH HYPONATREMIA: ICD-10-CM

## 2023-07-27 DIAGNOSIS — S81.809D MULTIPLE OPEN WOUNDS OF LOWER EXTREMITY, COMPLICATED, UNSPECIFIED LATERALITY, SUBSEQUENT ENCOUNTER: ICD-10-CM

## 2023-07-27 DIAGNOSIS — I21.A1 TYPE 2 MI (MYOCARDIAL INFARCTION): ICD-10-CM

## 2023-07-27 DIAGNOSIS — A41.9 SEPSIS, DUE TO UNSPECIFIED ORGANISM, UNSPECIFIED WHETHER ACUTE ORGAN DYSFUNCTION PRESENT: ICD-10-CM

## 2023-07-27 DIAGNOSIS — R06.82 TACHYPNEA: ICD-10-CM

## 2023-07-27 DIAGNOSIS — E87.20 LACTIC ACIDOSIS: ICD-10-CM

## 2023-07-27 DIAGNOSIS — N17.9 AKI (ACUTE KIDNEY INJURY): Primary | ICD-10-CM

## 2023-07-27 DIAGNOSIS — E43 SEVERE PROTEIN-CALORIE MALNUTRITION: ICD-10-CM

## 2023-07-27 DIAGNOSIS — I10 ESSENTIAL HYPERTENSION: ICD-10-CM

## 2023-07-27 DIAGNOSIS — I50.20 HFREF (HEART FAILURE WITH REDUCED EJECTION FRACTION): ICD-10-CM

## 2023-07-27 DIAGNOSIS — E86.0 DEHYDRATION: ICD-10-CM

## 2023-07-27 DIAGNOSIS — L03.119 CELLULITIS OF LOWER EXTREMITY, UNSPECIFIED LATERALITY: ICD-10-CM

## 2023-07-27 DIAGNOSIS — R78.81 GRAM-NEGATIVE BACTEREMIA: ICD-10-CM

## 2023-07-27 DIAGNOSIS — L03.90 CELLULITIS, UNSPECIFIED CELLULITIS SITE: ICD-10-CM

## 2023-07-27 DIAGNOSIS — E87.1 DEHYDRATION WITH HYPONATREMIA: ICD-10-CM

## 2023-07-27 DIAGNOSIS — R65.20 SEVERE SEPSIS: ICD-10-CM

## 2023-07-27 DIAGNOSIS — A41.9 SEVERE SEPSIS: ICD-10-CM

## 2023-07-27 LAB — TROPONIN I SERPL DL<=0.01 NG/ML-MCNC: 153.9 PG/ML

## 2023-07-27 PROCEDURE — 63600175 PHARM REV CODE 636 W HCPCS

## 2023-07-27 PROCEDURE — 87077 CULTURE AEROBIC IDENTIFY: CPT

## 2023-07-27 PROCEDURE — 99285 PR EMERGENCY DEPT VISIT,LEVEL V: ICD-10-PCS | Mod: ,,, | Performed by: EMERGENCY MEDICINE

## 2023-07-27 PROCEDURE — 83880 ASSAY OF NATRIURETIC PEPTIDE: CPT

## 2023-07-27 PROCEDURE — 99285 EMERGENCY DEPT VISIT HI MDM: CPT | Mod: ,,, | Performed by: EMERGENCY MEDICINE

## 2023-07-27 PROCEDURE — 99285 EMERGENCY DEPT VISIT HI MDM: CPT | Mod: 25

## 2023-07-27 PROCEDURE — 80053 COMPREHEN METABOLIC PANEL: CPT

## 2023-07-27 PROCEDURE — 85025 COMPLETE CBC W/AUTO DIFF WBC: CPT

## 2023-07-27 PROCEDURE — 96375 TX/PRO/DX INJ NEW DRUG ADDON: CPT

## 2023-07-27 PROCEDURE — 84484 ASSAY OF TROPONIN QUANT: CPT

## 2023-07-27 RX ORDER — ONDANSETRON 2 MG/ML
4 INJECTION INTRAMUSCULAR; INTRAVENOUS
Status: COMPLETED | OUTPATIENT
Start: 2023-07-27 | End: 2023-07-27

## 2023-07-27 RX ORDER — MORPHINE SULFATE 2 MG/ML
2 INJECTION, SOLUTION INTRAMUSCULAR; INTRAVENOUS
Status: COMPLETED | OUTPATIENT
Start: 2023-07-27 | End: 2023-07-27

## 2023-07-27 RX ADMIN — ONDANSETRON HYDROCHLORIDE 4 MG: 2 SOLUTION INTRAMUSCULAR; INTRAVENOUS at 11:07

## 2023-07-27 RX ADMIN — MORPHINE SULFATE 2 MG: 2 INJECTION, SOLUTION INTRAMUSCULAR; INTRAVENOUS at 11:07

## 2023-07-28 PROBLEM — E43 SEVERE PROTEIN-CALORIE MALNUTRITION: Status: ACTIVE | Noted: 2023-07-28

## 2023-07-28 PROBLEM — I21.A1 TYPE 2 MI (MYOCARDIAL INFARCTION): Status: ACTIVE | Noted: 2023-07-28

## 2023-07-28 PROBLEM — I48.91 A-FIB: Status: ACTIVE | Noted: 2023-07-28

## 2023-07-28 PROBLEM — E78.5 HYPERLIPEMIA: Status: ACTIVE | Noted: 2023-07-28

## 2023-07-28 PROBLEM — E87.1 DEHYDRATION WITH HYPONATREMIA: Status: ACTIVE | Noted: 2023-07-28

## 2023-07-28 PROBLEM — E87.29 HIGH ANION GAP METABOLIC ACIDOSIS: Status: RESOLVED | Noted: 2023-06-02 | Resolved: 2023-07-28

## 2023-07-28 PROBLEM — E86.0 DEHYDRATION WITH HYPONATREMIA: Status: ACTIVE | Noted: 2023-07-28

## 2023-07-28 LAB
ALBUMIN SERPL BCP-MCNC: 2 G/DL (ref 3.5–5)
ALBUMIN/GLOB SERPL: 0.3 {RATIO}
ALP SERPL-CCNC: 168 U/L (ref 45–115)
ALT SERPL W P-5'-P-CCNC: 12 U/L (ref 16–61)
ANION GAP SERPL CALCULATED.3IONS-SCNC: 19 MMOL/L (ref 7–16)
ANION GAP SERPL CALCULATED.3IONS-SCNC: 20 MMOL/L (ref 7–16)
ANISOCYTOSIS BLD QL SMEAR: ABNORMAL
ANISOCYTOSIS BLD QL SMEAR: ABNORMAL
APTT PPP: 41.4 SECONDS (ref 25.2–37.3)
AST SERPL W P-5'-P-CCNC: 14 U/L (ref 15–37)
BASOPHILS # BLD AUTO: 0.06 K/UL (ref 0–0.2)
BASOPHILS # BLD AUTO: 0.14 K/UL (ref 0–0.2)
BASOPHILS NFR BLD AUTO: 0.2 % (ref 0–1)
BASOPHILS NFR BLD AUTO: 0.3 % (ref 0–1)
BILIRUB SERPL-MCNC: 1 MG/DL (ref ?–1.2)
BILIRUB UR QL STRIP: NEGATIVE
BUN SERPL-MCNC: 61 MG/DL (ref 7–18)
BUN SERPL-MCNC: 67 MG/DL (ref 7–18)
BUN/CREAT SERPL: 27 (ref 6–20)
BUN/CREAT SERPL: 32 (ref 6–20)
CALCIUM SERPL-MCNC: 8.3 MG/DL (ref 8.5–10.1)
CALCIUM SERPL-MCNC: 9.4 MG/DL (ref 8.5–10.1)
CHLORIDE SERPL-SCNC: 100 MMOL/L (ref 98–107)
CHLORIDE SERPL-SCNC: 97 MMOL/L (ref 98–107)
CK SERPL-CCNC: 74 U/L (ref 39–308)
CLARITY UR: CLEAR
CO2 SERPL-SCNC: 14 MMOL/L (ref 21–32)
CO2 SERPL-SCNC: 17 MMOL/L (ref 21–32)
COLOR UR: YELLOW
CREAT SERPL-MCNC: 1.89 MG/DL (ref 0.7–1.3)
CREAT SERPL-MCNC: 2.44 MG/DL (ref 0.7–1.3)
CRP SERPL-MCNC: 18 MG/DL (ref 0–0.8)
DIFFERENTIAL METHOD BLD: ABNORMAL
DIFFERENTIAL METHOD BLD: ABNORMAL
EGFR (NO RACE VARIABLE) (RUSH/TITUS): 27 ML/MIN/1.73M2
EGFR (NO RACE VARIABLE) (RUSH/TITUS): 37 ML/MIN/1.73M2
EOSINOPHIL # BLD AUTO: 0 K/UL (ref 0–0.5)
EOSINOPHIL # BLD AUTO: 0.01 K/UL (ref 0–0.5)
EOSINOPHIL NFR BLD AUTO: 0 % (ref 1–4)
EOSINOPHIL NFR BLD AUTO: 0 % (ref 1–4)
ERYTHROCYTE [DISTWIDTH] IN BLOOD BY AUTOMATED COUNT: 15.8 % (ref 11.5–14.5)
ERYTHROCYTE [DISTWIDTH] IN BLOOD BY AUTOMATED COUNT: 16.3 % (ref 11.5–14.5)
EST. AVERAGE GLUCOSE BLD GHB EST-MCNC: 74 MG/DL
GLOBULIN SER-MCNC: 6.5 G/DL (ref 2–4)
GLUCOSE SERPL-MCNC: 131 MG/DL (ref 74–106)
GLUCOSE SERPL-MCNC: 94 MG/DL (ref 74–106)
GLUCOSE UR STRIP-MCNC: NORMAL MG/DL
HBA1C MFR BLD HPLC: 4.8 % (ref 4.5–6.6)
HCT VFR BLD AUTO: 41.9 % (ref 40–54)
HCT VFR BLD AUTO: 42 % (ref 40–54)
HGB BLD-MCNC: 12.8 G/DL (ref 13.5–18)
HGB BLD-MCNC: 13.5 G/DL (ref 13.5–18)
IMM GRANULOCYTES # BLD AUTO: 0.5 K/UL (ref 0–0.04)
IMM GRANULOCYTES # BLD AUTO: 0.72 K/UL (ref 0–0.04)
IMM GRANULOCYTES NFR BLD: 1.8 % (ref 0–0.4)
IMM GRANULOCYTES NFR BLD: 1.8 % (ref 0–0.4)
INR BLD: 1.42
KETONES UR STRIP-SCNC: NEGATIVE MG/DL
LACTATE SERPL-SCNC: 3 MMOL/L (ref 0.4–2)
LACTATE SERPL-SCNC: 4.4 MMOL/L (ref 0.4–2)
LEUKOCYTE ESTERASE UR QL STRIP: NEGATIVE
LYMPHOCYTES # BLD AUTO: 0.55 K/UL (ref 1–4.8)
LYMPHOCYTES # BLD AUTO: 0.82 K/UL (ref 1–4.8)
LYMPHOCYTES NFR BLD AUTO: 2 % (ref 27–41)
LYMPHOCYTES NFR BLD AUTO: 2 % (ref 27–41)
LYMPHOCYTES NFR BLD MANUAL: 1 % (ref 27–41)
LYMPHOCYTES NFR BLD MANUAL: 2 % (ref 27–41)
MAGNESIUM SERPL-MCNC: 2.6 MG/DL (ref 1.7–2.3)
MCH RBC QN AUTO: 28.8 PG (ref 27–31)
MCH RBC QN AUTO: 29.1 PG (ref 27–31)
MCHC RBC AUTO-ENTMCNC: 30.5 G/DL (ref 32–36)
MCHC RBC AUTO-ENTMCNC: 32.2 G/DL (ref 32–36)
MCV RBC AUTO: 89.5 FL (ref 80–96)
MCV RBC AUTO: 95.5 FL (ref 80–96)
METAMYELOCYTES NFR BLD MANUAL: 1 %
MONOCYTES # BLD AUTO: 0.34 K/UL (ref 0–0.8)
MONOCYTES # BLD AUTO: 0.76 K/UL (ref 0–0.8)
MONOCYTES NFR BLD AUTO: 1.2 % (ref 2–6)
MONOCYTES NFR BLD AUTO: 1.9 % (ref 2–6)
MONOCYTES NFR BLD MANUAL: 1 % (ref 2–6)
MONOCYTES NFR BLD MANUAL: 1 % (ref 2–6)
MPC BLD CALC-MCNC: 8.8 FL (ref 9.4–12.4)
MPC BLD CALC-MCNC: 9.6 FL (ref 9.4–12.4)
MYELOCYTES NFR BLD MANUAL: 1 %
NEUTROPHILS # BLD AUTO: 26.12 K/UL (ref 1.8–7.7)
NEUTROPHILS # BLD AUTO: 37.72 K/UL (ref 1.8–7.7)
NEUTROPHILS NFR BLD AUTO: 94 % (ref 53–65)
NEUTROPHILS NFR BLD AUTO: 94.8 % (ref 53–65)
NEUTS BAND NFR BLD MANUAL: 7 % (ref 1–5)
NEUTS BAND NFR BLD MANUAL: 9 % (ref 1–5)
NEUTS SEG NFR BLD MANUAL: 88 % (ref 50–62)
NEUTS SEG NFR BLD MANUAL: 89 % (ref 50–62)
NITRITE UR QL STRIP: NEGATIVE
NRBC # BLD AUTO: 0 X10E3/UL
NRBC # BLD AUTO: 0 X10E3/UL
NRBC, AUTO (.00): 0 %
NRBC, AUTO (.00): 0 %
NT-PROBNP SERPL-MCNC: 4460 PG/ML (ref 1–450)
OVALOCYTES BLD QL SMEAR: ABNORMAL
PH UR STRIP: 5.5 PH UNITS
PLATELET # BLD AUTO: 401 K/UL (ref 150–400)
PLATELET # BLD AUTO: 472 K/UL (ref 150–400)
PLATELET MORPHOLOGY: ABNORMAL
PLATELET MORPHOLOGY: ABNORMAL
POLYCHROMASIA BLD QL SMEAR: ABNORMAL
POTASSIUM SERPL-SCNC: 4.1 MMOL/L (ref 3.5–5.1)
POTASSIUM SERPL-SCNC: 4.7 MMOL/L (ref 3.5–5.1)
PROT SERPL-MCNC: 8.5 G/DL (ref 6.4–8.2)
PROT UR QL STRIP: 30
PROTHROMBIN TIME: 17.2 SECONDS (ref 11.7–14.7)
RBC # BLD AUTO: 4.4 M/UL (ref 4.6–6.2)
RBC # BLD AUTO: 4.68 M/UL (ref 4.6–6.2)
RBC # UR STRIP: NEGATIVE /UL
SODIUM SERPL-SCNC: 129 MMOL/L (ref 136–145)
SODIUM SERPL-SCNC: 129 MMOL/L (ref 136–145)
SP GR UR STRIP: 1.03
SQUAMOUS #/AREA URNS LPF: ABNORMAL /LPF
TROPONIN I SERPL DL<=0.01 NG/ML-MCNC: 222.2 PG/ML
UROBILINOGEN UR STRIP-ACNC: NORMAL MG/DL
WBC # BLD AUTO: 27.57 K/UL (ref 4.5–11)
WBC # BLD AUTO: 40.17 K/UL (ref 4.5–11)
WBC #/AREA URNS HPF: ABNORMAL /HPF

## 2023-07-28 PROCEDURE — 87070 CULTURE OTHR SPECIMN AEROBIC: CPT

## 2023-07-28 PROCEDURE — 82550 ASSAY OF CK (CPK): CPT | Performed by: INTERNAL MEDICINE

## 2023-07-28 PROCEDURE — 99223 PR INITIAL HOSPITAL CARE,LEVL III: ICD-10-PCS | Mod: ,,, | Performed by: HOSPITALIST

## 2023-07-28 PROCEDURE — 25000003 PHARM REV CODE 250

## 2023-07-28 PROCEDURE — 80048 BASIC METABOLIC PNL TOTAL CA: CPT | Performed by: HOSPITALIST

## 2023-07-28 PROCEDURE — 63600175 PHARM REV CODE 636 W HCPCS

## 2023-07-28 PROCEDURE — 96365 THER/PROPH/DIAG IV INF INIT: CPT

## 2023-07-28 PROCEDURE — 85610 PROTHROMBIN TIME: CPT | Performed by: HOSPITALIST

## 2023-07-28 PROCEDURE — 86140 C-REACTIVE PROTEIN: CPT | Performed by: INTERNAL MEDICINE

## 2023-07-28 PROCEDURE — 84484 ASSAY OF TROPONIN QUANT: CPT | Performed by: HOSPITALIST

## 2023-07-28 PROCEDURE — 97165 OT EVAL LOW COMPLEX 30 MIN: CPT

## 2023-07-28 PROCEDURE — 97161 PT EVAL LOW COMPLEX 20 MIN: CPT

## 2023-07-28 PROCEDURE — 81001 URINALYSIS AUTO W/SCOPE: CPT | Performed by: INTERNAL MEDICINE

## 2023-07-28 PROCEDURE — 36410 VNPNXR 3YR/> PHY/QHP DX/THER: CPT

## 2023-07-28 PROCEDURE — 85730 THROMBOPLASTIN TIME PARTIAL: CPT | Performed by: HOSPITALIST

## 2023-07-28 PROCEDURE — 85025 COMPLETE CBC W/AUTO DIFF WBC: CPT | Performed by: HOSPITALIST

## 2023-07-28 PROCEDURE — 25000003 PHARM REV CODE 250: Performed by: INTERNAL MEDICINE

## 2023-07-28 PROCEDURE — 93005 ELECTROCARDIOGRAM TRACING: CPT

## 2023-07-28 PROCEDURE — 11000001 HC ACUTE MED/SURG PRIVATE ROOM

## 2023-07-28 PROCEDURE — 83735 ASSAY OF MAGNESIUM: CPT | Performed by: HOSPITALIST

## 2023-07-28 PROCEDURE — 93010 EKG 12-LEAD: ICD-10-PCS | Mod: ,,, | Performed by: INTERNAL MEDICINE

## 2023-07-28 PROCEDURE — 99223 1ST HOSP IP/OBS HIGH 75: CPT | Mod: ,,, | Performed by: HOSPITALIST

## 2023-07-28 PROCEDURE — 83605 ASSAY OF LACTIC ACID: CPT

## 2023-07-28 PROCEDURE — 93010 ELECTROCARDIOGRAM REPORT: CPT | Mod: ,,, | Performed by: INTERNAL MEDICINE

## 2023-07-28 PROCEDURE — 83036 HEMOGLOBIN GLYCOSYLATED A1C: CPT | Performed by: HOSPITALIST

## 2023-07-28 PROCEDURE — 25000003 PHARM REV CODE 250: Performed by: HOSPITALIST

## 2023-07-28 RX ORDER — SODIUM CHLORIDE 9 MG/ML
INJECTION, SOLUTION INTRAVENOUS CONTINUOUS
Status: DISCONTINUED | OUTPATIENT
Start: 2023-07-28 | End: 2023-07-30

## 2023-07-28 RX ORDER — MIDODRINE HYDROCHLORIDE 5 MG/1
5 TABLET ORAL 2 TIMES DAILY WITH MEALS
Status: DISCONTINUED | OUTPATIENT
Start: 2023-07-28 | End: 2023-07-29

## 2023-07-28 RX ORDER — MORPHINE SULFATE 2 MG/ML
2 INJECTION, SOLUTION INTRAMUSCULAR; INTRAVENOUS
Status: COMPLETED | OUTPATIENT
Start: 2023-07-28 | End: 2023-07-28

## 2023-07-28 RX ORDER — SODIUM CHLORIDE 9 MG/ML
1000 INJECTION, SOLUTION INTRAVENOUS
Status: COMPLETED | OUTPATIENT
Start: 2023-07-28 | End: 2023-07-28

## 2023-07-28 RX ORDER — SODIUM CHLORIDE 9 MG/ML
INJECTION, SOLUTION INTRAVENOUS
Status: DISPENSED
Start: 2023-07-28 | End: 2023-07-28

## 2023-07-28 RX ORDER — METOPROLOL SUCCINATE 25 MG/1
25 TABLET, EXTENDED RELEASE ORAL DAILY
Status: DISCONTINUED | OUTPATIENT
Start: 2023-07-28 | End: 2023-08-07

## 2023-07-28 RX ORDER — IPRATROPIUM BROMIDE AND ALBUTEROL SULFATE 2.5; .5 MG/3ML; MG/3ML
3 SOLUTION RESPIRATORY (INHALATION) EVERY 6 HOURS PRN
Status: DISCONTINUED | OUTPATIENT
Start: 2023-07-28 | End: 2023-08-18 | Stop reason: HOSPADM

## 2023-07-28 RX ORDER — SIMETHICONE 80 MG
1 TABLET,CHEWABLE ORAL 3 TIMES DAILY PRN
Status: DISCONTINUED | OUTPATIENT
Start: 2023-07-28 | End: 2023-08-18 | Stop reason: HOSPADM

## 2023-07-28 RX ORDER — TRAZODONE HYDROCHLORIDE 50 MG/1
50 TABLET ORAL NIGHTLY PRN
Status: DISCONTINUED | OUTPATIENT
Start: 2023-07-28 | End: 2023-08-18 | Stop reason: HOSPADM

## 2023-07-28 RX ORDER — GUAIFENESIN/DEXTROMETHORPHAN 100-10MG/5
10 SYRUP ORAL EVERY 6 HOURS PRN
Status: DISCONTINUED | OUTPATIENT
Start: 2023-07-28 | End: 2023-08-18 | Stop reason: HOSPADM

## 2023-07-28 RX ORDER — ONDANSETRON 2 MG/ML
8 INJECTION INTRAMUSCULAR; INTRAVENOUS EVERY 6 HOURS PRN
Status: DISCONTINUED | OUTPATIENT
Start: 2023-07-28 | End: 2023-08-18 | Stop reason: HOSPADM

## 2023-07-28 RX ORDER — BISACODYL 5 MG
10 TABLET, DELAYED RELEASE (ENTERIC COATED) ORAL DAILY PRN
Status: DISCONTINUED | OUTPATIENT
Start: 2023-07-28 | End: 2023-08-18 | Stop reason: HOSPADM

## 2023-07-28 RX ORDER — ACETAMINOPHEN 500 MG
1000 TABLET ORAL EVERY 6 HOURS PRN
Status: DISCONTINUED | OUTPATIENT
Start: 2023-07-28 | End: 2023-08-18 | Stop reason: HOSPADM

## 2023-07-28 RX ORDER — ATORVASTATIN CALCIUM 40 MG/1
40 TABLET, FILM COATED ORAL DAILY
Status: DISCONTINUED | OUTPATIENT
Start: 2023-07-28 | End: 2023-08-18 | Stop reason: HOSPADM

## 2023-07-28 RX ORDER — MIRTAZAPINE 7.5 MG/1
15 TABLET, FILM COATED ORAL NIGHTLY
Status: DISCONTINUED | OUTPATIENT
Start: 2023-07-28 | End: 2023-08-18 | Stop reason: HOSPADM

## 2023-07-28 RX ORDER — ASPIRIN 81 MG/1
81 TABLET ORAL DAILY
Status: DISCONTINUED | OUTPATIENT
Start: 2023-07-28 | End: 2023-08-18 | Stop reason: HOSPADM

## 2023-07-28 RX ORDER — OXYCODONE HYDROCHLORIDE 5 MG/1
5 TABLET ORAL EVERY 4 HOURS PRN
Status: DISCONTINUED | OUTPATIENT
Start: 2023-07-28 | End: 2023-08-18 | Stop reason: HOSPADM

## 2023-07-28 RX ORDER — SILVER SULFADIAZINE 10 G/1000G
CREAM TOPICAL DAILY
Status: DISCONTINUED | OUTPATIENT
Start: 2023-07-28 | End: 2023-08-02

## 2023-07-28 RX ORDER — MORPHINE SULFATE 2 MG/ML
2 INJECTION, SOLUTION INTRAMUSCULAR; INTRAVENOUS EVERY 4 HOURS PRN
Status: DISCONTINUED | OUTPATIENT
Start: 2023-07-28 | End: 2023-07-28

## 2023-07-28 RX ORDER — UREA 200 MG/G
CREAM TOPICAL 2 TIMES DAILY
Status: DISCONTINUED | OUTPATIENT
Start: 2023-07-29 | End: 2023-08-18 | Stop reason: HOSPADM

## 2023-07-28 RX ADMIN — SODIUM CHLORIDE: 9 INJECTION, SOLUTION INTRAVENOUS at 05:07

## 2023-07-28 RX ADMIN — APIXABAN 5 MG: 5 TABLET, FILM COATED ORAL at 11:07

## 2023-07-28 RX ADMIN — PIPERACILLIN AND TAZOBACTAM 4.5 G: 4; .5 INJECTION, POWDER, FOR SOLUTION INTRAVENOUS; PARENTERAL at 06:07

## 2023-07-28 RX ADMIN — ACETAMINOPHEN 1000 MG: 500 TABLET ORAL at 10:07

## 2023-07-28 RX ADMIN — VANCOMYCIN HYDROCHLORIDE 1250 MG: 5 INJECTION, POWDER, LYOPHILIZED, FOR SOLUTION INTRAVENOUS at 02:07

## 2023-07-28 RX ADMIN — MIDODRINE HYDROCHLORIDE 5 MG: 5 TABLET ORAL at 08:07

## 2023-07-28 RX ADMIN — MORPHINE SULFATE 2 MG: 2 INJECTION, SOLUTION INTRAMUSCULAR; INTRAVENOUS at 02:07

## 2023-07-28 RX ADMIN — Medication 1 TABLET: at 08:07

## 2023-07-28 RX ADMIN — ASPIRIN 81 MG: 81 TABLET, COATED ORAL at 08:07

## 2023-07-28 RX ADMIN — APIXABAN 5 MG: 5 TABLET, FILM COATED ORAL at 08:07

## 2023-07-28 RX ADMIN — PIPERACILLIN AND TAZOBACTAM 4.5 G: 4; .5 INJECTION, POWDER, LYOPHILIZED, FOR SOLUTION INTRAVENOUS; PARENTERAL at 01:07

## 2023-07-28 RX ADMIN — MIDODRINE HYDROCHLORIDE 5 MG: 5 TABLET ORAL at 05:07

## 2023-07-28 RX ADMIN — MIRTAZAPINE 15 MG: 7.5 TABLET, FILM COATED ORAL at 11:07

## 2023-07-28 RX ADMIN — ATORVASTATIN CALCIUM 40 MG: 40 TABLET, FILM COATED ORAL at 08:07

## 2023-07-28 RX ADMIN — PIPERACILLIN AND TAZOBACTAM 4.5 G: 4; .5 INJECTION, POWDER, FOR SOLUTION INTRAVENOUS; PARENTERAL at 05:07

## 2023-07-28 RX ADMIN — SODIUM CHLORIDE 1000 ML: 9 INJECTION, SOLUTION INTRAVENOUS at 01:07

## 2023-07-28 NOTE — ASSESSMENT & PLAN NOTE
Patient with Persistent (7 days or more) atrial fibrillation which is controlled currently with Beta Blocker. Patient is currently in atrial fibrillation.[id number] Score: 3. HASBLED Score: 2. Anticoagulation indicated. Anticoagulation done with Eliquis.
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
## 2023-07-28 NOTE — ASSESSMENT & PLAN NOTE
Patient Education        Low Back Pain: Exercises  Your Care Instructions  Here are some examples of typical rehabilitation exercises for your condition. Start each exercise slowly. Ease off the exercise if you start to have pain. Your doctor or physical therapist will tell you when you can start these exercises and which ones will work best for you. How to do the exercises  Press-up    1. Lie on your stomach, supporting your body with your forearms. 2. Press your elbows down into the floor to raise your upper back. As you do this, relax your stomach muscles and allow your back to arch without using your back muscles. As your press up, do not let your hips or pelvis come off the floor. 3. Hold for 15 to 30 seconds, then relax. 4. Repeat 2 to 4 times. Alternate arm and leg (bird dog) exercise    Do this exercise slowly. Try to keep your body straight at all times, and do not let one hip drop lower than the other. 1. Start on the floor, on your hands and knees. 2. Tighten your belly muscles. 3. Raise one leg off the floor, and hold it straight out behind you. Be careful not to let your hip drop down, because that will twist your trunk. 4. Hold for about 6 seconds, then lower your leg and switch to the other leg. 5. Repeat 8 to 12 times on each leg. 6. Over time, work up to holding for 10 to 30 seconds each time. 7. If you feel stable and secure with your leg raised, try raising the opposite arm straight out in front of you at the same time. Knee-to-chest exercise    1. Lie on your back with your knees bent and your feet flat on the floor. 2. Bring one knee to your chest, keeping the other foot flat on the floor (or keeping the other leg straight, whichever feels better on your lower back). 3. Keep your lower back pressed to the floor. Hold for at least 15 to 30 seconds. 4. Relax, and lower the knee to the starting position. 5. Repeat with the other leg. Repeat 2 to 4 times with each leg.   6. To get Patient has hyponatremia which is controlled,We will aim to correct the sodium by 4-6mEq in 24 hours. We will monitor sodium Every 8 hours. The hyponatremia is due to Dehydration/hypovolemia. We will obtain the following studies: Urine sodium, urine osmolality, serum osmolality. We will treat the hyponatremia with IV fluids as follows: IV NS @ 110 cc/h. The patient's sodium results have been reviewed and are listed below.  Recent Labs   Lab 07/27/23  2333   *      more stretch, put your other leg flat on the floor while pulling your knee to your chest.  Curl-ups    1. Lie on the floor on your back with your knees bent at a 90-degree angle. Your feet should be flat on the floor, about 12 inches from your buttocks. 2. Cross your arms over your chest. If this bothers your neck, try putting your hands behind your neck (not your head), with your elbows spread apart. 3. Slowly tighten your belly muscles and raise your shoulder blades off the floor. 4. Keep your head in line with your body, and do not press your chin to your chest.  5. Hold this position for 1 or 2 seconds, then slowly lower yourself back down to the floor. 6. Repeat 8 to 12 times. Pelvic tilt exercise    1. Lie on your back with your knees bent. 2. \"Brace\" your stomach. This means to tighten your muscles by pulling in and imagining your belly button moving toward your spine. You should feel like your back is pressing to the floor and your hips and pelvis are rocking back. 3. Hold for about 6 seconds while you breathe smoothly. 4. Repeat 8 to 12 times. Heel dig bridging    1. Lie on your back with both knees bent and your ankles bent so that only your heels are digging into the floor. Your knees should be bent about 90 degrees. 2. Then push your heels into the floor, squeeze your buttocks, and lift your hips off the floor until your shoulders, hips, and knees are all in a straight line. 3. Hold for about 6 seconds as you continue to breathe normally, and then slowly lower your hips back down to the floor and rest for up to 10 seconds. 4. Do 8 to 12 repetitions. Hamstring stretch in doorway    1. Lie on your back in a doorway, with one leg through the open door. 2. Slide your leg up the wall to straighten your knee. You should feel a gentle stretch down the back of your leg. 3. Hold the stretch for at least 15 to 30 seconds. Do not arch your back, point your toes, or bend either knee.  Keep one heel

## 2023-07-28 NOTE — ASSESSMENT & PLAN NOTE
- We will continue IV hydration and recheck lactic acid after volume resuscitation  - May consider isotonic bicarbonate solution if indicated

## 2023-07-28 NOTE — ASSESSMENT & PLAN NOTE
Known chronic wounds on BLE for a few months.   Lost follow up with home health wound care reporting insurance did not approve it.   Photographs appear better compared to ones in 6/2023.  Started on broad spectrum antibiotics- vancomycin and Zosyn.   Follow wound and blood cultures  ESR and CRP pending.   Wound care consulted, thanks for the assistance.  May require further imaging with CT and general surgery assessment for possible debridement if no response to antibiotics in 48 hours.

## 2023-07-28 NOTE — ASSESSMENT & PLAN NOTE
- Pending wound and blood cultures  - Pending ESR and CRP  - Continue broad spectrum antibiotics through Vancomycin and zosyn  - Wound care consulted, thanks for the assistance

## 2023-07-28 NOTE — PROGRESS NOTES
07/28/23 1120   Wound Care Follow Up   Wound Care Follow-up? Yes   Wound Care- Next Visit Date 08/01/23   Follow Up Plan Yajaira POC

## 2023-07-28 NOTE — ASSESSMENT & PLAN NOTE
Suspect related to acute infection.  Na normal at baseline, admission Na 129.  Continue gentle IV hydration and trend Na.

## 2023-07-28 NOTE — ASSESSMENT & PLAN NOTE
Last recorded Echo in 2019 showed an EF of 40%  Not in acute exacerbation.  Resume home Toprol, not on ACEI or ARB at home- unclear reason but suspect may be due to borderline low BP and taking midodrine at baseline.

## 2023-07-28 NOTE — SUBJECTIVE & OBJECTIVE
Interval History: Patient seen and examined at the bedside, reports no change in status.     Review of Systems   Constitutional:  Positive for activity change and appetite change.   Skin:  Positive for wound.   Objective:     Vital Signs (Most Recent):  Temp: 98.4 °F (36.9 °C) (07/28/23 0821)  Pulse: 90 (07/28/23 0821)  Resp: 16 (07/28/23 0821)  BP: (!) 96/56 (07/28/23 0821)  SpO2: 100 % (07/28/23 0821) Vital Signs (24h Range):  Temp:  [97 °F (36.1 °C)-98.7 °F (37.1 °C)] 98.4 °F (36.9 °C)  Pulse:  [] 90  Resp:  [16-22] 16  SpO2:  [95 %-100 %] 100 %  BP: ()/(52-76) 96/56     Weight: 73.6 kg (162 lb 5.4 oz)  Body mass index is 22.02 kg/m².  No intake or output data in the 24 hours ending 07/28/23 1014      Physical Exam  Vitals and nursing note reviewed. Exam conducted with a chaperone present.   Constitutional:       Appearance: He is ill-appearing.   HENT:      Head: Normocephalic and atraumatic.      Nose: Nose normal.      Mouth/Throat:      Mouth: Mucous membranes are moist.   Eyes:      Extraocular Movements: Extraocular movements intact.   Cardiovascular:      Rate and Rhythm: Normal rate and regular rhythm.      Pulses: Normal pulses.      Heart sounds: Normal heart sounds.   Pulmonary:      Effort: Pulmonary effort is normal.      Breath sounds: Normal breath sounds.   Abdominal:      General: Bowel sounds are normal.      Palpations: Abdomen is soft.   Musculoskeletal:         General: Tenderness present.      Cervical back: Normal range of motion.      Right lower leg: Edema present.      Left lower leg: Edema present.   Skin:     General: Skin is warm.      Findings: Erythema and lesion present.   Neurological:      General: No focal deficit present.      Mental Status: He is alert and oriented to person, place, and time. Mental status is at baseline.   Psychiatric:         Mood and Affect: Mood normal.         Behavior: Behavior normal.           Significant Labs: All pertinent labs within the  past 24 hours have been reviewed.    Significant Imaging: I have reviewed all pertinent imaging results/findings within the past 24 hours.

## 2023-07-28 NOTE — PLAN OF CARE
Problem: Occupational Therapy  Goal: Occupational Therapy Goal  Description: STG:  Pt will perform grooming with setup  Pt will bathe with Anna with setup at EOB  Pt will perform UE dressing with Harshil  Pt will perform LE dressing with Anna with AD if needed  Pt will sit EOB x 10 min with SBA   Pt will transfer bed/chair/bsc with CGA with RW  Pt will perform standing task x 2 min with CGA with RW   Pt will tolerate 15 minutes of tx without fatigue      LT.Restore to max I with self care and mobility.      Outcome: Ongoing, Progressing

## 2023-07-28 NOTE — ASSESSMENT & PLAN NOTE
>>ASSESSMENT AND PLAN FOR CELLULITIS OF LOWER EXTREMITY WRITTEN ON 7/28/2023  4:48 AM BY RICHI BERMAN MD    - Pending wound and blood cultures  - Pending ESR and CRP  - Continue broad spectrum antibiotics through Vancomycin and zosyn  - Wound care consulted, thanks for the assistance

## 2023-07-28 NOTE — ASSESSMENT & PLAN NOTE
>>ASSESSMENT AND PLAN FOR SEPSIS WRITTEN ON 7/28/2023  4:46 AM BY RICHI BERMAN MD    This patient does have evidence of infective focus  My overall impression is sepsis.  Source: Skin and Soft Tissue (location bilateral lower extremity cellulitis)  Antibiotics given-   Antibiotics (72h ago, onward)    None        Latest lactate reviewed-  Recent Labs   Lab 07/28/23  0133   LACTATE 3.0*     Organ dysfunction indicated by Acute kidney injury    Fluid challenge Ideal Body Weight- The patient's ideal body weight is Ideal body weight: 77.6 kg (171 lb 1.2 oz) which will be used to calculate fluid bolus of 30 ml/kg for treatment of septic shock.      Post- resuscitation assessment Yes Perfusion exam was performed within 6 hours of septic shock presentation after bolus shows Adequate tissue perfusion assessed by non-invasive monitoring       Will Not start Pressors- Levophed for MAP of 65  Source control achieved by: Antibiotics
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
## 2023-07-28 NOTE — ASSESSMENT & PLAN NOTE
On Toprol but also on midodrine.  Suspect Toprol is for HFrEF and midodrine is to avoid hypotension.

## 2023-07-28 NOTE — ASSESSMENT & PLAN NOTE
Patient's FSGs are controlled on current medication regimen.  Last A1c reviewed-   Lab Results   Component Value Date    HGBA1C 5.5 06/02/2023     Most recent fingerstick glucose reviewed- No results for input(s): POCTGLUCOSE in the last 24 hours.  Current correctional scale  Medium  Maintain anti-hyperglycemic dose as follows-   Antihyperglycemics (From admission, onward)    None        Hold Oral hypoglycemics while patient is in the hospital.

## 2023-07-28 NOTE — ASSESSMENT & PLAN NOTE
Patient has persistent depression which is moderate and is currently controlled.   Will Continue anti-depressant medication- Remeron.   We will not consult psychiatry at this time.   Patient does not display psychosis at this time.   Continue to monitor closely and adjust plan of care as needed.

## 2023-07-28 NOTE — PLAN OF CARE
"  Problem: Physical Therapy  Goal: Physical Therapy Goal  Description: Short Term Goals to be met by: 2023    Patient will increase functional independence with mobility by performin. Supine to sit  independently  2. Sit to stand transfer with independently using Rolling walker  3. Bed to chair transfer independently using Rolling walker  4. Gait  x 50 feet with stand by assist using Rolling walker  5. Lower extremity exercise program x30 reps per handout, with assistance as needed    Long Term Goals to be met by: 2023    Pt will regain full independent functional mobility with Rolling walker and and wheelchair to return to home situation and prior activities of daily living.   Outcome: Ongoing, Progressing       Patient required less than expected assistance for transfers but was not able to take steps due to pain in B LE's from wounds. Patient reports sleeping in his wheelchair with LE's dependent x 6-8 months as it was "too hard" to get his legs in the bed. Niece has been coming am/pm to assist patient but patient appears to be failing in this environment. Patient currently open to swing bed but not permanent placement in NH. Patient with poor insight into deficits and long term consequences of his decisions. Patient reports mold issues in his home. In the long term, if patient able to regain independent transfers and move to a handicap accessible apartment with hospital bed, he may be able to remain in the community. PT advised pt to discuss his options with his niece and  at swing bed when ready for d/c.   "

## 2023-07-28 NOTE — HOSPITAL COURSE
7/28: Worsening leukocytosis noted. Blood cultures 2/2 sets growing GNB, wound culture with GNB.     7/30: Wound cultures with morganella, providencia and pseudomonas. General surgery consulted.     7/31: Blood cultures returned with acinetobacter, antibiotic changed to cefepime per consultation with pharmacy. Awaiting general surgery's input regarding surgery    8/15- not participating with therapy, unfortunately we have no options at this time except to discharge home. He will do poorly at home.     8/16-refusing any rehab facility and becomes belligerent when attempts are made to find out why.  I emphasized to him how poorly he will do at home.  He refuses to go anywhere else.  I will order home health for wound care.  Schedule follow up in Wound care clinic. He will do poorly at home but I have no other options at this time.  He is medically stable for discharge

## 2023-07-28 NOTE — HPI
Patient is a 71 yo male who presents to the hospital accompanied by his family with a complaint of wounds to his bilateral lower extremities. Patient is a poor historian, and history was provided by his daughter at the emergency department. Patient has a history of chronic wounds and was admitted to the hospital on 6/02/2023 of severe sepsis secondary to cellulitis of lower extremities and new onset of atrial fibrillation. He was subsequently discharged to [facility] for wound care.  His daughter reported that wounds were healing, and the patient was discharged home under the care of [facility] for continued care. However, the patient did not get any service from the  [facility], which culminated in the worsening of his wounds over the past few days. This is associated with pain and edema, but the patient denies any associated fever, chills, chest pain, shortness of breath, nausea and vomiting.     In the ED, the patient met sepsis criteria on arrival and was thus treated per sepsis protocol through IV hydration, blood culture collection before starting broad spectrum antibiotics. Ensuring work up was significant for WBC 27.6, H&H 13.5/41.9 with history of anemia and baseline H&H 10.2/33.6 on month ago, dehydration with hyponatremia with a sodium of 129, acute renal injury with a BUN/CR 67/2.44 and GFR 27 with a baseline BUN/CR 16/1.25 and GFR 92 one month ago. Anion Gap metabolic acidosis with a GAP of 19.  Initial Troponin of 153.9 with history of chronically elevated Troponin, and NTpBNP of 4,406 with a baseline of 20.7K about three weeks ago. Lactic acid of 3.0. Patient will be admitted for further evaluation and management.

## 2023-07-28 NOTE — PLAN OF CARE
[facility] - Short Stay Unit  Initial Discharge Assessment       Primary Care Provider: CHRISTIANE Bardales    Admission Diagnosis: Tachypnea [R06.82]  Dehydration [E86.0]  CHANDNI (acute kidney injury) [N17.9]  Multiple open wounds of lower extremity, complicated, unspecified laterality, subsequent encounter [P51.715D]  Cellulitis, unspecified cellulitis site [L03.90]    Admission Date: 7/27/2023  Expected Discharge Date:     Transition of Care Barriers: None    Payor: Card[employer] control MEDICARE / Plan: [employer] HMO PPO SPECIAL NEEDS / Product Type: Medicare Advantage /     Extended Emergency Contact Information  Primary Emergency Contact: Shelby Contreras  Mobile Phone: [phone]  Relation: Relative  Secondary Emergency Contact: Hilda Bagley  Mobile Phone: [phone]  Relation: Relative    Discharge Plan A: Skilled Nursing Facility  Discharge Plan B: Home with family, Home Health      [facility] - [place] - [address]  [address]  [place] [address]  Phone: [phone] Fax: [phone]    [facility] [place] [address]  [address]  [place] [address]  Phone: [phone] Fax: [phone]    [facility] - [place] - [address]  [address] [place] [address] [place] [address]  Phone: [phone] Fax: [phone]      Initial Assessment (most recent)       Adult Discharge Assessment - 07/28/23 1126          Discharge Assessment    Assessment Type Discharge Planning Assessment     Source of Information family     People in Home alone     Do you expect to return to your current living situation? Yes     Do you have help at home or someone to help you manage your care at home? Yes     Who are your caregiver(s) and their phone number(s)? [facility] [phone]     Prior to hospitilization cognitive status: Unable to Assess     Current cognitive status: Unable to Assess     Equipment Currently Used at Home wheelchair;bedside  commode     Readmission within 30 days? Yes     Patient currently being followed by outpatient case management? No     Do you currently have service(s) that help you manage your care at home? Yes     Name and Contact number of agency [facility] [phone]     Is the pt/caregiver preference to resume services with current agency Yes     Do you take prescription medications? Yes     Do you have prescription coverage? Yes     Coverage [employer] Medicare     Do you have any problems affording any of your prescribed medications? No     Is the patient taking medications as prescribed? yes     Who is going to help you get home at discharge? Niece     How do you get to doctors appointments? family or friend will provide     Are you on dialysis? No     Do you take coumadin? No     Discharge Plan A Skilled Nursing Facility     Discharge Plan B Home with family;Home Health     DME Needed Upon Discharge  none     Discharge Plan discussed with: --   Niece    Transition of Care Barriers None        Physical Activity    On average, how many days per week do you engage in moderate to strenuous exercise (like a brisk walk)? 0 days     On average, how many minutes do you engage in exercise at this level? 0 min        Financial Resource Strain    How hard is it for you to pay for the very basics like food, housing, medical care, and heating? Not hard at all        Housing Stability    In the last 12 months, was there a time when you were not able to pay the mortgage or rent on time? No     In the last 12 months, was there a time when you did not have a steady place to sleep or slept in a shelter (including now)? No        Transportation Needs    In the past 12 months, has lack of transportation kept you from medical appointments or from getting medications? No     In the past 12 months, has lack of transportation kept you from meetings, work, or from getting things needed for daily living? No        Food Insecurity     Within the past 12 months, you worried that your food would run out before you got the money to buy more. Never true     Within the past 12 months, the food you bought just didn't last and you didn't have money to get more. Never true        Stress    Do you feel stress - tense, restless, nervous, or anxious, or unable to sleep at night because your mind is troubled all the time - these days? Not at all        Social Connections    In a typical week, how many times do you talk on the phone with family, friends, or neighbors? More than three times a week     How often do you get together with friends or relatives? More than three times a week     How often do you attend Mormonism or Latter day services? Never     Do you belong to any clubs or organizations such as Mormonism groups, unions, fraternal or athletic groups, or school groups? No     How often do you attend meetings of the clubs or organizations you belong to? Never     Are you , , , , never , or living with a partner? Never         Alcohol Use    Q1: How often do you have a drink containing alcohol? Never     Q2: How many drinks containing alcohol do you have on a typical day when you are drinking? Patient does not drink     Q3: How often do you have six or more drinks on one occasion? Never                   LITZY spoke with pts Hilda marti. Pt lives at home alone, current with [facility] and uses a . Pt also has a bsc per figueroa. Per pts figueroa, pt needs skilled care at [facility] or either [facility]. Pts niece wants to view the facilities before making a final decision of which facility she wants pt admitted. LITZY spoke with Jacinta at [facility] and per  nurse, pt really needs placement. I.M. obtained and [facility] questions completed. LITZY will cont to follow for dc needs.
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
## 2023-07-28 NOTE — ASSESSMENT & PLAN NOTE
This patient does have evidence of infective focus  My overall impression is sepsis.  Source: Skin and Soft Tissue (location bilateral lower extremity cellulitis)  Antibiotics given-   Antibiotics (72h ago, onward)    Start     Stop Route Frequency Ordered    07/29/23 1600  vancomycin (VANCOCIN) 1,500 mg in dextrose 5 % (D5W) 250 mL IVPB         -- IV Every 36 hours 07/28/23 0552    07/28/23 0600  piperacillin-tazobactam (ZOSYN) 4.5 g in dextrose 5 % in water (D5W) 100 mL IVPB (MB+)         -- IV Every 8 hours (non-standard times) 07/28/23 0545        Latest lactate reviewed-  Recent Labs   Lab 07/28/23  0133 07/28/23  0929   LACTATE 3.0* 4.4*     Organ dysfunction indicated by Acute kidney injury  Will Not start Pressors- Levophed for MAP of 65  Source control achieved by: Antibiotics as mentioned below.
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
## 2023-07-28 NOTE — ASSESSMENT & PLAN NOTE
Nutrition consulted. Most recent weight and BMI monitored-     Measurements:  Wt Readings from Last 1 Encounters:   07/28/23 73.6 kg (162 lb 5.4 oz)   Body mass index is 22.02 kg/m².    Patient has been screened and assessed by RD.    Malnutrition Type:  Context:    Level:      Malnutrition Characteristic Summary:       Interventions/Recommendations (treatment strategy):  Recommend continue current diet as able/appropriate and tolerated. Also recommend addition of Ensure Max Protein TID and Abel BID to improve kcal/protein intakes and aid in wound healing. Also recommend consider addition of 500mg Vitamin C and 220mg ZnSO4 BID to aid in wound healing.
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
## 2023-07-28 NOTE — ASSESSMENT & PLAN NOTE
>>ASSESSMENT AND PLAN FOR CELLULITIS OF LOWER EXTREMITY WRITTEN ON 7/28/2023 10:29 AM BY TIFFANIE VENTURA MD    Known chronic wounds on BLE for a few months.   Lost follow up with home health wound care reporting insurance did not approve it.   Photographs appear better compared to ones in 6/2023.  Started on broad spectrum antibiotics- vancomycin and Zosyn.   Follow wound and blood cultures  ESR and CRP pending.   Wound care consulted, thanks for the assistance.  May require further imaging with CT and general surgery assessment for possible debridement if no response to antibiotics in 48 hours.
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
## 2023-07-28 NOTE — PROGRESS NOTES
Pharmacy consulted for vancomycin dosing. He was given vancomycin 1250 mg IV x 1 dose in the emergency room 7/28/23 0441. We will begin vancomycin 1500 mg IV every 36 hours 7/29/23 1600 and check a trough 7/31/23 0330. Pharmacy will monitor daily and adjust as necessary.

## 2023-07-28 NOTE — ASSESSMENT & PLAN NOTE
- Last recorded Echo in 2019 showed an EF of 40%  - Continue Gentle IV hydration and monitor closely  - Continue home medications  - Strict I and O   - Daily weights  - Telemonitoring

## 2023-07-28 NOTE — ASSESSMENT & PLAN NOTE
Patient with acute kidney injury/acute renal failure likely due to pre-renal azotemia due to dehydration   CHANDNI is currently improving.   Baseline creatinine 1.25 about one month ago   Labs reviewed- Renal function/electrolytes with Estimated Creatinine Clearance: 35.7 mL/min (A) (based on SCr of 1.89 mg/dL (H)). according to latest data.   Monitor urine output and serial BMP and adjust therapy as needed.  Avoid nephrotoxins and renally dose meds for GFR listed above.

## 2023-07-28 NOTE — ASSESSMENT & PLAN NOTE
>>ASSESSMENT AND PLAN FOR SEPSIS WRITTEN ON 7/28/2023 10:18 AM BY TIFFANIE VENTURA MD    This patient does have evidence of infective focus  My overall impression is sepsis.  Source: Skin and Soft Tissue (location bilateral lower extremity cellulitis)  Antibiotics given-   Antibiotics (72h ago, onward)    Start     Stop Route Frequency Ordered    07/29/23 1600  vancomycin (VANCOCIN) 1,500 mg in dextrose 5 % (D5W) 250 mL IVPB         -- IV Every 36 hours 07/28/23 0552    07/28/23 0600  piperacillin-tazobactam (ZOSYN) 4.5 g in dextrose 5 % in water (D5W) 100 mL IVPB (MB+)         -- IV Every 8 hours (non-standard times) 07/28/23 0545        Latest lactate reviewed-  Recent Labs   Lab 07/28/23  0133 07/28/23  0929   LACTATE 3.0* 4.4*     Organ dysfunction indicated by Acute kidney injury  Will Not start Pressors- Levophed for MAP of 65  Source control achieved by: Antibiotics as mentioned below.

## 2023-07-28 NOTE — PROGRESS NOTES
[facility] - Short Stay [facility] Medicine  Progress Note    Patient Name: Esthela Contreras  MRN: [id number]  Patient Class: IP- Inpatient   Admission Date: 7/27/2023  Length of Stay: 0 days  Attending Physician: Cortez Ford MD  Primary Care Provider: CHRISTIANE Bardales        Subjective:     Principal Problem:Severe sepsis        HPI:  Patient is a 73 yo male who presents to the hospital accompanied by his family with a complaint of wounds to his bilateral lower extremities. Patient is a poor historian, and history was provided by his daughter at the emergency department. Patient has a history of chronic wounds and was admitted to the hospital on 6/02/2023 of severe sepsis secondary to cellulitis of lower extremities and new onset of atrial fibrillation. He was subsequently discharged to [facility] for wound care.  His daughter reported that wounds were healing, and the patient was discharged home under the care of [facility] for continued care. However, the patient did not get any service from the  [facility], which culminated in the worsening of his wounds over the past few days. This is associated with pain and edema, but the patient denies any associated fever, chills, chest pain, shortness of breath, nausea and vomiting.     In the ED, the patient met sepsis criteria on arrival and was thus treated per sepsis protocol through IV hydration, blood culture collection before starting broad spectrum antibiotics. Ensuring work up was significant for WBC 27.6, H&H 13.5/41.9 with history of anemia and baseline H&H 10.2/33.6 on month ago, dehydration with hyponatremia with a sodium of 129, acute renal injury with a BUN/CR 67/2.44 and GFR 27 with a baseline BUN/CR 16/1.25 and GFR 92 one month ago. Anion Gap metabolic acidosis with a GAP of 19.  Initial Troponin of 153.9 with history of chronically elevated Troponin, and NTpBNP of 4,406 with a baseline of 20.7K about three weeks ago. Lactic  acid of 3.0. Patient will be admitted for further evaluation and management.          Overview/Hospital Course:  7/28: Worsening leukocytosis noted.       No new subjective & objective note has been filed under this hospital service since the last note was generated.      Assessment/Plan:      * Severe sepsis  This patient does have evidence of infective focus  My overall impression is sepsis.  Source: Skin and Soft Tissue (location bilateral lower extremity cellulitis)  Antibiotics given-   Antibiotics (72h ago, onward)      Start     Stop Route Frequency Ordered    07/29/23 1600  vancomycin (VANCOCIN) 1,500 mg in dextrose 5 % (D5W) 250 mL IVPB         -- IV Every 36 hours 07/28/23 0552    07/28/23 0600  piperacillin-tazobactam (ZOSYN) 4.5 g in dextrose 5 % in water (D5W) 100 mL IVPB (MB+)         -- IV Every 8 hours (non-standard times) 07/28/23 0545          Latest lactate reviewed-  Recent Labs   Lab 07/28/23  0133 07/28/23  0929   LACTATE 3.0* 4.4*     Organ dysfunction indicated by Acute kidney injury  Will Not start Pressors- Levophed for MAP of 65  Source control achieved by: Antibiotics as mentioned below.     Cellulitis of lower extremity  Known chronic wounds on BLE for a few months.   Lost follow up with home health wound care reporting insurance did not approve it.   Photographs appear better compared to ones in 6/2023.  Started on broad spectrum antibiotics- vancomycin and Zosyn.   Follow wound and blood cultures  ESR and CRP pending.   Wound care consulted, thanks for the assistance.  May require further imaging with CT and general surgery assessment for possible debridement if no response to antibiotics in 48 hours.       CHANDNI (acute kidney injury)  Patient with acute kidney injury/acute renal failure likely due to pre-renal azotemia due to dehydration   CHANDNI is currently improving.   Baseline creatinine 1.25 about one month ago   Labs reviewed- Renal function/electrolytes with Estimated Creatinine  Clearance: 35.7 mL/min (A) (based on SCr of 1.89 mg/dL (H)). according to latest data.   Monitor urine output and serial BMP and adjust therapy as needed.  Avoid nephrotoxins and renally dose meds for GFR listed above.      Lactic acidosis  Sec to severe sepsis.  Leading to high anion gap metabolic acidosis, bicarb 14 today.   Continue gentle IV hydration.   Follow lactate.           Dehydration with hyponatremia  Suspect related to acute infection.  Na normal at baseline, admission Na 129.  Continue gentle IV hydration and trend Na.     Type 2 MI (myocardial infarction)  Troponin elevation but trend is not significant.  Denies chest pain.  Checking EKG for completeness.  Monitor for chest pain.           HFrEF (heart failure with reduced ejection fraction)  Last recorded Echo in 2019 showed an EF of 40%  Not in acute exacerbation.  Resume home Toprol, not on ACEI or ARB at home- unclear reason but suspect may be due to borderline low BP and taking midodrine at baseline.       A-fib  Patient with Persistent (7 days or more) atrial fibrillation which is controlled currently with Beta Blocker. Patient is currently in atrial fibrillation.[id number] Score: 3. HASBLED Score: 2. Anticoagulation indicated. Anticoagulation done with Eliquis.    COPD (chronic obstructive pulmonary disease)  DuoNeb q6h PRN  Supplemental oxygenation    Essential hypertension  On Toprol but also on midodrine.  Suspect Toprol is for HFrEF and midodrine is to avoid hypotension.      Hyperlipemia  Continue home statin    Depression  Patient has persistent depression which is moderate and is currently controlled.   Will Continue anti-depressant medication- Remeron.   We will not consult psychiatry at this time.   Patient does not display psychosis at this time.   Continue to monitor closely and adjust plan of care as needed.        Severe protein-calorie malnutrition  Nutrition consulted. Most recent weight and BMI monitored-     Measurements:  Wt  Readings from Last 1 Encounters:   07/28/23 73.6 kg (162 lb 5.4 oz)   Body mass index is 22.02 kg/m².    Patient has been screened and assessed by RD.    Malnutrition Type:  Context:    Level:      Malnutrition Characteristic Summary:       Interventions/Recommendations (treatment strategy):  Recommend continue current diet as able/appropriate and tolerated. Also recommend addition of Ensure Max Protein TID and Abel BID to improve kcal/protein intakes and aid in wound healing. Also recommend consider addition of 500mg Vitamin C and 220mg ZnSO4 BID to aid in wound healing.    Type 2 diabetes mellitus  Patient's FSGs are controlled on current medication regimen.  Last A1c reviewed-   Lab Results   Component Value Date    HGBA1C 5.5 06/02/2023     Most recent fingerstick glucose reviewed- No results for input(s): POCTGLUCOSE in the last 24 hours.  Current correctional scale  Medium  Maintain anti-hyperglycemic dose as follows-   Antihyperglycemics (From admission, onward)      None          Hold Oral hypoglycemics while patient is in the hospital.      VTE Risk Mitigation (From admission, onward)           Ordered     apixaban tablet 5 mg  2 times daily         07/28/23 0443                    Discharge Planning   MAKENNA:      Code Status: Full Code   Is the patient medically ready for discharge?:     Reason for patient still in hospital (select all that apply): Patient unstable, Patient trending condition and Laboratory test                     TIFFANIE VENTURA MD  Department of Hospital Medicine   [facility] - Short Stay Unit

## 2023-07-28 NOTE — ASSESSMENT & PLAN NOTE
Sec to severe sepsis.  Leading to high anion gap metabolic acidosis, bicarb 14 today.   Continue gentle IV hydration.   Follow lactate.

## 2023-07-28 NOTE — ASSESSMENT & PLAN NOTE
This is likely secondary to lactic acidosis, uremia and acute renal injury. We will continue IV hydration and monitor closely. We may consider isotonic bicarbonate solution to correct acidosis if indicated

## 2023-07-28 NOTE — ASSESSMENT & PLAN NOTE
Patient with acute kidney injury/acute renal failure likely due to pre-renal azotemia due to dehydration CHANDNI is currently improving. Baseline creatinine 1.25 about one month ago - Labs reviewed- Renal function/electrolytes with Estimated Creatinine Clearance: 27.7 mL/min (A) (based on SCr of 2.44 mg/dL (H)). according to latest data. Monitor urine output and serial BMP and adjust therapy as needed. Avoid nephrotoxins and renally dose meds for GFR listed above.      - Avoid nephrotoxic drugs  - Renally dose applicable medications  - Strict I and O  - Continue to monitor renal function through daily renal functions

## 2023-07-28 NOTE — ASSESSMENT & PLAN NOTE
Troponin elevation but trend is not significant.  Denies chest pain.  Checking EKG for completeness.  Monitor for chest pain.

## 2023-07-28 NOTE — ASSESSMENT & PLAN NOTE
This patient does have evidence of infective focus  My overall impression is sepsis.  Source: Skin and Soft Tissue (location bilateral lower extremity cellulitis)  Antibiotics given-   Antibiotics (72h ago, onward)    None        Latest lactate reviewed-  Recent Labs   Lab 07/28/23  0133   LACTATE 3.0*     Organ dysfunction indicated by Acute kidney injury    Fluid challenge Ideal Body Weight- The patient's ideal body weight is Ideal body weight: 77.6 kg (171 lb 1.2 oz) which will be used to calculate fluid bolus of 30 ml/kg for treatment of septic shock.      Post- resuscitation assessment Yes Perfusion exam was performed within 6 hours of septic shock presentation after bolus shows Adequate tissue perfusion assessed by non-invasive monitoring       Will Not start Pressors- Levophed for MAP of 65  Source control achieved by: Antibiotics

## 2023-07-28 NOTE — ASSESSMENT & PLAN NOTE
Patient with Persistent (7 days or more) atrial fibrillation which is controlled currently with Beta Blocker. Patient is currently in atrial fibrillation.[id number] Score: 3. HASBLED Score: 2. Anticoagulation indicated. Anticoagulation done with Eliquis.

## 2023-07-28 NOTE — PLAN OF CARE
Problem: Adjustment to Illness (Sepsis/Septic Shock)  Goal: Optimal Coping  Outcome: Ongoing, Progressing  Intervention: Optimize Psychosocial Adjustment to Illness  Flowsheets (Taken 7/28/2023 1807)  Supportive Measures:   active listening utilized   self-responsibility promoted   verbalization of feelings encouraged   relaxation techniques promoted   self-care encouraged  Family/Support System Care:   self-care encouraged   involvement promoted
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
## 2023-07-29 PROBLEM — R78.81 GRAM-NEGATIVE BACTEREMIA: Status: ACTIVE | Noted: 2023-07-29

## 2023-07-29 LAB
ANION GAP SERPL CALCULATED.3IONS-SCNC: 16 MMOL/L (ref 7–16)
ANISOCYTOSIS BLD QL SMEAR: ABNORMAL
BASOPHILS # BLD AUTO: 0.08 K/UL (ref 0–0.2)
BASOPHILS NFR BLD AUTO: 0.3 % (ref 0–1)
BUN SERPL-MCNC: 53 MG/DL (ref 7–18)
BUN/CREAT SERPL: 35 (ref 6–20)
CALCIUM SERPL-MCNC: 7.9 MG/DL (ref 8.5–10.1)
CHLORIDE SERPL-SCNC: 106 MMOL/L (ref 98–107)
CO2 SERPL-SCNC: 17 MMOL/L (ref 21–32)
CREAT SERPL-MCNC: 1.53 MG/DL (ref 0.7–1.3)
CRENATED CELLS: ABNORMAL
DIFFERENTIAL METHOD BLD: ABNORMAL
EGFR (NO RACE VARIABLE) (RUSH/TITUS): 47 ML/MIN/1.73M2
EOSINOPHIL # BLD AUTO: 0.13 K/UL (ref 0–0.5)
EOSINOPHIL NFR BLD AUTO: 0.5 % (ref 1–4)
EOSINOPHIL NFR BLD MANUAL: 1 % (ref 1–4)
ERYTHROCYTE [DISTWIDTH] IN BLOOD BY AUTOMATED COUNT: 16.7 % (ref 11.5–14.5)
GLUCOSE SERPL-MCNC: 63 MG/DL (ref 74–106)
HCT VFR BLD AUTO: 35.8 % (ref 40–54)
HGB BLD-MCNC: 11.2 G/DL (ref 13.5–18)
IMM GRANULOCYTES # BLD AUTO: 0.47 K/UL (ref 0–0.04)
IMM GRANULOCYTES NFR BLD: 1.8 % (ref 0–0.4)
LACTATE SERPL-SCNC: 2.6 MMOL/L (ref 0.4–2)
LACTATE SERPL-SCNC: 4.8 MMOL/L (ref 0.4–2)
LYMPHOCYTES # BLD AUTO: 0.92 K/UL (ref 1–4.8)
LYMPHOCYTES NFR BLD AUTO: 3.5 % (ref 27–41)
LYMPHOCYTES NFR BLD MANUAL: 5 % (ref 27–41)
MCH RBC QN AUTO: 29.6 PG (ref 27–31)
MCHC RBC AUTO-ENTMCNC: 31.3 G/DL (ref 32–36)
MCV RBC AUTO: 94.7 FL (ref 80–96)
METAMYELOCYTES NFR BLD MANUAL: 1 %
MONOCYTES # BLD AUTO: 1.37 K/UL (ref 0–0.8)
MONOCYTES NFR BLD AUTO: 5.2 % (ref 2–6)
MONOCYTES NFR BLD MANUAL: 3 % (ref 2–6)
MPC BLD CALC-MCNC: 9 FL (ref 9.4–12.4)
NEUTROPHILS # BLD AUTO: 23.43 K/UL (ref 1.8–7.7)
NEUTROPHILS NFR BLD AUTO: 88.7 % (ref 53–65)
NEUTS BAND NFR BLD MANUAL: 1 % (ref 1–5)
NEUTS SEG NFR BLD MANUAL: 89 % (ref 50–62)
NRBC # BLD AUTO: 0 X10E3/UL
NRBC, AUTO (.00): 0 %
PLATELET # BLD AUTO: 298 K/UL (ref 150–400)
PLATELET MORPHOLOGY: ABNORMAL
POTASSIUM SERPL-SCNC: 3.7 MMOL/L (ref 3.5–5.1)
RBC # BLD AUTO: 3.78 M/UL (ref 4.6–6.2)
SODIUM SERPL-SCNC: 135 MMOL/L (ref 136–145)
VANCOMYCIN TROUGH SERPL-MCNC: 7.1 ΜG/ML (ref 10–20)
WBC # BLD AUTO: 26.4 K/UL (ref 4.5–11)

## 2023-07-29 PROCEDURE — 25000003 PHARM REV CODE 250: Performed by: INTERNAL MEDICINE

## 2023-07-29 PROCEDURE — 83605 ASSAY OF LACTIC ACID: CPT | Performed by: INTERNAL MEDICINE

## 2023-07-29 PROCEDURE — 99233 SBSQ HOSP IP/OBS HIGH 50: CPT | Mod: ,,, | Performed by: INTERNAL MEDICINE

## 2023-07-29 PROCEDURE — 63600175 PHARM REV CODE 636 W HCPCS

## 2023-07-29 PROCEDURE — 99233 PR SUBSEQUENT HOSPITAL CARE,LEVL III: ICD-10-PCS | Mod: ,,, | Performed by: INTERNAL MEDICINE

## 2023-07-29 PROCEDURE — 80202 ASSAY OF VANCOMYCIN: CPT | Performed by: INTERNAL MEDICINE

## 2023-07-29 PROCEDURE — 63600175 PHARM REV CODE 636 W HCPCS: Performed by: INTERNAL MEDICINE

## 2023-07-29 PROCEDURE — 96372 THER/PROPH/DIAG INJ SC/IM: CPT

## 2023-07-29 PROCEDURE — 80048 BASIC METABOLIC PNL TOTAL CA: CPT | Performed by: INTERNAL MEDICINE

## 2023-07-29 PROCEDURE — 25000003 PHARM REV CODE 250

## 2023-07-29 PROCEDURE — 11000001 HC ACUTE MED/SURG PRIVATE ROOM

## 2023-07-29 PROCEDURE — 85025 COMPLETE CBC W/AUTO DIFF WBC: CPT | Performed by: INTERNAL MEDICINE

## 2023-07-29 RX ORDER — MIDODRINE HYDROCHLORIDE 5 MG/1
5 TABLET ORAL
Status: DISCONTINUED | OUTPATIENT
Start: 2023-07-29 | End: 2023-08-07

## 2023-07-29 RX ORDER — HEPARIN SODIUM 5000 [USP'U]/ML
5000 INJECTION, SOLUTION INTRAVENOUS; SUBCUTANEOUS EVERY 8 HOURS
Status: DISCONTINUED | OUTPATIENT
Start: 2023-07-29 | End: 2023-08-18 | Stop reason: HOSPADM

## 2023-07-29 RX ADMIN — SODIUM CHLORIDE: 9 INJECTION, SOLUTION INTRAVENOUS at 07:07

## 2023-07-29 RX ADMIN — ASPIRIN 81 MG: 81 TABLET, COATED ORAL at 09:07

## 2023-07-29 RX ADMIN — PIPERACILLIN AND TAZOBACTAM 4.5 G: 4; .5 INJECTION, POWDER, FOR SOLUTION INTRAVENOUS; PARENTERAL at 06:07

## 2023-07-29 RX ADMIN — PIPERACILLIN AND TAZOBACTAM 4.5 G: 4; .5 INJECTION, POWDER, FOR SOLUTION INTRAVENOUS; PARENTERAL at 01:07

## 2023-07-29 RX ADMIN — SODIUM CHLORIDE: 9 INJECTION, SOLUTION INTRAVENOUS at 09:07

## 2023-07-29 RX ADMIN — HEPARIN SODIUM 5000 UNITS: 5000 INJECTION, SOLUTION INTRAVENOUS; SUBCUTANEOUS at 09:07

## 2023-07-29 RX ADMIN — ATORVASTATIN CALCIUM 40 MG: 40 TABLET, FILM COATED ORAL at 09:07

## 2023-07-29 RX ADMIN — APIXABAN 5 MG: 5 TABLET, FILM COATED ORAL at 09:07

## 2023-07-29 RX ADMIN — Medication 1 TABLET: at 09:07

## 2023-07-29 RX ADMIN — Medication: at 09:07

## 2023-07-29 RX ADMIN — MIDODRINE HYDROCHLORIDE 5 MG: 5 TABLET ORAL at 06:07

## 2023-07-29 RX ADMIN — PIPERACILLIN AND TAZOBACTAM 4.5 G: 4; .5 INJECTION, POWDER, FOR SOLUTION INTRAVENOUS; PARENTERAL at 09:07

## 2023-07-29 RX ADMIN — SILVER SULFADIAZINE: 10 CREAM TOPICAL at 09:07

## 2023-07-29 RX ADMIN — HEPARIN SODIUM 5000 UNITS: 5000 INJECTION, SOLUTION INTRAVENOUS; SUBCUTANEOUS at 02:07

## 2023-07-29 RX ADMIN — MIRTAZAPINE 15 MG: 7.5 TABLET, FILM COATED ORAL at 09:07

## 2023-07-29 RX ADMIN — OXYCODONE HYDROCHLORIDE 5 MG: 5 TABLET ORAL at 07:07

## 2023-07-29 RX ADMIN — MIDODRINE HYDROCHLORIDE 5 MG: 5 TABLET ORAL at 11:07

## 2023-07-29 RX ADMIN — Medication: at 02:07

## 2023-07-29 RX ADMIN — MIDODRINE HYDROCHLORIDE 5 MG: 5 TABLET ORAL at 09:07

## 2023-07-29 NOTE — ASSESSMENT & PLAN NOTE
This patient does have evidence of infective focus  My overall impression is sepsis.  Source: Skin and Soft Tissue (location bilateral lower extremity cellulitis)  Source control achieved by: Antibiotics as mentioned below.

## 2023-07-29 NOTE — SUBJECTIVE & OBJECTIVE
Interval History: Patient seen and examined at the bedside, reports no change in status.     Review of Systems   Constitutional:  Positive for activity change and appetite change.   Skin:  Positive for wound.     Objective:     Vital Signs (Most Recent):  Temp: 97.5 °F (36.4 °C) (07/29/23 0757)  Pulse: 78 (07/29/23 0757)  Resp: 18 (07/29/23 0757)  BP: (!) 88/50 (07/29/23 0757)  SpO2: 100 % (07/29/23 0540) Vital Signs (24h Range):  Temp:  [96.3 °F (35.7 °C)-98.3 °F (36.8 °C)] 97.5 °F (36.4 °C)  Pulse:  [73-86] 78  Resp:  [14-18] 18  SpO2:  [100 %] 100 %  BP: ()/(39-54) 88/50     Weight: 73.6 kg (162 lb 5.4 oz)  Body mass index is 22.02 kg/m².    Intake/Output Summary (Last 24 hours) at 7/29/2023 1047  Last data filed at 7/29/2023 0925  Gross per 24 hour   Intake 1615.42 ml   Output 850 ml   Net 765.42 ml         Physical Exam  Vitals and nursing note reviewed. Exam conducted with a chaperone present.   Constitutional:       Appearance: He is ill-appearing.   HENT:      Head: Normocephalic and atraumatic.      Nose: Nose normal.      Mouth/Throat:      Mouth: Mucous membranes are moist.   Eyes:      Extraocular Movements: Extraocular movements intact.   Cardiovascular:      Rate and Rhythm: Normal rate and regular rhythm.      Pulses: Normal pulses.      Heart sounds: Normal heart sounds.   Pulmonary:      Effort: Pulmonary effort is normal.      Breath sounds: Normal breath sounds.   Abdominal:      General: Bowel sounds are normal.      Palpations: Abdomen is soft.   Musculoskeletal:         General: Tenderness present.      Cervical back: Normal range of motion.      Right lower leg: Edema present.      Left lower leg: Edema present.   Skin:     General: Skin is warm.      Findings: Erythema and lesion present.   Neurological:      General: No focal deficit present.      Mental Status: He is alert and oriented to person, place, and time. Mental status is at baseline.   Psychiatric:         Mood and Affect:  Mood normal.         Behavior: Behavior normal.            Significant Labs: All pertinent labs within the past 24 hours have been reviewed.    Significant Imaging: I have reviewed all pertinent imaging results/findings within the past 24 hours.

## 2023-07-29 NOTE — PROGRESS NOTES
[facility] Medicine  Progress Note    Patient Name: Esthela Contreras  MRN: [id number]  Patient Class: IP- Inpatient   Admission Date: 7/27/2023  Length of Stay: 1 days  Attending Physician: Cortez Ford MD  Primary Care Provider: CHRISTIANE Bardales        Subjective:     Principal Problem:Severe sepsis        HPI:  Patient is a 71 yo male who presents to the hospital accompanied by his family with a complaint of wounds to his bilateral lower extremities. Patient is a poor historian, and history was provided by his daughter at the emergency department. Patient has a history of chronic wounds and was admitted to the hospital on 6/02/2023 of severe sepsis secondary to cellulitis of lower extremities and new onset of atrial fibrillation. He was subsequently discharged to [facility] for wound care.  His daughter reported that wounds were healing, and the patient was discharged home under the care of Psychiatric for continued care. However, the patient did not get any service from the  [facility], which culminated in the worsening of his wounds over the past few days. This is associated with pain and edema, but the patient denies any associated fever, chills, chest pain, shortness of breath, nausea and vomiting.     In the ED, the patient met sepsis criteria on arrival and was thus treated per sepsis protocol through IV hydration, blood culture collection before starting broad spectrum antibiotics. Ensuring work up was significant for WBC 27.6, H&H 13.5/41.9 with history of anemia and baseline H&H 10.2/33.6 on month ago, dehydration with hyponatremia with a sodium of 129, acute renal injury with a BUN/CR 67/2.44 and GFR 27 with a baseline BUN/CR 16/1.25 and GFR 92 one month ago. Anion Gap metabolic acidosis with a GAP of 19.  Initial Troponin of 153.9 with history of chronically elevated Troponin, and NTpBNP of 4,406 with a baseline of 20.7K about three weeks ago. Lactic  acid of 3.0. Patient will be admitted for further evaluation and management.            Overview/Hospital Course:  7/28: Worsening leukocytosis noted. Blood cultures 2/2 sets growing GNB, wound culture with GNB.       Interval History: Patient seen and examined at the bedside, reports no change in status.     Review of Systems   Constitutional:  Positive for activity change and appetite change.   Skin:  Positive for wound.     Objective:     Vital Signs (Most Recent):  Temp: 97.5 °F (36.4 °C) (07/29/23 0757)  Pulse: 78 (07/29/23 0757)  Resp: 18 (07/29/23 0757)  BP: (!) 88/50 (07/29/23 0757)  SpO2: 100 % (07/29/23 0540) Vital Signs (24h Range):  Temp:  [96.3 °F (35.7 °C)-98.3 °F (36.8 °C)] 97.5 °F (36.4 °C)  Pulse:  [73-86] 78  Resp:  [14-18] 18  SpO2:  [100 %] 100 %  BP: ()/(39-54) 88/50     Weight: 73.6 kg (162 lb 5.4 oz)  Body mass index is 22.02 kg/m².    Intake/Output Summary (Last 24 hours) at 7/29/2023 1047  Last data filed at 7/29/2023 0925  Gross per 24 hour   Intake 1615.42 ml   Output 850 ml   Net 765.42 ml         Physical Exam  Vitals and nursing note reviewed. Exam conducted with a chaperone present.   Constitutional:       Appearance: He is ill-appearing.   HENT:      Head: Normocephalic and atraumatic.      Nose: Nose normal.      Mouth/Throat:      Mouth: Mucous membranes are moist.   Eyes:      Extraocular Movements: Extraocular movements intact.   Cardiovascular:      Rate and Rhythm: Normal rate and regular rhythm.      Pulses: Normal pulses.      Heart sounds: Normal heart sounds.   Pulmonary:      Effort: Pulmonary effort is normal.      Breath sounds: Normal breath sounds.   Abdominal:      General: Bowel sounds are normal.      Palpations: Abdomen is soft.   Musculoskeletal:         General: Tenderness present.      Cervical back: Normal range of motion.      Right lower leg: Edema present.      Left lower leg: Edema present.   Skin:     General: Skin is warm.      Findings: Erythema and  lesion present.   Neurological:      General: No focal deficit present.      Mental Status: He is alert and oriented to person, place, and time. Mental status is at baseline.   Psychiatric:         Mood and Affect: Mood normal.         Behavior: Behavior normal.            Significant Labs: All pertinent labs within the past 24 hours have been reviewed.    Significant Imaging: I have reviewed all pertinent imaging results/findings within the past 24 hours.      Assessment/Plan:      * Severe sepsis  This patient does have evidence of infective focus  My overall impression is sepsis.  Source: Skin and Soft Tissue (location bilateral lower extremity cellulitis)  Source control achieved by: Antibiotics as mentioned below.     Cellulitis of lower extremity  Known chronic wounds on BLE for a few months.   Lost follow up with home health wound care reporting insurance did not approve it.   Photographs appear better compared to ones in 6/2023.  Inflammatory markers elevated.   Started on broad spectrum antibiotics- vancomycin and Zosyn.   Wound and blood culture with GNB, discontinued vancomycin.   Wound care consulted, thanks for the assistance.  Will strongly consider general surgery consultation for wound debridement/source control.       Gram-negative bacteremia  2 out of 2 sets on admission positive for gram negative bacteria- awaiting ID and sensitivity data.   Continue antibiotics as above.       CHANDNI (acute kidney injury)  Patient with acute kidney injury/acute renal failure likely due to pre-renal azotemia due to dehydration   CHANDNI is currently improving.   Baseline creatinine 1.25 about one month ago   Labs reviewed- Renal function/electrolytes with Estimated Creatinine Clearance: 44.1 mL/min (A) (based on SCr of 1.53 mg/dL (H)). according to latest data.   Monitor urine output and serial BMP and adjust therapy as needed.  Avoid nephrotoxins and renally dose meds for GFR listed above.      Lactic acidosis  Sec to  severe sepsis.  Leading to high anion gap metabolic acidosis, bicarb 14 today.   Continue gentle IV hydration.   Follow lactate.           Dehydration with hyponatremia  Suspect related to acute infection.  Na normal at baseline, admission Na 129.  Continue gentle IV hydration and trend Na- improving.     Type 2 MI (myocardial infarction)  Troponin elevation but trend is not significant.  Denies chest pain.  Checking EKG for completeness.  Monitor for chest pain.           Chronic HFrEF (heart failure with reduced ejection fraction)  Last recorded Echo in 2019 showed an EF of 40%  Not in acute exacerbation.  Resume home Toprol, not on ACEI or ARB at home- unclear reason but suspect may be due to borderline low BP and taking midodrine at baseline.       Severe protein-calorie malnutrition  Nutrition consulted. Most recent weight and BMI monitored-     Measurements:  Wt Readings from Last 1 Encounters:   07/28/23 73.6 kg (162 lb 5.4 oz)   Body mass index is 22.02 kg/m².    Patient has been screened and assessed by RD.    Malnutrition Type:  Context:    Level:      Malnutrition Characteristic Summary:       Interventions/Recommendations (treatment strategy):  Recommend continue current diet as able/appropriate and tolerated. Also recommend addition of Ensure Max Protein TID and Abel BID to improve kcal/protein intakes and aid in wound healing. Also recommend consider addition of 500mg Vitamin C and 220mg ZnSO4 BID to aid in wound healing.    A-fib  Patient with Persistent (7 days or more) atrial fibrillation which is controlled currently with Beta Blocker. Patient is currently in atrial fibrillation.[id number] Score: 3. HASBLED Score: 2. Anticoagulation on hold as may require surgical debridement.     COPD (chronic obstructive pulmonary disease)  DuoNeb q6h PRN  Supplemental oxygenation    Essential hypertension  On Toprol but also on midodrine.  Suspect Toprol is for HFrEF and midodrine is to avoid  hypotension.      Hyperlipemia  Continue home statin    Depression  Patient has persistent depression which is moderate and is currently controlled.   Will Continue anti-depressant medication- Remeron.   We will not consult psychiatry at this time.   Patient does not display psychosis at this time.   Continue to monitor closely and adjust plan of care as needed.        Type 2 diabetes mellitus  Patient's FSGs are controlled on current medication regimen.  Last A1c reviewed-   Lab Results   Component Value Date    HGBA1C 5.5 06/02/2023     Most recent fingerstick glucose reviewed- No results for input(s): POCTGLUCOSE in the last 24 hours.  Current correctional scale  Medium  Maintain anti-hyperglycemic dose as follows-   Antihyperglycemics (From admission, onward)    None        Hold Oral hypoglycemics while patient is in the hospital.      VTE Risk Mitigation (From admission, onward)         Ordered     heparin (porcine) injection 5,000 Units  Every 8 hours         07/29/23 1052                Discharge Planning   MAKENNA: 8/1/2023     Code Status: Full Code   Is the patient medically ready for discharge?: No    Reason for patient still in hospital (select all that apply): Patient trending condition, Consult recommendations, PT / OT recommendations and Pending disposition  Discharge Plan A: Skilled Nursing Facility                  TIFFANIE VENTURA MD  Department of Hospital Medicine   [facility] - Short Stay Unit

## 2023-07-29 NOTE — ASSESSMENT & PLAN NOTE
2 out of 2 sets on admission positive for gram negative bacteria- awaiting ID and sensitivity data.   Continue antibiotics as above.
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
## 2023-07-29 NOTE — ASSESSMENT & PLAN NOTE
Patient with Persistent (7 days or more) atrial fibrillation which is controlled currently with Beta Blocker. Patient is currently in atrial fibrillation.[id number] Score: 3. HASBLED Score: 2. Anticoagulation on hold as may require surgical debridement.

## 2023-07-29 NOTE — ASSESSMENT & PLAN NOTE
Patient with acute kidney injury/acute renal failure likely due to pre-renal azotemia due to dehydration   CHANDNI is currently improving.   Baseline creatinine 1.25 about one month ago   Labs reviewed- Renal function/electrolytes with Estimated Creatinine Clearance: 44.1 mL/min (A) (based on SCr of 1.53 mg/dL (H)). according to latest data.   Monitor urine output and serial BMP and adjust therapy as needed.  Avoid nephrotoxins and renally dose meds for GFR listed above.

## 2023-07-29 NOTE — PLAN OF CARE
Problem: Adult Inpatient Plan of Care  Goal: Plan of Care Review  7/29/2023 0449 by Katie Meyers RN  Outcome: Ongoing, Progressing  7/29/2023 0427 by Katie Meyers RN  Outcome: Ongoing, Progressing  Goal: Patient-Specific Goal (Individualized)  7/29/2023 0449 by Katie Meyers RN  Outcome: Ongoing, Progressing  7/29/2023 0427 by Katie Meyers RN  Outcome: Ongoing, Progressing  Goal: Absence of Hospital-Acquired Illness or Injury  7/29/2023 0449 by Katie Meyers RN  Outcome: Ongoing, Progressing  7/29/2023 0427 by Katie Meyers RN  Outcome: Ongoing, Progressing  Goal: Optimal Comfort and Wellbeing  7/29/2023 0449 by Katie Meyers RN  Outcome: Ongoing, Progressing  7/29/2023 0427 by Katie Meyers RN  Outcome: Ongoing, Progressing  Goal: Readiness for Transition of Care  7/29/2023 0449 by Katie Meyers RN  Outcome: Ongoing, Progressing  7/29/2023 0427 by Katie Meyers RN  Outcome: Ongoing, Progressing     Problem: Diabetes Comorbidity  Goal: Blood Glucose Level Within Targeted Range  7/29/2023 0449 by Katie Meyers RN  Outcome: Ongoing, Progressing  7/29/2023 0427 by Katie Meyers RN  Outcome: Ongoing, Progressing     Problem: Adjustment to Illness (Sepsis/Septic Shock)  Goal: Optimal Coping  7/29/2023 0449 by Katie Meyers RN  Outcome: Ongoing, Progressing  7/29/2023 0427 by Katie Meyers RN  Outcome: Ongoing, Progressing     Problem: Bleeding (Sepsis/Septic Shock)  Goal: Absence of Bleeding  7/29/2023 0449 by Katie Meyers RN  Outcome: Ongoing, Progressing  7/29/2023 0427 by Katie Meyers RN  Outcome: Ongoing, Progressing     Problem: Glycemic Control Impaired (Sepsis/Septic Shock)  Goal: Blood Glucose Level Within Desired Range  7/29/2023 0449 by Katie Meyers RN  Outcome: Ongoing, Progressing  7/29/2023 0427 by Katie Meyers RN  Outcome: Ongoing, Progressing     Problem: Infection Progression (Sepsis/Septic Shock)  Goal: Absence of Infection Signs and Symptoms  7/29/2023 0449 by Katie Meyers  RN  Outcome: Ongoing, Progressing  7/29/2023 0427 by Katie Meyers RN  Outcome: Ongoing, Progressing     Problem: Nutrition Impaired (Sepsis/Septic Shock)  Goal: Optimal Nutrition Intake  7/29/2023 0449 by Katie Meyers RN  Outcome: Ongoing, Progressing  7/29/2023 0427 by Katie Meyers RN  Outcome: Ongoing, Progressing     Problem: Fluid and Electrolyte Imbalance (Acute Kidney Injury/Impairment)  Goal: Fluid and Electrolyte Balance  7/29/2023 0449 by Katie Meyers RN  Outcome: Ongoing, Progressing  7/29/2023 0427 by Katie Meyers RN  Outcome: Ongoing, Progressing     Problem: Oral Intake Inadequate (Acute Kidney Injury/Impairment)  Goal: Optimal Nutrition Intake  7/29/2023 0449 by Katie Meyers RN  Outcome: Ongoing, Progressing  7/29/2023 0427 by Katie Meyers RN  Outcome: Ongoing, Progressing     Problem: Renal Function Impairment (Acute Kidney Injury/Impairment)  Goal: Effective Renal Function  7/29/2023 0449 by Katie Meyers RN  Outcome: Ongoing, Progressing  7/29/2023 0427 by Katie Meyers RN  Outcome: Ongoing, Progressing     Problem: Impaired Wound Healing  Goal: Optimal Wound Healing  7/29/2023 0449 by Katie Meyers RN  Outcome: Ongoing, Progressing  7/29/2023 0427 by Katie Meyers RN  Outcome: Ongoing, Progressing     Problem: Skin Injury Risk Increased  Goal: Skin Health and Integrity  7/29/2023 0449 by Katie Meyers RN  Outcome: Ongoing, Progressing  7/29/2023 0427 by Katie Meyers RN  Outcome: Ongoing, Progressing

## 2023-07-29 NOTE — ASSESSMENT & PLAN NOTE
>>ASSESSMENT AND PLAN FOR SEPSIS WRITTEN ON 7/29/2023 10:49 AM BY TIFFANIE VENTURA MD    This patient does have evidence of infective focus  My overall impression is sepsis.  Source: Skin and Soft Tissue (location bilateral lower extremity cellulitis)  Source control achieved by: Antibiotics as mentioned below.

## 2023-07-29 NOTE — ASSESSMENT & PLAN NOTE
>>ASSESSMENT AND PLAN FOR CELLULITIS OF LOWER EXTREMITY WRITTEN ON 7/29/2023 10:54 AM BY TIFFANIE VENTURA MD    Known chronic wounds on BLE for a few months.   Lost follow up with home health wound care reporting insurance did not approve it.   Photographs appear better compared to ones in 6/2023.  Inflammatory markers elevated.   Started on broad spectrum antibiotics- vancomycin and Zosyn.   Wound and blood culture with GNB, discontinued vancomycin.   Wound care consulted, thanks for the assistance.  Will strongly consider general surgery consultation for wound debridement/source control.

## 2023-07-29 NOTE — PLAN OF CARE
Problem: Adult Inpatient Plan of Care  Goal: Plan of Care Review  Outcome: Ongoing, Progressing  Goal: Patient-Specific Goal (Individualized)  Outcome: Ongoing, Progressing  Goal: Absence of Hospital-Acquired Illness or Injury  Outcome: Ongoing, Progressing  Goal: Optimal Comfort and Wellbeing  Outcome: Ongoing, Progressing  Goal: Readiness for Transition of Care  Outcome: Ongoing, Progressing     Problem: Diabetes Comorbidity  Goal: Blood Glucose Level Within Targeted Range  Outcome: Ongoing, Progressing     Problem: Adjustment to Illness (Sepsis/Septic Shock)  Goal: Optimal Coping  Outcome: Ongoing, Progressing     Problem: Bleeding (Sepsis/Septic Shock)  Goal: Absence of Bleeding  Outcome: Ongoing, Progressing     Problem: Glycemic Control Impaired (Sepsis/Septic Shock)  Goal: Blood Glucose Level Within Desired Range  Outcome: Ongoing, Progressing     Problem: Infection Progression (Sepsis/Septic Shock)  Goal: Absence of Infection Signs and Symptoms  Outcome: Ongoing, Progressing     Problem: Nutrition Impaired (Sepsis/Septic Shock)  Goal: Optimal Nutrition Intake  Outcome: Ongoing, Progressing     Problem: Fluid and Electrolyte Imbalance (Acute Kidney Injury/Impairment)  Goal: Fluid and Electrolyte Balance  Outcome: Ongoing, Progressing     Problem: Oral Intake Inadequate (Acute Kidney Injury/Impairment)  Goal: Optimal Nutrition Intake  Outcome: Ongoing, Progressing     Problem: Renal Function Impairment (Acute Kidney Injury/Impairment)  Goal: Effective Renal Function  Outcome: Ongoing, Progressing     Problem: Impaired Wound Healing  Goal: Optimal Wound Healing  Outcome: Ongoing, Progressing     Problem: Skin Injury Risk Increased  Goal: Skin Health and Integrity  Outcome: Ongoing, Progressing     Problem: Adult Inpatient Plan of Care  Goal: Plan of Care Review  Outcome: Ongoing, Progressing  Goal: Patient-Specific Goal (Individualized)  Outcome: Ongoing, Progressing  Goal: Absence of Hospital-Acquired  Illness or Injury  Outcome: Ongoing, Progressing  Goal: Optimal Comfort and Wellbeing  Outcome: Ongoing, Progressing  Goal: Readiness for Transition of Care  Outcome: Ongoing, Progressing     Problem: Diabetes Comorbidity  Goal: Blood Glucose Level Within Targeted Range  Outcome: Ongoing, Progressing     Problem: Adjustment to Illness (Sepsis/Septic Shock)  Goal: Optimal Coping  Outcome: Ongoing, Progressing     Problem: Bleeding (Sepsis/Septic Shock)  Goal: Absence of Bleeding  Outcome: Ongoing, Progressing     Problem: Glycemic Control Impaired (Sepsis/Septic Shock)  Goal: Blood Glucose Level Within Desired Range  Outcome: Ongoing, Progressing     Problem: Infection Progression (Sepsis/Septic Shock)  Goal: Absence of Infection Signs and Symptoms  Outcome: Ongoing, Progressing     Problem: Nutrition Impaired (Sepsis/Septic Shock)  Goal: Optimal Nutrition Intake  Outcome: Ongoing, Progressing     Problem: Fluid and Electrolyte Imbalance (Acute Kidney Injury/Impairment)  Goal: Fluid and Electrolyte Balance  Outcome: Ongoing, Progressing     Problem: Oral Intake Inadequate (Acute Kidney Injury/Impairment)  Goal: Optimal Nutrition Intake  Outcome: Ongoing, Progressing     Problem: Renal Function Impairment (Acute Kidney Injury/Impairment)  Goal: Effective Renal Function  Outcome: Ongoing, Progressing     Problem: Impaired Wound Healing  Goal: Optimal Wound Healing  Outcome: Ongoing, Progressing     Problem: Skin Injury Risk Increased  Goal: Skin Health and Integrity  Outcome: Ongoing, Progressing

## 2023-07-29 NOTE — ASSESSMENT & PLAN NOTE
Suspect related to acute infection.  Na normal at baseline, admission Na 129.  Continue gentle IV hydration and trend Na- improving.

## 2023-07-29 NOTE — ASSESSMENT & PLAN NOTE
Known chronic wounds on BLE for a few months.   Lost follow up with home health wound care reporting insurance did not approve it.   Photographs appear better compared to ones in 6/2023.  Inflammatory markers elevated.   Started on broad spectrum antibiotics- vancomycin and Zosyn.   Wound and blood culture with GNB, discontinued vancomycin.   Wound care consulted, thanks for the assistance.  Will strongly consider general surgery consultation for wound debridement/source control.

## 2023-07-30 PROBLEM — S81.809A: Status: ACTIVE | Noted: 2023-07-30

## 2023-07-30 LAB
ANION GAP SERPL CALCULATED.3IONS-SCNC: 16 MMOL/L (ref 7–16)
BASOPHILS # BLD AUTO: 0.09 K/UL (ref 0–0.2)
BASOPHILS NFR BLD AUTO: 0.7 % (ref 0–1)
BUN SERPL-MCNC: 39 MG/DL (ref 7–18)
BUN/CREAT SERPL: 31 (ref 6–20)
CALCIUM SERPL-MCNC: 8 MG/DL (ref 8.5–10.1)
CHLORIDE SERPL-SCNC: 109 MMOL/L (ref 98–107)
CO2 SERPL-SCNC: 14 MMOL/L (ref 21–32)
CREAT SERPL-MCNC: 1.24 MG/DL (ref 0.7–1.3)
DIFFERENTIAL METHOD BLD: ABNORMAL
EGFR (NO RACE VARIABLE) (RUSH/TITUS): 61 ML/MIN/1.73M2
EOSINOPHIL # BLD AUTO: 0.13 K/UL (ref 0–0.5)
EOSINOPHIL NFR BLD AUTO: 1 % (ref 1–4)
ERYTHROCYTE [DISTWIDTH] IN BLOOD BY AUTOMATED COUNT: 17.2 % (ref 11.5–14.5)
GLUCOSE SERPL-MCNC: 72 MG/DL (ref 74–106)
HCT VFR BLD AUTO: 35.9 % (ref 40–54)
HGB BLD-MCNC: 10.8 G/DL (ref 13.5–18)
IMM GRANULOCYTES # BLD AUTO: 0.49 K/UL (ref 0–0.04)
IMM GRANULOCYTES NFR BLD: 3.8 % (ref 0–0.4)
LYMPHOCYTES # BLD AUTO: 1.46 K/UL (ref 1–4.8)
LYMPHOCYTES NFR BLD AUTO: 11.5 % (ref 27–41)
MCH RBC QN AUTO: 29 PG (ref 27–31)
MCHC RBC AUTO-ENTMCNC: 30.1 G/DL (ref 32–36)
MCV RBC AUTO: 96.2 FL (ref 80–96)
MICROORGANISM SPEC CULT: ABNORMAL
MONOCYTES # BLD AUTO: 0.9 K/UL (ref 0–0.8)
MONOCYTES NFR BLD AUTO: 7.1 % (ref 2–6)
MPC BLD CALC-MCNC: 9.1 FL (ref 9.4–12.4)
NEUTROPHILS # BLD AUTO: 9.68 K/UL (ref 1.8–7.7)
NEUTROPHILS NFR BLD AUTO: 75.9 % (ref 53–65)
NRBC # BLD AUTO: 0 X10E3/UL
NRBC, AUTO (.00): 0 %
PLATELET # BLD AUTO: 266 K/UL (ref 150–400)
POTASSIUM SERPL-SCNC: 4.3 MMOL/L (ref 3.5–5.1)
RBC # BLD AUTO: 3.73 M/UL (ref 4.6–6.2)
SODIUM SERPL-SCNC: 135 MMOL/L (ref 136–145)
WBC # BLD AUTO: 12.75 K/UL (ref 4.5–11)

## 2023-07-30 PROCEDURE — 99223 1ST HOSP IP/OBS HIGH 75: CPT | Mod: ,,, | Performed by: SURGERY

## 2023-07-30 PROCEDURE — 99233 PR SUBSEQUENT HOSPITAL CARE,LEVL III: ICD-10-PCS | Mod: ,,, | Performed by: INTERNAL MEDICINE

## 2023-07-30 PROCEDURE — 80048 BASIC METABOLIC PNL TOTAL CA: CPT | Performed by: INTERNAL MEDICINE

## 2023-07-30 PROCEDURE — 85025 COMPLETE CBC W/AUTO DIFF WBC: CPT | Performed by: INTERNAL MEDICINE

## 2023-07-30 PROCEDURE — 99223 PR INITIAL HOSPITAL CARE,LEVL III: ICD-10-PCS | Mod: ,,, | Performed by: SURGERY

## 2023-07-30 PROCEDURE — 25000003 PHARM REV CODE 250: Performed by: INTERNAL MEDICINE

## 2023-07-30 PROCEDURE — 99233 SBSQ HOSP IP/OBS HIGH 50: CPT | Mod: ,,, | Performed by: INTERNAL MEDICINE

## 2023-07-30 PROCEDURE — 11000001 HC ACUTE MED/SURG PRIVATE ROOM

## 2023-07-30 PROCEDURE — 25000003 PHARM REV CODE 250: Performed by: HOSPITALIST

## 2023-07-30 PROCEDURE — 63600175 PHARM REV CODE 636 W HCPCS

## 2023-07-30 PROCEDURE — 63600175 PHARM REV CODE 636 W HCPCS: Performed by: INTERNAL MEDICINE

## 2023-07-30 PROCEDURE — 25000003 PHARM REV CODE 250

## 2023-07-30 RX ADMIN — PIPERACILLIN AND TAZOBACTAM 4.5 G: 4; .5 INJECTION, POWDER, FOR SOLUTION INTRAVENOUS; PARENTERAL at 02:07

## 2023-07-30 RX ADMIN — HEPARIN SODIUM 5000 UNITS: 5000 INJECTION, SOLUTION INTRAVENOUS; SUBCUTANEOUS at 09:07

## 2023-07-30 RX ADMIN — PIPERACILLIN AND TAZOBACTAM 4.5 G: 4; .5 INJECTION, POWDER, FOR SOLUTION INTRAVENOUS; PARENTERAL at 09:07

## 2023-07-30 RX ADMIN — SODIUM CHLORIDE: 9 INJECTION, SOLUTION INTRAVENOUS at 04:07

## 2023-07-30 RX ADMIN — ACETAMINOPHEN 1000 MG: 500 TABLET ORAL at 09:07

## 2023-07-30 RX ADMIN — MIDODRINE HYDROCHLORIDE 5 MG: 5 TABLET ORAL at 12:07

## 2023-07-30 RX ADMIN — Medication 1 TABLET: at 09:07

## 2023-07-30 RX ADMIN — MIRTAZAPINE 15 MG: 7.5 TABLET, FILM COATED ORAL at 09:07

## 2023-07-30 RX ADMIN — ATORVASTATIN CALCIUM 40 MG: 40 TABLET, FILM COATED ORAL at 09:07

## 2023-07-30 RX ADMIN — MIDODRINE HYDROCHLORIDE 5 MG: 5 TABLET ORAL at 09:07

## 2023-07-30 RX ADMIN — ASPIRIN 81 MG: 81 TABLET, COATED ORAL at 09:07

## 2023-07-30 RX ADMIN — PIPERACILLIN AND TAZOBACTAM 4.5 G: 4; .5 INJECTION, POWDER, FOR SOLUTION INTRAVENOUS; PARENTERAL at 05:07

## 2023-07-30 RX ADMIN — MIDODRINE HYDROCHLORIDE 5 MG: 5 TABLET ORAL at 05:07

## 2023-07-30 RX ADMIN — Medication: at 10:07

## 2023-07-30 RX ADMIN — SILVER SULFADIAZINE: 10 CREAM TOPICAL at 10:07

## 2023-07-30 RX ADMIN — Medication: at 09:07
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
## 2023-07-30 NOTE — PROGRESS NOTES
[facility] Short Stay [facility] Medicine  Progress Note    Patient Name: Esthela Contreras  MRN: [id number]  Patient Class: IP- Inpatient   Admission Date: 7/27/2023  Length of Stay: 2 days  Attending Physician: Cortez Ford MD  Primary Care Provider: CHRISTIANE Bardales        Subjective:     Principal Problem:Severe sepsis        HPI:  Patient is a 71 yo male who presents to the hospital accompanied by his family with a complaint of wounds to his bilateral lower extremities. Patient is a poor historian, and history was provided by his daughter at the emergency department. Patient has a history of chronic wounds and was admitted to the hospital on 6/02/2023 of severe sepsis secondary to cellulitis of lower extremities and new onset of atrial fibrillation. He was subsequently discharged to [facility] for wound care.  His daughter reported that wounds were healing, and the patient was discharged home under the care of [facility] for continued care. However, the patient did not get any service from the  [facility], which culminated in the worsening of his wounds over the past few days. This is associated with pain and edema, but the patient denies any associated fever, chills, chest pain, shortness of breath, nausea and vomiting.     In the ED, the patient met sepsis criteria on arrival and was thus treated per sepsis protocol through IV hydration, blood culture collection before starting broad spectrum antibiotics. Ensuring work up was significant for WBC 27.6, H&H 13.5/41.9 with history of anemia and baseline H&H 10.2/33.6 on month ago, dehydration with hyponatremia with a sodium of 129, acute renal injury with a BUN/CR 67/2.44 and GFR 27 with a baseline BUN/CR 16/1.25 and GFR 92 one month ago. Anion Gap metabolic acidosis with a GAP of 19.  Initial Troponin of 153.9 with history of chronically elevated Troponin, and NTpBNP of 4,406 with a baseline of 20.7K about three weeks ago. Lactic  acid of 3.0. Patient will be admitted for further evaluation and management.            Overview/Hospital Course:  7/28: Worsening leukocytosis noted. Blood cultures 2/2 sets growing GNB, wound culture with GNB.     7/30: Wound cultures with morganella, providencia and pseudomonas. General surgery consulted.       Interval History: Patient seen and examined at the bedside, reports doing ok, no overnight events.     Review of Systems   Constitutional:  Positive for activity change and appetite change.   Skin:  Positive for wound.     Objective:     Vital Signs (Most Recent):  Temp: 97.6 °F (36.4 °C) (07/30/23 0711)  Pulse: 80 (07/30/23 0711)  Resp: 18 (07/30/23 0711)  BP: (!) 90/54 (07/30/23 0711)  SpO2: 98 % (07/30/23 0711) Vital Signs (24h Range):  Temp:  [96.9 °F (36.1 °C)-97.9 °F (36.6 °C)] 97.6 °F (36.4 °C)  Pulse:  [] 80  Resp:  [16-20] 18  SpO2:  [98 %-100 %] 98 %  BP: ()/(45-69) 90/54     Weight: 73.6 kg (162 lb 5.4 oz)  Body mass index is 22.02 kg/m².    Intake/Output Summary (Last 24 hours) at 7/30/2023 1123  Last data filed at 7/30/2023 0604  Gross per 24 hour   Intake 240 ml   Output 420 ml   Net -180 ml           Physical Exam  Vitals and nursing note reviewed. Exam conducted with a chaperone present.   Constitutional:       Appearance: He is ill-appearing.   HENT:      Head: Normocephalic and atraumatic.      Nose: Nose normal.      Mouth/Throat:      Mouth: Mucous membranes are moist.   Eyes:      Extraocular Movements: Extraocular movements intact.   Cardiovascular:      Rate and Rhythm: Normal rate and regular rhythm.      Pulses: Normal pulses.      Heart sounds: Normal heart sounds.   Pulmonary:      Effort: Pulmonary effort is normal.      Breath sounds: Normal breath sounds.   Abdominal:      General: Bowel sounds are normal.      Palpations: Abdomen is soft.   Musculoskeletal:         General: Tenderness present.      Cervical back: Normal range of motion.      Right lower leg: Edema  present.      Left lower leg: Edema present.   Skin:     General: Skin is warm.      Findings: Erythema and lesion present.   Neurological:      General: No focal deficit present.      Mental Status: He is alert and oriented to person, place, and time. Mental status is at baseline.   Psychiatric:         Mood and Affect: Mood normal.         Behavior: Behavior normal.            Significant Labs: All pertinent labs within the past 24 hours have been reviewed.    Significant Imaging: I have reviewed all pertinent imaging results/findings within the past 24 hours.      Assessment/Plan:      * Severe sepsis  This patient does have evidence of infective focus  My overall impression is sepsis.  Source: Skin and Soft Tissue (location bilateral lower extremity cellulitis)  Source control achieved by: Antibiotics as mentioned below.     Cellulitis of lower extremity  Known chronic wounds on BLE for a few months.   Lost follow up with home health wound care reporting insurance did not approve it.   Photographs appear better compared to ones in 6/2023.  Inflammatory markers elevated.   Started on broad spectrum antibiotics- vancomycin and Zosyn.   Wound culture with pseudomonas, morganella and providencia growth, blood culture with GNB- discontinued vancomycin.   General surgery consultation for wound debridement/source control.   Wound care consulted, thanks for the assistance.        Gram-negative bacteremia  2 out of 2 sets on admission positive for gram negative bacteria- awaiting ID and sensitivity data.   Continue antibiotics as above.       CHANDNI (acute kidney injury)  Patient with acute kidney injury/acute renal failure likely due to pre-renal azotemia due to dehydration   CHANDNI is currently improving.   Baseline creatinine 1.25 about one month ago   Labs reviewed- Renal function/electrolytes with Estimated Creatinine Clearance: 54.4 mL/min (based on SCr of 1.24 mg/dL). according to latest data.   Monitor urine output and  serial BMP and adjust therapy as needed.  Avoid nephrotoxins and renally dose meds for GFR listed above.      Lactic acidosis  Sec to severe sepsis.  Leading to high anion gap metabolic acidosis, bicarb 14 today.   S/p IV hydration.   Follow lactate.           Dehydration with hyponatremia  Suspect related to acute infection.  Na normal at baseline, admission Na 129.  Improved with IV hydration.     Type 2 MI (myocardial infarction)  Troponin elevation but trend is not significant.  Denies chest pain.  No EKG done on admission in ED.   Monitor for chest pain.           Chronic HFrEF (heart failure with reduced ejection fraction)  Last recorded Echo in 2019 showed an EF of 40%  Not in acute exacerbation.  Resume home Toprol, not on ACEI or ARB at home- unclear reason but suspect may be due to borderline low BP and taking midodrine at baseline.       Severe protein-calorie malnutrition  Nutrition consulted. Most recent weight and BMI monitored-     Measurements:  Wt Readings from Last 1 Encounters:   07/28/23 73.6 kg (162 lb 5.4 oz)   Body mass index is 22.02 kg/m².    Patient has been screened and assessed by RD.    Malnutrition Type:  Context:    Level:      Malnutrition Characteristic Summary:       Interventions/Recommendations (treatment strategy):  Recommend continue current diet as able/appropriate and tolerated. Also recommend addition of Ensure Max Protein TID and Abel BID to improve kcal/protein intakes and aid in wound healing. Also recommend consider addition of 500mg Vitamin C and 220mg ZnSO4 BID to aid in wound healing.    A-fib  Patient with Persistent (7 days or more) atrial fibrillation which is controlled currently with Beta Blocker.   Patient is currently in atrial fibrillation.  [id number] Score: 3. HASBLED Score: 2.   Anticoagulation on hold as may require surgical debridement.     COPD (chronic obstructive pulmonary disease)  DuoNeb q6h PRN  Supplemental oxygenation    Essential hypertension  On  Toprol but also on midodrine.  Suspect Toprol is for HFrEF and midodrine is to avoid hypotension.      Hyperlipemia  Continue home statin    Depression  Patient has persistent depression which is moderate and is currently controlled.   Will Continue anti-depressant medication- Remeron.   We will not consult psychiatry at this time.   Patient does not display psychosis at this time.   Continue to monitor closely and adjust plan of care as needed.        Type 2 diabetes mellitus  Patient's FSGs are controlled on current medication regimen.  Last A1c reviewed-   Lab Results   Component Value Date    HGBA1C 5.5 06/02/2023     Most recent fingerstick glucose reviewed- No results for input(s): POCTGLUCOSE in the last 24 hours.  Current correctional scale  Medium  Maintain anti-hyperglycemic dose as follows-   Antihyperglycemics (From admission, onward)    None        Hold Oral hypoglycemics while patient is in the hospital.      VTE Risk Mitigation (From admission, onward)         Ordered     heparin (porcine) injection 5,000 Units  Every 8 hours         07/29/23 1052                Discharge Planning   MAKENNA: 8/1/2023     Code Status: Full Code   Is the patient medically ready for discharge?: No    Reason for patient still in hospital (select all that apply): Consult recommendations and Pending disposition  Discharge Plan A: Skilled Nursing Facility                  TIFFANIE VENTURA MD  Department of Hospital Medicine   [facility] - Short Stay Unit

## 2023-07-30 NOTE — PLAN OF CARE
Problem: Adult Inpatient Plan of Care  Goal: Plan of Care Review  Outcome: Ongoing, Progressing  Goal: Patient-Specific Goal (Individualized)  Outcome: Ongoing, Progressing  Goal: Absence of Hospital-Acquired Illness or Injury  Outcome: Ongoing, Progressing  Goal: Optimal Comfort and Wellbeing  Outcome: Ongoing, Progressing  Goal: Readiness for Transition of Care  Outcome: Ongoing, Progressing     Problem: Diabetes Comorbidity  Goal: Blood Glucose Level Within Targeted Range  Outcome: Ongoing, Progressing     Problem: Adjustment to Illness (Sepsis/Septic Shock)  Goal: Optimal Coping  Outcome: Ongoing, Progressing     Problem: Bleeding (Sepsis/Septic Shock)  Goal: Absence of Bleeding  Outcome: Ongoing, Progressing     Problem: Glycemic Control Impaired (Sepsis/Septic Shock)  Goal: Blood Glucose Level Within Desired Range  Outcome: Ongoing, Progressing     Problem: Infection Progression (Sepsis/Septic Shock)  Goal: Absence of Infection Signs and Symptoms  Outcome: Ongoing, Progressing     Problem: Nutrition Impaired (Sepsis/Septic Shock)  Goal: Optimal Nutrition Intake  Outcome: Ongoing, Progressing     Problem: Fluid and Electrolyte Imbalance (Acute Kidney Injury/Impairment)  Goal: Fluid and Electrolyte Balance  Outcome: Ongoing, Progressing     Problem: Oral Intake Inadequate (Acute Kidney Injury/Impairment)  Goal: Optimal Nutrition Intake  Outcome: Ongoing, Progressing     Problem: Renal Function Impairment (Acute Kidney Injury/Impairment)  Goal: Effective Renal Function  Outcome: Ongoing, Progressing     Problem: Impaired Wound Healing  Goal: Optimal Wound Healing  Outcome: Ongoing, Progressing     Problem: Skin Injury Risk Increased  Goal: Skin Health and Integrity  Outcome: Ongoing, Progressing

## 2023-07-30 NOTE — ASSESSMENT & PLAN NOTE
Troponin elevation but trend is not significant.  Denies chest pain.  No EKG done on admission in ED.   Monitor for chest pain.

## 2023-07-30 NOTE — SUBJECTIVE & OBJECTIVE
Interval History: Patient seen and examined at the bedside, reports doing ok, no overnight events.     Review of Systems   Constitutional:  Positive for activity change and appetite change.   Skin:  Positive for wound.     Objective:     Vital Signs (Most Recent):  Temp: 97.6 °F (36.4 °C) (07/30/23 0711)  Pulse: 80 (07/30/23 0711)  Resp: 18 (07/30/23 0711)  BP: (!) 90/54 (07/30/23 0711)  SpO2: 98 % (07/30/23 0711) Vital Signs (24h Range):  Temp:  [96.9 °F (36.1 °C)-97.9 °F (36.6 °C)] 97.6 °F (36.4 °C)  Pulse:  [] 80  Resp:  [16-20] 18  SpO2:  [98 %-100 %] 98 %  BP: ()/(45-69) 90/54     Weight: 73.6 kg (162 lb 5.4 oz)  Body mass index is 22.02 kg/m².    Intake/Output Summary (Last 24 hours) at 7/30/2023 1123  Last data filed at 7/30/2023 0604  Gross per 24 hour   Intake 240 ml   Output 420 ml   Net -180 ml           Physical Exam  Vitals and nursing note reviewed. Exam conducted with a chaperone present.   Constitutional:       Appearance: He is ill-appearing.   HENT:      Head: Normocephalic and atraumatic.      Nose: Nose normal.      Mouth/Throat:      Mouth: Mucous membranes are moist.   Eyes:      Extraocular Movements: Extraocular movements intact.   Cardiovascular:      Rate and Rhythm: Normal rate and regular rhythm.      Pulses: Normal pulses.      Heart sounds: Normal heart sounds.   Pulmonary:      Effort: Pulmonary effort is normal.      Breath sounds: Normal breath sounds.   Abdominal:      General: Bowel sounds are normal.      Palpations: Abdomen is soft.   Musculoskeletal:         General: Tenderness present.      Cervical back: Normal range of motion.      Right lower leg: Edema present.      Left lower leg: Edema present.   Skin:     General: Skin is warm.      Findings: Erythema and lesion present.   Neurological:      General: No focal deficit present.      Mental Status: He is alert and oriented to person, place, and time. Mental status is at baseline.   Psychiatric:         Mood and  Affect: Mood normal.         Behavior: Behavior normal.            Significant Labs: All pertinent labs within the past 24 hours have been reviewed.    Significant Imaging: I have reviewed all pertinent imaging results/findings within the past 24 hours.
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
## 2023-07-30 NOTE — ASSESSMENT & PLAN NOTE
>>ASSESSMENT AND PLAN FOR SEPSIS WRITTEN ON 7/30/2023 11:23 AM BY TIFFANIE VENTURA MD    This patient does have evidence of infective focus  My overall impression is sepsis.  Source: Skin and Soft Tissue (location bilateral lower extremity cellulitis)  Source control achieved by: Antibiotics as mentioned below.

## 2023-07-30 NOTE — ASSESSMENT & PLAN NOTE
Sec to severe sepsis.  Leading to high anion gap metabolic acidosis, bicarb 14 today.   S/p IV hydration.   Follow lactate.

## 2023-07-30 NOTE — HPI
"71 y/o male patient with recent hospitalizations is readmitted for sepsis.  He has known ulcerations of the bilateral lower extremities.  He has positive blood cultures and wound cultures. Not sure I trust the wound cultures, as the wounds are likely colonized with bacteria.  No urine cultures were performed.  Patient is not a great historian, but he sates that a "test is to be performed while he is asleep" in [place] on Aug 17th. He was seen by Dr. Mo a few weeks ago during hospital admission, and no intervention or surgery performed.   Surgery asked to evaluate wounds.   "
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
## 2023-07-30 NOTE — ASSESSMENT & PLAN NOTE
>>ASSESSMENT AND PLAN FOR CELLULITIS OF LOWER EXTREMITY WRITTEN ON 7/30/2023 11:24 AM BY TIFFANIE VENTURA MD    Known chronic wounds on BLE for a few months.   Lost follow up with home health wound care reporting insurance did not approve it.   Photographs appear better compared to ones in 6/2023.  Inflammatory markers elevated.   Started on broad spectrum antibiotics- vancomycin and Zosyn.   Wound culture with pseudomonas, morganella and providencia growth, blood culture with GNB- discontinued vancomycin.   General surgery consultation for wound debridement/source control.   Wound care consulted, thanks for the assistance.       Attending Attestation (For Attendings USE Only)...

## 2023-07-30 NOTE — ASSESSMENT & PLAN NOTE
Patient with Persistent (7 days or more) atrial fibrillation which is controlled currently with Beta Blocker.   Patient is currently in atrial fibrillation.  [id number] Score: 3. HASBLED Score: 2.   Anticoagulation on hold as may require surgical debridement.

## 2023-07-30 NOTE — ASSESSMENT & PLAN NOTE
Patient with acute kidney injury/acute renal failure likely due to pre-renal azotemia due to dehydration   CHANDNI is currently improving.   Baseline creatinine 1.25 about one month ago   Labs reviewed- Renal function/electrolytes with Estimated Creatinine Clearance: 54.4 mL/min (based on SCr of 1.24 mg/dL). according to latest data.   Monitor urine output and serial BMP and adjust therapy as needed.  Avoid nephrotoxins and renally dose meds for GFR listed above.

## 2023-07-30 NOTE — ASSESSMENT & PLAN NOTE
>>ASSESSMENT AND PLAN FOR CELLULITIS OF LOWER EXTREMITY WRITTEN ON 7/30/2023  6:40 PM BY KINGSLEY CONNELLY MD    Continue iv abx  No surgical intervention deemed necessary  Will check wounds again, tomorrow.

## 2023-07-30 NOTE — ASSESSMENT & PLAN NOTE
Known chronic wounds on BLE for a few months.   Lost follow up with home health wound care reporting insurance did not approve it.   Photographs appear better compared to ones in 6/2023.  Inflammatory markers elevated.   Started on broad spectrum antibiotics- vancomycin and Zosyn.   Wound culture with pseudomonas, morganella and providencia growth, blood culture with GNB- discontinued vancomycin.   General surgery consultation for wound debridement/source control.   Wound care consulted, thanks for the assistance.

## 2023-07-30 NOTE — ASSESSMENT & PLAN NOTE
Suspect related to acute infection.  Na normal at baseline, admission Na 129.  Improved with IV hydration.

## 2023-07-31 LAB
ANION GAP SERPL CALCULATED.3IONS-SCNC: 17 MMOL/L (ref 7–16)
ANISOCYTOSIS BLD QL SMEAR: ABNORMAL
BACTERIA BLD CULT: ABNORMAL
BACTERIA BLD CULT: ABNORMAL
BASOPHILS # BLD AUTO: 0.09 K/UL (ref 0–0.2)
BASOPHILS NFR BLD AUTO: 0.7 % (ref 0–1)
BUN SERPL-MCNC: 26 MG/DL (ref 7–18)
BUN/CREAT SERPL: 25 (ref 6–20)
CALCIUM SERPL-MCNC: 7.6 MG/DL (ref 8.5–10.1)
CHLORIDE SERPL-SCNC: 109 MMOL/L (ref 98–107)
CO2 SERPL-SCNC: 19 MMOL/L (ref 21–32)
CREAT SERPL-MCNC: 1.06 MG/DL (ref 0.7–1.3)
DIFFERENTIAL METHOD BLD: ABNORMAL
EGFR (NO RACE VARIABLE) (RUSH/TITUS): 74 ML/MIN/1.73M2
EOSINOPHIL # BLD AUTO: 0.12 K/UL (ref 0–0.5)
EOSINOPHIL NFR BLD AUTO: 0.9 % (ref 1–4)
EOSINOPHIL NFR BLD MANUAL: 6 % (ref 1–4)
ERYTHROCYTE [DISTWIDTH] IN BLOOD BY AUTOMATED COUNT: 17.2 % (ref 11.5–14.5)
GLUCOSE SERPL-MCNC: 85 MG/DL (ref 74–106)
GRAM STN SPEC: ABNORMAL
GRAM STN SPEC: ABNORMAL
HCT VFR BLD AUTO: 29.9 % (ref 40–54)
HGB BLD-MCNC: 9.7 G/DL (ref 13.5–18)
IMM GRANULOCYTES # BLD AUTO: 0.43 K/UL (ref 0–0.04)
IMM GRANULOCYTES NFR BLD: 3.3 % (ref 0–0.4)
INR BLD: 1.11
LYMPHOCYTES # BLD AUTO: 1.21 K/UL (ref 1–4.8)
LYMPHOCYTES NFR BLD AUTO: 9.2 % (ref 27–41)
LYMPHOCYTES NFR BLD MANUAL: 7 % (ref 27–41)
MCH RBC QN AUTO: 28.7 PG (ref 27–31)
MCHC RBC AUTO-ENTMCNC: 32.4 G/DL (ref 32–36)
MCV RBC AUTO: 88.5 FL (ref 80–96)
MONOCYTES # BLD AUTO: 0.77 K/UL (ref 0–0.8)
MONOCYTES NFR BLD AUTO: 5.8 % (ref 2–6)
MONOCYTES NFR BLD MANUAL: 4 % (ref 2–6)
MPC BLD CALC-MCNC: 9.8 FL (ref 9.4–12.4)
NEUTROPHILS # BLD AUTO: 10.56 K/UL (ref 1.8–7.7)
NEUTROPHILS NFR BLD AUTO: 80.1 % (ref 53–65)
NEUTS SEG NFR BLD MANUAL: 83 % (ref 50–62)
NRBC # BLD AUTO: 0 X10E3/UL
NRBC, AUTO (.00): 0 %
PLATELET # BLD AUTO: 219 K/UL (ref 150–400)
PLATELET MORPHOLOGY: ABNORMAL
POTASSIUM SERPL-SCNC: 4 MMOL/L (ref 3.5–5.1)
PROTHROMBIN TIME: 14.2 SECONDS (ref 11.7–14.7)
RBC # BLD AUTO: 3.38 M/UL (ref 4.6–6.2)
SODIUM SERPL-SCNC: 141 MMOL/L (ref 136–145)
WBC # BLD AUTO: 13.18 K/UL (ref 4.5–11)

## 2023-07-31 PROCEDURE — 80048 BASIC METABOLIC PNL TOTAL CA: CPT | Performed by: INTERNAL MEDICINE

## 2023-07-31 PROCEDURE — 99233 SBSQ HOSP IP/OBS HIGH 50: CPT | Mod: ,,, | Performed by: INTERNAL MEDICINE

## 2023-07-31 PROCEDURE — 11000001 HC ACUTE MED/SURG PRIVATE ROOM

## 2023-07-31 PROCEDURE — 87040 BLOOD CULTURE FOR BACTERIA: CPT | Performed by: INTERNAL MEDICINE

## 2023-07-31 PROCEDURE — 63600175 PHARM REV CODE 636 W HCPCS

## 2023-07-31 PROCEDURE — 85610 PROTHROMBIN TIME: CPT | Performed by: INTERNAL MEDICINE

## 2023-07-31 PROCEDURE — 63600175 PHARM REV CODE 636 W HCPCS: Performed by: INTERNAL MEDICINE

## 2023-07-31 PROCEDURE — 99233 PR SUBSEQUENT HOSPITAL CARE,LEVL III: ICD-10-PCS | Mod: ,,, | Performed by: INTERNAL MEDICINE

## 2023-07-31 PROCEDURE — 25000003 PHARM REV CODE 250

## 2023-07-31 PROCEDURE — 25000003 PHARM REV CODE 250: Performed by: INTERNAL MEDICINE

## 2023-07-31 PROCEDURE — 85025 COMPLETE CBC W/AUTO DIFF WBC: CPT | Performed by: INTERNAL MEDICINE

## 2023-07-31 RX ADMIN — Medication: at 08:07

## 2023-07-31 RX ADMIN — CEFEPIME 2 G: 2 INJECTION, POWDER, FOR SOLUTION INTRAVENOUS at 08:07

## 2023-07-31 RX ADMIN — MIDODRINE HYDROCHLORIDE 5 MG: 5 TABLET ORAL at 11:07

## 2023-07-31 RX ADMIN — OXYCODONE HYDROCHLORIDE 5 MG: 5 TABLET ORAL at 10:07

## 2023-07-31 RX ADMIN — PIPERACILLIN AND TAZOBACTAM 4.5 G: 4; .5 INJECTION, POWDER, FOR SOLUTION INTRAVENOUS; PARENTERAL at 08:07

## 2023-07-31 RX ADMIN — MIDODRINE HYDROCHLORIDE 5 MG: 5 TABLET ORAL at 04:07

## 2023-07-31 RX ADMIN — CEFEPIME 2 G: 2 INJECTION, POWDER, FOR SOLUTION INTRAVENOUS at 12:07

## 2023-07-31 RX ADMIN — Medication 1 TABLET: at 08:07

## 2023-07-31 RX ADMIN — PIPERACILLIN AND TAZOBACTAM 4.5 G: 4; .5 INJECTION, POWDER, FOR SOLUTION INTRAVENOUS; PARENTERAL at 01:07

## 2023-07-31 RX ADMIN — ASPIRIN 81 MG: 81 TABLET, COATED ORAL at 08:07

## 2023-07-31 RX ADMIN — MIDODRINE HYDROCHLORIDE 5 MG: 5 TABLET ORAL at 08:07

## 2023-07-31 RX ADMIN — ATORVASTATIN CALCIUM 40 MG: 40 TABLET, FILM COATED ORAL at 08:07

## 2023-07-31 RX ADMIN — HEPARIN SODIUM 5000 UNITS: 5000 INJECTION, SOLUTION INTRAVENOUS; SUBCUTANEOUS at 09:07

## 2023-07-31 RX ADMIN — METOPROLOL SUCCINATE 25 MG: 25 TABLET, EXTENDED RELEASE ORAL at 08:07

## 2023-07-31 RX ADMIN — SILVER SULFADIAZINE: 10 CREAM TOPICAL at 08:07

## 2023-07-31 NOTE — ASSESSMENT & PLAN NOTE
2 out of 2 sets on admission positive for acinetobacter.  Repeat cultures ordered for clearance (7/31).   Plan as above.

## 2023-07-31 NOTE — SUBJECTIVE & OBJECTIVE
Interval History: Patient seen and examined at the bedside, reports doing ok, no overnight events.     Review of Systems   Constitutional:  Positive for activity change and appetite change.   Skin:  Positive for wound.     Objective:     Vital Signs (Most Recent):  Temp: 98.2 °F (36.8 °C) (07/31/23 0716)  Pulse: 86 (07/31/23 0716)  Resp: 18 (07/31/23 1047)  BP: 101/62 (07/31/23 0716)  SpO2: 100 % (07/31/23 0716) Vital Signs (24h Range):  Temp:  [97.4 °F (36.3 °C)-98.6 °F (37 °C)] 98.2 °F (36.8 °C)  Pulse:  [] 86  Resp:  [16-18] 18  SpO2:  [95 %-100 %] 100 %  BP: ()/(57-72) 101/62     Weight: 73.6 kg (162 lb 5.4 oz)  Body mass index is 22.02 kg/m².    Intake/Output Summary (Last 24 hours) at 7/31/2023 1155  Last data filed at 7/31/2023 0858  Gross per 24 hour   Intake --   Output 575 ml   Net -575 ml           Physical Exam  Vitals and nursing note reviewed. Exam conducted with a chaperone present.   Constitutional:       Appearance: He is ill-appearing.   HENT:      Head: Normocephalic and atraumatic.      Nose: Nose normal.      Mouth/Throat:      Mouth: Mucous membranes are moist.   Eyes:      Extraocular Movements: Extraocular movements intact.   Cardiovascular:      Rate and Rhythm: Normal rate and regular rhythm.      Pulses: Normal pulses.      Heart sounds: Normal heart sounds.   Pulmonary:      Effort: Pulmonary effort is normal.      Breath sounds: Normal breath sounds.   Abdominal:      General: Bowel sounds are normal.      Palpations: Abdomen is soft.   Musculoskeletal:         General: Tenderness present.      Cervical back: Normal range of motion.      Right lower leg: Edema present.      Left lower leg: Edema present.   Skin:     General: Skin is warm.      Findings: Erythema and lesion present.   Neurological:      General: No focal deficit present.      Mental Status: He is alert and oriented to person, place, and time. Mental status is at baseline.   Psychiatric:         Mood and  Affect: Mood normal.         Behavior: Behavior normal.            Significant Labs: All pertinent labs within the past 24 hours have been reviewed.    Significant Imaging: I have reviewed all pertinent imaging results/findings within the past 24 hours.

## 2023-07-31 NOTE — PLAN OF CARE
Problem: Adult Inpatient Plan of Care  Goal: Plan of Care Review  Outcome: Ongoing, Progressing  Goal: Patient-Specific Goal (Individualized)  Outcome: Ongoing, Progressing  Goal: Absence of Hospital-Acquired Illness or Injury  Outcome: Ongoing, Progressing  Goal: Optimal Comfort and Wellbeing  Outcome: Ongoing, Progressing  Goal: Readiness for Transition of Care  Outcome: Ongoing, Progressing     Problem: Diabetes Comorbidity  Goal: Blood Glucose Level Within Targeted Range  Outcome: Ongoing, Progressing     Problem: Adjustment to Illness (Sepsis/Septic Shock)  Goal: Optimal Coping  Outcome: Ongoing, Progressing     Problem: Bleeding (Sepsis/Septic Shock)  Goal: Absence of Bleeding  Outcome: Ongoing, Progressing     Problem: Glycemic Control Impaired (Sepsis/Septic Shock)  Goal: Blood Glucose Level Within Desired Range  Outcome: Ongoing, Progressing     Problem: Infection Progression (Sepsis/Septic Shock)  Goal: Absence of Infection Signs and Symptoms  Outcome: Ongoing, Progressing     Problem: Nutrition Impaired (Sepsis/Septic Shock)  Goal: Optimal Nutrition Intake  Outcome: Ongoing, Progressing     Problem: Fluid and Electrolyte Imbalance (Acute Kidney Injury/Impairment)  Goal: Fluid and Electrolyte Balance  Outcome: Ongoing, Progressing     Problem: Oral Intake Inadequate (Acute Kidney Injury/Impairment)  Goal: Optimal Nutrition Intake  Outcome: Ongoing, Progressing     Problem: Renal Function Impairment (Acute Kidney Injury/Impairment)  Goal: Effective Renal Function  Outcome: Ongoing, Progressing     Problem: Impaired Wound Healing  Goal: Optimal Wound Healing  Outcome: Ongoing, Progressing     Problem: Skin Injury Risk Increased  Goal: Skin Health and Integrity  Outcome: Ongoing, Progressing     Problem: Infection  Goal: Absence of Infection Signs and Symptoms  Outcome: Ongoing, Progressing

## 2023-07-31 NOTE — PROGRESS NOTES
"[facility]  General Surgery  Progress Note    Subjective:     History of Present Illness:  73 y/o male patient with recent hospitalizations is readmitted for sepsis.  He has known ulcerations of the bilateral lower extremities.  He has positive blood cultures and wound cultures. Not sure I trust the wound cultures, as the wounds are likely colonized with bacteria.  No urine cultures were performed.  Patient is not a great historian, but he sates that a "test is to be performed while he is asleep" in [place] on Aug 17th. He was seen by Dr. Mo a few weeks ago during hospital admission, and no intervention or surgery performed.   Surgery asked to evaluate wounds.       Post-Op Info:  Procedure(s) (LRB):  DEBRIDEMENT, LOWER EXTREMITY (Bilateral)         Interval History:   BLE dressings changed.  Venous static changes of the lower extremities with exposed excoriated dermis.  Cleansed with Vashe.  Silvadene, gauze, and Kerlix dressing applied.  Offloading boots applied.  Plan for OR debridement tomorrow.    Medications:  Continuous Infusions:  Scheduled Meds:   aspirin  81 mg Oral Daily    atorvastatin  40 mg Oral Daily    ceFEPime (MAXIPIME) IVPB  2 g Intravenous Q8H    heparin (porcine)  5,000 Units Subcutaneous Q8H    metoprolol succinate  25 mg Oral Daily    midodrine  5 mg Oral TID WM    mirtazapine  15 mg Oral QHS    multivitamin  1 tablet Oral Daily    silver sulfADIAZINE 1%   Topical (Top) Daily    urea   Topical (Top) BID     PRN Meds:acetaminophen, albuterol-ipratropium, bisacodyL, dextromethorphan-guaiFENesin  mg/5 ml, ondansetron, oxyCODONE, simethicone, traZODone     Review of patient's allergies indicates:  No Known Allergies  Objective:     Vital Signs (Most Recent):  Temp: 98 °F (36.7 °C) (07/31/23 1209)  Pulse: 79 (07/31/23 1209)  Resp: 18 (07/31/23 1209)  BP: (!) 89/47 (07/31/23 1211)  SpO2: 100 % (07/31/23 1209) Vital Signs (24h Range):  Temp:  [97.7 °F " (36.5 °C)-98.6 °F (37 °C)] 98 °F (36.7 °C)  Pulse:  [] 79  Resp:  [16-18] 18  SpO2:  [95 %-100 %] 100 %  BP: ()/(46-72) 89/47     Weight: 73.6 kg (162 lb 5.4 oz)  Body mass index is 22.02 kg/m².    Intake/Output - Last 3 Shifts         07/29 0700 07/30 0659 07/30 0700 07/31 0659 07/31 0700 08/01 0659    P.O. 480      I.V. (mL/kg)       IV Piggyback       Total Intake(mL/kg) 480 (6.5)      Urine (mL/kg/hr) 620 (0.4) 325 (0.2) 250 (0.4)    Total Output 620 325 250    Net -140 -325 -250           Urine Occurrence  300 x              Physical Exam  Vitals reviewed.   Cardiovascular:      Rate and Rhythm: Normal rate.   Pulmonary:      Effort: Pulmonary effort is normal. No respiratory distress.   Skin:     General: Skin is warm and dry.      Comments: BLE wounds, see media   Neurological:      Mental Status: He is alert. Mental status is at baseline.          Significant Labs:  I have reviewed all pertinent lab results within the past 24 hours.  CBC:   Recent Labs   Lab 07/31/23  0856   WBC 13.18*   RBC 3.38*   HGB 9.7*   HCT 29.9*      MCV 88.5   MCH 28.7   MCHC 32.4     CMP:   Recent Labs   Lab 07/27/23  2333 07/28/23  0825 07/31/23  0856   *   < > 85   CALCIUM 9.4   < > 7.6*   ALBUMIN 2.0*  --   --    PROT 8.5*  --   --    *   < > 141   K 4.1   < > 4.0   CO2 17*   < > 19*   CL 97*   < > 109*   BUN 67*   < > 26*   CREATININE 2.44*   < > 1.06   ALKPHOS 168*  --   --    ALT 12*  --   --    AST 14*  --   --    BILITOT 1.0  --   --     < > = values in this interval not displayed.       Significant Diagnostics:  I have reviewed all pertinent imaging results/findings within the past 24 hours.    Assessment/Plan:     Cellulitis of lower extremity  Continue iv abx  No surgical intervention deemed necessary  Will check wounds again, tomorrow.         Praful Olguin, CHRISTIANE  General Surgery  [facility]

## 2023-07-31 NOTE — ASSESSMENT & PLAN NOTE
This patient does have evidence of infective focus  My overall impression is sepsis.  Source: Skin and Soft Tissue (location bilateral lower extremity cellulitis)  Source control achieved by: Antibiotics as mentioned below.   Cultures as below

## 2023-07-31 NOTE — SUBJECTIVE & OBJECTIVE
Interval History:   BLE dressings changed.  Venous static changes of the lower extremities with exposed excoriated dermis.  Cleansed with Vashe.  Silvadene, gauze, and Kerlix dressing applied.  Offloading boots applied.  Plan for OR debridement tomorrow.    Medications:  Continuous Infusions:  Scheduled Meds:   aspirin  81 mg Oral Daily    atorvastatin  40 mg Oral Daily    ceFEPime (MAXIPIME) IVPB  2 g Intravenous Q8H    heparin (porcine)  5,000 Units Subcutaneous Q8H    metoprolol succinate  25 mg Oral Daily    midodrine  5 mg Oral TID WM    mirtazapine  15 mg Oral QHS    multivitamin  1 tablet Oral Daily    silver sulfADIAZINE 1%   Topical (Top) Daily    urea   Topical (Top) BID     PRN Meds:acetaminophen, albuterol-ipratropium, bisacodyL, dextromethorphan-guaiFENesin  mg/5 ml, ondansetron, oxyCODONE, simethicone, traZODone     Review of patient's allergies indicates:  No Known Allergies  Objective:     Vital Signs (Most Recent):  Temp: 98 °F (36.7 °C) (07/31/23 1209)  Pulse: 79 (07/31/23 1209)  Resp: 18 (07/31/23 1209)  BP: (!) 89/47 (07/31/23 1211)  SpO2: 100 % (07/31/23 1209) Vital Signs (24h Range):  Temp:  [97.7 °F (36.5 °C)-98.6 °F (37 °C)] 98 °F (36.7 °C)  Pulse:  [] 79  Resp:  [16-18] 18  SpO2:  [95 %-100 %] 100 %  BP: ()/(46-72) 89/47     Weight: 73.6 kg (162 lb 5.4 oz)  Body mass index is 22.02 kg/m².    Intake/Output - Last 3 Shifts         07/29 0700 07/30 0659 07/30 0700 07/31 0659 07/31 0700 08/01 0659    P.O. 480      I.V. (mL/kg)       IV Piggyback       Total Intake(mL/kg) 480 (6.5)      Urine (mL/kg/hr) 620 (0.4) 325 (0.2) 250 (0.4)    Total Output 620 325 250    Net -140 -325 -250           Urine Occurrence  300 x              Physical Exam  Vitals reviewed.   Cardiovascular:      Rate and Rhythm: Normal rate.   Pulmonary:      Effort: Pulmonary effort is normal. No respiratory distress.   Skin:     General: Skin is warm and dry.      Comments: BLE wounds, see media    Neurological:      Mental Status: He is alert. Mental status is at baseline.          Significant Labs:  I have reviewed all pertinent lab results within the past 24 hours.  CBC:   Recent Labs   Lab 07/31/23  0856   WBC 13.18*   RBC 3.38*   HGB 9.7*   HCT 29.9*      MCV 88.5   MCH 28.7   MCHC 32.4     CMP:   Recent Labs   Lab 07/27/23  2333 07/28/23  0825 07/31/23  0856   *   < > 85   CALCIUM 9.4   < > 7.6*   ALBUMIN 2.0*  --   --    PROT 8.5*  --   --    *   < > 141   K 4.1   < > 4.0   CO2 17*   < > 19*   CL 97*   < > 109*   BUN 67*   < > 26*   CREATININE 2.44*   < > 1.06   ALKPHOS 168*  --   --    ALT 12*  --   --    AST 14*  --   --    BILITOT 1.0  --   --     < > = values in this interval not displayed.       Significant Diagnostics:  I have reviewed all pertinent imaging results/findings within the past 24 hours.

## 2023-07-31 NOTE — PLAN OF CARE
Spoke with pt's niece Hilda and the family are looking at the SNF this afternoon, ss to f/u in am for final choice

## 2023-07-31 NOTE — ASSESSMENT & PLAN NOTE
Patient with Persistent (7 days or more) atrial fibrillation which is controlled currently with Beta Blocker.   Patient is currently in atrial fibrillation.  [id number] Score: 3. HASBLED Score: 2.   Anticoagulation on hold as may require surgical debridement.

## 2023-07-31 NOTE — ASSESSMENT & PLAN NOTE
Known chronic wounds on BLE for a few months.   Lost follow up with home health wound care reporting insurance did not approve it.   Photographs appear better compared to ones in 6/2023.  Inflammatory markers elevated.   Started on broad spectrum antibiotics- vancomycin and Zosyn.   Wound culture with pseudomonas, morganella and providencia growth, blood culture with acinetobacter- discontinued vancomycin.   Switched to cefepime given acinetobacter in blood (7/31)- discussed with pharmacy.   General surgery consulted for wound debridement/source control.   Wound care consulted.

## 2023-07-31 NOTE — PT/OT/SLP PROGRESS
"Occupational Therapy      Patient Name:  Esthela Contreras   MRN:  [id number]    Patient not seen today secondary to Patient unwilling to participate (pt refused all offers for OT tx; pt reports "I haven't had anything to eat and I do not feel up to it."). Will follow-up 8/1/23.    7/31/2023  "

## 2023-07-31 NOTE — PROGRESS NOTES
[facility] Medicine  Progress Note    Patient Name: Esthela Contreras  MRN: [id number]  Patient Class: IP- Inpatient   Admission Date: 7/27/2023  Length of Stay: 3 days  Attending Physician: Cortez Ford MD  Primary Care Provider: CHRISTIANE Bardales        Subjective:     Principal Problem:Severe sepsis        HPI:  Patient is a 71 yo male who presents to the hospital accompanied by his family with a complaint of wounds to his bilateral lower extremities. Patient is a poor historian, and history was provided by his daughter at the emergency department. Patient has a history of chronic wounds and was admitted to the hospital on 6/02/2023 of severe sepsis secondary to cellulitis of lower extremities and new onset of atrial fibrillation. He was subsequently discharged to [facility] for wound care.  His daughter reported that wounds were healing, and the patient was discharged home under the care of [facility] for continued care. However, the patient did not get any service from the  [facility], which culminated in the worsening of his wounds over the past few days. This is associated with pain and edema, but the patient denies any associated fever, chills, chest pain, shortness of breath, nausea and vomiting.     In the ED, the patient met sepsis criteria on arrival and was thus treated per sepsis protocol through IV hydration, blood culture collection before starting broad spectrum antibiotics. Ensuring work up was significant for WBC 27.6, H&H 13.5/41.9 with history of anemia and baseline H&H 10.2/33.6 on month ago, dehydration with hyponatremia with a sodium of 129, acute renal injury with a BUN/CR 67/2.44 and GFR 27 with a baseline BUN/CR 16/1.25 and GFR 92 one month ago. Anion Gap metabolic acidosis with a GAP of 19.  Initial Troponin of 153.9 with history of chronically elevated Troponin, and NTpBNP of 4,406 with a baseline of 20.7K about three weeks ago. Lactic  acid of 3.0. Patient will be admitted for further evaluation and management.            Overview/Hospital Course:  7/28: Worsening leukocytosis noted. Blood cultures 2/2 sets growing GNB, wound culture with GNB.     7/30: Wound cultures with morganella, providencia and pseudomonas. General surgery consulted.     7/31: Blood cultures returned with acinetobacter, antibiotic changed to cefepime per consultation with pharmacy. Awaiting general surgery's input regarding surgery.       Interval History: Patient seen and examined at the bedside, reports doing ok, no overnight events.     Review of Systems   Constitutional:  Positive for activity change and appetite change.   Skin:  Positive for wound.     Objective:     Vital Signs (Most Recent):  Temp: 98.2 °F (36.8 °C) (07/31/23 0716)  Pulse: 86 (07/31/23 0716)  Resp: 18 (07/31/23 1047)  BP: 101/62 (07/31/23 0716)  SpO2: 100 % (07/31/23 0716) Vital Signs (24h Range):  Temp:  [97.4 °F (36.3 °C)-98.6 °F (37 °C)] 98.2 °F (36.8 °C)  Pulse:  [] 86  Resp:  [16-18] 18  SpO2:  [95 %-100 %] 100 %  BP: ()/(57-72) 101/62     Weight: 73.6 kg (162 lb 5.4 oz)  Body mass index is 22.02 kg/m².    Intake/Output Summary (Last 24 hours) at 7/31/2023 1155  Last data filed at 7/31/2023 0858  Gross per 24 hour   Intake --   Output 575 ml   Net -575 ml           Physical Exam  Vitals and nursing note reviewed. Exam conducted with a chaperone present.   Constitutional:       Appearance: He is ill-appearing.   HENT:      Head: Normocephalic and atraumatic.      Nose: Nose normal.      Mouth/Throat:      Mouth: Mucous membranes are moist.   Eyes:      Extraocular Movements: Extraocular movements intact.   Cardiovascular:      Rate and Rhythm: Normal rate and regular rhythm.      Pulses: Normal pulses.      Heart sounds: Normal heart sounds.   Pulmonary:      Effort: Pulmonary effort is normal.      Breath sounds: Normal breath sounds.   Abdominal:      General: Bowel sounds are  normal.      Palpations: Abdomen is soft.   Musculoskeletal:         General: Tenderness present.      Cervical back: Normal range of motion.      Right lower leg: Edema present.      Left lower leg: Edema present.   Skin:     General: Skin is warm.      Findings: Erythema and lesion present.   Neurological:      General: No focal deficit present.      Mental Status: He is alert and oriented to person, place, and time. Mental status is at baseline.   Psychiatric:         Mood and Affect: Mood normal.         Behavior: Behavior normal.            Significant Labs: All pertinent labs within the past 24 hours have been reviewed.    Significant Imaging: I have reviewed all pertinent imaging results/findings within the past 24 hours.      Assessment/Plan:      * Severe sepsis  This patient does have evidence of infective focus  My overall impression is sepsis.  Source: Skin and Soft Tissue (location bilateral lower extremity cellulitis)  Source control achieved by: Antibiotics as mentioned below.   Cultures as below    Cellulitis of lower extremity  Known chronic wounds on BLE for a few months.   Lost follow up with home health wound care reporting insurance did not approve it.   Photographs appear better compared to ones in 6/2023.  Inflammatory markers elevated.   Started on broad spectrum antibiotics- vancomycin and Zosyn.   Wound culture with pseudomonas, morganella and providencia growth, blood culture with acinetobacter- discontinued vancomycin.   Switched to cefepime given acinetobacter in blood (7/31)- discussed with pharmacy.   General surgery consulted for wound debridement/source control.   Wound care consulted.        Gram-negative bacteremia  2 out of 2 sets on admission positive for acinetobacter.  Repeat cultures ordered for clearance (7/31).   Plan as above.     CHANDNI (acute kidney injury)  Patient with acute kidney injury/acute renal failure likely due to pre-renal azotemia due to dehydration   CHANDNI is  currently improving.   Baseline creatinine 1.25 about one month ago   Labs reviewed- Renal function/electrolytes with Estimated Creatinine Clearance: 63.6 mL/min (based on SCr of 1.06 mg/dL). according to latest data.   Monitor urine output and serial BMP and adjust therapy as needed.  Avoid nephrotoxins and renally dose meds for GFR listed above.      Lactic acidosis  Sec to severe sepsis.  Leading to high anion gap metabolic acidosis, bicarb 14 today.   S/p IV hydration.   Follow lactate.           Dehydration with hyponatremia  Suspect related to acute infection.  Na normal at baseline, admission Na 129.  Improved with IV hydration.     Type 2 MI (myocardial infarction)  Troponin elevation but trend is not significant.  Denies chest pain.  No EKG done on admission in ED.   Monitor for chest pain.           Chronic HFrEF (heart failure with reduced ejection fraction)  Last recorded Echo in 2019 showed an EF of 40%  Not in acute exacerbation.  Resume home Toprol, not on ACEI or ARB at home- unclear reason but suspect may be due to borderline low BP and taking midodrine at baseline.       Severe protein-calorie malnutrition  Nutrition consulted. Most recent weight and BMI monitored-     Measurements:  Wt Readings from Last 1 Encounters:   07/28/23 73.6 kg (162 lb 5.4 oz)   Body mass index is 22.02 kg/m².    Patient has been screened and assessed by RD.    Malnutrition Type:  Context: chronic illness  Level: severe    Malnutrition Characteristic Summary:  Weight Loss (Malnutrition): greater than 5% in 1 month  Energy Intake (Malnutrition): less than 75% for greater than or equal to 1 month  Subcutaneous Fat (Malnutrition): severe depletion  Muscle Mass (Malnutrition): severe depletion    Interventions/Recommendations (treatment strategy):  Recommend continue current diet as able/appropriate and tolerated. Also recommend addition of Ensure Max Protein TID and Abel BID to improve kcal/protein intakes and aid in wound  healing. Also recommend consider addition of 500mg Vitamin C and 220mg ZnSO4 BID to aid in wound healing.    A-fib  Patient with Persistent (7 days or more) atrial fibrillation which is controlled currently with Beta Blocker.   Patient is currently in atrial fibrillation.  [id number] Score: 3. HASBLED Score: 2.   Anticoagulation on hold as may require surgical debridement.     COPD (chronic obstructive pulmonary disease)  DuoNeb q6h PRN  Supplemental oxygenation    Essential hypertension  On Toprol but also on midodrine.  Suspect Toprol is for HFrEF and midodrine is to avoid hypotension.      Hyperlipemia  Continue home statin    Depression  Patient has persistent depression which is moderate and is currently controlled.   Will Continue anti-depressant medication- Remeron.   We will not consult psychiatry at this time.   Patient does not display psychosis at this time.   Continue to monitor closely and adjust plan of care as needed.        Multiple and open wound of lower limb, complicated  Management as above.       Type 2 diabetes mellitus  Patient's FSGs are controlled on current medication regimen.  Last A1c reviewed-   Lab Results   Component Value Date    HGBA1C 5.5 06/02/2023     Most recent fingerstick glucose reviewed- No results for input(s): POCTGLUCOSE in the last 24 hours.  Current correctional scale  Medium  Maintain anti-hyperglycemic dose as follows-   Antihyperglycemics (From admission, onward)    None        Hold Oral hypoglycemics while patient is in the hospital.      VTE Risk Mitigation (From admission, onward)         Ordered     heparin (porcine) injection 5,000 Units  Every 8 hours         07/29/23 1052                Discharge Planning   MAKENNA: 8/4/2023     Code Status: Full Code   Is the patient medically ready for discharge?: No    Reason for patient still in hospital (select all that apply): Laboratory test and Consult recommendations  Discharge Plan A: Skilled Nursing Facility                   TIFFANIE VENTURA MD  Department of Hospital Medicine   [facility] Short Stay [facility]

## 2023-07-31 NOTE — PT/OT/SLP PROGRESS
"Physical Therapy      Patient Name:  Esthela Contreras   MRN:  [id number]    Patient not seen today secondary to Patient unwilling to participate. pt declined all offers of PT post wound care, stating "I don't want no exercise", "they been working on my legs and cleaning them". Will follow-up next treatment date.    PT POC discussed with Iona Tobar DPT      "

## 2023-07-31 NOTE — ASSESSMENT & PLAN NOTE
Nutrition consulted. Most recent weight and BMI monitored-     Measurements:  Wt Readings from Last 1 Encounters:   07/28/23 73.6 kg (162 lb 5.4 oz)   Body mass index is 22.02 kg/m².    Patient has been screened and assessed by RD.    Malnutrition Type:  Context: chronic illness  Level: severe    Malnutrition Characteristic Summary:  Weight Loss (Malnutrition): greater than 5% in 1 month  Energy Intake (Malnutrition): less than 75% for greater than or equal to 1 month  Subcutaneous Fat (Malnutrition): severe depletion  Muscle Mass (Malnutrition): severe depletion    Interventions/Recommendations (treatment strategy):  Recommend continue current diet as able/appropriate and tolerated. Also recommend addition of Ensure Max Protein TID and Abel BID to improve kcal/protein intakes and aid in wound healing. Also recommend consider addition of 500mg Vitamin C and 220mg ZnSO4 BID to aid in wound healing.

## 2023-07-31 NOTE — ASSESSMENT & PLAN NOTE
>>ASSESSMENT AND PLAN FOR SEPSIS WRITTEN ON 7/31/2023 11:55 AM BY TIFFANIE VENTURA MD    This patient does have evidence of infective focus  My overall impression is sepsis.  Source: Skin and Soft Tissue (location bilateral lower extremity cellulitis)  Source control achieved by: Antibiotics as mentioned below.   Cultures as below

## 2023-07-31 NOTE — ASSESSMENT & PLAN NOTE
Patient with acute kidney injury/acute renal failure likely due to pre-renal azotemia due to dehydration   CHANDNI is currently improving.   Baseline creatinine 1.25 about one month ago   Labs reviewed- Renal function/electrolytes with Estimated Creatinine Clearance: 63.6 mL/min (based on SCr of 1.06 mg/dL). according to latest data.   Monitor urine output and serial BMP and adjust therapy as needed.  Avoid nephrotoxins and renally dose meds for GFR listed above.

## 2023-08-01 ENCOUNTER — ANESTHESIA (OUTPATIENT)
Dept: SURGERY | Facility: HOSPITAL | Age: 73
DRG: 871 | End: 2023-08-01
Payer: MEDICARE

## 2023-08-01 ENCOUNTER — ANESTHESIA EVENT (OUTPATIENT)
Dept: SURGERY | Facility: HOSPITAL | Age: 73
DRG: 871 | End: 2023-08-01
Payer: MEDICARE

## 2023-08-01 PROBLEM — E87.1 DEHYDRATION WITH HYPONATREMIA: Status: RESOLVED | Noted: 2023-07-28 | Resolved: 2023-08-01

## 2023-08-01 PROBLEM — E86.0 DEHYDRATION WITH HYPONATREMIA: Status: RESOLVED | Noted: 2023-07-28 | Resolved: 2023-08-01

## 2023-08-01 LAB
ANION GAP SERPL CALCULATED.3IONS-SCNC: 11 MMOL/L (ref 7–16)
ANISOCYTOSIS BLD QL SMEAR: ABNORMAL
BASOPHILS # BLD AUTO: 0.11 K/UL (ref 0–0.2)
BASOPHILS NFR BLD AUTO: 0.9 % (ref 0–1)
BASOPHILS NFR BLD MANUAL: 1 % (ref 0–1)
BUN SERPL-MCNC: 24 MG/DL (ref 7–18)
BUN/CREAT SERPL: 28 (ref 6–20)
CALCIUM SERPL-MCNC: 7.5 MG/DL (ref 8.5–10.1)
CHLORIDE SERPL-SCNC: 115 MMOL/L (ref 98–107)
CO2 SERPL-SCNC: 21 MMOL/L (ref 21–32)
CREAT SERPL-MCNC: 0.85 MG/DL (ref 0.7–1.3)
DIFFERENTIAL METHOD BLD: ABNORMAL
EGFR (NO RACE VARIABLE) (RUSH/TITUS): 91 ML/MIN/1.73M2
EOSINOPHIL # BLD AUTO: 0.18 K/UL (ref 0–0.5)
EOSINOPHIL NFR BLD AUTO: 1.5 % (ref 1–4)
ERYTHROCYTE [DISTWIDTH] IN BLOOD BY AUTOMATED COUNT: 17.2 % (ref 11.5–14.5)
GLUCOSE SERPL-MCNC: 80 MG/DL (ref 74–106)
GLUCOSE SERPL-MCNC: 91 MG/DL (ref 70–105)
HCT VFR BLD AUTO: 31.2 % (ref 40–54)
HGB BLD-MCNC: 9.7 G/DL (ref 13.5–18)
IMM GRANULOCYTES # BLD AUTO: 0.47 K/UL (ref 0–0.04)
IMM GRANULOCYTES NFR BLD: 3.9 % (ref 0–0.4)
LYMPHOCYTES # BLD AUTO: 1.56 K/UL (ref 1–4.8)
LYMPHOCYTES NFR BLD AUTO: 12.9 % (ref 27–41)
LYMPHOCYTES NFR BLD MANUAL: 13 % (ref 27–41)
MCH RBC QN AUTO: 28.5 PG (ref 27–31)
MCHC RBC AUTO-ENTMCNC: 31.1 G/DL (ref 32–36)
MCV RBC AUTO: 91.8 FL (ref 80–96)
MONOCYTES # BLD AUTO: 0.95 K/UL (ref 0–0.8)
MONOCYTES NFR BLD AUTO: 7.9 % (ref 2–6)
MONOCYTES NFR BLD MANUAL: 4 % (ref 2–6)
MPC BLD CALC-MCNC: 10.9 FL (ref 9.4–12.4)
NEUTROPHILS # BLD AUTO: 8.8 K/UL (ref 1.8–7.7)
NEUTROPHILS NFR BLD AUTO: 72.9 % (ref 53–65)
NEUTS SEG NFR BLD MANUAL: 82 % (ref 50–62)
NRBC # BLD AUTO: 0 X10E3/UL
NRBC, AUTO (.00): 0 %
PLATELET # BLD AUTO: 230 K/UL (ref 150–400)
PLATELET MORPHOLOGY: ABNORMAL
POTASSIUM SERPL-SCNC: 4.4 MMOL/L (ref 3.5–5.1)
RBC # BLD AUTO: 3.4 M/UL (ref 4.6–6.2)
SODIUM SERPL-SCNC: 143 MMOL/L (ref 136–145)
WBC # BLD AUTO: 12.07 K/UL (ref 4.5–11)

## 2023-08-01 PROCEDURE — 87070 CULTURE OTHR SPECIMN AEROBIC: CPT | Performed by: FAMILY MEDICINE

## 2023-08-01 PROCEDURE — D9220A PRA ANESTHESIA: ICD-10-PCS | Mod: ANES,,, | Performed by: ANESTHESIOLOGY

## 2023-08-01 PROCEDURE — 25000003 PHARM REV CODE 250: Performed by: FAMILY MEDICINE

## 2023-08-01 PROCEDURE — 63600175 PHARM REV CODE 636 W HCPCS: Performed by: INTERNAL MEDICINE

## 2023-08-01 PROCEDURE — 27000177 HC AIRWAY, LARYNGEAL MASK: Performed by: ANESTHESIOLOGY

## 2023-08-01 PROCEDURE — 25000003 PHARM REV CODE 250: Performed by: HOSPITALIST

## 2023-08-01 PROCEDURE — 11106 PR INCISIONAL BIOPSY, SKIN, SINGLE LESION: ICD-10-PCS | Mod: ,,, | Performed by: SURGERY

## 2023-08-01 PROCEDURE — 97598 DBRDMT OPN WND ADDL 20CM/<: CPT | Mod: 59,,, | Performed by: SURGERY

## 2023-08-01 PROCEDURE — 85025 COMPLETE CBC W/AUTO DIFF WBC: CPT | Performed by: INTERNAL MEDICINE

## 2023-08-01 PROCEDURE — 36000706: Performed by: SURGERY

## 2023-08-01 PROCEDURE — 25000003 PHARM REV CODE 250: Performed by: INTERNAL MEDICINE

## 2023-08-01 PROCEDURE — 97597 DBRDMT OPN WND 1ST 20 CM/<: CPT | Mod: 59,,, | Performed by: SURGERY

## 2023-08-01 PROCEDURE — 11106 INCAL BX SKN SINGLE LES: CPT | Mod: ,,, | Performed by: SURGERY

## 2023-08-01 PROCEDURE — 63600175 PHARM REV CODE 636 W HCPCS: Performed by: FAMILY MEDICINE

## 2023-08-01 PROCEDURE — 25000003 PHARM REV CODE 250: Performed by: NURSE ANESTHETIST, CERTIFIED REGISTERED

## 2023-08-01 PROCEDURE — 88304 SURGICAL PATHOLOGY: ICD-10-PCS | Mod: 26,,, | Performed by: PATHOLOGY

## 2023-08-01 PROCEDURE — 88304 TISSUE EXAM BY PATHOLOGIST: CPT | Mod: 26,,, | Performed by: PATHOLOGY

## 2023-08-01 PROCEDURE — 80048 BASIC METABOLIC PNL TOTAL CA: CPT | Performed by: INTERNAL MEDICINE

## 2023-08-01 PROCEDURE — 97598 PR DEBRIDEMENT OPEN WOUND EA ADDL 20 SQ CM: ICD-10-PCS | Mod: 59,,, | Performed by: SURGERY

## 2023-08-01 PROCEDURE — 71000033 HC RECOVERY, INTIAL HOUR: Performed by: SURGERY

## 2023-08-01 PROCEDURE — 82962 GLUCOSE BLOOD TEST: CPT

## 2023-08-01 PROCEDURE — 99233 SBSQ HOSP IP/OBS HIGH 50: CPT | Mod: ,,, | Performed by: FAMILY MEDICINE

## 2023-08-01 PROCEDURE — 37000009 HC ANESTHESIA EA ADD 15 MINS: Performed by: SURGERY

## 2023-08-01 PROCEDURE — D9220A PRA ANESTHESIA: ICD-10-PCS | Mod: CRNA,,, | Performed by: NURSE ANESTHETIST, CERTIFIED REGISTERED

## 2023-08-01 PROCEDURE — 88304 TISSUE EXAM BY PATHOLOGIST: CPT | Mod: TC,SUR | Performed by: SURGERY

## 2023-08-01 PROCEDURE — D9220A PRA ANESTHESIA: Mod: CRNA,,, | Performed by: NURSE ANESTHETIST, CERTIFIED REGISTERED

## 2023-08-01 PROCEDURE — 37000008 HC ANESTHESIA 1ST 15 MINUTES: Performed by: SURGERY

## 2023-08-01 PROCEDURE — 99233 PR SUBSEQUENT HOSPITAL CARE,LEVL III: ICD-10-PCS | Mod: ,,, | Performed by: FAMILY MEDICINE

## 2023-08-01 PROCEDURE — 87075 CULTR BACTERIA EXCEPT BLOOD: CPT | Performed by: FAMILY MEDICINE

## 2023-08-01 PROCEDURE — 97597 PR DEBRIDEMENT OPEN WOUND 20 SQ CM<: ICD-10-PCS | Mod: 59,,, | Performed by: SURGERY

## 2023-08-01 PROCEDURE — 11000001 HC ACUTE MED/SURG PRIVATE ROOM

## 2023-08-01 PROCEDURE — 27000655: Performed by: ANESTHESIOLOGY

## 2023-08-01 PROCEDURE — 27000716 HC OXISENSOR PROBE, ANY SIZE: Performed by: ANESTHESIOLOGY

## 2023-08-01 PROCEDURE — 63600175 PHARM REV CODE 636 W HCPCS: Performed by: NURSE ANESTHETIST, CERTIFIED REGISTERED

## 2023-08-01 PROCEDURE — 36000707: Performed by: SURGERY

## 2023-08-01 PROCEDURE — D9220A PRA ANESTHESIA: Mod: ANES,,, | Performed by: ANESTHESIOLOGY

## 2023-08-01 RX ORDER — CEFAZOLIN SODIUM 1 G/3ML
INJECTION, POWDER, FOR SOLUTION INTRAMUSCULAR; INTRAVENOUS
Status: DISCONTINUED | OUTPATIENT
Start: 2023-08-01 | End: 2023-08-01

## 2023-08-01 RX ORDER — SODIUM CHLORIDE, SODIUM LACTATE, POTASSIUM CHLORIDE, CALCIUM CHLORIDE 600; 310; 30; 20 MG/100ML; MG/100ML; MG/100ML; MG/100ML
INJECTION, SOLUTION INTRAVENOUS CONTINUOUS PRN
Status: DISCONTINUED | OUTPATIENT
Start: 2023-08-01 | End: 2023-08-01

## 2023-08-01 RX ORDER — PROPOFOL 10 MG/ML
VIAL (ML) INTRAVENOUS
Status: DISCONTINUED | OUTPATIENT
Start: 2023-08-01 | End: 2023-08-01

## 2023-08-01 RX ORDER — MORPHINE SULFATE 10 MG/ML
4 INJECTION INTRAMUSCULAR; INTRAVENOUS; SUBCUTANEOUS EVERY 5 MIN PRN
Status: DISCONTINUED | OUTPATIENT
Start: 2023-08-01 | End: 2023-08-01 | Stop reason: HOSPADM

## 2023-08-01 RX ORDER — OXYCODONE HYDROCHLORIDE 5 MG/1
5 TABLET ORAL
Status: DISCONTINUED | OUTPATIENT
Start: 2023-08-01 | End: 2023-08-01 | Stop reason: HOSPADM

## 2023-08-01 RX ORDER — SODIUM CHLORIDE, SODIUM LACTATE, POTASSIUM CHLORIDE, CALCIUM CHLORIDE 600; 310; 30; 20 MG/100ML; MG/100ML; MG/100ML; MG/100ML
125 INJECTION, SOLUTION INTRAVENOUS CONTINUOUS
Status: DISCONTINUED | OUTPATIENT
Start: 2023-08-01 | End: 2023-08-01

## 2023-08-01 RX ORDER — HYDROMORPHONE HYDROCHLORIDE 2 MG/ML
0.5 INJECTION, SOLUTION INTRAMUSCULAR; INTRAVENOUS; SUBCUTANEOUS EVERY 5 MIN PRN
Status: DISCONTINUED | OUTPATIENT
Start: 2023-08-01 | End: 2023-08-01 | Stop reason: HOSPADM

## 2023-08-01 RX ORDER — PHENYLEPHRINE HYDROCHLORIDE 10 MG/ML
INJECTION INTRAVENOUS
Status: DISCONTINUED | OUTPATIENT
Start: 2023-08-01 | End: 2023-08-01

## 2023-08-01 RX ORDER — MEPERIDINE HYDROCHLORIDE 25 MG/ML
25 INJECTION INTRAMUSCULAR; INTRAVENOUS; SUBCUTANEOUS EVERY 10 MIN PRN
Status: DISCONTINUED | OUTPATIENT
Start: 2023-08-01 | End: 2023-08-01 | Stop reason: HOSPADM

## 2023-08-01 RX ORDER — DIPHENHYDRAMINE HYDROCHLORIDE 50 MG/ML
25 INJECTION INTRAMUSCULAR; INTRAVENOUS EVERY 6 HOURS PRN
Status: DISCONTINUED | OUTPATIENT
Start: 2023-08-01 | End: 2023-08-01 | Stop reason: HOSPADM

## 2023-08-01 RX ORDER — FENTANYL CITRATE 50 UG/ML
INJECTION, SOLUTION INTRAMUSCULAR; INTRAVENOUS
Status: DISCONTINUED | OUTPATIENT
Start: 2023-08-01 | End: 2023-08-01

## 2023-08-01 RX ORDER — ONDANSETRON 2 MG/ML
4 INJECTION INTRAMUSCULAR; INTRAVENOUS DAILY PRN
Status: DISCONTINUED | OUTPATIENT
Start: 2023-08-01 | End: 2023-08-01 | Stop reason: HOSPADM

## 2023-08-01 RX ORDER — LIDOCAINE HYDROCHLORIDE 20 MG/ML
INJECTION, SOLUTION EPIDURAL; INFILTRATION; INTRACAUDAL; PERINEURAL
Status: DISCONTINUED | OUTPATIENT
Start: 2023-08-01 | End: 2023-08-01

## 2023-08-01 RX ORDER — ONDANSETRON 2 MG/ML
INJECTION INTRAMUSCULAR; INTRAVENOUS
Status: DISCONTINUED | OUTPATIENT
Start: 2023-08-01 | End: 2023-08-01

## 2023-08-01 RX ORDER — ETOMIDATE 2 MG/ML
INJECTION INTRAVENOUS
Status: DISCONTINUED | OUTPATIENT
Start: 2023-08-01 | End: 2023-08-01

## 2023-08-01 RX ADMIN — OXYCODONE HYDROCHLORIDE 5 MG: 5 TABLET ORAL at 02:08

## 2023-08-01 RX ADMIN — Medication: at 09:08

## 2023-08-01 RX ADMIN — ETOMIDATE 10 MG: 2 INJECTION, SOLUTION INTRAVENOUS at 08:08

## 2023-08-01 RX ADMIN — FENTANYL CITRATE 25 MCG: 50 INJECTION INTRAMUSCULAR; INTRAVENOUS at 08:08

## 2023-08-01 RX ADMIN — CEFEPIME 2 G: 2 INJECTION, POWDER, FOR SOLUTION INTRAVENOUS at 09:08

## 2023-08-01 RX ADMIN — CEFEPIME 2 G: 2 INJECTION, POWDER, FOR SOLUTION INTRAVENOUS at 01:08

## 2023-08-01 RX ADMIN — MIDODRINE HYDROCHLORIDE 5 MG: 5 TABLET ORAL at 05:08

## 2023-08-01 RX ADMIN — LIDOCAINE HYDROCHLORIDE 80 MG: 20 INJECTION, SOLUTION INTRAVENOUS at 08:08

## 2023-08-01 RX ADMIN — PHENYLEPHRINE HYDROCHLORIDE 200 MCG: 10 INJECTION INTRAVENOUS at 08:08

## 2023-08-01 RX ADMIN — SODIUM CHLORIDE, POTASSIUM CHLORIDE, SODIUM LACTATE AND CALCIUM CHLORIDE: 600; 310; 30; 20 INJECTION, SOLUTION INTRAVENOUS at 08:08

## 2023-08-01 RX ADMIN — CEFEPIME 2 G: 2 INJECTION, POWDER, FOR SOLUTION INTRAVENOUS at 04:08

## 2023-08-01 RX ADMIN — GENTAMICIN SULFATE 360 MG: 40 INJECTION, SOLUTION INTRAMUSCULAR; INTRAVENOUS at 05:08

## 2023-08-01 RX ADMIN — PROPOFOL 30 MG: 10 INJECTION, EMULSION INTRAVENOUS at 08:08

## 2023-08-01 RX ADMIN — CEFAZOLIN 2 G: 1 INJECTION, POWDER, FOR SOLUTION INTRAMUSCULAR; INTRAVENOUS; PARENTERAL at 08:08

## 2023-08-01 RX ADMIN — MIDODRINE HYDROCHLORIDE 5 MG: 5 TABLET ORAL at 11:08

## 2023-08-01 RX ADMIN — HEPARIN SODIUM 5000 UNITS: 5000 INJECTION, SOLUTION INTRAVENOUS; SUBCUTANEOUS at 09:08

## 2023-08-01 RX ADMIN — ONDANSETRON 4 MG: 2 INJECTION INTRAMUSCULAR; INTRAVENOUS at 08:08

## 2023-08-01 RX ADMIN — BISACODYL 10 MG: 5 TABLET, COATED ORAL at 09:08

## 2023-08-01 RX ADMIN — PHENYLEPHRINE HYDROCHLORIDE 200 MCG: 10 INJECTION INTRAVENOUS at 09:08

## 2023-08-01 NOTE — ASSESSMENT & PLAN NOTE
Known chronic wounds on BLE for a few months.   Lost follow up with home health wound care reporting insurance did not approve it.   Photographs appear better compared to ones in 6/2023.  Inflammatory markers elevated.   Started on broad spectrum antibiotics- vancomycin and Zosyn.   Wound culture with pseudomonas, morganella and providencia growth, blood culture with acinetobacter- discontinued vancomycin.   Switched to cefepime given acinetobacter in blood (7/31)- discussed with pharmacy.   General surgery consulted for wound debridement/source control.   Wound care consulted.    8/1 - All organisms in blood and wound covered by cefipime. Pseudomonas in wound. Consider adding gent for double coverage.

## 2023-08-01 NOTE — INTERVAL H&P NOTE
The patient has been examined and the H&P has been reviewed:    I concur with the findings and changes have been noted since the H&P was written:  Blood cultures grew Acinetobacter, which does not match the wound.  However, there was no other source for infection, so exploration of the legs with washout and debridement is indicated to rule out this as the initial source of his sepsis.  Tissue specimen will be sent for evaluation by pathology as well.  Patient understands the risks and benefits to include infection bleeding further nonhealing in the possibility that further surgery would be required.  He expresses understanding and wishes to proceed.    Surgery risks, benefits and alternative options discussed and understood by patient/family.

## 2023-08-01 NOTE — ASSESSMENT & PLAN NOTE
Patient with acute kidney injury/acute renal failure likely due to pre-renal azotemia due to dehydration   CHANDNI is currently improving.   Baseline creatinine 1.25 about one month ago   Labs reviewed- Renal function/electrolytes with Estimated Creatinine Clearance: 78.7 mL/min (based on SCr of 0.85 mg/dL). according to latest data.   Monitor urine output and serial BMP and adjust therapy as needed.  Avoid nephrotoxins and renally dose meds for GFR listed above.    8/1 - Renal function improved. Considering gent addition.

## 2023-08-01 NOTE — OP NOTE
[facility] - Periop Services     Operative Note    SUMMARY     Date of Procedure: 8/1/2023     Procedure: Procedure(s) (LRB):  Selictive DEBRIDEMENT, LOWER EXTREMITY, wound biopsy right leg (Bilateral)     Surgeon(s) and Role:     * Kumar Spring MD - Primary    Assisting Surgeon: None    Pre-Operative Diagnosis: Multiple open wounds of lower extremity, complicated, unspecified laterality, subsequent encounter [S81.809D]    Post-Operative Diagnosis: Post-Op Diagnosis Codes:     * Multiple open wounds of lower extremity, complicated, unspecified laterality, subsequent encounter [S81.809D]    Anesthesia: General/MAC    .    Description of the Findings of the Procedure:  There was no area of abscess, nor any purulent fluid expressible from the wounds.  Fibrin and exudate were removed bilaterally using a curette to debride the wounds at the superficial level.  Wound measurements are as follows. Left leg posteriorly 11 cm x 13 cm.  Left lateral ankle 3.5 cm x 6.5 cm.   medial left ankle 3.5 cm x 6.5 cm  posterior right leg 16 cm x 18 cm.  Lateral right ankle 7.5 cm x 6 cm.  Dorsum of the right foot 6 cm x 4.9 cm.      An incisional biopsy was performed of the proximal aspect of the right lateral leg wound.        Technical Procedures Used: The patient was brought to the operating room and placed in supine position.  Following general anesthesia, bilateral lower extremities were prepped and draped standard fashion.  There was no purulent fluid expressible from the legs bilaterally.  A curette was used to selectively debride fibrin and exudate from the ulcerations of the left lower leg.  Subsequent to this, the curette was also used to selectively debride fibrin and exudate from the right lower leg.  A 15 blade was then used to perform incisional biopsy of proximal aspect of the anterior medial ulceration of the right leg.  Chromic suture was then used to close this defect in continuous running fashion.   Following irrigation, cultures were obtained from both legs, using the same swab for both legs.  Following this, all wounds were dressed with Xeroform followed by dry gauze wrap.  Patient was awakened from anesthesia in stable condition.    Assistant(s): WIL Barth    Complications: No    Estimated Blood Loss (EBL): 25 mL           Implants: * No implants in log *    Specimens:  Biopsy right leg wound  Specimen (24h ago, onward)       Start     Ordered    08/01/23 0928  Surgical Pathology  RELEASE UPON ORDERING         08/01/23 0928                     Condition: Good      Complications:  None
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
## 2023-08-01 NOTE — TRANSFER OF CARE
"Anesthesia Transfer of Care Note    Patient: Esthela Contreras    Procedure(s) Performed: Procedure(s) (LRB):  Selictive DEBRIDEMENT, LOWER EXTREMITY, wound biopsy right leg (Bilateral)    Patient location: PACU    Anesthesia Type: general    Transport from OR: Transported from OR on 6-10 L/min O2 by face mask with adequate spontaneous ventilation    Post pain: adequate analgesia    Post assessment: no apparent anesthetic complications    Post vital signs: stable    Level of consciousness: awake and alert    Nausea/Vomiting: no nausea/vomiting    Complications: none    Transfer of care protocol was followed      Last vitals:   Visit Vitals  /68   Pulse 94   Temp 36.4 °C (97.6 °F)   Resp 16   Ht 5' 10" (1.778 m)   Wt 77.4 kg (170 lb 9.6 oz)   SpO2 100%   BMI 24.48 kg/m²     "

## 2023-08-01 NOTE — PROGRESS NOTES
Patient had bilateral LE debridement this morning. Patient stated that he was sorry but he just could not participate today. He was hurting and too tired from his surgery.

## 2023-08-01 NOTE — ANESTHESIA POSTPROCEDURE EVALUATION
Anesthesia Post Evaluation    Patient: Esthela Contreras    Procedure(s) Performed: Procedure(s) (LRB):  SELECTIVE DEBRIDEMENT, LOWER EXTREMITY (Bilateral)  BIOPSY, SOFT TISSUE (Right)    Final Anesthesia Type: general      Patient location during evaluation: PACU  Patient participation: Yes- Able to Participate  Level of consciousness: awake and sedated  Post-procedure vital signs: reviewed and stable  Pain management: adequate  Airway patency: patent    PONV status at discharge: No PONV  Anesthetic complications: no      Cardiovascular status: blood pressure returned to baseline  Respiratory status: unassisted  Hydration status: euvolemic  Follow-up not needed.          Vitals Value Taken Time   /65 08/01/23 1115   Temp 36 °C (96.8 °F) 08/01/23 1030   Pulse 93 08/01/23 1115   Resp 18 08/01/23 1115   SpO2 100 % 08/01/23 1115         Event Time   Out of Recovery 08/01/2023 10:20:00         Pain/Heriberto Score: Pain Rating Prior to Med Admin: 0 (7/31/2023  7:48 PM)  Pain Rating Post Med Admin: 0 (7/31/2023 11:28 AM)  Heriberto Score: 10 (8/1/2023 10:10 AM)

## 2023-08-01 NOTE — SUBJECTIVE & OBJECTIVE
Interval History: Went for debridement this am. Cultures reviewed.  Acinetobacter in blood cx from 7/27.  That organism plus others in wound cx. All sensitive to cefipime.     Review of Systems  Objective:     Vital Signs (Most Recent):  Temp: 96.8 °F (36 °C) (08/01/23 1030)  Pulse: 96 (08/01/23 1230)  Resp: 18 (08/01/23 1230)  BP: 112/62 (08/01/23 1230)  SpO2: 100 % (08/01/23 1230) Vital Signs (24h Range):  Temp:  [96.8 °F (36 °C)-99.2 °F (37.3 °C)] 96.8 °F (36 °C)  Pulse:  [79-96] 96  Resp:  [11-18] 18  SpO2:  [96 %-100 %] 100 %  BP: ()/(32-69) 112/62     Weight: 77.4 kg (170 lb 9.6 oz)  Body mass index is 24.48 kg/m².    Intake/Output Summary (Last 24 hours) at 8/1/2023 1329  Last data filed at 8/1/2023 1010  Gross per 24 hour   Intake 425 ml   Output 825 ml   Net -400 ml         Physical Exam  Vitals and nursing note reviewed. Exam conducted with a chaperone present.   Constitutional:       General: He is not in acute distress.     Appearance: He is ill-appearing. He is not toxic-appearing.   HENT:      Head: Normocephalic and atraumatic.      Nose: Nose normal.      Mouth/Throat:      Mouth: Mucous membranes are moist.   Eyes:      Extraocular Movements: Extraocular movements intact.   Cardiovascular:      Rate and Rhythm: Normal rate and regular rhythm.      Pulses: Normal pulses.      Heart sounds: Normal heart sounds.   Pulmonary:      Effort: Pulmonary effort is normal.      Breath sounds: Normal breath sounds.   Abdominal:      General: Bowel sounds are normal.      Palpations: Abdomen is soft.   Musculoskeletal:         General: Tenderness present.      Cervical back: Normal range of motion.      Right lower leg: Edema present.      Left lower leg: Edema present.   Skin:     General: Skin is warm.      Findings: Erythema and lesion present.   Neurological:      General: No focal deficit present.      Mental Status: He is alert and oriented to person, place, and time. Mental status is at baseline.  "  Psychiatric:         Mood and Affect: Mood normal.         Behavior: Behavior normal.             Significant Labs: All pertinent labs within the past 24 hours have been reviewed.  Blood Culture: No results for input(s): "LABBLOO" in the last 48 hours.  BMP:   Recent Labs   Lab 08/01/23  0438   GLU 80      K 4.4   *   CO2 21   BUN 24*   CREATININE 0.85   CALCIUM 7.5*     CBC:   Recent Labs   Lab 07/31/23  0856 08/01/23  0438   WBC 13.18* 12.07*   HGB 9.7* 9.7*   HCT 29.9* 31.2*    230       Significant Imaging: I have reviewed all pertinent imaging results/findings within the past 24 hours.  "

## 2023-08-01 NOTE — OR NURSING
0938 Rec'd pt to PACU asleep with no distress noted, respirations even and unlabored. VSS. Bilateral lower leg dressings C/D/I, cap refill 4-5 sec, pedal pulses 1+. No needs at this time. Will continue to monitor.     0954 Called and spoke with pt family at this time. Update given and all questions answered.     1000 Pt c/o pain 10/10. BP 89/58. No IV narcotics given at this time. Explained reasoning to pt, verbalized understanding.     1020 Out of PACU. VSS. No signs of bleeding/distress noted.     1030 Pt to room 432 awake and alert with no distress noted, respirations even and unlabored. No visitors at bedside. Bedside report given to LINA Leo RN. Moved pt to regular bed with safety precautions in place. BLE dressings C/D/I, cap refill 4-5 sec, pedal pulses 1+. C/o pain, denies other needs. BP 99/66, P 86, R 18, O2 97% RA, T  97.8 temporal.

## 2023-08-01 NOTE — NURSING
1030 Pt arrival to room via stretcher at this time with nurse assist x1. Pt transferred from stretcher to low air loss bed with assist x2. Pt complaining of pain at this time. Pt educated on low blood pressure and pain medication. Position change completed to help assist in pain. Pt seems to be content. See flowsheet for vitals. Pt on room air. NADN. Surgery checks ongoing.

## 2023-08-01 NOTE — PLAN OF CARE
LITZY spoke with MD and pt will need 10 more days of iv Cefepime x2 daily. LITZY spoke with pts niece and she looked at [facility] and thinks [facility] would be a better option for SNF placement. However, pt will need iv abx twice a day and per Liam Perez, iv abx can only be once daily. LITZY informed pt and niece and pt gave choice for [facility]. Pts niece wants to speak with pt and call SW back re: placement. LITZY following.     1410: Pts niece called back and stated pt did not answer phone. LITZY spoke with pt and informed pt that his niece thought it would be closer for her to travel to [facility] rather than [place]. Pt stated he did not want to dc to [facility] and pt would dc to [facility]. LITZY informed pts niece per pts request. Referral made to Crys at [facility]. LITZY following.

## 2023-08-01 NOTE — ASSESSMENT & PLAN NOTE
Patient with Persistent (7 days or more) atrial fibrillation which is controlled currently with Beta Blocker.   Patient is currently in atrial fibrillation.  [id number] Score: 3. HASBLED Score: 2.   Anticoagulation on hold for surgical debridement. Resume tomorrow if no further debridement likely.

## 2023-08-01 NOTE — ANESTHESIA PROCEDURE NOTES
Intubation    Date/Time: 8/1/2023 8:37 AM    Performed by: Isha Goyal CRNA  Authorized by: Trung Mayo MD    Intubation:     Induction:  Intravenous    Intubated:  Postinduction    Mask Ventilation:  Easy mask    Attempts:  1    Attempted By:  CRNA    Method of Intubation:  Other (see comments)    Difficult Airway Encountered?: No      Complications:  None    Airway Device:  Supraglottic airway/LMA    Airway Device Size:  4.0    Style/Cuff Inflation:  Cuffed (inflated to minimal occlusive pressure)    Placement Verified By:  Capnometry    Complicating Factors:  None    Findings Post-Intubation:  BS equal bilateral and atraumatic/condition of teeth unchanged

## 2023-08-01 NOTE — PROGRESS NOTES
[facility] - Short Stay [facility] Medicine  Progress Note    Patient Name: Esthela Contreras  MRN: [id number]  Patient Class: IP- Inpatient   Admission Date: 7/27/2023  Length of Stay: 4 days  Attending Physician: Blue Rosario Jr., MD  Primary Care Provider: CHRISTIANE Bardales        Subjective:     Principal Problem:Severe sepsis        HPI:  Patient is a 73 yo male who presents to the hospital accompanied by his family with a complaint of wounds to his bilateral lower extremities. Patient is a poor historian, and history was provided by his daughter at the emergency department. Patient has a history of chronic wounds and was admitted to the hospital on 6/02/2023 of severe sepsis secondary to cellulitis of lower extremities and new onset of atrial fibrillation. He was subsequently discharged to [facility] for wound care.  His daughter reported that wounds were healing, and the patient was discharged home under the care of [facility] for continued care. However, the patient did not get any service from the  [facility], which culminated in the worsening of his wounds over the past few days. This is associated with pain and edema, but the patient denies any associated fever, chills, chest pain, shortness of breath, nausea and vomiting.     In the ED, the patient met sepsis criteria on arrival and was thus treated per sepsis protocol through IV hydration, blood culture collection before starting broad spectrum antibiotics. Ensuring work up was significant for WBC 27.6, H&H 13.5/41.9 with history of anemia and baseline H&H 10.2/33.6 on month ago, dehydration with hyponatremia with a sodium of 129, acute renal injury with a BUN/CR 67/2.44 and GFR 27 with a baseline BUN/CR 16/1.25 and GFR 92 one month ago. Anion Gap metabolic acidosis with a GAP of 19.  Initial Troponin of 153.9 with history of chronically elevated Troponin, and NTpBNP of 4,406 with a baseline of 20.7K about three weeks ago.  Lactic acid of 3.0. Patient will be admitted for further evaluation and management.            Overview/Hospital Course:  7/28: Worsening leukocytosis noted. Blood cultures 2/2 sets growing GNB, wound culture with GNB.     7/30: Wound cultures with morganella, providencia and pseudomonas. General surgery consulted.     7/31: Blood cultures returned with acinetobacter, antibiotic changed to cefepime per consultation with pharmacy. Awaiting general surgery's input regarding surgery.       Interval History: Went for debridement this am. Cultures reviewed.  Acinetobacter in blood cx from 7/27.  That organism plus others in wound cx. All sensitive to cefipime.     Review of Systems  Objective:     Vital Signs (Most Recent):  Temp: 96.8 °F (36 °C) (08/01/23 1030)  Pulse: 96 (08/01/23 1230)  Resp: 18 (08/01/23 1230)  BP: 112/62 (08/01/23 1230)  SpO2: 100 % (08/01/23 1230) Vital Signs (24h Range):  Temp:  [96.8 °F (36 °C)-99.2 °F (37.3 °C)] 96.8 °F (36 °C)  Pulse:  [79-96] 96  Resp:  [11-18] 18  SpO2:  [96 %-100 %] 100 %  BP: ()/(32-69) 112/62     Weight: 77.4 kg (170 lb 9.6 oz)  Body mass index is 24.48 kg/m².    Intake/Output Summary (Last 24 hours) at 8/1/2023 1329  Last data filed at 8/1/2023 1010  Gross per 24 hour   Intake 425 ml   Output 825 ml   Net -400 ml         Physical Exam  Vitals and nursing note reviewed. Exam conducted with a chaperone present.   Constitutional:       General: He is not in acute distress.     Appearance: He is ill-appearing. He is not toxic-appearing.   HENT:      Head: Normocephalic and atraumatic.      Nose: Nose normal.      Mouth/Throat:      Mouth: Mucous membranes are moist.   Eyes:      Extraocular Movements: Extraocular movements intact.   Cardiovascular:      Rate and Rhythm: Normal rate and regular rhythm.      Pulses: Normal pulses.      Heart sounds: Normal heart sounds.   Pulmonary:      Effort: Pulmonary effort is normal.      Breath sounds: Normal breath sounds.  "  Abdominal:      General: Bowel sounds are normal.      Palpations: Abdomen is soft.   Musculoskeletal:         General: Tenderness present.      Cervical back: Normal range of motion.      Right lower leg: Edema present.      Left lower leg: Edema present.   Skin:     General: Skin is warm.      Findings: Erythema and lesion present.   Neurological:      General: No focal deficit present.      Mental Status: He is alert and oriented to person, place, and time. Mental status is at baseline.   Psychiatric:         Mood and Affect: Mood normal.         Behavior: Behavior normal.             Significant Labs: All pertinent labs within the past 24 hours have been reviewed.  Blood Culture: No results for input(s): "LABBLOO" in the last 48 hours.  BMP:   Recent Labs   Lab 08/01/23  0438   GLU 80      K 4.4   *   CO2 21   BUN 24*   CREATININE 0.85   CALCIUM 7.5*     CBC:   Recent Labs   Lab 07/31/23  0856 08/01/23  0438   WBC 13.18* 12.07*   HGB 9.7* 9.7*   HCT 29.9* 31.2*    230       Significant Imaging: I have reviewed all pertinent imaging results/findings within the past 24 hours.      Assessment/Plan:      * Severe sepsis  This patient does have evidence of infective focus  My overall impression is sepsis.  Source: Skin and Soft Tissue (location bilateral lower extremity cellulitis)  Source control achieved by: Antibiotics as mentioned below.   Cultures as below    Cellulitis of lower extremity  Known chronic wounds on BLE for a few months.   Lost follow up with home health wound care reporting insurance did not approve it.   Photographs appear better compared to ones in 6/2023.  Inflammatory markers elevated.   Started on broad spectrum antibiotics- vancomycin and Zosyn.   Wound culture with pseudomonas, morganella and providencia growth, blood culture with acinetobacter- discontinued vancomycin.   Switched to cefepime given acinetobacter in blood (7/31)- discussed with pharmacy.   General " surgery consulted for wound debridement/source control.   Wound care consulted.    8/1 - All organisms in blood and wound covered by cefipime. Pseudomonas in wound. Consider adding gent for double coverage.          A-fib  Patient with Persistent (7 days or more) atrial fibrillation which is controlled currently with Beta Blocker.   Patient is currently in atrial fibrillation.  [id number] Score: 3. HASBLED Score: 2.   Anticoagulation on hold for surgical debridement. Resume tomorrow if no further debridement likely.     Chronic HFrEF (heart failure with reduced ejection fraction)  Last recorded Echo in 2019 showed an EF of 40%  Not in acute exacerbation.  Resume home Toprol, not on ACEI or ARB at home- unclear reason but suspect may be due to borderline low BP and taking midodrine at baseline.       CHANDNI (acute kidney injury)  Patient with acute kidney injury/acute renal failure likely due to pre-renal azotemia due to dehydration   CHANDNI is currently improving.   Baseline creatinine 1.25 about one month ago   Labs reviewed- Renal function/electrolytes with Estimated Creatinine Clearance: 78.7 mL/min (based on SCr of 0.85 mg/dL). according to latest data.   Monitor urine output and serial BMP and adjust therapy as needed.  Avoid nephrotoxins and renally dose meds for GFR listed above.    8/1 - Renal function improved. Considering gent addition.       Multiple and open wound of lower limb, complicated  Management as above.       Gram-negative bacteremia  2 out of 2 sets on admission positive for acinetobacter.  Repeat cultures ordered for clearance (7/31).   Plan as above.     Severe protein-calorie malnutrition  Nutrition consulted. Most recent weight and BMI monitored-     Measurements:  Wt Readings from Last 1 Encounters:   07/28/23 73.6 kg (162 lb 5.4 oz)   Body mass index is 22.02 kg/m².    Patient has been screened and assessed by RD.    Malnutrition Type:  Context: chronic illness  Level: severe    Malnutrition  Characteristic Summary:  Weight Loss (Malnutrition): greater than 5% in 1 month  Energy Intake (Malnutrition): less than 75% for greater than or equal to 1 month  Subcutaneous Fat (Malnutrition): severe depletion  Muscle Mass (Malnutrition): severe depletion    Interventions/Recommendations (treatment strategy):  Recommend continue current diet as able/appropriate and tolerated. Also recommend addition of Ensure Max Protein TID and Abel BID to improve kcal/protein intakes and aid in wound healing. Also recommend consider addition of 500mg Vitamin C and 220mg ZnSO4 BID to aid in wound healing.    Type 2 MI (myocardial infarction)  Troponin elevation but trend is not significant.  Denies chest pain.  No EKG done on admission in ED.   Monitor for chest pain.           Hyperlipemia  Continue home statin    Depression  Patient has persistent depression which is moderate and is currently controlled.   Will Continue anti-depressant medication- Remeron.   We will not consult psychiatry at this time.   Patient does not display psychosis at this time.   Continue to monitor closely and adjust plan of care as needed.        Type 2 diabetes mellitus  Patient's FSGs are controlled on current medication regimen.  Last A1c reviewed-   Lab Results   Component Value Date    HGBA1C 5.5 06/02/2023     Most recent fingerstick glucose reviewed- No results for input(s): POCTGLUCOSE in the last 24 hours.  Current correctional scale  Medium  Maintain anti-hyperglycemic dose as follows-   Antihyperglycemics (From admission, onward)    None        Hold Oral hypoglycemics while patient is in the hospital.    COPD (chronic obstructive pulmonary disease)  DuoNeb q6h PRN  Supplemental oxygenation    Lactic acidosis  Sec to severe sepsis.  Leading to high anion gap metabolic acidosis, bicarb 14 today.   S/p IV hydration.   Follow lactate.     8/1 - bicarb improved to 21.           Essential hypertension  On Toprol but also on midodrine.  Suspect  Toprol is for HFrEF and midodrine is to avoid hypotension.        VTE Risk Mitigation (From admission, onward)         Ordered     heparin (porcine) injection 5,000 Units  Every 8 hours         07/29/23 1052                Discharge Planning   MAKENNA: 8/4/2023     Code Status: Full Code   Is the patient medically ready for discharge?: No    Reason for patient still in hospital (select all that apply): Treatment  Discharge Plan A: Skilled Nursing Facility                  Blue Rosario Jr, MD  Department of Hospital Medicine   [facility] - Short Stay Unit

## 2023-08-01 NOTE — PT/OT/SLP PROGRESS
Physical Therapy      Patient Name:  Esthela Contreras   MRN:  [id number]    Patient not seen today secondary to Off the floor for procedure/surgery - debridement. Will follow-up next treatment date.

## 2023-08-01 NOTE — PROGRESS NOTES
Pharmacokinetic Initial Assessment: Gentamicin    Assessment:  Weight utilized for dose calculation: Ideal Body Weight  Dosing method utilized: extended interval dosing    Plan: Extended interval dosing regimen: Gentamicin 360 mg IV q24h with a gent trough ordered for 8/2 at 1530    Pharmacy will continue to monitor.    Please contact pharmacy at extension [phone] with any questions regarding this assessment.    Patient brief summary:  Esthela Contreras is a 72 y.o. male initiated on aminoglycoside therapy for treatment of suspected skin & soft tissue infection    Drug Allergies:   Review of patient's allergies indicates:  No Known Allergies    Actual Body Weight:   77.4 kg    Adjust Body Weight:   74.8 kg    Ideal Body Weight:  73 kg    Renal Function:   Estimated Creatinine Clearance: 78.7 mL/min (based on SCr of 0.85 mg/dL).,     Dialysis Method (if applicable):  N/A    CBC (last 72 hours):  Recent Labs   Lab Result Units 07/30/23 0426 07/31/23  0856 08/01/23  0438   WBC K/uL 12.75* 13.18* 12.07*   Hemoglobin g/dL 10.8* 9.7* 9.7*   Hematocrit % 35.9* 29.9* 31.2*   Platelet Count K/uL 266 219 230   Lymphocytes % % 11.5* 9.2* 12.9*   Lymphocytes, Man % %  --  7* 13*   Monocytes % % 7.1* 5.8 7.9*   Monocytes, Man % %  --  4 4   Eosinophils % % 1.0 0.9* 1.5   Eosinophils, Man % %  --  6*  --    Basophils % % 0.7 0.7 0.9   Basophils, Man % %  --   --  1       Metabolic Panel (last 72 hours):  Recent Labs   Lab Result Units 07/30/23 0426 07/31/23  0856 08/01/23  0438   Sodium mmol/L 135* 141 143   Potassium mmol/L 4.3 4.0 4.4   Chloride mmol/L 109* 109* 115*   CO2 mmol/L 14* 19* 21   Glucose mg/dL 72* 85 80   BUN mg/dL 39* 26* 24*   Creatinine mg/dL 1.24 1.06 0.85       Microbiologic Results:  Microbiology Results (last 7 days)       Procedure Component Value Units Date/Time    Culture, Anaerobe [[id number]] Collected: 08/01/23 0950    Order Status: Sent Specimen: Wound from Leg, Left Updated: 08/01/23 0950    Culture,  Wound [[id number]] Collected: 08/01/23 0950    Order Status: Sent Specimen: Wound from Leg, Left Updated: 08/01/23 0950    Blood culture (site 1) [[id number]] Collected: 07/31/23 1319    Order Status: Sent Specimen: Blood Updated: 07/31/23 1431    Blood culture (site 2) [[id number]] Collected: 07/31/23 1325    Order Status: Sent Specimen: Blood Updated: 07/31/23 1431    Blood culture #2 [[id number]]  (Abnormal)  (Susceptibility) Collected: 07/27/23 2328    Order Status: Completed Specimen: Blood from Wrist, Left Updated: 07/31/23 0833     Culture, Blood Acinetobacter baumannii complex/haemolyticus     Gram Stain Result Gram negative bacilli     Comment: From Aerobic Bottle       Blood culture #1 [[id number]]  (Abnormal)  (Susceptibility) Collected: 07/27/23 2328    Order Status: Completed Specimen: Blood from Peripheral, Antecubital, Left Updated: 07/31/23 0828     Culture, Blood Acinetobacter baumannii complex/haemolyticus     Gram Stain Result Gram negative bacilli     Comment: From Aerobic Bottle       Culture, Wound [[id number]]  (Abnormal)  (Susceptibility) Collected: 07/28/23 0021    Order Status: Completed Specimen: Wound from Leg, Left Updated: 07/30/23 0700     Culture, Wound/Abscess Heavy Growth Morganella morganii      Heavy Growth Pseudomonas aeruginosa      Heavy Growth Providencia stuartii    Culture, Wound [[id number]]  (Abnormal)  (Susceptibility) Collected: 07/28/23 0021    Order Status: Completed Specimen: Wound from Leg, Right Updated: 07/30/23 0651     Culture, Wound/Abscess Heavy Growth Morganella morganii      Heavy Growth Pseudomonas aeruginosa

## 2023-08-01 NOTE — ASSESSMENT & PLAN NOTE
Sec to severe sepsis.  Leading to high anion gap metabolic acidosis, bicarb 14 today.   S/p IV hydration.   Follow lactate.     8/1 - bicarb improved to 21.

## 2023-08-01 NOTE — ASSESSMENT & PLAN NOTE
>>ASSESSMENT AND PLAN FOR CELLULITIS OF LOWER EXTREMITY WRITTEN ON 8/1/2023  1:36 PM BY RODO THACKER JR., MD    Known chronic wounds on BLE for a few months.   Lost follow up with home health wound care reporting insurance did not approve it.   Photographs appear better compared to ones in 6/2023.  Inflammatory markers elevated.   Started on broad spectrum antibiotics- vancomycin and Zosyn.   Wound culture with pseudomonas, morganella and providencia growth, blood culture with acinetobacter- discontinued vancomycin.   Switched to cefepime given acinetobacter in blood (7/31)- discussed with pharmacy.   General surgery consulted for wound debridement/source control.   Wound care consulted.    8/1 - All organisms in blood and wound covered by cefipime. Pseudomonas in wound. Consider adding gent for double coverage.

## 2023-08-01 NOTE — ANESTHESIA PREPROCEDURE EVALUATION
08/01/2023  Esthela Contreras is a 72 y.o., male.      Pre-op Assessment    I have reviewed the Patient Summary Reports.     I have reviewed the Nursing Notes. I have reviewed the NPO Status.   I have reviewed the Medications.     Review of Systems  Anesthesia Hx:  No problems with previous Anesthesia    Social:  Non-Smoker, No Alcohol Use    Hematology/Oncology:  Hematology Normal   Oncology Normal     EENT/Dental:EENT/Dental Normal   Cardiovascular:   Hypertension, poorly controlled Valvular problems/Murmurs, MR Past MI CAD  Dysrhythmias atrial fibrillation CHF hyperlipidemia    Pulmonary:   COPD Shortness of breath    Renal/:   Chronic Renal Disease, CKD    Hepatic/GI:  Hepatic/GI Normal Protein malnutrition   Musculoskeletal:  Musculoskeletal Normal    Neurological:   CVA    Endocrine:   Diabetes, poorly controlled, type 2    Dermatological:  Skin Normal    Psych:  Psychiatric Normal           Physical Exam  General: Well nourished    Airway:  Mallampati: III / II  Mouth Opening: Normal  TM Distance: > 6 cm  Tongue: Normal  Neck ROM: Normal ROM    Chest/Lungs:  Clear to auscultation, Normal Respiratory Rate    Heart:  Rate: Normal  Rhythm: Regular Rhythm        Anesthesia Plan  Type of Anesthesia, risks & benefits discussed:    Anesthesia Type: Gen Supraglottic Airway  Intra-op Monitoring Plan: Standard ASA Monitors  Post Op Pain Control Plan: multimodal analgesia  Induction:  IV  Informed Consent: Informed consent signed with the Patient and all parties understand the risks and agree with anesthesia plan.  All questions answered. Patient consented to blood products? Yes  ASA Score: 2  Day of Surgery Review of History & Physical: H&P Update referred to the surgeon/provider.I have interviewed and examined the patient. I have reviewed the patient's H&P dated: There are no significant changes. H&P completed  by Anesthesiologist.    Ready For Surgery From Anesthesia Perspective.     .

## 2023-08-02 LAB
ANION GAP SERPL CALCULATED.3IONS-SCNC: 11 MMOL/L (ref 7–16)
ANISOCYTOSIS BLD QL SMEAR: ABNORMAL
BASOPHILS # BLD AUTO: 0.1 K/UL (ref 0–0.2)
BASOPHILS NFR BLD AUTO: 0.9 % (ref 0–1)
BASOPHILS NFR BLD MANUAL: 2 % (ref 0–1)
BUN SERPL-MCNC: 33 MG/DL (ref 7–18)
BUN/CREAT SERPL: 38 (ref 6–20)
CALCIUM SERPL-MCNC: 7.5 MG/DL (ref 8.5–10.1)
CHLORIDE SERPL-SCNC: 113 MMOL/L (ref 98–107)
CO2 SERPL-SCNC: 21 MMOL/L (ref 21–32)
CREAT SERPL-MCNC: 0.87 MG/DL (ref 0.7–1.3)
DHEA SERPL-MCNC: NORMAL
DIFFERENTIAL METHOD BLD: ABNORMAL
EGFR (NO RACE VARIABLE) (RUSH/TITUS): 91 ML/MIN/1.73M2
EOSINOPHIL # BLD AUTO: 0.15 K/UL (ref 0–0.5)
EOSINOPHIL NFR BLD AUTO: 1.3 % (ref 1–4)
ERYTHROCYTE [DISTWIDTH] IN BLOOD BY AUTOMATED COUNT: 17.2 % (ref 11.5–14.5)
ESTROGEN SERPL-MCNC: NORMAL PG/ML
GENTAMICIN TROUGH SERPL-MCNC: 1.9 ΜG/ML (ref 0.5–1.9)
GLUCOSE SERPL-MCNC: 118 MG/DL (ref 74–106)
HCT VFR BLD AUTO: 29.1 % (ref 40–54)
HGB BLD-MCNC: 9 G/DL (ref 13.5–18)
HYPOCHROMIA BLD QL SMEAR: ABNORMAL
IMM GRANULOCYTES # BLD AUTO: 0.4 K/UL (ref 0–0.04)
IMM GRANULOCYTES NFR BLD: 3.5 % (ref 0–0.4)
INSULIN SERPL-ACNC: NORMAL U[IU]/ML
LAB AP GROSS DESCRIPTION: NORMAL
LAB AP LABORATORY NOTES: NORMAL
LYMPHOCYTES # BLD AUTO: 1.71 K/UL (ref 1–4.8)
LYMPHOCYTES NFR BLD AUTO: 15 % (ref 27–41)
LYMPHOCYTES NFR BLD MANUAL: 13 % (ref 27–41)
MCH RBC QN AUTO: 28.6 PG (ref 27–31)
MCHC RBC AUTO-ENTMCNC: 30.9 G/DL (ref 32–36)
MCV RBC AUTO: 92.4 FL (ref 80–96)
MONOCYTES # BLD AUTO: 1.03 K/UL (ref 0–0.8)
MONOCYTES NFR BLD AUTO: 9 % (ref 2–6)
MONOCYTES NFR BLD MANUAL: 8 % (ref 2–6)
MPC BLD CALC-MCNC: 10.8 FL (ref 9.4–12.4)
NEUTROPHILS # BLD AUTO: 8 K/UL (ref 1.8–7.7)
NEUTROPHILS NFR BLD AUTO: 70.3 % (ref 53–65)
NEUTS SEG NFR BLD MANUAL: 77 % (ref 50–62)
NRBC # BLD AUTO: 0 X10E3/UL
NRBC, AUTO (.00): 0 %
OVALOCYTES BLD QL SMEAR: ABNORMAL
PLATELET # BLD AUTO: 197 K/UL (ref 150–400)
PLATELET MORPHOLOGY: ABNORMAL
POTASSIUM SERPL-SCNC: 4.9 MMOL/L (ref 3.5–5.1)
RBC # BLD AUTO: 3.15 M/UL (ref 4.6–6.2)
SODIUM SERPL-SCNC: 140 MMOL/L (ref 136–145)
T3RU NFR SERPL: NORMAL %
WBC # BLD AUTO: 11.39 K/UL (ref 4.5–11)

## 2023-08-02 PROCEDURE — 85025 COMPLETE CBC W/AUTO DIFF WBC: CPT | Performed by: INTERNAL MEDICINE

## 2023-08-02 PROCEDURE — 99232 SBSQ HOSP IP/OBS MODERATE 35: CPT | Mod: ,,, | Performed by: FAMILY MEDICINE

## 2023-08-02 PROCEDURE — 80170 ASSAY OF GENTAMICIN: CPT | Performed by: FAMILY MEDICINE

## 2023-08-02 PROCEDURE — 25000003 PHARM REV CODE 250: Performed by: INTERNAL MEDICINE

## 2023-08-02 PROCEDURE — 25000003 PHARM REV CODE 250: Performed by: FAMILY MEDICINE

## 2023-08-02 PROCEDURE — 25000003 PHARM REV CODE 250: Performed by: HOSPITALIST

## 2023-08-02 PROCEDURE — 80048 BASIC METABOLIC PNL TOTAL CA: CPT | Performed by: INTERNAL MEDICINE

## 2023-08-02 PROCEDURE — 63600175 PHARM REV CODE 636 W HCPCS: Performed by: FAMILY MEDICINE

## 2023-08-02 PROCEDURE — 11000001 HC ACUTE MED/SURG PRIVATE ROOM

## 2023-08-02 PROCEDURE — 25000003 PHARM REV CODE 250

## 2023-08-02 PROCEDURE — 99232 PR SUBSEQUENT HOSPITAL CARE,LEVL II: ICD-10-PCS | Mod: ,,, | Performed by: FAMILY MEDICINE

## 2023-08-02 PROCEDURE — 97110 THERAPEUTIC EXERCISES: CPT

## 2023-08-02 PROCEDURE — 63600175 PHARM REV CODE 636 W HCPCS: Performed by: INTERNAL MEDICINE

## 2023-08-02 PROCEDURE — 97535 SELF CARE MNGMENT TRAINING: CPT

## 2023-08-02 RX ORDER — POLYETHYLENE GLYCOL 3350 17 G/17G
17 POWDER, FOR SOLUTION ORAL DAILY
Status: DISCONTINUED | OUTPATIENT
Start: 2023-08-02 | End: 2023-08-18 | Stop reason: HOSPADM

## 2023-08-02 RX ADMIN — CEFEPIME 2 G: 2 INJECTION, POWDER, FOR SOLUTION INTRAVENOUS at 04:08

## 2023-08-02 RX ADMIN — ACETAMINOPHEN 1000 MG: 500 TABLET ORAL at 01:08

## 2023-08-02 RX ADMIN — CEFEPIME 2 G: 2 INJECTION, POWDER, FOR SOLUTION INTRAVENOUS at 01:08

## 2023-08-02 RX ADMIN — Medication: at 08:08

## 2023-08-02 RX ADMIN — MIDODRINE HYDROCHLORIDE 5 MG: 5 TABLET ORAL at 08:08

## 2023-08-02 RX ADMIN — Medication: at 09:08

## 2023-08-02 RX ADMIN — Medication 1 TABLET: at 08:08

## 2023-08-02 RX ADMIN — POLYETHYLENE GLYCOL 3350 17 G: 17 POWDER, FOR SOLUTION ORAL at 03:08

## 2023-08-02 RX ADMIN — ATORVASTATIN CALCIUM 40 MG: 40 TABLET, FILM COATED ORAL at 08:08

## 2023-08-02 RX ADMIN — ASPIRIN 81 MG: 81 TABLET, COATED ORAL at 08:08

## 2023-08-02 RX ADMIN — MIRTAZAPINE 15 MG: 7.5 TABLET, FILM COATED ORAL at 08:08

## 2023-08-02 RX ADMIN — MIDODRINE HYDROCHLORIDE 5 MG: 5 TABLET ORAL at 01:08

## 2023-08-02 RX ADMIN — GENTAMICIN SULFATE 360 MG: 40 INJECTION, SOLUTION INTRAMUSCULAR; INTRAVENOUS at 03:08

## 2023-08-02 RX ADMIN — CEFEPIME 2 G: 2 INJECTION, POWDER, FOR SOLUTION INTRAVENOUS at 08:08

## 2023-08-02 RX ADMIN — HEPARIN SODIUM 5000 UNITS: 5000 INJECTION, SOLUTION INTRAVENOUS; SUBCUTANEOUS at 01:08

## 2023-08-02 NOTE — SUBJECTIVE & OBJECTIVE
Interval History: No complaints except constipation. Discussed with surgery. If wounds look ok at tomorrow's dressing change can go back to hospital swing bed.     Review of Systems  Objective:     Vital Signs (Most Recent):  Temp: 98.2 °F (36.8 °C) (08/02/23 1525)  Pulse: 85 (08/02/23 1525)  Resp: 18 (08/02/23 1525)  BP: (!) 95/56 (08/02/23 1525)  SpO2: 98 % (08/02/23 1525) Vital Signs (24h Range):  Temp:  [97.1 °F (36.2 °C)-98.2 °F (36.8 °C)] 98.2 °F (36.8 °C)  Pulse:  [80-89] 85  Resp:  [18] 18  SpO2:  [98 %-100 %] 98 %  BP: ()/(52-62) 95/56     Weight: 77.4 kg (170 lb 9.6 oz)  Body mass index is 24.48 kg/m².    Intake/Output Summary (Last 24 hours) at 8/2/2023 1855  Last data filed at 8/2/2023 1333  Gross per 24 hour   Intake 720 ml   Output 1200 ml   Net -480 ml         Physical Exam  Vitals and nursing note reviewed. Exam conducted with a chaperone present.   Constitutional:       General: He is not in acute distress.     Appearance: He is not ill-appearing or toxic-appearing.   HENT:      Head: Normocephalic and atraumatic.      Nose: Nose normal.      Mouth/Throat:      Mouth: Mucous membranes are moist.   Eyes:      Extraocular Movements: Extraocular movements intact.   Cardiovascular:      Rate and Rhythm: Normal rate and regular rhythm.      Pulses: Normal pulses.      Heart sounds: Normal heart sounds.   Pulmonary:      Effort: Pulmonary effort is normal.      Breath sounds: Normal breath sounds.   Abdominal:      General: Bowel sounds are normal. There is no distension.      Palpations: Abdomen is soft.      Tenderness: There is no abdominal tenderness.   Musculoskeletal:         General: Tenderness present.      Cervical back: Normal range of motion.   Skin:     General: Skin is warm.      Findings: Erythema and lesion present.   Neurological:      General: No focal deficit present.      Mental Status: He is alert and oriented to person, place, and time. Mental status is at baseline.    Psychiatric:         Mood and Affect: Mood normal.         Behavior: Behavior normal.             Significant Labs: All pertinent labs within the past 24 hours have been reviewed.  BMP:   Recent Labs   Lab 08/02/23  0452   *      K 4.9   *   CO2 21   BUN 33*   CREATININE 0.87   CALCIUM 7.5*     CBC:   Recent Labs   Lab 08/01/23  0438 08/02/23  0451   WBC 12.07* 11.39*   HGB 9.7* 9.0*   HCT 31.2* 29.1*    197       Significant Imaging: I have reviewed all pertinent imaging results/findings within the past 24 hours.

## 2023-08-02 NOTE — PROGRESS NOTES
Postop day 1 following BLE superficial debridement with biopsy.  No abscess or evidence of infection with debridement.  Patient without complaints.  Dressings clean, dry, intact.  Plan to take down dressings tomorrow.

## 2023-08-02 NOTE — PROGRESS NOTES
[facility] - Short Stay [facility] Medicine  Progress Note    Patient Name: Esthela Contreras  MRN: [id number]  Patient Class: IP- Inpatient   Admission Date: 7/27/2023  Length of Stay: 5 days  Attending Physician: Blue Rosario Jr., MD  Primary Care Provider: CHRISTIANE Bardales        Subjective:     Principal Problem:Severe sepsis        HPI:  Patient is a 71 yo male who presents to the hospital accompanied by his family with a complaint of wounds to his bilateral lower extremities. Patient is a poor historian, and history was provided by his daughter at the emergency department. Patient has a history of chronic wounds and was admitted to the hospital on 6/02/2023 of severe sepsis secondary to cellulitis of lower extremities and new onset of atrial fibrillation. He was subsequently discharged to [facility] for wound care.  His daughter reported that wounds were healing, and the patient was discharged home under the care of [facility] for continued care. However, the patient did not get any service from the  [facility], which culminated in the worsening of his wounds over the past few days. This is associated with pain and edema, but the patient denies any associated fever, chills, chest pain, shortness of breath, nausea and vomiting.     In the ED, the patient met sepsis criteria on arrival and was thus treated per sepsis protocol through IV hydration, blood culture collection before starting broad spectrum antibiotics. Ensuring work up was significant for WBC 27.6, H&H 13.5/41.9 with history of anemia and baseline H&H 10.2/33.6 on month ago, dehydration with hyponatremia with a sodium of 129, acute renal injury with a BUN/CR 67/2.44 and GFR 27 with a baseline BUN/CR 16/1.25 and GFR 92 one month ago. Anion Gap metabolic acidosis with a GAP of 19.  Initial Troponin of 153.9 with history of chronically elevated Troponin, and NTpBNP of 4,406 with a baseline of 20.7K about three weeks ago.  Lactic acid of 3.0. Patient will be admitted for further evaluation and management.            Overview/Hospital Course:  7/28: Worsening leukocytosis noted. Blood cultures 2/2 sets growing GNB, wound culture with GNB.     7/30: Wound cultures with morganella, providencia and pseudomonas. General surgery consulted.     7/31: Blood cultures returned with acinetobacter, antibiotic changed to cefepime per consultation with pharmacy. Awaiting general surgery's input regarding surgery.       Interval History: No complaints except constipation. Discussed with surgery. If wounds look ok at tomorrow's dressing change can go back to hospital swing bed.     Review of Systems  Objective:     Vital Signs (Most Recent):  Temp: 98.2 °F (36.8 °C) (08/02/23 1525)  Pulse: 85 (08/02/23 1525)  Resp: 18 (08/02/23 1525)  BP: (!) 95/56 (08/02/23 1525)  SpO2: 98 % (08/02/23 1525) Vital Signs (24h Range):  Temp:  [97.1 °F (36.2 °C)-98.2 °F (36.8 °C)] 98.2 °F (36.8 °C)  Pulse:  [80-89] 85  Resp:  [18] 18  SpO2:  [98 %-100 %] 98 %  BP: ()/(52-62) 95/56     Weight: 77.4 kg (170 lb 9.6 oz)  Body mass index is 24.48 kg/m².    Intake/Output Summary (Last 24 hours) at 8/2/2023 1855  Last data filed at 8/2/2023 1333  Gross per 24 hour   Intake 720 ml   Output 1200 ml   Net -480 ml         Physical Exam  Vitals and nursing note reviewed. Exam conducted with a chaperone present.   Constitutional:       General: He is not in acute distress.     Appearance: He is not ill-appearing or toxic-appearing.   HENT:      Head: Normocephalic and atraumatic.      Nose: Nose normal.      Mouth/Throat:      Mouth: Mucous membranes are moist.   Eyes:      Extraocular Movements: Extraocular movements intact.   Cardiovascular:      Rate and Rhythm: Normal rate and regular rhythm.      Pulses: Normal pulses.      Heart sounds: Normal heart sounds.   Pulmonary:      Effort: Pulmonary effort is normal.      Breath sounds: Normal breath sounds.   Abdominal:       General: Bowel sounds are normal. There is no distension.      Palpations: Abdomen is soft.      Tenderness: There is no abdominal tenderness.   Musculoskeletal:         General: Tenderness present.      Cervical back: Normal range of motion.   Skin:     General: Skin is warm.      Findings: Erythema and lesion present.   Neurological:      General: No focal deficit present.      Mental Status: He is alert and oriented to person, place, and time. Mental status is at baseline.   Psychiatric:         Mood and Affect: Mood normal.         Behavior: Behavior normal.             Significant Labs: All pertinent labs within the past 24 hours have been reviewed.  BMP:   Recent Labs   Lab 08/02/23  0452   *      K 4.9   *   CO2 21   BUN 33*   CREATININE 0.87   CALCIUM 7.5*     CBC:   Recent Labs   Lab 08/01/23  0438 08/02/23  0451   WBC 12.07* 11.39*   HGB 9.7* 9.0*   HCT 31.2* 29.1*    197       Significant Imaging: I have reviewed all pertinent imaging results/findings within the past 24 hours.      Assessment/Plan:      * Severe sepsis  This patient does have evidence of infective focus  My overall impression is sepsis.  Source: Skin and Soft Tissue (location bilateral lower extremity cellulitis)  Source control achieved by: Antibiotics as mentioned below.   Cultures as below    Cellulitis of lower extremity  Known chronic wounds on BLE for a few months.   Lost follow up with home health wound care reporting insurance did not approve it.   Photographs appear better compared to ones in 6/2023.  Inflammatory markers elevated.   Started on broad spectrum antibiotics- vancomycin and Zosyn.   Wound culture with pseudomonas, morganella and providencia growth, blood culture with acinetobacter- discontinued vancomycin.   Switched to cefepime given acinetobacter in blood (7/31)- discussed with pharmacy.   General surgery consulted for wound debridement/source control.   Wound care consulted.    8/1 - All  organisms in blood and wound covered by cefipime. Pseudomonas in wound. Consider adding gent for double coverage.      8/2 - Added gent yesterday.  Continue cefepime.         A-fib  Patient with Persistent (7 days or more) atrial fibrillation which is controlled currently with Beta Blocker.   Patient is currently in atrial fibrillation.  [id number] Score: 3. HASBLED Score: 2.   Anticoagulation on hold for surgical debridement. Resume tomorrow if no further debridement likely.     Chronic HFrEF (heart failure with reduced ejection fraction)  Last recorded Echo in 2019 showed an EF of 40%  Not in acute exacerbation.  Resume home Toprol, not on ACEI or ARB at home- unclear reason but suspect may be due to borderline low BP and taking midodrine at baseline.       CHANDNI (acute kidney injury)  Patient with acute kidney injury/acute renal failure likely due to pre-renal azotemia due to dehydration   CHANDNI is currently improving.   Baseline creatinine 1.25 about one month ago   Labs reviewed- Renal function/electrolytes with Estimated Creatinine Clearance: 78.7 mL/min (based on SCr of 0.85 mg/dL). according to latest data.   Monitor urine output and serial BMP and adjust therapy as needed.  Avoid nephrotoxins and renally dose meds for GFR listed above.    8/1 - Renal function improved. Considering gent addition.       Multiple and open wound of lower limb, complicated  Management as above.       Gram-negative bacteremia  2 out of 2 sets on admission positive for acinetobacter.  Repeat cultures ordered for clearance (7/31).   Plan as above.     Severe protein-calorie malnutrition  Nutrition consulted. Most recent weight and BMI monitored-     Measurements:  Wt Readings from Last 1 Encounters:   07/28/23 73.6 kg (162 lb 5.4 oz)   Body mass index is 22.02 kg/m².    Patient has been screened and assessed by RD.    Malnutrition Type:  Context: chronic illness  Level: severe    Malnutrition Characteristic Summary:  Weight Loss  (Malnutrition): greater than 5% in 1 month  Energy Intake (Malnutrition): less than 75% for greater than or equal to 1 month  Subcutaneous Fat (Malnutrition): severe depletion  Muscle Mass (Malnutrition): severe depletion    Interventions/Recommendations (treatment strategy):  Recommend continue current diet as able/appropriate and tolerated. Also recommend addition of Ensure Max Protein TID and Abel BID to improve kcal/protein intakes and aid in wound healing. Also recommend consider addition of 500mg Vitamin C and 220mg ZnSO4 BID to aid in wound healing.    Type 2 MI (myocardial infarction)  Troponin elevation but trend is not significant.  Denies chest pain.  No EKG done on admission in ED.   Monitor for chest pain.           Hyperlipemia  Continue home statin    Depression  Patient has persistent depression which is moderate and is currently controlled.   Will Continue anti-depressant medication- Remeron.   We will not consult psychiatry at this time.   Patient does not display psychosis at this time.   Continue to monitor closely and adjust plan of care as needed.        Type 2 diabetes mellitus  Patient's FSGs are controlled on current medication regimen.  Last A1c reviewed-   Lab Results   Component Value Date    HGBA1C 5.5 06/02/2023     Most recent fingerstick glucose reviewed- No results for input(s): POCTGLUCOSE in the last 24 hours.  Current correctional scale  Medium  Maintain anti-hyperglycemic dose as follows-   Antihyperglycemics (From admission, onward)    None        Hold Oral hypoglycemics while patient is in the hospital.    COPD (chronic obstructive pulmonary disease)  DuoNeb q6h PRN  Supplemental oxygenation    Lactic acidosis  Sec to severe sepsis.  Leading to high anion gap metabolic acidosis, bicarb 14 today.   S/p IV hydration.   Follow lactate.     8/1 - bicarb improved to 21.           Essential hypertension  On Toprol but also on midodrine.  Suspect Toprol is for HFrEF and midodrine is  to avoid hypotension.        VTE Risk Mitigation (From admission, onward)         Ordered     heparin (porcine) injection 5,000 Units  Every 8 hours         07/29/23 1052                Discharge Planning   MAKENNA: 8/4/2023     Code Status: Full Code   Is the patient medically ready for discharge?: No    Reason for patient still in hospital (select all that apply): Treatment  Discharge Plan A: Skilled Nursing Facility                  Blue Rosario Jr, MD  Department of Hospital Medicine   [facility] - Short Stay Unit

## 2023-08-02 NOTE — ASSESSMENT & PLAN NOTE
Patient with Persistent (7 days or more) atrial fibrillation which is controlled currently with Beta Blocker.   Patient is currently in atrial fibrillation.  [id number] Score: 3. HASBLED Score: 2.   Anticoagulation on hold for surgical debridement. Resume tomorrow if no further debridement likely.

## 2023-08-02 NOTE — PLAN OF CARE
LITZY spoke with MD this a.m. and surgery was consulted. Per Praful, NP pt will not need a debridement at this time. Referral made to [facility] [place] on yesterday. LITZY awaiting insurance approval. LITZY following.

## 2023-08-02 NOTE — PT/OT/SLP PROGRESS
Physical Therapy      Patient Name:  Esthela Contreras   MRN:  [id number]    Patient not seen today secondary to Patient fatigue (pt reported being too tired to participate in OOB act or in LE ex this afternoon.). Will follow-up 8/3/23.

## 2023-08-02 NOTE — PT/OT/SLP PROGRESS
Occupational Therapy   Treatment    Name: Esthela Contreras  MRN: [id number]  Admitting Diagnosis:  Severe sepsis  1 Day Post-Op    Recommendations:     Discharge Recommendations: nursing facility, skilled  Discharge Equipment Recommendations:   (To be determined)  Barriers to discharge:  None    Assessment:     Esthela Contreras is a 72 y.o. male with a medical diagnosis of Severe sepsis.  He presents with complaint of (B) LE pain. Pt agreed to OT treatment.. Performance deficits affecting function are weakness, impaired endurance, impaired self care skills, impaired functional mobility, impaired balance, pain, decreased lower extremity function, impaired skin.     Pt participated well with tx. Nurse notified after t/f to toilet regarding toileting difficulties and came to assess pt. Pt tolerated adls and exercises well.    Rehab Prognosis:  Good; patient would benefit from acute skilled OT services to address these deficits and reach maximum level of function.       Plan:     Patient to be seen 5 x/week to address the above listed problems via self-care/home management, therapeutic activities, therapeutic exercises  Plan of Care Expires: 08/25/23  Plan of Care Reviewed with: patient    Subjective     Chief Complaint: Severe Sepsis  Patient/Family Comments/goals: Swingbed at D/C  Pain/Comfort:  Pain Rating 1: 8/10 (pt complained of pain, but would not rate. 8 is an approximate level)  Location 1: leg    Objective:     Communicated with: ANAHI Lawrence prior to session.  Patient found HOB elevated with peripheral IV, telemetry upon OT entry to room.    General Precautions: Standard, fall    Orthopedic Precautions:N/A  Braces: N/A  Respiratory Status: Room air     Occupational Performance:     Bed Mobility:    Patient completed Rolling/Turning to Left with  minimum assistance  Patient completed Supine to Sit with minimum assistance and increased time and effort      Functional Mobility/Transfers:  Patient completed Sit  <> Stand Transfer with minimum assistance  with  gait belt to stand to RW   Patient completed Bed <> Chair Transfer using Step Transfer technique with contact guard assistance and minimum assistance with rolling walker  Patient completed BSC Transfer Step Transfer technique with contact guard assistance and minimum assistance with  rolling walker and gait belt  Functional Mobility: CGA/Min a    Activities of Daily Living:  Grooming: independence brushing teeth with self setup and combing hair. I with washing face and hands.  Upper Body Dressing: moderate assistance donning gown as a robe  Toileting: dependence        [facility] 6 Click ADL:      Treatment & Education:  Pt performed UE strengthening exercises. 2 lb hand wt x 2 sets of 15 reps (B) shoulder flexion/extension, adduction/abduction and (B) elbow and wrist flexion/extension. Red theraband  x 2 sets of 15 reps (B) elbow flexion and extension. Rest breaks provided as needed.      Patient left up in chair with all lines intact, call button in reach, and nurse notified    GOALS:   Multidisciplinary Problems       Occupational Therapy Goals          Problem: Occupational Therapy    Goal Priority Disciplines Outcome Interventions   Occupational Therapy Goal     OT, PT/OT Ongoing, Progressing    Description: STG:  Pt will perform grooming with setup  Pt will bathe with Anna with setup at EOB  Pt will perform UE dressing with Harshil  Pt will perform LE dressing with Anna with AD if needed  Pt will sit EOB x 10 min with SBA   Pt will transfer bed/chair/bsc with CGA with RW  Pt will perform standing task x 2 min with CGA with RW   Pt will tolerate 15 minutes of tx without fatigue      LT.Restore to max I with self care and mobility.                           Time Tracking:     OT Date of Treatment: 23  OT Start Time: 838  OT Stop Time: 925  OT Total Time (min): 47 min    Billable Minutes:Self Care/Home Management 20  Therapeutic Exercise 15    OT/KO: OT           8/2/2023

## 2023-08-02 NOTE — ASSESSMENT & PLAN NOTE
>>ASSESSMENT AND PLAN FOR CELLULITIS OF LOWER EXTREMITY WRITTEN ON 8/2/2023  6:58 PM BY RODO THACKER JR., MD    Known chronic wounds on BLE for a few months.   Lost follow up with home health wound care reporting insurance did not approve it.   Photographs appear better compared to ones in 6/2023.  Inflammatory markers elevated.   Started on broad spectrum antibiotics- vancomycin and Zosyn.   Wound culture with pseudomonas, morganella and providencia growth, blood culture with acinetobacter- discontinued vancomycin.   Switched to cefepime given acinetobacter in blood (7/31)- discussed with pharmacy.   General surgery consulted for wound debridement/source control.   Wound care consulted.    8/1 - All organisms in blood and wound covered by cefipime. Pseudomonas in wound. Consider adding gent for double coverage.      8/2 - Added gent yesterday.  Continue cefepime.

## 2023-08-02 NOTE — ASSESSMENT & PLAN NOTE
Known chronic wounds on BLE for a few months.   Lost follow up with home health wound care reporting insurance did not approve it.   Photographs appear better compared to ones in 6/2023.  Inflammatory markers elevated.   Started on broad spectrum antibiotics- vancomycin and Zosyn.   Wound culture with pseudomonas, morganella and providencia growth, blood culture with acinetobacter- discontinued vancomycin.   Switched to cefepime given acinetobacter in blood (7/31)- discussed with pharmacy.   General surgery consulted for wound debridement/source control.   Wound care consulted.    8/1 - All organisms in blood and wound covered by cefipime. Pseudomonas in wound. Consider adding gent for double coverage.      8/2 - Added gent yesterday.  Continue cefepime.

## 2023-08-02 NOTE — ASSESSMENT & PLAN NOTE
>>ASSESSMENT AND PLAN FOR SEPSIS WRITTEN ON 8/2/2023  6:57 PM BY RODO THACKER JR., MD    This patient does have evidence of infective focus  My overall impression is sepsis.  Source: Skin and Soft Tissue (location bilateral lower extremity cellulitis)  Source control achieved by: Antibiotics as mentioned below.   Cultures as below

## 2023-08-03 LAB
ANION GAP SERPL CALCULATED.3IONS-SCNC: 10 MMOL/L (ref 7–16)
ANISOCYTOSIS BLD QL SMEAR: ABNORMAL
ATYPICAL LYMPHOCYTES: ABNORMAL
BASOPHILS # BLD AUTO: 0.1 K/UL (ref 0–0.2)
BASOPHILS NFR BLD AUTO: 0.9 % (ref 0–1)
BUN SERPL-MCNC: 33 MG/DL (ref 7–18)
BUN/CREAT SERPL: 45 (ref 6–20)
CALCIUM SERPL-MCNC: 7.7 MG/DL (ref 8.5–10.1)
CHLORIDE SERPL-SCNC: 114 MMOL/L (ref 98–107)
CO2 SERPL-SCNC: 22 MMOL/L (ref 21–32)
CREAT SERPL-MCNC: 0.74 MG/DL (ref 0.7–1.3)
DIFFERENTIAL METHOD BLD: ABNORMAL
EGFR (NO RACE VARIABLE) (RUSH/TITUS): 95 ML/MIN/1.73M2
EOSINOPHIL # BLD AUTO: 0.26 K/UL (ref 0–0.5)
EOSINOPHIL NFR BLD AUTO: 2.3 % (ref 1–4)
EOSINOPHIL NFR BLD MANUAL: 3 % (ref 1–4)
ERYTHROCYTE [DISTWIDTH] IN BLOOD BY AUTOMATED COUNT: 16.9 % (ref 11.5–14.5)
GLUCOSE SERPL-MCNC: 79 MG/DL (ref 74–106)
HCT VFR BLD AUTO: 29 % (ref 40–54)
HGB BLD-MCNC: 9.2 G/DL (ref 13.5–18)
IMM GRANULOCYTES # BLD AUTO: 0.52 K/UL (ref 0–0.04)
IMM GRANULOCYTES NFR BLD: 4.6 % (ref 0–0.4)
LYMPHOCYTES # BLD AUTO: 1.84 K/UL (ref 1–4.8)
LYMPHOCYTES NFR BLD AUTO: 16.3 % (ref 27–41)
LYMPHOCYTES NFR BLD MANUAL: 22 % (ref 27–41)
MCH RBC QN AUTO: 28.6 PG (ref 27–31)
MCHC RBC AUTO-ENTMCNC: 31.7 G/DL (ref 32–36)
MCV RBC AUTO: 90.1 FL (ref 80–96)
MICROORGANISM SPEC CULT: ABNORMAL
MONOCYTES # BLD AUTO: 1.11 K/UL (ref 0–0.8)
MONOCYTES NFR BLD AUTO: 9.8 % (ref 2–6)
MONOCYTES NFR BLD MANUAL: 7 % (ref 2–6)
MPC BLD CALC-MCNC: 11 FL (ref 9.4–12.4)
NEUTROPHILS # BLD AUTO: 7.46 K/UL (ref 1.8–7.7)
NEUTROPHILS NFR BLD AUTO: 66.1 % (ref 53–65)
NEUTS SEG NFR BLD MANUAL: 68 % (ref 50–62)
NRBC # BLD AUTO: 0 X10E3/UL
NRBC, AUTO (.00): 0 %
PLATELET # BLD AUTO: 172 K/UL (ref 150–400)
PLATELET MORPHOLOGY: ABNORMAL
POLYCHROMASIA BLD QL SMEAR: ABNORMAL
POTASSIUM SERPL-SCNC: 4.9 MMOL/L (ref 3.5–5.1)
RBC # BLD AUTO: 3.22 M/UL (ref 4.6–6.2)
SODIUM SERPL-SCNC: 141 MMOL/L (ref 136–145)
WBC # BLD AUTO: 11.29 K/UL (ref 4.5–11)

## 2023-08-03 PROCEDURE — 11000001 HC ACUTE MED/SURG PRIVATE ROOM

## 2023-08-03 PROCEDURE — 63600175 PHARM REV CODE 636 W HCPCS: Performed by: INTERNAL MEDICINE

## 2023-08-03 PROCEDURE — 97530 THERAPEUTIC ACTIVITIES: CPT

## 2023-08-03 PROCEDURE — 99232 PR SUBSEQUENT HOSPITAL CARE,LEVL II: ICD-10-PCS | Mod: ,,, | Performed by: FAMILY MEDICINE

## 2023-08-03 PROCEDURE — 97110 THERAPEUTIC EXERCISES: CPT

## 2023-08-03 PROCEDURE — 80048 BASIC METABOLIC PNL TOTAL CA: CPT | Performed by: INTERNAL MEDICINE

## 2023-08-03 PROCEDURE — 25000003 PHARM REV CODE 250: Performed by: FAMILY MEDICINE

## 2023-08-03 PROCEDURE — 25000003 PHARM REV CODE 250: Performed by: HOSPITALIST

## 2023-08-03 PROCEDURE — 25000003 PHARM REV CODE 250

## 2023-08-03 PROCEDURE — 99232 SBSQ HOSP IP/OBS MODERATE 35: CPT | Mod: ,,, | Performed by: FAMILY MEDICINE

## 2023-08-03 PROCEDURE — 85025 COMPLETE CBC W/AUTO DIFF WBC: CPT | Performed by: INTERNAL MEDICINE

## 2023-08-03 PROCEDURE — 25000003 PHARM REV CODE 250: Performed by: INTERNAL MEDICINE

## 2023-08-03 RX ADMIN — MIRTAZAPINE 15 MG: 7.5 TABLET, FILM COATED ORAL at 08:08

## 2023-08-03 RX ADMIN — ASPIRIN 81 MG: 81 TABLET, COATED ORAL at 09:08

## 2023-08-03 RX ADMIN — ATORVASTATIN CALCIUM 40 MG: 40 TABLET, FILM COATED ORAL at 09:08

## 2023-08-03 RX ADMIN — POLYETHYLENE GLYCOL 3350 17 G: 17 POWDER, FOR SOLUTION ORAL at 09:08

## 2023-08-03 RX ADMIN — MIDODRINE HYDROCHLORIDE 5 MG: 5 TABLET ORAL at 09:08

## 2023-08-03 RX ADMIN — CEFEPIME 2 G: 2 INJECTION, POWDER, FOR SOLUTION INTRAVENOUS at 08:08

## 2023-08-03 RX ADMIN — CEFEPIME 2 G: 2 INJECTION, POWDER, FOR SOLUTION INTRAVENOUS at 04:08

## 2023-08-03 RX ADMIN — Medication: at 09:08

## 2023-08-03 RX ADMIN — Medication 1 TABLET: at 09:08

## 2023-08-03 RX ADMIN — Medication: at 08:08

## 2023-08-03 RX ADMIN — CEFEPIME 2 G: 2 INJECTION, POWDER, FOR SOLUTION INTRAVENOUS at 02:08

## 2023-08-03 RX ADMIN — ACETAMINOPHEN 1000 MG: 500 TABLET ORAL at 04:08

## 2023-08-03 RX ADMIN — OXYCODONE HYDROCHLORIDE 5 MG: 5 TABLET ORAL at 12:08

## 2023-08-03 RX ADMIN — MIDODRINE HYDROCHLORIDE 5 MG: 5 TABLET ORAL at 12:08

## 2023-08-03 RX ADMIN — MIDODRINE HYDROCHLORIDE 5 MG: 5 TABLET ORAL at 06:08

## 2023-08-03 NOTE — PLAN OF CARE
MARIA GUADALUPE spoke with Maria in UR and pt was denied by [facility] for maria guadalupeb. Peer to Peer can be completed as long as it is done by 9 a.m. tomorrow. MARIA GUADALUPE informed MD. MARIA GUADALUPE and MD spoke with pt in room and informed pt of denial. Pt may have to dc home with iv abx. MARIA GUADALUPE following.

## 2023-08-03 NOTE — PT/OT/SLP PROGRESS
Occupational Therapy   Treatment    Name: Esthela Contreras  MRN: [id number]  Admitting Diagnosis:  Severe sepsis  2 Days Post-Op    Recommendations:     Discharge Recommendations: nursing facility, skilled  Discharge Equipment Recommendations:   (to be determined)  Barriers to discharge:  None    Assessment:     Esthela Contreras is a 72 y.o. male with a medical diagnosis of Severe sepsis.  He presents with no complaints.Pt agreed to OT treatment. Performance deficits affecting function are weakness, impaired endurance.     Rehab Prognosis:  Good; patient would benefit from acute skilled OT services to address these deficits and reach maximum level of function.       Plan:     Patient to be seen 5 x/week to address the above listed problems via self-care/home management, therapeutic activities, therapeutic exercises  Plan of Care Expires: 08/25/23  Plan of Care Reviewed with: patient    Subjective     Chief Complaint: Severe Sepsis  Patient/Family Comments/goals: To return home  Pain/Comfort:  Pain Rating 1: 0/10  Pain Rating Post-Intervention 1: 0/10    Objective:     Communicated with: ANAHI Silvestre prior to session.  Patient found HOB elevated with peripheral IV, telemetry upon OT entry to room.    General Precautions: Standard, fall    Orthopedic Precautions:N/A  Braces: N/A  Respiratory Status: Room air     Occupational Performance:     Bed Mobility:         Functional Mobility/Transfers:    Functional Mobility:     Activities of Daily Living:        [facility] 6 Click ADL:      Treatment & Education:  Pt performed UE strengthening exercises. 2 lb hand wt x 2 sets of 15 reps (R) shoulder flexion/extension and adduction/abduction and (B) elbow and wrist flexion/extension. 1 lb hand wt x 2 sets of 15 reps AAROM (L) shoulder flexion/extension and adduction/abduction.  Red theraband x 2 sets of 15 reps (B) elbow flexion and extension.    Pt required encouragement, but tolerated tx well.    Patient left HOB elevated with all  lines intact, call button in reach, and nurse notified    GOALS:   Multidisciplinary Problems       Occupational Therapy Goals          Problem: Occupational Therapy    Goal Priority Disciplines Outcome Interventions   Occupational Therapy Goal     OT, PT/OT Ongoing, Progressing    Description: STG:  Pt will perform grooming with setup  Pt will bathe with Anna with setup at EOB  Pt will perform UE dressing with Harshil  Pt will perform LE dressing with Anna with AD if needed  Pt will sit EOB x 10 min with SBA   Pt will transfer bed/chair/bsc with CGA with RW  Pt will perform standing task x 2 min with CGA with RW   Pt will tolerate 15 minutes of tx without fatigue      LT.Restore to max I with self care and mobility.                           Time Tracking:     OT Date of Treatment: 23  OT Start Time:   OT Stop Time:   OT Total Time (min): 20 min    Billable Minutes:Therapeutic Exercise 18    OT/KO: OT          8/3/2023

## 2023-08-03 NOTE — PROGRESS NOTES
Pharmacy currently dosing gentamicin 360 mg IV every 24 hours which resulted in subtherapeutic trough. We will decrease to gentamicin 360 mg IV every 36 hours and check another trough 8/4/23 0330. Pharmacy will continue to monitor daily and adjust as necessary.

## 2023-08-03 NOTE — PROGRESS NOTES
Dressings changed.  Wound beds look good without bleeding, purulence, necrosis, or malodor.  Continue dressing changes with Xeroform gauze and Kerlix.  Ready for discharge from general surgery standpoint.  General surgery will sign off.

## 2023-08-03 NOTE — PT/OT/SLP PROGRESS
"Physical Therapy Treatment    Patient Name:  Esthela Contreras   MRN:  [id number]    Recommendations:     Discharge Recommendations: nursing facility, skilled  Discharge Equipment Recommendations: hospital bed  Barriers to discharge: Decreased caregiver support    Assessment:     Esthela Contreras is a 72 y.o. male admitted with a medical diagnosis of Severe sepsis.  He presents with the following impairments/functional limitations: weakness, impaired endurance, impaired functional mobility, gait instability, decreased lower extremity function, impaired skin. Pt more willing to participate in skilled interventions today. Pt ed on not scratching BLE skin impairments to reduce risk of further infection.     Rehab Prognosis: Good and Fair; patient would benefit from acute skilled PT services to address these deficits and reach maximum level of function.    Recent Surgery: Procedure(s) (LRB):  SELECTIVE DEBRIDEMENT, LOWER EXTREMITY (Bilateral)  BIOPSY, SOFT TISSUE (Right) 2 Days Post-Op    Plan:     During this hospitalization, patient to be seen 5 x/week to address the identified rehab impairments via gait training, therapeutic activities, therapeutic exercises and progress toward the following goals:    Plan of Care Expires:  08/28/23    Subjective     Chief Complaint: sepsis  Patient/Family Comments/goals: "I guess I can try to do a little something"  Pain/Comfort:  Pain Rating 1: 0/10      Objective:     Communicated with RN prior to session.  Patient found HOB elevated with peripheral IV, telemetry upon PT entry to room.     General Precautions: Standard, fall  Orthopedic Precautions: N/A  Braces: N/A  Respiratory Status: Room air     Functional Mobility:  Bed Mobility:     Scooting: contact guard assistance  Bridging: contact guard assistance      AM-PAC 6 CLICK MOBILITY          Treatment & Education:  Bilateral lower extremity exercise x 15-20 reps: ankle pumps, Quad sets, short arc quads, heel slides, hip " abduction/adduction, straight leg raises, and bridging with active assist ROM, verbal cues, and verbal cues for sequencing and safety     Patient left HOB elevated with all lines intact, call button in reach, and OTR present..    GOALS:   Multidisciplinary Problems       Physical Therapy Goals          Problem: Physical Therapy    Goal Priority Disciplines Outcome Goal Variances Interventions   Physical Therapy Goal     PT, PT/OT Ongoing, Progressing     Description: Short Term Goals to be met by: 2023    Patient will increase functional independence with mobility by performin. Supine to sit  independently  2. Sit to stand transfer with independently using Rolling walker  3. Bed to chair transfer independently using Rolling walker  4. Gait  x 50 feet with stand by assist using Rolling walker  5. Lower extremity exercise program x30 reps per handout, with assistance as needed    Long Term Goals to be met by: 2023    Pt will regain full independent functional mobility with Rolling walker and and wheelchair to return to home situation and prior activities of daily living.                        Time Tracking:     PT Received On: 23  PT Start Time: 1359     PT Stop Time: 1428  PT Total Time (min): 29 min     Billable Minutes: Therapeutic Activity 10 and Therapeutic Exercise 15    Treatment Type: Treatment  PT/PTA: PT     Number of PTA visits since last PT visit: 0     2023

## 2023-08-04 LAB — BACTERIA SPEC ANAEROBE CULT: NORMAL

## 2023-08-04 PROCEDURE — 25000003 PHARM REV CODE 250: Performed by: INTERNAL MEDICINE

## 2023-08-04 PROCEDURE — 97530 THERAPEUTIC ACTIVITIES: CPT

## 2023-08-04 PROCEDURE — 25000003 PHARM REV CODE 250

## 2023-08-04 PROCEDURE — 99232 SBSQ HOSP IP/OBS MODERATE 35: CPT | Mod: ,,, | Performed by: INTERNAL MEDICINE

## 2023-08-04 PROCEDURE — 63600175 PHARM REV CODE 636 W HCPCS: Performed by: INTERNAL MEDICINE

## 2023-08-04 PROCEDURE — 11000001 HC ACUTE MED/SURG PRIVATE ROOM

## 2023-08-04 PROCEDURE — 99232 PR SUBSEQUENT HOSPITAL CARE,LEVL II: ICD-10-PCS | Mod: ,,, | Performed by: INTERNAL MEDICINE

## 2023-08-04 PROCEDURE — 25000003 PHARM REV CODE 250: Performed by: HOSPITALIST

## 2023-08-04 PROCEDURE — 63600175 PHARM REV CODE 636 W HCPCS: Performed by: FAMILY MEDICINE

## 2023-08-04 PROCEDURE — 25000003 PHARM REV CODE 250: Performed by: FAMILY MEDICINE

## 2023-08-04 RX ADMIN — Medication: at 10:08

## 2023-08-04 RX ADMIN — Medication 1 TABLET: at 10:08

## 2023-08-04 RX ADMIN — GENTAMICIN SULFATE 360 MG: 40 INJECTION, SOLUTION INTRAMUSCULAR; INTRAVENOUS at 04:08

## 2023-08-04 RX ADMIN — CEFEPIME 2 G: 2 INJECTION, POWDER, FOR SOLUTION INTRAVENOUS at 01:08

## 2023-08-04 RX ADMIN — MIRTAZAPINE 15 MG: 7.5 TABLET, FILM COATED ORAL at 08:08

## 2023-08-04 RX ADMIN — Medication: at 08:08

## 2023-08-04 RX ADMIN — CEFEPIME 2 G: 2 INJECTION, POWDER, FOR SOLUTION INTRAVENOUS at 08:08

## 2023-08-04 RX ADMIN — MIDODRINE HYDROCHLORIDE 5 MG: 5 TABLET ORAL at 01:08

## 2023-08-04 RX ADMIN — CEFEPIME 2 G: 2 INJECTION, POWDER, FOR SOLUTION INTRAVENOUS at 05:08

## 2023-08-04 RX ADMIN — MIDODRINE HYDROCHLORIDE 5 MG: 5 TABLET ORAL at 10:08

## 2023-08-04 RX ADMIN — ASPIRIN 81 MG: 81 TABLET, COATED ORAL at 10:08

## 2023-08-04 RX ADMIN — ACETAMINOPHEN 1000 MG: 500 TABLET ORAL at 02:08

## 2023-08-04 RX ADMIN — MIDODRINE HYDROCHLORIDE 5 MG: 5 TABLET ORAL at 06:08

## 2023-08-04 RX ADMIN — ATORVASTATIN CALCIUM 40 MG: 40 TABLET, FILM COATED ORAL at 10:08

## 2023-08-04 RX ADMIN — ACETAMINOPHEN 1000 MG: 500 TABLET ORAL at 06:08

## 2023-08-04 RX ADMIN — POLYETHYLENE GLYCOL 3350 17 G: 17 POWDER, FOR SOLUTION ORAL at 10:08

## 2023-08-04 NOTE — ASSESSMENT & PLAN NOTE
>>ASSESSMENT AND PLAN FOR SEPSIS WRITTEN ON 8/3/2023  7:15 PM BY RODO THACKER JR., MD    This patient does have evidence of infective focus  My overall impression is sepsis.  Source: Skin and Soft Tissue (location bilateral lower extremity cellulitis)  Source control achieved by: Antibiotics as mentioned below.   Cultures as below      8/3 - Blood cx x2 positive for acinetobacter. Repeat cx on 7/31 NTD.  Plan 14 days tx (cefepime)  from 7/31 if they remain negative. Also on Gent to double cover pseudomonas in wounds.

## 2023-08-04 NOTE — PROGRESS NOTES
[facility] - Short Stay [facility] Medicine  Progress Note    Patient Name: Esthela Contreras  MRN: [id number]  Patient Class: IP- Inpatient   Admission Date: 7/27/2023  Length of Stay: 6 days  Attending Physician: Blue Rosario Jr., MD  Primary Care Provider: Yolande Spring FNP        Subjective:     Principal Problem:Severe sepsis        HPI:  Patient is a 71 yo male who presents to the hospital accompanied by his family with a complaint of wounds to his bilateral lower extremities. Patient is a poor historian, and history was provided by his daughter at the emergency department. Patient has a history of chronic wounds and was admitted to the hospital on 6/02/2023 of severe sepsis secondary to cellulitis of lower extremities and new onset of atrial fibrillation. He was subsequently discharged to [facility] for wound care.  His daughter reported that wounds were healing, and the patient was discharged home under the care of [facility] for continued care. However, the patient did not get any service from the  [facility], which culminated in the worsening of his wounds over the past few days. This is associated with pain and edema, but the patient denies any associated fever, chills, chest pain, shortness of breath, nausea and vomiting.     In the ED, the patient met sepsis criteria on arrival and was thus treated per sepsis protocol through IV hydration, blood culture collection before starting broad spectrum antibiotics. Ensuring work up was significant for WBC 27.6, H&H 13.5/41.9 with history of anemia and baseline H&H 10.2/33.6 on month ago, dehydration with hyponatremia with a sodium of 129, acute renal injury with a BUN/CR 67/2.44 and GFR 27 with a baseline BUN/CR 16/1.25 and GFR 92 one month ago. Anion Gap metabolic acidosis with a GAP of 19.  Initial Troponin of 153.9 with history of chronically elevated Troponin, and NTpBNP of 4,406 with a baseline of 20.7K about three weeks ago.  Lactic acid of 3.0. Patient will be admitted for further evaluation and management.            Overview/Hospital Course:  7/28: Worsening leukocytosis noted. Blood cultures 2/2 sets growing GNB, wound culture with GNB.     7/30: Wound cultures with morganella, providencia and pseudomonas. General surgery consulted.     7/31: Blood cultures returned with acinetobacter, antibiotic changed to cefepime per consultation with pharmacy. Awaiting general surgery's input regarding surgery.       Interval History: No complaints. Insurance denied swing bed.     Review of Systems  Objective:     Vital Signs (Most Recent):  Temp: 98.2 °F (36.8 °C) (08/03/23 1556)  Pulse: 86 (08/03/23 1556)  Resp: 18 (08/03/23 1556)  BP: (!) 99/52 (08/03/23 1556)  SpO2: 99 % (08/03/23 1556) Vital Signs (24h Range):  Temp:  [97.1 °F (36.2 °C)-98.5 °F (36.9 °C)] 98.2 °F (36.8 °C)  Pulse:  [73-90] 86  Resp:  [16-18] 18  SpO2:  [94 %-99 %] 99 %  BP: ()/(47-60) 99/52     Weight: 77.4 kg (170 lb 9.6 oz)  Body mass index is 24.48 kg/m².    Intake/Output Summary (Last 24 hours) at 8/3/2023 1910  Last data filed at 8/3/2023 1800  Gross per 24 hour   Intake 900 ml   Output 1250 ml   Net -350 ml         Physical Exam  Vitals and nursing note reviewed. Exam conducted with a chaperone present.   Constitutional:       General: He is not in acute distress.     Appearance: He is not ill-appearing or toxic-appearing.   HENT:      Head: Normocephalic and atraumatic.      Nose: Nose normal.      Mouth/Throat:      Mouth: Mucous membranes are moist.   Eyes:      Extraocular Movements: Extraocular movements intact.   Cardiovascular:      Rate and Rhythm: Normal rate and regular rhythm.      Pulses: Normal pulses.      Heart sounds: Normal heart sounds.   Pulmonary:      Effort: Pulmonary effort is normal.      Breath sounds: Normal breath sounds.   Abdominal:      General: Bowel sounds are normal. There is no distension.      Palpations: Abdomen is soft.       Tenderness: There is no abdominal tenderness.   Musculoskeletal:         General: Tenderness present.      Cervical back: Normal range of motion.   Skin:     General: Skin is warm.      Findings: Erythema and lesion present.   Neurological:      General: No focal deficit present.      Mental Status: He is alert and oriented to person, place, and time. Mental status is at baseline.   Psychiatric:         Mood and Affect: Mood normal.         Behavior: Behavior normal.             Significant Labs: All pertinent labs within the past 24 hours have been reviewed.  BMP:   Recent Labs   Lab 08/03/23  0241   GLU 79      K 4.9   *   CO2 22   BUN 33*   CREATININE 0.74   CALCIUM 7.7*     CBC:   Recent Labs   Lab 08/02/23  0451 08/03/23  0241   WBC 11.39* 11.29*   HGB 9.0* 9.2*   HCT 29.1* 29.0*    172       Significant Imaging: I have reviewed all pertinent imaging results/findings within the past 24 hours.      Assessment/Plan:      * Severe sepsis  This patient does have evidence of infective focus  My overall impression is sepsis.  Source: Skin and Soft Tissue (location bilateral lower extremity cellulitis)  Source control achieved by: Antibiotics as mentioned below.   Cultures as below      8/3 - Blood cx x2 positive for acinetobacter. Repeat cx on 7/31 NTD.  Plan 14 days tx (cefepime)  from 7/31 if they remain negative. Also on Gent to double cover pseudomonas in wounds.       Cellulitis of lower extremity  Known chronic wounds on BLE for a few months.   Lost follow up with home health wound care reporting insurance did not approve it.   Photographs appear better compared to ones in 6/2023.  Inflammatory markers elevated.   Started on broad spectrum antibiotics- vancomycin and Zosyn.   Wound culture with pseudomonas, morganella and providencia growth, blood culture with acinetobacter- discontinued vancomycin.   Switched to cefepime given acinetobacter in blood (7/31)- discussed with pharmacy.   General  surgery consulted for wound debridement/source control.   Wound care consulted.    8/1 - All organisms in blood and wound covered by cefipime. Pseudomonas in wound. Consider adding gent for double coverage.      8/2 - Added gent yesterday.  Continue cefepime.         A-fib  Patient with Persistent (7 days or more) atrial fibrillation which is controlled currently with Beta Blocker.   Patient is currently in atrial fibrillation.  [id number] Score: 3. HASBLED Score: 2.   Anticoagulation on hold for surgical debridement. Resume tomorrow if no further debridement likely.     8/3 - Will resume anticoagulation.     Chronic HFrEF (heart failure with reduced ejection fraction)  Last recorded Echo in 2019 showed an EF of 40%  Not in acute exacerbation.  Resume home Toprol, not on ACEI or ARB at home- unclear reason but suspect may be due to borderline low BP and taking midodrine at baseline.       CHANDNI (acute kidney injury)  Patient with acute kidney injury/acute renal failure likely due to pre-renal azotemia due to dehydration   CHANDNI is currently improving.   Baseline creatinine 1.25 about one month ago   Labs reviewed- Renal function/electrolytes with Estimated Creatinine Clearance: 78.7 mL/min (based on SCr of 0.85 mg/dL). according to latest data.   Monitor urine output and serial BMP and adjust therapy as needed.  Avoid nephrotoxins and renally dose meds for GFR listed above.    8/1 - Renal function improved. Considering gent addition.       Multiple and open wound of lower limb, complicated  Management as above.       Gram-negative bacteremia  2 out of 2 sets on admission positive for acinetobacter.  Repeat cultures ordered for clearance (7/31).   Plan as above.     Severe protein-calorie malnutrition  Nutrition consulted. Most recent weight and BMI monitored-     Measurements:  Wt Readings from Last 1 Encounters:   07/28/23 73.6 kg (162 lb 5.4 oz)   Body mass index is 22.02 kg/m².    Patient has been screened and  assessed by RD.    Malnutrition Type:  Context: chronic illness  Level: severe    Malnutrition Characteristic Summary:  Weight Loss (Malnutrition): greater than 5% in 1 month  Energy Intake (Malnutrition): less than 75% for greater than or equal to 1 month  Subcutaneous Fat (Malnutrition): severe depletion  Muscle Mass (Malnutrition): severe depletion    Interventions/Recommendations (treatment strategy):  Recommend continue current diet as able/appropriate and tolerated. Also recommend addition of Ensure Max Protein TID and Abel BID to improve kcal/protein intakes and aid in wound healing. Also recommend consider addition of 500mg Vitamin C and 220mg ZnSO4 BID to aid in wound healing.    Type 2 MI (myocardial infarction)  Troponin elevation but trend is not significant.  Denies chest pain.  No EKG done on admission in ED.   Monitor for chest pain.           Hyperlipemia  Continue home statin    Depression  Patient has persistent depression which is moderate and is currently controlled.   Will Continue anti-depressant medication- Remeron.   We will not consult psychiatry at this time.   Patient does not display psychosis at this time.   Continue to monitor closely and adjust plan of care as needed.        Type 2 diabetes mellitus  Patient's FSGs are controlled on current medication regimen.  Last A1c reviewed-   Lab Results   Component Value Date    HGBA1C 5.5 06/02/2023     Most recent fingerstick glucose reviewed- No results for input(s): POCTGLUCOSE in the last 24 hours.  Current correctional scale  Medium  Maintain anti-hyperglycemic dose as follows-   Antihyperglycemics (From admission, onward)    None        Hold Oral hypoglycemics while patient is in the hospital.    COPD (chronic obstructive pulmonary disease)  DuoNeb q6h PRN  Supplemental oxygenation    Lactic acidosis  Sec to severe sepsis.  Leading to high anion gap metabolic acidosis, bicarb 14 today.   S/p IV hydration.   Follow lactate.     8/1 -  bicarb improved to 21.           Essential hypertension  On Toprol but also on midodrine.  Suspect Toprol is for HFrEF and midodrine is to avoid hypotension.        VTE Risk Mitigation (From admission, onward)         Ordered     heparin (porcine) injection 5,000 Units  Every 8 hours         07/29/23 1052                Discharge Planning   MAKENNA: 8/4/2023     Code Status: Full Code   Is the patient medically ready for discharge?: No    Reason for patient still in hospital (select all that apply): Treatment  Discharge Plan A: Skilled Nursing Facility                  Blue Rosario Jr, MD  Department of Hospital Medicine   [facility] - Short Stay Unit

## 2023-08-04 NOTE — PROGRESS NOTES
"Pharmacokinetic Follow Up: Gentamicin    Assessment of levels:   Pt refused his gent trough this morning.    Regimen Plan:   Continue current dose: Gentamicin 360 mg IV every 36 hours with a gent trough ordered for 8/5 at 1530    Drug levels (last 3 results):  No results for input(s): "AMIKACINPEAK", "AMIKACINTROU", "AMIKACINRAND", "AMIKACIN" in the last 72 hours.    No results for input(s): "GENTAMICIN", "GENTPEAK", "GENTTROUGH", "GENT10", "GENT12", "GENT8", "GENTRANDOM" in the last 72 hours.    No results for input(s): "TOBRA8", "TOBRA10", "TOBRA12", "TOBRARND", "TOBRAMYCIN", "TOBRAPEAK", "TOBRATROUGH", "TOBRAMYCINPE", "TOBRAMYCINRA", "TOBRAMYCINTR" in the last 72 hours.    Pharmacy will continue to monitor.    Please contact pharmacy at extension [phone] with any questions regarding this assessment.    Patient brief summary:  Esthela Contreras is a 72 y.o. male initiated on aminoglycoside therapy for treatment of skin & soft tissue infection    Drug Allergies:   Review of patient's allergies indicates:  No Known Allergies    Actual Body Weight:   77.4 kg    Adjust Body Weight:   74.8    Ideal Body Weight:  73 kg    Renal Function:   Estimated Creatinine Clearance: 90.4 mL/min (based on SCr of 0.74 mg/dL).,     Dialysis Method (if applicable):  N/A    CBC (last 72 hours):  Recent Labs   Lab Result Units 08/02/23  0451 08/03/23  0241   WBC K/uL 11.39* 11.29*   Hemoglobin g/dL 9.0* 9.2*   Hematocrit % 29.1* 29.0*   Platelet Count K/uL 197 172   Lymphocytes % % 15.0* 16.3*   Lymphocytes, Man % % 13* 22*   Monocytes % % 9.0* 9.8*   Monocytes, Man % % 8* 7*   Eosinophils % % 1.3 2.3   Eosinophils, Man % %  --  3   Basophils % % 0.9 0.9   Basophils, Man % % 2*  --        Metabolic Panel (last 72 hours):  Recent Labs   Lab Result Units 08/02/23  0452 08/03/23  0241   Sodium mmol/L 140 141   Potassium mmol/L 4.9 4.9   Chloride mmol/L 113* 114*   CO2 mmol/L 21 22   Glucose mg/dL 118* 79   BUN mg/dL 33* 33*   Creatinine mg/dL " 0.87 0.74       Aminoglycoside Administrations:  aminoglycosides given in last 96 hours                     gentamicin (GARAMYCIN) 360 mg in sodium chloride 0.9% 100 mL IVPB (mg) 360 mg New Bag 08/04/23 0428    gentamicin (GARAMYCIN) 360 mg in sodium chloride 0.9% 100 mL IVPB (mg) 360 mg New Bag 08/02/23 1537     360 mg New Bag 08/01/23 1700                    Microbiologic Results:  Microbiology Results (last 7 days)       Procedure Component Value Units Date/Time    Culture, Anaerobe [[id number]] Collected: 08/01/23 0950    Order Status: Completed Specimen: Wound from Leg, Left Updated: 08/04/23 0902     Culture, Anaerobe No Anaerobes Isolated    Blood culture (site 1) [[id number]] Collected: 07/31/23 1319    Order Status: Completed Specimen: Blood Updated: 08/04/23 0746     Culture, Blood No Growth At 72 Hours    Blood culture (site 2) [[id number]] Collected: 07/31/23 1325    Order Status: Completed Specimen: Blood Updated: 08/04/23 0746     Culture, Blood No Growth At 72 Hours    Culture, Wound [[id number]]  (Abnormal)  (Susceptibility) Collected: 08/01/23 0950    Order Status: Completed Specimen: Wound from Leg, Left Updated: 08/03/23 0748     Culture, Wound/Abscess Rare growth Pseudomonas aeruginosa    Blood culture #2 [[id number]]  (Abnormal)  (Susceptibility) Collected: 07/27/23 2328    Order Status: Completed Specimen: Blood from Wrist, Left Updated: 07/31/23 0833     Culture, Blood Acinetobacter baumannii complex/haemolyticus     Gram Stain Result Gram negative bacilli     Comment: From Aerobic Bottle       Blood culture #1 [[id number]]  (Abnormal)  (Susceptibility) Collected: 07/27/23 2328    Order Status: Completed Specimen: Blood from Peripheral, Antecubital, Left Updated: 07/31/23 0828     Culture, Blood Acinetobacter baumannii complex/haemolyticus     Gram Stain Result Gram negative bacilli     Comment: From Aerobic Bottle       Culture, Wound [[id number]]  (Abnormal)  (Susceptibility) Collected: 07/28/23  0021    Order Status: Completed Specimen: Wound from Leg, Left Updated: 07/30/23 0700     Culture, Wound/Abscess Heavy Growth Morganella morganii      Heavy Growth Pseudomonas aeruginosa      Heavy Growth Providencia stuartii    Culture, Wound [[id number]]  (Abnormal)  (Susceptibility) Collected: 07/28/23 0021    Order Status: Completed Specimen: Wound from Leg, Right Updated: 07/30/23 0651     Culture, Wound/Abscess Heavy Growth Morganella morganii      Heavy Growth Pseudomonas aeruginosa

## 2023-08-04 NOTE — ASSESSMENT & PLAN NOTE
>>ASSESSMENT AND PLAN FOR CELLULITIS OF LOWER EXTREMITY WRITTEN ON 8/3/2023  7:11 PM BY RODO THACKER JR., MD    Known chronic wounds on BLE for a few months.   Lost follow up with home health wound care reporting insurance did not approve it.   Photographs appear better compared to ones in 6/2023.  Inflammatory markers elevated.   Started on broad spectrum antibiotics- vancomycin and Zosyn.   Wound culture with pseudomonas, morganella and providencia growth, blood culture with acinetobacter- discontinued vancomycin.   Switched to cefepime given acinetobacter in blood (7/31)- discussed with pharmacy.   General surgery consulted for wound debridement/source control.   Wound care consulted.    8/1 - All organisms in blood and wound covered by cefipime. Pseudomonas in wound. Consider adding gent for double coverage.      8/2 - Added gent yesterday.  Continue cefepime.

## 2023-08-04 NOTE — PT/OT/SLP PROGRESS
Occupational Therapy   Treatment    Name: Esthela Contreras  MRN: [id number]  Admitting Diagnosis:  Severe sepsis  3 Days Post-Op    Recommendations:     Discharge Recommendations: nursing facility, skilled  Discharge Equipment Recommendations:   (to be determined)  Barriers to discharge:  None    Assessment:     Esthela Contreras is a 72 y.o. male with a medical diagnosis of Severe sepsis.  He presents with no complaints. Bed is covered in loose skin. Nurse notified.  Performance deficits affecting function are weakness, impaired endurance, impaired self care skills, impaired functional mobility, gait instability, impaired balance, decreased lower extremity function.     Rehab Prognosis:  Good; patient would benefit from acute skilled OT services to address these deficits and reach maximum level of function.       Plan:     Patient to be seen 5 x/week to address the above listed problems via self-care/home management, therapeutic activities, therapeutic exercises  Plan of Care Expires: 08/25/23  Plan of Care Reviewed with: patient    Subjective     Chief Complaint: Severe sepsis  Patient/Family Comments/goals: To return home  Pain/Comfort:  Pain Rating 1: 0/10  Pain Rating Post-Intervention 1: 0/10    Objective:     Communicated with: Jass Silvestre prior to session.  Patient found HOB elevated with peripheral IV, telemetry upon OT entry to room.    General Precautions: Standard, fall    Orthopedic Precautions:N/A  Braces: N/A  Respiratory Status: Room air     Occupational Performance:     Bed Mobility:    Patient completed Rolling/Turning to Right with supervision  Patient completed Supine to Sit with supervision     Functional Mobility/Transfers:  Patient completed Sit <> Stand Transfer with contact guard assistance  with  rolling walker   Patient completed Bed <> Chair Transfer using Step Transfer technique with contact guard assistance with rolling walker  Functional Mobility: CGA    Activities of Daily  Living:        [facility] 6 Click ADL:      Treatment & Education:  Pt became emotional after transferring to chair. Pt crying due to death of daughter approx 1 month ago and unable to participate in skilled tx.     Patient left up in chair with all lines intact, call button in reach, and nurse present changing bandages on legs.    GOALS:   Multidisciplinary Problems       Occupational Therapy Goals          Problem: Occupational Therapy    Goal Priority Disciplines Outcome Interventions   Occupational Therapy Goal     OT, PT/OT Ongoing, Progressing    Description: STG:  Pt will perform grooming with setup  Pt will bathe with Anna with setup at EOB  Pt will perform UE dressing with Harshil  Pt will perform LE dressing with Anna with AD if needed  Pt will sit EOB x 10 min with SBA   Pt will transfer bed/chair/bsc with CGA with RW  Pt will perform standing task x 2 min with CGA with RW   Pt will tolerate 15 minutes of tx without fatigue      LT.Restore to max I with self care and mobility.                           Time Tracking:     OT Date of Treatment: 23  OT Start Time: 1527  OT Stop Time: 1547  OT Total Time (min): 20 min    Billable Minutes: No charge.    OT/KO: OT          2023

## 2023-08-04 NOTE — PROGRESS NOTES
[facility] - Short Stay [facility] Medicine  Progress Note    Patient Name: Esthela Contreras  MRN: [id number]  Patient Class: IP- Inpatient   Admission Date: 7/27/2023  Length of Stay: 7 days  Attending Physician: Milton Cabrera MD  Primary Care Provider: Yolande Spring FNP        Subjective:     Principal Problem:Severe sepsis        HPI:  Patient is a 71 yo male who presents to the hospital accompanied by his family with a complaint of wounds to his bilateral lower extremities. Patient is a poor historian, and history was provided by his daughter at the emergency department. Patient has a history of chronic wounds and was admitted to the hospital on 6/02/2023 of severe sepsis secondary to cellulitis of lower extremities and new onset of atrial fibrillation. He was subsequently discharged to [facility] for wound care.  His daughter reported that wounds were healing, and the patient was discharged home under the care of [facility] for continued care. However, the patient did not get any service from the  [facility], which culminated in the worsening of his wounds over the past few days. This is associated with pain and edema, but the patient denies any associated fever, chills, chest pain, shortness of breath, nausea and vomiting.     In the ED, the patient met sepsis criteria on arrival and was thus treated per sepsis protocol through IV hydration, blood culture collection before starting broad spectrum antibiotics. Ensuring work up was significant for WBC 27.6, H&H 13.5/41.9 with history of anemia and baseline H&H 10.2/33.6 on month ago, dehydration with hyponatremia with a sodium of 129, acute renal injury with a BUN/CR 67/2.44 and GFR 27 with a baseline BUN/CR 16/1.25 and GFR 92 one month ago. Anion Gap metabolic acidosis with a GAP of 19.  Initial Troponin of 153.9 with history of chronically elevated Troponin, and NTpBNP of 4,406 with a baseline of 20.7K about three weeks ago. Lactic  acid of 3.0. Patient will be admitted for further evaluation and management.            Overview/Hospital Course:  7/28: Worsening leukocytosis noted. Blood cultures 2/2 sets growing GNB, wound culture with GNB.     7/30: Wound cultures with morganella, providencia and pseudomonas. General surgery consulted.     7/31: Blood cultures returned with acinetobacter, antibiotic changed to cefepime per consultation with pharmacy. Awaiting general surgery's input regarding surgery.       Interval History:     NEGRITO MCGHEE working on placement.     Review of Systems  Objective:     Vital Signs (Most Recent):  Temp: 98.3 °F (36.8 °C) (08/04/23 1129)  Pulse: 82 (08/04/23 1129)  Resp: 18 (08/04/23 1129)  BP: (!) 97/52 (08/04/23 1129)  SpO2: 99 % (08/04/23 1129) Vital Signs (24h Range):  Temp:  [97.1 °F (36.2 °C)-98.3 °F (36.8 °C)] 98.3 °F (36.8 °C)  Pulse:  [77-86] 82  Resp:  [16-18] 18  SpO2:  [94 %-100 %] 99 %  BP: ()/(52-57) 97/52     Weight: 77.4 kg (170 lb 9.6 oz)  Body mass index is 24.48 kg/m².    Intake/Output Summary (Last 24 hours) at 8/4/2023 1148  Last data filed at 8/4/2023 0400  Gross per 24 hour   Intake 900 ml   Output 1250 ml   Net -350 ml           Physical Exam  Vitals and nursing note reviewed. Exam conducted with a chaperone present.   Constitutional:       General: He is not in acute distress.     Appearance: He is not ill-appearing or toxic-appearing.   HENT:      Head: Normocephalic and atraumatic.      Nose: Nose normal.      Mouth/Throat:      Mouth: Mucous membranes are moist.   Eyes:      Extraocular Movements: Extraocular movements intact.   Cardiovascular:      Rate and Rhythm: Normal rate and regular rhythm.      Pulses: Normal pulses.      Heart sounds: Normal heart sounds.   Pulmonary:      Effort: Pulmonary effort is normal.      Breath sounds: Normal breath sounds.   Abdominal:      General: Bowel sounds are normal. There is no distension.      Palpations: Abdomen is soft.      Tenderness:  There is no abdominal tenderness.   Musculoskeletal:         General: Tenderness present.      Cervical back: Normal range of motion.   Skin:     General: Skin is warm.      Findings: Erythema and lesion present.   Neurological:      General: No focal deficit present.      Mental Status: He is alert and oriented to person, place, and time. Mental status is at baseline.   Psychiatric:         Mood and Affect: Mood normal.         Behavior: Behavior normal.             Significant Labs: All pertinent labs within the past 24 hours have been reviewed.  BMP:   Recent Labs   Lab 08/03/23  0241   GLU 79      K 4.9   *   CO2 22   BUN 33*   CREATININE 0.74   CALCIUM 7.7*       CBC:   Recent Labs   Lab 08/03/23  0241   WBC 11.29*   HGB 9.2*   HCT 29.0*            Significant Imaging: I have reviewed all pertinent imaging results/findings within the past 24 hours.      Assessment/Plan:      * Severe sepsis  This patient does have evidence of infective focus  My overall impression is sepsis.  Source: Skin and Soft Tissue (location bilateral lower extremity cellulitis)  Source control achieved by: Antibiotics as mentioned below.   Cultures as below      8/3 - Blood cx x2 positive for acinetobacter. Repeat cx on 7/31 NTD.  Plan 14 days tx (cefepime)  from 7/31 if they remain negative. Also on Gent to double cover pseudomonas in wounds.       Multiple and open wound of lower limb, complicated  Management as above.       Gram-negative bacteremia  2 out of 2 sets on admission positive for acinetobacter.  Repeat cultures ordered for clearance (7/31).   Plan as above.     Severe protein-calorie malnutrition  Nutrition consulted. Most recent weight and BMI monitored-     Measurements:  Wt Readings from Last 1 Encounters:   07/28/23 73.6 kg (162 lb 5.4 oz)   Body mass index is 22.02 kg/m².    Patient has been screened and assessed by RD.    Malnutrition Type:  Context: chronic illness  Level: severe    Malnutrition  Characteristic Summary:  Weight Loss (Malnutrition): greater than 5% in 1 month  Energy Intake (Malnutrition): less than 75% for greater than or equal to 1 month  Subcutaneous Fat (Malnutrition): severe depletion  Muscle Mass (Malnutrition): severe depletion    Interventions/Recommendations (treatment strategy):  Recommend continue current diet as able/appropriate and tolerated. Also recommend addition of Ensure Max Protein TID and Abel BID to improve kcal/protein intakes and aid in wound healing. Also recommend consider addition of 500mg Vitamin C and 220mg ZnSO4 BID to aid in wound healing.    Type 2 MI (myocardial infarction)  Troponin elevation but trend is not significant.  Denies chest pain.  No EKG done on admission in ED.   Monitor for chest pain.           Hyperlipemia  Continue home statin    A-fib  Patient with Persistent (7 days or more) atrial fibrillation which is controlled currently with Beta Blocker.   Patient is currently in atrial fibrillation.  GTPWG2SIRa Score: 3. HASBLED Score: 2.   Anticoagulation on hold for surgical debridement. Resume tomorrow if no further debridement likely.     8/3 - Will resume anticoagulation.     Depression  Patient has persistent depression which is moderate and is currently controlled.   Will Continue anti-depressant medication- Remeron.   We will not consult psychiatry at this time.   Patient does not display psychosis at this time.   Continue to monitor closely and adjust plan of care as needed.        Type 2 diabetes mellitus  Patient's FSGs are controlled on current medication regimen.  Last A1c reviewed-   Lab Results   Component Value Date    HGBA1C 5.5 06/02/2023     Most recent fingerstick glucose reviewed- No results for input(s): POCTGLUCOSE in the last 24 hours.  Current correctional scale  Medium  Maintain anti-hyperglycemic dose as follows-   Antihyperglycemics (From admission, onward)    None        Hold Oral hypoglycemics while patient is in the  hospital.    COPD (chronic obstructive pulmonary disease)  DuoNeb q6h PRN  Supplemental oxygenation    Lactic acidosis  Sec to severe sepsis.  Leading to high anion gap metabolic acidosis, bicarb 14 today.   S/p IV hydration.   Follow lactate.     8/1 - bicarb improved to 21.           Cellulitis of lower extremity  Known chronic wounds on BLE for a few months.   Lost follow up with home health wound care reporting insurance did not approve it.   Photographs appear better compared to ones in 6/2023.  Inflammatory markers elevated.   Started on broad spectrum antibiotics- vancomycin and Zosyn.   Wound culture with pseudomonas, morganella and providencia growth, blood culture with acinetobacter- discontinued vancomycin.   Switched to cefepime given acinetobacter in blood (7/31)- discussed with pharmacy.   General surgery consulted for wound debridement/source control.   Wound care consulted.    8/1 - All organisms in blood and wound covered by cefipime. Pseudomonas in wound. Consider adding gent for double coverage.      8/2 - Added gent yesterday.  Continue cefepime.         CHANDNI (acute kidney injury)  Patient with acute kidney injury/acute renal failure likely due to pre-renal azotemia due to dehydration   CHANDNI is currently improving.   Baseline creatinine 1.25 about one month ago   Labs reviewed- Renal function/electrolytes with Estimated Creatinine Clearance: 78.7 mL/min (based on SCr of 0.85 mg/dL). according to latest data.   Monitor urine output and serial BMP and adjust therapy as needed.  Avoid nephrotoxins and renally dose meds for GFR listed above.    8/1 - Renal function improved. Considering gent addition.       Essential hypertension  On Toprol but also on midodrine.  Suspect Toprol is for HFrEF and midodrine is to avoid hypotension.      Chronic HFrEF (heart failure with reduced ejection fraction)  Last recorded Echo in 2019 showed an EF of 40%  Not in acute exacerbation.  Resume home Toprol, not on ACEI  or ARB at home- unclear reason but suspect may be due to borderline low BP and taking midodrine at baseline.         VTE Risk Mitigation (From admission, onward)         Ordered     heparin (porcine) injection 5,000 Units  Every 8 hours         07/29/23 1052                Discharge Planning   MAKENNA: 8/4/2023     Code Status: Full Code   Is the patient medically ready for discharge?: No    Reason for patient still in hospital (select all that apply): Treatment  Discharge Plan A: Skilled Nursing Facility                  Rehmat ELENA Cabrera MD  Department of Hospital Medicine   [facility] - Short Stay Unit

## 2023-08-04 NOTE — PT/OT/SLP PROGRESS
"Physical Therapy Treatment    Patient Name:  Esthela Contreras   MRN:  [id number]    Recommendations:     Discharge Recommendations: nursing facility, skilled  Discharge Equipment Recommendations: hospital bed  Barriers to discharge: Decreased caregiver support and ongoing medical care    Assessment:     Esthela Contreras is a 72 y.o. male admitted with a medical diagnosis of Severe sepsis.  He presents with the following impairments/functional limitations: weakness, impaired endurance, impaired functional mobility, gait instability, decreased lower extremity function, impaired skin. During session, pt became emotionally labile, weeping while stating "My daughter is gone!" RN LISA Silvestre, informed of pt's presentation/reaction. PT session discontinued for this reason.     Rehab Prognosis: Fair; patient would benefit from acute skilled PT services to address these deficits and reach maximum level of function.    Recent Surgery: Procedure(s) (LRB):  SELECTIVE DEBRIDEMENT, LOWER EXTREMITY (Bilateral)  BIOPSY, SOFT TISSUE (Right) 3 Days Post-Op    Plan:     During this hospitalization, patient to be seen 5 x/week to address the identified rehab impairments via gait training, therapeutic activities, therapeutic exercises and progress toward the following goals:    Plan of Care Expires:  08/28/23    Subjective     Chief Complaint: severe sepsis  Patient/Family Comments/goals: "Lord, Im so tired"  Pain/Comfort:  Pain Rating 1: 0/10      Objective:     Communicated with LISA Sivlestre RN prior to session.  Patient found supine with peripheral IV, telemetry upon PT entry to room.     General Precautions: Standard, fall  Orthopedic Precautions: N/A  Braces: N/A  Respiratory Status: Room air     Functional Mobility:  Bed Mobility:     Supine to Sit: contact guard assistance  Transfers:     Sit to Stand:  contact guard assistance with rolling walker  Bed to Chair: contact guard assistance with  rolling walker  using  Step " Transfer  Balance: Fair- in stance      AM-PAC 6 CLICK MOBILITY  Turning over in bed (including adjusting bedclothes, sheets and blankets)?: 3  Sitting down on and standing up from a chair with arms (e.g., wheelchair, bedside commode, etc.): 3  Moving from lying on back to sitting on the side of the bed?: 3  Moving to and from a bed to a chair (including a wheelchair)?: 3  Need to walk in hospital room?: 3  Climbing 3-5 steps with a railing?: 1  Basic Mobility Total Score: 16       Treatment & Education:  Mobility as stated above.     Patient left up in chair with all lines intact, call button in reach, and RN present.. PT required BLE dressings to be re-applied.     GOALS:   Multidisciplinary Problems       Physical Therapy Goals          Problem: Physical Therapy    Goal Priority Disciplines Outcome Goal Variances Interventions   Physical Therapy Goal     PT, PT/OT Ongoing, Progressing     Description: Short Term Goals to be met by: 2023    Patient will increase functional independence with mobility by performin. Supine to sit  independently  2. Sit to stand transfer with independently using Rolling walker  3. Bed to chair transfer independently using Rolling walker  4. Gait  x 50 feet with stand by assist using Rolling walker  5. Lower extremity exercise program x30 reps per handout, with assistance as needed    Long Term Goals to be met by: 2023    Pt will regain full independent functional mobility with Rolling walker and and wheelchair to return to home situation and prior activities of daily living.                        Time Tracking:     PT Received On: 23  PT Start Time: 1525     PT Stop Time: 1543  PT Total Time (min): 18 min     Billable Minutes: Therapeutic Activity 15    Treatment Type: Treatment  PT/PTA: PT     Number of PTA visits since last PT visit: 0     2023

## 2023-08-04 NOTE — SUBJECTIVE & OBJECTIVE
Interval History:     NEGRITO MCGHEE working on placement.     Review of Systems  Objective:     Vital Signs (Most Recent):  Temp: 98.3 °F (36.8 °C) (08/04/23 1129)  Pulse: 82 (08/04/23 1129)  Resp: 18 (08/04/23 1129)  BP: (!) 97/52 (08/04/23 1129)  SpO2: 99 % (08/04/23 1129) Vital Signs (24h Range):  Temp:  [97.1 °F (36.2 °C)-98.3 °F (36.8 °C)] 98.3 °F (36.8 °C)  Pulse:  [77-86] 82  Resp:  [16-18] 18  SpO2:  [94 %-100 %] 99 %  BP: ()/(52-57) 97/52     Weight: 77.4 kg (170 lb 9.6 oz)  Body mass index is 24.48 kg/m².    Intake/Output Summary (Last 24 hours) at 8/4/2023 1148  Last data filed at 8/4/2023 0400  Gross per 24 hour   Intake 900 ml   Output 1250 ml   Net -350 ml           Physical Exam  Vitals and nursing note reviewed. Exam conducted with a chaperone present.   Constitutional:       General: He is not in acute distress.     Appearance: He is not ill-appearing or toxic-appearing.   HENT:      Head: Normocephalic and atraumatic.      Nose: Nose normal.      Mouth/Throat:      Mouth: Mucous membranes are moist.   Eyes:      Extraocular Movements: Extraocular movements intact.   Cardiovascular:      Rate and Rhythm: Normal rate and regular rhythm.      Pulses: Normal pulses.      Heart sounds: Normal heart sounds.   Pulmonary:      Effort: Pulmonary effort is normal.      Breath sounds: Normal breath sounds.   Abdominal:      General: Bowel sounds are normal. There is no distension.      Palpations: Abdomen is soft.      Tenderness: There is no abdominal tenderness.   Musculoskeletal:         General: Tenderness present.      Cervical back: Normal range of motion.   Skin:     General: Skin is warm.      Findings: Erythema and lesion present.   Neurological:      General: No focal deficit present.      Mental Status: He is alert and oriented to person, place, and time. Mental status is at baseline.   Psychiatric:         Mood and Affect: Mood normal.         Behavior: Behavior normal.             Significant  Labs: All pertinent labs within the past 24 hours have been reviewed.  BMP:   Recent Labs   Lab 08/03/23  0241   GLU 79      K 4.9   *   CO2 22   BUN 33*   CREATININE 0.74   CALCIUM 7.7*       CBC:   Recent Labs   Lab 08/03/23  0241   WBC 11.29*   HGB 9.2*   HCT 29.0*            Significant Imaging: I have reviewed all pertinent imaging results/findings within the past 24 hours.

## 2023-08-04 NOTE — ASSESSMENT & PLAN NOTE
Patient with Persistent (7 days or more) atrial fibrillation which is controlled currently with Beta Blocker.   Patient is currently in atrial fibrillation.  [id number] Score: 3. HASBLED Score: 2.   Anticoagulation on hold for surgical debridement. Resume tomorrow if no further debridement likely.     8/3 - Will resume anticoagulation.

## 2023-08-04 NOTE — PLAN OF CARE
MD on yesterday did not call for peer to peer. Per Dr. Cabrera, pt really needs swb for iv abx and wound care. SW to call [employer] to find out if they will allow for a peer to peer today. SW following.

## 2023-08-04 NOTE — PLAN OF CARE
LITZY called [employer] at [phone] and spoke with Sean. The call reference number is [id number]. LITZY inquired if MD was able to complete a peer to peer since MD yesterday did not complete peer to peer. LITZY had to be switched over to another department at [phone]. LITZY spoke with [place] and LITZY would need to speak back with the appeals department at [phone]. LITZY spoke with Calderon from the appeals department and LITZY had to go to [employer] and retrieve the appeal and grievance letter. Form has to be filled out and signed by MD and pt. MD has gone for the day and will complete in a.m. LITZY will fax once completed to [facility] at [phone]. LITZY following.

## 2023-08-04 NOTE — SUBJECTIVE & OBJECTIVE
Interval History: No complaints. Insurance denied swing bed.     Review of Systems  Objective:     Vital Signs (Most Recent):  Temp: 98.2 °F (36.8 °C) (08/03/23 1556)  Pulse: 86 (08/03/23 1556)  Resp: 18 (08/03/23 1556)  BP: (!) 99/52 (08/03/23 1556)  SpO2: 99 % (08/03/23 1556) Vital Signs (24h Range):  Temp:  [97.1 °F (36.2 °C)-98.5 °F (36.9 °C)] 98.2 °F (36.8 °C)  Pulse:  [73-90] 86  Resp:  [16-18] 18  SpO2:  [94 %-99 %] 99 %  BP: ()/(47-60) 99/52     Weight: 77.4 kg (170 lb 9.6 oz)  Body mass index is 24.48 kg/m².    Intake/Output Summary (Last 24 hours) at 8/3/2023 1910  Last data filed at 8/3/2023 1800  Gross per 24 hour   Intake 900 ml   Output 1250 ml   Net -350 ml         Physical Exam  Vitals and nursing note reviewed. Exam conducted with a chaperone present.   Constitutional:       General: He is not in acute distress.     Appearance: He is not ill-appearing or toxic-appearing.   HENT:      Head: Normocephalic and atraumatic.      Nose: Nose normal.      Mouth/Throat:      Mouth: Mucous membranes are moist.   Eyes:      Extraocular Movements: Extraocular movements intact.   Cardiovascular:      Rate and Rhythm: Normal rate and regular rhythm.      Pulses: Normal pulses.      Heart sounds: Normal heart sounds.   Pulmonary:      Effort: Pulmonary effort is normal.      Breath sounds: Normal breath sounds.   Abdominal:      General: Bowel sounds are normal. There is no distension.      Palpations: Abdomen is soft.      Tenderness: There is no abdominal tenderness.   Musculoskeletal:         General: Tenderness present.      Cervical back: Normal range of motion.   Skin:     General: Skin is warm.      Findings: Erythema and lesion present.   Neurological:      General: No focal deficit present.      Mental Status: He is alert and oriented to person, place, and time. Mental status is at baseline.   Psychiatric:         Mood and Affect: Mood normal.         Behavior: Behavior normal.              Significant Labs: All pertinent labs within the past 24 hours have been reviewed.  BMP:   Recent Labs   Lab 08/03/23  0241   GLU 79      K 4.9   *   CO2 22   BUN 33*   CREATININE 0.74   CALCIUM 7.7*     CBC:   Recent Labs   Lab 08/02/23  0451 08/03/23  0241   WBC 11.39* 11.29*   HGB 9.0* 9.2*   HCT 29.1* 29.0*    172       Significant Imaging: I have reviewed all pertinent imaging results/findings within the past 24 hours.

## 2023-08-04 NOTE — ASSESSMENT & PLAN NOTE
This patient does have evidence of infective focus  My overall impression is sepsis.  Source: Skin and Soft Tissue (location bilateral lower extremity cellulitis)  Source control achieved by: Antibiotics as mentioned below.   Cultures as below      8/3 - Blood cx x2 positive for acinetobacter. Repeat cx on 7/31 NTD.  Plan 14 days tx (cefepime)  from 7/31 if they remain negative. Also on Gent to double cover pseudomonas in wounds.

## 2023-08-05 LAB
ALBUMIN SERPL BCP-MCNC: 1.3 G/DL (ref 3.5–5)
ALBUMIN/GLOB SERPL: 0.4 {RATIO}
ALP SERPL-CCNC: 105 U/L (ref 45–115)
ALT SERPL W P-5'-P-CCNC: 13 U/L (ref 16–61)
ANION GAP SERPL CALCULATED.3IONS-SCNC: 10 MMOL/L (ref 7–16)
AST SERPL W P-5'-P-CCNC: 19 U/L (ref 15–37)
BASOPHILS # BLD AUTO: 0.15 K/UL (ref 0–0.2)
BASOPHILS NFR BLD AUTO: 1.4 % (ref 0–1)
BILIRUB SERPL-MCNC: 0.3 MG/DL (ref ?–1.2)
BUN SERPL-MCNC: 32 MG/DL (ref 7–18)
BUN/CREAT SERPL: 39 (ref 6–20)
CALCIUM SERPL-MCNC: 7.7 MG/DL (ref 8.5–10.1)
CHLORIDE SERPL-SCNC: 111 MMOL/L (ref 98–107)
CO2 SERPL-SCNC: 24 MMOL/L (ref 21–32)
CREAT SERPL-MCNC: 0.83 MG/DL (ref 0.7–1.3)
DIFFERENTIAL METHOD BLD: ABNORMAL
EGFR (NO RACE VARIABLE) (RUSH/TITUS): 92 ML/MIN/1.73M2
EOSINOPHIL # BLD AUTO: 0.29 K/UL (ref 0–0.5)
EOSINOPHIL NFR BLD AUTO: 2.6 % (ref 1–4)
ERYTHROCYTE [DISTWIDTH] IN BLOOD BY AUTOMATED COUNT: 16.9 % (ref 11.5–14.5)
GENTAMICIN TROUGH SERPL-MCNC: 1.3 ΜG/ML (ref 0.5–1.9)
GLOBULIN SER-MCNC: 3.7 G/DL (ref 2–4)
GLUCOSE SERPL-MCNC: 91 MG/DL (ref 74–106)
HCT VFR BLD AUTO: 29.4 % (ref 40–54)
HGB BLD-MCNC: 9.1 G/DL (ref 13.5–18)
IMM GRANULOCYTES # BLD AUTO: 0.28 K/UL (ref 0–0.04)
IMM GRANULOCYTES NFR BLD: 2.5 % (ref 0–0.4)
LYMPHOCYTES # BLD AUTO: 1.7 K/UL (ref 1–4.8)
LYMPHOCYTES NFR BLD AUTO: 15.4 % (ref 27–41)
MCH RBC QN AUTO: 28.7 PG (ref 27–31)
MCHC RBC AUTO-ENTMCNC: 31 G/DL (ref 32–36)
MCV RBC AUTO: 92.7 FL (ref 80–96)
MONOCYTES # BLD AUTO: 1.11 K/UL (ref 0–0.8)
MONOCYTES NFR BLD AUTO: 10.1 % (ref 2–6)
MPC BLD CALC-MCNC: 10.6 FL (ref 9.4–12.4)
NEUTROPHILS # BLD AUTO: 7.51 K/UL (ref 1.8–7.7)
NEUTROPHILS NFR BLD AUTO: 68 % (ref 53–65)
NRBC # BLD AUTO: 0 X10E3/UL
NRBC, AUTO (.00): 0 %
PLATELET # BLD AUTO: 218 K/UL (ref 150–400)
POTASSIUM SERPL-SCNC: 4.6 MMOL/L (ref 3.5–5.1)
PROT SERPL-MCNC: 5 G/DL (ref 6.4–8.2)
RBC # BLD AUTO: 3.17 M/UL (ref 4.6–6.2)
SODIUM SERPL-SCNC: 140 MMOL/L (ref 136–145)
WBC # BLD AUTO: 11.04 K/UL (ref 4.5–11)

## 2023-08-05 PROCEDURE — 99232 SBSQ HOSP IP/OBS MODERATE 35: CPT | Mod: ,,, | Performed by: INTERNAL MEDICINE

## 2023-08-05 PROCEDURE — 25000003 PHARM REV CODE 250: Performed by: FAMILY MEDICINE

## 2023-08-05 PROCEDURE — 63600175 PHARM REV CODE 636 W HCPCS: Performed by: FAMILY MEDICINE

## 2023-08-05 PROCEDURE — 80053 COMPREHEN METABOLIC PANEL: CPT | Performed by: INTERNAL MEDICINE

## 2023-08-05 PROCEDURE — 99232 PR SUBSEQUENT HOSPITAL CARE,LEVL II: ICD-10-PCS | Mod: ,,, | Performed by: INTERNAL MEDICINE

## 2023-08-05 PROCEDURE — 25000003 PHARM REV CODE 250: Performed by: INTERNAL MEDICINE

## 2023-08-05 PROCEDURE — 25000003 PHARM REV CODE 250

## 2023-08-05 PROCEDURE — 11000001 HC ACUTE MED/SURG PRIVATE ROOM

## 2023-08-05 PROCEDURE — 63600175 PHARM REV CODE 636 W HCPCS: Performed by: INTERNAL MEDICINE

## 2023-08-05 PROCEDURE — 25000003 PHARM REV CODE 250: Performed by: HOSPITALIST

## 2023-08-05 PROCEDURE — 85025 COMPLETE CBC W/AUTO DIFF WBC: CPT | Performed by: INTERNAL MEDICINE

## 2023-08-05 PROCEDURE — 80170 ASSAY OF GENTAMICIN: CPT | Performed by: INTERNAL MEDICINE

## 2023-08-05 RX ADMIN — ACETAMINOPHEN 1000 MG: 500 TABLET ORAL at 09:08

## 2023-08-05 RX ADMIN — MIRTAZAPINE 15 MG: 7.5 TABLET, FILM COATED ORAL at 09:08

## 2023-08-05 RX ADMIN — ACETAMINOPHEN 1000 MG: 500 TABLET ORAL at 12:08

## 2023-08-05 RX ADMIN — CEFEPIME 2 G: 2 INJECTION, POWDER, FOR SOLUTION INTRAVENOUS at 09:08

## 2023-08-05 RX ADMIN — ASPIRIN 81 MG: 81 TABLET, COATED ORAL at 08:08

## 2023-08-05 RX ADMIN — MIDODRINE HYDROCHLORIDE 5 MG: 5 TABLET ORAL at 04:08

## 2023-08-05 RX ADMIN — Medication: at 11:08

## 2023-08-05 RX ADMIN — ATORVASTATIN CALCIUM 40 MG: 40 TABLET, FILM COATED ORAL at 08:08

## 2023-08-05 RX ADMIN — CEFEPIME 2 G: 2 INJECTION, POWDER, FOR SOLUTION INTRAVENOUS at 04:08

## 2023-08-05 RX ADMIN — MIDODRINE HYDROCHLORIDE 5 MG: 5 TABLET ORAL at 08:08

## 2023-08-05 RX ADMIN — CEFEPIME 2 G: 2 INJECTION, POWDER, FOR SOLUTION INTRAVENOUS at 01:08

## 2023-08-05 RX ADMIN — HEPARIN SODIUM 5000 UNITS: 5000 INJECTION, SOLUTION INTRAVENOUS; SUBCUTANEOUS at 09:08

## 2023-08-05 RX ADMIN — GENTAMICIN SULFATE 360 MG: 40 INJECTION, SOLUTION INTRAMUSCULAR; INTRAVENOUS at 04:08

## 2023-08-05 RX ADMIN — MIDODRINE HYDROCHLORIDE 5 MG: 5 TABLET ORAL at 12:08

## 2023-08-05 RX ADMIN — Medication 1 TABLET: at 08:08

## 2023-08-05 NOTE — PLAN OF CARE
Appeal and grievance form completed by Dr. Cabrera and faxed to [facility]. SS following.

## 2023-08-05 NOTE — SUBJECTIVE & OBJECTIVE
Interval History:     NEGRITO MCGHEE working on placement.   Cont iv abx.     Review of Systems  Objective:     Vital Signs (Most Recent):  Temp: 98.2 °F (36.8 °C) (08/05/23 0600)  Pulse: 74 (08/05/23 0600)  Resp: 20 (08/05/23 0600)  BP: (!) 93/51 (08/05/23 0600)  SpO2: 100 % (08/05/23 0600) Vital Signs (24h Range):  Temp:  [97.1 °F (36.2 °C)-98.3 °F (36.8 °C)] 98.2 °F (36.8 °C)  Pulse:  [74-87] 74  Resp:  [18-20] 20  SpO2:  [95 %-100 %] 100 %  BP: ()/(51-59) 93/51     Weight: 77.4 kg (170 lb 9.6 oz)  Body mass index is 24.48 kg/m².    Intake/Output Summary (Last 24 hours) at 8/5/2023 1126  Last data filed at 8/4/2023 1800  Gross per 24 hour   Intake 1000 ml   Output 750 ml   Net 250 ml           Physical Exam  Vitals and nursing note reviewed. Exam conducted with a chaperone present.   Constitutional:       General: He is not in acute distress.     Appearance: He is not ill-appearing or toxic-appearing.   HENT:      Head: Normocephalic and atraumatic.      Nose: Nose normal.      Mouth/Throat:      Mouth: Mucous membranes are moist.   Eyes:      Extraocular Movements: Extraocular movements intact.   Cardiovascular:      Rate and Rhythm: Normal rate and regular rhythm.      Pulses: Normal pulses.      Heart sounds: Normal heart sounds.   Pulmonary:      Effort: Pulmonary effort is normal.      Breath sounds: Normal breath sounds.   Abdominal:      General: Bowel sounds are normal. There is no distension.      Palpations: Abdomen is soft.      Tenderness: There is no abdominal tenderness.   Musculoskeletal:         General: Tenderness present.      Cervical back: Normal range of motion.   Skin:     General: Skin is warm.      Findings: Erythema and lesion present.   Neurological:      General: No focal deficit present.      Mental Status: He is alert and oriented to person, place, and time. Mental status is at baseline.   Psychiatric:         Mood and Affect: Mood normal.         Behavior: Behavior normal.              Significant Labs: All pertinent labs within the past 24 hours have been reviewed.  BMP:   Recent Labs   Lab 08/05/23 0447   GLU 91      K 4.6   *   CO2 24   BUN 32*   CREATININE 0.83   CALCIUM 7.7*       CBC:   Recent Labs   Lab 08/05/23 0446   WBC 11.04*   HGB 9.1*   HCT 29.4*            Significant Imaging: I have reviewed all pertinent imaging results/findings within the past 24 hours.

## 2023-08-05 NOTE — PROGRESS NOTES
[facility] Medicine  Progress Note    Patient Name: Esthela Contreras  MRN: [id number]  Patient Class: IP- Inpatient   Admission Date: 7/27/2023  Length of Stay: 8 days  Attending Physician: Milton Cabrera MD  Primary Care Provider: Yolande Spring FNP        Subjective:     Principal Problem:Severe sepsis        HPI:  Patient is a 73 yo male who presents to the hospital accompanied by his family with a complaint of wounds to his bilateral lower extremities. Patient is a poor historian, and history was provided by his daughter at the emergency department. Patient has a history of chronic wounds and was admitted to the hospital on 6/02/2023 of severe sepsis secondary to cellulitis of lower extremities and new onset of atrial fibrillation. He was subsequently discharged to [facility] for wound care.  His daughter reported that wounds were healing, and the patient was discharged home under the care of [facility] for continued care. However, the patient did not get any service from the  [facility], which culminated in the worsening of his wounds over the past few days. This is associated with pain and edema, but the patient denies any associated fever, chills, chest pain, shortness of breath, nausea and vomiting.     In the ED, the patient met sepsis criteria on arrival and was thus treated per sepsis protocol through IV hydration, blood culture collection before starting broad spectrum antibiotics. Ensuring work up was significant for WBC 27.6, H&H 13.5/41.9 with history of anemia and baseline H&H 10.2/33.6 on month ago, dehydration with hyponatremia with a sodium of 129, acute renal injury with a BUN/CR 67/2.44 and GFR 27 with a baseline BUN/CR 16/1.25 and GFR 92 one month ago. Anion Gap metabolic acidosis with a GAP of 19.  Initial Troponin of 153.9 with history of chronically elevated Troponin, and NTpBNP of 4,406 with a baseline of 20.7K about three weeks ago. Lactic  acid of 3.0. Patient will be admitted for further evaluation and management.            Overview/Hospital Course:  7/28: Worsening leukocytosis noted. Blood cultures 2/2 sets growing GNB, wound culture with GNB.     7/30: Wound cultures with morganella, providencia and pseudomonas. General surgery consulted.     7/31: Blood cultures returned with acinetobacter, antibiotic changed to cefepime per consultation with pharmacy. Awaiting general surgery's input regarding surgery.       Interval History:     NEGRITO MCGHEE working on placement.   Cont iv abx.     Review of Systems  Objective:     Vital Signs (Most Recent):  Temp: 98.2 °F (36.8 °C) (08/05/23 0600)  Pulse: 74 (08/05/23 0600)  Resp: 20 (08/05/23 0600)  BP: (!) 93/51 (08/05/23 0600)  SpO2: 100 % (08/05/23 0600) Vital Signs (24h Range):  Temp:  [97.1 °F (36.2 °C)-98.3 °F (36.8 °C)] 98.2 °F (36.8 °C)  Pulse:  [74-87] 74  Resp:  [18-20] 20  SpO2:  [95 %-100 %] 100 %  BP: ()/(51-59) 93/51     Weight: 77.4 kg (170 lb 9.6 oz)  Body mass index is 24.48 kg/m².    Intake/Output Summary (Last 24 hours) at 8/5/2023 1126  Last data filed at 8/4/2023 1800  Gross per 24 hour   Intake 1000 ml   Output 750 ml   Net 250 ml           Physical Exam  Vitals and nursing note reviewed. Exam conducted with a chaperone present.   Constitutional:       General: He is not in acute distress.     Appearance: He is not ill-appearing or toxic-appearing.   HENT:      Head: Normocephalic and atraumatic.      Nose: Nose normal.      Mouth/Throat:      Mouth: Mucous membranes are moist.   Eyes:      Extraocular Movements: Extraocular movements intact.   Cardiovascular:      Rate and Rhythm: Normal rate and regular rhythm.      Pulses: Normal pulses.      Heart sounds: Normal heart sounds.   Pulmonary:      Effort: Pulmonary effort is normal.      Breath sounds: Normal breath sounds.   Abdominal:      General: Bowel sounds are normal. There is no distension.      Palpations: Abdomen is soft.       Tenderness: There is no abdominal tenderness.   Musculoskeletal:         General: Tenderness present.      Cervical back: Normal range of motion.   Skin:     General: Skin is warm.      Findings: Erythema and lesion present.   Neurological:      General: No focal deficit present.      Mental Status: He is alert and oriented to person, place, and time. Mental status is at baseline.   Psychiatric:         Mood and Affect: Mood normal.         Behavior: Behavior normal.             Significant Labs: All pertinent labs within the past 24 hours have been reviewed.  BMP:   Recent Labs   Lab 08/05/23  0447   GLU 91      K 4.6   *   CO2 24   BUN 32*   CREATININE 0.83   CALCIUM 7.7*       CBC:   Recent Labs   Lab 08/05/23  0446   WBC 11.04*   HGB 9.1*   HCT 29.4*            Significant Imaging: I have reviewed all pertinent imaging results/findings within the past 24 hours.      Assessment/Plan:      * Severe sepsis  This patient does have evidence of infective focus  My overall impression is sepsis.  Source: Skin and Soft Tissue (location bilateral lower extremity cellulitis)  Source control achieved by: Antibiotics as mentioned below.   Cultures as below      8/3 - Blood cx x2 positive for acinetobacter. Repeat cx on 7/31 NTD.  Plan 14 days tx (cefepime)  from 7/31 if they remain negative. Also on Gent to double cover pseudomonas in wounds.       Multiple and open wound of lower limb, complicated  Management as above.       Gram-negative bacteremia  2 out of 2 sets on admission positive for acinetobacter.  Repeat cultures ordered for clearance (7/31).   Plan as above.     Severe protein-calorie malnutrition  Nutrition consulted. Most recent weight and BMI monitored-     Measurements:  Wt Readings from Last 1 Encounters:   07/28/23 73.6 kg (162 lb 5.4 oz)   Body mass index is 22.02 kg/m².    Patient has been screened and assessed by RD.    Malnutrition Type:  Context: chronic illness  Level:  severe    Malnutrition Characteristic Summary:  Weight Loss (Malnutrition): greater than 5% in 1 month  Energy Intake (Malnutrition): less than 75% for greater than or equal to 1 month  Subcutaneous Fat (Malnutrition): severe depletion  Muscle Mass (Malnutrition): severe depletion    Interventions/Recommendations (treatment strategy):  Recommend continue current diet as able/appropriate and tolerated. Also recommend addition of Ensure Max Protein TID and Abel BID to improve kcal/protein intakes and aid in wound healing. Also recommend consider addition of 500mg Vitamin C and 220mg ZnSO4 BID to aid in wound healing.    Type 2 MI (myocardial infarction)  Troponin elevation but trend is not significant.  Denies chest pain.  No EKG done on admission in ED.   Monitor for chest pain.           Hyperlipemia  Continue home statin    A-fib  Patient with Persistent (7 days or more) atrial fibrillation which is controlled currently with Beta Blocker.   Patient is currently in atrial fibrillation.  [id number] Score: 3. HASBLED Score: 2.   Anticoagulation on hold for surgical debridement. Resume tomorrow if no further debridement likely.     8/3 - Will resume anticoagulation.     Depression  Patient has persistent depression which is moderate and is currently controlled.   Will Continue anti-depressant medication- Remeron.   We will not consult psychiatry at this time.   Patient does not display psychosis at this time.   Continue to monitor closely and adjust plan of care as needed.        Type 2 diabetes mellitus  Patient's FSGs are controlled on current medication regimen.  Last A1c reviewed-   Lab Results   Component Value Date    HGBA1C 5.5 06/02/2023     Most recent fingerstick glucose reviewed- No results for input(s): POCTGLUCOSE in the last 24 hours.  Current correctional scale  Medium  Maintain anti-hyperglycemic dose as follows-   Antihyperglycemics (From admission, onward)    None        Hold Oral hypoglycemics while  patient is in the hospital.    COPD (chronic obstructive pulmonary disease)  DuoNeb q6h PRN  Supplemental oxygenation    Lactic acidosis  Sec to severe sepsis.  Leading to high anion gap metabolic acidosis, bicarb 14 today.   S/p IV hydration.   Follow lactate.     8/1 - bicarb improved to 21.           Cellulitis of lower extremity  Known chronic wounds on BLE for a few months.   Lost follow up with home health wound care reporting insurance did not approve it.   Photographs appear better compared to ones in 6/2023.  Inflammatory markers elevated.   Started on broad spectrum antibiotics- vancomycin and Zosyn.   Wound culture with pseudomonas, morganella and providencia growth, blood culture with acinetobacter- discontinued vancomycin.   Switched to cefepime given acinetobacter in blood (7/31)- discussed with pharmacy.   General surgery consulted for wound debridement/source control.   Wound care consulted.    8/1 - All organisms in blood and wound covered by cefipime. Pseudomonas in wound. Consider adding gent for double coverage.      8/2 - Added gent yesterday.  Continue cefepime.         CHANDNI (acute kidney injury)  Patient with acute kidney injury/acute renal failure likely due to pre-renal azotemia due to dehydration   CHANDNI is currently improving.   Baseline creatinine 1.25 about one month ago   Labs reviewed- Renal function/electrolytes with Estimated Creatinine Clearance: 78.7 mL/min (based on SCr of 0.85 mg/dL). according to latest data.   Monitor urine output and serial BMP and adjust therapy as needed.  Avoid nephrotoxins and renally dose meds for GFR listed above.    8/1 - Renal function improved. Considering gent addition.       Essential hypertension  On Toprol but also on midodrine.  Suspect Toprol is for HFrEF and midodrine is to avoid hypotension.      Chronic HFrEF (heart failure with reduced ejection fraction)  Last recorded Echo in 2019 showed an EF of 40%  Not in acute exacerbation.  Resume home  Toprol, not on ACEI or ARB at home- unclear reason but suspect may be due to borderline low BP and taking midodrine at baseline.         VTE Risk Mitigation (From admission, onward)         Ordered     heparin (porcine) injection 5,000 Units  Every 8 hours         07/29/23 1052                Discharge Planning   MAKENNA: 8/4/2023     Code Status: Full Code   Is the patient medically ready for discharge?: No    Reason for patient still in hospital (select all that apply): Treatment  Discharge Plan A: Skilled Nursing Facility                  Rehmat ELENA Cabrera MD  Department of Hospital Medicine   [facility] Stay [facility]

## 2023-08-06 LAB
BACTERIA BLD CULT: NORMAL
BACTERIA BLD CULT: NORMAL
GENTAMICIN SERPL-MCNC: 5 ΜG/ML (ref 0–1.9)

## 2023-08-06 PROCEDURE — 94761 N-INVAS EAR/PLS OXIMETRY MLT: CPT

## 2023-08-06 PROCEDURE — 99232 SBSQ HOSP IP/OBS MODERATE 35: CPT | Mod: ,,, | Performed by: INTERNAL MEDICINE

## 2023-08-06 PROCEDURE — 80170 ASSAY OF GENTAMICIN: CPT | Performed by: INTERNAL MEDICINE

## 2023-08-06 PROCEDURE — 25000003 PHARM REV CODE 250: Performed by: INTERNAL MEDICINE

## 2023-08-06 PROCEDURE — 63600175 PHARM REV CODE 636 W HCPCS: Performed by: INTERNAL MEDICINE

## 2023-08-06 PROCEDURE — 11000001 HC ACUTE MED/SURG PRIVATE ROOM

## 2023-08-06 PROCEDURE — 25000003 PHARM REV CODE 250: Performed by: HOSPITALIST

## 2023-08-06 PROCEDURE — 25000003 PHARM REV CODE 250

## 2023-08-06 PROCEDURE — 99232 PR SUBSEQUENT HOSPITAL CARE,LEVL II: ICD-10-PCS | Mod: ,,, | Performed by: INTERNAL MEDICINE

## 2023-08-06 PROCEDURE — 25000003 PHARM REV CODE 250: Performed by: FAMILY MEDICINE

## 2023-08-06 RX ADMIN — Medication 1 TABLET: at 09:08

## 2023-08-06 RX ADMIN — CEFEPIME 2 G: 2 INJECTION, POWDER, FOR SOLUTION INTRAVENOUS at 08:08

## 2023-08-06 RX ADMIN — MIRTAZAPINE 15 MG: 7.5 TABLET, FILM COATED ORAL at 08:08

## 2023-08-06 RX ADMIN — MIDODRINE HYDROCHLORIDE 5 MG: 5 TABLET ORAL at 12:08

## 2023-08-06 RX ADMIN — ACETAMINOPHEN 1000 MG: 500 TABLET ORAL at 03:08

## 2023-08-06 RX ADMIN — Medication: at 09:08

## 2023-08-06 RX ADMIN — MIDODRINE HYDROCHLORIDE 5 MG: 5 TABLET ORAL at 04:08

## 2023-08-06 RX ADMIN — POLYETHYLENE GLYCOL 3350 17 G: 17 POWDER, FOR SOLUTION ORAL at 09:08

## 2023-08-06 RX ADMIN — ATORVASTATIN CALCIUM 40 MG: 40 TABLET, FILM COATED ORAL at 09:08

## 2023-08-06 RX ADMIN — CEFEPIME 2 G: 2 INJECTION, POWDER, FOR SOLUTION INTRAVENOUS at 01:08

## 2023-08-06 RX ADMIN — CEFEPIME 2 G: 2 INJECTION, POWDER, FOR SOLUTION INTRAVENOUS at 06:08

## 2023-08-06 RX ADMIN — HEPARIN SODIUM 5000 UNITS: 5000 INJECTION, SOLUTION INTRAVENOUS; SUBCUTANEOUS at 06:08

## 2023-08-06 RX ADMIN — MIDODRINE HYDROCHLORIDE 5 MG: 5 TABLET ORAL at 09:08

## 2023-08-06 RX ADMIN — ASPIRIN 81 MG: 81 TABLET, COATED ORAL at 09:08

## 2023-08-06 RX ADMIN — ACETAMINOPHEN 1000 MG: 500 TABLET ORAL at 02:08

## 2023-08-06 RX ADMIN — HEPARIN SODIUM 5000 UNITS: 5000 INJECTION, SOLUTION INTRAVENOUS; SUBCUTANEOUS at 01:08

## 2023-08-06 RX ADMIN — Medication: at 08:08

## 2023-08-06 NOTE — PROGRESS NOTES
Pharmacy dosing gentamicin 360 mg IV every 36 hours which resulted in an elevated trough. We will adjust to gentamicin 360 mg IV every 48 hours and check a trough 8/9/23 1530. Pharmacy will monitor daily and adjust as necessary.

## 2023-08-06 NOTE — SUBJECTIVE & OBJECTIVE
Interval History:     RENÉO  Pt doing well, ongoing placement    Review of Systems  Objective:     Vital Signs (Most Recent):  Temp: 97.6 °F (36.4 °C) (08/06/23 0745)  Pulse: 78 (08/06/23 0745)  Resp: 18 (08/06/23 0745)  BP: 107/64 (08/06/23 0932)  SpO2: 100 % (08/06/23 0745) Vital Signs (24h Range):  Temp:  [97.5 °F (36.4 °C)-98.3 °F (36.8 °C)] 97.6 °F (36.4 °C)  Pulse:  [70-84] 78  Resp:  [16-20] 18  SpO2:  [99 %-100 %] 100 %  BP: ()/(48-64) 107/64     Weight: 77.4 kg (170 lb 9.6 oz)  Body mass index is 24.48 kg/m².    Intake/Output Summary (Last 24 hours) at 8/6/2023 1136  Last data filed at 8/6/2023 1006  Gross per 24 hour   Intake --   Output 1350 ml   Net -1350 ml           Physical Exam  Vitals and nursing note reviewed. Exam conducted with a chaperone present.   Constitutional:       General: He is not in acute distress.     Appearance: He is not ill-appearing or toxic-appearing.   HENT:      Head: Normocephalic and atraumatic.      Nose: Nose normal.      Mouth/Throat:      Mouth: Mucous membranes are moist.   Eyes:      Extraocular Movements: Extraocular movements intact.   Cardiovascular:      Rate and Rhythm: Normal rate and regular rhythm.      Pulses: Normal pulses.      Heart sounds: Normal heart sounds.   Pulmonary:      Effort: Pulmonary effort is normal.      Breath sounds: Normal breath sounds.   Abdominal:      General: Bowel sounds are normal. There is no distension.      Palpations: Abdomen is soft.      Tenderness: There is no abdominal tenderness.   Musculoskeletal:         General: Tenderness present.      Cervical back: Normal range of motion.   Skin:     General: Skin is warm.      Findings: Erythema and lesion present.   Neurological:      General: No focal deficit present.      Mental Status: He is alert and oriented to person, place, and time. Mental status is at baseline.   Psychiatric:         Mood and Affect: Mood normal.         Behavior: Behavior normal.              Significant Labs: All pertinent labs within the past 24 hours have been reviewed.  BMP:   Recent Labs   Lab 08/05/23 0447   GLU 91      K 4.6   *   CO2 24   BUN 32*   CREATININE 0.83   CALCIUM 7.7*       CBC:   Recent Labs   Lab 08/05/23 0446   WBC 11.04*   HGB 9.1*   HCT 29.4*            Significant Imaging: I have reviewed all pertinent imaging results/findings within the past 24 hours.

## 2023-08-06 NOTE — PLAN OF CARE
Problem: Adult Inpatient Plan of Care  Goal: Plan of Care Review  Outcome: Ongoing, Progressing  Flowsheets (Taken 8/6/2023 8496)  Plan of Care Reviewed With: patient  Goal: Patient-Specific Goal (Individualized)  Outcome: Ongoing, Progressing  Goal: Absence of Hospital-Acquired Illness or Injury  Outcome: Ongoing, Progressing  Goal: Optimal Comfort and Wellbeing  Outcome: Ongoing, Progressing  Goal: Readiness for Transition of Care  Outcome: Ongoing, Progressing     Problem: Diabetes Comorbidity  Goal: Blood Glucose Level Within Targeted Range  Outcome: Ongoing, Progressing     Problem: Adjustment to Illness (Sepsis/Septic Shock)  Goal: Optimal Coping  Outcome: Ongoing, Progressing     Problem: Bleeding (Sepsis/Septic Shock)  Goal: Absence of Bleeding  Outcome: Ongoing, Progressing     Problem: Glycemic Control Impaired (Sepsis/Septic Shock)  Goal: Blood Glucose Level Within Desired Range  Outcome: Ongoing, Progressing     Problem: Infection Progression (Sepsis/Septic Shock)  Goal: Absence of Infection Signs and Symptoms  Outcome: Ongoing, Progressing     Problem: Nutrition Impaired (Sepsis/Septic Shock)  Goal: Optimal Nutrition Intake  Outcome: Ongoing, Progressing     Problem: Fluid and Electrolyte Imbalance (Acute Kidney Injury/Impairment)  Goal: Fluid and Electrolyte Balance  Outcome: Ongoing, Progressing     Problem: Renal Function Impairment (Acute Kidney Injury/Impairment)  Goal: Effective Renal Function  Outcome: Ongoing, Progressing     Problem: Impaired Wound Healing  Goal: Optimal Wound Healing  Outcome: Ongoing, Progressing

## 2023-08-06 NOTE — PROGRESS NOTES
[facility] Medicine  Progress Note    Patient Name: Esthela Contreras  MRN: [id number]  Patient Class: IP- Inpatient   Admission Date: 7/27/2023  Length of Stay: 9 days  Attending Physician: Milton Cabrera MD  Primary Care Provider: Yolande Spring FNP        Subjective:     Principal Problem:Severe sepsis        HPI:  Patient is a 71 yo male who presents to the hospital accompanied by his family with a complaint of wounds to his bilateral lower extremities. Patient is a poor historian, and history was provided by his daughter at the emergency department. Patient has a history of chronic wounds and was admitted to the hospital on 6/02/2023 of severe sepsis secondary to cellulitis of lower extremities and new onset of atrial fibrillation. He was subsequently discharged to [facility] for wound care.  His daughter reported that wounds were healing, and the patient was discharged home under the care of [facility] for continued care. However, the patient did not get any service from the  [facility], which culminated in the worsening of his wounds over the past few days. This is associated with pain and edema, but the patient denies any associated fever, chills, chest pain, shortness of breath, nausea and vomiting.     In the ED, the patient met sepsis criteria on arrival and was thus treated per sepsis protocol through IV hydration, blood culture collection before starting broad spectrum antibiotics. Ensuring work up was significant for WBC 27.6, H&H 13.5/41.9 with history of anemia and baseline H&H 10.2/33.6 on month ago, dehydration with hyponatremia with a sodium of 129, acute renal injury with a BUN/CR 67/2.44 and GFR 27 with a baseline BUN/CR 16/1.25 and GFR 92 one month ago. Anion Gap metabolic acidosis with a GAP of 19.  Initial Troponin of 153.9 with history of chronically elevated Troponin, and NTpBNP of 4,406 with a baseline of 20.7K about three weeks ago. Lactic  acid of 3.0. Patient will be admitted for further evaluation and management.            Overview/Hospital Course:  7/28: Worsening leukocytosis noted. Blood cultures 2/2 sets growing GNB, wound culture with GNB.     7/30: Wound cultures with morganella, providencia and pseudomonas. General surgery consulted.     7/31: Blood cultures returned with acinetobacter, antibiotic changed to cefepime per consultation with pharmacy. Awaiting general surgery's input regarding surgery.       Interval History:     NAEO  Pt doing well, ongoing placement    Review of Systems  Objective:     Vital Signs (Most Recent):  Temp: 97.6 °F (36.4 °C) (08/06/23 0745)  Pulse: 78 (08/06/23 0745)  Resp: 18 (08/06/23 0745)  BP: 107/64 (08/06/23 0932)  SpO2: 100 % (08/06/23 0745) Vital Signs (24h Range):  Temp:  [97.5 °F (36.4 °C)-98.3 °F (36.8 °C)] 97.6 °F (36.4 °C)  Pulse:  [70-84] 78  Resp:  [16-20] 18  SpO2:  [99 %-100 %] 100 %  BP: ()/(48-64) 107/64     Weight: 77.4 kg (170 lb 9.6 oz)  Body mass index is 24.48 kg/m².    Intake/Output Summary (Last 24 hours) at 8/6/2023 1136  Last data filed at 8/6/2023 1006  Gross per 24 hour   Intake --   Output 1350 ml   Net -1350 ml           Physical Exam  Vitals and nursing note reviewed. Exam conducted with a chaperone present.   Constitutional:       General: He is not in acute distress.     Appearance: He is not ill-appearing or toxic-appearing.   HENT:      Head: Normocephalic and atraumatic.      Nose: Nose normal.      Mouth/Throat:      Mouth: Mucous membranes are moist.   Eyes:      Extraocular Movements: Extraocular movements intact.   Cardiovascular:      Rate and Rhythm: Normal rate and regular rhythm.      Pulses: Normal pulses.      Heart sounds: Normal heart sounds.   Pulmonary:      Effort: Pulmonary effort is normal.      Breath sounds: Normal breath sounds.   Abdominal:      General: Bowel sounds are normal. There is no distension.      Palpations: Abdomen is soft.       Tenderness: There is no abdominal tenderness.   Musculoskeletal:         General: Tenderness present.      Cervical back: Normal range of motion.   Skin:     General: Skin is warm.      Findings: Erythema and lesion present.   Neurological:      General: No focal deficit present.      Mental Status: He is alert and oriented to person, place, and time. Mental status is at baseline.   Psychiatric:         Mood and Affect: Mood normal.         Behavior: Behavior normal.             Significant Labs: All pertinent labs within the past 24 hours have been reviewed.  BMP:   Recent Labs   Lab 08/05/23  0447   GLU 91      K 4.6   *   CO2 24   BUN 32*   CREATININE 0.83   CALCIUM 7.7*       CBC:   Recent Labs   Lab 08/05/23  0446   WBC 11.04*   HGB 9.1*   HCT 29.4*            Significant Imaging: I have reviewed all pertinent imaging results/findings within the past 24 hours.      Assessment/Plan:      * Severe sepsis  This patient does have evidence of infective focus  My overall impression is sepsis.  Source: Skin and Soft Tissue (location bilateral lower extremity cellulitis)  Source control achieved by: Antibiotics as mentioned below.   Cultures as below      8/3 - Blood cx x2 positive for acinetobacter. Repeat cx on 7/31 NTD.  Plan 14 days tx (cefepime)  from 7/31 if they remain negative. Also on Gent to double cover pseudomonas in wounds.       Multiple and open wound of lower limb, complicated  Management as above.       Gram-negative bacteremia  2 out of 2 sets on admission positive for acinetobacter.  Repeat cultures ordered for clearance (7/31).   Plan as above.     Severe protein-calorie malnutrition  Nutrition consulted. Most recent weight and BMI monitored-     Measurements:  Wt Readings from Last 1 Encounters:   07/28/23 73.6 kg (162 lb 5.4 oz)   Body mass index is 22.02 kg/m².    Patient has been screened and assessed by RD.    Malnutrition Type:  Context: chronic illness  Level:  severe    Malnutrition Characteristic Summary:  Weight Loss (Malnutrition): greater than 5% in 1 month  Energy Intake (Malnutrition): less than 75% for greater than or equal to 1 month  Subcutaneous Fat (Malnutrition): severe depletion  Muscle Mass (Malnutrition): severe depletion    Interventions/Recommendations (treatment strategy):  Recommend continue current diet as able/appropriate and tolerated. Also recommend addition of Ensure Max Protein TID and Abel BID to improve kcal/protein intakes and aid in wound healing. Also recommend consider addition of 500mg Vitamin C and 220mg ZnSO4 BID to aid in wound healing.    Type 2 MI (myocardial infarction)  Troponin elevation but trend is not significant.  Denies chest pain.  No EKG done on admission in ED.   Monitor for chest pain.           Hyperlipemia  Continue home statin    A-fib  Patient with Persistent (7 days or more) atrial fibrillation which is controlled currently with Beta Blocker.   Patient is currently in atrial fibrillation.  [id number] Score: 3. HASBLED Score: 2.   Anticoagulation on hold for surgical debridement. Resume tomorrow if no further debridement likely.     8/3 - Will resume anticoagulation.     Depression  Patient has persistent depression which is moderate and is currently controlled.   Will Continue anti-depressant medication- Remeron.   We will not consult psychiatry at this time.   Patient does not display psychosis at this time.   Continue to monitor closely and adjust plan of care as needed.        Type 2 diabetes mellitus  Patient's FSGs are controlled on current medication regimen.  Last A1c reviewed-   Lab Results   Component Value Date    HGBA1C 5.5 06/02/2023     Most recent fingerstick glucose reviewed- No results for input(s): POCTGLUCOSE in the last 24 hours.  Current correctional scale  Medium  Maintain anti-hyperglycemic dose as follows-   Antihyperglycemics (From admission, onward)    None        Hold Oral hypoglycemics while  patient is in the hospital.    COPD (chronic obstructive pulmonary disease)  DuoNeb q6h PRN  Supplemental oxygenation    Lactic acidosis  Sec to severe sepsis.  Leading to high anion gap metabolic acidosis, bicarb 14 today.   S/p IV hydration.   Follow lactate.     8/1 - bicarb improved to 21.           Cellulitis of lower extremity  Known chronic wounds on BLE for a few months.   Lost follow up with home health wound care reporting insurance did not approve it.   Photographs appear better compared to ones in 6/2023.  Inflammatory markers elevated.   Started on broad spectrum antibiotics- vancomycin and Zosyn.   Wound culture with pseudomonas, morganella and providencia growth, blood culture with acinetobacter- discontinued vancomycin.   Switched to cefepime given acinetobacter in blood (7/31)- discussed with pharmacy.   General surgery consulted for wound debridement/source control.   Wound care consulted.    8/1 - All organisms in blood and wound covered by cefipime. Pseudomonas in wound. Consider adding gent for double coverage.      8/2 - Added gent yesterday.  Continue cefepime.         CHANDNI (acute kidney injury)  Patient with acute kidney injury/acute renal failure likely due to pre-renal azotemia due to dehydration   CHANDNI is currently improving.   Baseline creatinine 1.25 about one month ago   Labs reviewed- Renal function/electrolytes with Estimated Creatinine Clearance: 78.7 mL/min (based on SCr of 0.85 mg/dL). according to latest data.   Monitor urine output and serial BMP and adjust therapy as needed.  Avoid nephrotoxins and renally dose meds for GFR listed above.    8/1 - Renal function improved. Considering gent addition.       Essential hypertension  On Toprol but also on midodrine.  Suspect Toprol is for HFrEF and midodrine is to avoid hypotension.      Chronic HFrEF (heart failure with reduced ejection fraction)  Last recorded Echo in 2019 showed an EF of 40%  Not in acute exacerbation.  Resume home  Toprol, not on ACEI or ARB at home- unclear reason but suspect may be due to borderline low BP and taking midodrine at baseline.         VTE Risk Mitigation (From admission, onward)         Ordered     heparin (porcine) injection 5,000 Units  Every 8 hours         07/29/23 1052                Discharge Planning   MAKENNA:      Code Status: Full Code   Is the patient medically ready for discharge?: No    Reason for patient still in hospital (select all that apply): Treatment  Discharge Plan A: Skilled Nursing Facility                  Rehmat ELENA Cabrera MD  Department of Hospital Medicine   [facility]

## 2023-08-07 PROCEDURE — 97116 GAIT TRAINING THERAPY: CPT

## 2023-08-07 PROCEDURE — 63600175 PHARM REV CODE 636 W HCPCS: Performed by: INTERNAL MEDICINE

## 2023-08-07 PROCEDURE — 25000003 PHARM REV CODE 250: Performed by: HOSPITALIST

## 2023-08-07 PROCEDURE — 99232 PR SUBSEQUENT HOSPITAL CARE,LEVL II: ICD-10-PCS | Mod: ,,, | Performed by: INTERNAL MEDICINE

## 2023-08-07 PROCEDURE — 25000003 PHARM REV CODE 250: Performed by: INTERNAL MEDICINE

## 2023-08-07 PROCEDURE — 11000001 HC ACUTE MED/SURG PRIVATE ROOM

## 2023-08-07 PROCEDURE — 97110 THERAPEUTIC EXERCISES: CPT

## 2023-08-07 PROCEDURE — 25000003 PHARM REV CODE 250

## 2023-08-07 PROCEDURE — 99232 SBSQ HOSP IP/OBS MODERATE 35: CPT | Mod: ,,, | Performed by: INTERNAL MEDICINE

## 2023-08-07 PROCEDURE — 25000003 PHARM REV CODE 250: Performed by: FAMILY MEDICINE

## 2023-08-07 RX ADMIN — CEFEPIME 2 G: 2 INJECTION, POWDER, FOR SOLUTION INTRAVENOUS at 09:08

## 2023-08-07 RX ADMIN — CEFEPIME 2 G: 2 INJECTION, POWDER, FOR SOLUTION INTRAVENOUS at 04:08

## 2023-08-07 RX ADMIN — ATORVASTATIN CALCIUM 40 MG: 40 TABLET, FILM COATED ORAL at 08:08

## 2023-08-07 RX ADMIN — CEFEPIME 2 G: 2 INJECTION, POWDER, FOR SOLUTION INTRAVENOUS at 01:08

## 2023-08-07 RX ADMIN — ASPIRIN 81 MG: 81 TABLET, COATED ORAL at 08:08

## 2023-08-07 RX ADMIN — Medication: at 08:08

## 2023-08-07 RX ADMIN — GENTAMICIN SULFATE 360 MG: 40 INJECTION, SOLUTION INTRAMUSCULAR; INTRAVENOUS at 05:08

## 2023-08-07 RX ADMIN — MIDODRINE HYDROCHLORIDE 5 MG: 5 TABLET ORAL at 08:08

## 2023-08-07 RX ADMIN — Medication 1 TABLET: at 08:08

## 2023-08-07 RX ADMIN — ACETAMINOPHEN 1000 MG: 500 TABLET ORAL at 10:08

## 2023-08-07 RX ADMIN — ACETAMINOPHEN 1000 MG: 500 TABLET ORAL at 08:08

## 2023-08-07 RX ADMIN — POLYETHYLENE GLYCOL 3350 17 G: 17 POWDER, FOR SOLUTION ORAL at 08:08
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
## 2023-08-07 NOTE — PLAN OF CARE
MD completed appeal form over weekend. LITZY over weekend faxed to [facility] for appeal. SW to call [facility] to find out when someone will be contacting MD. LITZY following.     1108: LITZY spoke with Nishi CARRASCO from [facility] Appeals Department at [phone]. Reference Number [id number]. Per [place] [facility] did receive the Expedited Appeal on 8/5/23. The appeal in still in process and will take 48-72 hours. Once a decision has been made a decision letter will be faxed over. LITZY can reach the Expedited Appeal Department at [phone]. LITZY following.     7967: LITZY spoke with Edlemira from [facility] and she wanted updated pt/ot notes and progress notes within the last 48 hours. Updates faxed to [phone]. LITZY following.

## 2023-08-07 NOTE — PT/OT/SLP PROGRESS
Occupational Therapy   Treatment    Name: Esthela Contreras  MRN: [id number]  Admitting Diagnosis:  Severe sepsis  6 Days Post-Op    Recommendations:     Discharge Recommendations: nursing facility, skilled  Discharge Equipment Recommendations:   (to be determined)  Barriers to discharge:  None    Assessment:     Esthela Contreras is a 72 y.o. male with a medical diagnosis of Severe sepsis.  He presents with no complaints. Pt agreed to UE exercises on (R) UE only. Due to pain on (L). Nurse reports testing ordered.. Performance deficits affecting function are weakness, impaired endurance.     Rehab Prognosis:  Good; patient would benefit from acute skilled OT services to address these deficits and reach maximum level of function.       Plan:     Patient to be seen 5 x/week to address the above listed problems via self-care/home management, therapeutic activities, therapeutic exercises  Plan of Care Expires: 08/25/23  Plan of Care Reviewed with: patient    Subjective     Chief Complaint: Severe Sepsis  Patient/Family Comments/goals: To return home  Pain/Comfort:  Pain Rating 1: 0/10  Pain Rating Post-Intervention 1: 0/10    Objective:     Communicated with: ANAHI So prior to session.  Patient found HOB elevated with peripheral IV upon OT entry to room.    General Precautions: Standard, fall    Orthopedic Precautions:N/A  Braces: N/A  Respiratory Status: Room air     Occupational Performance:     Bed Mobility:         Functional Mobility/Transfers:    Functional Mobility:     Activities of Daily Living:        [facility] 6 Click ADL:      Treatment & Education:  Pt performed UE strengthening exercises on (R) UE. 2 lb hand wt x 2 sets of 15 reps shoulder flexion/extension, adduction/abduction and elbow and wrist flexion/extension. Red theraband x 2 sets of 15 reps shoulder adduction/abduction and elbow flexion and extension. Hand exerciser x 2 sets of 30 reps x 3 bands.     Pt participated well with exercises without  complaint. Plan is to continue tx    Patient left HOB elevated with all lines intact, call button in reach, and nurse notified    GOALS:   Multidisciplinary Problems       Occupational Therapy Goals          Problem: Occupational Therapy    Goal Priority Disciplines Outcome Interventions   Occupational Therapy Goal     OT, PT/OT Ongoing, Progressing    Description: STG:  Pt will perform grooming with setup  Pt will bathe with Anna with setup at EOB  Pt will perform UE dressing with Harshil  Pt will perform LE dressing with Anna with AD if needed  Pt will sit EOB x 10 min with SBA   Pt will transfer bed/chair/bsc with CGA with RW  Pt will perform standing task x 2 min with CGA with RW   Pt will tolerate 15 minutes of tx without fatigue      LT.Restore to max I with self care and mobility.                           Time Tracking:     OT Date of Treatment: 23  OT Start Time: 1353  OT Stop Time: 1405  OT Total Time (min): 12 min    Billable Minutes:Therapeutic Exercise 11    OT/KO: OT          2023

## 2023-08-07 NOTE — NURSING
Patient refusing to take midodrine 5mg says he doesn't care what his blood pressure is that he is not taking it. He said let his blood pressure go down he doesn't care. Notified patient the importance of taking the medication and he still doesn't want to take it. Dr. Cabrera aware.

## 2023-08-07 NOTE — SUBJECTIVE & OBJECTIVE
Interval History:     NAEO  Pt refusing meds and labs intermittently  SW working on placement.     Review of Systems  Objective:     Vital Signs (Most Recent):  Temp: 98.2 °F (36.8 °C) (08/07/23 0714)  Pulse: 70 (08/07/23 0714)  Resp: 17 (08/07/23 0714)  BP: (!) 108/56 (08/07/23 0714)  SpO2: 98 % (08/07/23 0714) Vital Signs (24h Range):  Temp:  [97.7 °F (36.5 °C)-98.5 °F (36.9 °C)] 98.2 °F (36.8 °C)  Pulse:  [70-81] 70  Resp:  [17-18] 17  SpO2:  [98 %-100 %] 98 %  BP: (100-108)/(54-58) 108/56     Weight: 77.4 kg (170 lb 9.6 oz)  Body mass index is 24.48 kg/m².    Intake/Output Summary (Last 24 hours) at 8/7/2023 1050  Last data filed at 8/7/2023 0832  Gross per 24 hour   Intake 330 ml   Output 1150 ml   Net -820 ml           Physical Exam  Vitals and nursing note reviewed. Exam conducted with a chaperone present.   Constitutional:       General: He is not in acute distress.     Appearance: He is not ill-appearing or toxic-appearing.   HENT:      Head: Normocephalic and atraumatic.      Nose: Nose normal.      Mouth/Throat:      Mouth: Mucous membranes are moist.   Eyes:      Extraocular Movements: Extraocular movements intact.   Cardiovascular:      Rate and Rhythm: Normal rate and regular rhythm.      Pulses: Normal pulses.      Heart sounds: Normal heart sounds.   Pulmonary:      Effort: Pulmonary effort is normal.      Breath sounds: Normal breath sounds.   Abdominal:      General: Bowel sounds are normal. There is no distension.      Palpations: Abdomen is soft.      Tenderness: There is no abdominal tenderness.   Musculoskeletal:         General: Tenderness present.      Cervical back: Normal range of motion.   Skin:     General: Skin is warm.      Findings: Erythema and lesion present.   Neurological:      General: No focal deficit present.      Mental Status: He is alert and oriented to person, place, and time. Mental status is at baseline.   Psychiatric:         Mood and Affect: Mood normal.          "Behavior: Behavior normal.             Significant Labs: All pertinent labs within the past 24 hours have been reviewed.  BMP:   No results for input(s): "GLU", "NA", "K", "CL", "CO2", "BUN", "CREATININE", "CALCIUM", "MG" in the last 48 hours.    CBC:   No results for input(s): "WBC", "HGB", "HCT", "PLT" in the last 48 hours.      Significant Imaging: I have reviewed all pertinent imaging results/findings within the past 24 hours.  "

## 2023-08-07 NOTE — PROGRESS NOTES
[facility] Short Stay [facility] Medicine  Progress Note    Patient Name: Esthela Contreras  MRN: [id number]  Patient Class: IP- Inpatient   Admission Date: 7/27/2023  Length of Stay: 10 days  Attending Physician: Milton Cabrera MD  Primary Care Provider: Yolande Spring FNP        Subjective:     Principal Problem:Severe sepsis        HPI:  Patient is a 71 yo male who presents to the hospital accompanied by his family with a complaint of wounds to his bilateral lower extremities. Patient is a poor historian, and history was provided by his daughter at the emergency department. Patient has a history of chronic wounds and was admitted to the hospital on 6/02/2023 of severe sepsis secondary to cellulitis of lower extremities and new onset of atrial fibrillation. He was subsequently discharged to [facility] for wound care.  His daughter reported that wounds were healing, and the patient was discharged home under the care of [facility] for continued care. However, the patient did not get any service from the  Count includes the [facility], which culminated in the worsening of his wounds over the past few days. This is associated with pain and edema, but the patient denies any associated fever, chills, chest pain, shortness of breath, nausea and vomiting.     In the ED, the patient met sepsis criteria on arrival and was thus treated per sepsis protocol through IV hydration, blood culture collection before starting broad spectrum antibiotics. Ensuring work up was significant for WBC 27.6, H&H 13.5/41.9 with history of anemia and baseline H&H 10.2/33.6 on month ago, dehydration with hyponatremia with a sodium of 129, acute renal injury with a BUN/CR 67/2.44 and GFR 27 with a baseline BUN/CR 16/1.25 and GFR 92 one month ago. Anion Gap metabolic acidosis with a GAP of 19.  Initial Troponin of 153.9 with history of chronically elevated Troponin, and NTpBNP of 4,406 with a baseline of 20.7K about three weeks ago. Lactic  acid of 3.0. Patient will be admitted for further evaluation and management.            Overview/Hospital Course:  7/28: Worsening leukocytosis noted. Blood cultures 2/2 sets growing GNB, wound culture with GNB.     7/30: Wound cultures with morganella, providencia and pseudomonas. General surgery consulted.     7/31: Blood cultures returned with acinetobacter, antibiotic changed to cefepime per consultation with pharmacy. Awaiting general surgery's input regarding surgery.       Interval History:     NAEO  Pt refusing meds and labs intermittently  SW working on placement.     Review of Systems  Objective:     Vital Signs (Most Recent):  Temp: 98.2 °F (36.8 °C) (08/07/23 0714)  Pulse: 70 (08/07/23 0714)  Resp: 17 (08/07/23 0714)  BP: (!) 108/56 (08/07/23 0714)  SpO2: 98 % (08/07/23 0714) Vital Signs (24h Range):  Temp:  [97.7 °F (36.5 °C)-98.5 °F (36.9 °C)] 98.2 °F (36.8 °C)  Pulse:  [70-81] 70  Resp:  [17-18] 17  SpO2:  [98 %-100 %] 98 %  BP: (100-108)/(54-58) 108/56     Weight: 77.4 kg (170 lb 9.6 oz)  Body mass index is 24.48 kg/m².    Intake/Output Summary (Last 24 hours) at 8/7/2023 1050  Last data filed at 8/7/2023 0832  Gross per 24 hour   Intake 330 ml   Output 1150 ml   Net -820 ml           Physical Exam  Vitals and nursing note reviewed. Exam conducted with a chaperone present.   Constitutional:       General: He is not in acute distress.     Appearance: He is not ill-appearing or toxic-appearing.   HENT:      Head: Normocephalic and atraumatic.      Nose: Nose normal.      Mouth/Throat:      Mouth: Mucous membranes are moist.   Eyes:      Extraocular Movements: Extraocular movements intact.   Cardiovascular:      Rate and Rhythm: Normal rate and regular rhythm.      Pulses: Normal pulses.      Heart sounds: Normal heart sounds.   Pulmonary:      Effort: Pulmonary effort is normal.      Breath sounds: Normal breath sounds.   Abdominal:      General: Bowel sounds are normal. There is no distension.       "Palpations: Abdomen is soft.      Tenderness: There is no abdominal tenderness.   Musculoskeletal:         General: Tenderness present.      Cervical back: Normal range of motion.   Skin:     General: Skin is warm.      Findings: Erythema and lesion present.   Neurological:      General: No focal deficit present.      Mental Status: He is alert and oriented to person, place, and time. Mental status is at baseline.   Psychiatric:         Mood and Affect: Mood normal.         Behavior: Behavior normal.             Significant Labs: All pertinent labs within the past 24 hours have been reviewed.  BMP:   No results for input(s): "GLU", "NA", "K", "CL", "CO2", "BUN", "CREATININE", "CALCIUM", "MG" in the last 48 hours.    CBC:   No results for input(s): "WBC", "HGB", "HCT", "PLT" in the last 48 hours.      Significant Imaging: I have reviewed all pertinent imaging results/findings within the past 24 hours.      Assessment/Plan:      * Severe sepsis  This patient does have evidence of infective focus  My overall impression is sepsis.  Source: Skin and Soft Tissue (location bilateral lower extremity cellulitis)  Source control achieved by: Antibiotics as mentioned below.   Cultures as below      8/3 - Blood cx x2 positive for acinetobacter. Repeat cx on 7/31 NTD.  Plan 14 days tx (cefepime)  from 7/31 if they remain negative. Also on Gent to double cover pseudomonas in wounds.       Multiple and open wound of lower limb, complicated  Management as above.       Gram-negative bacteremia  2 out of 2 sets on admission positive for acinetobacter.  Repeat cultures ordered for clearance (7/31).   Plan as above.     Severe protein-calorie malnutrition  Nutrition consulted. Most recent weight and BMI monitored-     Measurements:  Wt Readings from Last 1 Encounters:   07/28/23 73.6 kg (162 lb 5.4 oz)   Body mass index is 22.02 kg/m².    Patient has been screened and assessed by RD.    Malnutrition Type:  Context: chronic " illness  Level: severe    Malnutrition Characteristic Summary:  Weight Loss (Malnutrition): greater than 5% in 1 month  Energy Intake (Malnutrition): less than 75% for greater than or equal to 1 month  Subcutaneous Fat (Malnutrition): severe depletion  Muscle Mass (Malnutrition): severe depletion    Interventions/Recommendations (treatment strategy):  Recommend continue current diet as able/appropriate and tolerated. Also recommend addition of Ensure Max Protein TID and Abel BID to improve kcal/protein intakes and aid in wound healing. Also recommend consider addition of 500mg Vitamin C and 220mg ZnSO4 BID to aid in wound healing.    Type 2 MI (myocardial infarction)  Troponin elevation but trend is not significant.  Denies chest pain.  No EKG done on admission in ED.   Monitor for chest pain.           Hyperlipemia  Continue home statin    A-fib  Patient with Persistent (7 days or more) atrial fibrillation which is controlled currently with Beta Blocker.   Patient is currently in atrial fibrillation.  [id number] Score: 3. HASBLED Score: 2.   Anticoagulation on hold for surgical debridement. Resume tomorrow if no further debridement likely.     8/3 - Will resume anticoagulation.     Depression  Patient has persistent depression which is moderate and is currently controlled.   Will Continue anti-depressant medication- Remeron.   We will not consult psychiatry at this time.   Patient does not display psychosis at this time.   Continue to monitor closely and adjust plan of care as needed.        Type 2 diabetes mellitus  Patient's FSGs are controlled on current medication regimen.  Last A1c reviewed-   Lab Results   Component Value Date    HGBA1C 5.5 06/02/2023     Most recent fingerstick glucose reviewed- No results for input(s): POCTGLUCOSE in the last 24 hours.  Current correctional scale  Medium  Maintain anti-hyperglycemic dose as follows-   Antihyperglycemics (From admission, onward)    None        Hold Oral  hypoglycemics while patient is in the hospital.    COPD (chronic obstructive pulmonary disease)  DuoNeb q6h PRN  Supplemental oxygenation    Lactic acidosis  Sec to severe sepsis.  Leading to high anion gap metabolic acidosis, bicarb 14 today.   S/p IV hydration.   Follow lactate.     8/1 - bicarb improved to 21.           Cellulitis of lower extremity  Known chronic wounds on BLE for a few months.   Lost follow up with home health wound care reporting insurance did not approve it.   Photographs appear better compared to ones in 6/2023.  Inflammatory markers elevated.   Started on broad spectrum antibiotics- vancomycin and Zosyn.   Wound culture with pseudomonas, morganella and providencia growth, blood culture with acinetobacter- discontinued vancomycin.   Switched to cefepime given acinetobacter in blood (7/31)- discussed with pharmacy.   General surgery consulted for wound debridement/source control.   Wound care consulted.    8/1 - All organisms in blood and wound covered by cefipime. Pseudomonas in wound. Consider adding gent for double coverage.      8/2 - Added gent yesterday.  Continue cefepime.         CHANDNI (acute kidney injury)  Patient with acute kidney injury/acute renal failure likely due to pre-renal azotemia due to dehydration   CHANDNI is currently improving.   Baseline creatinine 1.25 about one month ago   Labs reviewed- Renal function/electrolytes with Estimated Creatinine Clearance: 78.7 mL/min (based on SCr of 0.85 mg/dL). according to latest data.   Monitor urine output and serial BMP and adjust therapy as needed.  Avoid nephrotoxins and renally dose meds for GFR listed above.    8/1 - Renal function improved. Considering gent addition.       Essential hypertension  On Toprol but also on midodrine.  Suspect Toprol is for HFrEF and midodrine is to avoid hypotension.      Chronic HFrEF (heart failure with reduced ejection fraction)  Last recorded Echo in 2019 showed an EF of 40%  Not in acute  exacerbation.  Resume home Toprol, not on ACEI or ARB at home- unclear reason but suspect may be due to borderline low BP and taking midodrine at baseline.         VTE Risk Mitigation (From admission, onward)         Ordered     heparin (porcine) injection 5,000 Units  Every 8 hours         07/29/23 1052                Discharge Planning   MAKENNA:      Code Status: Full Code   Is the patient medically ready for discharge?: No    Reason for patient still in hospital (select all that apply): Treatment  Discharge Plan A: Skilled Nursing Facility                  Rehmat ELENA Cabrera MD  Department of Hospital Medicine   [facility] - Short Stay Unit

## 2023-08-07 NOTE — PT/OT/SLP PROGRESS
Physical Therapy Treatment    Patient Name:  Esthela Contreras   MRN:  [id number]    Recommendations:     Discharge Recommendations: nursing facility, skilled  Discharge Equipment Recommendations: hospital bed  Barriers to discharge: Decreased caregiver support    Assessment:     Esthela Contreras is a 72 y.o. male admitted with a medical diagnosis of Severe sepsis.  He presents with the following impairments/functional limitations: weakness, impaired endurance, impaired functional mobility, gait instability, decreased lower extremity function, impaired skin Patient with pain LE's with ambulation limiting gait. Gait also increased bleeding of left le. Nursing informed of pain and increased bleeding. .    Rehab Prognosis: Good; patient would benefit from acute skilled PT services to address these deficits and reach maximum level of function.    Recent Surgery: Procedure(s) (LRB):  SELECTIVE DEBRIDEMENT, LOWER EXTREMITY (Bilateral)  BIOPSY, SOFT TISSUE (Right) 6 Days Post-Op    Plan:     During this hospitalization, patient to be seen 5 x/week to address the identified rehab impairments via gait training, therapeutic activities, therapeutic exercises and progress toward the following goals:    Plan of Care Expires:  08/28/23    Subjective     Chief Complaint: LE pain  Patient/Family Comments/goals: Patient complains of increased pain with ambulation  Pain/Comfort:  Pain Rating 1: 6/10  Location - Side 1: Bilateral  Location 1: leg  Pain Addressed 1: Cessation of Activity, Nurse notified      Objective:     Communicated with nurse prior to session.  Patient found supine with   upon PT entry to room.     General Precautions: Standard, fall  Orthopedic Precautions: N/A  Braces: N/A  Respiratory Status: Room air     Functional Mobility:  Bed Mobility:     Supine to Sit: contact guard assistance  Sit to Supine: contact guard assistance  Transfers:     Sit to Stand:  contact guard assistance with rolling walker  Gait: ambulated 15  feet with rolling walker, 90% decreased memo, limited by pain      AM-PAC 6 CLICK MOBILITY  Turning over in bed (including adjusting bedclothes, sheets and blankets)?: 3  Sitting down on and standing up from a chair with arms (e.g., wheelchair, bedside commode, etc.): 3  Moving from lying on back to sitting on the side of the bed?: 3  Moving to and from a bed to a chair (including a wheelchair)?: 3  Need to walk in hospital room?: 3  Climbing 3-5 steps with a railing?: 2  Basic Mobility Total Score: 17       Treatment & Education:  BLE: aps, qs, saq, abd-add, slr 3x10 each    Patient left supine with call button in reach..    GOALS:   Multidisciplinary Problems       Physical Therapy Goals          Problem: Physical Therapy    Goal Priority Disciplines Outcome Goal Variances Interventions   Physical Therapy Goal     PT, PT/OT Ongoing, Progressing     Description: Short Term Goals to be met by: 2023    Patient will increase functional independence with mobility by performin. Supine to sit  independently  2. Sit to stand transfer with independently using Rolling walker  3. Bed to chair transfer independently using Rolling walker  4. Gait  x 50 feet with stand by assist using Rolling walker  5. Lower extremity exercise program x30 reps per handout, with assistance as needed    Long Term Goals to be met by: 2023    Pt will regain full independent functional mobility with Rolling walker and and wheelchair to return to home situation and prior activities of daily living.                        Time Tracking:     PT Received On: 23  PT Start Time: 920     PT Stop Time: 945  PT Total Time (min): 25 min     Billable Minutes: Evaluation 20    Treatment Type: Treatment  PT/PTA: PT     Number of PTA visits since last PT visit: 0     2023

## 2023-08-08 PROCEDURE — 99232 SBSQ HOSP IP/OBS MODERATE 35: CPT | Mod: ,,, | Performed by: INTERNAL MEDICINE

## 2023-08-08 PROCEDURE — 97116 GAIT TRAINING THERAPY: CPT

## 2023-08-08 PROCEDURE — 97110 THERAPEUTIC EXERCISES: CPT

## 2023-08-08 PROCEDURE — 63600175 PHARM REV CODE 636 W HCPCS: Performed by: INTERNAL MEDICINE

## 2023-08-08 PROCEDURE — 25000003 PHARM REV CODE 250: Performed by: FAMILY MEDICINE

## 2023-08-08 PROCEDURE — 11000001 HC ACUTE MED/SURG PRIVATE ROOM

## 2023-08-08 PROCEDURE — 25000003 PHARM REV CODE 250

## 2023-08-08 PROCEDURE — 99232 PR SUBSEQUENT HOSPITAL CARE,LEVL II: ICD-10-PCS | Mod: ,,, | Performed by: INTERNAL MEDICINE

## 2023-08-08 PROCEDURE — 25000003 PHARM REV CODE 250: Performed by: INTERNAL MEDICINE

## 2023-08-08 PROCEDURE — 97535 SELF CARE MNGMENT TRAINING: CPT

## 2023-08-08 RX ADMIN — ASPIRIN 81 MG: 81 TABLET, COATED ORAL at 08:08

## 2023-08-08 RX ADMIN — CEFEPIME 2 G: 2 INJECTION, POWDER, FOR SOLUTION INTRAVENOUS at 05:08

## 2023-08-08 RX ADMIN — Medication: at 08:08

## 2023-08-08 RX ADMIN — OXYCODONE HYDROCHLORIDE 5 MG: 5 TABLET ORAL at 06:08

## 2023-08-08 RX ADMIN — Medication 1 TABLET: at 08:08

## 2023-08-08 RX ADMIN — ATORVASTATIN CALCIUM 40 MG: 40 TABLET, FILM COATED ORAL at 08:08

## 2023-08-08 RX ADMIN — POLYETHYLENE GLYCOL 3350 17 G: 17 POWDER, FOR SOLUTION ORAL at 08:08

## 2023-08-08 RX ADMIN — OXYCODONE HYDROCHLORIDE 5 MG: 5 TABLET ORAL at 12:08

## 2023-08-08 RX ADMIN — CEFEPIME 2 G: 2 INJECTION, POWDER, FOR SOLUTION INTRAVENOUS at 09:08

## 2023-08-08 RX ADMIN — CEFEPIME 2 G: 2 INJECTION, POWDER, FOR SOLUTION INTRAVENOUS at 12:08

## 2023-08-08 NOTE — PT/OT/SLP PROGRESS
Physical Therapy Treatment    Patient Name:  Esthela Contreras   MRN:  [id number]    Recommendations:     Discharge Recommendations: nursing facility, skilled  Discharge Equipment Recommendations: hospital bed  Barriers to discharge: Decreased caregiver support    Assessment:     Esthela Contreras is a 72 y.o. male admitted with a medical diagnosis of Severe sepsis.  He presents with the following impairments/functional limitations: weakness, impaired endurance, impaired functional mobility, gait instability, decreased lower extremity function, impaired skin Patient with pain LE's with ambulation limiting gait.     Rehab Prognosis: Good; patient would benefit from acute skilled PT services to address these deficits and reach maximum level of function.    Recent Surgery: Procedure(s) (LRB):  SELECTIVE DEBRIDEMENT, LOWER EXTREMITY (Bilateral)  BIOPSY, SOFT TISSUE (Right) 7 Days Post-Op    Plan:     During this hospitalization, patient to be seen 5 x/week to address the identified rehab impairments via gait training, therapeutic activities, therapeutic exercises and progress toward the following goals:    Plan of Care Expires:  08/28/23    Subjective     Chief Complaint: LE pain  Patient/Family Comments/goals: Patient complains of increased pain with ambulation  Pain/Comfort:  Pain Rating 1: 4/10  Location - Side 1: Bilateral  Location 1: leg  Pain Addressed 1: Cessation of Activity      Objective:     Communicated with nurse prior to session.  Patient found supine with   upon PT entry to room.     General Precautions: Standard, fall  Orthopedic Precautions: N/A  Braces: N/A  Respiratory Status: Room air     Functional Mobility:  Bed Mobility:     Supine to Sit: contact guard assistance  Sit to Supine: contact guard assistance  Transfers:     Sit to Stand:  contact guard assistance with rolling walker  Gait: ambulated 15 feet with rolling walker, 90% decreased memo, limited by pain      AM-PAC 6 CLICK MOBILITY  Turning over  in bed (including adjusting bedclothes, sheets and blankets)?: 3  Sitting down on and standing up from a chair with arms (e.g., wheelchair, bedside commode, etc.): 3  Moving from lying on back to sitting on the side of the bed?: 3  Moving to and from a bed to a chair (including a wheelchair)?: 3  Need to walk in hospital room?: 3  Climbing 3-5 steps with a railing?: 2  Basic Mobility Total Score: 17       Treatment & Education:  BLE: aps, qs, saq, abd-add, slr 3x10 each    Patient left supine with call button in reach..    GOALS:   Multidisciplinary Problems       Physical Therapy Goals          Problem: Physical Therapy    Goal Priority Disciplines Outcome Goal Variances Interventions   Physical Therapy Goal     PT, PT/OT Ongoing, Progressing     Description: Short Term Goals to be met by: 2023    Patient will increase functional independence with mobility by performin. Supine to sit  independently  2. Sit to stand transfer with independently using Rolling walker  3. Bed to chair transfer independently using Rolling walker  4. Gait  x 50 feet with stand by assist using Rolling walker  5. Lower extremity exercise program x30 reps per handout, with assistance as needed    Long Term Goals to be met by: 2023    Pt will regain full independent functional mobility with Rolling walker and and wheelchair to return to home situation and prior activities of daily living.                        Time Tracking:     PT Received On: 23  PT Start Time: 955     PT Stop Time: 1020  PT Total Time (min): 25 min     Billable Minutes: gait 10, exercise 15    Treatment Type: Treatment  PT/PTA: PT     Number of PTA visits since last PT visit: 0     2023

## 2023-08-08 NOTE — PROGRESS NOTES
[facility] Medicine  Progress Note    Patient Name: Esthela Contreras  MRN: [id number]  Patient Class: IP- Inpatient   Admission Date: 7/27/2023  Length of Stay: 11 days  Attending Physician: Milton Cabrera MD  Primary Care Provider: Yolande Spring FNP        Subjective:     Principal Problem:Severe sepsis        HPI:  Patient is a 71 yo male who presents to the hospital accompanied by his family with a complaint of wounds to his bilateral lower extremities. Patient is a poor historian, and history was provided by his daughter at the emergency department. Patient has a history of chronic wounds and was admitted to the hospital on 6/02/2023 of severe sepsis secondary to cellulitis of lower extremities and new onset of atrial fibrillation. He was subsequently discharged to [facility] for wound care.  His daughter reported that wounds were healing, and the patient was discharged home under the care of [facility] for continued care. However, the patient did not get any service from the  [facility], which culminated in the worsening of his wounds over the past few days. This is associated with pain and edema, but the patient denies any associated fever, chills, chest pain, shortness of breath, nausea and vomiting.     In the ED, the patient met sepsis criteria on arrival and was thus treated per sepsis protocol through IV hydration, blood culture collection before starting broad spectrum antibiotics. Ensuring work up was significant for WBC 27.6, H&H 13.5/41.9 with history of anemia and baseline H&H 10.2/33.6 on month ago, dehydration with hyponatremia with a sodium of 129, acute renal injury with a BUN/CR 67/2.44 and GFR 27 with a baseline BUN/CR 16/1.25 and GFR 92 one month ago. Anion Gap metabolic acidosis with a GAP of 19.  Initial Troponin of 153.9 with history of chronically elevated Troponin, and NTpBNP of 4,406 with a baseline of 20.7K about three weeks ago. Lactic  acid of 3.0. Patient will be admitted for further evaluation and management.            Overview/Hospital Course:  7/28: Worsening leukocytosis noted. Blood cultures 2/2 sets growing GNB, wound culture with GNB.     7/30: Wound cultures with morganella, providencia and pseudomonas. General surgery consulted.     7/31: Blood cultures returned with acinetobacter, antibiotic changed to cefepime per consultation with pharmacy. Awaiting general surgery's input regarding surgery.       Interval History:     NAEO  Pt has nno new complains.   Refused midodrine and metoprolol will therfore dc it.     Review of Systems   Respiratory: Negative.     Cardiovascular: Negative.    Gastrointestinal: Negative.      Objective:     Vital Signs (Most Recent):  Temp: 98.1 °F (36.7 °C) (08/08/23 1215)  Pulse: 82 (08/08/23 1215)  Resp: 18 (08/08/23 1231)  BP: 106/60 (08/08/23 1215)  SpO2: 96 % (08/08/23 1215) Vital Signs (24h Range):  Temp:  [97.7 °F (36.5 °C)-98.3 °F (36.8 °C)] 98.1 °F (36.7 °C)  Pulse:  [71-87] 82  Resp:  [17-18] 18  SpO2:  [93 %-100 %] 96 %  BP: ()/(51-63) 106/60     Weight: 77.4 kg (170 lb 9.6 oz)  Body mass index is 24.48 kg/m².    Intake/Output Summary (Last 24 hours) at 8/8/2023 1244  Last data filed at 8/8/2023 0756  Gross per 24 hour   Intake 0 ml   Output 1000 ml   Net -1000 ml           Physical Exam  Vitals and nursing note reviewed. Exam conducted with a chaperone present.   Constitutional:       General: He is not in acute distress.     Appearance: He is not ill-appearing or toxic-appearing.   HENT:      Head: Normocephalic and atraumatic.      Nose: Nose normal.      Mouth/Throat:      Mouth: Mucous membranes are moist.   Eyes:      Extraocular Movements: Extraocular movements intact.   Cardiovascular:      Rate and Rhythm: Normal rate and regular rhythm.      Pulses: Normal pulses.      Heart sounds: Normal heart sounds.   Pulmonary:      Effort: Pulmonary effort is normal.      Breath sounds: Normal  "breath sounds.   Abdominal:      General: Bowel sounds are normal. There is no distension.      Palpations: Abdomen is soft.      Tenderness: There is no abdominal tenderness.   Musculoskeletal:         General: Tenderness present.      Cervical back: Normal range of motion.   Skin:     General: Skin is warm.      Findings: Erythema and lesion present.   Neurological:      General: No focal deficit present.      Mental Status: He is alert and oriented to person, place, and time. Mental status is at baseline.   Psychiatric:         Mood and Affect: Mood normal.         Behavior: Behavior normal.             Significant Labs: All pertinent labs within the past 24 hours have been reviewed.  BMP:   No results for input(s): "GLU", "NA", "K", "CL", "CO2", "BUN", "CREATININE", "CALCIUM", "MG" in the last 48 hours.    CBC:   No results for input(s): "WBC", "HGB", "HCT", "PLT" in the last 48 hours.      Significant Imaging: I have reviewed all pertinent imaging results/findings within the past 24 hours.      Assessment/Plan:      * Severe sepsis  This patient does have evidence of infective focus  My overall impression is sepsis.  Source: Skin and Soft Tissue (location bilateral lower extremity cellulitis)  Source control achieved by: Antibiotics as mentioned below.   Cultures as below      8/3 - Blood cx x2 positive for acinetobacter. Repeat cx on 7/31 NTD.  Plan 14 days tx (cefepime)  from 7/31 if they remain negative. Also on Gent to double cover pseudomonas in wounds.       Multiple and open wound of lower limb, complicated  Management as above.       Gram-negative bacteremia  2 out of 2 sets on admission positive for acinetobacter.  Repeat cultures ordered for clearance (7/31).   Plan as above.     Severe protein-calorie malnutrition  Nutrition consulted. Most recent weight and BMI monitored-     Measurements:  Wt Readings from Last 1 Encounters:   07/28/23 73.6 kg (162 lb 5.4 oz)   Body mass index is 22.02 " kg/m².    Patient has been screened and assessed by RD.    Malnutrition Type:  Context: chronic illness  Level: severe    Malnutrition Characteristic Summary:  Weight Loss (Malnutrition): greater than 5% in 1 month  Energy Intake (Malnutrition): less than 75% for greater than or equal to 1 month  Subcutaneous Fat (Malnutrition): severe depletion  Muscle Mass (Malnutrition): severe depletion    Interventions/Recommendations (treatment strategy):  Recommend continue current diet as able/appropriate and tolerated. Also recommend addition of Ensure Max Protein TID and Abel BID to improve kcal/protein intakes and aid in wound healing. Also recommend consider addition of 500mg Vitamin C and 220mg ZnSO4 BID to aid in wound healing.    Type 2 MI (myocardial infarction)  Troponin elevation but trend is not significant.  Denies chest pain.  No EKG done on admission in ED.   Monitor for chest pain.           Hyperlipemia  Continue home statin    A-fib  Patient with Persistent (7 days or more) atrial fibrillation which is controlled currently with Beta Blocker.   Patient is currently in atrial fibrillation.  INPTJ6HODs Score: 3. HASBLED Score: 2.   Anticoagulation on hold for surgical debridement. Resume tomorrow if no further debridement likely.     8/3 - Will resume anticoagulation.     Depression  Patient has persistent depression which is moderate and is currently controlled.   Will Continue anti-depressant medication- Remeron.   We will not consult psychiatry at this time.   Patient does not display psychosis at this time.   Continue to monitor closely and adjust plan of care as needed.        Type 2 diabetes mellitus  Patient's FSGs are controlled on current medication regimen.  Last A1c reviewed-   Lab Results   Component Value Date    HGBA1C 5.5 06/02/2023     Most recent fingerstick glucose reviewed- No results for input(s): POCTGLUCOSE in the last 24 hours.  Current correctional scale  Medium  Maintain  anti-hyperglycemic dose as follows-   Antihyperglycemics (From admission, onward)    None        Hold Oral hypoglycemics while patient is in the hospital.    COPD (chronic obstructive pulmonary disease)  DuoNeb q6h PRN  Supplemental oxygenation    Lactic acidosis  Sec to severe sepsis.  Leading to high anion gap metabolic acidosis, bicarb 14 today.   S/p IV hydration.   Follow lactate.     8/1 - bicarb improved to 21.           Cellulitis of lower extremity  Known chronic wounds on BLE for a few months.   Lost follow up with home health wound care reporting insurance did not approve it.   Photographs appear better compared to ones in 6/2023.  Inflammatory markers elevated.   Started on broad spectrum antibiotics- vancomycin and Zosyn.   Wound culture with pseudomonas, morganella and providencia growth, blood culture with acinetobacter- discontinued vancomycin.   Switched to cefepime given acinetobacter in blood (7/31)- discussed with pharmacy.   General surgery consulted for wound debridement/source control.   Wound care consulted.    8/1 - All organisms in blood and wound covered by cefipime. Pseudomonas in wound. Consider adding gent for double coverage.      8/2 - Added gent yesterday.  Continue cefepime.         CHANDNI (acute kidney injury)  Patient with acute kidney injury/acute renal failure likely due to pre-renal azotemia due to dehydration   CHANDNI is currently improving.   Baseline creatinine 1.25 about one month ago   Labs reviewed- Renal function/electrolytes with Estimated Creatinine Clearance: 78.7 mL/min (based on SCr of 0.85 mg/dL). according to latest data.   Monitor urine output and serial BMP and adjust therapy as needed.  Avoid nephrotoxins and renally dose meds for GFR listed above.    8/1 - Renal function improved. Considering gent addition.       Essential hypertension  On Toprol but also on midodrine.  Suspect Toprol is for HFrEF and midodrine is to avoid hypotension.      Chronic HFrEF (heart  failure with reduced ejection fraction)  Last recorded Echo in 2019 showed an EF of 40%  Not in acute exacerbation.  Resume home Toprol, not on ACEI or ARB at home- unclear reason but suspect may be due to borderline low BP and taking midodrine at baseline.         VTE Risk Mitigation (From admission, onward)         Ordered     heparin (porcine) injection 5,000 Units  Every 8 hours         07/29/23 1052                Discharge Planning   MAKENNA:      Code Status: Full Code   Is the patient medically ready for discharge?: No    Reason for patient still in hospital (select all that apply): Treatment  Discharge Plan A: Skilled Nursing Facility                  Rehmat ELENA Cabrera MD  Department of Hospital Medicine   [facility]   Arava Counseling:  Patient counseled regarding adverse effects of Arava including but not limited to nausea, vomiting, abnormalities in liver function tests. Patients may develop mouth sores, rash, diarrhea, and abnormalities in blood counts. The patient understands that monitoring is required including LFTs and blood counts.  There is a rare possibility of scarring of the liver and lung problems that can occur when taking methotrexate. Persistent nausea, loss of appetite, pale stools, dark urine, cough, and shortness of breath should be reported immediately. Patient advised to discontinue Arava treatment and consult with a physician prior to attempting conception. The patient will have to undergo a treatment to eliminate Arava from the body prior to conception.

## 2023-08-08 NOTE — PT/OT/SLP PROGRESS
Occupational Therapy   Treatment    Name: Esthela Contreras  MRN: [id number]  Admitting Diagnosis:  Severe sepsis  7 Days Post-Op    Recommendations:     Discharge Recommendations: nursing facility, skilled  Discharge Equipment Recommendations:   (to be determined)  Barriers to discharge:  None    Assessment:     Esthela Contreras is a 72 y.o. male with a medical diagnosis of Severe sepsis.  He presents with no complaints. Pt agreed to OT treatment. Performance deficits affecting function are weakness, impaired endurance, impaired self care skills.     Rehab Prognosis:  Good; patient would benefit from acute skilled OT services to address these deficits and reach maximum level of function.       Plan:     Patient to be seen 5 x/week to address the above listed problems via self-care/home management, therapeutic activities, therapeutic exercises  Plan of Care Expires: 08/25/23  Plan of Care Reviewed with: patient    Subjective     Chief Complaint: Severe Sepsis  Patient/Family Comments/goals: To return home  Pain/Comfort:  Pain Rating 1: 0/10  Pain Rating Post-Intervention 1: 0/10    Objective:     Communicated with: ANAHI Cummins prior to session.  Patient found HOB elevated with peripheral IV upon OT entry to room.    General Precautions: Standard, fall    Orthopedic Precautions:N/A  Braces: N/A  Respiratory Status: Room air     Occupational Performance:     Bed Mobility:    Patient completed Supine to Sit with modified independence  Patient completed Sit to Supine with contact guard assistance     Functional Mobility/Transfers:  NT due to legs bleeding. Pt performed side scoot to HOB.  Functional Mobility: NT    Activities of Daily Living:  Grooming: independence brushing teeth and combing hair  Bathing: independence UE anterior bathing, Mod a with back crossing large towel over shoulders. Pt performed perineum/buttock bathing in bed with setup. Pt able to bathe thighs and top of legs only due to dressings    Upper Body Dressing: independence donning gown after setup  Lower Body Dressing: NT pt returned to bed and nurse notified regarding bleeding .      [facility] 6 Click ADL:      Treatment & Education:  Pt participated well with tx. Bath initiated sitting EOB x approx 15 min and completed in bed due to bleeding from Lower extremities. Pt tolerated tx well and reported feeling better after bath.    Patient left HOB elevated with all lines intact, call button in reach, and nurse notified    GOALS:   Multidisciplinary Problems       Occupational Therapy Goals          Problem: Occupational Therapy    Goal Priority Disciplines Outcome Interventions   Occupational Therapy Goal     OT, PT/OT Ongoing, Progressing    Description: STG:  Pt will perform grooming with setup  Pt will bathe with Anna with setup at EOB  Pt will perform UE dressing with Harshil  Pt will perform LE dressing with Anna with AD if needed  Pt will sit EOB x 10 min with SBA   Pt will transfer bed/chair/bsc with CGA with RW  Pt will perform standing task x 2 min with CGA with RW   Pt will tolerate 15 minutes of tx without fatigue      LT.Restore to max I with self care and mobility.                           Time Tracking:     OT Date of Treatment: 23  OT Start Time: 903  OT Stop Time: 942  OT Total Time (min): 39 min    Billable Minutes:Self Care/Home Management 35    OT/KO: OT          2023

## 2023-08-08 NOTE — SUBJECTIVE & OBJECTIVE
Interval History:     NEGRITO  Pt has nno new complains.   Refused midodrine and metoprolol will therfore dc it.     Review of Systems   Respiratory: Negative.     Cardiovascular: Negative.    Gastrointestinal: Negative.      Objective:     Vital Signs (Most Recent):  Temp: 98.1 °F (36.7 °C) (08/08/23 1215)  Pulse: 82 (08/08/23 1215)  Resp: 18 (08/08/23 1231)  BP: 106/60 (08/08/23 1215)  SpO2: 96 % (08/08/23 1215) Vital Signs (24h Range):  Temp:  [97.7 °F (36.5 °C)-98.3 °F (36.8 °C)] 98.1 °F (36.7 °C)  Pulse:  [71-87] 82  Resp:  [17-18] 18  SpO2:  [93 %-100 %] 96 %  BP: ()/(51-63) 106/60     Weight: 77.4 kg (170 lb 9.6 oz)  Body mass index is 24.48 kg/m².    Intake/Output Summary (Last 24 hours) at 8/8/2023 1244  Last data filed at 8/8/2023 0756  Gross per 24 hour   Intake 0 ml   Output 1000 ml   Net -1000 ml           Physical Exam  Vitals and nursing note reviewed. Exam conducted with a chaperone present.   Constitutional:       General: He is not in acute distress.     Appearance: He is not ill-appearing or toxic-appearing.   HENT:      Head: Normocephalic and atraumatic.      Nose: Nose normal.      Mouth/Throat:      Mouth: Mucous membranes are moist.   Eyes:      Extraocular Movements: Extraocular movements intact.   Cardiovascular:      Rate and Rhythm: Normal rate and regular rhythm.      Pulses: Normal pulses.      Heart sounds: Normal heart sounds.   Pulmonary:      Effort: Pulmonary effort is normal.      Breath sounds: Normal breath sounds.   Abdominal:      General: Bowel sounds are normal. There is no distension.      Palpations: Abdomen is soft.      Tenderness: There is no abdominal tenderness.   Musculoskeletal:         General: Tenderness present.      Cervical back: Normal range of motion.   Skin:     General: Skin is warm.      Findings: Erythema and lesion present.   Neurological:      General: No focal deficit present.      Mental Status: He is alert and oriented to person, place, and time.  "Mental status is at baseline.   Psychiatric:         Mood and Affect: Mood normal.         Behavior: Behavior normal.             Significant Labs: All pertinent labs within the past 24 hours have been reviewed.  BMP:   No results for input(s): "GLU", "NA", "K", "CL", "CO2", "BUN", "CREATININE", "CALCIUM", "MG" in the last 48 hours.    CBC:   No results for input(s): "WBC", "HGB", "HCT", "PLT" in the last 48 hours.      Significant Imaging: I have reviewed all pertinent imaging results/findings within the past 24 hours.  "

## 2023-08-08 NOTE — PLAN OF CARE
LITZY called and left message with Edelmira Bashir with [employer] at [phone] Ext [phone] and left message re: the decision of the expedited appeal. LITZY awaiting call back. LITZY following.

## 2023-08-08 NOTE — PLAN OF CARE
Problem: Adjustment to Illness (Sepsis/Septic Shock)  Goal: Optimal Coping  Outcome: Ongoing, Progressing  Intervention: Optimize Psychosocial Adjustment to Illness  Flowsheets (Taken 8/8/2023 1816)  Supportive Measures:   active listening utilized   verbalization of feelings encouraged   self-reflection promoted   self-care encouraged  Family/Support System Care: self-care encouraged

## 2023-08-09 LAB
ALBUMIN SERPL BCP-MCNC: 1.5 G/DL (ref 3.5–5)
ALBUMIN/GLOB SERPL: 0.3 {RATIO}
ALP SERPL-CCNC: 104 U/L (ref 45–115)
ALT SERPL W P-5'-P-CCNC: 19 U/L (ref 16–61)
ANION GAP SERPL CALCULATED.3IONS-SCNC: 9 MMOL/L (ref 7–16)
AST SERPL W P-5'-P-CCNC: 30 U/L (ref 15–37)
BASOPHILS # BLD AUTO: 0.14 K/UL (ref 0–0.2)
BASOPHILS NFR BLD AUTO: 1.6 % (ref 0–1)
BILIRUB SERPL-MCNC: 0.2 MG/DL (ref ?–1.2)
BUN SERPL-MCNC: 37 MG/DL (ref 7–18)
BUN/CREAT SERPL: 28 (ref 6–20)
CALCIUM SERPL-MCNC: 7.9 MG/DL (ref 8.5–10.1)
CHLORIDE SERPL-SCNC: 111 MMOL/L (ref 98–107)
CO2 SERPL-SCNC: 24 MMOL/L (ref 21–32)
CREAT SERPL-MCNC: 1.34 MG/DL (ref 0.7–1.3)
DIFFERENTIAL METHOD BLD: ABNORMAL
EGFR (NO RACE VARIABLE) (RUSH/TITUS): 56 ML/MIN/1.73M2
EOSINOPHIL # BLD AUTO: 0.3 K/UL (ref 0–0.5)
EOSINOPHIL NFR BLD AUTO: 3.4 % (ref 1–4)
ERYTHROCYTE [DISTWIDTH] IN BLOOD BY AUTOMATED COUNT: 16.8 % (ref 11.5–14.5)
GENTAMICIN TROUGH SERPL-MCNC: 1.1 ΜG/ML (ref 0.5–1.9)
GLOBULIN SER-MCNC: 4.3 G/DL (ref 2–4)
GLUCOSE SERPL-MCNC: 86 MG/DL (ref 74–106)
HCT VFR BLD AUTO: 30.2 % (ref 40–54)
HGB BLD-MCNC: 9.2 G/DL (ref 13.5–18)
IMM GRANULOCYTES # BLD AUTO: 0.06 K/UL (ref 0–0.04)
IMM GRANULOCYTES NFR BLD: 0.7 % (ref 0–0.4)
LYMPHOCYTES # BLD AUTO: 1.64 K/UL (ref 1–4.8)
LYMPHOCYTES NFR BLD AUTO: 18.7 % (ref 27–41)
MCH RBC QN AUTO: 28.8 PG (ref 27–31)
MCHC RBC AUTO-ENTMCNC: 30.5 G/DL (ref 32–36)
MCV RBC AUTO: 94.4 FL (ref 80–96)
MONOCYTES # BLD AUTO: 0.96 K/UL (ref 0–0.8)
MONOCYTES NFR BLD AUTO: 10.9 % (ref 2–6)
MPC BLD CALC-MCNC: 11.1 FL (ref 9.4–12.4)
NEUTROPHILS # BLD AUTO: 5.69 K/UL (ref 1.8–7.7)
NEUTROPHILS NFR BLD AUTO: 64.7 % (ref 53–65)
NRBC # BLD AUTO: 0 X10E3/UL
NRBC, AUTO (.00): 0 %
PLATELET # BLD AUTO: 172 K/UL (ref 150–400)
POTASSIUM SERPL-SCNC: 4.8 MMOL/L (ref 3.5–5.1)
PROT SERPL-MCNC: 5.8 G/DL (ref 6.4–8.2)
RBC # BLD AUTO: 3.2 M/UL (ref 4.6–6.2)
SODIUM SERPL-SCNC: 139 MMOL/L (ref 136–145)
WBC # BLD AUTO: 8.79 K/UL (ref 4.5–11)

## 2023-08-09 PROCEDURE — 25000003 PHARM REV CODE 250: Performed by: INTERNAL MEDICINE

## 2023-08-09 PROCEDURE — 97110 THERAPEUTIC EXERCISES: CPT

## 2023-08-09 PROCEDURE — 25000003 PHARM REV CODE 250: Performed by: FAMILY MEDICINE

## 2023-08-09 PROCEDURE — 97116 GAIT TRAINING THERAPY: CPT

## 2023-08-09 PROCEDURE — 63600175 PHARM REV CODE 636 W HCPCS: Performed by: INTERNAL MEDICINE

## 2023-08-09 PROCEDURE — 85025 COMPLETE CBC W/AUTO DIFF WBC: CPT | Performed by: INTERNAL MEDICINE

## 2023-08-09 PROCEDURE — 11000001 HC ACUTE MED/SURG PRIVATE ROOM

## 2023-08-09 PROCEDURE — 99232 SBSQ HOSP IP/OBS MODERATE 35: CPT | Mod: ,,, | Performed by: INTERNAL MEDICINE

## 2023-08-09 PROCEDURE — 97535 SELF CARE MNGMENT TRAINING: CPT

## 2023-08-09 PROCEDURE — 80053 COMPREHEN METABOLIC PANEL: CPT | Performed by: INTERNAL MEDICINE

## 2023-08-09 PROCEDURE — 99232 PR SUBSEQUENT HOSPITAL CARE,LEVL II: ICD-10-PCS | Mod: ,,, | Performed by: INTERNAL MEDICINE

## 2023-08-09 PROCEDURE — 80170 ASSAY OF GENTAMICIN: CPT | Performed by: INTERNAL MEDICINE

## 2023-08-09 PROCEDURE — 25000003 PHARM REV CODE 250

## 2023-08-09 PROCEDURE — 25000003 PHARM REV CODE 250: Performed by: HOSPITALIST

## 2023-08-09 RX ADMIN — HEPARIN SODIUM 5000 UNITS: 5000 INJECTION, SOLUTION INTRAVENOUS; SUBCUTANEOUS at 09:08

## 2023-08-09 RX ADMIN — Medication 1 TABLET: at 09:08

## 2023-08-09 RX ADMIN — MIRTAZAPINE 15 MG: 7.5 TABLET, FILM COATED ORAL at 08:08

## 2023-08-09 RX ADMIN — CEFEPIME 2 G: 2 INJECTION, POWDER, FOR SOLUTION INTRAVENOUS at 04:08

## 2023-08-09 RX ADMIN — GENTAMICIN SULFATE 360 MG: 40 INJECTION, SOLUTION INTRAMUSCULAR; INTRAVENOUS at 04:08

## 2023-08-09 RX ADMIN — ASPIRIN 81 MG: 81 TABLET, COATED ORAL at 09:08

## 2023-08-09 RX ADMIN — Medication: at 09:08

## 2023-08-09 RX ADMIN — ACETAMINOPHEN 1000 MG: 500 TABLET ORAL at 01:08

## 2023-08-09 RX ADMIN — CEFEPIME 2 G: 2 INJECTION, POWDER, FOR SOLUTION INTRAVENOUS at 08:08

## 2023-08-09 RX ADMIN — CEFEPIME 2 G: 2 INJECTION, POWDER, FOR SOLUTION INTRAVENOUS at 01:08

## 2023-08-09 RX ADMIN — POLYETHYLENE GLYCOL 3350 17 G: 17 POWDER, FOR SOLUTION ORAL at 09:08

## 2023-08-09 RX ADMIN — ATORVASTATIN CALCIUM 40 MG: 40 TABLET, FILM COATED ORAL at 09:08

## 2023-08-09 NOTE — PT/OT/SLP PROGRESS
Physical Therapy Treatment    Patient Name:  Esthela Contreras   MRN:  [id number]    Recommendations:     Discharge Recommendations: nursing facility, skilled  Discharge Equipment Recommendations: hospital bed  Barriers to discharge: Decreased caregiver support    Assessment:     Esthela Contreras is a 72 y.o. male admitted with a medical diagnosis of Severe sepsis.  He presents with the following impairments/functional limitations: weakness, impaired endurance, impaired functional mobility, gait instability, decreased lower extremity function, impaired skin Patient with pain LE's with ambulation limiting gait.     Rehab Prognosis: Good; patient would benefit from acute skilled PT services to address these deficits and reach maximum level of function.    Recent Surgery: Procedure(s) (LRB):  SELECTIVE DEBRIDEMENT, LOWER EXTREMITY (Bilateral)  BIOPSY, SOFT TISSUE (Right) 8 Days Post-Op    Plan:     During this hospitalization, patient to be seen 5 x/week to address the identified rehab impairments via gait training, therapeutic activities, therapeutic exercises and progress toward the following goals:    Plan of Care Expires:  08/28/23    Subjective     Chief Complaint: LE pain  Patient/Family Comments/goals: Patient complains of increased pain with ambulation  Pain/Comfort:  Pain Rating 1: 6/10  Location - Side 1: Bilateral  Location 1: leg      Objective:     Communicated with nurse prior to session.  Patient found supine with   upon PT entry to room.     General Precautions: Standard, fall  Orthopedic Precautions: N/A  Braces: N/A  Respiratory Status: Room air     Functional Mobility:  Bed Mobility:     Supine to Sit: contact guard assistance  Sit to Supine: contact guard assistance  Transfers:     Sit to Stand:  contact guard assistance with rolling walker  Gait: ambulated 15 feet with rolling walker, 90% decreased memo, limited by pain      AM-PAC 6 CLICK MOBILITY  Turning over in bed (including adjusting bedclothes,  sheets and blankets)?: 3  Sitting down on and standing up from a chair with arms (e.g., wheelchair, bedside commode, etc.): 3  Moving from lying on back to sitting on the side of the bed?: 3  Moving to and from a bed to a chair (including a wheelchair)?: 3  Need to walk in hospital room?: 3  Climbing 3-5 steps with a railing?: 3  Basic Mobility Total Score: 18       Treatment & Education:  BLE: aps, qs, saq, abd-add, slr 3x10 each    Patient left supine with call button in reach..    GOALS:   Multidisciplinary Problems       Physical Therapy Goals          Problem: Physical Therapy    Goal Priority Disciplines Outcome Goal Variances Interventions   Physical Therapy Goal     PT, PT/OT Ongoing, Progressing     Description: Short Term Goals to be met by: 2023    Patient will increase functional independence with mobility by performin. Supine to sit  independently  2. Sit to stand transfer with independently using Rolling walker  3. Bed to chair transfer independently using Rolling walker  4. Gait  x 50 feet with stand by assist using Rolling walker  5. Lower extremity exercise program x30 reps per handout, with assistance as needed    Long Term Goals to be met by: 2023    Pt will regain full independent functional mobility with Rolling walker and and wheelchair to return to home situation and prior activities of daily living.                        Time Tracking:     PT Received On: 23  PT Start Time: 1130     PT Stop Time: 1155  PT Total Time (min): 25 min     Billable Minutes: gait 10, exercise 15    Treatment Type: Treatment  PT/PTA: PT     Number of PTA visits since last PT visit: 0     2023
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
## 2023-08-09 NOTE — PROGRESS NOTES
Pharmacy Consult    Consulted to assist in the management of Gent therapy.  Patient is currently receiving Gent 360mg iv q48hr.  Trough drawn at 1600 on 8/9. Reported as 1.1 mcg/ml.   Since trough Is in appropriate range, no changes needed at this time.  Will continue to monitor and make adjustment as necessary.      EPI Johnson.Ph.

## 2023-08-09 NOTE — SUBJECTIVE & OBJECTIVE
Interval History:     NAEO  Pt has nno new complains.   Awaiting placement   Pt very happy for not being on metoprolol or midodrine.     Review of Systems   Respiratory: Negative.     Cardiovascular: Negative.    Gastrointestinal: Negative.      Objective:     Vital Signs (Most Recent):  Temp: 98.1 °F (36.7 °C) (08/09/23 1141)  Pulse: 80 (08/09/23 1141)  Resp: 18 (08/09/23 1141)  BP: (!) 96/51 (08/09/23 1141)  SpO2: 99 % (08/09/23 1141) Vital Signs (24h Range):  Temp:  [97.9 °F (36.6 °C)-98.2 °F (36.8 °C)] 98.1 °F (36.7 °C)  Pulse:  [70-92] 80  Resp:  [17-19] 18  SpO2:  [95 %-100 %] 99 %  BP: ()/(51-68) 96/51     Weight: 77.4 kg (170 lb 9.6 oz)  Body mass index is 24.48 kg/m².    Intake/Output Summary (Last 24 hours) at 8/9/2023 1212  Last data filed at 8/8/2023 1815  Gross per 24 hour   Intake 440 ml   Output 300 ml   Net 140 ml           Physical Exam  Vitals and nursing note reviewed. Exam conducted with a chaperone present.   Constitutional:       General: He is not in acute distress.     Appearance: He is not ill-appearing or toxic-appearing.   HENT:      Head: Normocephalic and atraumatic.      Nose: Nose normal.      Mouth/Throat:      Mouth: Mucous membranes are moist.   Eyes:      Extraocular Movements: Extraocular movements intact.   Cardiovascular:      Rate and Rhythm: Normal rate and regular rhythm.      Pulses: Normal pulses.      Heart sounds: Normal heart sounds.   Pulmonary:      Effort: Pulmonary effort is normal.      Breath sounds: Normal breath sounds.   Abdominal:      General: Bowel sounds are normal. There is no distension.      Palpations: Abdomen is soft.      Tenderness: There is no abdominal tenderness.   Musculoskeletal:         General: Tenderness present.      Cervical back: Normal range of motion.   Skin:     General: Skin is warm.      Findings: Erythema and lesion present.   Neurological:      General: No focal deficit present.      Mental Status: He is alert and oriented to  person, place, and time. Mental status is at baseline.   Psychiatric:         Mood and Affect: Mood normal.         Behavior: Behavior normal.             Significant Labs: All pertinent labs within the past 24 hours have been reviewed.  BMP:   Recent Labs   Lab 08/09/23  0521   GLU 86      K 4.8   *   CO2 24   BUN 37*   CREATININE 1.34*   CALCIUM 7.9*       CBC:   Recent Labs   Lab 08/09/23  0521   WBC 8.79   HGB 9.2*   HCT 30.2*            Significant Imaging: I have reviewed all pertinent imaging results/findings within the past 24 hours.

## 2023-08-09 NOTE — PT/OT/SLP PROGRESS
Occupational Therapy   Treatment    Name: Esthela Contreras  MRN: [id number]  Admitting Diagnosis:  Severe sepsis  8 Days Post-Op    Recommendations:     Discharge Recommendations: nursing facility, skilled  Discharge Equipment Recommendations:   (to be determined)  Barriers to discharge:  Decreased caregiver support, Other (Comment) (ongoing medical treatment)    Assessment:     Esthela Contreras is a 72 y.o. male with a medical diagnosis of Severe sepsis.  He presents with weakness and decline in ADLs. Performance deficits affecting function are weakness, impaired endurance, impaired self care skills.     Rehab Prognosis:  Good; patient would benefit from acute skilled OT services to address these deficits and reach maximum level of function.       Plan:     Patient to be seen 5 x/week to address the above listed problems via self-care/home management, therapeutic activities, therapeutic exercises  Plan of Care Expires: 08/25/23  Plan of Care Reviewed with: patient    Subjective     Chief Complaint: sepsis  Patient/Family Comments/goals: pt agreeable to OT tx  Pain/Comfort:  Pain Rating 1: 2/10  Location - Side 1: Bilateral  Location 1: leg    Objective:     Communicated with: ANAHI Bone prior to session.  Patient found supine with PICC line upon OT entry to room.    General Precautions: Standard, fall    Orthopedic Precautions:N/A  Braces: N/A  Respiratory Status: Room air     Occupational Performance:     Bed Mobility:    Patient completed Rolling/Turning to Left with  modified independence  Patient completed Rolling/Turning to Right with modified independence     Functional Mobility/Transfers:  Not performed    Activities of Daily Living:  Upper Body Dressing: minimum assistance to doff/bowen gown  Toileting: minimum assistance to perform toilet hygiene from bed      AMPA 6 Click ADL:      Treatment & Education:  Pt performed ADL training as listed above. Pt with Anna needed for toilet hygiene for thoroughness. Pt  performed x 15 reps each bicep curls 2#db, chest press 2#db, shoulder press 2#db, and IR/ER 2#db in order to improve BUE strength and endurance to perform ADL and ADL t/f with less assist.     Patient left HOB elevated with all lines intact and call button in reach    GOALS:   Multidisciplinary Problems       Occupational Therapy Goals          Problem: Occupational Therapy    Goal Priority Disciplines Outcome Interventions   Occupational Therapy Goal     OT, PT/OT Ongoing, Progressing    Description: STG:  Pt will perform grooming with setup  Pt will bathe with Anna with setup at EOB  Pt will perform UE dressing with Harshil  Pt will perform LE dressing with Anna with AD if needed  Pt will sit EOB x 10 min with SBA   Pt will transfer bed/chair/bsc with CGA with RW  Pt will perform standing task x 2 min with CGA with RW   Pt will tolerate 15 minutes of tx without fatigue      LT.Restore to max I with self care and mobility.                           Time Tracking:     OT Date of Treatment: 23  OT Start Time: 1508  OT Stop Time: 1531  OT Total Time (min): 23 min    Billable Minutes:Self Care/Home Management 8 minutes  Therapeutic Exercise 15 minutes    OT/KO: OT          2023

## 2023-08-09 NOTE — PLAN OF CARE
Diane called and pt was approved for [facility] from the expedited appeal. LITZY informed Crys at [facility] and per Crys, pt is denied. LITZY informed MD of denial by Dr. Contreras. Dr. Cabrera notified. Per Dr. Cabrera, pt  was refusing metoprolol and midodrine only, said he didn't feel good with those two oral, so the two meds were discontinued and pt has been compliant with everything else and didn't need metoprolol and mididrine. Pt was still declined. MD aware, LITZY following.

## 2023-08-09 NOTE — PROGRESS NOTES
[facility] Medicine  Progress Note    Patient Name: Esthela Contreras  MRN: [id number]  Patient Class: IP- Inpatient   Admission Date: 7/27/2023  Length of Stay: 12 days  Attending Physician: Milton Cabrera MD  Primary Care Provider: Yolande Spring FNP        Subjective:     Principal Problem:Severe sepsis        HPI:  Patient is a 71 yo male who presents to the hospital accompanied by his family with a complaint of wounds to his bilateral lower extremities. Patient is a poor historian, and history was provided by his daughter at the emergency department. Patient has a history of chronic wounds and was admitted to the hospital on 6/02/2023 of severe sepsis secondary to cellulitis of lower extremities and new onset of atrial fibrillation. He was subsequently discharged to [facility] for wound care.  His daughter reported that wounds were healing, and the patient was discharged home under the care of [facility] for continued care. However, the patient did not get any service from the  [facility], which culminated in the worsening of his wounds over the past few days. This is associated with pain and edema, but the patient denies any associated fever, chills, chest pain, shortness of breath, nausea and vomiting.     In the ED, the patient met sepsis criteria on arrival and was thus treated per sepsis protocol through IV hydration, blood culture collection before starting broad spectrum antibiotics. Ensuring work up was significant for WBC 27.6, H&H 13.5/41.9 with history of anemia and baseline H&H 10.2/33.6 on month ago, dehydration with hyponatremia with a sodium of 129, acute renal injury with a BUN/CR 67/2.44 and GFR 27 with a baseline BUN/CR 16/1.25 and GFR 92 one month ago. Anion Gap metabolic acidosis with a GAP of 19.  Initial Troponin of 153.9 with history of chronically elevated Troponin, and NTpBNP of 4,406 with a baseline of 20.7K about three weeks ago. Lactic  acid of 3.0. Patient will be admitted for further evaluation and management.            Overview/Hospital Course:  7/28: Worsening leukocytosis noted. Blood cultures 2/2 sets growing GNB, wound culture with GNB.     7/30: Wound cultures with morganella, providencia and pseudomonas. General surgery consulted.     7/31: Blood cultures returned with acinetobacter, antibiotic changed to cefepime per consultation with pharmacy. Awaiting general surgery's input regarding surgery.       Interval History:     NAEO  Pt has nno new complains.   Awaiting placement   Pt very happy for not being on metoprolol or midodrine.     Review of Systems   Respiratory: Negative.     Cardiovascular: Negative.    Gastrointestinal: Negative.      Objective:     Vital Signs (Most Recent):  Temp: 98.1 °F (36.7 °C) (08/09/23 1141)  Pulse: 80 (08/09/23 1141)  Resp: 18 (08/09/23 1141)  BP: (!) 96/51 (08/09/23 1141)  SpO2: 99 % (08/09/23 1141) Vital Signs (24h Range):  Temp:  [97.9 °F (36.6 °C)-98.2 °F (36.8 °C)] 98.1 °F (36.7 °C)  Pulse:  [70-92] 80  Resp:  [17-19] 18  SpO2:  [95 %-100 %] 99 %  BP: ()/(51-68) 96/51     Weight: 77.4 kg (170 lb 9.6 oz)  Body mass index is 24.48 kg/m².    Intake/Output Summary (Last 24 hours) at 8/9/2023 1212  Last data filed at 8/8/2023 1815  Gross per 24 hour   Intake 440 ml   Output 300 ml   Net 140 ml           Physical Exam  Vitals and nursing note reviewed. Exam conducted with a chaperone present.   Constitutional:       General: He is not in acute distress.     Appearance: He is not ill-appearing or toxic-appearing.   HENT:      Head: Normocephalic and atraumatic.      Nose: Nose normal.      Mouth/Throat:      Mouth: Mucous membranes are moist.   Eyes:      Extraocular Movements: Extraocular movements intact.   Cardiovascular:      Rate and Rhythm: Normal rate and regular rhythm.      Pulses: Normal pulses.      Heart sounds: Normal heart sounds.   Pulmonary:      Effort: Pulmonary effort is normal.       Breath sounds: Normal breath sounds.   Abdominal:      General: Bowel sounds are normal. There is no distension.      Palpations: Abdomen is soft.      Tenderness: There is no abdominal tenderness.   Musculoskeletal:         General: Tenderness present.      Cervical back: Normal range of motion.   Skin:     General: Skin is warm.      Findings: Erythema and lesion present.   Neurological:      General: No focal deficit present.      Mental Status: He is alert and oriented to person, place, and time. Mental status is at baseline.   Psychiatric:         Mood and Affect: Mood normal.         Behavior: Behavior normal.             Significant Labs: All pertinent labs within the past 24 hours have been reviewed.  BMP:   Recent Labs   Lab 08/09/23 0521   GLU 86      K 4.8   *   CO2 24   BUN 37*   CREATININE 1.34*   CALCIUM 7.9*       CBC:   Recent Labs   Lab 08/09/23 0521   WBC 8.79   HGB 9.2*   HCT 30.2*            Significant Imaging: I have reviewed all pertinent imaging results/findings within the past 24 hours.      Assessment/Plan:      * Severe sepsis  This patient does have evidence of infective focus  My overall impression is sepsis.  Source: Skin and Soft Tissue (location bilateral lower extremity cellulitis)  Source control achieved by: Antibiotics as mentioned below.   Cultures as below      8/3 - Blood cx x2 positive for acinetobacter. Repeat cx on 7/31 NTD.  Plan 14 days tx (cefepime)  from 7/31 if they remain negative. Also on Gent to double cover pseudomonas in wounds.       Multiple and open wound of lower limb, complicated  Management as above.       Gram-negative bacteremia  2 out of 2 sets on admission positive for acinetobacter.  Repeat cultures ordered for clearance (7/31).   Plan as above.     Severe protein-calorie malnutrition  Nutrition consulted. Most recent weight and BMI monitored-     Measurements:  Wt Readings from Last 1 Encounters:   07/28/23 73.6 kg (162 lb 5.4 oz)    Body mass index is 22.02 kg/m².    Patient has been screened and assessed by RD.    Malnutrition Type:  Context: chronic illness  Level: severe    Malnutrition Characteristic Summary:  Weight Loss (Malnutrition): greater than 5% in 1 month  Energy Intake (Malnutrition): less than 75% for greater than or equal to 1 month  Subcutaneous Fat (Malnutrition): severe depletion  Muscle Mass (Malnutrition): severe depletion    Interventions/Recommendations (treatment strategy):  Recommend continue current diet as able/appropriate and tolerated. Also recommend addition of Ensure Max Protein TID and Abel BID to improve kcal/protein intakes and aid in wound healing. Also recommend consider addition of 500mg Vitamin C and 220mg ZnSO4 BID to aid in wound healing.    Type 2 MI (myocardial infarction)  Troponin elevation but trend is not significant.  Denies chest pain.  No EKG done on admission in ED.   Monitor for chest pain.           Hyperlipemia  Continue home statin    A-fib  Patient with Persistent (7 days or more) atrial fibrillation which is controlled currently with Beta Blocker.   Patient is currently in atrial fibrillation.  [id number] Score: 3. HASBLED Score: 2.   Anticoagulation on hold for surgical debridement. Resume tomorrow if no further debridement likely.     8/3 - Will resume anticoagulation.     Depression  Patient has persistent depression which is moderate and is currently controlled.   Will Continue anti-depressant medication- Remeron.   We will not consult psychiatry at this time.   Patient does not display psychosis at this time.   Continue to monitor closely and adjust plan of care as needed.        Type 2 diabetes mellitus  Patient's FSGs are controlled on current medication regimen.  Last A1c reviewed-   Lab Results   Component Value Date    HGBA1C 5.5 06/02/2023     Most recent fingerstick glucose reviewed- No results for input(s): POCTGLUCOSE in the last 24 hours.  Current correctional scale   Medium  Maintain anti-hyperglycemic dose as follows-   Antihyperglycemics (From admission, onward)    None        Hold Oral hypoglycemics while patient is in the hospital.    COPD (chronic obstructive pulmonary disease)  DuoNeb q6h PRN  Supplemental oxygenation    Lactic acidosis  Sec to severe sepsis.  Leading to high anion gap metabolic acidosis, bicarb 14 today.   S/p IV hydration.   Follow lactate.     8/1 - bicarb improved to 21.           Cellulitis of lower extremity  Known chronic wounds on BLE for a few months.   Lost follow up with home health wound care reporting insurance did not approve it.   Photographs appear better compared to ones in 6/2023.  Inflammatory markers elevated.   Started on broad spectrum antibiotics- vancomycin and Zosyn.   Wound culture with pseudomonas, morganella and providencia growth, blood culture with acinetobacter- discontinued vancomycin.   Switched to cefepime given acinetobacter in blood (7/31)- discussed with pharmacy.   General surgery consulted for wound debridement/source control.   Wound care consulted.    8/1 - All organisms in blood and wound covered by cefipime. Pseudomonas in wound. Consider adding gent for double coverage.      8/2 - Added gent yesterday.  Continue cefepime.         CHANDNI (acute kidney injury)  Patient with acute kidney injury/acute renal failure likely due to pre-renal azotemia due to dehydration   CHANDNI is currently improving.   Baseline creatinine 1.25 about one month ago   Labs reviewed- Renal function/electrolytes with Estimated Creatinine Clearance: 78.7 mL/min (based on SCr of 0.85 mg/dL). according to latest data.   Monitor urine output and serial BMP and adjust therapy as needed.  Avoid nephrotoxins and renally dose meds for GFR listed above.    8/1 - Renal function improved. Considering gent addition.       Essential hypertension  On Toprol but also on midodrine.  Suspect Toprol is for HFrEF and midodrine is to avoid hypotension.      Chronic  HFrEF (heart failure with reduced ejection fraction)  Last recorded Echo in 2019 showed an EF of 40%  Not in acute exacerbation.  Resume home Toprol, not on ACEI or ARB at home- unclear reason but suspect may be due to borderline low BP and taking midodrine at baseline.         VTE Risk Mitigation (From admission, onward)         Ordered     heparin (porcine) injection 5,000 Units  Every 8 hours         07/29/23 1052                Discharge Planning   MAKENNA:      Code Status: Full Code   Is the patient medically ready for discharge?: No    Reason for patient still in hospital (select all that apply): Treatment  Discharge Plan A: Skilled Nursing Facility                  Rehmat U MD Rick  Department of Hospital Medicine   [facility]

## 2023-08-10 PROCEDURE — 25000003 PHARM REV CODE 250: Performed by: INTERNAL MEDICINE

## 2023-08-10 PROCEDURE — 97110 THERAPEUTIC EXERCISES: CPT

## 2023-08-10 PROCEDURE — 99232 SBSQ HOSP IP/OBS MODERATE 35: CPT | Mod: ,,, | Performed by: INTERNAL MEDICINE

## 2023-08-10 PROCEDURE — 25000003 PHARM REV CODE 250

## 2023-08-10 PROCEDURE — 11000001 HC ACUTE MED/SURG PRIVATE ROOM

## 2023-08-10 PROCEDURE — 63600175 PHARM REV CODE 636 W HCPCS: Performed by: INTERNAL MEDICINE

## 2023-08-10 PROCEDURE — 99232 PR SUBSEQUENT HOSPITAL CARE,LEVL II: ICD-10-PCS | Mod: ,,, | Performed by: INTERNAL MEDICINE

## 2023-08-10 PROCEDURE — 25000003 PHARM REV CODE 250: Performed by: FAMILY MEDICINE

## 2023-08-10 PROCEDURE — 25000003 PHARM REV CODE 250: Performed by: HOSPITALIST

## 2023-08-10 RX ADMIN — POLYETHYLENE GLYCOL 3350 17 G: 17 POWDER, FOR SOLUTION ORAL at 09:08

## 2023-08-10 RX ADMIN — CEFEPIME 2 G: 2 INJECTION, POWDER, FOR SOLUTION INTRAVENOUS at 01:08

## 2023-08-10 RX ADMIN — ATORVASTATIN CALCIUM 40 MG: 40 TABLET, FILM COATED ORAL at 09:08

## 2023-08-10 RX ADMIN — CEFEPIME 2 G: 2 INJECTION, POWDER, FOR SOLUTION INTRAVENOUS at 08:08

## 2023-08-10 RX ADMIN — HEPARIN SODIUM 5000 UNITS: 5000 INJECTION, SOLUTION INTRAVENOUS; SUBCUTANEOUS at 05:08

## 2023-08-10 RX ADMIN — ASPIRIN 81 MG: 81 TABLET, COATED ORAL at 09:08

## 2023-08-10 RX ADMIN — CEFEPIME 2 G: 2 INJECTION, POWDER, FOR SOLUTION INTRAVENOUS at 05:08

## 2023-08-10 RX ADMIN — ACETAMINOPHEN 1000 MG: 500 TABLET ORAL at 06:08

## 2023-08-10 RX ADMIN — HEPARIN SODIUM 5000 UNITS: 5000 INJECTION, SOLUTION INTRAVENOUS; SUBCUTANEOUS at 03:08

## 2023-08-10 RX ADMIN — Medication: at 09:08

## 2023-08-10 RX ADMIN — Medication 1 TABLET: at 09:08

## 2023-08-10 RX ADMIN — Medication: at 08:08
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
## 2023-08-10 NOTE — PLAN OF CARE
Problem: Adult Inpatient Plan of Care  Goal: Plan of Care Review  Outcome: Ongoing, Progressing  Goal: Patient-Specific Goal (Individualized)  Outcome: Ongoing, Progressing  Goal: Absence of Hospital-Acquired Illness or Injury  Outcome: Ongoing, Progressing  Goal: Optimal Comfort and Wellbeing  Outcome: Ongoing, Progressing  Goal: Readiness for Transition of Care  Outcome: Ongoing, Progressing     Problem: Infection Progression (Sepsis/Septic Shock)  Goal: Absence of Infection Signs and Symptoms  Outcome: Ongoing, Progressing     Problem: Impaired Wound Healing  Goal: Optimal Wound Healing  Outcome: Ongoing, Progressing

## 2023-08-10 NOTE — PROGRESS NOTES
[facility] Medicine  Progress Note    Patient Name: Esthela Contreras  MRN: [id number]  Patient Class: IP- Inpatient   Admission Date: 7/27/2023  Length of Stay: 13 days  Attending Physician: Milton Cabrera MD  Primary Care Provider: Yolande Spring FNP        Subjective:     Principal Problem:Severe sepsis        HPI:  Patient is a 73 yo male who presents to the hospital accompanied by his family with a complaint of wounds to his bilateral lower extremities. Patient is a poor historian, and history was provided by his daughter at the emergency department. Patient has a history of chronic wounds and was admitted to the hospital on 6/02/2023 of severe sepsis secondary to cellulitis of lower extremities and new onset of atrial fibrillation. He was subsequently discharged to [facility] for wound care.  His daughter reported that wounds were healing, and the patient was discharged home under the care of [facility] for continued care. However, the patient did not get any service from the  [facility], which culminated in the worsening of his wounds over the past few days. This is associated with pain and edema, but the patient denies any associated fever, chills, chest pain, shortness of breath, nausea and vomiting.     In the ED, the patient met sepsis criteria on arrival and was thus treated per sepsis protocol through IV hydration, blood culture collection before starting broad spectrum antibiotics. Ensuring work up was significant for WBC 27.6, H&H 13.5/41.9 with history of anemia and baseline H&H 10.2/33.6 on month ago, dehydration with hyponatremia with a sodium of 129, acute renal injury with a BUN/CR 67/2.44 and GFR 27 with a baseline BUN/CR 16/1.25 and GFR 92 one month ago. Anion Gap metabolic acidosis with a GAP of 19.  Initial Troponin of 153.9 with history of chronically elevated Troponin, and NTpBNP of 4,406 with a baseline of 20.7K about three weeks ago. Lactic  acid of 3.0. Patient will be admitted for further evaluation and management.            Overview/Hospital Course:  7/28: Worsening leukocytosis noted. Blood cultures 2/2 sets growing GNB, wound culture with GNB.     7/30: Wound cultures with morganella, providencia and pseudomonas. General surgery consulted.     7/31: Blood cultures returned with acinetobacter, antibiotic changed to cefepime per consultation with pharmacy. Awaiting general surgery's input regarding surgery.       Interval History:     NAEO  Pt has nno new complains.   Awaiting placement   Cont iv abx.     Review of Systems   Respiratory: Negative.     Cardiovascular: Negative.    Gastrointestinal: Negative.      Objective:     Vital Signs (Most Recent):  Temp: 98.1 °F (36.7 °C) (08/10/23 0704)  Pulse: 87 (08/10/23 0704)  Resp: 18 (08/10/23 0704)  BP: 107/61 (08/10/23 0704)  SpO2: 97 % (08/10/23 0704) Vital Signs (24h Range):  Temp:  [98 °F (36.7 °C)-98.4 °F (36.9 °C)] 98.1 °F (36.7 °C)  Pulse:  [57-96] 87  Resp:  [16-19] 18  SpO2:  [96 %-99 %] 97 %  BP: ()/(51-78) 107/61     Weight: 77.4 kg (170 lb 9.6 oz)  Body mass index is 24.48 kg/m².    Intake/Output Summary (Last 24 hours) at 8/10/2023 1023  Last data filed at 8/10/2023 0507  Gross per 24 hour   Intake 240 ml   Output 880 ml   Net -640 ml           Physical Exam  Vitals and nursing note reviewed. Exam conducted with a chaperone present.   Constitutional:       General: He is not in acute distress.     Appearance: He is not ill-appearing or toxic-appearing.   HENT:      Head: Normocephalic and atraumatic.      Nose: Nose normal.      Mouth/Throat:      Mouth: Mucous membranes are moist.   Eyes:      Extraocular Movements: Extraocular movements intact.   Cardiovascular:      Rate and Rhythm: Normal rate and regular rhythm.      Pulses: Normal pulses.      Heart sounds: Normal heart sounds.   Pulmonary:      Effort: Pulmonary effort is normal.      Breath sounds: Normal breath sounds.    Abdominal:      General: Bowel sounds are normal. There is no distension.      Palpations: Abdomen is soft.      Tenderness: There is no abdominal tenderness.   Musculoskeletal:         General: Tenderness present.      Cervical back: Normal range of motion.   Skin:     General: Skin is warm.      Findings: Erythema and lesion present.   Neurological:      General: No focal deficit present.      Mental Status: He is alert and oriented to person, place, and time. Mental status is at baseline.   Psychiatric:         Mood and Affect: Mood normal.         Behavior: Behavior normal.             Significant Labs: All pertinent labs within the past 24 hours have been reviewed.  BMP:   Recent Labs   Lab 08/09/23  0521   GLU 86      K 4.8   *   CO2 24   BUN 37*   CREATININE 1.34*   CALCIUM 7.9*       CBC:   Recent Labs   Lab 08/09/23 0521   WBC 8.79   HGB 9.2*   HCT 30.2*            Significant Imaging: I have reviewed all pertinent imaging results/findings within the past 24 hours.      Assessment/Plan:      * Severe sepsis  This patient does have evidence of infective focus  My overall impression is sepsis.  Source: Skin and Soft Tissue (location bilateral lower extremity cellulitis)  Source control achieved by: Antibiotics as mentioned below.   Cultures as below      8/3 - Blood cx x2 positive for acinetobacter. Repeat cx on 7/31 NTD.  Plan 14 days tx (cefepime)  from 7/31 if they remain negative. Also on Gent to double cover pseudomonas in wounds.       Multiple and open wound of lower limb, complicated  Management as above.       Gram-negative bacteremia  2 out of 2 sets on admission positive for acinetobacter.  Repeat cultures ordered for clearance (7/31).   Plan as above.     Severe protein-calorie malnutrition  Nutrition consulted. Most recent weight and BMI monitored-     Measurements:  Wt Readings from Last 1 Encounters:   07/28/23 73.6 kg (162 lb 5.4 oz)   Body mass index is 22.02  kg/m².    Patient has been screened and assessed by RD.    Malnutrition Type:  Context: chronic illness  Level: severe    Malnutrition Characteristic Summary:  Weight Loss (Malnutrition): greater than 5% in 1 month  Energy Intake (Malnutrition): less than 75% for greater than or equal to 1 month  Subcutaneous Fat (Malnutrition): severe depletion  Muscle Mass (Malnutrition): severe depletion    Interventions/Recommendations (treatment strategy):  Recommend continue current diet as able/appropriate and tolerated. Also recommend addition of Ensure Max Protein TID and Abel BID to improve kcal/protein intakes and aid in wound healing. Also recommend consider addition of 500mg Vitamin C and 220mg ZnSO4 BID to aid in wound healing.    Type 2 MI (myocardial infarction)  Troponin elevation but trend is not significant.  Denies chest pain.  No EKG done on admission in ED.   Monitor for chest pain.           Hyperlipemia  Continue home statin    A-fib  Patient with Persistent (7 days or more) atrial fibrillation which is controlled currently with Beta Blocker.   Patient is currently in atrial fibrillation.  RSABT2VYLu Score: 3. HASBLED Score: 2.   Anticoagulation on hold for surgical debridement. Resume tomorrow if no further debridement likely.     8/3 - Will resume anticoagulation.     Depression  Patient has persistent depression which is moderate and is currently controlled.   Will Continue anti-depressant medication- Remeron.   We will not consult psychiatry at this time.   Patient does not display psychosis at this time.   Continue to monitor closely and adjust plan of care as needed.        Type 2 diabetes mellitus  Patient's FSGs are controlled on current medication regimen.  Last A1c reviewed-   Lab Results   Component Value Date    HGBA1C 5.5 06/02/2023     Most recent fingerstick glucose reviewed- No results for input(s): POCTGLUCOSE in the last 24 hours.  Current correctional scale  Medium  Maintain  anti-hyperglycemic dose as follows-   Antihyperglycemics (From admission, onward)    None        Hold Oral hypoglycemics while patient is in the hospital.    COPD (chronic obstructive pulmonary disease)  DuoNeb q6h PRN  Supplemental oxygenation    Lactic acidosis  Sec to severe sepsis.  Leading to high anion gap metabolic acidosis, bicarb 14 today.   S/p IV hydration.   Follow lactate.     8/1 - bicarb improved to 21.           Cellulitis of lower extremity  Known chronic wounds on BLE for a few months.   Lost follow up with home health wound care reporting insurance did not approve it.   Photographs appear better compared to ones in 6/2023.  Inflammatory markers elevated.   Started on broad spectrum antibiotics- vancomycin and Zosyn.   Wound culture with pseudomonas, morganella and providencia growth, blood culture with acinetobacter- discontinued vancomycin.   Switched to cefepime given acinetobacter in blood (7/31)- discussed with pharmacy.   General surgery consulted for wound debridement/source control.   Wound care consulted.    8/1 - All organisms in blood and wound covered by cefipime. Pseudomonas in wound. Consider adding gent for double coverage.      8/2 - Added gent yesterday.  Continue cefepime.         CHANDNI (acute kidney injury)  Patient with acute kidney injury/acute renal failure likely due to pre-renal azotemia due to dehydration   CHANDNI is currently improving.   Baseline creatinine 1.25 about one month ago   Labs reviewed- Renal function/electrolytes with Estimated Creatinine Clearance: 78.7 mL/min (based on SCr of 0.85 mg/dL). according to latest data.   Monitor urine output and serial BMP and adjust therapy as needed.  Avoid nephrotoxins and renally dose meds for GFR listed above.    8/1 - Renal function improved. Considering gent addition.       Essential hypertension  On Toprol but also on midodrine.  Suspect Toprol is for HFrEF and midodrine is to avoid hypotension.      Chronic HFrEF (heart  failure with reduced ejection fraction)  Last recorded Echo in 2019 showed an EF of 40%  Not in acute exacerbation.  Resume home Toprol, not on ACEI or ARB at home- unclear reason but suspect may be due to borderline low BP and taking midodrine at baseline.         VTE Risk Mitigation (From admission, onward)         Ordered     heparin (porcine) injection 5,000 Units  Every 8 hours         07/29/23 1052                Discharge Planning   MAKENNA:      Code Status: Full Code   Is the patient medically ready for discharge?: No    Reason for patient still in hospital (select all that apply): Treatment  Discharge Plan A: Skilled Nursing Facility                  Rehmat ELENA Cabrera MD  Department of Hospital Medicine   [facility]

## 2023-08-10 NOTE — PLAN OF CARE
SW spoke with pt re: choice for [facility] since pt has been denied for [facility]. Pt stated he wanted to speak with MD first re: diagnosis of his leg and stated he did not want to keep moving around. MD informed and MD to speak with pt. SW following.

## 2023-08-10 NOTE — PT/OT/SLP PROGRESS
Occupational Therapy   Treatment    Name: Esthela Contreras  MRN: [id number]  Admitting Diagnosis:  Severe sepsis  9 Days Post-Op    Recommendations:     Discharge Recommendations: nursing facility, skilled  Discharge Equipment Recommendations:   (to be determined)  Barriers to discharge:  Decreased caregiver support, Other (Comment) (ongoing medical treatment)    Assessment:     Esthela Contreras is a 72 y.o. male with a medical diagnosis of Severe sepsis.  He presents with weakness and decline in ADLs. Performance deficits affecting function are weakness, impaired endurance, impaired self care skills.     Rehab Prognosis:  Good; patient would benefit from acute skilled OT services to address these deficits and reach maximum level of function.       Plan:     Patient to be seen 5 x/week to address the above listed problems via self-care/home management, therapeutic activities, therapeutic exercises  Plan of Care Expires: 08/25/23  Plan of Care Reviewed with: patient    Subjective     Chief Complaint: sepsis  Patient/Family Comments/goals: pt agreeable to OT tx  Pain/Comfort:  Pain Rating 1: 4/10  Location - Side 1: Bilateral  Location 1: leg    Objective:     Communicated with: ANAHI Regan prior to session.  Patient found HOB elevated with PICC line upon OT entry to room.    General Precautions: Standard, fall    Orthopedic Precautions:N/A  Braces: N/A  Respiratory Status: Room air     Occupational Performance:     Bed Mobility:    Not performed     Functional Mobility/Transfers:  Not performed    Activities of Daily Living:  Not performed      [facility] 6 Click ADL:      Treatment & Education:  Pt performed x 15 reps each bicep curls 2#db, chest press 2#db, shoulder press 2#db,IR/ER 2#db, rows green tband, and tricep press green tband in order to improve BUE strength and endurance to perform ADL and ADL t/f with less assist.     Patient left HOB elevated with all lines intact and call button in reach    GOALS:    Multidisciplinary Problems       Occupational Therapy Goals          Problem: Occupational Therapy    Goal Priority Disciplines Outcome Interventions   Occupational Therapy Goal     OT, PT/OT Ongoing, Progressing    Description: STG:  Pt will perform grooming with setup  Pt will bathe with Anna with setup at EOB  Pt will perform UE dressing with Harshil  Pt will perform LE dressing with Anna with AD if needed  Pt will sit EOB x 10 min with SBA   Pt will transfer bed/chair/bsc with CGA with RW  Pt will perform standing task x 2 min with CGA with RW   Pt will tolerate 15 minutes of tx without fatigue      LT.Restore to max I with self care and mobility.                           Time Tracking:     OT Date of Treatment: 08/10/23  OT Start Time: 1045  OT Stop Time: 1058  OT Total Time (min): 13 min    Billable Minutes:Therapeutic Exercise 13 minutes    OT/KO: OT          8/10/2023

## 2023-08-10 NOTE — SUBJECTIVE & OBJECTIVE
Interval History:     RENÉO  Pt has nno new complains.   Awaiting placement   Cont iv abx.     Review of Systems   Respiratory: Negative.     Cardiovascular: Negative.    Gastrointestinal: Negative.      Objective:     Vital Signs (Most Recent):  Temp: 98.1 °F (36.7 °C) (08/10/23 0704)  Pulse: 87 (08/10/23 0704)  Resp: 18 (08/10/23 0704)  BP: 107/61 (08/10/23 0704)  SpO2: 97 % (08/10/23 0704) Vital Signs (24h Range):  Temp:  [98 °F (36.7 °C)-98.4 °F (36.9 °C)] 98.1 °F (36.7 °C)  Pulse:  [57-96] 87  Resp:  [16-19] 18  SpO2:  [96 %-99 %] 97 %  BP: ()/(51-78) 107/61     Weight: 77.4 kg (170 lb 9.6 oz)  Body mass index is 24.48 kg/m².    Intake/Output Summary (Last 24 hours) at 8/10/2023 1023  Last data filed at 8/10/2023 0507  Gross per 24 hour   Intake 240 ml   Output 880 ml   Net -640 ml           Physical Exam  Vitals and nursing note reviewed. Exam conducted with a chaperone present.   Constitutional:       General: He is not in acute distress.     Appearance: He is not ill-appearing or toxic-appearing.   HENT:      Head: Normocephalic and atraumatic.      Nose: Nose normal.      Mouth/Throat:      Mouth: Mucous membranes are moist.   Eyes:      Extraocular Movements: Extraocular movements intact.   Cardiovascular:      Rate and Rhythm: Normal rate and regular rhythm.      Pulses: Normal pulses.      Heart sounds: Normal heart sounds.   Pulmonary:      Effort: Pulmonary effort is normal.      Breath sounds: Normal breath sounds.   Abdominal:      General: Bowel sounds are normal. There is no distension.      Palpations: Abdomen is soft.      Tenderness: There is no abdominal tenderness.   Musculoskeletal:         General: Tenderness present.      Cervical back: Normal range of motion.   Skin:     General: Skin is warm.      Findings: Erythema and lesion present.   Neurological:      General: No focal deficit present.      Mental Status: He is alert and oriented to person, place, and time. Mental status is at  baseline.   Psychiatric:         Mood and Affect: Mood normal.         Behavior: Behavior normal.             Significant Labs: All pertinent labs within the past 24 hours have been reviewed.  BMP:   Recent Labs   Lab 08/09/23  0521   GLU 86      K 4.8   *   CO2 24   BUN 37*   CREATININE 1.34*   CALCIUM 7.9*       CBC:   Recent Labs   Lab 08/09/23  0521   WBC 8.79   HGB 9.2*   HCT 30.2*            Significant Imaging: I have reviewed all pertinent imaging results/findings within the past 24 hours.

## 2023-08-10 NOTE — PT/OT/SLP PROGRESS
Physical Therapy Treatment    Patient Name:  Esthela Contreras   MRN:  [id number]    Recommendations:     Discharge Recommendations: nursing facility, skilled  Discharge Equipment Recommendations: hospital bed  Barriers to discharge: Decreased caregiver support    Assessment:     Esthela Contreras is a 72 y.o. male admitted with a medical diagnosis of Severe sepsis.  He presents with the following impairments/functional limitations: weakness, impaired endurance, impaired functional mobility, gait instability, decreased lower extremity function, impaired skin Patient's mobility limited by pain and fear of bleeding. Awaiting placement.    Rehab Prognosis: Good; patient would benefit from acute skilled PT services to address these deficits and reach maximum level of function.    Recent Surgery: Procedure(s) (LRB):  SELECTIVE DEBRIDEMENT, LOWER EXTREMITY (Bilateral)  BIOPSY, SOFT TISSUE (Right) 9 Days Post-Op    Plan:     During this hospitalization, patient to be seen 5 x/week to address the identified rehab impairments via gait training, therapeutic activities, therapeutic exercises and progress toward the following goals:    Plan of Care Expires:  08/28/23    Subjective     Chief Complaint: LE pain and bleeding  Patient/Family Comments/goals: Patient states he doesn't want to get up due to fear of LE bleeding and due to pain when walking.  Pain/Comfort:  Pain Rating 1: 6/10  Location - Side 1: Bilateral  Location 1: leg  Pain Addressed 1: Cessation of Activity      Objective:     Communicated with nurse prior to session.  Patient found HOB elevated with   upon PT entry to room.     General Precautions: Standard, fall  Orthopedic Precautions: N/A  Braces: N/A  Respiratory Status: Room air     Functional Mobility:  NT. Patient deferred oob mobility due to pain      AM-PAC 6 CLICK MOBILITY  Turning over in bed (including adjusting bedclothes, sheets and blankets)?: 3  Sitting down on and standing up from a chair with arms  (e.g., wheelchair, bedside commode, etc.): 3  Moving from lying on back to sitting on the side of the bed?: 3  Moving to and from a bed to a chair (including a wheelchair)?: 3  Need to walk in hospital room?: 3  Climbing 3-5 steps with a railing?: 3  Basic Mobility Total Score: 18       Treatment & Education:  BLE: aps, qs, saq, abd-add, slr 3x10    Patient left HOB elevated with call button in reach..    GOALS:   Multidisciplinary Problems       Physical Therapy Goals          Problem: Physical Therapy    Goal Priority Disciplines Outcome Goal Variances Interventions   Physical Therapy Goal     PT, PT/OT Ongoing, Progressing     Description: Short Term Goals to be met by: 2023    Patient will increase functional independence with mobility by performin. Supine to sit  independently  2. Sit to stand transfer with independently using Rolling walker  3. Bed to chair transfer independently using Rolling walker  4. Gait  x 50 feet with stand by assist using Rolling walker  5. Lower extremity exercise program x30 reps per handout, with assistance as needed    Long Term Goals to be met by: 2023    Pt will regain full independent functional mobility with Rolling walker and and wheelchair to return to home situation and prior activities of daily living.                        Time Tracking:     PT Received On: 08/10/23  PT Start Time: 1145     PT Stop Time: 1200  PT Total Time (min): 15 min     Billable Minutes: Therapeutic Exercise 10    Treatment Type: Treatment  PT/PTA: PT     Number of PTA visits since last PT visit: 0     08/10/2023

## 2023-08-11 PROCEDURE — 99232 SBSQ HOSP IP/OBS MODERATE 35: CPT | Mod: ,,, | Performed by: INTERNAL MEDICINE

## 2023-08-11 PROCEDURE — 25000003 PHARM REV CODE 250

## 2023-08-11 PROCEDURE — 99232 PR SUBSEQUENT HOSPITAL CARE,LEVL II: ICD-10-PCS | Mod: ,,, | Performed by: INTERNAL MEDICINE

## 2023-08-11 PROCEDURE — 25000003 PHARM REV CODE 250: Performed by: INTERNAL MEDICINE

## 2023-08-11 PROCEDURE — 63600175 PHARM REV CODE 636 W HCPCS: Performed by: INTERNAL MEDICINE

## 2023-08-11 PROCEDURE — 94761 N-INVAS EAR/PLS OXIMETRY MLT: CPT

## 2023-08-11 PROCEDURE — 11000001 HC ACUTE MED/SURG PRIVATE ROOM

## 2023-08-11 RX ADMIN — CEFEPIME 2 G: 2 INJECTION, POWDER, FOR SOLUTION INTRAVENOUS at 09:08

## 2023-08-11 RX ADMIN — ATORVASTATIN CALCIUM 40 MG: 40 TABLET, FILM COATED ORAL at 10:08

## 2023-08-11 RX ADMIN — Medication: at 10:08

## 2023-08-11 RX ADMIN — GENTAMICIN SULFATE 360 MG: 40 INJECTION, SOLUTION INTRAMUSCULAR; INTRAVENOUS at 06:08

## 2023-08-11 RX ADMIN — Medication 1 TABLET: at 10:08

## 2023-08-11 RX ADMIN — OXYCODONE HYDROCHLORIDE 5 MG: 5 TABLET ORAL at 09:08

## 2023-08-11 RX ADMIN — MIRTAZAPINE 15 MG: 7.5 TABLET, FILM COATED ORAL at 09:08

## 2023-08-11 RX ADMIN — Medication: at 09:08

## 2023-08-11 RX ADMIN — CEFEPIME 2 G: 2 INJECTION, POWDER, FOR SOLUTION INTRAVENOUS at 01:08

## 2023-08-11 RX ADMIN — CEFEPIME 2 G: 2 INJECTION, POWDER, FOR SOLUTION INTRAVENOUS at 05:08

## 2023-08-11 RX ADMIN — ASPIRIN 81 MG: 81 TABLET, COATED ORAL at 10:08

## 2023-08-11 NOTE — SUBJECTIVE & OBJECTIVE
Interval History:     NEGRITO Napier has no new complains, refusing to give any choice for swb, SW notified.     Review of Systems   Respiratory: Negative.     Cardiovascular: Negative.    Gastrointestinal: Negative.      Objective:     Vital Signs (Most Recent):  Temp: 98.3 °F (36.8 °C) (08/11/23 1224)  Pulse: 82 (08/11/23 1224)  Resp: 18 (08/11/23 1224)  BP: 121/63 (08/11/23 1224)  SpO2: 98 % (08/11/23 1224) Vital Signs (24h Range):  Temp:  [97.1 °F (36.2 °C)-98.3 °F (36.8 °C)] 98.3 °F (36.8 °C)  Pulse:  [82-96] 82  Resp:  [18] 18  SpO2:  [97 %-99 %] 98 %  BP: ()/(57-68) 121/63     Weight: 77.4 kg (170 lb 9.6 oz)  Body mass index is 24.48 kg/m².    Intake/Output Summary (Last 24 hours) at 8/11/2023 1243  Last data filed at 8/11/2023 0400  Gross per 24 hour   Intake 800 ml   Output 1600 ml   Net -800 ml           Physical Exam  Vitals and nursing note reviewed. Exam conducted with a chaperone present.   Constitutional:       General: He is not in acute distress.     Appearance: He is not ill-appearing or toxic-appearing.   HENT:      Head: Normocephalic and atraumatic.      Nose: Nose normal.      Mouth/Throat:      Mouth: Mucous membranes are moist.   Eyes:      Extraocular Movements: Extraocular movements intact.   Cardiovascular:      Rate and Rhythm: Normal rate and regular rhythm.      Pulses: Normal pulses.      Heart sounds: Normal heart sounds.   Pulmonary:      Effort: Pulmonary effort is normal.      Breath sounds: Normal breath sounds.   Abdominal:      General: Bowel sounds are normal. There is no distension.      Palpations: Abdomen is soft.      Tenderness: There is no abdominal tenderness.   Musculoskeletal:         General: Tenderness present.      Cervical back: Normal range of motion.   Skin:     General: Skin is warm.      Findings: Erythema and lesion present.   Neurological:      General: No focal deficit present.      Mental Status: He is alert and oriented to person, place, and time. Mental  "status is at baseline.   Psychiatric:         Mood and Affect: Mood normal.         Behavior: Behavior normal.             Significant Labs: All pertinent labs within the past 24 hours have been reviewed.  BMP:   No results for input(s): "GLU", "NA", "K", "CL", "CO2", "BUN", "CREATININE", "CALCIUM", "MG" in the last 48 hours.    CBC:   No results for input(s): "WBC", "HGB", "HCT", "PLT" in the last 48 hours.      Significant Imaging: I have reviewed all pertinent imaging results/findings within the past 24 hours.  "

## 2023-08-11 NOTE — PLAN OF CARE
SW spoke with pt and pt would not give a choice for another swb facility. Pt stated that he needs to find out what is wrong with his legs and feet and what type of disease he has. Pt does not want to dc to another facility. MD informed and Dir of SS. Per MD, iv abx should be completed by Monday and pt can dc back home. SW will cont to follow for dc needs.

## 2023-08-11 NOTE — PT/OT/SLP PROGRESS
Occupational Therapy      Patient Name:  Esthela Contreras   MRN:  [id number]    Patient not seen today secondary to Patient unwilling to participate even with encouragement. Pt stated that he was strong enough and all that would not help his feet /legs get better. Will follow-up on next treatment day.    8/11/2023

## 2023-08-11 NOTE — PT/OT/SLP PROGRESS
Physical Therapy      Patient Name:  Esthela Contreras   MRN:  [id number]    Patient not seen today secondary to Patient unwilling to participate. Will follow-up 8/14/23.

## 2023-08-11 NOTE — PROGRESS NOTES
[facility] - Short Stay [facility] Medicine  Progress Note    Patient Name: Esthela Contreras  MRN: [id number]  Patient Class: IP- Inpatient   Admission Date: 7/27/2023  Length of Stay: 14 days  Attending Physician: Milton Cabrera MD  Primary Care Provider: Yolande Spring FNP        Subjective:     Principal Problem:Severe sepsis        HPI:  Patient is a 73 yo male who presents to the hospital accompanied by his family with a complaint of wounds to his bilateral lower extremities. Patient is a poor historian, and history was provided by his daughter at the emergency department. Patient has a history of chronic wounds and was admitted to the hospital on 6/02/2023 of severe sepsis secondary to cellulitis of lower extremities and new onset of atrial fibrillation. He was subsequently discharged to [facility] for wound care.  His daughter reported that wounds were healing, and the patient was discharged home under the care of [facility] for continued care. However, the patient did not get any service from the  [facility], which culminated in the worsening of his wounds over the past few days. This is associated with pain and edema, but the patient denies any associated fever, chills, chest pain, shortness of breath, nausea and vomiting.     In the ED, the patient met sepsis criteria on arrival and was thus treated per sepsis protocol through IV hydration, blood culture collection before starting broad spectrum antibiotics. Ensuring work up was significant for WBC 27.6, H&H 13.5/41.9 with history of anemia and baseline H&H 10.2/33.6 on month ago, dehydration with hyponatremia with a sodium of 129, acute renal injury with a BUN/CR 67/2.44 and GFR 27 with a baseline BUN/CR 16/1.25 and GFR 92 one month ago. Anion Gap metabolic acidosis with a GAP of 19.  Initial Troponin of 153.9 with history of chronically elevated Troponin, and NTpBNP of 4,406 with a baseline of 20.7K about three weeks ago. Lactic  acid of 3.0. Patient will be admitted for further evaluation and management.            Overview/Hospital Course:  7/28: Worsening leukocytosis noted. Blood cultures 2/2 sets growing GNB, wound culture with GNB.     7/30: Wound cultures with morganella, providencia and pseudomonas. General surgery consulted.     7/31: Blood cultures returned with acinetobacter, antibiotic changed to cefepime per consultation with pharmacy. Awaiting general surgery's input regarding surgery.       Interval History:     NEGRITO  Pt has no new complains, refusing to give any choice for swb, SW notified.     Review of Systems   Respiratory: Negative.     Cardiovascular: Negative.    Gastrointestinal: Negative.      Objective:     Vital Signs (Most Recent):  Temp: 98.3 °F (36.8 °C) (08/11/23 1224)  Pulse: 82 (08/11/23 1224)  Resp: 18 (08/11/23 1224)  BP: 121/63 (08/11/23 1224)  SpO2: 98 % (08/11/23 1224) Vital Signs (24h Range):  Temp:  [97.1 °F (36.2 °C)-98.3 °F (36.8 °C)] 98.3 °F (36.8 °C)  Pulse:  [82-96] 82  Resp:  [18] 18  SpO2:  [97 %-99 %] 98 %  BP: ()/(57-68) 121/63     Weight: 77.4 kg (170 lb 9.6 oz)  Body mass index is 24.48 kg/m².    Intake/Output Summary (Last 24 hours) at 8/11/2023 1243  Last data filed at 8/11/2023 0400  Gross per 24 hour   Intake 800 ml   Output 1600 ml   Net -800 ml           Physical Exam  Vitals and nursing note reviewed. Exam conducted with a chaperone present.   Constitutional:       General: He is not in acute distress.     Appearance: He is not ill-appearing or toxic-appearing.   HENT:      Head: Normocephalic and atraumatic.      Nose: Nose normal.      Mouth/Throat:      Mouth: Mucous membranes are moist.   Eyes:      Extraocular Movements: Extraocular movements intact.   Cardiovascular:      Rate and Rhythm: Normal rate and regular rhythm.      Pulses: Normal pulses.      Heart sounds: Normal heart sounds.   Pulmonary:      Effort: Pulmonary effort is normal.      Breath sounds: Normal breath  "sounds.   Abdominal:      General: Bowel sounds are normal. There is no distension.      Palpations: Abdomen is soft.      Tenderness: There is no abdominal tenderness.   Musculoskeletal:         General: Tenderness present.      Cervical back: Normal range of motion.   Skin:     General: Skin is warm.      Findings: Erythema and lesion present.   Neurological:      General: No focal deficit present.      Mental Status: He is alert and oriented to person, place, and time. Mental status is at baseline.   Psychiatric:         Mood and Affect: Mood normal.         Behavior: Behavior normal.             Significant Labs: All pertinent labs within the past 24 hours have been reviewed.  BMP:   No results for input(s): "GLU", "NA", "K", "CL", "CO2", "BUN", "CREATININE", "CALCIUM", "MG" in the last 48 hours.    CBC:   No results for input(s): "WBC", "HGB", "HCT", "PLT" in the last 48 hours.      Significant Imaging: I have reviewed all pertinent imaging results/findings within the past 24 hours.      Assessment/Plan:      * Severe sepsis  This patient does have evidence of infective focus  My overall impression is sepsis.  Source: Skin and Soft Tissue (location bilateral lower extremity cellulitis)  Source control achieved by: Antibiotics as mentioned below.   Cultures as below      8/3 - Blood cx x2 positive for acinetobacter. Repeat cx on 7/31 NTD.  Plan 14 days tx (cefepime)  from 7/31 if they remain negative. Also on Gent to double cover pseudomonas in wounds.       Multiple and open wound of lower limb, complicated  Management as above.       Gram-negative bacteremia  2 out of 2 sets on admission positive for acinetobacter.  Repeat cultures ordered for clearance (7/31).   Plan as above.     Severe protein-calorie malnutrition  Nutrition consulted. Most recent weight and BMI monitored-     Measurements:  Wt Readings from Last 1 Encounters:   07/28/23 73.6 kg (162 lb 5.4 oz)   Body mass index is 22.02 kg/m².    Patient " has been screened and assessed by RD.    Malnutrition Type:  Context: chronic illness  Level: severe    Malnutrition Characteristic Summary:  Weight Loss (Malnutrition): greater than 5% in 1 month  Energy Intake (Malnutrition): less than 75% for greater than or equal to 1 month  Subcutaneous Fat (Malnutrition): severe depletion  Muscle Mass (Malnutrition): severe depletion    Interventions/Recommendations (treatment strategy):  Recommend continue current diet as able/appropriate and tolerated. Also recommend addition of Ensure Max Protein TID and Abel BID to improve kcal/protein intakes and aid in wound healing. Also recommend consider addition of 500mg Vitamin C and 220mg ZnSO4 BID to aid in wound healing.    Type 2 MI (myocardial infarction)  Troponin elevation but trend is not significant.  Denies chest pain.  No EKG done on admission in ED.   Monitor for chest pain.           Hyperlipemia  Continue home statin    A-fib  Patient with Persistent (7 days or more) atrial fibrillation which is controlled currently with Beta Blocker.   Patient is currently in atrial fibrillation.  [id number] Score: 3. HASBLED Score: 2.   Anticoagulation on hold for surgical debridement. Resume tomorrow if no further debridement likely.     8/3 - Will resume anticoagulation.     Depression  Patient has persistent depression which is moderate and is currently controlled.   Will Continue anti-depressant medication- Remeron.   We will not consult psychiatry at this time.   Patient does not display psychosis at this time.   Continue to monitor closely and adjust plan of care as needed.        Type 2 diabetes mellitus  Patient's FSGs are controlled on current medication regimen.  Last A1c reviewed-   Lab Results   Component Value Date    HGBA1C 5.5 06/02/2023     Most recent fingerstick glucose reviewed- No results for input(s): POCTGLUCOSE in the last 24 hours.  Current correctional scale  Medium  Maintain anti-hyperglycemic dose as  follows-   Antihyperglycemics (From admission, onward)    None        Hold Oral hypoglycemics while patient is in the hospital.    COPD (chronic obstructive pulmonary disease)  DuoNeb q6h PRN  Supplemental oxygenation    Lactic acidosis  Sec to severe sepsis.  Leading to high anion gap metabolic acidosis, bicarb 14 today.   S/p IV hydration.   Follow lactate.     8/1 - bicarb improved to 21.           Cellulitis of lower extremity  Known chronic wounds on BLE for a few months.   Lost follow up with home health wound care reporting insurance did not approve it.   Photographs appear better compared to ones in 6/2023.  Inflammatory markers elevated.   Started on broad spectrum antibiotics- vancomycin and Zosyn.   Wound culture with pseudomonas, morganella and providencia growth, blood culture with acinetobacter- discontinued vancomycin.   Switched to cefepime given acinetobacter in blood (7/31)- discussed with pharmacy.   General surgery consulted for wound debridement/source control.   Wound care consulted.    8/1 - All organisms in blood and wound covered by cefipime. Pseudomonas in wound. Consider adding gent for double coverage.      8/2 - Added gent yesterday.  Continue cefepime.         CHANDNI (acute kidney injury)  Patient with acute kidney injury/acute renal failure likely due to pre-renal azotemia due to dehydration   CHANDNI is currently improving.   Baseline creatinine 1.25 about one month ago   Labs reviewed- Renal function/electrolytes with Estimated Creatinine Clearance: 78.7 mL/min (based on SCr of 0.85 mg/dL). according to latest data.   Monitor urine output and serial BMP and adjust therapy as needed.  Avoid nephrotoxins and renally dose meds for GFR listed above.    8/1 - Renal function improved. Considering gent addition.       Essential hypertension  On Toprol but also on midodrine.  Suspect Toprol is for HFrEF and midodrine is to avoid hypotension.      Chronic HFrEF (heart failure with reduced ejection  fraction)  Last recorded Echo in 2019 showed an EF of 40%  Not in acute exacerbation.  Resume home Toprol, not on ACEI or ARB at home- unclear reason but suspect may be due to borderline low BP and taking midodrine at baseline.         VTE Risk Mitigation (From admission, onward)         Ordered     heparin (porcine) injection 5,000 Units  Every 8 hours         07/29/23 1052                Discharge Planning   MAKENNA:      Code Status: Full Code   Is the patient medically ready for discharge?: No    Reason for patient still in hospital (select all that apply): Treatment  Discharge Plan A: Skilled Nursing Facility                  Rehmat ELENA Cabrera MD  Department of Hospital Medicine   [facility] - Short Stay Unit

## 2023-08-12 PROCEDURE — 11000001 HC ACUTE MED/SURG PRIVATE ROOM

## 2023-08-12 PROCEDURE — 99232 SBSQ HOSP IP/OBS MODERATE 35: CPT | Mod: ,,, | Performed by: INTERNAL MEDICINE

## 2023-08-12 PROCEDURE — 25000003 PHARM REV CODE 250: Performed by: FAMILY MEDICINE

## 2023-08-12 PROCEDURE — 25000003 PHARM REV CODE 250: Performed by: INTERNAL MEDICINE

## 2023-08-12 PROCEDURE — 25000003 PHARM REV CODE 250: Performed by: HOSPITALIST

## 2023-08-12 PROCEDURE — 99232 PR SUBSEQUENT HOSPITAL CARE,LEVL II: ICD-10-PCS | Mod: ,,, | Performed by: INTERNAL MEDICINE

## 2023-08-12 PROCEDURE — 25000003 PHARM REV CODE 250

## 2023-08-12 PROCEDURE — 63600175 PHARM REV CODE 636 W HCPCS: Performed by: INTERNAL MEDICINE

## 2023-08-12 RX ADMIN — Medication: at 08:08

## 2023-08-12 RX ADMIN — OXYCODONE HYDROCHLORIDE 5 MG: 5 TABLET ORAL at 07:08

## 2023-08-12 RX ADMIN — ACETAMINOPHEN 1000 MG: 500 TABLET ORAL at 07:08

## 2023-08-12 RX ADMIN — ASPIRIN 81 MG: 81 TABLET, COATED ORAL at 08:08

## 2023-08-12 RX ADMIN — CEFEPIME 2 G: 2 INJECTION, POWDER, FOR SOLUTION INTRAVENOUS at 04:08

## 2023-08-12 RX ADMIN — CEFEPIME 2 G: 2 INJECTION, POWDER, FOR SOLUTION INTRAVENOUS at 01:08

## 2023-08-12 RX ADMIN — ATORVASTATIN CALCIUM 40 MG: 40 TABLET, FILM COATED ORAL at 08:08

## 2023-08-12 RX ADMIN — MIRTAZAPINE 15 MG: 7.5 TABLET, FILM COATED ORAL at 08:08

## 2023-08-12 RX ADMIN — POLYETHYLENE GLYCOL 3350 17 G: 17 POWDER, FOR SOLUTION ORAL at 08:08

## 2023-08-12 RX ADMIN — TRAZODONE HYDROCHLORIDE 50 MG: 50 TABLET ORAL at 08:08

## 2023-08-12 RX ADMIN — Medication 1 TABLET: at 08:08

## 2023-08-12 RX ADMIN — CEFEPIME 2 G: 2 INJECTION, POWDER, FOR SOLUTION INTRAVENOUS at 08:08

## 2023-08-12 NOTE — PLAN OF CARE
Problem: Adult Inpatient Plan of Care  Goal: Optimal Comfort and Wellbeing  Outcome: Ongoing, Progressing     Problem: Adult Inpatient Plan of Care  Goal: Optimal Comfort and Wellbeing  Outcome: Ongoing, Progressing     Problem: Adult Inpatient Plan of Care  Goal: Optimal Comfort and Wellbeing  Outcome: Ongoing, Progressing     Problem: Adult Inpatient Plan of Care  Goal: Readiness for Transition of Care  Outcome: Ongoing, Progressing

## 2023-08-12 NOTE — SUBJECTIVE & OBJECTIVE
Interval History:     RENÉO  Pt refusing placement anywhere  Will be here till Monday then to complete abx and then home  Review of Systems   Respiratory: Negative.     Cardiovascular: Negative.    Gastrointestinal: Negative.      Objective:     Vital Signs (Most Recent):  Temp: 96.7 °F (35.9 °C) (08/12/23 0600)  Pulse: 79 (08/12/23 0600)  Resp: 18 (08/12/23 0600)  BP: (!) 101/55 (08/12/23 0600)  SpO2: 99 % (08/12/23 0600) Vital Signs (24h Range):  Temp:  [96.7 °F (35.9 °C)-98.3 °F (36.8 °C)] 96.7 °F (35.9 °C)  Pulse:  [76-97] 79  Resp:  [16-20] 18  SpO2:  [97 %-100 %] 99 %  BP: ()/(55-73) 101/55     Weight: 77.4 kg (170 lb 9.6 oz)  Body mass index is 24.48 kg/m².    Intake/Output Summary (Last 24 hours) at 8/12/2023 1026  Last data filed at 8/11/2023 2200  Gross per 24 hour   Intake --   Output 1000 ml   Net -1000 ml           Physical Exam  Vitals and nursing note reviewed. Exam conducted with a chaperone present.   Constitutional:       General: He is not in acute distress.     Appearance: He is not ill-appearing or toxic-appearing.   HENT:      Head: Normocephalic and atraumatic.      Nose: Nose normal.      Mouth/Throat:      Mouth: Mucous membranes are moist.   Eyes:      Extraocular Movements: Extraocular movements intact.   Cardiovascular:      Rate and Rhythm: Normal rate and regular rhythm.      Pulses: Normal pulses.      Heart sounds: Normal heart sounds.   Pulmonary:      Effort: Pulmonary effort is normal.      Breath sounds: Normal breath sounds.   Abdominal:      General: Bowel sounds are normal. There is no distension.      Palpations: Abdomen is soft.      Tenderness: There is no abdominal tenderness.   Musculoskeletal:         General: Tenderness present.      Cervical back: Normal range of motion.   Skin:     General: Skin is warm.      Findings: Erythema and lesion present.   Neurological:      General: No focal deficit present.      Mental Status: He is alert and oriented to person, place,  "and time. Mental status is at baseline.   Psychiatric:         Mood and Affect: Mood normal.         Behavior: Behavior normal.             Significant Labs: All pertinent labs within the past 24 hours have been reviewed.  BMP:   No results for input(s): "GLU", "NA", "K", "CL", "CO2", "BUN", "CREATININE", "CALCIUM", "MG" in the last 48 hours.    CBC:   No results for input(s): "WBC", "HGB", "HCT", "PLT" in the last 48 hours.      Significant Imaging: I have reviewed all pertinent imaging results/findings within the past 24 hours.  "

## 2023-08-12 NOTE — PROGRESS NOTES
[facility] Medicine  Progress Note    Patient Name: Esthela Contreras  MRN: [id number]  Patient Class: IP- Inpatient   Admission Date: 7/27/2023  Length of Stay: 15 days  Attending Physician: Milton Cabrera MD  Primary Care Provider: Yolande Spring FNP        Subjective:     Principal Problem:Severe sepsis        HPI:  Patient is a 71 yo male who presents to the hospital accompanied by his family with a complaint of wounds to his bilateral lower extremities. Patient is a poor historian, and history was provided by his daughter at the emergency department. Patient has a history of chronic wounds and was admitted to the hospital on 6/02/2023 of severe sepsis secondary to cellulitis of lower extremities and new onset of atrial fibrillation. He was subsequently discharged to [facility] for wound care.  His daughter reported that wounds were healing, and the patient was discharged home under the care of [facility] for continued care. However, the patient did not get any service from the  [facility], which culminated in the worsening of his wounds over the past few days. This is associated with pain and edema, but the patient denies any associated fever, chills, chest pain, shortness of breath, nausea and vomiting.     In the ED, the patient met sepsis criteria on arrival and was thus treated per sepsis protocol through IV hydration, blood culture collection before starting broad spectrum antibiotics. Ensuring work up was significant for WBC 27.6, H&H 13.5/41.9 with history of anemia and baseline H&H 10.2/33.6 on month ago, dehydration with hyponatremia with a sodium of 129, acute renal injury with a BUN/CR 67/2.44 and GFR 27 with a baseline BUN/CR 16/1.25 and GFR 92 one month ago. Anion Gap metabolic acidosis with a GAP of 19.  Initial Troponin of 153.9 with history of chronically elevated Troponin, and NTpBNP of 4,406 with a baseline of 20.7K about three weeks ago. Lactic  acid of 3.0. Patient will be admitted for further evaluation and management.            Overview/Hospital Course:  7/28: Worsening leukocytosis noted. Blood cultures 2/2 sets growing GNB, wound culture with GNB.     7/30: Wound cultures with morganella, providencia and pseudomonas. General surgery consulted.     7/31: Blood cultures returned with acinetobacter, antibiotic changed to cefepime per consultation with pharmacy. Awaiting general surgery's input regarding surgery.       Interval History:     NAEO  Pt refusing placement anywhere  Will be here till Monday then to complete abx and then home  Review of Systems   Respiratory: Negative.     Cardiovascular: Negative.    Gastrointestinal: Negative.      Objective:     Vital Signs (Most Recent):  Temp: 96.7 °F (35.9 °C) (08/12/23 0600)  Pulse: 79 (08/12/23 0600)  Resp: 18 (08/12/23 0600)  BP: (!) 101/55 (08/12/23 0600)  SpO2: 99 % (08/12/23 0600) Vital Signs (24h Range):  Temp:  [96.7 °F (35.9 °C)-98.3 °F (36.8 °C)] 96.7 °F (35.9 °C)  Pulse:  [76-97] 79  Resp:  [16-20] 18  SpO2:  [97 %-100 %] 99 %  BP: ()/(55-73) 101/55     Weight: 77.4 kg (170 lb 9.6 oz)  Body mass index is 24.48 kg/m².    Intake/Output Summary (Last 24 hours) at 8/12/2023 1026  Last data filed at 8/11/2023 2200  Gross per 24 hour   Intake --   Output 1000 ml   Net -1000 ml           Physical Exam  Vitals and nursing note reviewed. Exam conducted with a chaperone present.   Constitutional:       General: He is not in acute distress.     Appearance: He is not ill-appearing or toxic-appearing.   HENT:      Head: Normocephalic and atraumatic.      Nose: Nose normal.      Mouth/Throat:      Mouth: Mucous membranes are moist.   Eyes:      Extraocular Movements: Extraocular movements intact.   Cardiovascular:      Rate and Rhythm: Normal rate and regular rhythm.      Pulses: Normal pulses.      Heart sounds: Normal heart sounds.   Pulmonary:      Effort: Pulmonary effort is normal.      Breath  "sounds: Normal breath sounds.   Abdominal:      General: Bowel sounds are normal. There is no distension.      Palpations: Abdomen is soft.      Tenderness: There is no abdominal tenderness.   Musculoskeletal:         General: Tenderness present.      Cervical back: Normal range of motion.   Skin:     General: Skin is warm.      Findings: Erythema and lesion present.   Neurological:      General: No focal deficit present.      Mental Status: He is alert and oriented to person, place, and time. Mental status is at baseline.   Psychiatric:         Mood and Affect: Mood normal.         Behavior: Behavior normal.             Significant Labs: All pertinent labs within the past 24 hours have been reviewed.  BMP:   No results for input(s): "GLU", "NA", "K", "CL", "CO2", "BUN", "CREATININE", "CALCIUM", "MG" in the last 48 hours.    CBC:   No results for input(s): "WBC", "HGB", "HCT", "PLT" in the last 48 hours.      Significant Imaging: I have reviewed all pertinent imaging results/findings within the past 24 hours.      Assessment/Plan:      * Severe sepsis  This patient does have evidence of infective focus  My overall impression is sepsis.  Source: Skin and Soft Tissue (location bilateral lower extremity cellulitis)  Source control achieved by: Antibiotics as mentioned below.   Cultures as below      8/3 - Blood cx x2 positive for acinetobacter. Repeat cx on 7/31 NTD.  Plan 14 days tx (cefepime)  from 7/31 if they remain negative. Also on Gent to double cover pseudomonas in wounds.       Multiple and open wound of lower limb, complicated  Management as above.       Gram-negative bacteremia  2 out of 2 sets on admission positive for acinetobacter.  Repeat cultures ordered for clearance (7/31).   Plan as above.     Severe protein-calorie malnutrition  Nutrition consulted. Most recent weight and BMI monitored-     Measurements:  Wt Readings from Last 1 Encounters:   07/28/23 73.6 kg (162 lb 5.4 oz)   Body mass index is " 22.02 kg/m².    Patient has been screened and assessed by RD.    Malnutrition Type:  Context: chronic illness  Level: severe    Malnutrition Characteristic Summary:  Weight Loss (Malnutrition): greater than 5% in 1 month  Energy Intake (Malnutrition): less than 75% for greater than or equal to 1 month  Subcutaneous Fat (Malnutrition): severe depletion  Muscle Mass (Malnutrition): severe depletion    Interventions/Recommendations (treatment strategy):  Recommend continue current diet as able/appropriate and tolerated. Also recommend addition of Ensure Max Protein TID and Abel BID to improve kcal/protein intakes and aid in wound healing. Also recommend consider addition of 500mg Vitamin C and 220mg ZnSO4 BID to aid in wound healing.    Type 2 MI (myocardial infarction)  Troponin elevation but trend is not significant.  Denies chest pain.  No EKG done on admission in ED.   Monitor for chest pain.           Hyperlipemia  Continue home statin    A-fib  Patient with Persistent (7 days or more) atrial fibrillation which is controlled currently with Beta Blocker.   Patient is currently in atrial fibrillation.  [id number] Score: 3. HASBLED Score: 2.   Anticoagulation on hold for surgical debridement. Resume tomorrow if no further debridement likely.     8/3 - Will resume anticoagulation.     Depression  Patient has persistent depression which is moderate and is currently controlled.   Will Continue anti-depressant medication- Remeron.   We will not consult psychiatry at this time.   Patient does not display psychosis at this time.   Continue to monitor closely and adjust plan of care as needed.        Type 2 diabetes mellitus  Patient's FSGs are controlled on current medication regimen.  Last A1c reviewed-   Lab Results   Component Value Date    HGBA1C 5.5 06/02/2023     Most recent fingerstick glucose reviewed- No results for input(s): POCTGLUCOSE in the last 24 hours.  Current correctional scale  Medium  Maintain  anti-hyperglycemic dose as follows-   Antihyperglycemics (From admission, onward)    None        Hold Oral hypoglycemics while patient is in the hospital.    COPD (chronic obstructive pulmonary disease)  DuoNeb q6h PRN  Supplemental oxygenation    Lactic acidosis  Sec to severe sepsis.  Leading to high anion gap metabolic acidosis, bicarb 14 today.   S/p IV hydration.   Follow lactate.     8/1 - bicarb improved to 21.           Cellulitis of lower extremity  Known chronic wounds on BLE for a few months.   Lost follow up with home health wound care reporting insurance did not approve it.   Photographs appear better compared to ones in 6/2023.  Inflammatory markers elevated.   Started on broad spectrum antibiotics- vancomycin and Zosyn.   Wound culture with pseudomonas, morganella and providencia growth, blood culture with acinetobacter- discontinued vancomycin.   Switched to cefepime given acinetobacter in blood (7/31)- discussed with pharmacy.   General surgery consulted for wound debridement/source control.   Wound care consulted.    8/1 - All organisms in blood and wound covered by cefipime. Pseudomonas in wound. Consider adding gent for double coverage.      8/2 - Added gent yesterday.  Continue cefepime.         CHANDNI (acute kidney injury)  Patient with acute kidney injury/acute renal failure likely due to pre-renal azotemia due to dehydration   CHANDNI is currently improving.   Baseline creatinine 1.25 about one month ago   Labs reviewed- Renal function/electrolytes with Estimated Creatinine Clearance: 78.7 mL/min (based on SCr of 0.85 mg/dL). according to latest data.   Monitor urine output and serial BMP and adjust therapy as needed.  Avoid nephrotoxins and renally dose meds for GFR listed above.    8/1 - Renal function improved. Considering gent addition.       Essential hypertension  On Toprol but also on midodrine.  Suspect Toprol is for HFrEF and midodrine is to avoid hypotension.      Chronic HFrEF (heart  failure with reduced ejection fraction)  Last recorded Echo in 2019 showed an EF of 40%  Not in acute exacerbation.  Resume home Toprol, not on ACEI or ARB at home- unclear reason but suspect may be due to borderline low BP and taking midodrine at baseline.         VTE Risk Mitigation (From admission, onward)         Ordered     heparin (porcine) injection 5,000 Units  Every 8 hours         07/29/23 1052                Discharge Planning   MAKENNA:      Code Status: Full Code   Is the patient medically ready for discharge?: No    Reason for patient still in hospital (select all that apply): Treatment  Discharge Plan A: Skilled Nursing Facility                  Rehmat ELENA Cabrera MD  Department of Hospital Medicine   [facility]

## 2023-08-13 LAB
FLUAV AG UPPER RESP QL IA.RAPID: NEGATIVE
FLUBV AG UPPER RESP QL IA.RAPID: NEGATIVE
SARS-COV+SARS-COV-2 AG RESP QL IA.RAPID: POSITIVE

## 2023-08-13 PROCEDURE — 87428 SARSCOV & INF VIR A&B AG IA: CPT | Performed by: INTERNAL MEDICINE

## 2023-08-13 PROCEDURE — 25000003 PHARM REV CODE 250

## 2023-08-13 PROCEDURE — 99232 PR SUBSEQUENT HOSPITAL CARE,LEVL II: ICD-10-PCS | Mod: ,,, | Performed by: INTERNAL MEDICINE

## 2023-08-13 PROCEDURE — 25000003 PHARM REV CODE 250: Performed by: HOSPITALIST

## 2023-08-13 PROCEDURE — 25000003 PHARM REV CODE 250: Performed by: FAMILY MEDICINE

## 2023-08-13 PROCEDURE — 94761 N-INVAS EAR/PLS OXIMETRY MLT: CPT

## 2023-08-13 PROCEDURE — 63600175 PHARM REV CODE 636 W HCPCS: Performed by: INTERNAL MEDICINE

## 2023-08-13 PROCEDURE — 99232 SBSQ HOSP IP/OBS MODERATE 35: CPT | Mod: ,,, | Performed by: INTERNAL MEDICINE

## 2023-08-13 PROCEDURE — 25000003 PHARM REV CODE 250: Performed by: INTERNAL MEDICINE

## 2023-08-13 PROCEDURE — 11000001 HC ACUTE MED/SURG PRIVATE ROOM

## 2023-08-13 RX ADMIN — POLYETHYLENE GLYCOL 3350 17 G: 17 POWDER, FOR SOLUTION ORAL at 08:08

## 2023-08-13 RX ADMIN — MIRTAZAPINE 15 MG: 7.5 TABLET, FILM COATED ORAL at 08:08

## 2023-08-13 RX ADMIN — ATORVASTATIN CALCIUM 40 MG: 40 TABLET, FILM COATED ORAL at 08:08

## 2023-08-13 RX ADMIN — ASPIRIN 81 MG: 81 TABLET, COATED ORAL at 08:08

## 2023-08-13 RX ADMIN — Medication: at 12:08

## 2023-08-13 RX ADMIN — Medication: at 08:08

## 2023-08-13 RX ADMIN — CEFEPIME 2 G: 2 INJECTION, POWDER, FOR SOLUTION INTRAVENOUS at 05:08

## 2023-08-13 RX ADMIN — Medication 1 TABLET: at 08:08

## 2023-08-13 RX ADMIN — TRAZODONE HYDROCHLORIDE 50 MG: 50 TABLET ORAL at 08:08

## 2023-08-13 RX ADMIN — CEFEPIME 2 G: 2 INJECTION, POWDER, FOR SOLUTION INTRAVENOUS at 08:08

## 2023-08-13 RX ADMIN — ACETAMINOPHEN 1000 MG: 500 TABLET ORAL at 08:08

## 2023-08-13 RX ADMIN — CEFEPIME 2 G: 2 INJECTION, POWDER, FOR SOLUTION INTRAVENOUS at 12:08

## 2023-08-13 NOTE — PROGRESS NOTES
[facility] Medicine  Progress Note    Patient Name: Esthela Contreras  MRN: [id number]  Patient Class: IP- Inpatient   Admission Date: 7/27/2023  Length of Stay: 16 days  Attending Physician: Milton Cabrera MD  Primary Care Provider: Yolande Spring FNP        Subjective:     Principal Problem:Severe sepsis        HPI:  Patient is a 73 yo male who presents to the hospital accompanied by his family with a complaint of wounds to his bilateral lower extremities. Patient is a poor historian, and history was provided by his daughter at the emergency department. Patient has a history of chronic wounds and was admitted to the hospital on 6/02/2023 of severe sepsis secondary to cellulitis of lower extremities and new onset of atrial fibrillation. He was subsequently discharged to [facility] for wound care.  His daughter reported that wounds were healing, and the patient was discharged home under the care of [facility] for continued care. However, the patient did not get any service from the  [facility], which culminated in the worsening of his wounds over the past few days. This is associated with pain and edema, but the patient denies any associated fever, chills, chest pain, shortness of breath, nausea and vomiting.     In the ED, the patient met sepsis criteria on arrival and was thus treated per sepsis protocol through IV hydration, blood culture collection before starting broad spectrum antibiotics. Ensuring work up was significant for WBC 27.6, H&H 13.5/41.9 with history of anemia and baseline H&H 10.2/33.6 on month ago, dehydration with hyponatremia with a sodium of 129, acute renal injury with a BUN/CR 67/2.44 and GFR 27 with a baseline BUN/CR 16/1.25 and GFR 92 one month ago. Anion Gap metabolic acidosis with a GAP of 19.  Initial Troponin of 153.9 with history of chronically elevated Troponin, and NTpBNP of 4,406 with a baseline of 20.7K about three weeks ago. Lactic  acid of 3.0. Patient will be admitted for further evaluation and management.            Overview/Hospital Course:  7/28: Worsening leukocytosis noted. Blood cultures 2/2 sets growing GNB, wound culture with GNB.     7/30: Wound cultures with morganella, providencia and pseudomonas. General surgery consulted.     7/31: Blood cultures returned with acinetobacter, antibiotic changed to cefepime per consultation with pharmacy. Awaiting general surgery's input regarding surgery.       Interval History:     NAEO  Pt w/ low grade fever overnight, blood cx, covid/flu  Pt asking what infection he has, told him mulitple times that he has skin/soft tissue infction, he doesn't want to go home also he doesn't want to go to swing bed either,  following for dispo, appreciate assistance.     Review of Systems   Respiratory: Negative.     Cardiovascular: Negative.    Gastrointestinal: Negative.      Objective:     Vital Signs (Most Recent):  Temp: 97.5 °F (36.4 °C) (08/13/23 0600)  Pulse: 92 (08/13/23 0600)  Resp: 16 (08/13/23 0600)  BP: 107/63 (08/13/23 0600)  SpO2: 96 % (08/13/23 0600) Vital Signs (24h Range):  Temp:  [97.4 °F (36.3 °C)-100.9 °F (38.3 °C)] 97.5 °F (36.4 °C)  Pulse:  [] 92  Resp:  [14-18] 16  SpO2:  [96 %-100 %] 96 %  BP: ()/(52-85) 107/63     Weight: 77.4 kg (170 lb 9.6 oz)  Body mass index is 24.48 kg/m².    Intake/Output Summary (Last 24 hours) at 8/13/2023 1006  Last data filed at 8/13/2023 0833  Gross per 24 hour   Intake 240 ml   Output 1600 ml   Net -1360 ml           Physical Exam  Vitals and nursing note reviewed. Exam conducted with a chaperone present.   Constitutional:       General: He is not in acute distress.     Appearance: He is not ill-appearing or toxic-appearing.   HENT:      Head: Normocephalic and atraumatic.      Nose: Nose normal.      Mouth/Throat:      Mouth: Mucous membranes are moist.   Eyes:      Extraocular Movements: Extraocular movements intact.  "  Cardiovascular:      Rate and Rhythm: Normal rate and regular rhythm.      Pulses: Normal pulses.      Heart sounds: Normal heart sounds.   Pulmonary:      Effort: Pulmonary effort is normal.      Breath sounds: Normal breath sounds.   Abdominal:      General: Bowel sounds are normal. There is no distension.      Palpations: Abdomen is soft.      Tenderness: There is no abdominal tenderness.   Musculoskeletal:         General: Tenderness present.      Cervical back: Normal range of motion.   Skin:     General: Skin is warm.      Findings: Erythema and lesion present.   Neurological:      General: No focal deficit present.      Mental Status: He is alert and oriented to person, place, and time. Mental status is at baseline.   Psychiatric:         Mood and Affect: Mood normal.         Behavior: Behavior normal.             Significant Labs: All pertinent labs within the past 24 hours have been reviewed.  BMP:   No results for input(s): "GLU", "NA", "K", "CL", "CO2", "BUN", "CREATININE", "CALCIUM", "MG" in the last 48 hours.    CBC:   No results for input(s): "WBC", "HGB", "HCT", "PLT" in the last 48 hours.      Significant Imaging: I have reviewed all pertinent imaging results/findings within the past 24 hours.      Assessment/Plan:      * Severe sepsis  This patient does have evidence of infective focus  My overall impression is sepsis.  Source: Skin and Soft Tissue (location bilateral lower extremity cellulitis)  Source control achieved by: Antibiotics as mentioned below.   Cultures as below      8/3 - Blood cx x2 positive for acinetobacter. Repeat cx on 7/31 NTD.  Plan 14 days tx (cefepime)  from 7/31 if they remain negative. Also on Gent to double cover pseudomonas in wounds.       Multiple and open wound of lower limb, complicated  Management as above.       Gram-negative bacteremia  2 out of 2 sets on admission positive for acinetobacter.  Repeat cultures ordered for clearance (7/31).   Plan as above. "     Severe protein-calorie malnutrition  Nutrition consulted. Most recent weight and BMI monitored-     Measurements:  Wt Readings from Last 1 Encounters:   07/28/23 73.6 kg (162 lb 5.4 oz)   Body mass index is 22.02 kg/m².    Patient has been screened and assessed by RD.    Malnutrition Type:  Context: chronic illness  Level: severe    Malnutrition Characteristic Summary:  Weight Loss (Malnutrition): greater than 5% in 1 month  Energy Intake (Malnutrition): less than 75% for greater than or equal to 1 month  Subcutaneous Fat (Malnutrition): severe depletion  Muscle Mass (Malnutrition): severe depletion    Interventions/Recommendations (treatment strategy):  Recommend continue current diet as able/appropriate and tolerated. Also recommend addition of Ensure Max Protein TID and Abel BID to improve kcal/protein intakes and aid in wound healing. Also recommend consider addition of 500mg Vitamin C and 220mg ZnSO4 BID to aid in wound healing.    Type 2 MI (myocardial infarction)  Troponin elevation but trend is not significant.  Denies chest pain.  No EKG done on admission in ED.   Monitor for chest pain.           Hyperlipemia  Continue home statin    A-fib  Patient with Persistent (7 days or more) atrial fibrillation which is controlled currently with Beta Blocker.   Patient is currently in atrial fibrillation.  [id number] Score: 3. HASBLED Score: 2.   Anticoagulation on hold for surgical debridement. Resume tomorrow if no further debridement likely.     8/3 - Will resume anticoagulation.     Depression  Patient has persistent depression which is moderate and is currently controlled.   Will Continue anti-depressant medication- Remeron.   We will not consult psychiatry at this time.   Patient does not display psychosis at this time.   Continue to monitor closely and adjust plan of care as needed.        Type 2 diabetes mellitus  Patient's FSGs are controlled on current medication regimen.  Last A1c reviewed-   Lab  Results   Component Value Date    HGBA1C 5.5 06/02/2023     Most recent fingerstick glucose reviewed- No results for input(s): POCTGLUCOSE in the last 24 hours.  Current correctional scale  Medium  Maintain anti-hyperglycemic dose as follows-   Antihyperglycemics (From admission, onward)    None        Hold Oral hypoglycemics while patient is in the hospital.    COPD (chronic obstructive pulmonary disease)  DuoNeb q6h PRN  Supplemental oxygenation    Lactic acidosis  Sec to severe sepsis.  Leading to high anion gap metabolic acidosis, bicarb 14 today.   S/p IV hydration.   Follow lactate.     8/1 - bicarb improved to 21.           Cellulitis of lower extremity  Known chronic wounds on BLE for a few months.   Lost follow up with home health wound care reporting insurance did not approve it.   Photographs appear better compared to ones in 6/2023.  Inflammatory markers elevated.   Started on broad spectrum antibiotics- vancomycin and Zosyn.   Wound culture with pseudomonas, morganella and providencia growth, blood culture with acinetobacter- discontinued vancomycin.   Switched to cefepime given acinetobacter in blood (7/31)- discussed with pharmacy.   General surgery consulted for wound debridement/source control.   Wound care consulted.    8/1 - All organisms in blood and wound covered by cefipime. Pseudomonas in wound. Consider adding gent for double coverage.      8/2 - Added gent yesterday.  Continue cefepime.         CHANDNI (acute kidney injury)  Patient with acute kidney injury/acute renal failure likely due to pre-renal azotemia due to dehydration   CHANDNI is currently improving.   Baseline creatinine 1.25 about one month ago   Labs reviewed- Renal function/electrolytes with Estimated Creatinine Clearance: 78.7 mL/min (based on SCr of 0.85 mg/dL). according to latest data.   Monitor urine output and serial BMP and adjust therapy as needed.  Avoid nephrotoxins and renally dose meds for GFR listed above.    8/1 - Renal  function improved. Considering gent addition.       Essential hypertension  On Toprol but also on midodrine.  Suspect Toprol is for HFrEF and midodrine is to avoid hypotension.      Chronic HFrEF (heart failure with reduced ejection fraction)  Last recorded Echo in 2019 showed an EF of 40%  Not in acute exacerbation.  Resume home Toprol, not on ACEI or ARB at home- unclear reason but suspect may be due to borderline low BP and taking midodrine at baseline.         VTE Risk Mitigation (From admission, onward)         Ordered     heparin (porcine) injection 5,000 Units  Every 8 hours         07/29/23 1052                Discharge Planning   MAKENNA:      Code Status: Full Code   Is the patient medically ready for discharge?: No    Reason for patient still in hospital (select all that apply): Treatment  Discharge Plan A: Skilled Nursing Facility                  Rehmat ELENA Cabrera MD  Department of Hospital Medicine   [facility] Stay [facility]

## 2023-08-13 NOTE — SUBJECTIVE & OBJECTIVE
Interval History:     NAEO  Pt w/ low grade fever overnight, blood cx, covid/flu  Pt asking what infection he has, told him mulitple times that he has skin/soft tissue infction, he doesn't want to go home also he doesn't want to go to swing bed either,  following for dispo, appreciate assistance.     Review of Systems   Respiratory: Negative.     Cardiovascular: Negative.    Gastrointestinal: Negative.      Objective:     Vital Signs (Most Recent):  Temp: 97.5 °F (36.4 °C) (08/13/23 0600)  Pulse: 92 (08/13/23 0600)  Resp: 16 (08/13/23 0600)  BP: 107/63 (08/13/23 0600)  SpO2: 96 % (08/13/23 0600) Vital Signs (24h Range):  Temp:  [97.4 °F (36.3 °C)-100.9 °F (38.3 °C)] 97.5 °F (36.4 °C)  Pulse:  [] 92  Resp:  [14-18] 16  SpO2:  [96 %-100 %] 96 %  BP: ()/(52-85) 107/63     Weight: 77.4 kg (170 lb 9.6 oz)  Body mass index is 24.48 kg/m².    Intake/Output Summary (Last 24 hours) at 8/13/2023 1006  Last data filed at 8/13/2023 0833  Gross per 24 hour   Intake 240 ml   Output 1600 ml   Net -1360 ml           Physical Exam  Vitals and nursing note reviewed. Exam conducted with a chaperone present.   Constitutional:       General: He is not in acute distress.     Appearance: He is not ill-appearing or toxic-appearing.   HENT:      Head: Normocephalic and atraumatic.      Nose: Nose normal.      Mouth/Throat:      Mouth: Mucous membranes are moist.   Eyes:      Extraocular Movements: Extraocular movements intact.   Cardiovascular:      Rate and Rhythm: Normal rate and regular rhythm.      Pulses: Normal pulses.      Heart sounds: Normal heart sounds.   Pulmonary:      Effort: Pulmonary effort is normal.      Breath sounds: Normal breath sounds.   Abdominal:      General: Bowel sounds are normal. There is no distension.      Palpations: Abdomen is soft.      Tenderness: There is no abdominal tenderness.   Musculoskeletal:         General: Tenderness present.      Cervical back: Normal range of motion.  "  Skin:     General: Skin is warm.      Findings: Erythema and lesion present.   Neurological:      General: No focal deficit present.      Mental Status: He is alert and oriented to person, place, and time. Mental status is at baseline.   Psychiatric:         Mood and Affect: Mood normal.         Behavior: Behavior normal.             Significant Labs: All pertinent labs within the past 24 hours have been reviewed.  BMP:   No results for input(s): "GLU", "NA", "K", "CL", "CO2", "BUN", "CREATININE", "CALCIUM", "MG" in the last 48 hours.    CBC:   No results for input(s): "WBC", "HGB", "HCT", "PLT" in the last 48 hours.      Significant Imaging: I have reviewed all pertinent imaging results/findings within the past 24 hours.  "

## 2023-08-14 PROBLEM — U07.1 COVID: Status: ACTIVE | Noted: 2023-08-14

## 2023-08-14 PROCEDURE — 97110 THERAPEUTIC EXERCISES: CPT | Mod: CO

## 2023-08-14 PROCEDURE — 99232 PR SUBSEQUENT HOSPITAL CARE,LEVL II: ICD-10-PCS | Mod: ,,, | Performed by: INTERNAL MEDICINE

## 2023-08-14 PROCEDURE — 25000003 PHARM REV CODE 250: Performed by: HOSPITALIST

## 2023-08-14 PROCEDURE — 94761 N-INVAS EAR/PLS OXIMETRY MLT: CPT

## 2023-08-14 PROCEDURE — 25000003 PHARM REV CODE 250: Performed by: FAMILY MEDICINE

## 2023-08-14 PROCEDURE — 25000003 PHARM REV CODE 250: Performed by: INTERNAL MEDICINE

## 2023-08-14 PROCEDURE — 99232 SBSQ HOSP IP/OBS MODERATE 35: CPT | Mod: ,,, | Performed by: INTERNAL MEDICINE

## 2023-08-14 PROCEDURE — 63600175 PHARM REV CODE 636 W HCPCS: Performed by: INTERNAL MEDICINE

## 2023-08-14 PROCEDURE — 25000003 PHARM REV CODE 250

## 2023-08-14 PROCEDURE — 11000001 HC ACUTE MED/SURG PRIVATE ROOM

## 2023-08-14 RX ORDER — MUPIROCIN 20 MG/G
OINTMENT TOPICAL 2 TIMES DAILY
Status: DISCONTINUED | OUTPATIENT
Start: 2023-08-14 | End: 2023-08-18 | Stop reason: HOSPADM

## 2023-08-14 RX ADMIN — Medication 1 TABLET: at 08:08

## 2023-08-14 RX ADMIN — CEFEPIME 2 G: 2 INJECTION, POWDER, FOR SOLUTION INTRAVENOUS at 05:08

## 2023-08-14 RX ADMIN — Medication: at 09:08

## 2023-08-14 RX ADMIN — POLYETHYLENE GLYCOL 3350 17 G: 17 POWDER, FOR SOLUTION ORAL at 08:08

## 2023-08-14 RX ADMIN — ASPIRIN 81 MG: 81 TABLET, COATED ORAL at 08:08

## 2023-08-14 RX ADMIN — MIRTAZAPINE 15 MG: 7.5 TABLET, FILM COATED ORAL at 09:08

## 2023-08-14 RX ADMIN — TRAZODONE HYDROCHLORIDE 50 MG: 50 TABLET ORAL at 09:08

## 2023-08-14 RX ADMIN — ACETAMINOPHEN 1000 MG: 500 TABLET ORAL at 09:08

## 2023-08-14 RX ADMIN — OXYCODONE HYDROCHLORIDE 5 MG: 5 TABLET ORAL at 03:08

## 2023-08-14 RX ADMIN — ATORVASTATIN CALCIUM 40 MG: 40 TABLET, FILM COATED ORAL at 08:08

## 2023-08-14 RX ADMIN — MUPIROCIN: 20 OINTMENT TOPICAL at 12:08

## 2023-08-14 RX ADMIN — MUPIROCIN: 20 OINTMENT TOPICAL at 09:08
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
## 2023-08-14 NOTE — PT/OT/SLP PROGRESS
Occupational Therapy   Treatment    Name: Esthela Contreras  MRN: [id number]  Admitting Diagnosis:  Severe sepsis  13 Days Post-Op    Recommendations:     Discharge Recommendations: nursing facility, skilled  Discharge Equipment Recommendations:   (to be determined)  Barriers to discharge:       Assessment:     Esthela Contreras is a 72 y.o. male with a medical diagnosis of Severe sepsis.  . Performance deficits affecting function are weakness, impaired endurance, impaired self care skills, impaired skin.     Rehab Prognosis:  Good; patient would benefit from acute skilled OT services to address these deficits and reach maximum level of function.       Plan:     Patient to be seen 5 x/week to address the above listed problems via self-care/home management, therapeutic activities, therapeutic exercises  Plan of Care Expires: 08/25/23  Plan of Care Reviewed with: patient    Subjective     Chief Complaint:   Patient/Family Comments/goals:   Pain/Comfort:  Pain Rating 1: 0/10    Objective:     Communicated with:Bernie Ramon RN   prior to session.  Patient found HOB elevated with PICC line upon OT entry to room.    General Precautions: Standard, fall    Orthopedic Precautions:N/A  Braces: N/A  Respiratory Status: Room air     Occupational Performance:     Bed Mobility:         Functional Mobility/Transfers:    Functional Mobility:     Activities of Daily Living:        [facility] 6 Click ADL:      Treatment & Education:  Pt performed hand helper with 3 rubber band resistance 20 reps x 2, rom ex's with 1 lb wt 15 reps x 2 B shld flex,abd/add,elbow flex/ext    Patient left HOB elevated with all lines intact and call button in reach    GOALS:   Multidisciplinary Problems       Occupational Therapy Goals          Problem: Occupational Therapy    Goal Priority Disciplines Outcome Interventions   Occupational Therapy Goal     OT, PT/OT Ongoing, Progressing    Description: STG:  Pt will perform grooming with setup  Pt will bathe with Anna  with setup at EOB  Pt will perform UE dressing with Harshil  Pt will perform LE dressing with Anna with AD if needed  Pt will sit EOB x 10 min with SBA   Pt will transfer bed/chair/bsc with CGA with RW  Pt will perform standing task x 2 min with CGA with RW   Pt will tolerate 15 minutes of tx without fatigue      LT.Restore to max I with self care and mobility.                           Time Tracking:     OT Date of Treatment: 23  OT Start Time: 1520  OT Stop Time:   OT Total Time (min): 12 min    Billable Minutes:Therapeutic Exercise 10    OT/KO: KO          2023

## 2023-08-14 NOTE — SUBJECTIVE & OBJECTIVE
Interval History:     NAEO  Pt now w/ covid  Not requiring oxygen, satting 100% on RA      Review of Systems   Respiratory: Negative.     Cardiovascular: Negative.    Gastrointestinal: Negative.      Objective:     Vital Signs (Most Recent):  Temp: 98 °F (36.7 °C) (08/14/23 1137)  Pulse: 82 (08/14/23 1137)  Resp: 18 (08/14/23 1137)  BP: (!) 102/54 (08/14/23 1137)  SpO2: 98 % (08/14/23 1137) Vital Signs (24h Range):  Temp:  [97.7 °F (36.5 °C)-99.4 °F (37.4 °C)] 98 °F (36.7 °C)  Pulse:  [81-97] 82  Resp:  [16-18] 18  SpO2:  [95 %-100 %] 98 %  BP: (102-110)/(54-69) 102/54     Weight: 77.4 kg (170 lb 9.6 oz)  Body mass index is 24.48 kg/m².    Intake/Output Summary (Last 24 hours) at 8/14/2023 1153  Last data filed at 8/14/2023 0800  Gross per 24 hour   Intake 640 ml   Output 600 ml   Net 40 ml           Physical Exam  Vitals and nursing note reviewed. Exam conducted with a chaperone present.   Constitutional:       General: He is not in acute distress.     Appearance: He is not ill-appearing or toxic-appearing.   HENT:      Head: Normocephalic and atraumatic.      Nose: Nose normal.      Mouth/Throat:      Mouth: Mucous membranes are moist.   Eyes:      Extraocular Movements: Extraocular movements intact.   Cardiovascular:      Rate and Rhythm: Normal rate and regular rhythm.      Pulses: Normal pulses.      Heart sounds: Normal heart sounds.   Pulmonary:      Effort: Pulmonary effort is normal.      Breath sounds: Normal breath sounds.   Abdominal:      General: Bowel sounds are normal. There is no distension.      Palpations: Abdomen is soft.      Tenderness: There is no abdominal tenderness.   Musculoskeletal:         General: Tenderness present.      Cervical back: Normal range of motion.   Skin:     General: Skin is warm.      Findings: Erythema and lesion present.   Neurological:      General: No focal deficit present.      Mental Status: He is alert and oriented to person, place, and time. Mental status is at  "baseline.   Psychiatric:         Mood and Affect: Mood normal.         Behavior: Behavior normal.             Significant Labs: All pertinent labs within the past 24 hours have been reviewed.  BMP:   No results for input(s): "GLU", "NA", "K", "CL", "CO2", "BUN", "CREATININE", "CALCIUM", "MG" in the last 48 hours.    CBC:   No results for input(s): "WBC", "HGB", "HCT", "PLT" in the last 48 hours.      Significant Imaging: I have reviewed all pertinent imaging results/findings within the past 24 hours.  "

## 2023-08-14 NOTE — PLAN OF CARE
Problem: Adult Inpatient Plan of Care  Goal: Plan of Care Review  Outcome: Ongoing, Progressing  Goal: Patient-Specific Goal (Individualized)  Outcome: Ongoing, Progressing  Goal: Absence of Hospital-Acquired Illness or Injury  Outcome: Ongoing, Progressing  Goal: Optimal Comfort and Wellbeing  Outcome: Ongoing, Progressing  Goal: Readiness for Transition of Care  Outcome: Ongoing, Progressing     Problem: Diabetes Comorbidity  Goal: Blood Glucose Level Within Targeted Range  Outcome: Ongoing, Progressing     Problem: Bleeding (Sepsis/Septic Shock)  Goal: Absence of Bleeding  Outcome: Ongoing, Progressing     Problem: Glycemic Control Impaired (Sepsis/Septic Shock)  Goal: Blood Glucose Level Within Desired Range  Outcome: Ongoing, Progressing     Problem: Infection Progression (Sepsis/Septic Shock)  Goal: Absence of Infection Signs and Symptoms  Outcome: Ongoing, Progressing     Problem: Nutrition Impaired (Sepsis/Septic Shock)  Goal: Optimal Nutrition Intake  Outcome: Ongoing, Progressing     Problem: Fluid and Electrolyte Imbalance (Acute Kidney Injury/Impairment)  Goal: Fluid and Electrolyte Balance  Outcome: Ongoing, Progressing     Problem: Oral Intake Inadequate (Acute Kidney Injury/Impairment)  Goal: Optimal Nutrition Intake  Outcome: Ongoing, Progressing     Problem: Renal Function Impairment (Acute Kidney Injury/Impairment)  Goal: Effective Renal Function  Outcome: Ongoing, Progressing     Problem: Impaired Wound Healing  Goal: Optimal Wound Healing  Outcome: Ongoing, Progressing     Problem: Skin Injury Risk Increased  Goal: Skin Health and Integrity  Outcome: Ongoing, Progressing     Problem: Infection  Goal: Absence of Infection Signs and Symptoms  Outcome: Ongoing, Progressing

## 2023-08-14 NOTE — PROGRESS NOTES
[facility] Medicine  Progress Note    Patient Name: Esthela Contreras  MRN: [id number]  Patient Class: IP- Inpatient   Admission Date: 7/27/2023  Length of Stay: 17 days  Attending Physician: Milton Cabrera MD  Primary Care Provider: Yolande Spring FNP        Subjective:     Principal Problem:Severe sepsis        HPI:  Patient is a 73 yo male who presents to the hospital accompanied by his family with a complaint of wounds to his bilateral lower extremities. Patient is a poor historian, and history was provided by his daughter at the emergency department. Patient has a history of chronic wounds and was admitted to the hospital on 6/02/2023 of severe sepsis secondary to cellulitis of lower extremities and new onset of atrial fibrillation. He was subsequently discharged to [facility] for wound care.  His daughter reported that wounds were healing, and the patient was discharged home under the care of [facility] for continued care. However, the patient did not get any service from the  [facility], which culminated in the worsening of his wounds over the past few days. This is associated with pain and edema, but the patient denies any associated fever, chills, chest pain, shortness of breath, nausea and vomiting.     In the ED, the patient met sepsis criteria on arrival and was thus treated per sepsis protocol through IV hydration, blood culture collection before starting broad spectrum antibiotics. Ensuring work up was significant for WBC 27.6, H&H 13.5/41.9 with history of anemia and baseline H&H 10.2/33.6 on month ago, dehydration with hyponatremia with a sodium of 129, acute renal injury with a BUN/CR 67/2.44 and GFR 27 with a baseline BUN/CR 16/1.25 and GFR 92 one month ago. Anion Gap metabolic acidosis with a GAP of 19.  Initial Troponin of 153.9 with history of chronically elevated Troponin, and NTpBNP of 4,406 with a baseline of 20.7K about three weeks ago. Lactic  acid of 3.0. Patient will be admitted for further evaluation and management.            Overview/Hospital Course:  7/28: Worsening leukocytosis noted. Blood cultures 2/2 sets growing GNB, wound culture with GNB.     7/30: Wound cultures with morganella, providencia and pseudomonas. General surgery consulted.     7/31: Blood cultures returned with acinetobacter, antibiotic changed to cefepime per consultation with pharmacy. Awaiting general surgery's input regarding surgery.       Interval History:     NAEO  Pt now w/ covid  Not requiring oxygen, satting 100% on RA      Review of Systems   Respiratory: Negative.     Cardiovascular: Negative.    Gastrointestinal: Negative.      Objective:     Vital Signs (Most Recent):  Temp: 98 °F (36.7 °C) (08/14/23 1137)  Pulse: 82 (08/14/23 1137)  Resp: 18 (08/14/23 1137)  BP: (!) 102/54 (08/14/23 1137)  SpO2: 98 % (08/14/23 1137) Vital Signs (24h Range):  Temp:  [97.7 °F (36.5 °C)-99.4 °F (37.4 °C)] 98 °F (36.7 °C)  Pulse:  [81-97] 82  Resp:  [16-18] 18  SpO2:  [95 %-100 %] 98 %  BP: (102-110)/(54-69) 102/54     Weight: 77.4 kg (170 lb 9.6 oz)  Body mass index is 24.48 kg/m².    Intake/Output Summary (Last 24 hours) at 8/14/2023 1153  Last data filed at 8/14/2023 0800  Gross per 24 hour   Intake 640 ml   Output 600 ml   Net 40 ml           Physical Exam  Vitals and nursing note reviewed. Exam conducted with a chaperone present.   Constitutional:       General: He is not in acute distress.     Appearance: He is not ill-appearing or toxic-appearing.   HENT:      Head: Normocephalic and atraumatic.      Nose: Nose normal.      Mouth/Throat:      Mouth: Mucous membranes are moist.   Eyes:      Extraocular Movements: Extraocular movements intact.   Cardiovascular:      Rate and Rhythm: Normal rate and regular rhythm.      Pulses: Normal pulses.      Heart sounds: Normal heart sounds.   Pulmonary:      Effort: Pulmonary effort is normal.      Breath sounds: Normal breath sounds.  "  Abdominal:      General: Bowel sounds are normal. There is no distension.      Palpations: Abdomen is soft.      Tenderness: There is no abdominal tenderness.   Musculoskeletal:         General: Tenderness present.      Cervical back: Normal range of motion.   Skin:     General: Skin is warm.      Findings: Erythema and lesion present.   Neurological:      General: No focal deficit present.      Mental Status: He is alert and oriented to person, place, and time. Mental status is at baseline.   Psychiatric:         Mood and Affect: Mood normal.         Behavior: Behavior normal.             Significant Labs: All pertinent labs within the past 24 hours have been reviewed.  BMP:   No results for input(s): "GLU", "NA", "K", "CL", "CO2", "BUN", "CREATININE", "CALCIUM", "MG" in the last 48 hours.    CBC:   No results for input(s): "WBC", "HGB", "HCT", "PLT" in the last 48 hours.      Significant Imaging: I have reviewed all pertinent imaging results/findings within the past 24 hours.      Assessment/Plan:      * Severe sepsis  This patient does have evidence of infective focus  My overall impression is sepsis.  Source: Skin and Soft Tissue (location bilateral lower extremity cellulitis)  Source control achieved by: Antibiotics as mentioned below.   Cultures as below      8/3 - Blood cx x2 positive for acinetobacter. Repeat cx on 7/31 NTD.  Plan 14 days tx (cefepime)  from 7/31 if they remain negative. Also on Gent to double cover pseudomonas in wounds.       COVID  Tested + on 8/13-symtpomatic for fevers only, no respiratory symtpoms for now therefore doesn't qualify for remdisivir.     Multiple and open wound of lower limb, complicated  Management as above.       Gram-negative bacteremia  2 out of 2 sets on admission positive for acinetobacter.  Repeat cultures ordered for clearance (7/31).   Plan as above.     Severe protein-calorie malnutrition  Nutrition consulted. Most recent weight and BMI monitored- "     Measurements:  Wt Readings from Last 1 Encounters:   07/28/23 73.6 kg (162 lb 5.4 oz)   Body mass index is 22.02 kg/m².    Patient has been screened and assessed by RD.    Malnutrition Type:  Context: chronic illness  Level: severe    Malnutrition Characteristic Summary:  Weight Loss (Malnutrition): greater than 5% in 1 month  Energy Intake (Malnutrition): less than 75% for greater than or equal to 1 month  Subcutaneous Fat (Malnutrition): severe depletion  Muscle Mass (Malnutrition): severe depletion    Interventions/Recommendations (treatment strategy):  Recommend continue current diet as able/appropriate and tolerated. Also recommend addition of Ensure Max Protein TID and Abel BID to improve kcal/protein intakes and aid in wound healing. Also recommend consider addition of 500mg Vitamin C and 220mg ZnSO4 BID to aid in wound healing.    Type 2 MI (myocardial infarction)  Troponin elevation but trend is not significant.  Denies chest pain.  No EKG done on admission in ED.   Monitor for chest pain.           Hyperlipemia  Continue home statin    A-fib  Patient with Persistent (7 days or more) atrial fibrillation which is controlled currently with Beta Blocker.   Patient is currently in atrial fibrillation.  [id number] Score: 3. HASBLED Score: 2.   Anticoagulation on hold for surgical debridement. Resume tomorrow if no further debridement likely.     8/3 - Will resume anticoagulation.     Depression  Patient has persistent depression which is moderate and is currently controlled.   Will Continue anti-depressant medication- Remeron.   We will not consult psychiatry at this time.   Patient does not display psychosis at this time.   Continue to monitor closely and adjust plan of care as needed.        Type 2 diabetes mellitus  Patient's FSGs are controlled on current medication regimen.  Last A1c reviewed-   Lab Results   Component Value Date    HGBA1C 5.5 06/02/2023     Most recent fingerstick glucose reviewed- No  results for input(s): POCTGLUCOSE in the last 24 hours.  Current correctional scale  Medium  Maintain anti-hyperglycemic dose as follows-   Antihyperglycemics (From admission, onward)    None        Hold Oral hypoglycemics while patient is in the hospital.    COPD (chronic obstructive pulmonary disease)  DuoNeb q6h PRN  Supplemental oxygenation    Lactic acidosis  Sec to severe sepsis.  Leading to high anion gap metabolic acidosis, bicarb 14 today.   S/p IV hydration.   Follow lactate.     8/1 - bicarb improved to 21.           Cellulitis of lower extremity  Known chronic wounds on BLE for a few months.   Lost follow up with home health wound care reporting insurance did not approve it.   Photographs appear better compared to ones in 6/2023.  Inflammatory markers elevated.   Started on broad spectrum antibiotics- vancomycin and Zosyn.   Wound culture with pseudomonas, morganella and providencia growth, blood culture with acinetobacter- discontinued vancomycin.   Switched to cefepime given acinetobacter in blood (7/31)- discussed with pharmacy.   General surgery consulted for wound debridement/source control.   Wound care consulted.    8/1 - All organisms in blood and wound covered by cefipime. Pseudomonas in wound. Consider adding gent for double coverage.      8/2 - Added gent yesterday.  Continue cefepime.         CHANDNI (acute kidney injury)  Patient with acute kidney injury/acute renal failure likely due to pre-renal azotemia due to dehydration   CHANDNI is currently improving.   Baseline creatinine 1.25 about one month ago   Labs reviewed- Renal function/electrolytes with Estimated Creatinine Clearance: 78.7 mL/min (based on SCr of 0.85 mg/dL). according to latest data.   Monitor urine output and serial BMP and adjust therapy as needed.  Avoid nephrotoxins and renally dose meds for GFR listed above.    8/1 - Renal function improved. Considering gent addition.       Essential hypertension  On Toprol but also on  midodrine.  Suspect Toprol is for HFrEF and midodrine is to avoid hypotension.      Chronic HFrEF (heart failure with reduced ejection fraction)  Last recorded Echo in 2019 showed an EF of 40%  Not in acute exacerbation.  Resume home Toprol, not on ACEI or ARB at home- unclear reason but suspect may be due to borderline low BP and taking midodrine at baseline.         VTE Risk Mitigation (From admission, onward)         Ordered     heparin (porcine) injection 5,000 Units  Every 8 hours         07/29/23 1052                Discharge Planning   MAKENNA:      Code Status: Full Code   Is the patient medically ready for discharge?: No    Reason for patient still in hospital (select all that apply): Treatment  Discharge Plan A: Skilled Nursing Facility                  Rehmat ELENA Cabrera MD  Department of Hospital Medicine   [facility] - Short Stay Unit

## 2023-08-14 NOTE — PT/OT/SLP PROGRESS
"Physical Therapy      Patient Name:  Esthela Contreras   MRN:  [id number]    Patient not seen today secondary to Patient unwilling to participate. pt declined all offers of PT stating, "I got Covid", "I don't feel like it", "you gonna have to wait another day". Will follow-up next treatment date.    "

## 2023-08-14 NOTE — PLAN OF CARE
Chart reviewed, pt tested positive for covid on 8/13. Pt symptomatic for fevers only. Pt with no respiratory symptoms as of now. Pt does not qualify for Remdisivir. SW will cont to follow.

## 2023-08-14 NOTE — PLAN OF CARE
Problem: Adult Inpatient Plan of Care  Goal: Plan of Care Review  Outcome: Ongoing, Progressing  Goal: Patient-Specific Goal (Individualized)  Outcome: Ongoing, Progressing  Goal: Optimal Comfort and Wellbeing  Outcome: Ongoing, Progressing  Goal: Readiness for Transition of Care  Outcome: Ongoing, Progressing     Problem: Diabetes Comorbidity  Goal: Blood Glucose Level Within Targeted Range  Outcome: Ongoing, Progressing     Problem: Bleeding (Sepsis/Septic Shock)  Goal: Absence of Bleeding  Outcome: Ongoing, Progressing     Problem: Glycemic Control Impaired (Sepsis/Septic Shock)  Goal: Blood Glucose Level Within Desired Range  Outcome: Ongoing, Progressing     Problem: Infection Progression (Sepsis/Septic Shock)  Goal: Absence of Infection Signs and Symptoms  Outcome: Ongoing, Progressing     Problem: Nutrition Impaired (Sepsis/Septic Shock)  Goal: Optimal Nutrition Intake  Outcome: Ongoing, Progressing     Problem: Fluid and Electrolyte Imbalance (Acute Kidney Injury/Impairment)  Goal: Fluid and Electrolyte Balance  Outcome: Ongoing, Progressing     Problem: Oral Intake Inadequate (Acute Kidney Injury/Impairment)  Goal: Optimal Nutrition Intake  Outcome: Ongoing, Progressing     Problem: Renal Function Impairment (Acute Kidney Injury/Impairment)  Goal: Effective Renal Function  Outcome: Ongoing, Progressing     Problem: Impaired Wound Healing  Goal: Optimal Wound Healing  Outcome: Ongoing, Progressing     Problem: Skin Injury Risk Increased  Goal: Skin Health and Integrity  Outcome: Ongoing, Progressing     Problem: Infection  Goal: Absence of Infection Signs and Symptoms  Outcome: Ongoing, Progressing

## 2023-08-14 NOTE — ASSESSMENT & PLAN NOTE
Tested + on 8/13-symtpomatic for fevers only, no respiratory symtpoms for now therefore doesn't qualify for remdisivir.

## 2023-08-15 PROCEDURE — 25000003 PHARM REV CODE 250: Performed by: FAMILY MEDICINE

## 2023-08-15 PROCEDURE — 25000003 PHARM REV CODE 250: Performed by: STUDENT IN AN ORGANIZED HEALTH CARE EDUCATION/TRAINING PROGRAM

## 2023-08-15 PROCEDURE — 94761 N-INVAS EAR/PLS OXIMETRY MLT: CPT

## 2023-08-15 PROCEDURE — 25000003 PHARM REV CODE 250

## 2023-08-15 PROCEDURE — 11000001 HC ACUTE MED/SURG PRIVATE ROOM

## 2023-08-15 PROCEDURE — 99233 PR SUBSEQUENT HOSPITAL CARE,LEVL III: ICD-10-PCS | Mod: ,,, | Performed by: STUDENT IN AN ORGANIZED HEALTH CARE EDUCATION/TRAINING PROGRAM

## 2023-08-15 PROCEDURE — 99233 SBSQ HOSP IP/OBS HIGH 50: CPT | Mod: ,,, | Performed by: STUDENT IN AN ORGANIZED HEALTH CARE EDUCATION/TRAINING PROGRAM

## 2023-08-15 PROCEDURE — 25000003 PHARM REV CODE 250: Performed by: HOSPITALIST

## 2023-08-15 RX ADMIN — MIRTAZAPINE 15 MG: 7.5 TABLET, FILM COATED ORAL at 08:08

## 2023-08-15 RX ADMIN — Medication: at 08:08

## 2023-08-15 RX ADMIN — Medication 1 TABLET: at 09:08

## 2023-08-15 RX ADMIN — Medication: at 09:08

## 2023-08-15 RX ADMIN — POLYETHYLENE GLYCOL 3350 17 G: 17 POWDER, FOR SOLUTION ORAL at 09:08

## 2023-08-15 RX ADMIN — MUPIROCIN: 20 OINTMENT TOPICAL at 09:08

## 2023-08-15 RX ADMIN — SODIUM CHLORIDE 500 ML: 9 INJECTION, SOLUTION INTRAVENOUS at 06:08

## 2023-08-15 RX ADMIN — ASPIRIN 81 MG: 81 TABLET, COATED ORAL at 09:08

## 2023-08-15 RX ADMIN — ACETAMINOPHEN 1000 MG: 500 TABLET ORAL at 08:08

## 2023-08-15 RX ADMIN — MUPIROCIN: 20 OINTMENT TOPICAL at 08:08

## 2023-08-15 RX ADMIN — ATORVASTATIN CALCIUM 40 MG: 40 TABLET, FILM COATED ORAL at 09:08

## 2023-08-15 NOTE — ASSESSMENT & PLAN NOTE
>>ASSESSMENT AND PLAN FOR SEPSIS WRITTEN ON 8/15/2023  4:40 PM BY ASUNCION CRANDALL, DO    This patient does have evidence of infective focus  My overall impression is sepsis.  Source: Skin and Soft Tissue (location bilateral lower extremity cellulitis)  Source control achieved by: Antibiotics as mentioned below.   Cultures as below      8/3 - Blood cx x2 positive for acinetobacter. Repeat cx on 7/31 NTD.  Plan 14 days tx (cefepime)  from 7/31 if they remain negative. Also on Gent to double cover pseudomonas in wounds.       Completed abx, needs continued wound care

## 2023-08-15 NOTE — ASSESSMENT & PLAN NOTE
Last recorded Echo in 2019 showed an EF of 40%  Not in acute exacerbation.  Resume home Toprol, not on ACEI or ARB at home- unclear reason but suspect may be due to borderline low BP and taking midodrine at baseline.     stable

## 2023-08-15 NOTE — ASSESSMENT & PLAN NOTE
Known chronic wounds on BLE for a few months.   Lost follow up with home health wound care reporting insurance did not approve it.   Photographs appear better compared to ones in 6/2023.  Inflammatory markers elevated.   Started on broad spectrum antibiotics- vancomycin and Zosyn.   Wound culture with pseudomonas, morganella and providencia growth, blood culture with acinetobacter- discontinued vancomycin.   Switched to cefepime given acinetobacter in blood (7/31)- discussed with pharmacy.   General surgery consulted for wound debridement/source control.   Wound care consulted.    8/1 - All organisms in blood and wound covered by cefipime. Pseudomonas in wound. Consider adding gent for double coverage.      8/2 - Added gent yesterday.  Continue cefepime.     Completed abx

## 2023-08-15 NOTE — PT/OT/SLP PROGRESS
"Physical Therapy      Patient Name:  Esthela Contreras   MRN:  [id number]    Patient not seen today secondary to Patient unwilling to participate. pt declined all offers of PT, stating, "see, that's what I'm talking 'bout, I told you I didn't feel like it". pt declined offers to be repositioned in bed. Will follow-up next treatment date.    "

## 2023-08-15 NOTE — ASSESSMENT & PLAN NOTE
Nutrition consulted. Most recent weight and BMI monitored-     Measurements:  Wt Readings from Last 1 Encounters:   08/01/23 77.4 kg (170 lb 9.6 oz)   Body mass index is 24.48 kg/m².    Patient has been screened and assessed by RD.    Malnutrition Type:  Context: chronic illness  Level: severe    Malnutrition Characteristic Summary:  Weight Loss (Malnutrition): greater than 5% in 1 month  Energy Intake (Malnutrition): less than 75% for greater than or equal to 1 month  Subcutaneous Fat (Malnutrition): severe depletion  Muscle Mass (Malnutrition): severe depletion    Interventions/Recommendations (treatment strategy):  Recommend continue current diet as able/appropriate and tolerated. Also recommend addition of Ensure Max Protein TID and Abel BID to improve kcal/protein intakes and aid in wound healing. Also recommend consider addition of 500mg Vitamin C and 220mg ZnSO4 BID to aid in wound healing.

## 2023-08-15 NOTE — PT/OT/SLP PROGRESS
"Occupational Therapy      Patient Name:  Esthela Contreras   MRN:  [id number]    Patient not seen today secondary to  (secondary to not feeling well. " I will do some ex's when I get better most likely in a couple of days,). Will follow-up on next treatment day.    8/15/2023  "

## 2023-08-15 NOTE — ASSESSMENT & PLAN NOTE
Sec to severe sepsis.  Leading to high anion gap metabolic acidosis, bicarb 14 today.   S/p IV hydration.   Follow lactate.     8/1 - bicarb improved to 21.       resolved

## 2023-08-15 NOTE — PROGRESS NOTES
[facility] Short Stay [facility] Medicine  Progress Note    Patient Name: Esthela Contreras  MRN: [id number]  Patient Class: IP- Inpatient   Admission Date: 7/27/2023  Length of Stay: 18 days  Attending Physician: Beck Earl DO  Primary Care Provider: Yolande Spring FNP        Subjective:     Principal Problem:Severe sepsis        HPI:  Patient is a 73 yo male who presents to the hospital accompanied by his family with a complaint of wounds to his bilateral lower extremities. Patient is a poor historian, and history was provided by his daughter at the emergency department. Patient has a history of chronic wounds and was admitted to the hospital on 6/02/2023 of severe sepsis secondary to cellulitis of lower extremities and new onset of atrial fibrillation. He was subsequently discharged to [facility] for wound care.  His daughter reported that wounds were healing, and the patient was discharged home under the care of [facility] for continued care. However, the patient did not get any service from the  [facility], which culminated in the worsening of his wounds over the past few days. This is associated with pain and edema, but the patient denies any associated fever, chills, chest pain, shortness of breath, nausea and vomiting.     In the ED, the patient met sepsis criteria on arrival and was thus treated per sepsis protocol through IV hydration, blood culture collection before starting broad spectrum antibiotics. Ensuring work up was significant for WBC 27.6, H&H 13.5/41.9 with history of anemia and baseline H&H 10.2/33.6 on month ago, dehydration with hyponatremia with a sodium of 129, acute renal injury with a BUN/CR 67/2.44 and GFR 27 with a baseline BUN/CR 16/1.25 and GFR 92 one month ago. Anion Gap metabolic acidosis with a GAP of 19.  Initial Troponin of 153.9 with history of chronically elevated Troponin, and NTpBNP of 4,406 with a baseline of 20.7K about three weeks ago. Lactic  acid of 3.0. Patient will be admitted for further evaluation and management.            Overview/Hospital Course:  7/28: Worsening leukocytosis noted. Blood cultures 2/2 sets growing GNB, wound culture with GNB.     7/30: Wound cultures with morganella, providencia and pseudomonas. General surgery consulted.     7/31: Blood cultures returned with acinetobacter, antibiotic changed to cefepime per consultation with pharmacy. Awaiting general surgery's input regarding surgery    8/15- not participating with therapy, unfortunately we have no options at this time except to discharge home. He will do poorly at home.   Chart reviewed    Interval History: naeo    Review of Systems   Constitutional:  Negative for chills, fatigue, fever and unexpected weight change.   HENT:  Negative for congestion, mouth sores and sore throat.    Eyes:  Negative for photophobia and visual disturbance.   Respiratory:  Positive for cough. Negative for chest tightness, shortness of breath and wheezing.    Cardiovascular:  Negative for chest pain, palpitations and leg swelling.   Gastrointestinal:  Negative for abdominal pain, diarrhea, nausea and vomiting.   Endocrine: Negative for cold intolerance and heat intolerance.   Genitourinary:  Negative for difficulty urinating, dysuria, frequency and urgency.   Musculoskeletal:  Negative for arthralgias, back pain and myalgias.   Skin:  Negative for pallor and rash.   Neurological:  Positive for dizziness. Negative for tremors, seizures, syncope, weakness, numbness and headaches.   Hematological:  Does not bruise/bleed easily.   Psychiatric/Behavioral:  Negative for agitation, confusion, hallucinations and suicidal ideas.      Objective:     Vital Signs (Most Recent):  Temp: 98.3 °F (36.8 °C) (08/15/23 1634)  Pulse: 95 (08/15/23 1634)  Resp: 19 (08/15/23 1634)  BP: 111/72 (08/15/23 1634)  SpO2: 95 % (08/15/23 1634) Vital Signs (24h Range):  Temp:  [97.8 °F (36.6 °C)-98.9 °F (37.2 °C)] 98.3 °F (36.8  °C)  Pulse:  [85-96] 95  Resp:  [16-19] 19  SpO2:  [92 %-99 %] 95 %  BP: (105-111)/(65-72) 111/72     Weight: 77.4 kg (170 lb 9.6 oz)  Body mass index is 24.48 kg/m².    Intake/Output Summary (Last 24 hours) at 8/15/2023 1638  Last data filed at 8/15/2023 1336  Gross per 24 hour   Intake 597 ml   Output --   Net 597 ml         Physical Exam  Vitals and nursing note reviewed. Exam conducted with a chaperone present.   Constitutional:       General: He is not in acute distress.     Appearance: He is not ill-appearing or toxic-appearing.   HENT:      Head: Normocephalic and atraumatic.      Nose: Nose normal.      Mouth/Throat:      Mouth: Mucous membranes are moist.   Eyes:      Extraocular Movements: Extraocular movements intact.   Cardiovascular:      Rate and Rhythm: Normal rate and regular rhythm.      Pulses: Normal pulses.      Heart sounds: Normal heart sounds.   Pulmonary:      Effort: Pulmonary effort is normal.      Breath sounds: Normal breath sounds.   Abdominal:      General: Bowel sounds are normal. There is no distension.      Palpations: Abdomen is soft.      Tenderness: There is no abdominal tenderness.   Musculoskeletal:         General: Tenderness present.      Cervical back: Normal range of motion.   Skin:     General: Skin is warm.      Findings: Erythema and lesion present.   Neurological:      General: No focal deficit present.      Mental Status: He is alert and oriented to person, place, and time. Mental status is at baseline.   Psychiatric:         Mood and Affect: Mood normal.         Behavior: Behavior normal.             Significant Labs: All pertinent labs within the past 24 hours have been reviewed.    Significant Imaging: I have reviewed all pertinent imaging results/findings within the past 24 hours.      Assessment/Plan:      * Severe sepsis  This patient does have evidence of infective focus  My overall impression is sepsis.  Source: Skin and Soft Tissue (location bilateral lower  extremity cellulitis)  Source control achieved by: Antibiotics as mentioned below.   Cultures as below      8/3 - Blood cx x2 positive for acinetobacter. Repeat cx on 7/31 NTD.  Plan 14 days tx (cefepime)  from 7/31 if they remain negative. Also on Gent to double cover pseudomonas in wounds.       Completed abx, needs continued wound care    COVID  Tested + on 8/13-symtpomatic for fevers only, no respiratory symtpoms for now therefore doesn't qualify for remdisivir.     Multiple and open wound of lower limb, complicated  Management as above.   Wound care, hh    Gram-negative bacteremia  2 out of 2 sets on admission positive for acinetobacter.  Repeat cultures ordered for clearance (7/31).   Plan as above.     Completed abx    Severe protein-calorie malnutrition  Nutrition consulted. Most recent weight and BMI monitored-     Measurements:  Wt Readings from Last 1 Encounters:   08/01/23 77.4 kg (170 lb 9.6 oz)   Body mass index is 24.48 kg/m².    Patient has been screened and assessed by RD.    Malnutrition Type:  Context: chronic illness  Level: severe    Malnutrition Characteristic Summary:  Weight Loss (Malnutrition): greater than 5% in 1 month  Energy Intake (Malnutrition): less than 75% for greater than or equal to 1 month  Subcutaneous Fat (Malnutrition): severe depletion  Muscle Mass (Malnutrition): severe depletion    Interventions/Recommendations (treatment strategy):  Recommend continue current diet as able/appropriate and tolerated. Also recommend addition of Ensure Max Protein TID and Abel BID to improve kcal/protein intakes and aid in wound healing. Also recommend consider addition of 500mg Vitamin C and 220mg ZnSO4 BID to aid in wound healing.    Type 2 MI (myocardial infarction)  Troponin elevation but trend is not significant.  Denies chest pain.  No EKG done on admission in ED.   Monitor for chest pain.           Hyperlipemia  Continue home statin    A-fib  Patient with Persistent (7 days or more)  atrial fibrillation which is controlled currently with Beta Blocker.   Patient is currently in atrial fibrillation.  [id number] Score: 3. HASBLED Score: 2.   Anticoagulation on hold for surgical debridement. Resume tomorrow if no further debridement likely.     8/3 - Will resume anticoagulation.     Depression  Patient has persistent depression which is moderate and is currently controlled.   Will Continue anti-depressant medication- Remeron.   We will not consult psychiatry at this time.   Patient does not display psychosis at this time.   Continue to monitor closely and adjust plan of care as needed.        Type 2 diabetes mellitus  Patient's FSGs are controlled on current medication regimen.  Last A1c reviewed-   Lab Results   Component Value Date    HGBA1C 5.5 06/02/2023     Most recent fingerstick glucose reviewed- No results for input(s): POCTGLUCOSE in the last 24 hours.  Current correctional scale  Medium  Maintain anti-hyperglycemic dose as follows-   Antihyperglycemics (From admission, onward)    None        Hold Oral hypoglycemics while patient is in the hospital.    COPD (chronic obstructive pulmonary disease)  DuoNeb q6h PRN  Supplemental oxygenation    Lactic acidosis  Sec to severe sepsis.  Leading to high anion gap metabolic acidosis, bicarb 14 today.   S/p IV hydration.   Follow lactate.     8/1 - bicarb improved to 21.       resolved    Cellulitis of lower extremity  Known chronic wounds on BLE for a few months.   Lost follow up with home health wound care reporting insurance did not approve it.   Photographs appear better compared to ones in 6/2023.  Inflammatory markers elevated.   Started on broad spectrum antibiotics- vancomycin and Zosyn.   Wound culture with pseudomonas, morganella and providencia growth, blood culture with acinetobacter- discontinued vancomycin.   Switched to cefepime given acinetobacter in blood (7/31)- discussed with pharmacy.   General surgery consulted for wound  debridement/source control.   Wound care consulted.    8/1 - All organisms in blood and wound covered by cefipime. Pseudomonas in wound. Consider adding gent for double coverage.      8/2 - Added gent yesterday.  Continue cefepime.     Completed abx    CHANDNI (acute kidney injury)  Patient with acute kidney injury/acute renal failure likely due to pre-renal azotemia due to dehydration   CHANDNI is currently improving.   Baseline creatinine 1.25 about one month ago   Labs reviewed- Renal function/electrolytes with Estimated Creatinine Clearance: 49.9 mL/min (A) (based on SCr of 1.34 mg/dL (H)). according to latest data.   Monitor urine output and serial BMP and adjust therapy as needed.  Avoid nephrotoxins and renally dose meds for GFR listed above.    8/1 - Renal function improved. Considering gent addition.       Essential hypertension  On Toprol but also on midodrine.  Suspect Toprol is for HFrEF and midodrine is to avoid hypotension.      Chronic HFrEF (heart failure with reduced ejection fraction)  Last recorded Echo in 2019 showed an EF of 40%  Not in acute exacerbation.  Resume home Toprol, not on ACEI or ARB at home- unclear reason but suspect may be due to borderline low BP and taking midodrine at baseline.     stable      VTE Risk Mitigation (From admission, onward)         Ordered     heparin (porcine) injection 5,000 Units  Every 8 hours         07/29/23 1052                Discharge Planning   MAKENNA:      Code Status: Full Code   Is the patient medically ready for discharge?: No    Reason for patient still in hospital (select all that apply): Treatment  Discharge Plan A: Skilled Nursing Facility                  Beck Earl DO  Department of Hospital Medicine   [facility] - Short Stay Unit

## 2023-08-15 NOTE — ASSESSMENT & PLAN NOTE
>>ASSESSMENT AND PLAN FOR CELLULITIS OF LOWER EXTREMITY WRITTEN ON 8/15/2023  4:40 PM BY ASUNCION CRANDALL, DO    Known chronic wounds on BLE for a few months.   Lost follow up with home health wound care reporting insurance did not approve it.   Photographs appear better compared to ones in 6/2023.  Inflammatory markers elevated.   Started on broad spectrum antibiotics- vancomycin and Zosyn.   Wound culture with pseudomonas, morganella and providencia growth, blood culture with acinetobacter- discontinued vancomycin.   Switched to cefepime given acinetobacter in blood (7/31)- discussed with pharmacy.   General surgery consulted for wound debridement/source control.   Wound care consulted.    8/1 - All organisms in blood and wound covered by cefipime. Pseudomonas in wound. Consider adding gent for double coverage.      8/2 - Added gent yesterday.  Continue cefepime.     Completed abx

## 2023-08-15 NOTE — ASSESSMENT & PLAN NOTE
Patient with Persistent (7 days or more) atrial fibrillation which is controlled currently with Beta Blocker.   Patient is currently in atrial fibrillation.  [id number] Score: 3. HASBLED Score: 2.   Anticoagulation on hold for surgical debridement. Resume tomorrow if no further debridement likely.     8/3 - Will resume anticoagulation.

## 2023-08-15 NOTE — ASSESSMENT & PLAN NOTE
This patient does have evidence of infective focus  My overall impression is sepsis.  Source: Skin and Soft Tissue (location bilateral lower extremity cellulitis)  Source control achieved by: Antibiotics as mentioned below.   Cultures as below      8/3 - Blood cx x2 positive for acinetobacter. Repeat cx on 7/31 NTD.  Plan 14 days tx (cefepime)  from 7/31 if they remain negative. Also on Gent to double cover pseudomonas in wounds.       Completed abx, needs continued wound care

## 2023-08-15 NOTE — ASSESSMENT & PLAN NOTE
Patient with acute kidney injury/acute renal failure likely due to pre-renal azotemia due to dehydration   CHANDNI is currently improving.   Baseline creatinine 1.25 about one month ago   Labs reviewed- Renal function/electrolytes with Estimated Creatinine Clearance: 49.9 mL/min (A) (based on SCr of 1.34 mg/dL (H)). according to latest data.   Monitor urine output and serial BMP and adjust therapy as needed.  Avoid nephrotoxins and renally dose meds for GFR listed above.    8/1 - Renal function improved. Considering gent addition.

## 2023-08-15 NOTE — SUBJECTIVE & OBJECTIVE
Interval History: naeo    Review of Systems   Constitutional:  Negative for chills, fatigue, fever and unexpected weight change.   HENT:  Negative for congestion, mouth sores and sore throat.    Eyes:  Negative for photophobia and visual disturbance.   Respiratory:  Positive for cough. Negative for chest tightness, shortness of breath and wheezing.    Cardiovascular:  Negative for chest pain, palpitations and leg swelling.   Gastrointestinal:  Negative for abdominal pain, diarrhea, nausea and vomiting.   Endocrine: Negative for cold intolerance and heat intolerance.   Genitourinary:  Negative for difficulty urinating, dysuria, frequency and urgency.   Musculoskeletal:  Negative for arthralgias, back pain and myalgias.   Skin:  Negative for pallor and rash.   Neurological:  Positive for dizziness. Negative for tremors, seizures, syncope, weakness, numbness and headaches.   Hematological:  Does not bruise/bleed easily.   Psychiatric/Behavioral:  Negative for agitation, confusion, hallucinations and suicidal ideas.      Objective:     Vital Signs (Most Recent):  Temp: 98.3 °F (36.8 °C) (08/15/23 1634)  Pulse: 95 (08/15/23 1634)  Resp: 19 (08/15/23 1634)  BP: 111/72 (08/15/23 1634)  SpO2: 95 % (08/15/23 1634) Vital Signs (24h Range):  Temp:  [97.8 °F (36.6 °C)-98.9 °F (37.2 °C)] 98.3 °F (36.8 °C)  Pulse:  [85-96] 95  Resp:  [16-19] 19  SpO2:  [92 %-99 %] 95 %  BP: (105-111)/(65-72) 111/72     Weight: 77.4 kg (170 lb 9.6 oz)  Body mass index is 24.48 kg/m².    Intake/Output Summary (Last 24 hours) at 8/15/2023 1638  Last data filed at 8/15/2023 1336  Gross per 24 hour   Intake 597 ml   Output --   Net 597 ml         Physical Exam  Vitals and nursing note reviewed. Exam conducted with a chaperone present.   Constitutional:       General: He is not in acute distress.     Appearance: He is not ill-appearing or toxic-appearing.   HENT:      Head: Normocephalic and atraumatic.      Nose: Nose normal.      Mouth/Throat:       Mouth: Mucous membranes are moist.   Eyes:      Extraocular Movements: Extraocular movements intact.   Cardiovascular:      Rate and Rhythm: Normal rate and regular rhythm.      Pulses: Normal pulses.      Heart sounds: Normal heart sounds.   Pulmonary:      Effort: Pulmonary effort is normal.      Breath sounds: Normal breath sounds.   Abdominal:      General: Bowel sounds are normal. There is no distension.      Palpations: Abdomen is soft.      Tenderness: There is no abdominal tenderness.   Musculoskeletal:         General: Tenderness present.      Cervical back: Normal range of motion.   Skin:     General: Skin is warm.      Findings: Erythema and lesion present.   Neurological:      General: No focal deficit present.      Mental Status: He is alert and oriented to person, place, and time. Mental status is at baseline.   Psychiatric:         Mood and Affect: Mood normal.         Behavior: Behavior normal.             Significant Labs: All pertinent labs within the past 24 hours have been reviewed.    Significant Imaging: I have reviewed all pertinent imaging results/findings within the past 24 hours.

## 2023-08-15 NOTE — ASSESSMENT & PLAN NOTE
2 out of 2 sets on admission positive for acinetobacter.  Repeat cultures ordered for clearance (7/31).   Plan as above.     Completed abx

## 2023-08-16 PROCEDURE — 25000003 PHARM REV CODE 250: Performed by: FAMILY MEDICINE

## 2023-08-16 PROCEDURE — 11000001 HC ACUTE MED/SURG PRIVATE ROOM

## 2023-08-16 PROCEDURE — 99239 HOSP IP/OBS DSCHRG MGMT >30: CPT | Mod: ,,, | Performed by: STUDENT IN AN ORGANIZED HEALTH CARE EDUCATION/TRAINING PROGRAM

## 2023-08-16 PROCEDURE — 1111F PR DISCHARGE MEDS RECONCILED W/ CURRENT OUTPATIENT MED LIST: ICD-10-PCS | Mod: CPTII,,, | Performed by: STUDENT IN AN ORGANIZED HEALTH CARE EDUCATION/TRAINING PROGRAM

## 2023-08-16 PROCEDURE — 25000003 PHARM REV CODE 250

## 2023-08-16 PROCEDURE — 1111F DSCHRG MED/CURRENT MED MERGE: CPT | Mod: CPTII,,, | Performed by: STUDENT IN AN ORGANIZED HEALTH CARE EDUCATION/TRAINING PROGRAM

## 2023-08-16 PROCEDURE — 99239 PR HOSPITAL DISCHARGE DAY,>30 MIN: ICD-10-PCS | Mod: ,,, | Performed by: STUDENT IN AN ORGANIZED HEALTH CARE EDUCATION/TRAINING PROGRAM

## 2023-08-16 RX ORDER — TIOTROPIUM BROMIDE INHALATION SPRAY 3.12 UG/1
2 SPRAY, METERED RESPIRATORY (INHALATION) DAILY
Qty: 4 G | Status: SHIPPED | OUTPATIENT
Start: 2023-08-16

## 2023-08-16 RX ORDER — FLUTICASONE PROPIONATE AND SALMETEROL XINAFOATE 230; 21 UG/1; UG/1
2 AEROSOL, METERED RESPIRATORY (INHALATION) 2 TIMES DAILY
Qty: 12 G | Refills: 1 | Status: SHIPPED | OUTPATIENT
Start: 2023-08-16 | End: 2024-08-15

## 2023-08-16 RX ORDER — POLYETHYLENE GLYCOL 3350 17 G/17G
17 POWDER, FOR SOLUTION ORAL DAILY
Qty: 100 EACH | Refills: 0 | Status: SHIPPED | OUTPATIENT
Start: 2023-08-17

## 2023-08-16 RX ADMIN — ATORVASTATIN CALCIUM 40 MG: 40 TABLET, FILM COATED ORAL at 09:08

## 2023-08-16 RX ADMIN — MUPIROCIN: 20 OINTMENT TOPICAL at 09:08

## 2023-08-16 RX ADMIN — POLYETHYLENE GLYCOL 3350 17 G: 17 POWDER, FOR SOLUTION ORAL at 09:08

## 2023-08-16 RX ADMIN — Medication: at 09:08

## 2023-08-16 RX ADMIN — ASPIRIN 81 MG: 81 TABLET, COATED ORAL at 09:08

## 2023-08-16 RX ADMIN — Medication 1 TABLET: at 09:08

## 2023-08-16 NOTE — PLAN OF CARE
[facility] Short Stay Unit  Discharge Final Note    Primary Care Provider: Yolande Spring FNP    Expected Discharge Date: 8/16/2023    Final Discharge Note (most recent)       Final Note - 08/16/23 1141          Final Note    Assessment Type Final Discharge Note     Anticipated Discharge Disposition Home-Health Care Sv   [facility]    What phone number can be called within the next 1-3 days to see how you are doing after discharge? [phone]        Post-Acute Status    Post-Acute Authorization Home Health     Home Health Status Set-up Complete/Auth obtained     Patient choice form signed by patient/caregiver List with quality metrics by geographic area provided;List from CMS Compare     Discharge Delays None known at this time                     Important Message from Medicare  Important Message from Medicare regarding Discharge Appeal Rights: Explained to patient/caregiver     Date IMM was signed: 08/14/23  Time IMM was signed: 1538     Follow-up providers       Smitha Marsh FNP   Specialty: General Surgery, Wound Care    [address]  [facility] [address]   Phone: [phone]       Next Steps: Schedule an appointment as soon as possible for a visit in 3 day(s)    Yolande Spring FNP   Specialty: Family Medicine   Relationship: PCP - General    [address]  [facility]  [facility] [address]   Phone: [phone]       Next Steps: Schedule an appointment as soon as possible for a visit in 3 day(s)              After-discharge care                [facility]       [facility]   Service: [facility] Services    [address]  [facility] [address]   Phone: [phone]                             LITZY consulted for . Pt already current with [facility]. Pt to dc home today. Pt declined referral to be sent to [facility] or any other facility. Pt states he will dc home. LITZY faxed dc orders to [facility]. No other needs.

## 2023-08-16 NOTE — NURSING
"1723 spoke with Hilda, niece, via phone who states, "I dont understand why he came in without covid and the hospital is letting him leave with it." When explained pt is stable enough to go home and to seek emergency services if any new onset shortness of breath or chest pain, cg states she is calling corporate and hung up.    "

## 2023-08-16 NOTE — NURSING
Upon connection to the ipad, attempted virtual discharge teaching with patient. Patient immediately stated he was not going home, he was not going to pharmacy to get meds, or keep any follow up appts. Patient stated he was told he was positive for covid and he refuses to take it home to his grandchildren.I asked patient if he would like to talk to Dr Earl and Nurse Manager Ghanshyam Dunbar RN, Patient stated that noone is giving him any answers, I attempted to redirect the situation by going over discharge meds; in hopes that it would show the patient that the staff is infact trying to help patient, Patient stated he did not want to talk with me anymore. Advised patient that I would notify his nurse so she could arrange further conversations with the  and staff manager.  Notified Yareli Vigil RN of current conversation

## 2023-08-16 NOTE — NURSING
Pt became irritable when told he is being discharged. States he is worried he will get his family sick. First denies offer to contact Dr. Earl to discuss concerns. Later requesting, Dr. Earl made aware of worries. Discharge remains in process.

## 2023-08-16 NOTE — DISCHARGE SUMMARY
[facility] Short Stay [facility] Medicine  Discharge Summary      Patient Name: Esthela Contreras  MRN: [id number]  [facility]: [id number]  Patient Class: IP- Inpatient  Admission Date: 7/27/2023  Hospital Length of Stay: 19 days  Discharge Date and Time:  08/16/2023 10:27 AM  Attending Physician: Beck Earl DO   Discharging Provider: Beck Earl DO  Primary Care Provider: Yolande Spring FNP    Primary Care Team: Networked reference to record PCT     HPI:   Patient is a 73 yo male who presents to the hospital accompanied by his family with a complaint of wounds to his bilateral lower extremities. Patient is a poor historian, and history was provided by his daughter at the emergency department. Patient has a history of chronic wounds and was admitted to the hospital on 6/02/2023 of severe sepsis secondary to cellulitis of lower extremities and new onset of atrial fibrillation. He was subsequently discharged to [facility] for wound care.  His daughter reported that wounds were healing, and the patient was discharged home under the care of [facility] for continued care. However, the patient did not get any service from the  [facility], which culminated in the worsening of his wounds over the past few days. This is associated with pain and edema, but the patient denies any associated fever, chills, chest pain, shortness of breath, nausea and vomiting.     In the ED, the patient met sepsis criteria on arrival and was thus treated per sepsis protocol through IV hydration, blood culture collection before starting broad spectrum antibiotics. Ensuring work up was significant for WBC 27.6, H&H 13.5/41.9 with history of anemia and baseline H&H 10.2/33.6 on month ago, dehydration with hyponatremia with a sodium of 129, acute renal injury with a BUN/CR 67/2.44 and GFR 27 with a baseline BUN/CR 16/1.25 and GFR 92 one month ago. Anion Gap metabolic acidosis with a GAP of 19.  Initial Troponin of 153.9  with history of chronically elevated Troponin, and NTpBNP of 4,406 with a baseline of 20.7K about three weeks ago. Lactic acid of 3.0. Patient will be admitted for further evaluation and management.            Procedure(s) (LRB):  SELECTIVE DEBRIDEMENT, LOWER EXTREMITY (Bilateral)  BIOPSY, SOFT TISSUE (Right)      Hospital Course:   7/28: Worsening leukocytosis noted. Blood cultures 2/2 sets growing GNB, wound culture with GNB.     7/30: Wound cultures with morganella, providencia and pseudomonas. General surgery consulted.     7/31: Blood cultures returned with acinetobacter, antibiotic changed to cefepime per consultation with pharmacy. Awaiting general surgery's input regarding surgery    8/15- not participating with therapy, unfortunately we have no options at this time except to discharge home. He will do poorly at home.     8/16-refusing any rehab facility and becomes belligerent when attempts are made to find out why.  I emphasized to him how poorly he will do at home.  He refuses to go anywhere else.  I will order home health for wound care.  Schedule follow up in Wound care clinic. He will do poorly at home but I have no other options at this time.  He is medically stable for discharge       Goals of Care Treatment Preferences:  Code Status: Full Code      Consults:   Consults (From admission, onward)        Status Ordering Provider     Inpatient consult to Social Work  Once        Provider:  (Not yet assigned)    ASUNCION Sanderson     Inpatient consult to General Surgery  Once        Provider:  Mehreen Mo MD    Completed TIFFANIE VENTURA     IP consult to case management  Once        Provider:  (Not yet assigned)    Completed TIFFANIE VENTURA     Inpatient consult to Registered Dietitian/Nutritionist  Once        Provider:  (Not yet assigned)    Completed TIFFANIE VENTURA          Psychiatric  Depression  Patient has persistent depression which is moderate and is currently controlled.    Will Continue anti-depressant medication- Remeron.   We will not consult psychiatry at this time.   Patient does not display psychosis at this time.   Continue to monitor closely and adjust plan of care as needed.        Pulmonary  COPD (chronic obstructive pulmonary disease)  Not on oxygen at home  Spiriva, fluticasone-salmeterol  PCP follow up    Cardiac/Vascular  Type 2 MI (myocardial infarction)  2/2 sepsis          A-fib  Bb  oac    Essential hypertension  On Toprol but also on midodrine.  Suspect Toprol is for HFrEF and midodrine is to avoid hypotension.      Chronic HFrEF (heart failure with reduced ejection fraction)  Last recorded Echo in 2019 showed an EF of 40%  Not in acute exacerbation.  Resume home Toprol, not on ACEI or ARB at home- unclear reason but suspect may be due to borderline low BP and taking midodrine at baseline.     stable    Renal/  Lactic acidosis  Sec to severe sepsis.  Leading to high anion gap metabolic acidosis, bicarb 14 today.   S/p IV hydration.   Follow lactate.     8/1 - bicarb improved to 21.       resolved    CHANDNI (acute kidney injury)  resolved      ID  * Severe sepsis      Completed abx, needs continued wound care    COVID  asymptomatic    Gram-negative bacteremia  2 out of 2 sets on admission positive for acinetobacter.  Repeat cultures ordered for clearance (7/31).   Plan as above.     Completed abx    Cellulitis of lower extremity  Wound care OP  Completed abx    Endocrine  Severe protein-calorie malnutrition  hh    Orthopedic  Multiple and open wound of lower limb, complicated    Wound care,       Final Active Diagnoses:    Diagnosis Date Noted POA    PRINCIPAL PROBLEM:  Severe sepsis [A41.9, R65.20] 06/01/2023 Yes    COVID [U07.1] 08/14/2023 No    Multiple and open wound of lower limb, complicated [S81.809A] 07/30/2023 Yes    Gram-negative bacteremia [R78.81] 07/29/2023 Yes    A-fib [I48.91] 07/28/2023 Yes    Type 2 MI (myocardial infarction) [I21.A1]  07/28/2023 Yes    Severe protein-calorie malnutrition [E43] 07/28/2023 Unknown    Depression [F32.A] 06/14/2023 Yes    COPD (chronic obstructive pulmonary disease) [J44.9] 06/02/2023 Yes    Lactic acidosis [E87.20] 06/02/2023 Yes    Cellulitis of lower extremity [L03.119] 06/01/2023 Yes    CHANDNI (acute kidney injury) [N17.9] 06/01/2023 Yes    Chronic HFrEF (heart failure with reduced ejection fraction) [I50.22] 10/12/2021 Yes    Essential hypertension [I10] 10/12/2021 Yes      Problems Resolved During this Admission:    Diagnosis Date Noted Date Resolved POA    Dehydration with hyponatremia [E86.0, E87.1] 07/28/2023 08/01/2023 Yes    High anion gap metabolic acidosis [E87.29] 06/02/2023 07/28/2023 Yes       Discharged Condition: good    Disposition: [place]Health Care [facility] [place]    Follow Up:   Contact information for follow-up providers     Smitha Marsh FNP. Schedule an appointment as soon as possible for a visit in 3 day(s).    Specialties: General Surgery, Wound Care  Contact information:  [address] [facility] [address]  [phone]             Yolande Spring FNP. Schedule an appointment as soon as possible for a visit in 3 day(s).    Specialty: Family Medicine  Contact information:  [address] [place]  [facility]  [facility] [address]  [phone]                   Contact information for after-discharge care     Home Medical Care     [facility] .    Service: Home Health Services  Contact information:  [address] [facility] [address]  [phone]                             Patient Instructions:      ACCEPT - Ambulatory referral/consult to Cardiology   Standing Status: Future   Referral Priority: Routine Referral Type: Consultation   Referral Reason: Specialty Services Required   Requested Specialty: Cardiology   Number of Visits Requested: 1     Diet Cardiac     Activity as tolerated       Significant Diagnostic Studies: Labs: All labs within the past 24 hours have been  reviewed    Pending Diagnostic Studies:     Procedure Component Value Units Date/Time    EXTRA TUBES [[id number]] Collected: 07/29/23 1553    Order Status: Sent Lab Status: In process Updated: 07/29/23 1553    Specimen: Blood, Venous     Narrative:      The following orders were created for panel order EXTRA TUBES.  Procedure                               Abnormality         Status                     ---------                               -----------         ------                     Lavender Top Hold[[id number]]                                In process                   Please view results for these tests on the individual orders.         Medications:  Reconciled Home Medications:      Medication List      START taking these medications    fluticasone-salmeterol 230-21 mcg/dose 230-21 mcg/actuation Hfaa inhaler  Commonly known as: ADVAIR HFA  Inhale 2 puffs into the lungs 2 (two) times daily. Controller     ipratropium-albuteroL  mcg/actuation inhaler  Commonly known as: CombiVENT  Inhale 1 puff into the lungs every 6 (six) hours as needed for Wheezing. Rescue     polyethylene glycol 17 gram Pwpk  Commonly known as: GLYCOLAX  Take 17 g by mouth once daily.  Start taking on: August 17, 2023     SPIRIVA RESPIMAT 2.5 mcg/actuation inhaler  Generic drug: tiotropium bromide  Inhale 2 puffs into the lungs Daily. Controller        CONTINUE taking these medications    acetaminophen 325 MG tablet  Commonly known as: TYLENOL  Take 2 tablets (650 mg total) by mouth every 6 (six) hours as needed for Pain or Temperature greater than.     apixaban 5 mg Tab  Commonly known as: ELIQUIS  Take 1 tablet (5 mg total) by mouth 2 (two) times daily.     atorvastatin 40 MG tablet  Commonly known as: LIPITOR  Take 1 tablet (40 mg total) by mouth once daily.     metoprolol succinate 25 MG 24 hr tablet  Commonly known as: TOPROL-XL  Take 1 tablet (25 mg total) by mouth once daily.     midodrine 5 MG Tab  Commonly known as:  PROAMATINE  Take 1 tablet (5 mg total) by mouth 3 (three) times daily with meals.     mirtazapine 15 MG tablet  Commonly known as: REMERON  Take 1 tablet (15 mg total) by mouth every evening.        STOP taking these medications    ascorbic acid (vitamin C) 500 MG tablet  Commonly known as: VITAMIN C     aspirin 81 MG EC tablet  Commonly known as: ECOTRIN     multivitamin Tab     senna-docusate 8.6-50 mg 8.6-50 mg per tablet  Commonly known as: PERICOLACE            Indwelling Lines/Drains at time of discharge:   Lines/Drains/Airways     None                 Time spent on the discharge of patient: >30 minutes         Beck Earl DO  Department of Hospital Medicine  [facility] - Short Stay Unit

## 2023-08-16 NOTE — PT/OT/SLP PROGRESS
Physical Therapy      Patient Name:  Esthela Contreras   MRN:  [id number]    Patient not seen today secondary to pt. To be DC today and Patient unwilling to participate. Will follow-up tomorrow if pt. Remains hospitalized .

## 2023-08-16 NOTE — ASSESSMENT & PLAN NOTE
>>ASSESSMENT AND PLAN FOR SEPSIS WRITTEN ON 8/16/2023 10:27 AM BY ASUNCION CRANDALL DO        Completed abx, needs continued wound care

## 2023-08-17 PROCEDURE — 97110 THERAPEUTIC EXERCISES: CPT

## 2023-08-17 PROCEDURE — 11000001 HC ACUTE MED/SURG PRIVATE ROOM

## 2023-08-17 RX ADMIN — Medication: at 09:08

## 2023-08-17 RX ADMIN — MUPIROCIN: 20 OINTMENT TOPICAL at 09:08

## 2023-08-17 NOTE — PT/OT/SLP PROGRESS
Physical Therapy      Patient Name:  Esthela Contreras   MRN:  [id number]    Patient not seen today secondary to; states he has already done therapy/ Patient unwilling to participate. Will follow-up tomorrow.

## 2023-08-17 NOTE — NURSING
Spoke with Pt's family as they expressed concerns about the pt being discharged being that he is COVID +. Explained to the family that pt is refusing therapy and general care. They expressed the concern as to how he would get home because they had no way of getting him home and did not feel comfortable with the decision to discharge because of his COVID status. Explained to the family that arrangement of ambulance transport was an option. Family v/u.

## 2023-08-17 NOTE — PT/OT/SLP PROGRESS
Occupational Therapy   Treatment    Name: Esthela Contreras  MRN: [id number]  Admitting Diagnosis:  Severe sepsis  16 Days Post-Op    Recommendations:     Discharge Recommendations: home with home health, nursing facility, skilled  Discharge Equipment Recommendations:   (to be determined)  Barriers to discharge:  None    Assessment:     Esthela Contreras is a 72 y.o. male with a medical diagnosis of Severe sepsis.  He presents with no complaints.Pt agreed to OT treatment. Performance deficits affecting function are weakness, impaired endurance.     Rehab Prognosis:  Good; patient would benefit from acute skilled OT services to address these deficits and reach maximum level of function.       Plan:     Patient to be seen 5 x/week to address the above listed problems via self-care/home management, therapeutic activities, therapeutic exercises  Plan of Care Expires: 08/25/23  Plan of Care Reviewed with: patient    Subjective     Chief Complaint: Severe Sepsis, COVID  Patient/Family Comments/goals: To return home  Pain/Comfort:  Pain Rating 1: 0/10  Pain Rating Post-Intervention 1: 0/10    Objective:     Communicated with: ANAHI Lyons prior to session.  Patient found HOB elevated with PICC line upon OT entry to room.    General Precautions: Standard, fall    Orthopedic Precautions:N/A  Braces: N/A  Respiratory Status: Room air     Occupational Performance:     Bed Mobility:         Functional Mobility/Transfers:    Functional Mobility:     Activities of Daily Living:        [facility] 6 Click ADL:      Treatment & Education:  Pt performed uE strengthening exercises. 2 lb hand wt on (R) and 1 lb wt on (L) for shoulder flexion/extension and adduction/abduction. 2 lb hand wt x 2 sets of 15 reps (B) elbow and wrist flexion/extension.Hand exerciser x 3 bands x 50 reps (B).  Red theraband x 2 sets of 15 rep (B) elbow flexion and extension.     Pt participated well with exercises. Plan is to continue tx addressing goals.    Patient left  HOB elevated with all lines intact, call button in reach, and nurse notified    GOALS:   Multidisciplinary Problems       Occupational Therapy Goals          Problem: Occupational Therapy    Goal Priority Disciplines Outcome Interventions   Occupational Therapy Goal     OT, PT/OT Ongoing, Progressing    Description: STG:  Pt will perform grooming with setup  Pt will bathe with Anna with setup at EOB  Pt will perform UE dressing with Harshil  Pt will perform LE dressing with Anna with AD if needed  Pt will sit EOB x 10 min with SBA   Pt will transfer bed/chair/bsc with CGA with RW  Pt will perform standing task x 2 min with CGA with RW   Pt will tolerate 15 minutes of tx without fatigue      LT.Restore to max I with self care and mobility.                           Time Tracking:     OT Date of Treatment: 23  OT Start Time:   OT Stop Time: 1439  OT Total Time (min): 23 min    Billable Minutes:Therapeutic Exercise 20    OT/KO: OT          2023

## 2023-08-17 NOTE — PLAN OF CARE
Pt refused to dc home on yesterday due to being covid positive and family would not transport pt home on yesterday. MD iraheta, SW following.
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
## 2023-08-17 NOTE — PT/OT/SLP PROGRESS
Occupational Therapy      Patient Name:  Esthela Contreras   MRN:  [id number]    Patient not seen today secondary to pt is scheduled for d/c home.    8/17/2023

## 2023-08-18 VITALS
OXYGEN SATURATION: 99 % | WEIGHT: 170.63 LBS | DIASTOLIC BLOOD PRESSURE: 68 MMHG | RESPIRATION RATE: 18 BRPM | HEIGHT: 70 IN | HEART RATE: 88 BPM | TEMPERATURE: 98 F | BODY MASS INDEX: 24.43 KG/M2 | SYSTOLIC BLOOD PRESSURE: 121 MMHG

## 2023-08-18 DIAGNOSIS — U07.1 COVID-19 VIRUS DETECTED: ICD-10-CM

## 2023-08-18 PROCEDURE — 97110 THERAPEUTIC EXERCISES: CPT | Mod: CO

## 2023-08-18 NOTE — PLAN OF CARE
[facility] - Short Stay Unit  Discharge Final Note    Primary Care Provider: Yolande Spring FNP    Expected Discharge Date: 8/16/2023    Final Discharge Note (most recent)       Final Note - 08/18/23 1402          Final Note    Assessment Type Final Discharge Note     Anticipated Discharge Disposition Home-Health Care [facility]   [facility]    What phone number can be called within the next 1-3 days to see how you are doing after discharge? [phone]        Post-Acute Status    Post-Acute Authorization Home Health     Home Health Status Set-up Complete/Auth obtained     Patient choice form signed by patient/caregiver List with quality metrics by geographic area provided;List from CMS Compare     Discharge Delays None known at this time                     Important Message from Medicare  Important Message from Medicare regarding Discharge Appeal Rights: Explained to patient/caregiver     Date IMM was signed: 08/14/23  Time IMM was signed: 1538     Follow-up providers       Smitha Marsh FNP   Specialty: General Surgery, Wound Care    [address] [facility] [address]   Phone: [phone]       Next Steps: Go in 3 day(s)    Instructions: Schedule an appointment as soon as possible for a visit in 3 day(s);  August 30, 2023 at 9:00 am    Floridalma Rushing NP   Specialty: Internal Medicine    [address] [facility]  Internal Medicine [facility] [address]   Phone: [phone]       Next Steps: Go to    Instructions: Schedule an appointment as soon as possible for a visit in 3 day(s):Appt:   August 30, 2023 at 2:15 pm    Kathrin Dong MD   Specialty: Cardiology    [address] [facility] [address]   Phone: [phone]       Next Steps: Follow up    Instructions: Schedule an appointment as soon as possible for a visit S/P MI2 ,AFIB, CHF. Clinic nurse to call patient with appt.              After-discharge care                Home Medical Care       [facility]   Service: Home Health Services     [address] [place] [address]   Phone: [phone]                             Pt to dc home today. Pt with covid, did not want to sign I.M. SW faxed dc orders to [facility], no other needs.

## 2023-08-18 NOTE — PT/OT/SLP PROGRESS
Occupational Therapy   Treatment    Name: Esthela Contreras  MRN: [id number]  Admitting Diagnosis:  Severe sepsis  17 Days Post-Op    Recommendations:     Discharge Recommendations: home with home health  Discharge Equipment Recommendations:   (to be determined)  Barriers to discharge:       Assessment:     Esthela Contreras is a 72 y.o. male with a medical diagnosis of Severe sepsis. Performance deficits affecting function are weakness, impaired endurance.     Rehab Prognosis:  Good; patient would benefit from acute skilled OT services to address these deficits and reach maximum level of function.       Plan:     Patient to be seen 5 x/week to address the above listed problems via self-care/home management, therapeutic activities, therapeutic exercises  Plan of Care Expires: 08/25/23  Plan of Care Reviewed with: patient    Subjective     Chief Complaint:   Patient/Family Comments/goals:   Pain/Comfort:  Pain Rating 1: 0/10    Objective:     Communicated with: Susi Bryant RN prior to session.  Patient found HOB elevated with PICC line upon OT entry to room.    General Precautions: Standard, fall    Orthopedic Precautions:N/A  Braces: N/A  Respiratory Status: Room air     Occupational Performance:     Bed Mobility:         Functional Mobility/Transfers:    Functional Mobility:     Activities of Daily Living:        [facility] 6 Click ADL:      Treatment & Education:  Pt performed hand helper with 2 rubber band resistances 20 reps x 2, rom ex's with 2 lb wt 15 reps x 2 B elbow flex, 10 reps x 2 B shld flex,abd/add,wrist flex/ext, red t band 15 reps x 2 B elbow flex,abd/add     Patient left supine with all lines intact and call button in reach    GOALS:   Multidisciplinary Problems       Occupational Therapy Goals          Problem: Occupational Therapy    Goal Priority Disciplines Outcome Interventions   Occupational Therapy Goal     OT, PT/OT Ongoing, Progressing    Description: STG:  Pt will perform grooming with setup  Pt  will bathe with Anna with setup at EOB  Pt will perform UE dressing with Harshil  Pt will perform LE dressing with Anna with AD if needed  Pt will sit EOB x 10 min with SBA   Pt will transfer bed/chair/bsc with CGA with RW  Pt will perform standing task x 2 min with CGA with RW   Pt will tolerate 15 minutes of tx without fatigue      LT.Restore to max I with self care and mobility.                           Time Tracking:     OT Date of Treatment: 23  OT Start Time: 928  OT Stop Time: 949  OT Total Time (min): 21 min    Billable Minutes:Therapeutic Exercise 20    OT/KO: KO          2023

## 2023-08-21 ENCOUNTER — PATIENT OUTREACH (OUTPATIENT)
Dept: ADMINISTRATIVE | Facility: CLINIC | Age: 73
End: 2023-08-21

## 2023-08-21 NOTE — PROGRESS NOTES
C3 nurse attempted to contact Esthela TABBY Contreras  for a TCC post hospital discharge follow up call. Spoke with patients figueroa. Niece stated she will return call tomorrow when she is at patients home.   The patient has a scheduled HOSFU appointment with Floridalma Rushing NP  on 8/30/23 @ 215.

## 2023-08-22 NOTE — PROGRESS NOTES
C3 nurse attempted to contact caregiver Hilda Bagley for a TCC post hospital discharge follow up call. No answer. Left voicemail with callback information. The patient has a scheduled HOSFU appointment with Floridalma Rushing NP on 8/30/23 @ 215.

## 2023-10-12 NOTE — PT/OT/SLP PROGRESS
"Physical Therapy      Patient Name:  Esthela Contreras   MRN:  [id number]    Patient not seen today secondary to Patient unwilling to participate. Upon Therapist arrival to room patient stated, "they are about to come take my blood. I ain't going to do it today." After encouragement to participate and offering to return later patient stated "You can come back, but I'm just not going to do it today." Will follow-up 6/30/2023.    CAMRYN Acuna  6/29/2023    " Nsaids Pregnancy And Lactation Text: These medications are considered safe up to 30 weeks gestation. It is excreted in breast milk.

## 2023-11-20 PROBLEM — A41.9 SEVERE SEPSIS: Status: RESOLVED | Noted: 2023-06-01 | Resolved: 2023-11-20

## 2023-11-20 PROBLEM — N17.9 AKI (ACUTE KIDNEY INJURY): Status: RESOLVED | Noted: 2023-06-01 | Resolved: 2023-11-20

## 2023-11-20 PROBLEM — R65.20 SEVERE SEPSIS: Status: RESOLVED | Noted: 2023-06-01 | Resolved: 2023-11-20

## 2023-11-20 PROBLEM — I21.A1 TYPE 2 MI (MYOCARDIAL INFARCTION): Status: RESOLVED | Noted: 2023-07-28 | Resolved: 2023-11-20
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
## 2024-03-13 ENCOUNTER — HOSPITAL ENCOUNTER (INPATIENT)
Facility: HOSPITAL | Age: 74
LOS: 12 days | Discharge: HOME HEALTH | DRG: 871 | End: 2024-03-26
Attending: EMERGENCY MEDICINE | Admitting: FAMILY MEDICINE
Payer: MEDICARE

## 2024-03-13 DIAGNOSIS — R06.02 SHORTNESS OF BREATH: ICD-10-CM

## 2024-03-13 DIAGNOSIS — R07.9 CHEST PAIN: ICD-10-CM

## 2024-03-13 DIAGNOSIS — I26.99 PULMONARY EMBOLUS WITH INFARCTION: ICD-10-CM

## 2024-03-13 DIAGNOSIS — I47.29 NON-SUSTAINED VENTRICULAR TACHYCARDIA: ICD-10-CM

## 2024-03-13 DIAGNOSIS — I50.22 CHRONIC HFREF (HEART FAILURE WITH REDUCED EJECTION FRACTION): ICD-10-CM

## 2024-03-13 DIAGNOSIS — I48.91 ATRIAL FIBRILLATION, UNSPECIFIED TYPE: ICD-10-CM

## 2024-03-13 DIAGNOSIS — E11.9 TYPE 2 DIABETES MELLITUS WITHOUT COMPLICATION, UNSPECIFIED WHETHER LONG TERM INSULIN USE: ICD-10-CM

## 2024-03-13 DIAGNOSIS — N17.9 AKI (ACUTE KIDNEY INJURY): ICD-10-CM

## 2024-03-13 DIAGNOSIS — L03.119 CELLULITIS OF LOWER EXTREMITY, UNSPECIFIED LATERALITY: ICD-10-CM

## 2024-03-13 DIAGNOSIS — J90 LOCULATED PLEURAL EFFUSION: ICD-10-CM

## 2024-03-13 DIAGNOSIS — I50.9 CHF (CONGESTIVE HEART FAILURE): ICD-10-CM

## 2024-03-13 DIAGNOSIS — I25.10 CORONARY ARTERY DISEASE INVOLVING NATIVE CORONARY ARTERY OF NATIVE HEART WITHOUT ANGINA PECTORIS: ICD-10-CM

## 2024-03-13 DIAGNOSIS — L03.90 SEPSIS DUE TO CELLULITIS: ICD-10-CM

## 2024-03-13 DIAGNOSIS — J44.9 CHRONIC OBSTRUCTIVE PULMONARY DISEASE, UNSPECIFIED COPD TYPE: ICD-10-CM

## 2024-03-13 DIAGNOSIS — R06.02 SOB (SHORTNESS OF BREATH): ICD-10-CM

## 2024-03-13 DIAGNOSIS — A41.9 SEPSIS, DUE TO UNSPECIFIED ORGANISM, UNSPECIFIED WHETHER ACUTE ORGAN DYSFUNCTION PRESENT: Primary | ICD-10-CM

## 2024-03-13 DIAGNOSIS — A41.9 SEPSIS DUE TO CELLULITIS: ICD-10-CM

## 2024-03-13 LAB
ALBUMIN SERPL BCP-MCNC: 1.8 G/DL (ref 3.5–5)
ALBUMIN/GLOB SERPL: 0.3 {RATIO}
ALP SERPL-CCNC: 130 U/L (ref 45–115)
ALT SERPL W P-5'-P-CCNC: 14 U/L (ref 16–61)
ANION GAP SERPL CALCULATED.3IONS-SCNC: 14 MMOL/L (ref 7–16)
AST SERPL W P-5'-P-CCNC: 15 U/L (ref 15–37)
BASOPHILS # BLD AUTO: 0.04 K/UL (ref 0–0.2)
BASOPHILS NFR BLD AUTO: 0.3 % (ref 0–1)
BILIRUB SERPL-MCNC: 0.5 MG/DL (ref ?–1.2)
BUN SERPL-MCNC: 36 MG/DL (ref 7–18)
BUN/CREAT SERPL: 33 (ref 6–20)
CALCIUM SERPL-MCNC: 8.7 MG/DL (ref 8.5–10.1)
CHLORIDE SERPL-SCNC: 106 MMOL/L (ref 98–107)
CO2 SERPL-SCNC: 26 MMOL/L (ref 21–32)
CREAT SERPL-MCNC: 1.08 MG/DL (ref 0.7–1.3)
DIFFERENTIAL METHOD BLD: ABNORMAL
EGFR (NO RACE VARIABLE) (RUSH/TITUS): 72 ML/MIN/1.73M2
EOSINOPHIL # BLD AUTO: 0.12 K/UL (ref 0–0.5)
EOSINOPHIL NFR BLD AUTO: 0.8 % (ref 1–4)
ERYTHROCYTE [DISTWIDTH] IN BLOOD BY AUTOMATED COUNT: 19.9 % (ref 11.5–14.5)
GLOBULIN SER-MCNC: 6.5 G/DL (ref 2–4)
GLUCOSE SERPL-MCNC: 96 MG/DL (ref 74–106)
HCT VFR BLD AUTO: 37.6 % (ref 40–54)
HGB BLD-MCNC: 11.2 G/DL (ref 13.5–18)
IMM GRANULOCYTES # BLD AUTO: 0.06 K/UL (ref 0–0.04)
IMM GRANULOCYTES NFR BLD: 0.4 % (ref 0–0.4)
LYMPHOCYTES # BLD AUTO: 0.97 K/UL (ref 1–4.8)
LYMPHOCYTES NFR BLD AUTO: 6.6 % (ref 27–41)
MCH RBC QN AUTO: 25.1 PG (ref 27–31)
MCHC RBC AUTO-ENTMCNC: 29.8 G/DL (ref 32–36)
MCV RBC AUTO: 84.1 FL (ref 80–96)
MONOCYTES # BLD AUTO: 0.47 K/UL (ref 0–0.8)
MONOCYTES NFR BLD AUTO: 3.2 % (ref 2–6)
MPC BLD CALC-MCNC: 11.3 FL (ref 9.4–12.4)
NEUTROPHILS # BLD AUTO: 12.97 K/UL (ref 1.8–7.7)
NEUTROPHILS NFR BLD AUTO: 88.7 % (ref 53–65)
NRBC # BLD AUTO: 0 X10E3/UL
NRBC, AUTO (.00): 0 %
NT-PROBNP SERPL-MCNC: 7135 PG/ML (ref 1–450)
PLATELET # BLD AUTO: 232 K/UL (ref 150–400)
POTASSIUM SERPL-SCNC: 4.8 MMOL/L (ref 3.5–5.1)
PROT SERPL-MCNC: 8.3 G/DL (ref 6.4–8.2)
RBC # BLD AUTO: 4.47 M/UL (ref 4.6–6.2)
SODIUM SERPL-SCNC: 141 MMOL/L (ref 136–145)
TROPONIN I SERPL DL<=0.01 NG/ML-MCNC: 99.4 PG/ML
WBC # BLD AUTO: 14.63 K/UL (ref 4.5–11)

## 2024-03-13 PROCEDURE — 25000003 PHARM REV CODE 250: Performed by: EMERGENCY MEDICINE

## 2024-03-13 PROCEDURE — 85025 COMPLETE CBC W/AUTO DIFF WBC: CPT | Performed by: EMERGENCY MEDICINE

## 2024-03-13 PROCEDURE — 84484 ASSAY OF TROPONIN QUANT: CPT | Performed by: EMERGENCY MEDICINE

## 2024-03-13 PROCEDURE — 96367 TX/PROPH/DG ADDL SEQ IV INF: CPT

## 2024-03-13 PROCEDURE — 63600175 PHARM REV CODE 636 W HCPCS: Performed by: EMERGENCY MEDICINE

## 2024-03-13 PROCEDURE — 87149 DNA/RNA DIRECT PROBE: CPT | Performed by: EMERGENCY MEDICINE

## 2024-03-13 PROCEDURE — 93010 ELECTROCARDIOGRAM REPORT: CPT | Mod: ,,, | Performed by: STUDENT IN AN ORGANIZED HEALTH CARE EDUCATION/TRAINING PROGRAM

## 2024-03-13 PROCEDURE — 83880 ASSAY OF NATRIURETIC PEPTIDE: CPT | Performed by: EMERGENCY MEDICINE

## 2024-03-13 PROCEDURE — 87040 BLOOD CULTURE FOR BACTERIA: CPT | Performed by: EMERGENCY MEDICINE

## 2024-03-13 PROCEDURE — 99285 EMERGENCY DEPT VISIT HI MDM: CPT | Mod: 25

## 2024-03-13 PROCEDURE — 99285 EMERGENCY DEPT VISIT HI MDM: CPT | Mod: ,,, | Performed by: EMERGENCY MEDICINE

## 2024-03-13 PROCEDURE — 96375 TX/PRO/DX INJ NEW DRUG ADDON: CPT

## 2024-03-13 PROCEDURE — 93005 ELECTROCARDIOGRAM TRACING: CPT

## 2024-03-13 PROCEDURE — 96365 THER/PROPH/DIAG IV INF INIT: CPT

## 2024-03-13 PROCEDURE — 80053 COMPREHEN METABOLIC PANEL: CPT | Performed by: EMERGENCY MEDICINE

## 2024-03-13 RX ORDER — HYDROMORPHONE HYDROCHLORIDE 2 MG/ML
1 INJECTION, SOLUTION INTRAMUSCULAR; INTRAVENOUS; SUBCUTANEOUS
Status: COMPLETED | OUTPATIENT
Start: 2024-03-13 | End: 2024-03-13

## 2024-03-13 RX ORDER — ONDANSETRON HYDROCHLORIDE 2 MG/ML
4 INJECTION, SOLUTION INTRAVENOUS
Status: COMPLETED | OUTPATIENT
Start: 2024-03-13 | End: 2024-03-13

## 2024-03-13 RX ADMIN — CEFTRIAXONE SODIUM 1 G: 1 INJECTION, POWDER, FOR SOLUTION INTRAMUSCULAR; INTRAVENOUS at 11:03

## 2024-03-13 RX ADMIN — HYDROMORPHONE HYDROCHLORIDE 1 MG: 2 INJECTION, SOLUTION INTRAMUSCULAR; INTRAVENOUS; SUBCUTANEOUS at 08:03

## 2024-03-13 RX ADMIN — ONDANSETRON 4 MG: 2 INJECTION INTRAMUSCULAR; INTRAVENOUS at 08:03

## 2024-03-13 RX ADMIN — AZITHROMYCIN DIHYDRATE 500 MG: 500 INJECTION, POWDER, LYOPHILIZED, FOR SOLUTION INTRAVENOUS at 11:03

## 2024-03-14 LAB
ANION GAP SERPL CALCULATED.3IONS-SCNC: 7 MMOL/L (ref 7–16)
APTT PPP: 121.3 SECONDS (ref 25.2–37.3)
APTT PPP: 37.1 SECONDS (ref 25.2–37.3)
APTT PPP: 67.7 SECONDS (ref 25.2–37.3)
BASOPHILS # BLD AUTO: 0.03 K/UL (ref 0–0.2)
BASOPHILS NFR BLD AUTO: 0.2 % (ref 0–1)
BILIRUB UR QL STRIP: NEGATIVE
BUN SERPL-MCNC: 36 MG/DL (ref 7–18)
BUN/CREAT SERPL: 29 (ref 6–20)
CALCIUM SERPL-MCNC: 8.9 MG/DL (ref 8.5–10.1)
CHLORIDE SERPL-SCNC: 111 MMOL/L (ref 98–107)
CLARITY UR: CLEAR
CO2 SERPL-SCNC: 26 MMOL/L (ref 21–32)
COLOR UR: YELLOW
CREAT SERPL-MCNC: 1.24 MG/DL (ref 0.7–1.3)
DIFFERENTIAL METHOD BLD: ABNORMAL
EGFR (NO RACE VARIABLE) (RUSH/TITUS): 61 ML/MIN/1.73M2
EOSINOPHIL # BLD AUTO: 0.02 K/UL (ref 0–0.5)
EOSINOPHIL NFR BLD AUTO: 0.1 % (ref 1–4)
ERYTHROCYTE [DISTWIDTH] IN BLOOD BY AUTOMATED COUNT: 19.8 % (ref 11.5–14.5)
GLUCOSE SERPL-MCNC: 109 MG/DL (ref 74–106)
GLUCOSE SERPL-MCNC: 95 MG/DL (ref 70–105)
GLUCOSE UR STRIP-MCNC: NORMAL MG/DL
HCT VFR BLD AUTO: 35 % (ref 40–54)
HGB BLD-MCNC: 10.4 G/DL (ref 13.5–18)
HYALINE CASTS #/AREA URNS LPF: ABNORMAL /LPF
IMM GRANULOCYTES # BLD AUTO: 0.09 K/UL (ref 0–0.04)
IMM GRANULOCYTES NFR BLD: 0.6 % (ref 0–0.4)
INR BLD: 1.32
KETONES UR STRIP-SCNC: NEGATIVE MG/DL
LACTATE SERPL-SCNC: 2 MMOL/L (ref 0.4–2)
LACTATE SERPL-SCNC: 2.4 MMOL/L (ref 0.4–2)
LEUKOCYTE ESTERASE UR QL STRIP: NEGATIVE
LYMPHOCYTES # BLD AUTO: 0.84 K/UL (ref 1–4.8)
LYMPHOCYTES NFR BLD AUTO: 5.9 % (ref 27–41)
MCH RBC QN AUTO: 24.9 PG (ref 27–31)
MCHC RBC AUTO-ENTMCNC: 29.7 G/DL (ref 32–36)
MCV RBC AUTO: 83.7 FL (ref 80–96)
MONOCYTES # BLD AUTO: 0.55 K/UL (ref 0–0.8)
MONOCYTES NFR BLD AUTO: 3.9 % (ref 2–6)
MPC BLD CALC-MCNC: 11.4 FL (ref 9.4–12.4)
MUCOUS, UA: ABNORMAL /LPF
NEUTROPHILS # BLD AUTO: 12.6 K/UL (ref 1.8–7.7)
NEUTROPHILS NFR BLD AUTO: 89.3 % (ref 53–65)
NITRITE UR QL STRIP: NEGATIVE
NRBC # BLD AUTO: 0 X10E3/UL
NRBC, AUTO (.00): 0 %
PH UR STRIP: 5.5 PH UNITS
PLATELET # BLD AUTO: 214 K/UL (ref 150–400)
POTASSIUM SERPL-SCNC: 5.5 MMOL/L (ref 3.5–5.1)
POTASSIUM UR-SCNC: 52 MMOL/L (ref 25–125)
PROT UR QL STRIP: 50
PROTHROMBIN TIME: 16.2 SECONDS (ref 11.7–14.7)
RBC # BLD AUTO: 4.18 M/UL (ref 4.6–6.2)
RBC # UR STRIP: NEGATIVE /UL
RBC #/AREA URNS HPF: 1 /HPF
SODIUM SERPL-SCNC: 138 MMOL/L (ref 136–145)
SP GR UR STRIP: 1.03
SQUAMOUS #/AREA URNS LPF: ABNORMAL /HPF
TROPONIN I SERPL DL<=0.01 NG/ML-MCNC: 152.9 PG/ML
TROPONIN I SERPL DL<=0.01 NG/ML-MCNC: 153.7 PG/ML
UROBILINOGEN UR STRIP-ACNC: 3 MG/DL
VERIGENE RESULT: ABNORMAL
WBC # BLD AUTO: 14.13 K/UL (ref 4.5–11)
WBC #/AREA URNS HPF: 1 /HPF

## 2024-03-14 PROCEDURE — 25000003 PHARM REV CODE 250: Performed by: INTERNAL MEDICINE

## 2024-03-14 PROCEDURE — 25000003 PHARM REV CODE 250

## 2024-03-14 PROCEDURE — 25000003 PHARM REV CODE 250: Performed by: FAMILY MEDICINE

## 2024-03-14 PROCEDURE — 82962 GLUCOSE BLOOD TEST: CPT

## 2024-03-14 PROCEDURE — 99223 1ST HOSP IP/OBS HIGH 75: CPT | Mod: AI,,, | Performed by: INTERNAL MEDICINE

## 2024-03-14 PROCEDURE — 11000001 HC ACUTE MED/SURG PRIVATE ROOM

## 2024-03-14 PROCEDURE — 96376 TX/PRO/DX INJ SAME DRUG ADON: CPT

## 2024-03-14 PROCEDURE — 85610 PROTHROMBIN TIME: CPT | Performed by: FAMILY MEDICINE

## 2024-03-14 PROCEDURE — 93010 ELECTROCARDIOGRAM REPORT: CPT | Mod: ,,, | Performed by: HOSPITALIST

## 2024-03-14 PROCEDURE — 63600175 PHARM REV CODE 636 W HCPCS: Mod: JZ,JG

## 2024-03-14 PROCEDURE — 81003 URINALYSIS AUTO W/O SCOPE: CPT | Performed by: EMERGENCY MEDICINE

## 2024-03-14 PROCEDURE — 93005 ELECTROCARDIOGRAM TRACING: CPT

## 2024-03-14 PROCEDURE — 94640 AIRWAY INHALATION TREATMENT: CPT

## 2024-03-14 PROCEDURE — 80048 BASIC METABOLIC PNL TOTAL CA: CPT | Performed by: INTERNAL MEDICINE

## 2024-03-14 PROCEDURE — 02HV33Z INSERTION OF INFUSION DEVICE INTO SUPERIOR VENA CAVA, PERCUTANEOUS APPROACH: ICD-10-PCS | Performed by: STUDENT IN AN ORGANIZED HEALTH CARE EDUCATION/TRAINING PROGRAM

## 2024-03-14 PROCEDURE — 63600175 PHARM REV CODE 636 W HCPCS: Performed by: INTERNAL MEDICINE

## 2024-03-14 PROCEDURE — 84484 ASSAY OF TROPONIN QUANT: CPT | Performed by: INTERNAL MEDICINE

## 2024-03-14 PROCEDURE — 63600175 PHARM REV CODE 636 W HCPCS: Mod: JZ,JG | Performed by: FAMILY MEDICINE

## 2024-03-14 PROCEDURE — 83605 ASSAY OF LACTIC ACID: CPT | Performed by: INTERNAL MEDICINE

## 2024-03-14 PROCEDURE — 63600175 PHARM REV CODE 636 W HCPCS: Performed by: EMERGENCY MEDICINE

## 2024-03-14 PROCEDURE — 85730 THROMBOPLASTIN TIME PARTIAL: CPT | Performed by: FAMILY MEDICINE

## 2024-03-14 PROCEDURE — 25000242 PHARM REV CODE 250 ALT 637 W/ HCPCS: Performed by: INTERNAL MEDICINE

## 2024-03-14 PROCEDURE — 27000221 HC OXYGEN, UP TO 24 HOURS

## 2024-03-14 PROCEDURE — 84133 ASSAY OF URINE POTASSIUM: CPT | Performed by: FAMILY MEDICINE

## 2024-03-14 PROCEDURE — 85025 COMPLETE CBC W/AUTO DIFF WBC: CPT | Performed by: INTERNAL MEDICINE

## 2024-03-14 PROCEDURE — 99900035 HC TECH TIME PER 15 MIN (STAT)

## 2024-03-14 PROCEDURE — 94761 N-INVAS EAR/PLS OXIMETRY MLT: CPT

## 2024-03-14 RX ORDER — GABAPENTIN 100 MG/1
100 CAPSULE ORAL 3 TIMES DAILY
Status: DISCONTINUED | OUTPATIENT
Start: 2024-03-14 | End: 2024-03-26 | Stop reason: HOSPADM

## 2024-03-14 RX ORDER — PROCHLORPERAZINE EDISYLATE 5 MG/ML
5 INJECTION INTRAMUSCULAR; INTRAVENOUS EVERY 6 HOURS PRN
Status: DISCONTINUED | OUTPATIENT
Start: 2024-03-14 | End: 2024-03-26 | Stop reason: HOSPADM

## 2024-03-14 RX ORDER — DOBUTAMINE HYDROCHLORIDE 400 MG/100ML
2.5 INJECTION INTRAVENOUS CONTINUOUS
Status: DISCONTINUED | OUTPATIENT
Start: 2024-03-14 | End: 2024-03-15

## 2024-03-14 RX ORDER — MUPIROCIN 20 MG/G
OINTMENT TOPICAL 2 TIMES DAILY
Status: DISPENSED | OUTPATIENT
Start: 2024-03-14 | End: 2024-03-19

## 2024-03-14 RX ORDER — INSULIN ASPART 100 [IU]/ML
0-5 INJECTION, SOLUTION INTRAVENOUS; SUBCUTANEOUS
Status: DISCONTINUED | OUTPATIENT
Start: 2024-03-14 | End: 2024-03-26 | Stop reason: HOSPADM

## 2024-03-14 RX ORDER — IPRATROPIUM BROMIDE AND ALBUTEROL SULFATE 2.5; .5 MG/3ML; MG/3ML
3 SOLUTION RESPIRATORY (INHALATION) EVERY 6 HOURS
Status: DISCONTINUED | OUTPATIENT
Start: 2024-03-14 | End: 2024-03-15

## 2024-03-14 RX ORDER — DEXTROSE MONOHYDRATE 5 G/100ML
INJECTION INTRAVENOUS
Status: DISPENSED
Start: 2024-03-14 | End: 2024-03-14

## 2024-03-14 RX ORDER — METOPROLOL TARTRATE 25 MG/1
12.5 TABLET ORAL 2 TIMES DAILY
Status: DISCONTINUED | OUTPATIENT
Start: 2024-03-14 | End: 2024-03-15

## 2024-03-14 RX ORDER — MORPHINE SULFATE 2 MG/ML
2 INJECTION, SOLUTION INTRAMUSCULAR; INTRAVENOUS EVERY 4 HOURS PRN
Status: DISCONTINUED | OUTPATIENT
Start: 2024-03-14 | End: 2024-03-14

## 2024-03-14 RX ORDER — ATORVASTATIN CALCIUM 40 MG/1
40 TABLET, FILM COATED ORAL DAILY
Status: DISCONTINUED | OUTPATIENT
Start: 2024-03-14 | End: 2024-03-26 | Stop reason: HOSPADM

## 2024-03-14 RX ORDER — BUDESONIDE 0.5 MG/2ML
0.5 INHALANT ORAL EVERY 12 HOURS
Status: DISCONTINUED | OUTPATIENT
Start: 2024-03-14 | End: 2024-03-15

## 2024-03-14 RX ORDER — ASPIRIN 81 MG/1
81 TABLET ORAL DAILY
Status: DISCONTINUED | OUTPATIENT
Start: 2024-03-14 | End: 2024-03-26 | Stop reason: HOSPADM

## 2024-03-14 RX ORDER — MORPHINE SULFATE 2 MG/ML
2 INJECTION, SOLUTION INTRAMUSCULAR; INTRAVENOUS
Status: DISCONTINUED | OUTPATIENT
Start: 2024-03-14 | End: 2024-03-14

## 2024-03-14 RX ORDER — MIDODRINE HYDROCHLORIDE 5 MG/1
10 TABLET ORAL
Status: DISCONTINUED | OUTPATIENT
Start: 2024-03-14 | End: 2024-03-14

## 2024-03-14 RX ORDER — HYDROCODONE BITARTRATE AND ACETAMINOPHEN 5; 325 MG/1; MG/1
1 TABLET ORAL EVERY 6 HOURS PRN
Status: DISCONTINUED | OUTPATIENT
Start: 2024-03-14 | End: 2024-03-26 | Stop reason: HOSPADM

## 2024-03-14 RX ORDER — MIDODRINE HYDROCHLORIDE 5 MG/1
5 TABLET ORAL
Status: DISCONTINUED | OUTPATIENT
Start: 2024-03-14 | End: 2024-03-14

## 2024-03-14 RX ORDER — GLUCAGON 1 MG
1 KIT INJECTION
Status: DISCONTINUED | OUTPATIENT
Start: 2024-03-14 | End: 2024-03-26 | Stop reason: HOSPADM

## 2024-03-14 RX ORDER — METOPROLOL TARTRATE 25 MG/1
TABLET ORAL
Status: DISPENSED
Start: 2024-03-14 | End: 2024-03-14

## 2024-03-14 RX ORDER — HYDROMORPHONE HYDROCHLORIDE 2 MG/ML
1 INJECTION, SOLUTION INTRAMUSCULAR; INTRAVENOUS; SUBCUTANEOUS
Status: COMPLETED | OUTPATIENT
Start: 2024-03-14 | End: 2024-03-14

## 2024-03-14 RX ORDER — IBUPROFEN 200 MG
16 TABLET ORAL
Status: DISCONTINUED | OUTPATIENT
Start: 2024-03-14 | End: 2024-03-26 | Stop reason: HOSPADM

## 2024-03-14 RX ORDER — HYDROMORPHONE HYDROCHLORIDE 2 MG/ML
INJECTION, SOLUTION INTRAMUSCULAR; INTRAVENOUS; SUBCUTANEOUS
Status: DISPENSED
Start: 2024-03-14 | End: 2024-03-14

## 2024-03-14 RX ORDER — MORPHINE SULFATE 2 MG/ML
2 INJECTION, SOLUTION INTRAMUSCULAR; INTRAVENOUS
Status: DISCONTINUED | OUTPATIENT
Start: 2024-03-14 | End: 2024-03-22

## 2024-03-14 RX ORDER — HYDROXYZINE PAMOATE 25 MG/1
25 CAPSULE ORAL EVERY 8 HOURS PRN
Status: DISCONTINUED | OUTPATIENT
Start: 2024-03-14 | End: 2024-03-26 | Stop reason: HOSPADM

## 2024-03-14 RX ORDER — FUROSEMIDE 10 MG/ML
40 INJECTION INTRAMUSCULAR; INTRAVENOUS DAILY
Status: DISCONTINUED | OUTPATIENT
Start: 2024-03-14 | End: 2024-03-16

## 2024-03-14 RX ORDER — MORPHINE SULFATE 2 MG/ML
INJECTION, SOLUTION INTRAMUSCULAR; INTRAVENOUS
Status: COMPLETED
Start: 2024-03-14 | End: 2024-03-14

## 2024-03-14 RX ORDER — PIPERACILLIN SODIUM, TAZOBACTAM SODIUM 4; .5 G/20ML; G/20ML
INJECTION, POWDER, LYOPHILIZED, FOR SOLUTION INTRAVENOUS
Status: COMPLETED
Start: 2024-03-14 | End: 2024-03-14

## 2024-03-14 RX ORDER — ONDANSETRON HYDROCHLORIDE 2 MG/ML
4 INJECTION, SOLUTION INTRAVENOUS EVERY 8 HOURS PRN
Status: DISCONTINUED | OUTPATIENT
Start: 2024-03-14 | End: 2024-03-26 | Stop reason: HOSPADM

## 2024-03-14 RX ORDER — ENOXAPARIN SODIUM 100 MG/ML
40 INJECTION SUBCUTANEOUS EVERY 24 HOURS
Status: DISCONTINUED | OUTPATIENT
Start: 2024-03-14 | End: 2024-03-14

## 2024-03-14 RX ORDER — NALOXONE HCL 0.4 MG/ML
0.02 VIAL (ML) INJECTION
Status: DISCONTINUED | OUTPATIENT
Start: 2024-03-14 | End: 2024-03-26 | Stop reason: HOSPADM

## 2024-03-14 RX ORDER — ACETAMINOPHEN 325 MG/1
650 TABLET ORAL EVERY 6 HOURS PRN
Status: DISCONTINUED | OUTPATIENT
Start: 2024-03-14 | End: 2024-03-26 | Stop reason: HOSPADM

## 2024-03-14 RX ORDER — IBUPROFEN 200 MG
24 TABLET ORAL
Status: DISCONTINUED | OUTPATIENT
Start: 2024-03-14 | End: 2024-03-26 | Stop reason: HOSPADM

## 2024-03-14 RX ORDER — SODIUM CHLORIDE 0.9 % (FLUSH) 0.9 %
10 SYRINGE (ML) INJECTION EVERY 12 HOURS PRN
Status: DISCONTINUED | OUTPATIENT
Start: 2024-03-14 | End: 2024-03-26 | Stop reason: HOSPADM

## 2024-03-14 RX ORDER — HEPARIN SODIUM,PORCINE/D5W 25000/250
0-40 INTRAVENOUS SOLUTION INTRAVENOUS CONTINUOUS
Status: DISCONTINUED | OUTPATIENT
Start: 2024-03-14 | End: 2024-03-15

## 2024-03-14 RX ADMIN — MUPIROCIN: 20 OINTMENT TOPICAL at 10:03

## 2024-03-14 RX ADMIN — MORPHINE SULFATE 2 MG: 2 INJECTION, SOLUTION INTRAMUSCULAR; INTRAVENOUS at 08:03

## 2024-03-14 RX ADMIN — PIPERACILLIN AND TAZOBACTAM 4.5 G: 4; .5 INJECTION, POWDER, FOR SOLUTION INTRAVENOUS; PARENTERAL at 05:03

## 2024-03-14 RX ADMIN — HYDROMORPHONE HYDROCHLORIDE 1 MG: 2 INJECTION, SOLUTION INTRAMUSCULAR; INTRAVENOUS; SUBCUTANEOUS at 12:03

## 2024-03-14 RX ADMIN — HEPARIN SODIUM 18 UNITS/KG/HR: 10000 INJECTION, SOLUTION INTRAVENOUS at 09:03

## 2024-03-14 RX ADMIN — GABAPENTIN 100 MG: 100 CAPSULE ORAL at 10:03

## 2024-03-14 RX ADMIN — VANCOMYCIN HYDROCHLORIDE 1500 MG: 1 INJECTION, POWDER, LYOPHILIZED, FOR SOLUTION INTRAVENOUS at 06:03

## 2024-03-14 RX ADMIN — BUDESONIDE INHALATION 0.5 MG: 0.5 SUSPENSION RESPIRATORY (INHALATION) at 08:03

## 2024-03-14 RX ADMIN — IPRATROPIUM BROMIDE AND ALBUTEROL SULFATE 3 ML: 2.5; .5 SOLUTION RESPIRATORY (INHALATION) at 08:03

## 2024-03-14 RX ADMIN — MORPHINE SULFATE 2 MG: 2 INJECTION, SOLUTION INTRAMUSCULAR; INTRAVENOUS at 12:03

## 2024-03-14 RX ADMIN — PIPERACILLIN AND TAZOBACTAM: 4; .5 INJECTION, POWDER, FOR SOLUTION INTRAVENOUS at 05:03

## 2024-03-14 RX ADMIN — PIPERACILLIN AND TAZOBACTAM 4.5 G: 4; .5 INJECTION, POWDER, FOR SOLUTION INTRAVENOUS; PARENTERAL at 08:03

## 2024-03-14 RX ADMIN — FUROSEMIDE 40 MG: 10 INJECTION, SOLUTION INTRAVENOUS at 10:03

## 2024-03-14 RX ADMIN — MORPHINE SULFATE 2 MG: 2 INJECTION, SOLUTION INTRAMUSCULAR; INTRAVENOUS at 05:03

## 2024-03-14 RX ADMIN — GABAPENTIN 100 MG: 100 CAPSULE ORAL at 03:03

## 2024-03-14 RX ADMIN — DOBUTAMINE HYDROCHLORIDE 2.5 MCG/KG/MIN: 400 INJECTION INTRAVENOUS at 12:03

## 2024-03-14 RX ADMIN — PIPERACILLIN AND TAZOBACTAM 4.5 G: 4; .5 INJECTION, POWDER, FOR SOLUTION INTRAVENOUS; PARENTERAL at 04:03

## 2024-03-14 RX ADMIN — HYDROXYZINE PAMOATE 25 MG: 25 CAPSULE ORAL at 08:03

## 2024-03-14 RX ADMIN — IPRATROPIUM BROMIDE AND ALBUTEROL SULFATE 3 ML: 2.5; .5 SOLUTION RESPIRATORY (INHALATION) at 06:03

## 2024-03-14 RX ADMIN — ASPIRIN 81 MG: 81 TABLET, COATED ORAL at 10:03

## 2024-03-14 RX ADMIN — MIDODRINE HYDROCHLORIDE 10 MG: 5 TABLET ORAL at 08:03

## 2024-03-14 RX ADMIN — ATORVASTATIN CALCIUM 40 MG: 40 TABLET, FILM COATED ORAL at 10:03

## 2024-03-14 RX ADMIN — IPRATROPIUM BROMIDE AND ALBUTEROL SULFATE 3 ML: 2.5; .5 SOLUTION RESPIRATORY (INHALATION) at 12:03
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
## 2024-03-14 NOTE — ASSESSMENT & PLAN NOTE
Details are unknown at this time, but I do not feel that elevation in troponin levels represents ACS. Will continue home medications

## 2024-03-14 NOTE — HPI
73-year-old male seen in consult by General surgery for worsening wounds of bilateral lower extremities past medical history includes COPD, DM 2, bilateral lower extremity venous stasis, HFrEF.  Admitted overnight after presentation to the emergency room complaining of worsening bilateral lower extremity wounds.  He apparently was sent by his home health nurse due to concern of infection.  Patient complains of burning pain both legs.  No reported fevers, chills, shortness of breath, nausea or vomiting.

## 2024-03-14 NOTE — CONSULTS
Pharmacy consulted to dose Vancomycin.    Based on pt wt of 77.1 kg and Scr 1.08, the following therapy will be initiated:    Vancomycin 1500 mg IV q 24 hrs.    Trough prior 4th dose. Goal trough around 15.    Pharmacy to follow daily and make necessary adjustments.    Thank you.

## 2024-03-14 NOTE — ASSESSMENT & PLAN NOTE
Pt appeared to have developed some mild degree of CHF decompensation. Will gently diurese this pt with IV lasix.

## 2024-03-14 NOTE — ASSESSMENT & PLAN NOTE
Worsening bilateral lower extremity wounds with yellowish drainage and malodor  Lactic acid 2.5 on admission now 1.4  White blood count 14  Currently on vancomycin Zosyn  Plan for OR tomorrow for debridement.  Procedure and risks/benefits explained to patient in detail.  Risks include worsening infection bleeding, unforeseen circumstances.  The patient in agreement to proceed with debridement   Klisyri Counseling:  I discussed with the patient the risks of Klisyri including but not limited to erythema, scaling, itching, weeping, crusting, and pain.

## 2024-03-14 NOTE — PROCEDURES
Procedure performed by Carson METZ under the supervision of Dr. Smith . 5 [place] PICC line placed in brachial vein in left upper extremity. Informed consent obtained including risks and possible complications. Patient pepped with chloro prep and draped in a sterile fashion. 2cc 1% lidocaine infused. Utilizing U/S guidance a 51 cm 5 [place] PICC line placed and position verified by fluoroscopy. PICC line flushed with heparinized saline.Statlock and bandage applied. Patient tolerated procedure well with no immediate complication.

## 2024-03-14 NOTE — ASSESSMENT & PLAN NOTE
Pt is on BB and Eliquis. Will utilize the half the usual dose of BB given decompensated HF and hold off on Eliquis as wound debridement is anticipated

## 2024-03-14 NOTE — ASSESSMENT & PLAN NOTE
>>ASSESSMENT AND PLAN FOR CELLULITIS OF LOWER EXTREMITY WRITTEN ON 3/14/2024  4:01 AM BY AMANDA WONG MD      In the past, wound grew pseudomonas and other GNB, but presence of GPC could not be entirely ruled out at this time and thus will empirically start pt on Vancomycin and Zosyn for now
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
## 2024-03-14 NOTE — ASSESSMENT & PLAN NOTE
Last known HbA1c was 4.8. Will start pt on low intensity SSI to maintain CBGs in the range of 130's to 180's

## 2024-03-14 NOTE — CARE UPDATE
Echocardiogram completed earlier this AM with concerns for PE and worsening LVEF (most recent 25-35%). Started on heparin infusion for now. Difficulty maintaining access and drawing labs so PICC ordered. Awaiting surgery consult. Low EF with elevated troponin and elevated lactate. Will discontinue midodrine in favor of low-dose dobutamine considering use of IV opioids will likely worsen hypotension, as well as anticipated NPO status.

## 2024-03-14 NOTE — ASSESSMENT & PLAN NOTE
>>ASSESSMENT AND PLAN FOR SEPSIS WRITTEN ON 3/14/2024  4:08 AM BY AMANDA WONG MD      This patient does have evidence of infective focus  My overall impression is sepsis.  Source: Skin and Soft Tissue (location BLE)    Antibiotics (72h ago, onward)      Vancomycin and Zosyn          Fluid challenge Not needed - patient is not hypotensive      Will Not start Pressors- Levophed for MAP of 65  Source control achieved by: Empiric ABX
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
## 2024-03-14 NOTE — ASSESSMENT & PLAN NOTE
This patient does have evidence of infective focus  My overall impression is sepsis.  Source: Skin and Soft Tissue (location BLE)    Antibiotics (72h ago, onward)      Vancomycin and Zosyn          Fluid challenge Not needed - patient is not hypotensive      Will Not start Pressors- Levophed for MAP of 65  Source control achieved by: Emp[employer]ic ABX

## 2024-03-14 NOTE — ASSESSMENT & PLAN NOTE
In the past, wound grew pseudomonas and other GNB, but presence of GPC could not be entirely ruled out at this time and thus will empirically start pt on Vancomycin and Zosyn for now

## 2024-03-14 NOTE — ASSESSMENT & PLAN NOTE
>>ASSESSMENT AND PLAN FOR CELLULITIS OF LOWER EXTREMITY WRITTEN ON 3/14/2024  3:12 PM BY MEHREEN FENG ACNP    Worsening bilateral lower extremity wounds with yellowish drainage and malodor  Lactic acid 2.5 on admission now 1.4  White blood count 14  Currently on vancomycin Zosyn  Plan for OR tomorrow for debridement.  Procedure and risks/benefits explained to patient in detail.  Risks include worsening infection bleeding, unforeseen circumstances.  The patient in agreement to proceed with debridement
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
## 2024-03-14 NOTE — HPI
Patient is a 73-year-old male with a history of combined CHF with last known EF of 35% with chronically elevated proBNP ranging from 4 to 20,000, COPD, type 2 diabetes, and chronic bilateral lower extremity edema coupled with venous stasis dermatitis which has been frequently complicated by cellulitis requiring hospitalization who presented to emergency room as instructed by his home health nurse for evaluation of bilateral lower extremity wound which appeared to have been infected again.  Patient complained of burning type of pain +7/10 involving bilateral lower extremities, but otherwise denied any fever chills cough wheezing shortness of breath chest pain palpitation PND or orthopnea in association.    On initial presentation, patient was tachycardic with a heart rate in the 120s but vital signs were otherwise stable and patient was afebrile.  Workup was notable for chest x-ray demonstrating right-sided pleural effusion with right basilar density representing either atelectasis versus developing infiltrate, chronically elevated troponin in the 100s without any ischemic abnormalities seen on EKG, chronically elevated proBNP along with leukocytosis with left shift.     Patient will be admitted with a working diagnosis of sepsis secondary to bilateral lower extremity cellulitis in the setting of chronic venous stasis dermatitis and type 2 MI associated with acute decompensation of chronic combined heart failure.

## 2024-03-15 PROBLEM — I26.99 PULMONARY EMBOLUS WITH INFARCTION: Status: ACTIVE | Noted: 2024-03-15

## 2024-03-15 PROBLEM — L03.90 SEPSIS DUE TO CELLULITIS: Status: ACTIVE | Noted: 2024-03-15

## 2024-03-15 PROBLEM — A41.9 SEPSIS DUE TO CELLULITIS: Status: ACTIVE | Noted: 2024-03-15

## 2024-03-15 LAB
ANION GAP SERPL CALCULATED.3IONS-SCNC: 8 MMOL/L (ref 7–16)
AORTIC ROOT ANNULUS: 2.74 CM
AORTIC VALVE CUSP SEPERATION: 1.56 CM
APTT PPP: 74.8 SECONDS (ref 25.2–37.3)
APTT PPP: 94.6 SECONDS (ref 25.2–37.3)
AV INDEX (PROSTH): 0.83
AV MEAN GRADIENT: 3 MMHG
AV PEAK GRADIENT: 5 MMHG
AV VALVE AREA BY VELOCITY RATIO: 2.37 CM²
AV VALVE AREA: 2.36 CM²
AV VELOCITY RATIO: 0.84
BASOPHILS # BLD AUTO: 0.04 K/UL (ref 0–0.2)
BASOPHILS NFR BLD AUTO: 0.4 % (ref 0–1)
BSA FOR ECHO PROCEDURE: 1.95 M2
BUN SERPL-MCNC: 35 MG/DL (ref 7–18)
BUN/CREAT SERPL: 26 (ref 6–20)
CALCIUM SERPL-MCNC: 8.3 MG/DL (ref 8.5–10.1)
CHLORIDE SERPL-SCNC: 110 MMOL/L (ref 98–107)
CO2 SERPL-SCNC: 25 MMOL/L (ref 21–32)
CREAT SERPL-MCNC: 1.36 MG/DL (ref 0.7–1.3)
CV ECHO LV RWT: 0.53 CM
DIFFERENTIAL METHOD BLD: ABNORMAL
DOP CALC AO PEAK VEL: 1.16 M/S
DOP CALC AO VTI: 16 CM
DOP CALC LVOT AREA: 2.8 CM2
DOP CALC LVOT DIAMETER: 1.9 CM
DOP CALC LVOT PEAK VEL: 0.97 M/S
DOP CALC LVOT STROKE VOLUME: 37.69 CM3
DOP CALC MV VTI: 21.5 CM
DOP CALCLVOT PEAK VEL VTI: 13.3 CM
E WAVE DECELERATION TIME: 145.69 MSEC
ECHO LV POSTERIOR WALL: 1.51 CM (ref 0.6–1.1)
EGFR (NO RACE VARIABLE) (RUSH/TITUS): 54 ML/MIN/1.73M2
EJECTION FRACTION: 20 %
EOSINOPHIL # BLD AUTO: 0.1 K/UL (ref 0–0.5)
EOSINOPHIL NFR BLD AUTO: 1.1 % (ref 1–4)
ERYTHROCYTE [DISTWIDTH] IN BLOOD BY AUTOMATED COUNT: 19.3 % (ref 11.5–14.5)
FRACTIONAL SHORTENING: 18 % (ref 28–44)
GLUCOSE SERPL-MCNC: 114 MG/DL (ref 70–105)
GLUCOSE SERPL-MCNC: 117 MG/DL (ref 70–105)
GLUCOSE SERPL-MCNC: 76 MG/DL (ref 74–106)
GLUCOSE SERPL-MCNC: 88 MG/DL (ref 70–105)
GLUCOSE SERPL-MCNC: 98 MG/DL (ref 70–105)
HCT VFR BLD AUTO: 26.7 % (ref 40–54)
HGB BLD-MCNC: 7.9 G/DL (ref 13.5–18)
IMM GRANULOCYTES # BLD AUTO: 0.03 K/UL (ref 0–0.04)
IMM GRANULOCYTES NFR BLD: 0.3 % (ref 0–0.4)
INR BLD: 1.28
INTERVENTRICULAR SEPTUM: 1.47 CM (ref 0.6–1.1)
IVC DIAMETER: 2.14 CM
LA MAJOR: 4.18 CM
LEFT ATRIUM SIZE: 3.93 CM
LEFT INTERNAL DIMENSION IN SYSTOLE: 4.7 CM (ref 2.1–4)
LEFT VENTRICLE DIASTOLIC VOLUME INDEX: 82.46 ML/M2
LEFT VENTRICLE DIASTOLIC VOLUME: 160.8 ML
LEFT VENTRICLE MASS INDEX: 201 G/M2
LEFT VENTRICLE SYSTOLIC VOLUME INDEX: 52.6 ML/M2
LEFT VENTRICLE SYSTOLIC VOLUME: 102.59 ML
LEFT VENTRICULAR INTERNAL DIMENSION IN DIASTOLE: 5.71 CM (ref 3.5–6)
LEFT VENTRICULAR MASS: 391.69 G
LVOT MG: 2.04 MMHG
LVOT MV: 0.65 CM/S
LYMPHOCYTES # BLD AUTO: 0.66 K/UL (ref 1–4.8)
LYMPHOCYTES NFR BLD AUTO: 7.1 % (ref 27–41)
MCH RBC QN AUTO: 24.5 PG (ref 27–31)
MCHC RBC AUTO-ENTMCNC: 29.6 G/DL (ref 32–36)
MCV RBC AUTO: 82.7 FL (ref 80–96)
MONOCYTES # BLD AUTO: 0.49 K/UL (ref 0–0.8)
MONOCYTES NFR BLD AUTO: 5.3 % (ref 2–6)
MPC BLD CALC-MCNC: 11 FL (ref 9.4–12.4)
MV MEAN GRADIENT: 4 MMHG
MV PEAK GRADIENT: 10 MMHG
MV STENOSIS PRESSURE HALF TIME: 42.25 MS
MV VALVE AREA BY CONTINUITY EQUATION: 1.75 CM2
MV VALVE AREA P 1/2 METHOD: 5.21 CM2
NEUTROPHILS # BLD AUTO: 8 K/UL (ref 1.8–7.7)
NEUTROPHILS NFR BLD AUTO: 85.8 % (ref 53–65)
NRBC # BLD AUTO: 0 X10E3/UL
NRBC, AUTO (.00): 0 %
OHS QRS DURATION: 106 MS
OHS QTC CALCULATION: 510 MS
PISA MRMAX VEL: 3.52 M/S
PISA TR MAX VEL: 3.28 M/S
PLATELET # BLD AUTO: 114 K/UL (ref 150–400)
POTASSIUM SERPL-SCNC: 5.4 MMOL/L (ref 3.5–5.1)
PROTHROMBIN TIME: 15.8 SECONDS (ref 11.7–14.7)
PV PEAK GRADIENT: 1 MMHG
PV PEAK VELOCITY: 0.53 M/S
RA MAJOR: 6.61 CM
RA PRESSURE ESTIMATED: 15 MMHG
RBC # BLD AUTO: 3.23 M/UL (ref 4.6–6.2)
RIGHT VENTRICULAR END-DIASTOLIC DIMENSION: 5.34 CM
RV TB RVSP: 18 MMHG
SODIUM SERPL-SCNC: 138 MMOL/L (ref 136–145)
TDI LATERAL: 0.21 M/S
TDI SEPTAL: 0.07 M/S
TDI: 0.14 M/S
TR MAX PG: 43 MMHG
TRICUSPID ANNULAR PLANE SYSTOLIC EXCURSION: 1.72 CM
TV REST PULMONARY ARTERY PRESSURE: 58 MMHG
WBC # BLD AUTO: 9.32 K/UL (ref 4.5–11)
Z-SCORE OF LEFT VENTRICULAR DIMENSION IN END DIASTOLE: 0.27
Z-SCORE OF LEFT VENTRICULAR DIMENSION IN END SYSTOLE: 2.51

## 2024-03-15 PROCEDURE — 85730 THROMBOPLASTIN TIME PARTIAL: CPT | Performed by: FAMILY MEDICINE

## 2024-03-15 PROCEDURE — 11000001 HC ACUTE MED/SURG PRIVATE ROOM

## 2024-03-15 PROCEDURE — 99233 SBSQ HOSP IP/OBS HIGH 50: CPT | Mod: ,,, | Performed by: FAMILY MEDICINE

## 2024-03-15 PROCEDURE — 27000221 HC OXYGEN, UP TO 24 HOURS

## 2024-03-15 PROCEDURE — 25000003 PHARM REV CODE 250: Performed by: FAMILY MEDICINE

## 2024-03-15 PROCEDURE — 25500020 PHARM REV CODE 255: Performed by: FAMILY MEDICINE

## 2024-03-15 PROCEDURE — 94761 N-INVAS EAR/PLS OXIMETRY MLT: CPT

## 2024-03-15 PROCEDURE — 25000003 PHARM REV CODE 250: Performed by: INTERNAL MEDICINE

## 2024-03-15 PROCEDURE — 63600175 PHARM REV CODE 636 W HCPCS: Mod: JZ,JG | Performed by: NURSE PRACTITIONER

## 2024-03-15 PROCEDURE — 80048 BASIC METABOLIC PNL TOTAL CA: CPT | Performed by: INTERNAL MEDICINE

## 2024-03-15 PROCEDURE — 82962 GLUCOSE BLOOD TEST: CPT

## 2024-03-15 PROCEDURE — 85610 PROTHROMBIN TIME: CPT | Performed by: FAMILY MEDICINE

## 2024-03-15 PROCEDURE — 99223 1ST HOSP IP/OBS HIGH 75: CPT | Mod: ,,, | Performed by: NURSE PRACTITIONER

## 2024-03-15 PROCEDURE — P9047 ALBUMIN (HUMAN), 25%, 50ML: HCPCS | Mod: JZ,JG | Performed by: NURSE PRACTITIONER

## 2024-03-15 PROCEDURE — 63600175 PHARM REV CODE 636 W HCPCS: Performed by: FAMILY MEDICINE

## 2024-03-15 PROCEDURE — 63600175 PHARM REV CODE 636 W HCPCS: Performed by: INTERNAL MEDICINE

## 2024-03-15 PROCEDURE — 85025 COMPLETE CBC W/AUTO DIFF WBC: CPT | Performed by: INTERNAL MEDICINE

## 2024-03-15 PROCEDURE — 99900035 HC TECH TIME PER 15 MIN (STAT)

## 2024-03-15 RX ORDER — HEPARIN SODIUM,PORCINE/D5W 25000/250
0-40 INTRAVENOUS SOLUTION INTRAVENOUS CONTINUOUS
Status: DISCONTINUED | OUTPATIENT
Start: 2024-03-15 | End: 2024-03-16

## 2024-03-15 RX ORDER — MIDODRINE HYDROCHLORIDE 5 MG/1
5 TABLET ORAL
Status: DISCONTINUED | OUTPATIENT
Start: 2024-03-15 | End: 2024-03-26 | Stop reason: HOSPADM

## 2024-03-15 RX ORDER — ALBUMIN HUMAN 250 G/1000ML
25 SOLUTION INTRAVENOUS EVERY 8 HOURS
Status: COMPLETED | OUTPATIENT
Start: 2024-03-15 | End: 2024-03-16

## 2024-03-15 RX ORDER — IPRATROPIUM BROMIDE AND ALBUTEROL SULFATE 2.5; .5 MG/3ML; MG/3ML
3 SOLUTION RESPIRATORY (INHALATION) EVERY 6 HOURS PRN
Status: DISCONTINUED | OUTPATIENT
Start: 2024-03-15 | End: 2024-03-26 | Stop reason: HOSPADM

## 2024-03-15 RX ADMIN — PIPERACILLIN AND TAZOBACTAM 4.5 G: 4; .5 INJECTION, POWDER, FOR SOLUTION INTRAVENOUS; PARENTERAL at 12:03

## 2024-03-15 RX ADMIN — ALBUMIN (HUMAN) 25 G: 12.5 SOLUTION INTRAVENOUS at 03:03

## 2024-03-15 RX ADMIN — HYDROCODONE BITARTRATE AND ACETAMINOPHEN 1 TABLET: 5; 325 TABLET ORAL at 07:03

## 2024-03-15 RX ADMIN — HEPARIN SODIUM 18 UNITS/KG/HR: 10000 INJECTION, SOLUTION INTRAVENOUS at 12:03

## 2024-03-15 RX ADMIN — IOPAMIDOL 100 ML: 755 INJECTION, SOLUTION INTRAVENOUS at 09:03

## 2024-03-15 RX ADMIN — GABAPENTIN 100 MG: 100 CAPSULE ORAL at 09:03

## 2024-03-15 RX ADMIN — FUROSEMIDE 40 MG: 10 INJECTION, SOLUTION INTRAVENOUS at 03:03

## 2024-03-15 RX ADMIN — ALBUMIN (HUMAN) 25 G: 12.5 SOLUTION INTRAVENOUS at 09:03

## 2024-03-15 RX ADMIN — HEPARIN SODIUM 15 UNITS/KG/HR: 10000 INJECTION, SOLUTION INTRAVENOUS at 12:03

## 2024-03-15 RX ADMIN — PIPERACILLIN AND TAZOBACTAM 4.5 G: 4; .5 INJECTION, POWDER, FOR SOLUTION INTRAVENOUS; PARENTERAL at 05:03

## 2024-03-15 RX ADMIN — MIDODRINE HYDROCHLORIDE 5 MG: 5 TABLET ORAL at 05:03

## 2024-03-15 RX ADMIN — PIPERACILLIN AND TAZOBACTAM 4.5 G: 4; .5 INJECTION, POWDER, FOR SOLUTION INTRAVENOUS; PARENTERAL at 09:03

## 2024-03-15 RX ADMIN — VANCOMYCIN HYDROCHLORIDE 1500 MG: 1 INJECTION, POWDER, LYOPHILIZED, FOR SOLUTION INTRAVENOUS at 06:03

## 2024-03-15 RX ADMIN — GABAPENTIN 100 MG: 100 CAPSULE ORAL at 03:03

## 2024-03-15 NOTE — ASSESSMENT & PLAN NOTE
>>ASSESSMENT AND PLAN FOR CELLULITIS OF LOWER EXTREMITY WRITTEN ON 3/15/2024 10:31 AM BY PEÑA BANDA,       In the past, wound grew pseudomonas and other GNB, but presence of GPC could not be entirely ruled out at this time and thus will empirically start pt on Vancomycin and Zosyn for now
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
## 2024-03-15 NOTE — ASSESSMENT & PLAN NOTE
>>ASSESSMENT AND PLAN FOR SEPSIS WRITTEN ON 3/15/2024 10:31 AM BY PEÑA BANDA, DO    This patient does have evidence of infective focus  My overall impression is sepsis.  Source: Skin and Soft Tissue (location BLE)    Antibiotics (72h ago, onward)      Vancomycin and Zosyn          Fluid challenge Not needed - patient is not hypotensive      Will Not start Pressors- Levophed for MAP of 65  Source control achieved by: Empiric ABX
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
## 2024-03-15 NOTE — ASSESSMENT & PLAN NOTE
>>ASSESSMENT AND PLAN FOR CELLULITIS OF LOWER EXTREMITY WRITTEN ON 3/15/2024 11:23 AM BY MEHREEN FENG ACNP    Worsening bilateral lower extremity wounds with yellowish drainage and malodor  Lactic acid 2.5 on admission now 1.4  White blood count 14  Currently on vancomycin Zosyn  Plan for OR tomorrow for debridement.  Procedure and risks/benefits explained to patient in detail.  Risks include worsening infection bleeding, unforeseen circumstances.  The patient in agreement to proceed with debridement  3/15:  Dr. Spring saw patient and decided to continue current treatment for lower extremity wounds.  The patient presently has worsening EF of 10% also being treated for DVT and PE.  Surgery will sign off this time please call with questions or concerns

## 2024-03-15 NOTE — ASSESSMENT & PLAN NOTE
Patient with known CAD s/p  unknown , which is controlled Will continue ASA and Statin and monitor for S/Sx of angina/ACS. Continue to monitor on telemetry.

## 2024-03-15 NOTE — SUBJECTIVE & OBJECTIVE
Interval History:  No acute events overnight    Medications:  Continuous Infusions:   DOBUTamine IV infusion (non-titrating) 2.5 mcg/kg/min (03/14/24 1223)    heparin (porcine) in D5W       Scheduled Meds:   albuterol-ipratropium  3 mL Nebulization Q6H    aspirin  81 mg Oral Daily    atorvastatin  40 mg Oral Daily    budesonide  0.5 mg Nebulization Q12H    furosemide (LASIX) injection  40 mg Intravenous Daily    gabapentin  100 mg Oral TID    heparin (PORCINE)  80 Units/kg (Adjusted) Intravenous Once    mupirocin   Nasal BID    piperacillin-tazobactam (Zosyn) IV (PEDS and ADULTS) (extended infusion is not appropriate)  4.5 g Intravenous Q8H    vancomycin (VANCOCIN) IV (PEDS and ADULTS)  1,500 mg Intravenous Q24H     PRN Meds:acetaminophen, dextrose 10%, dextrose 10%, glucagon (human recombinant), glucose, glucose, heparin (PORCINE), heparin (PORCINE), HYDROcodone-acetaminophen, hydrOXYzine pamoate, insulin aspart U-100, morphine, naloxone, ondansetron, prochlorperazine, sodium chloride 0.9%, vancomycin - pharmacy to dose     Review of patient's allergies indicates:  No Known Allergies  Objective:     Vital Signs (Most Recent):  Temp: 97.9 °F (36.6 °C) (03/15/24 1045)  Pulse: (!) 111 (03/15/24 1045)  Resp: 17 (03/15/24 1045)  BP: 103/60 (03/15/24 1045)  SpO2: 98 % (03/15/24 1045) Vital Signs (24h Range):  Temp:  [97.7 °F (36.5 °C)-99.2 °F (37.3 °C)] 97.9 °F (36.6 °C)  Pulse:  [] 111  Resp:  [10-20] 17  SpO2:  [77 %-100 %] 98 %  BP: ()/(49-88) 103/60     Weight: 77.7 kg (171 lb 4.8 oz)  Body mass index is 24.58 kg/m².    Intake/Output - Last 3 Shifts         03/13 0700  03/14 0659 03/14 0700  03/15 0659 03/15 0700 03/16 0659    IV Piggyback 450      Total Intake(mL/kg) 450 (5.8)      Urine (mL/kg/hr)  1000 (0.5)     Total Output  1000     Net +450 -1000                     Physical Exam  Vitals reviewed.   Constitutional:       General: He is not in acute distress.  HENT:      Mouth/Throat:      Mouth:  Mucous membranes are moist.   Eyes:      Extraocular Movements: Extraocular movements intact.   Cardiovascular:      Rate and Rhythm: Normal rate.      Pulses: Normal pulses.      Heart sounds: Normal heart sounds.   Pulmonary:      Effort: Pulmonary effort is normal.      Breath sounds: Normal breath sounds.   Abdominal:      General: Bowel sounds are normal. There is no distension.      Palpations: Abdomen is soft.      Tenderness: There is no abdominal tenderness.   Musculoskeletal:         General: Normal range of motion.   Skin:     General: Skin is warm.      Capillary Refill: Capillary refill takes less than 2 seconds.      Comments: Bilateral leg wounds with drainage   Neurological:      General: No focal deficit present.      Mental Status: He is alert and oriented to person, place, and time.   Psychiatric:         Mood and Affect: Mood normal.         Behavior: Behavior normal.          Significant Labs:  I have reviewed all pertinent lab results within the past 24 hours.    Significant Diagnostics:  I have reviewed all pertinent imaging results/findings within the past 24 hours.

## 2024-03-15 NOTE — ASSESSMENT & PLAN NOTE
"Palliative Care   Received consult from Dr. Wall.  Medical record reviewed.  Reach out to Dr. Sweet to discuss palliative care needs; recommendations appreciated.  Patient has a complex medical history and hospitalization.      Met with patient at patient's bedside.  He is laying in bed receiving blood transfusion with eyes closed.  Introduced myself and discussed role of palliative care.  Patient briefly opened 1 eye.  He reports he has significant pain in his right foot which is still 4/10 severity.  He is minimally conversive and required me asking questions multiple times to receive an answer.  I inquired if he was fatigued and offered to meet with him tomorrow morning, perhaps when he has more energy.  He reports this would be better.    Inquired if his family would be present tomorrow.  He reports his wife is not visiting as she just had surgery to remove a kidney due to renal failure.  He is unsure who is taking care of her though there oldest son lives with.  Patient's primary contact is daughter Kendra who patient reports is \"very intelligent.\"  Patient has 7 living children and 14 grandchildren.  Patient became less engaged and confirmed I would meet with him tomorrow morning.    Patient's nurse confirmed that patient's pain was very high this morning making patient challenging and now he is somewhat somnolent.  Will attempt to meet with him tomorrow morning.  Julisa Michaud, BREANA.P.R.N.  Palliative Care Nurse Practitioner  [phone]            " - Being followed by primary medical team  - Dr. Cobos reviewed CT, no pulmonary thrombectomy needed

## 2024-03-15 NOTE — ASSESSMENT & PLAN NOTE
"Patient's FSGs are controlled on current medication regimen.  Last A1c reviewed-   Lab Results   Component Value Date    HGBA1C 4.8 07/28/2023     Most recent fingerstick glucose reviewed- No results for input(s): "POCTGLUCOSE" in the last 24 hours.  Current correctional scale  Low  Maintain anti-hyperglycemic dose as follows-   Antihyperglycemics (From admission, onward)      Start     Stop Route Frequency Ordered    03/14/24 0520  insulin aspart U-100 injection 0-5 Units         -- SubQ Before meals & nightly PRN 03/14/24 0424          Hold Oral hypoglycemics while patient is in the hospital.  "

## 2024-03-15 NOTE — SUBJECTIVE & OBJECTIVE
Interval History:    No significant events overnight, no new complaints this morning. Cardiology consulted for PE with worsening CHF. Surgery aware with plans to defer non-emergent procedures for now.    Objective:     Vital Signs (Most Recent):  Temp: 99.2 °F (37.3 °C) (03/15/24 0642)  Pulse: (!) 117 (03/15/24 0642)  Resp: 20 (03/15/24 0642)  BP: (!) 93/54 (03/15/24 0642)  SpO2: 96 % (03/15/24 0642) Vital Signs (24h Range):  Temp:  [97.7 °F (36.5 °C)-99.2 °F (37.3 °C)] 99.2 °F (37.3 °C)  Pulse:  [] 117  Resp:  [10-20] 20  SpO2:  [77 %-100 %] 96 %  BP: ()/(49-88) 93/54     Weight: 77.7 kg (171 lb 4.8 oz)  Body mass index is 24.58 kg/m².     Physical Exam  Vitals reviewed.   Constitutional:       General: He is not in acute distress.     Appearance: Normal appearance.   Cardiovascular:      Rate and Rhythm: Regular rhythm. Tachycardia present.   Pulmonary:      Effort: Pulmonary effort is normal. No respiratory distress.      Breath sounds: Normal breath sounds.   Musculoskeletal:         General: Swelling and tenderness present.   Skin:     Capillary Refill: Capillary refill takes less than 2 seconds.      Comments: Edematous bilateral LE edema with erythema weeping with serosanguinous fluid. Purulent drainage present on the web spaces in the L foot.    Neurological:      General: No focal deficit present.      Mental Status: He is alert and oriented to person, place, and time.   Psychiatric:         Mood and Affect: Mood normal.         Behavior: Behavior normal.                Significant Labs: All pertinent labs within the past 24 hours have been reviewed.    Significant Imaging: I have reviewed all pertinent imaging results/findings within the past 24 hours.

## 2024-03-15 NOTE — ASSESSMENT & PLAN NOTE
Worsening bilateral lower extremity wounds with yellowish drainage and malodor  Lactic acid 2.5 on admission now 1.4  White blood count 14  Currently on vancomycin Zosyn  Plan for OR tomorrow for debridement.  Procedure and risks/benefits explained to patient in detail.  Risks include worsening infection bleeding, unforeseen circumstances.  The patient in agreement to proceed with debridement  3/15:  Dr. Spring saw patient and decided to continue current treatment for lower extremity wounds.  The patient presently has worsening EF of 10% also being treated for DVT and PE.  Surgery will sign off this time please call with questions or concerns

## 2024-03-15 NOTE — ASSESSMENT & PLAN NOTE
>>ASSESSMENT AND PLAN FOR SEPSIS WRITTEN ON 3/15/2024  2:15 PM BY BALA RAMIREZ FNP    - Being followed by primary medical team

## 2024-03-15 NOTE — ASSESSMENT & PLAN NOTE
Patient with Paroxysmal (<7 days) atrial fibrillation which is controlled currently with Beta Blocker. Patient is currently in sinus rhythm.[id number] Score: 4. HASBLED Score: 2. Anticoagulation indicated. Anticoagulation with heparin.

## 2024-03-15 NOTE — ASSESSMENT & PLAN NOTE
CT PE: Occlusive pulmonary embolus within the branch of the right pulmonary artery extending into the right lower lobe.  Infarct located within the right lower lobe possibly associated with superimposed pneumonia.     - heparin infusion per protocol

## 2024-03-15 NOTE — ASSESSMENT & PLAN NOTE
- NICM EF 35% 6/2023, [facility] 2019 normal coronaries  - Repeat echo with EF 20%, moderate RV enlargement with hyperdynamic systolic function, RA severely dilated, Mod-MR, PASP 58mmHg, elevated CVP 15mmHg  - On IV dobutamine 2.5mcg/kg/min  - IV lasix 40 held this morning for low BP; better now, will have RN give  - Albumin 1.8, give albumin 25% q 8 hours x 3 doses  - Further recommendations to follow per Dr. Cobos

## 2024-03-15 NOTE — HPI
"73-year-old male with a history of combined CHF with last known EF of 35% with chronically elevated proBNP ranging from 4 to 20,000, COPD, type 2 diabetes, and chronic bilateral lower extremity edema coupled with venous stasis dermatitis which has been frequently complicated by cellulitis requiring hospitalization who presented to emergency room as instructed by his home health nurse for evaluation of bilateral lower extremity wound which appeared to have been infected again.  Patient complained of burning type of pain +7/10 involving bilateral lower extremities, but otherwise denied any fever chills cough wheezing shortness of breath chest pain palpitation PND or orthopnea in association.     On initial presentation, patient was tachycardic with a heart rate in the 120s but vital signs were otherwise stable and patient was afebrile.  Workup was notable for chest x-ray demonstrating right-sided pleural effusion with right basilar density representing either atelectasis versus developing infiltrate, chronically elevated troponin in the 100s without any ischemic abnormalities seen on EKG, chronically elevated proBNP along with leukocytosis with left shift.      Patient will be admitted with a working diagnosis of sepsis secondary to bilateral lower extremity cellulitis in the setting of chronic venous stasis dermatitis and type 2 MI associated with acute decompensation of chronic combined heart failure    3/15/2024:  Cardiology consulted for worsening HFrEF. Patient seen today, reports chronic sob. Denies any worsening sob. States "I am only here for my legs and I am not having anything done except for my legs". He reports he is not ambulatory and gets around by wheelchair. Patient with hx of NICM, [facility] 2019 with normal coronaries.  "

## 2024-03-15 NOTE — PROGRESS NOTES
[facility]  General Surgery  Progress Note    Subjective:     History of Present Illness:  73-year-old male seen in consult by General surgery for worsening wounds of bilateral lower extremities past medical history includes COPD, DM 2, bilateral lower extremity venous stasis, HFrEF.  Admitted overnight after presentation to the emergency room complaining of worsening bilateral lower extremity wounds.  He apparently was sent by his home health nurse due to concern of infection.  Patient complains of burning pain both legs.  No reported fevers, chills, shortness of breath, nausea or vomiting.        Post-Op Info:  Procedure(s) (LRB):  DEBRIDEMENT, LOWER EXTREMITY (Bilateral)         Interval History:  No acute events overnight    Medications:  Continuous Infusions:   DOBUTamine IV infusion (non-titrating) 2.5 mcg/kg/min (03/14/24 1223)    heparin (porcine) in D5W       Scheduled Meds:   albuterol-ipratropium  3 mL Nebulization Q6H    aspirin  81 mg Oral Daily    atorvastatin  40 mg Oral Daily    budesonide  0.5 mg Nebulization Q12H    furosemide (LASIX) injection  40 mg Intravenous Daily    gabapentin  100 mg Oral TID    heparin (PORCINE)  80 Units/kg (Adjusted) Intravenous Once    mupirocin   Nasal BID    piperacillin-tazobactam (Zosyn) IV (PEDS and ADULTS) (extended infusion is not appropriate)  4.5 g Intravenous Q8H    vancomycin (VANCOCIN) IV (PEDS and ADULTS)  1,500 mg Intravenous Q24H     PRN Meds:acetaminophen, dextrose 10%, dextrose 10%, glucagon (human recombinant), glucose, glucose, heparin (PORCINE), heparin (PORCINE), HYDROcodone-acetaminophen, hydrOXYzine pamoate, insulin aspart U-100, morphine, naloxone, ondansetron, prochlorperazine, sodium chloride 0.9%, vancomycin - pharmacy to dose     Review of patient's allergies indicates:  No Known Allergies  Objective:     Vital Signs (Most Recent):  Temp: 97.9 °F (36.6 °C) (03/15/24 1045)  Pulse: (!) 111 (03/15/24 1045)  Resp: 17  (03/15/24 1045)  BP: 103/60 (03/15/24 1045)  SpO2: 98 % (03/15/24 1045) Vital Signs (24h Range):  Temp:  [97.7 °F (36.5 °C)-99.2 °F (37.3 °C)] 97.9 °F (36.6 °C)  Pulse:  [] 111  Resp:  [10-20] 17  SpO2:  [77 %-100 %] 98 %  BP: ()/(49-88) 103/60     Weight: 77.7 kg (171 lb 4.8 oz)  Body mass index is 24.58 kg/m².    Intake/Output - Last 3 Shifts         03/13 0700  03/14 0659 03/14 0700  03/15 0659 03/15 0700  03/16 0659    IV Piggyback 450      Total Intake(mL/kg) 450 (5.8)      Urine (mL/kg/hr)  1000 (0.5)     Total Output  1000     Net +450 -1000                     Physical Exam  Vitals reviewed.   Constitutional:       General: He is not in acute distress.  HENT:      Mouth/Throat:      Mouth: Mucous membranes are moist.   Eyes:      Extraocular Movements: Extraocular movements intact.   Cardiovascular:      Rate and Rhythm: Normal rate.      Pulses: Normal pulses.      Heart sounds: Normal heart sounds.   Pulmonary:      Effort: Pulmonary effort is normal.      Breath sounds: Normal breath sounds.   Abdominal:      General: Bowel sounds are normal. There is no distension.      Palpations: Abdomen is soft.      Tenderness: There is no abdominal tenderness.   Musculoskeletal:         General: Normal range of motion.   Skin:     General: Skin is warm.      Capillary Refill: Capillary refill takes less than 2 seconds.      Comments: Bilateral leg wounds with drainage   Neurological:      General: No focal deficit present.      Mental Status: He is alert and oriented to person, place, and time.   Psychiatric:         Mood and Affect: Mood normal.         Behavior: Behavior normal.          Significant Labs:  I have reviewed all pertinent lab results within the past 24 hours.    Significant Diagnostics:  I have reviewed all pertinent imaging results/findings within the past 24 hours.  Assessment/Plan:     Cellulitis of lower extremity  Worsening bilateral lower extremity wounds with yellowish drainage and  malodor  Lactic acid 2.5 on admission now 1.4  White blood count 14  Currently on vancomycin Zosyn  Plan for OR tomorrow for debridement.  Procedure and risks/benefits explained to patient in detail.  Risks include worsening infection bleeding, unforeseen circumstances.  The patient in agreement to proceed with debridement  3/15:  Dr. Spring saw patient and decided to continue current treatment for lower extremity wounds.  The patient presently has worsening EF of 10% also being treated for DVT and PE.  Surgery will sign off this time please call with questions or concerns        Fredy Don, [facility]  General Surgery  [facility]

## 2024-03-15 NOTE — PROGRESS NOTES
[facility] Hospital Medicine  Progress Note    Patient Name: Esthela Contreras  MRN: [id number]  Patient Class: IP- Inpatient   Admission Date: 3/13/2024  Length of Stay: 1 days  Attending Physician: Linda Contreras DO  Primary Care Provider: Yolande Spring FNP        Subjective:     Principal Problem:Sepsis        HPI:  Patient is a 73-year-old male with a history of combined CHF with last known EF of 35% with chronically elevated proBNP ranging from 4 to 20,000, COPD, type 2 diabetes, and chronic bilateral lower extremity edema coupled with venous stasis dermatitis which has been frequently complicated by cellulitis requiring hospitalization who presented to emergency room as instructed by his home health nurse for evaluation of bilateral lower extremity wound which appeared to have been infected again.  Patient complained of burning type of pain +7/10 involving bilateral lower extremities, but otherwise denied any fever chills cough wheezing shortness of breath chest pain palpitation PND or orthopnea in association.    On initial presentation, patient was tachycardic with a heart rate in the 120s but vital signs were otherwise stable and patient was afebrile.  Workup was notable for chest x-ray demonstrating right-sided pleural effusion with right basilar density representing either atelectasis versus developing infiltrate, chronically elevated troponin in the 100s without any ischemic abnormalities seen on EKG, chronically elevated proBNP along with leukocytosis with left shift.     Patient will be admitted with a working diagnosis of sepsis secondary to bilateral lower extremity cellulitis in the setting of chronic venous stasis dermatitis and type 2 MI associated with acute decompensation of chronic combined heart failure.        Overview/Hospital Course:  No notes on file    Interval History:    No significant events overnight, no new complaints this morning. Cardiology consulted for PE  with worsening CHF. Surgery aware with plans to defer non-emergent procedures for now.    Objective:     Vital Signs (Most Recent):  Temp: 99.2 °F (37.3 °C) (03/15/24 0642)  Pulse: (!) 117 (03/15/24 0642)  Resp: 20 (03/15/24 0642)  BP: (!) 93/54 (03/15/24 0642)  SpO2: 96 % (03/15/24 0642) Vital Signs (24h Range):  Temp:  [97.7 °F (36.5 °C)-99.2 °F (37.3 °C)] 99.2 °F (37.3 °C)  Pulse:  [] 117  Resp:  [10-20] 20  SpO2:  [77 %-100 %] 96 %  BP: ()/(49-88) 93/54     Weight: 77.7 kg (171 lb 4.8 oz)  Body mass index is 24.58 kg/m².     Physical Exam  Vitals reviewed.   Constitutional:       General: He is not in acute distress.     Appearance: Normal appearance.   Cardiovascular:      Rate and Rhythm: Regular rhythm. Tachycardia present.   Pulmonary:      Effort: Pulmonary effort is normal. No respiratory distress.      Breath sounds: Normal breath sounds.   Musculoskeletal:         General: Swelling and tenderness present.   Skin:     Capillary Refill: Capillary refill takes less than 2 seconds.      Comments: Edematous bilateral LE edema with erythema weeping with serosanguinous fluid. Purulent drainage present on the web spaces in the L foot.    Neurological:      General: No focal deficit present.      Mental Status: He is alert and oriented to person, place, and time.   Psychiatric:         Mood and Affect: Mood normal.         Behavior: Behavior normal.                Significant Labs: All pertinent labs within the past 24 hours have been reviewed.    Significant Imaging: I have reviewed all pertinent imaging results/findings within the past 24 hours.    Assessment/Plan:      * Sepsis  This patient does have evidence of infective focus  My overall impression is sepsis.  Source: Skin and Soft Tissue (location BLE)    Antibiotics (72h ago, onward)      Vancomycin and Zosyn          Fluid challenge Not needed - patient is not hypotensive      Will Not start Pressors- Levophed for MAP of 65  Source control  "achieved by: [employer] ABX      Pulmonary embolus with infarction  CT PE: Occlusive pulmonary embolus within the branch of the right pulmonary artery extending into the right lower lobe.  Infarct located within the right lower lobe possibly associated with superimposed pneumonia.     - heparin infusion per protocol    A-fib  Patient with Paroxysmal (<7 days) atrial fibrillation which is controlled currently with Beta Blocker. Patient is currently in sinus rhythm.[id number] Score: 4. HASBLED Score: 2. Anticoagulation indicated. Anticoagulation with heparin.    Type 2 diabetes mellitus  Patient's FSGs are controlled on current medication regimen.  Last A1c reviewed-   Lab Results   Component Value Date    HGBA1C 4.8 07/28/2023     Most recent fingerstick glucose reviewed- No results for input(s): "POCTGLUCOSE" in the last 24 hours.  Current correctional scale  Low  Maintain anti-hyperglycemic dose as follows-   Antihyperglycemics (From admission, onward)      Start     Stop Route Frequency Ordered    03/14/24 0520  insulin aspart U-100 injection 0-5 Units         -- SubQ Before meals & nightly PRN 03/14/24 0424          Hold Oral hypoglycemics while patient is in the hospital.    COPD (chronic obstructive pulmonary disease)  Patient's COPD is controlled currently.  Patient is currently off COPD Pathway. Continue scheduled inhalers Supplemental oxygen and monitor respiratory status closely.     Cellulitis of lower extremity    In the past, wound grew pseudomonas and other GNB, but presence of GPC could not be entirely ruled out at this time and thus will empirically start pt on Vancomycin and Zosyn for now      Coronary artery disease involving native coronary artery of native heart without angina pectoris  Patient with known CAD s/p  unknown , which is controlled Will continue ASA and Statin and monitor for S/Sx of angina/ACS. Continue to monitor on telemetry.     Chronic HFrEF (heart failure with reduced ejection " "fraction)    Patient is identified as having Systolic (HFrEF) heart failure that is Acute on chronic. CHF is currently controlled. Latest ECHO performed and demonstrates- Results for orders placed during the hospital encounter of 03/13/24    Echo    Interpretation Summary    Left Ventricle: The left ventricle is mildly dilated. Mildly increased wall thickness. There is concentric hypertrophy. Severe global hypokinesis present. There is reduced systolic function. Ejection fraction by visual approximation is 20%.    Right Ventricle: Moderate right ventricular enlargement. Systolic function is hyperdynamic.    Left Atrium: Left atrium is mildly dilated.    Right Atrium: Right atrium is severely dilated.    Aortic Valve: The aortic valve is a trileaflet valve. Moderately calcified cusps.    Mitral Valve: There is mild bileaflet sclerosis. Mildly thickened leaflets. There is no stenosis. The mean pressure gradient across the mitral valve is 4 mmHg at a heart rate of  bpm. There is moderate regurgitation with an eccentric jet.    Tricuspid Valve: There is mild regurgitation with an eccentrically directed jet.    Pulmonary Artery: The estimated pulmonary artery systolic pressure is 58 mmHg.    IVC/SVC: Elevated venous pressure at 15 mmHg.    Pericardium: There is a trivial effusion. No indication of cardiac tamponade.    Continue Furosemide and monitor clinical status closely. Monitor on telemetry. Patient is off CHF pathway.  Monitor strict Is&Os and daily weights.  Place on fluid restriction of  n/a . Cardiology has been consulted. Continue to stress to patient importance of self efficacy and  on diet for CHF. Last BNP reviewed- and noted below No results for input(s): "BNP", "BNPTRIAGEBLO" in the last 168 hours.      VTE Risk Mitigation (From admission, onward)           Ordered     heparin 25,000 units in dextrose 5% (100 units/ml) IV bolus from bag HIGH INTENSITY nomogram - [facility]  Once        Question:  Heparin " Infusion Adjustment (DO NOT MODIFY ANSWER)  Answer:  \\[email or web address]\epic\Images\Pharmacy\HeparinInfusions\heparin HIGH INTENSITY nomogram for [facility] [id number].pdf    03/15/24 1041     heparin 25,000 units in dextrose 5% 250 mL (100 units/mL) infusion HIGH INTENSITY nomogram - [facility]  Continuous        Question:  Begin at (units/kg/hr)  Answer:  18    03/15/24 1041     heparin 25,000 units in dextrose 5% (100 units/ml) IV bolus from bag HIGH INTENSITY nomogram - [facility]  As needed (PRN)        Question:  Heparin Infusion Adjustment (DO NOT MODIFY ANSWER)  Answer:  [email or web address]\epic\Images\Pharmacy\HeparinInfusions\[facility]parin [facility] [id number]    03/15/24 1041     heparin 25,000 units in dextrose 5% (100 units/ml) IV bolus from bag HIGH INTENSITY nomogram - [facility]  As needed (PRN)        Question:  Heparin Infusion Adjustment (DO NOT MODIFY ANSWER)  Answer:  \\[email or web address]\epic\Images\Pharmacy\HeparinInfusions\heparin HIGH INTENSITY nomogram for [facility] [id number].pdf    03/15/24 1041     Reason for no Mechanical VTE Prophylaxis  Once        Comments: BLE wound and cellulitis   Question:  Reasons:  Answer:  Physician Provided (leave comment)    03/14/24 0424     IP VTE HIGH RISK PATIENT  Once         03/14/24 0424                    Discharge Planning   MAKENNA:      Code Status: Full Code   Is the patient medically ready for discharge?:     Reason for patient still in hospital (select all that apply): Treatment                     Linda Contreras DO  Department of Hospital Medicine   [facility]

## 2024-03-15 NOTE — ASSESSMENT & PLAN NOTE
"  Patient is identified as having Systolic (HFrEF) heart failure that is Acute on chronic. CHF is currently controlled. Latest ECHO performed and demonstrates- Results for orders placed during the hospital encounter of 03/13/24    Echo    Interpretation Summary    Left Ventricle: The left ventricle is mildly dilated. Mildly increased wall thickness. There is concentric hypertrophy. Severe global hypokinesis present. There is reduced systolic function. Ejection fraction by visual approximation is 20%.    Right Ventricle: Moderate right ventricular enlargement. Systolic function is hyperdynamic.    Left Atrium: Left atrium is mildly dilated.    Right Atrium: Right atrium is severely dilated.    Aortic Valve: The aortic valve is a trileaflet valve. Moderately calcified cusps.    Mitral Valve: There is mild bileaflet sclerosis. Mildly thickened leaflets. There is no stenosis. The mean pressure gradient across the mitral valve is 4 mmHg at a heart rate of  bpm. There is moderate regurgitation with an eccentric jet.    Tricuspid Valve: There is mild regurgitation with an eccentrically directed jet.    Pulmonary Artery: The estimated pulmonary artery systolic pressure is 58 mmHg.    IVC/SVC: Elevated venous pressure at 15 mmHg.    Pericardium: There is a trivial effusion. No indication of cardiac tamponade.    Continue Furosemide and monitor clinical status closely. Monitor on telemetry. Patient is off CHF pathway.  Monitor strict Is&Os and daily weights.  Place on fluid restriction of  n/a . Cardiology has been consulted. Continue to stress to patient importance of self efficacy and  on diet for CHF. Last BNP reviewed- and noted below No results for input(s): "BNP", "BNPTRIAGEBLO" in the last 168 hours.  "

## 2024-03-16 PROBLEM — A41.9 SEPSIS DUE TO CELLULITIS: Status: ACTIVE | Noted: 2023-06-01

## 2024-03-16 PROBLEM — L03.90 SEPSIS DUE TO CELLULITIS: Status: ACTIVE | Noted: 2023-06-01

## 2024-03-16 PROBLEM — R78.81 POSITIVE BLOOD CULTURE: Status: ACTIVE | Noted: 2024-03-16

## 2024-03-16 PROBLEM — L03.119 CELLULITIS OF LOWER EXTREMITY: Status: ACTIVE | Noted: 2024-03-16

## 2024-03-16 LAB
ANION GAP SERPL CALCULATED.3IONS-SCNC: 10 MMOL/L (ref 7–16)
APTT PPP: 112.5 SECONDS (ref 25.2–37.3)
APTT PPP: 71.2 SECONDS (ref 25.2–37.3)
APTT PPP: 77.8 SECONDS (ref 25.2–37.3)
BASOPHILS # BLD AUTO: 0.05 K/UL (ref 0–0.2)
BASOPHILS NFR BLD AUTO: 0.7 % (ref 0–1)
BUN SERPL-MCNC: 37 MG/DL (ref 7–18)
BUN/CREAT SERPL: 19 (ref 6–20)
CALCIUM SERPL-MCNC: 8.6 MG/DL (ref 8.5–10.1)
CHLORIDE SERPL-SCNC: 107 MMOL/L (ref 98–107)
CO2 SERPL-SCNC: 24 MMOL/L (ref 21–32)
CREAT SERPL-MCNC: 1.92 MG/DL (ref 0.7–1.3)
DIFFERENTIAL METHOD BLD: ABNORMAL
EGFR (NO RACE VARIABLE) (RUSH/TITUS): 36 ML/MIN/1.73M2
EOSINOPHIL # BLD AUTO: 0.16 K/UL (ref 0–0.5)
EOSINOPHIL NFR BLD AUTO: 2.4 % (ref 1–4)
ERYTHROCYTE [DISTWIDTH] IN BLOOD BY AUTOMATED COUNT: 18.9 % (ref 11.5–14.5)
GLUCOSE SERPL-MCNC: 101 MG/DL (ref 70–105)
GLUCOSE SERPL-MCNC: 110 MG/DL (ref 70–105)
GLUCOSE SERPL-MCNC: 112 MG/DL (ref 70–105)
GLUCOSE SERPL-MCNC: 81 MG/DL (ref 74–106)
HCT VFR BLD AUTO: 26.1 % (ref 40–54)
HGB BLD-MCNC: 7.9 G/DL (ref 13.5–18)
IMM GRANULOCYTES # BLD AUTO: 0.03 K/UL (ref 0–0.04)
IMM GRANULOCYTES NFR BLD: 0.4 % (ref 0–0.4)
LYMPHOCYTES # BLD AUTO: 0.95 K/UL (ref 1–4.8)
LYMPHOCYTES NFR BLD AUTO: 14.2 % (ref 27–41)
MCH RBC QN AUTO: 24.8 PG (ref 27–31)
MCHC RBC AUTO-ENTMCNC: 30.3 G/DL (ref 32–36)
MCV RBC AUTO: 82.1 FL (ref 80–96)
MONOCYTES # BLD AUTO: 0.58 K/UL (ref 0–0.8)
MONOCYTES NFR BLD AUTO: 8.6 % (ref 2–6)
MPC BLD CALC-MCNC: 9.9 FL (ref 9.4–12.4)
NEUTROPHILS # BLD AUTO: 4.94 K/UL (ref 1.8–7.7)
NEUTROPHILS NFR BLD AUTO: 73.7 % (ref 53–65)
NRBC # BLD AUTO: 0.02 X10E3/UL
NRBC, AUTO (.00): 0.3 %
PLATELET # BLD AUTO: 101 K/UL (ref 150–400)
POTASSIUM SERPL-SCNC: 5.1 MMOL/L (ref 3.5–5.1)
RBC # BLD AUTO: 3.18 M/UL (ref 4.6–6.2)
SODIUM SERPL-SCNC: 136 MMOL/L (ref 136–145)
WBC # BLD AUTO: 6.71 K/UL (ref 4.5–11)

## 2024-03-16 PROCEDURE — 63600175 PHARM REV CODE 636 W HCPCS: Mod: JZ,JG | Performed by: NURSE PRACTITIONER

## 2024-03-16 PROCEDURE — 80048 BASIC METABOLIC PNL TOTAL CA: CPT | Performed by: INTERNAL MEDICINE

## 2024-03-16 PROCEDURE — P9047 ALBUMIN (HUMAN), 25%, 50ML: HCPCS | Mod: JZ,JG | Performed by: NURSE PRACTITIONER

## 2024-03-16 PROCEDURE — 99233 SBSQ HOSP IP/OBS HIGH 50: CPT | Mod: ,,, | Performed by: FAMILY MEDICINE

## 2024-03-16 PROCEDURE — 63600175 PHARM REV CODE 636 W HCPCS: Mod: JZ,JG | Performed by: FAMILY MEDICINE

## 2024-03-16 PROCEDURE — 82962 GLUCOSE BLOOD TEST: CPT

## 2024-03-16 PROCEDURE — 11000001 HC ACUTE MED/SURG PRIVATE ROOM

## 2024-03-16 PROCEDURE — 85025 COMPLETE CBC W/AUTO DIFF WBC: CPT | Performed by: INTERNAL MEDICINE

## 2024-03-16 PROCEDURE — 94761 N-INVAS EAR/PLS OXIMETRY MLT: CPT

## 2024-03-16 PROCEDURE — 63600175 PHARM REV CODE 636 W HCPCS: Performed by: INTERNAL MEDICINE

## 2024-03-16 PROCEDURE — 25000003 PHARM REV CODE 250: Performed by: INTERNAL MEDICINE

## 2024-03-16 PROCEDURE — 85730 THROMBOPLASTIN TIME PARTIAL: CPT | Performed by: FAMILY MEDICINE

## 2024-03-16 PROCEDURE — 87070 CULTURE OTHR SPECIMN AEROBIC: CPT | Performed by: FAMILY MEDICINE

## 2024-03-16 PROCEDURE — 25000003 PHARM REV CODE 250: Performed by: FAMILY MEDICINE

## 2024-03-16 RX ORDER — FUROSEMIDE 40 MG/1
40 TABLET ORAL DAILY
Status: DISCONTINUED | OUTPATIENT
Start: 2024-03-17 | End: 2024-03-26 | Stop reason: HOSPADM

## 2024-03-16 RX ORDER — ENOXAPARIN SODIUM 100 MG/ML
1 INJECTION SUBCUTANEOUS EVERY 12 HOURS
Status: DISCONTINUED | OUTPATIENT
Start: 2024-03-16 | End: 2024-03-17

## 2024-03-16 RX ORDER — HYDRALAZINE HYDROCHLORIDE 10 MG/1
10 TABLET, FILM COATED ORAL EVERY 12 HOURS
Status: DISCONTINUED | OUTPATIENT
Start: 2024-03-16 | End: 2024-03-26 | Stop reason: HOSPADM

## 2024-03-16 RX ADMIN — MUPIROCIN: 20 OINTMENT TOPICAL at 09:03

## 2024-03-16 RX ADMIN — MIDODRINE HYDROCHLORIDE 5 MG: 5 TABLET ORAL at 11:03

## 2024-03-16 RX ADMIN — PIPERACILLIN AND TAZOBACTAM 4.5 G: 4; .5 INJECTION, POWDER, FOR SOLUTION INTRAVENOUS; PARENTERAL at 04:03

## 2024-03-16 RX ADMIN — GABAPENTIN 100 MG: 100 CAPSULE ORAL at 09:03

## 2024-03-16 RX ADMIN — PIPERACILLIN AND TAZOBACTAM 4.5 G: 4; .5 INJECTION, POWDER, FOR SOLUTION INTRAVENOUS; PARENTERAL at 09:03

## 2024-03-16 RX ADMIN — PIPERACILLIN AND TAZOBACTAM 4.5 G: 4; .5 INJECTION, POWDER, FOR SOLUTION INTRAVENOUS; PARENTERAL at 01:03

## 2024-03-16 RX ADMIN — ASPIRIN 81 MG: 81 TABLET, COATED ORAL at 09:03

## 2024-03-16 RX ADMIN — HEPARIN SODIUM 15 UNITS/KG/HR: 10000 INJECTION, SOLUTION INTRAVENOUS at 04:03

## 2024-03-16 RX ADMIN — VANCOMYCIN HYDROCHLORIDE 1500 MG: 1 INJECTION, POWDER, LYOPHILIZED, FOR SOLUTION INTRAVENOUS at 06:03

## 2024-03-16 RX ADMIN — MIDODRINE HYDROCHLORIDE 5 MG: 5 TABLET ORAL at 04:03

## 2024-03-16 RX ADMIN — MORPHINE SULFATE 2 MG: 2 INJECTION, SOLUTION INTRAMUSCULAR; INTRAVENOUS at 06:03

## 2024-03-16 RX ADMIN — ALBUMIN (HUMAN) 25 G: 12.5 SOLUTION INTRAVENOUS at 05:03

## 2024-03-16 RX ADMIN — MIDODRINE HYDROCHLORIDE 5 MG: 5 TABLET ORAL at 09:03

## 2024-03-16 RX ADMIN — ATORVASTATIN CALCIUM 40 MG: 40 TABLET, FILM COATED ORAL at 09:03

## 2024-03-16 RX ADMIN — FUROSEMIDE 40 MG: 10 INJECTION, SOLUTION INTRAVENOUS at 11:03

## 2024-03-16 NOTE — PROGRESS NOTES
Pharmacy assisting in the management of vancomycin for this patient for: BLE wounds    Clinical info: scr steadily rising, received contrast yesterday. Will likely improve but will hold vanc until tomorrow    Vancomycin level being checked in the morning. Will hold vanc for now til we get a level.      Pharmacy will continue to monitor daily and make adjustments as needed.    Demetria Vallejo, PharmD  [phone]

## 2024-03-16 NOTE — PROGRESS NOTES
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  Patient complained of burning type of pain +7/10 involving bilateral lower extremities, but otherwise denied any fever chills cough wheezing shortness of breath chest pain palpitation PND or orthopnea in association.    On initial presentation, patient was tachycardic with a heart rate in the 120s but vital signs were otherwise stable and patient was afebrile.  Workup was notable for chest x-ray demonstrating right-sided pleural effusion with right basilar density representing either atelectasis versus developing infiltrate, chronically elevated troponin in the 100s without any ischemic abnormalities seen on EKG, chronically elevated proBNP along with leukocytosis with left shift.     Patient will be admitted with a working diagnosis of sepsis secondary to bilateral lower extremity cellulitis in the setting of chronic venous stasis dermatitis and type 2 MI associated with acute decompensation of chronic combined heart failure.        Overview/Hospital Course:  No notes on file    Interval History:    No significant events overnight, seen resting comfortably this morning.  Some soft blood pressure since dobutamine discontinued however adequate currently. Will transition from heparin infusion to Lovenox today.    Objective:     Vital Signs (Most Recent):  Temp: 97.7 °F (36.5 °C) (03/16/24 1037)  Pulse: 101 (03/16/24 1037)  Resp: 20 (03/16/24 1037)  BP: 110/78 (03/16/24 1037)  SpO2: 99 % (03/16/24 1037) Vital Signs (24h Range):  Temp:  [97.5 °F (36.4 °C)-98.3 °F (36.8 °C)] 97.7 °F (36.5 °C)  Pulse:  [] 101  Resp:  [16-20] 20  SpO2:  [97 %-100 %] 99 %  BP: ()/(60-78) 110/78     Weight: 77.7 kg (171 lb 4.8 oz)  Body mass index is 24.58 kg/m².     Physical Exam  Vitals reviewed.   Constitutional:       General: He is not in acute distress.     Appearance: Normal appearance.   Cardiovascular:      Rate and Rhythm: Regular rhythm. Tachycardia present.   Pulmonary:      Effort: Pulmonary effort is normal. No respiratory distress.      Breath sounds: Normal breath sounds.   Musculoskeletal:         General: Swelling and tenderness present.   Skin:     Capillary Refill: Capillary refill takes less than 2 seconds.      Comments: Edematous bilateral LE edema with erythema weeping with serosanguinous fluid. Purulent drainage present on the web spaces in the L foot.    Neurological:      General: No focal deficit present.      Mental Status: He is alert and oriented to person, place, and time.   Psychiatric:         Mood and Affect: Mood normal.         Behavior: Behavior normal.                Significant Labs: All pertinent labs within the past 24 hours have been reviewed.    Significant Imaging: I have reviewed all pertinent imaging results/findings within the past 24 hours.      Assessment/Plan:      * Sepsis due to cellulitis  >>ASSESSMENT AND PLAN FOR CELLULITIS OF LOWER EXTREMITY WRITTEN ON 3/15/2024 10:31 AM BY PEÑA BANDA DO    Unfortunately patient is a poor candidate for non-emergent surgical debridement at this time due to heart failure and pulmonary embolism with  infarct. Continue IV antibiotics and local wound care.    This patient does have evidence of infective focus  My overall impression is sepsis.  Source: Skin and Soft Tissue (location lower extremities)  Antibiotics given-   Antibiotics (72h ago, onward)      Start     Stop Route Frequency Ordered    03/14/24 0900  mupirocin 2 % ointment         03/19/24 0859 Nasl 2 times daily 03/14/24 0446    03/14/24 0830  piperacillin-tazobactam (ZOSYN) 4.5 g in dextrose 5 % in water (D5W) 100 mL IVPB (MB+)         -- IV Every 8 hours (non-standard times) 03/14/24 0424    03/14/24 0706  vancomycin - pharmacy to dose         -- IV pharmacy to manage frequency 03/14/24 0606          Latest lactate reviewed-  Recent Labs   Lab 03/14/24  0922   LACTATE 2.0     Organ dysfunction indicated by Acute kidney injury    Fluid challenge Contraindicated- Fluid bolus is contraindicated in this patient due to Congestive Heart Failure     Post- resuscitation assessment No - Post resuscitation assessment not needed       Will Not start Pressors- Levophed for MAP of 65  Source control achieved by: antibiotics, local wound care     In the past, wound grew pseudomonas and other GNB, but presence of GPC could not be entirely ruled out at this time and thus will empirically start pt on Vancomycin and Zosyn for now    Wound culture pending.      Pulmonary embolus with infarction  Transition from heparin infusion to weight-based Lovenox today.    CT PE: Occlusive pulmonary embolus within the branch of the right pulmonary artery extending into the right lower lobe.  Infarct located within the right lower lobe possibly associated with superimposed pneumonia.         Positive blood culture  Blood culture #1 of 2 positive - Verigene MRSE and Strep species. Possible contaminant as only one sample with growth to date. Will continue antibiotics and repeat cultures.       A-fib  Patient with Paroxysmal (<7 days) atrial fibrillation which is controlled currently  "with Beta Blocker. Patient is currently in sinus rhythm.[id number] Score: 4. HASBLED Score: 2. Anticoagulation indicated. Anticoagulation with heparin.    Type 2 diabetes mellitus  Patient's FSGs are controlled on current medication regimen.  Last A1c reviewed-   Lab Results   Component Value Date    HGBA1C 4.8 07/28/2023     Most recent fingerstick glucose reviewed- No results for input(s): "POCTGLUCOSE" in the last 24 hours.  Current correctional scale  Low  Maintain anti-hyperglycemic dose as follows-   Antihyperglycemics (From admission, onward)      Start     Stop Route Frequency Ordered    03/14/24 0520  insulin aspart U-100 injection 0-5 Units         -- SubQ Before meals & nightly PRN 03/14/24 0424          Hold Oral hypoglycemics while patient is in the hospital.    COPD (chronic obstructive pulmonary disease)  Patient's COPD is controlled currently.  Patient is currently off COPD Pathway. Continue PRN nebs, Supplemental oxygen and monitor respiratory status closely.     Coronary artery disease involving native coronary artery of native heart without angina pectoris  Patient with known CAD s/p  unknown , which is controlled Will continue ASA and Statin and monitor for S/Sx of angina/ACS. Continue to monitor on telemetry.     Chronic HFrEF (heart failure with reduced ejection fraction)  On dobutamine infusion until yesterday, BP currently soft but adequate. Will resume home midodrine and monitor.   ------  Patient is identified as having Systolic (HFrEF) heart failure that is Acute on chronic. CHF is currently controlled. Latest ECHO performed and demonstrates- Results for orders placed during the hospital encounter of 03/13/24    Echo    Interpretation Summary    Left Ventricle: The left ventricle is mildly dilated. Mildly increased wall thickness. There is concentric hypertrophy. Severe global hypokinesis present. There is reduced systolic function. Ejection fraction by visual approximation is 20%.    " "Right Ventricle: Moderate right ventricular enlargement. Systolic function is hyperdynamic.    Left Atrium: Left atrium is mildly dilated.    Right Atrium: Right atrium is severely dilated.    Aortic Valve: The aortic valve is a trileaflet valve. Moderately calcified cusps.    Mitral Valve: There is mild bileaflet sclerosis. Mildly thickened leaflets. There is no stenosis. The mean pressure gradient across the mitral valve is 4 mmHg at a heart rate of  bpm. There is moderate regurgitation with an eccentric jet.    Tricuspid Valve: There is mild regurgitation with an eccentrically directed jet.    Pulmonary Artery: The estimated pulmonary artery systolic pressure is 58 mmHg.    IVC/SVC: Elevated venous pressure at 15 mmHg.    Pericardium: There is a trivial effusion. No indication of cardiac tamponade.    Continue Furosemide and monitor clinical status closely. Monitor on telemetry. Patient is off CHF pathway.  Monitor strict Is&Os and daily weights.  Place on fluid restriction of  n/a . Cardiology has been consulted. Continue to stress to patient importance of self efficacy and  on diet for CHF. Last BNP reviewed- and noted below No results for input(s): "BNP", "BNPTRIAGEBLO" in the last 168 hours.      VTE Risk Mitigation (From admission, onward)           Ordered     enoxaparin injection 80 mg  Every 12 hours         03/16/24 1111     Reason for no Mechanical VTE Prophylaxis  Once        Comments: BLE wound and cellulitis   Question:  Reasons:  Answer:  Physician Provided (leave comment)    03/14/24 0424     IP VTE HIGH RISK PATIENT  Once         03/14/24 0424                    Discharge Planning   MAKENNA:      Code Status: Full Code   Is the patient medically ready for discharge?:     Reason for patient still in hospital (select all that apply): Treatment                     Linda Contreras DO  Department of Hospital Medicine   [facility]    "

## 2024-03-16 NOTE — ASSESSMENT & PLAN NOTE
Blood culture #1 of 2 positive - Verigene MRSE and Strep species. Possible contaminant as only one sample with growth to date. Will continue antibiotics and repeat cultures.

## 2024-03-16 NOTE — ASSESSMENT & PLAN NOTE
"On dobutamine infusion until yesterday, BP currently soft but adequate. Will resume home midodrine and monitor.   ------  Patient is identified as having Systolic (HFrEF) heart failure that is Acute on chronic. CHF is currently controlled. Latest ECHO performed and demonstrates- Results for orders placed during the hospital encounter of 03/13/24    Echo    Interpretation Summary    Left Ventricle: The left ventricle is mildly dilated. Mildly increased wall thickness. There is concentric hypertrophy. Severe global hypokinesis present. There is reduced systolic function. Ejection fraction by visual approximation is 20%.    Right Ventricle: Moderate right ventricular enlargement. Systolic function is hyperdynamic.    Left Atrium: Left atrium is mildly dilated.    Right Atrium: Right atrium is severely dilated.    Aortic Valve: The aortic valve is a trileaflet valve. Moderately calcified cusps.    Mitral Valve: There is mild bileaflet sclerosis. Mildly thickened leaflets. There is no stenosis. The mean pressure gradient across the mitral valve is 4 mmHg at a heart rate of  bpm. There is moderate regurgitation with an eccentric jet.    Tricuspid Valve: There is mild regurgitation with an eccentrically directed jet.    Pulmonary Artery: The estimated pulmonary artery systolic pressure is 58 mmHg.    IVC/SVC: Elevated venous pressure at 15 mmHg.    Pericardium: There is a trivial effusion. No indication of cardiac tamponade.    Continue Furosemide and monitor clinical status closely. Monitor on telemetry. Patient is off CHF pathway.  Monitor strict Is&Os and daily weights.  Place on fluid restriction of  n/a . Cardiology has been consulted. Continue to stress to patient importance of self efficacy and  on diet for CHF. Last BNP reviewed- and noted below No results for input(s): "BNP", "BNPTRIAGEBLO" in the last 168 hours.  "

## 2024-03-16 NOTE — PLAN OF CARE
Problem: Skin Injury Risk Increased  Goal: Skin Health and Integrity  3/15/2024 2004 by Indigo Underwood, DAMEONT  Outcome: Ongoing, Progressing  3/15/2024 2003 by Indigo Underwood, RUBEN  Outcome: Ongoing, Progressing     Problem: Gas Exchange Impaired  Goal: Optimal Gas Exchange  Outcome: Ongoing, Progressing

## 2024-03-16 NOTE — ASSESSMENT & PLAN NOTE
>>ASSESSMENT AND PLAN FOR SEPSIS DUE TO CELLULITIS WRITTEN ON 3/16/2024  1:49 PM BY PEÑA BANDA DO    >>ASSESSMENT AND PLAN FOR SEPSIS WRITTEN ON 3/16/2024  1:46 PM BY PEÑA BANDA,     This patient does have evidence of infective focus  My overall impression is sepsis.  Source: Skin and Soft Tissue (location BLE)    Antibiotics (72h ago, onward)      Vancomycin and Zosyn          Fluid challenge Not needed - patient is not hypotensive      Will Not start Pressors- Levophed for MAP of 65  Source control achieved by: Empiric ABX      >>ASSESSMENT AND PLAN FOR CELLULITIS OF LOWER EXTREMITY WRITTEN ON 3/16/2024  1:47 PM BY PEÑA BANDA,     Currently not a candidate for non-emergent surgical intervention due to advanced heart failure and pulmonary embolism with infarct. Will continue IV antibiotics and local wound care for now.     Wound culture pending.   ------  In the past, wound grew pseudomonas and other GNB, but presence of GPC could not be entirely ruled out at this time and thus will empirically start pt on Vancomycin and Zosyn for now

## 2024-03-16 NOTE — ASSESSMENT & PLAN NOTE
Patient's COPD is controlled currently.  Patient is currently off COPD Pathway. Continue PRN nebs, Supplemental oxygen and monitor respiratory status closely.

## 2024-03-16 NOTE — PLAN OF CARE
Problem: Adult Inpatient Plan of Care  Goal: Plan of Care Review  Outcome: Ongoing, Progressing  Goal: Patient-Specific Goal (Individualized)  Outcome: Ongoing, Progressing  Goal: Absence of Hospital-Acquired Illness or Injury  Outcome: Ongoing, Progressing  Goal: Optimal Comfort and Wellbeing  Outcome: Ongoing, Progressing  Goal: Readiness for Transition of Care  Outcome: Ongoing, Progressing     Problem: Diabetes Comorbidity  Goal: Blood Glucose Level Within Targeted Range  Outcome: Ongoing, Progressing     Problem: Adjustment to Illness (Sepsis/Septic Shock)  Goal: Optimal Coping  Outcome: Ongoing, Progressing     Problem: Bleeding (Sepsis/Septic Shock)  Goal: Absence of Bleeding  Outcome: Ongoing, Progressing     Problem: Glycemic Control Impaired (Sepsis/Septic Shock)  Goal: Blood Glucose Level Within Desired Range  Outcome: Ongoing, Progressing     Problem: Infection Progression (Sepsis/Septic Shock)  Goal: Absence of Infection Signs and Symptoms  Outcome: Ongoing, Progressing     Problem: Nutrition Impaired (Sepsis/Septic Shock)  Goal: Optimal Nutrition Intake  Outcome: Ongoing, Progressing     Problem: Infection  Goal: Absence of Infection Signs and Symptoms  Outcome: Ongoing, Progressing     Problem: Impaired Wound Healing  Goal: Optimal Wound Healing  Outcome: Ongoing, Progressing     Problem: Skin Injury Risk Increased  Goal: Skin Health and Integrity  Outcome: Ongoing, Progressing

## 2024-03-16 NOTE — ASSESSMENT & PLAN NOTE
Currently not a candidate for non-emergent surgical intervention due to advanced heart failure and pulmonary embolism with infarct. Will continue IV antibiotics and local wound care for now.     Wound culture pending.   ------  In the past, wound grew pseudomonas and other GNB, but presence of GPC could not be entirely ruled out at this time and thus will empirically start pt on Vancomycin and Zosyn for now

## 2024-03-16 NOTE — ASSESSMENT & PLAN NOTE
>>ASSESSMENT AND PLAN FOR CELLULITIS OF LOWER EXTREMITY WRITTEN ON 3/15/2024 10:31 AM BY PEÑA BANDA DO    Unfortunately patient is a poor candidate for non-emergent surgical debridement at this time due to heart failure and pulmonary embolism with infarct. Continue IV antibiotics and local wound care.    This patient does have evidence of infective focus  My overall impression is sepsis.  Source: Skin and Soft Tissue (location lower extremities)  Antibiotics given-   Antibiotics (72h ago, onward)      Start     Stop Route Frequency Ordered    03/14/24 0900  mupirocin 2 % ointment         03/19/24 0859 Nasl 2 times daily 03/14/24 0446    03/14/24 0830  piperacillin-tazobactam (ZOSYN) 4.5 g in dextrose 5 % in water (D5W) 100 mL IVPB (MB+)         -- IV Every 8 hours (non-standard times) 03/14/24 0424    03/14/24 0706  vancomycin - pharmacy to dose         -- IV pharmacy to manage frequency 03/14/24 0606          Latest lactate reviewed-  Recent Labs   Lab 03/14/24  0922   LACTATE 2.0     Organ dysfunction indicated by Acute kidney injury    Fluid challenge Contraindicated- Fluid bolus is contraindicated in this patient due to Congestive Heart Failure     Post- resuscitation assessment No - Post resuscitation assessment not needed       Will Not start Pressors- Levophed for MAP of 65  Source control achieved by: antibiotics, local wound care     In the past, wound grew pseudomonas and other GNB, but presence of GPC could not be entirely ruled out at this time and thus will empirically start pt on Vancomycin and Zosyn for now    Wound culture pending.

## 2024-03-16 NOTE — ASSESSMENT & PLAN NOTE
Transition from heparin infusion to weight-based Lovenox today.    CT PE: Occlusive pulmonary embolus within the branch of the right pulmonary artery extending into the right lower lobe.  Infarct located within the right lower lobe possibly associated with superimposed pneumonia.

## 2024-03-16 NOTE — SUBJECTIVE & OBJECTIVE
Interval History:    No significant events overnight, seen resting comfortably this morning. Some soft blood pressure since dobutamine discontinued however adequate currently. Will transition from heparin infusion to Lovenox today.    Objective:     Vital Signs (Most Recent):  Temp: 97.7 °F (36.5 °C) (03/16/24 1037)  Pulse: 101 (03/16/24 1037)  Resp: 20 (03/16/24 1037)  BP: 110/78 (03/16/24 1037)  SpO2: 99 % (03/16/24 1037) Vital Signs (24h Range):  Temp:  [97.5 °F (36.4 °C)-98.3 °F (36.8 °C)] 97.7 °F (36.5 °C)  Pulse:  [] 101  Resp:  [16-20] 20  SpO2:  [97 %-100 %] 99 %  BP: ()/(60-78) 110/78     Weight: 77.7 kg (171 lb 4.8 oz)  Body mass index is 24.58 kg/m².     Physical Exam  Vitals reviewed.   Constitutional:       General: He is not in acute distress.     Appearance: Normal appearance.   Cardiovascular:      Rate and Rhythm: Regular rhythm. Tachycardia present.   Pulmonary:      Effort: Pulmonary effort is normal. No respiratory distress.      Breath sounds: Normal breath sounds.   Musculoskeletal:         General: Swelling and tenderness present.   Skin:     Capillary Refill: Capillary refill takes less than 2 seconds.      Comments: Edematous bilateral LE edema with erythema weeping with serosanguinous fluid. Purulent drainage present on the web spaces in the L foot.    Neurological:      General: No focal deficit present.      Mental Status: He is alert and oriented to person, place, and time.   Psychiatric:         Mood and Affect: Mood normal.         Behavior: Behavior normal.                Significant Labs: All pertinent labs within the past 24 hours have been reviewed.    Significant Imaging: I have reviewed all pertinent imaging results/findings within the past 24 hours.

## 2024-03-17 DIAGNOSIS — R06.02 SOB (SHORTNESS OF BREATH): Primary | ICD-10-CM

## 2024-03-17 PROBLEM — R91.8 PULMONARY NODULES/LESIONS, MULTIPLE: Status: ACTIVE | Noted: 2024-03-17

## 2024-03-17 PROBLEM — J90 LOCULATED PLEURAL EFFUSION: Status: ACTIVE | Noted: 2024-03-17

## 2024-03-17 LAB
ANION GAP SERPL CALCULATED.3IONS-SCNC: 10 MMOL/L (ref 7–16)
BACTERIA BLD CULT: ABNORMAL
BASOPHILS # BLD AUTO: 0.05 K/UL (ref 0–0.2)
BASOPHILS NFR BLD AUTO: 0.6 % (ref 0–1)
BUN SERPL-MCNC: 39 MG/DL (ref 7–18)
BUN/CREAT SERPL: 21 (ref 6–20)
CALCIUM SERPL-MCNC: 8.3 MG/DL (ref 8.5–10.1)
CHLORIDE SERPL-SCNC: 107 MMOL/L (ref 98–107)
CO2 SERPL-SCNC: 26 MMOL/L (ref 21–32)
CREAT SERPL-MCNC: 1.87 MG/DL (ref 0.7–1.3)
DIFFERENTIAL METHOD BLD: ABNORMAL
EGFR (NO RACE VARIABLE) (RUSH/TITUS): 37 ML/MIN/1.73M2
EOSINOPHIL # BLD AUTO: 0.24 K/UL (ref 0–0.5)
EOSINOPHIL NFR BLD AUTO: 2.9 % (ref 1–4)
ERYTHROCYTE [DISTWIDTH] IN BLOOD BY AUTOMATED COUNT: 18.7 % (ref 11.5–14.5)
GLUCOSE SERPL-MCNC: 126 MG/DL (ref 74–106)
GLUCOSE SERPL-MCNC: 148 MG/DL (ref 70–105)
GLUCOSE SERPL-MCNC: 151 MG/DL (ref 70–105)
GLUCOSE SERPL-MCNC: 161 MG/DL (ref 70–105)
GLUCOSE SERPL-MCNC: 97 MG/DL (ref 70–105)
GRAM STN SPEC: ABNORMAL
HCT VFR BLD AUTO: 28.7 % (ref 40–54)
HGB BLD-MCNC: 8.1 G/DL (ref 13.5–18)
IMM GRANULOCYTES # BLD AUTO: 0.04 K/UL (ref 0–0.04)
IMM GRANULOCYTES NFR BLD: 0.5 % (ref 0–0.4)
LYMPHOCYTES # BLD AUTO: 1.02 K/UL (ref 1–4.8)
LYMPHOCYTES NFR BLD AUTO: 12.5 % (ref 27–41)
MCH RBC QN AUTO: 24 PG (ref 27–31)
MCHC RBC AUTO-ENTMCNC: 28.2 G/DL (ref 32–36)
MCV RBC AUTO: 84.9 FL (ref 80–96)
MONOCYTES # BLD AUTO: 0.79 K/UL (ref 0–0.8)
MONOCYTES NFR BLD AUTO: 9.7 % (ref 2–6)
MPC BLD CALC-MCNC: 10.2 FL (ref 9.4–12.4)
NEUTROPHILS # BLD AUTO: 6.04 K/UL (ref 1.8–7.7)
NEUTROPHILS NFR BLD AUTO: 73.8 % (ref 53–65)
NRBC # BLD AUTO: 0 X10E3/UL
NRBC, AUTO (.00): 0 %
PLATELET # BLD AUTO: 122 K/UL (ref 150–400)
POTASSIUM SERPL-SCNC: 4.5 MMOL/L (ref 3.5–5.1)
RBC # BLD AUTO: 3.38 M/UL (ref 4.6–6.2)
SODIUM SERPL-SCNC: 138 MMOL/L (ref 136–145)
VANCOMYCIN TROUGH SERPL-MCNC: 28.2 ΜG/ML (ref 10–20)
WBC # BLD AUTO: 8.18 K/UL (ref 4.5–11)

## 2024-03-17 PROCEDURE — 82962 GLUCOSE BLOOD TEST: CPT

## 2024-03-17 PROCEDURE — 25000003 PHARM REV CODE 250: Performed by: FAMILY MEDICINE

## 2024-03-17 PROCEDURE — 94761 N-INVAS EAR/PLS OXIMETRY MLT: CPT

## 2024-03-17 PROCEDURE — 63600175 PHARM REV CODE 636 W HCPCS: Performed by: FAMILY MEDICINE

## 2024-03-17 PROCEDURE — 76604 US EXAM CHEST: CPT | Mod: 26,,, | Performed by: STUDENT IN AN ORGANIZED HEALTH CARE EDUCATION/TRAINING PROGRAM

## 2024-03-17 PROCEDURE — 25000003 PHARM REV CODE 250: Performed by: INTERNAL MEDICINE

## 2024-03-17 PROCEDURE — 99233 SBSQ HOSP IP/OBS HIGH 50: CPT | Mod: ,,, | Performed by: INTERNAL MEDICINE

## 2024-03-17 PROCEDURE — 80048 BASIC METABOLIC PNL TOTAL CA: CPT | Performed by: STUDENT IN AN ORGANIZED HEALTH CARE EDUCATION/TRAINING PROGRAM

## 2024-03-17 PROCEDURE — 99223 1ST HOSP IP/OBS HIGH 75: CPT | Mod: 25,,, | Performed by: STUDENT IN AN ORGANIZED HEALTH CARE EDUCATION/TRAINING PROGRAM

## 2024-03-17 PROCEDURE — 85025 COMPLETE CBC W/AUTO DIFF WBC: CPT | Performed by: FAMILY MEDICINE

## 2024-03-17 PROCEDURE — 11000001 HC ACUTE MED/SURG PRIVATE ROOM

## 2024-03-17 PROCEDURE — 80202 ASSAY OF VANCOMYCIN: CPT | Performed by: INTERNAL MEDICINE

## 2024-03-17 PROCEDURE — 99900035 HC TECH TIME PER 15 MIN (STAT)

## 2024-03-17 PROCEDURE — 63600175 PHARM REV CODE 636 W HCPCS: Performed by: INTERNAL MEDICINE

## 2024-03-17 RX ADMIN — MIDODRINE HYDROCHLORIDE 5 MG: 5 TABLET ORAL at 08:03

## 2024-03-17 RX ADMIN — MIDODRINE HYDROCHLORIDE 5 MG: 5 TABLET ORAL at 06:03

## 2024-03-17 RX ADMIN — GABAPENTIN 100 MG: 100 CAPSULE ORAL at 09:03

## 2024-03-17 RX ADMIN — ENOXAPARIN SODIUM 80 MG: 100 INJECTION SUBCUTANEOUS at 01:03

## 2024-03-17 RX ADMIN — MIDODRINE HYDROCHLORIDE 5 MG: 5 TABLET ORAL at 02:03

## 2024-03-17 RX ADMIN — GABAPENTIN 100 MG: 100 CAPSULE ORAL at 08:03

## 2024-03-17 RX ADMIN — MUPIROCIN: 20 OINTMENT TOPICAL at 09:03

## 2024-03-17 RX ADMIN — PIPERACILLIN AND TAZOBACTAM 4.5 G: 4; .5 INJECTION, POWDER, FOR SOLUTION INTRAVENOUS; PARENTERAL at 08:03

## 2024-03-17 RX ADMIN — ASPIRIN 81 MG: 81 TABLET, COATED ORAL at 08:03

## 2024-03-17 RX ADMIN — PIPERACILLIN AND TAZOBACTAM 4.5 G: 4; .5 INJECTION, POWDER, FOR SOLUTION INTRAVENOUS; PARENTERAL at 01:03

## 2024-03-17 RX ADMIN — MUPIROCIN: 20 OINTMENT TOPICAL at 02:03

## 2024-03-17 RX ADMIN — PIPERACILLIN AND TAZOBACTAM 4.5 G: 4; .5 INJECTION, POWDER, FOR SOLUTION INTRAVENOUS; PARENTERAL at 06:03

## 2024-03-17 RX ADMIN — HYDROCODONE BITARTRATE AND ACETAMINOPHEN 1 TABLET: 5; 325 TABLET ORAL at 09:03

## 2024-03-17 RX ADMIN — HYDROCODONE BITARTRATE AND ACETAMINOPHEN 1 TABLET: 5; 325 TABLET ORAL at 01:03

## 2024-03-17 RX ADMIN — ATORVASTATIN CALCIUM 40 MG: 40 TABLET, FILM COATED ORAL at 08:03

## 2024-03-17 NOTE — ASSESSMENT & PLAN NOTE
Plan as per Cardiology, plan to do left heart catheterization following right-sided thoracentesis tomorrow.

## 2024-03-17 NOTE — ASSESSMENT & PLAN NOTE
"Patient is identified as having Systolic (HFrEF) heart failure that is Acute on chronic. CHF is currently controlled. Latest ECHO performed and demonstrates- Results for orders placed during the hospital encounter of 03/13/24    Echo    Interpretation Summary    Left Ventricle: The left ventricle is mildly dilated. Mildly increased wall thickness. There is concentric hypertrophy. Severe global hypokinesis present. There is reduced systolic function. Ejection fraction by visual approximation is 20%.    Right Ventricle: Moderate right ventricular enlargement. Systolic function is hyperdynamic.    Left Atrium: Left atrium is mildly dilated.    Right Atrium: Right atrium is severely dilated.    Aortic Valve: The aortic valve is a trileaflet valve. Moderately calcified cusps.    Mitral Valve: There is mild bileaflet sclerosis. Mildly thickened leaflets. There is no stenosis. The mean pressure gradient across the mitral valve is 4 mmHg at a heart rate of  bpm. There is moderate regurgitation with an eccentric jet.    Tricuspid Valve: There is mild regurgitation with an eccentrically directed jet.    Pulmonary Artery: The estimated pulmonary artery systolic pressure is 58 mmHg.    IVC/SVC: Elevated venous pressure at 15 mmHg.    Pericardium: There is a trivial effusion. No indication of cardiac tamponade.    Required dobutamine initially, now off- resume home midodrine.   Continue Furosemide and monitor clinical status closely. Monitor on telemetry. Patient is off CHF pathway.  Monitor strict Is&Os and daily weights.  Place on fluid restriction.   Cardiology has been consulted.   Continue to stress to patient importance of self efficacy and  on diet for CHF. Last BNP reviewed- and noted below No results for input(s): "BNP", "BNPTRIAGEBLO" in the last 168 hours.  "

## 2024-03-17 NOTE — PROGRESS NOTES
[facility] Hospital Medicine  Progress Note    Patient Name: Esthela Contreras  MRN: [id number]  Patient Class: IP- Inpatient   Admission Date: 3/13/2024  Length of Stay: 3 days  Attending Physician: Cortez Ford MD  Primary Care Provider: Yolande Spring FNP        Subjective:     Principal Problem:Sepsis due to cellulitis        HPI:  Patient is a 73-year-old male with a history of combined CHF with last known EF of 35% with chronically elevated proBNP ranging from 4 to 20,000, COPD, type 2 diabetes, and chronic bilateral lower extremity edema coupled with venous stasis dermatitis which has been frequently complicated by cellulitis requiring hospitalization who presented to emergency room as instructed by his home health nurse for evaluation of bilateral lower extremity wound which appeared to have been infected again.  Patient complained of burning type of pain +7/10 involving bilateral lower extremities, but otherwise denied any fever chills cough wheezing shortness of breath chest pain palpitation PND or orthopnea in association.    On initial presentation, patient was tachycardic with a heart rate in the 120s but vital signs were otherwise stable and patient was afebrile.  Workup was notable for chest x-ray demonstrating right-sided pleural effusion with right basilar density representing either atelectasis versus developing infiltrate, chronically elevated troponin in the 100s without any ischemic abnormalities seen on EKG, chronically elevated proBNP along with leukocytosis with left shift.     Patient will be admitted with a working diagnosis of sepsis secondary to bilateral lower extremity cellulitis in the setting of chronic venous stasis dermatitis and type 2 MI associated with acute decompensation of chronic combined heart failure.        Overview/Hospital Course:  No notes on file    Interval History: Patient seen and examined at the bedside, reports doing Ok, denies any new  complaints.     Review of Systems   Respiratory:  Positive for shortness of breath.    Skin:  Positive for wound.     Objective:     Vital Signs (Most Recent):  Temp: 97.8 °F (36.6 °C) (03/17/24 0616)  Pulse: 102 (03/17/24 0618)  Resp: 18 (03/17/24 0510)  BP: 100/68 (03/17/24 0618)  SpO2: 98 % (03/17/24 0618) Vital Signs (24h Range):  Temp:  [97.5 °F (36.4 °C)-98 °F (36.7 °C)] 97.8 °F (36.6 °C)  Pulse:  [] 102  Resp:  [14-20] 18  SpO2:  [89 %-99 %] 98 %  BP: ()/(62-78) 100/68     Weight: 77.7 kg (171 lb 4.8 oz)  Body mass index is 24.58 kg/m².    Intake/Output Summary (Last 24 hours) at 3/17/2024 1023  Last data filed at 3/17/2024 0135  Gross per 24 hour   Intake --   Output 450 ml   Net -450 ml         Physical Exam  Constitutional:       Appearance: He is ill-appearing.   HENT:      Head: Normocephalic and atraumatic.      Mouth/Throat:      Mouth: Mucous membranes are moist.   Eyes:      Extraocular Movements: Extraocular movements intact.   Cardiovascular:      Rate and Rhythm: Normal rate. Rhythm irregular.   Pulmonary:      Effort: Pulmonary effort is normal. No respiratory distress.      Breath sounds: Rales present.   Abdominal:      General: Bowel sounds are normal.      Palpations: Abdomen is soft.   Musculoskeletal:      Right lower leg: Edema present.      Left lower leg: Edema present.   Skin:     Findings: Lesion present.   Neurological:      General: No focal deficit present.      Mental Status: He is alert and oriented to person, place, and time.             Significant Labs: All pertinent labs within the past 24 hours have been reviewed.    Significant Imaging: I have reviewed all pertinent imaging results/findings within the past 24 hours.    Assessment/Plan:      * Sepsis due to cellulitis  This patient does have evidence of infective focus  My overall impression is sepsis.  Source: Skin and Soft Tissue (location lower extremities)  Antibiotics given-   Antibiotics (72h ago, onward)       Start     Stop Route Frequency Ordered    03/14/24 0900  mupirocin 2 % ointment         03/19/24 0859 Nasl 2 times daily 03/14/24 0446    03/14/24 0830  piperacillin-tazobactam (ZOSYN) 4.5 g in dextrose 5 % in water (D5W) 100 mL IVPB (MB+)         -- IV Every 8 hours (non-standard times) 03/14/24 0424    03/14/24 0706  vancomycin - pharmacy to dose         -- IV pharmacy to manage frequency 03/14/24 0606          Source control achieved by: antibiotics, local wound care   In the past, wound grew pseudomonas and acinetobacter.  Current wound cultures with GNB.  Unfortunately patient is a poor candidate for non-emergent surgical debridement at this time due to heart failure and pulmonary embolism with infarct.   Continue local wound care.   Continue broad spectrum IV antibiotics.         Pulmonary embolus with infarction  CT PE: Occlusive pulmonary embolus within the branch of the right pulmonary artery extending into the right lower lobe.  Infarct located within the right lower lobe.  Continue weight based Lovenox, transition to PO later.            Chronic HFrEF (heart failure with reduced ejection fraction)  Patient is identified as having Systolic (HFrEF) heart failure that is Acute on chronic. CHF is currently controlled. Latest ECHO performed and demonstrates- Results for orders placed during the hospital encounter of 03/13/24    Echo    Interpretation Summary    Left Ventricle: The left ventricle is mildly dilated. Mildly increased wall thickness. There is concentric hypertrophy. Severe global hypokinesis present. There is reduced systolic function. Ejection fraction by visual approximation is 20%.    Right Ventricle: Moderate right ventricular enlargement. Systolic function is hyperdynamic.    Left Atrium: Left atrium is mildly dilated.    Right Atrium: Right atrium is severely dilated.    Aortic Valve: The aortic valve is a trileaflet valve. Moderately calcified cusps.    Mitral Valve: There is mild  "bileaflet sclerosis. Mildly thickened leaflets. There is no stenosis. The mean pressure gradient across the mitral valve is 4 mmHg at a heart rate of  bpm. There is moderate regurgitation with an eccentric jet.    Tricuspid Valve: There is mild regurgitation with an eccentrically directed jet.    Pulmonary Artery: The estimated pulmonary artery systolic pressure is 58 mmHg.    IVC/SVC: Elevated venous pressure at 15 mmHg.    Pericardium: There is a trivial effusion. No indication of cardiac tamponade.    Required dobutamine initially, now off- resume home midodrine.   Continue Furosemide and monitor clinical status closely. Monitor on telemetry. Patient is off CHF pathway.  Monitor strict Is&Os and daily weights.  Place on fluid restriction.   Cardiology has been consulted.   Continue to stress to patient importance of self efficacy and  on diet for CHF. Last BNP reviewed- and noted below No results for input(s): "BNP", "BNPTRIAGEBLO" in the last 168 hours.    Loculated pleural effusion  Patient found to have moderate pleural effusion on imaging.   I have personally reviewed and interpreted the following imaging: CT. A thoracentesis was deferred to Pulmonology.   Most likely etiology includes  cause not entirely clear but could be from pulmonary infarction/infection/malignancy . \  Management to include  Pulmonology consultation to consider thoracentesis/sampling.   Discussed with Dr. Dong.       Pulmonary nodules/lesions, multiple  CTA chest showed interval development of a 1.3 cm solid pulmonary nodule located adjacent to the right major fissure. Additional new 1 cm ill-defined and solid pulmonary nodule located near the lingula.   Pulmonology consulted.       Cellulitis of lower extremity  Plan as outlined above.       Positive blood culture  Blood culture #1 of 2 positive - Verigene MRSE and Strep species.   Possible contaminant as only one sample with growth to date.   Continue antibiotics as above. " "      A-fib  Patient with Paroxysmal (<7 days) atrial fibrillation which is controlled currently without rate controlling med (beta blocker on hold given heart failure/BP). Patient is currently in sinus rhythm.[id number] Score: 4. HASBLED Score: 2. Anticoagulation indicated. Anticoagulation with Lovenox.     Type 2 diabetes mellitus  Patient's FSGs are controlled on current medication regimen.  Last A1c reviewed-   Lab Results   Component Value Date    HGBA1C 4.8 07/28/2023     Most recent fingerstick glucose reviewed- No results for input(s): "POCTGLUCOSE" in the last 24 hours.  Current correctional scale  Low  Maintain anti-hyperglycemic dose as follows-   Antihyperglycemics (From admission, onward)      Start     Stop Route Frequency Ordered    03/14/24 0520  insulin aspart U-100 injection 0-5 Units         -- SubQ Before meals & nightly PRN 03/14/24 0424          Hold Oral hypoglycemics while patient is in the hospital.    COPD (chronic obstructive pulmonary disease)  Patient's COPD is controlled currently.  Patient is currently off COPD Pathway. Continue PRN nebs, Supplemental oxygen and monitor respiratory status closely.     Coronary artery disease involving native coronary artery of native heart without angina pectoris  Patient with known CAD s/p  unknown , which is controlled Will continue ASA and Statin and monitor for S/Sx of angina/ACS. Continue to monitor on telemetry.       VTE Risk Mitigation (From admission, onward)           Ordered     enoxaparin injection 80 mg  Every 12 hours         03/16/24 1111     Reason for no Mechanical VTE Prophylaxis  Once        Comments: BLE wound and cellulitis   Question:  Reasons:  Answer:  Physician Provided (leave comment)    03/14/24 0424     IP VTE HIGH RISK PATIENT  Once         03/14/24 0424                    Discharge Planning   MAKENNA: 3/20/2024     Code Status: Full Code   Is the patient medically ready for discharge?:     Reason for patient still in hospital " (select all that apply): Patient trending condition, Treatment, and Consult recommendations               TIFFANIE VENTURA MD  Department of Hospital Medicine   [facility]

## 2024-03-17 NOTE — ASSESSMENT & PLAN NOTE
These appear to be new as compared to his CT chest last year.  While there is always concern for this being malignant in etiology, they could also be secondary to infection/rounded atelectasis.  Patient will require additional workup and follow up imaging following resolution of his acute/critical conditions.  Cytology from plan thoracentesis tomorrow we will also be sent for analysis.      We will plan for repeat CT scan in approximately six weeks' time (at which time consideration for holding anticoagulation in the setting of his pulmonary embolism can be considered for any possible intervention).

## 2024-03-17 NOTE — ASSESSMENT & PLAN NOTE
This patient does have evidence of infective focus  My overall impression is sepsis.  Source: Skin and Soft Tissue (location lower extremities)  Antibiotics given-   Antibiotics (72h ago, onward)      Start     Stop Route Frequency Ordered    03/14/24 0900  mupirocin 2 % ointment         03/19/24 0859 Nasl 2 times daily 03/14/24 0446    03/14/24 0830  piperacillin-tazobactam (ZOSYN) 4.5 g in dextrose 5 % in water (D5W) 100 mL IVPB (MB+)         -- IV Every 8 hours (non-standard times) 03/14/24 0424    03/14/24 0706  vancomycin - pharmacy to dose         -- IV pharmacy to manage frequency 03/14/24 0606          Source control achieved by: antibiotics, local wound care   In the past, wound grew pseudomonas and acinetobacter.  Current wound cultures with GNB.  Unfortunately patient is a poor candidate for non-emergent surgical debridement at this time due to heart failure and pulmonary embolism with infarct.   Continue local wound care.   Continue broad spectrum IV antibiotics.

## 2024-03-17 NOTE — NURSING
At 1000 I was informed that lab was on the floor trying to get blood on pt, I went to room and they were not in room at that time, I will call back to see about the blood draw.

## 2024-03-17 NOTE — SUBJECTIVE & OBJECTIVE
Interval History: Patient seen and examined at the bedside, reports doing Ok, denies any new complaints.     Review of Systems   Respiratory:  Positive for shortness of breath.    Skin:  Positive for wound.     Objective:     Vital Signs (Most Recent):  Temp: 97.8 °F (36.6 °C) (03/17/24 0616)  Pulse: 102 (03/17/24 0618)  Resp: 18 (03/17/24 0510)  BP: 100/68 (03/17/24 0618)  SpO2: 98 % (03/17/24 0618) Vital Signs (24h Range):  Temp:  [97.5 °F (36.4 °C)-98 °F (36.7 °C)] 97.8 °F (36.6 °C)  Pulse:  [] 102  Resp:  [14-20] 18  SpO2:  [89 %-99 %] 98 %  BP: ()/(62-78) 100/68     Weight: 77.7 kg (171 lb 4.8 oz)  Body mass index is 24.58 kg/m².    Intake/Output Summary (Last 24 hours) at 3/17/2024 1023  Last data filed at 3/17/2024 0135  Gross per 24 hour   Intake --   Output 450 ml   Net -450 ml         Physical Exam  Constitutional:       Appearance: He is ill-appearing.   HENT:      Head: Normocephalic and atraumatic.      Mouth/Throat:      Mouth: Mucous membranes are moist.   Eyes:      Extraocular Movements: Extraocular movements intact.   Cardiovascular:      Rate and Rhythm: Normal rate. Rhythm irregular.   Pulmonary:      Effort: Pulmonary effort is normal. No respiratory distress.      Breath sounds: Rales present.   Abdominal:      General: Bowel sounds are normal.      Palpations: Abdomen is soft.   Musculoskeletal:      Right lower leg: Edema present.      Left lower leg: Edema present.   Skin:     Findings: Lesion present.   Neurological:      General: No focal deficit present.      Mental Status: He is alert and oriented to person, place, and time.             Significant Labs: All pertinent labs within the past 24 hours have been reviewed.    Significant Imaging: I have reviewed all pertinent imaging results/findings within the past 24 hours.

## 2024-03-17 NOTE — ASSESSMENT & PLAN NOTE
Now on anticoagulation, most likely provoked given patient's comorbidities-agree with continuing anticoagulation.

## 2024-03-17 NOTE — NURSING
At 1015 I called out reach through [employer] and spoke to a lady about blood and she took a message to get to the two ladies that are working today to give me a call back.
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
## 2024-03-17 NOTE — PROGRESS NOTES
Pharmacy assisting in the management of vancomycin for this patient for: sepsis d/t cellulitis    Clinical info: vanc already on hold d/t worsening renal function    Vancomycin level: 28.2    Changes to be made: continue to hold vanc. Will check random level in the morning    Pharmacy will continue to monitor daily and make adjustments as needed.    Demetria Vallejo, PharmD  1524

## 2024-03-17 NOTE — ASSESSMENT & PLAN NOTE
Blood culture #1 of 2 positive - Verigene MRSE and Strep species.   Possible contaminant as only one sample with growth to date.   Continue antibiotics as above.

## 2024-03-17 NOTE — NURSING
"At 0905 Dr. Ford is here making rounds and he informed me that he wants to consult Pulmonary on this pt, he said, "I will notify Dr. Dong myself of the consult".  "

## 2024-03-17 NOTE — ASSESSMENT & PLAN NOTE
CT PE: Occlusive pulmonary embolus within the branch of the right pulmonary artery extending into the right lower lobe.  Infarct located within the right lower lobe.  Continue weight based Lovenox, transition to PO later.

## 2024-03-17 NOTE — ASSESSMENT & PLAN NOTE
CTA chest showed interval development of a 1.3 cm solid pulmonary nodule located adjacent to the right major fissure. Additional new 1 cm ill-defined and solid pulmonary nodule located near the lingula.   Pulmonology consulted.

## 2024-03-17 NOTE — HPI
73-year-old gentleman with past medical history significant for heart failure with a reduced ejection fraction, COPD (no pulmonary function tests on file) who had initially been admitted for the concern of bilateral lower extremity wound found to be tachycardic and admitted for working diagnosis of sepsis secondary to cellulitis, pulmonary now consulted for pulmonary nodules and loculated pleural effusion.        I reviewed the patient's labs from this morning personally which showed evidence of platelet of 122, hemoglobin of 8.1), acute kidney injury with creatinine of 1.87, improving from the day prior.  I noted the wound cultures growing Gram-negative bacilli.      I reviewed the patient's imaging which showed evidence of subsequent segmental pulmonary embolism, right-sided loculated pleural effusion.

## 2024-03-17 NOTE — ASSESSMENT & PLAN NOTE
Reported history of COPD, not wheezing at this time.  No evidence of COPD exacerbation at this time.  We will require outpatient pulmonary function tests.  We will schedule in clinic six weeks from now, with pulmonary function tests, 6 minute walk test and CT chest with IV contrast prior to that.

## 2024-03-17 NOTE — ASSESSMENT & PLAN NOTE
Patient with right-sided loculated pleural effusion.  Ultrasound shows evidence of septations.  This could be reactive in the setting of his pleural effusion, parapneumonic in the setting of his other lung nodule/CT findings or in the setting of his heart failure with reduced ejection fraction (would be less likely given unilateral, but very possible).    We will plan for thoracentesis tomorrow (right-sided).  Please hold additional Lovenox dosing till thoracentesis.  Patient does not need to be NPO for the thoracentesis, but may require to be NPO for other procedures such as cardiac catheterization happening tomorrow.  Please repeat CBC tomorrow a.m..      Unless there is gross evidence of empyema on initial tap (unlikely given appearance on ultrasound), we will proceed with thoracentesis over chest tube placement (even in the setting of septations).  With the patient being on therapeutic anticoagulation and plan for cardiac catheterization with loading of antiplatelet therapies, patient is at high-risk of bleeding with placement of chest tube and initiation of tPA/DNase.  However, if strong suspicion of empyema over simple exudative effusion, then we will consider placement of chest tube and communicate with other consulting services to coordinate care (such as initiation of heparin drip over longer acting anticoagulation).      Plan for thoracentesis discussed with the family.  Risks of thoracentesis explained to patient as well as his family.  These risks include but are not limited to bleeding, infection, pneumothorax.  Patient and family agree.

## 2024-03-17 NOTE — ASSESSMENT & PLAN NOTE
Patient found to have moderate pleural effusion on imaging.   I have personally reviewed and interpreted the following imaging: CT. A thoracentesis was deferred to Pulmonology.   Most likely etiology includes  cause not entirely clear but could be from pulmonary infarction/infection/malignancy . \  Management to include  Pulmonology consultation to consider thoracentesis/sampling.   Discussed with Dr. Dong.
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
## 2024-03-17 NOTE — ASSESSMENT & PLAN NOTE
Patient with Paroxysmal (<7 days) atrial fibrillation which is controlled currently without rate controlling med (beta blocker on hold given heart failure/BP). Patient is currently in sinus rhythm.[id number] Score: 4. HASBLED Score: 2. Anticoagulation indicated. Anticoagulation with Lovenox.

## 2024-03-18 PROBLEM — D64.9 CHRONIC ANEMIA: Status: ACTIVE | Noted: 2024-03-18

## 2024-03-18 LAB
ALBUMIN FLD-MCNC: 1.1 G/DL
ANION GAP SERPL CALCULATED.3IONS-SCNC: 13 MMOL/L (ref 7–16)
ANISOCYTOSIS BLD QL SMEAR: ABNORMAL
ATYPICAL LYMPHOCYTES: ABNORMAL
BASOPHILS # BLD AUTO: 0.06 K/UL (ref 0–0.2)
BASOPHILS NFR BLD AUTO: 0.7 % (ref 0–1)
BASOPHILS NFR BLD MANUAL: 1 % (ref 0–1)
BUN SERPL-MCNC: 40 MG/DL (ref 7–18)
BUN/CREAT SERPL: 22 (ref 6–20)
CALCIUM SERPL-MCNC: 8.3 MG/DL (ref 8.5–10.1)
CHLORIDE SERPL-SCNC: 107 MMOL/L (ref 98–107)
CHOLEST FLD-MCNC: <50 MG/DL
CLARITY FLD: ABNORMAL
CO2 SERPL-SCNC: 23 MMOL/L (ref 21–32)
COLOR FLD: ABNORMAL
CREAT SERPL-MCNC: 1.86 MG/DL (ref 0.7–1.3)
DIFFERENTIAL METHOD BLD: ABNORMAL
EGFR (NO RACE VARIABLE) (RUSH/TITUS): 37 ML/MIN/1.73M2
EOSINOPHIL # BLD AUTO: 0.21 K/UL (ref 0–0.5)
EOSINOPHIL NFR BLD AUTO: 2.6 % (ref 1–4)
EOSINOPHIL NFR BLD MANUAL: 2 % (ref 1–4)
ERYTHROCYTE [DISTWIDTH] IN BLOOD BY AUTOMATED COUNT: 18.8 % (ref 11.5–14.5)
GLUCOSE FLD-MCNC: 75 MG/DL
GLUCOSE SERPL-MCNC: 119 MG/DL (ref 74–106)
GLUCOSE SERPL-MCNC: 159 MG/DL (ref 70–105)
GLUCOSE SERPL-MCNC: 162 MG/DL (ref 70–105)
GLUCOSE SERPL-MCNC: 97 MG/DL (ref 70–105)
GLUCOSE SERPL-MCNC: 99 MG/DL (ref 70–105)
GRANULOCYTES NFR FLD MANUAL: 87 % (ref 0–25)
HCT VFR BLD AUTO: 28.7 % (ref 40–54)
HGB BLD-MCNC: 8.6 G/DL (ref 13.5–18)
HYPOCHROMIA BLD QL SMEAR: ABNORMAL
IMM GRANULOCYTES # BLD AUTO: 0.09 K/UL (ref 0–0.04)
IMM GRANULOCYTES NFR BLD: 1.1 % (ref 0–0.4)
LDH FLD-CCNC: 211 U/L
LYMPHOCYTES # BLD AUTO: 1.49 K/UL (ref 1–4.8)
LYMPHOCYTES NFR BLD AUTO: 18.2 % (ref 27–41)
LYMPHOCYTES NFR BLD MANUAL: 16 % (ref 27–41)
LYMPHOCYTES NFR FLD MANUAL: 5 %
MCH RBC QN AUTO: 24.6 PG (ref 27–31)
MCHC RBC AUTO-ENTMCNC: 30 G/DL (ref 32–36)
MCV RBC AUTO: 82.2 FL (ref 80–96)
MONOCYTES # BLD AUTO: 0.68 K/UL (ref 0–0.8)
MONOCYTES NFR BLD AUTO: 8.3 % (ref 2–6)
MONOCYTES NFR BLD MANUAL: 5 % (ref 2–6)
MONOCYTES NFR FLD MANUAL: 8 %
MPC BLD CALC-MCNC: 9.7 FL (ref 9.4–12.4)
NEUTROPHILS # BLD AUTO: 5.64 K/UL (ref 1.8–7.7)
NEUTROPHILS NFR BLD AUTO: 69.1 % (ref 53–65)
NEUTS BAND NFR BLD MANUAL: 2 % (ref 1–5)
NEUTS SEG NFR BLD MANUAL: 74 % (ref 50–62)
NRBC # BLD AUTO: 0 X10E3/UL
NRBC, AUTO (.00): 0 %
OVALOCYTES BLD QL SMEAR: ABNORMAL
PLATELET # BLD AUTO: 146 K/UL (ref 150–400)
PLATELET MORPHOLOGY: ABNORMAL
POLYCHROMASIA BLD QL SMEAR: ABNORMAL
POTASSIUM SERPL-SCNC: 4.7 MMOL/L (ref 3.5–5.1)
PROT FLD-MCNC: 3.4 G/DL
RBC # BLD AUTO: 3.49 M/UL (ref 4.6–6.2)
RBC # FLD MANUAL: 7000 /CUMM
SODIUM SERPL-SCNC: 138 MMOL/L (ref 136–145)
VANCOMYCIN SERPL-MCNC: 17.7 ΜG/ML (ref 0–20)
VANCOMYCIN SERPL-MCNC: 20.9 ΜG/ML (ref 0–20)
WBC # BLD AUTO: 8.17 K/UL (ref 4.5–11)
WBC # FLD MANUAL: 3163 /CUMM

## 2024-03-18 PROCEDURE — 82962 GLUCOSE BLOOD TEST: CPT

## 2024-03-18 PROCEDURE — 80202 ASSAY OF VANCOMYCIN: CPT | Performed by: INTERNAL MEDICINE

## 2024-03-18 PROCEDURE — 11000001 HC ACUTE MED/SURG PRIVATE ROOM

## 2024-03-18 PROCEDURE — 25000003 PHARM REV CODE 250: Performed by: INTERNAL MEDICINE

## 2024-03-18 PROCEDURE — 88112 CYTOPATH CELL ENHANCE TECH: CPT | Mod: TC,MCY | Performed by: STUDENT IN AN ORGANIZED HEALTH CARE EDUCATION/TRAINING PROGRAM

## 2024-03-18 PROCEDURE — 25000003 PHARM REV CODE 250: Performed by: FAMILY MEDICINE

## 2024-03-18 PROCEDURE — 32554 ASPIRATE PLEURA W/O IMAGING: CPT | Mod: RT,,, | Performed by: STUDENT IN AN ORGANIZED HEALTH CARE EDUCATION/TRAINING PROGRAM

## 2024-03-18 PROCEDURE — 88112 CYTOPATH CELL ENHANCE TECH: CPT | Mod: 26,,, | Performed by: PATHOLOGY

## 2024-03-18 PROCEDURE — 84157 ASSAY OF PROTEIN OTHER: CPT | Performed by: STUDENT IN AN ORGANIZED HEALTH CARE EDUCATION/TRAINING PROGRAM

## 2024-03-18 PROCEDURE — 88305 TISSUE EXAM BY PATHOLOGIST: CPT | Mod: 26,,, | Performed by: PATHOLOGY

## 2024-03-18 PROCEDURE — 82042 OTHER SOURCE ALBUMIN QUAN EA: CPT | Performed by: STUDENT IN AN ORGANIZED HEALTH CARE EDUCATION/TRAINING PROGRAM

## 2024-03-18 PROCEDURE — 87070 CULTURE OTHR SPECIMN AEROBIC: CPT | Performed by: STUDENT IN AN ORGANIZED HEALTH CARE EDUCATION/TRAINING PROGRAM

## 2024-03-18 PROCEDURE — 89051 BODY FLUID CELL COUNT: CPT | Performed by: STUDENT IN AN ORGANIZED HEALTH CARE EDUCATION/TRAINING PROGRAM

## 2024-03-18 PROCEDURE — 80048 BASIC METABOLIC PNL TOTAL CA: CPT | Performed by: STUDENT IN AN ORGANIZED HEALTH CARE EDUCATION/TRAINING PROGRAM

## 2024-03-18 PROCEDURE — 85025 COMPLETE CBC W/AUTO DIFF WBC: CPT | Performed by: INTERNAL MEDICINE

## 2024-03-18 PROCEDURE — 63600175 PHARM REV CODE 636 W HCPCS: Performed by: INTERNAL MEDICINE

## 2024-03-18 PROCEDURE — 89050 BODY FLUID CELL COUNT: CPT | Performed by: STUDENT IN AN ORGANIZED HEALTH CARE EDUCATION/TRAINING PROGRAM

## 2024-03-18 PROCEDURE — 99232 SBSQ HOSP IP/OBS MODERATE 35: CPT | Mod: ,,, | Performed by: INTERNAL MEDICINE

## 2024-03-18 PROCEDURE — 32554 ASPIRATE PLEURA W/O IMAGING: CPT | Mod: RT | Performed by: STUDENT IN AN ORGANIZED HEALTH CARE EDUCATION/TRAINING PROGRAM

## 2024-03-18 PROCEDURE — 25000003 PHARM REV CODE 250: Performed by: NURSE PRACTITIONER

## 2024-03-18 RX ORDER — ENOXAPARIN SODIUM 100 MG/ML
1 INJECTION SUBCUTANEOUS EVERY 12 HOURS
Status: DISCONTINUED | OUTPATIENT
Start: 2024-03-18 | End: 2024-03-20

## 2024-03-18 RX ADMIN — GABAPENTIN 100 MG: 100 CAPSULE ORAL at 08:03

## 2024-03-18 RX ADMIN — MIDODRINE HYDROCHLORIDE 5 MG: 5 TABLET ORAL at 05:03

## 2024-03-18 RX ADMIN — MUPIROCIN: 20 OINTMENT TOPICAL at 08:03

## 2024-03-18 RX ADMIN — PIPERACILLIN AND TAZOBACTAM 4.5 G: 4; .5 INJECTION, POWDER, FOR SOLUTION INTRAVENOUS; PARENTERAL at 02:03

## 2024-03-18 RX ADMIN — VANCOMYCIN HYDROCHLORIDE 1500 MG: 1 INJECTION, POWDER, LYOPHILIZED, FOR SOLUTION INTRAVENOUS at 08:03

## 2024-03-18 RX ADMIN — PIPERACILLIN AND TAZOBACTAM 4.5 G: 4; .5 INJECTION, POWDER, FOR SOLUTION INTRAVENOUS; PARENTERAL at 06:03

## 2024-03-18 RX ADMIN — ATORVASTATIN CALCIUM 40 MG: 40 TABLET, FILM COATED ORAL at 03:03

## 2024-03-18 RX ADMIN — HYDRALAZINE HYDROCHLORIDE 10 MG: 10 TABLET, FILM COATED ORAL at 08:03

## 2024-03-18 RX ADMIN — GABAPENTIN 100 MG: 100 CAPSULE ORAL at 03:03

## 2024-03-18 RX ADMIN — FUROSEMIDE 40 MG: 40 TABLET ORAL at 03:03

## 2024-03-18 RX ADMIN — HYDROCODONE BITARTRATE AND ACETAMINOPHEN 1 TABLET: 5; 325 TABLET ORAL at 08:03

## 2024-03-18 RX ADMIN — ASPIRIN 81 MG: 81 TABLET, COATED ORAL at 03:03

## 2024-03-18 RX ADMIN — HYDROCODONE BITARTRATE AND ACETAMINOPHEN 1 TABLET: 5; 325 TABLET ORAL at 03:03

## 2024-03-18 RX ADMIN — PIPERACILLIN AND TAZOBACTAM 4.5 G: 4; .5 INJECTION, POWDER, FOR SOLUTION INTRAVENOUS; PARENTERAL at 10:03

## 2024-03-18 NOTE — PROGRESS NOTES
[facility] Medicine  Progress Note    Patient Name: Esthela Contreras  MRN: [id number]  Patient Class: IP- Inpatient   Admission Date: 3/13/2024  Length of Stay: 4 days  Attending Physician: Cortez Ford MD  Primary Care Provider: Yolande Spring FNP        Subjective:     Principal Problem:Sepsis due to cellulitis        HPI:  Patient is a 73-year-old male with a history of combined CHF with last known EF of 35% with chronically elevated proBNP ranging from 4 to 20,000, COPD, type 2 diabetes, and chronic bilateral lower extremity edema coupled with venous stasis dermatitis which has been frequently complicated by cellulitis requiring hospitalization who presented to emergency room as instructed by his home health nurse for evaluation of bilateral lower extremity wound which appeared to have been infected again.  Patient complained of burning type of pain +7/10 involving bilateral lower extremities, but otherwise denied any fever chills cough wheezing shortness of breath chest pain palpitation PND or orthopnea in association.    On initial presentation, patient was tachycardic with a heart rate in the 120s but vital signs were otherwise stable and patient was afebrile.  Workup was notable for chest x-ray demonstrating right-sided pleural effusion with right basilar density representing either atelectasis versus developing infiltrate, chronically elevated troponin in the 100s without any ischemic abnormalities seen on EKG, chronically elevated proBNP along with leukocytosis with left shift.     Patient will be admitted with a working diagnosis of sepsis secondary to bilateral lower extremity cellulitis in the setting of chronic venous stasis dermatitis and type 2 MI associated with acute decompensation of chronic combined heart failure.        Overview/Hospital Course:  No notes on file    Interval History: Patient seen and examined at the bedside, reports doing Ok, denies any new  complaints.     Review of Systems   Respiratory:  Positive for shortness of breath.    Skin:  Positive for wound.     Objective:     Vital Signs (Most Recent):  Temp: 97.3 °F (36.3 °C) (03/18/24 1045)  Pulse: 96 (03/18/24 1045)  Resp: 20 (03/18/24 1045)  BP: 100/65 (03/18/24 1045)  SpO2: 100 % (03/18/24 1111) Vital Signs (24h Range):  Temp:  [97.2 °F (36.2 °C)-98.2 °F (36.8 °C)] 97.3 °F (36.3 °C)  Pulse:  [] 96  Resp:  [18-20] 20  SpO2:  [93 %-100 %] 100 %  BP: ()/(65-81) 100/65     Weight: 77.7 kg (171 lb 4.8 oz)  Body mass index is 24.58 kg/m².    Intake/Output Summary (Last 24 hours) at 3/18/2024 1211  Last data filed at 3/18/2024 0330  Gross per 24 hour   Intake --   Output 800 ml   Net -800 ml           Physical Exam  Constitutional:       Appearance: He is ill-appearing.   HENT:      Head: Normocephalic and atraumatic.      Mouth/Throat:      Mouth: Mucous membranes are moist.   Eyes:      Extraocular Movements: Extraocular movements intact.   Cardiovascular:      Rate and Rhythm: Normal rate. Rhythm irregular.   Pulmonary:      Effort: Pulmonary effort is normal. No respiratory distress.      Breath sounds: Rales present.   Abdominal:      General: Bowel sounds are normal.      Palpations: Abdomen is soft.   Musculoskeletal:      Right lower leg: Edema present.      Left lower leg: Edema present.   Skin:     Findings: Lesion present.   Neurological:      General: No focal deficit present.      Mental Status: He is alert and oriented to person, place, and time.             Significant Labs: All pertinent labs within the past 24 hours have been reviewed.    Significant Imaging: I have reviewed all pertinent imaging results/findings within the past 24 hours.    Assessment/Plan:      * Sepsis due to cellulitis  This patient does have evidence of infective focus  My overall impression is sepsis.  Source: Skin and Soft Tissue (location lower extremities)  Antibiotics given-   Antibiotics (72h ago,  onward)      Start     Stop Route Frequency Ordered    03/14/24 0900  mupirocin 2 % ointment         03/19/24 0859 Nasl 2 times daily 03/14/24 0446    03/14/24 0830  piperacillin-tazobactam (ZOSYN) 4.5 g in dextrose 5 % in water (D5W) 100 mL IVPB (MB+)         -- IV Every 8 hours (non-standard times) 03/14/24 0424    03/14/24 0706  vancomycin - pharmacy to dose         -- IV pharmacy to manage frequency 03/14/24 0606          Source control achieved by: antibiotics, local wound care   In the past, wound grew pseudomonas and acinetobacter.  Current wound cultures with GNB.  Unfortunately patient is a poor candidate for non-emergent surgical debridement at this time due to heart failure and pulmonary embolism with infarct.   Continue local wound care.   Continue broad spectrum IV antibiotics until cultures finalized.         Pulmonary embolus with infarction  CT PE: Occlusive pulmonary embolus within the branch of the right pulmonary artery extending into the right lower lobe.  Infarct located within the right lower lobe.  Continue weight based Lovenox, transition to PO later.            Chronic HFrEF (heart failure with reduced ejection fraction)  Patient is identified as having Systolic (HFrEF) heart failure that is Acute on chronic. CHF is currently controlled. Latest ECHO performed and demonstrates- Results for orders placed during the hospital encounter of 03/13/24    Echo    Interpretation Summary    Left Ventricle: The left ventricle is mildly dilated. Mildly increased wall thickness. There is concentric hypertrophy. Severe global hypokinesis present. There is reduced systolic function. Ejection fraction by visual approximation is 20%.    Right Ventricle: Moderate right ventricular enlargement. Systolic function is hyperdynamic.    Left Atrium: Left atrium is mildly dilated.    Right Atrium: Right atrium is severely dilated.    Aortic Valve: The aortic valve is a trileaflet valve. Moderately calcified cusps.     "Mitral Valve: There is mild bileaflet sclerosis. Mildly thickened leaflets. There is no stenosis. The mean pressure gradient across the mitral valve is 4 mmHg at a heart rate of  bpm. There is moderate regurgitation with an eccentric jet.    Tricuspid Valve: There is mild regurgitation with an eccentrically directed jet.    Pulmonary Artery: The estimated pulmonary artery systolic pressure is 58 mmHg.    IVC/SVC: Elevated venous pressure at 15 mmHg.    Pericardium: There is a trivial effusion. No indication of cardiac tamponade.    Required dobutamine initially, now off- resume home midodrine.   Continue Furosemide and monitor clinical status closely. Monitor on telemetry. Patient is off CHF pathway.  Monitor strict Is&Os and daily weights.  Place on fluid restriction.   Cardiology has been consulted.   Continue to stress to patient importance of self efficacy and  on diet for CHF. Last BNP reviewed- and noted below No results for input(s): "BNP", "BNPTRIAGEBLO" in the last 168 hours.    Loculated pleural effusion  Patient found to have moderate pleural effusion on imaging.   I have personally reviewed and interpreted the following imaging: CT. A thoracentesis was deferred to Pulmonology.   Most likely etiology includes  cause not entirely clear but could be from pulmonary infarction/infection .   Management to include  Pulmonology consultation to consider thoracentesis/sampling.   Discussed with Dr. Dong- plan for thoracentesis today.  Continue IV Zosyn.       CHANDNI (acute kidney injury)  Patient with acute kidney injury/acute renal failure likely due to pre-renal azotemia due to IVVD CHANDNI is currently stable.   Baseline creatinine  - Labs reviewed- Renal function/electrolytes with Estimated Creatinine Clearance: 35.4 mL/min (A) (based on SCr of 1.86 mg/dL (H)). according to latest data.   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 " "  Monitor urine output and serial BMP and adjust therapy as needed. Avoid nephrotoxins and renally dose meds for GFR listed above.    Pulmonary nodules/lesions, multiple  CTA chest showed interval development of a 1.3 cm solid pulmonary nodule located adjacent to the right major fissure. Additional new 1 cm ill-defined and solid pulmonary nodule located near the lingula.   Pulmonology consulted.       Chronic anemia  Patient's anemia is currently controlled. Has not received any PRBCs to date. Etiology likely d/t chronic disease due to Chronic Kidney Disease  Current CBC reviewed-   Lab Results   Component Value Date    HGB 8.6 (L) 03/18/2024    HCT 28.7 (L) 03/18/2024     Monitor serial CBC and transfuse if patient becomes hemodynamically unstable, symptomatic or H/H drops below 7/21.    Cellulitis of lower extremity  Plan as outlined above.       Positive blood culture  Blood culture #1 of 2 positive - Verigene MRSE and Strep species.   Possible contaminant as only one sample with growth to date.   Continue antibiotics as above.       A-fib  Patient with Paroxysmal (<7 days) atrial fibrillation which is controlled currently without rate controlling med (beta blocker on hold given heart failure/BP). Patient is currently in sinus rhythm.[id number] Score: 4. HASBLED Score: 2. Anticoagulation indicated. Anticoagulation with Lovenox (holding for thoracentesis).     Type 2 diabetes mellitus  Patient's FSGs are controlled on current medication regimen.  Last A1c reviewed-   Lab Results   Component Value Date    HGBA1C 4.8 07/28/2023     Most recent fingerstick glucose reviewed- No results for input(s): "POCTGLUCOSE" in the last 24 hours.  Current correctional scale  Low  Maintain anti-hyperglycemic dose as follows-   Antihyperglycemics (From admission, onward)      Start     Stop Route Frequency Ordered    03/14/24 0520  insulin aspart U-100 injection 0-5 Units         -- SubQ Before meals & nightly PRN 03/14/24 3759 "          Hold Oral hypoglycemics while patient is in the hospital.    COPD (chronic obstructive pulmonary disease)  Patient's COPD is controlled currently.  Patient is currently off COPD Pathway. Continue PRN nebs, Supplemental oxygen and monitor respiratory status closely.     Coronary artery disease involving native coronary artery of native heart without angina pectoris  Patient with known CAD s/p  unknown , which is controlled Will continue ASA and Statin and monitor for S/Sx of angina/ACS. Continue to monitor on telemetry.       VTE Risk Mitigation (From admission, onward)           Ordered     Reason for no Mechanical VTE Prophylaxis  Once        Comments: BLE wound and cellulitis   Question:  Reasons:  Answer:  Physician Provided (leave comment)    03/14/24 0424     IP VTE HIGH RISK PATIENT  Once         03/14/24 0424                    Discharge Planning   MAKENNA: 3/20/2024     Code Status: Full Code   Is the patient medically ready for discharge?:     Reason for patient still in hospital (select all that apply): Patient trending condition, Laboratory test, and Consult recommendations                 TIFFANIE VENTURA MD  Department of Hospital Medicine   [facility]

## 2024-03-18 NOTE — PLAN OF CARE
[facility]  Initial Discharge Assessment       Primary Care Provider: Yolande Spring FNP    Admission Diagnosis: Shortness of breath [R06.02]  CHF (congestive heart failure) [I50.9]  Chest pain [R07.9]  Sepsis due to cellulitis [L03.90, A41.9]  Sepsis, due to unspecified organism, unspecified whether acute organ dysfunction present [A41.9]    Admission Date: 3/13/2024  Expected Discharge Date: 3/20/2024    Transition of Care Barriers: None    Payor: HU[employer] MANAGED MEDICARE / Plan: HU[employer] MEDICARE PPO / Product Type: Medicare Advantage /     Extended Emergency Contact Information  Primary Emergency Contact: Shelby Contreras  Mobile Phone: [phone]  Relation: Relative  Secondary Emergency Contact: Hilda Bagley  Mobile Phone: [phone]  Relation: Relative    Discharge Plan A: Home  Discharge Plan B: Home      [facility] - [place], [place] - [address]  [address] [facility] [place] [address]  Phone: [phone] Fax: [phone]    [facility] - [facility] - [place], [place] - [address]  [address] [facility] [place] [address]  Phone: [phone] Fax: [phone]    [facility] [address] [place] [address] - [address]  [address] [place] [address] [place] [address]  Phone: [phone] Fax: [phone]    [facility], [place] - [address]  [address] [place] [address] [place] [address]  Phone: [phone] Fax: [phone]      Initial Assessment (most recent)       Adult Discharge Assessment - 03/18/24 1525          Discharge Assessment    Assessment Type Discharge Planning Assessment     Source of Information patient     People in Home alone     Do you expect to return to your current living situation? Yes     Do you have help at home or someone to help you manage your care at home? Yes     Prior to hospitilization cognitive status: Unable to Assess     Current cognitive status: Alert/Oriented     Walking or Climbing Stairs Difficulty yes     Walking or Climbing  Stairs ambulation difficulty, requires equipment     Mobility Management cane     Dressing/Bathing Difficulty yes     Dressing/Bathing bathing difficulty, assistance 1 person     Dressing/Bathing Management niece assists pt     Equipment Currently Used at Home cane, straight;bedside commode;walker, rolling     Patient currently being followed by outpatient case management? No     Do you currently have service(s) that help you manage your care at home? No     Do you take prescription medications? Yes     Do you have prescription coverage? Yes     Coverage [employer]     Do you have any problems affording any of your prescribed medications? No     Is the patient taking medications as prescribed? yes     How do you get to doctors appointments? family or friend will provide     Are you on dialysis? No     Do you take coumadin? No     Discharge Plan A Home     Discharge Plan B Home     DME Needed Upon Discharge  none     Discharge Plan discussed with: Patient     Transition of Care Barriers None        Physical Activity    On average, how many days per week do you engage in moderate to strenuous exercise (like a brisk walk)? 0 days     On average, how many minutes do you engage in exercise at this level? 0 min        Financial Resource Strain    How hard is it for you to pay for the very basics like food, housing, medical care, and heating? Not hard at all        Housing Stability    In the last 12 months, was there a time when you were not able to pay the mortgage or rent on time? No     In the last 12 months, how many places have you lived? 1     In the last 12 months, was there a time when you did not have a steady place to sleep or slept in a shelter (including now)? No        Transportation Needs    In the past 12 months, has lack of transportation kept you from medical appointments or from getting medications? No     In the past 12 months, has lack of transportation kept you from meetings, work, or from getting things  needed for daily living? No        Food Insecurity    Within the past 12 months, you worried that your food would run out before you got the money to buy more. Never true     Within the past 12 months, the food you bought just didn't last and you didn't have money to get more. Never true        Stress    Do you feel stress - tense, restless, nervous, or anxious, or unable to sleep at night because your mind is troubled all the time - these days? Not at all        Social Connections    In a typical week, how many times do you talk on the phone with family, friends, or neighbors? More than three times a week     How often do you get together with friends or relatives? More than three times a week     How often do you attend Scientology or Yazidi services? Never     Do you belong to any clubs or organizations such as Scientology groups, unions, fraternal or athletic groups, or school groups? No     How often do you attend meetings of the clubs or organizations you belong to? Never     Are you , , , , never , or living with a partner? Never         Alcohol Use    Q1: How often do you have a drink containing alcohol? Never     Q2: How many drinks containing alcohol do you have on a typical day when you are drinking? Patient does not drink     Q3: How often do you have six or more drinks on one occasion? Never                        SS spoke with pt for admission assessment. Pt lives at home alone. Pt's niece assists patient as needed. Discharge plan is to return home with no anticipated needs. SDOH updated. SS will follow for discharge needs as they arise.

## 2024-03-18 NOTE — ASSESSMENT & PLAN NOTE
This patient does have evidence of infective focus  My overall impression is sepsis.  Source: Skin and Soft Tissue (location lower extremities)  Antibiotics given-   Antibiotics (72h ago, onward)      Start     Stop Route Frequency Ordered    03/14/24 0900  mupirocin 2 % ointment         03/19/24 0859 Nasl 2 times daily 03/14/24 0446    03/14/24 0830  piperacillin-tazobactam (ZOSYN) 4.5 g in dextrose 5 % in water (D5W) 100 mL IVPB (MB+)         -- IV Every 8 hours (non-standard times) 03/14/24 0424    03/14/24 0706  vancomycin - pharmacy to dose         -- IV pharmacy to manage frequency 03/14/24 0606          Source control achieved by: antibiotics, local wound care   In the past, wound grew pseudomonas and acinetobacter.  Current wound cultures with GNB.  Unfortunately patient is a poor candidate for non-emergent surgical debridement at this time due to heart failure and pulmonary embolism with infarct.   Continue local wound care.   Continue broad spectrum IV antibiotics until cultures finalized.

## 2024-03-18 NOTE — PHYSICIAN QUERY
PT Name: Esthela Contreras  MR #: [id number]     DOCUMENTATION CLARIFICATION      CDS/: PEÑA AMARAL MSN RN             Contact information:[email or web address]    This form is a permanent document in the medical record.     Query Date: March 18, 2024    Dear Provider,  By submitting this query, we are merely seeking further clarification of documentation.  Please utilize your independent clinical judgment when addressing the question(s) below.     The Medical Record contains the following:    Supporting Clinical Findings Location in Medical Record   HPI: Patient will be admitted with a working diagnosis of sepsis secondary to bilateral lower extremity cellulitis in the setting of chronic venous stasis dermatitis and type 2 MI associated with acute decompensation of chronic combined heart failure.    H&P, Dr. Lee, 03/14/24   Assessment/Plan:  Coronary artery disease involving native coronary artery of native heart without angina pectoris-  Details are unknown at this time, but I do not feel that elevation in troponin levels represents ACS. Will continue home medications   H&P, Dr. Lee, 03/14/24 03/13/24 19:42 03/13/24 21:37 03/14/24 04:48   Troponin I High Sensitivity 99.4 (HH)   152.9 (HH)   153.7 ()        Lab results   Assessment/Plan:  Coronary artery disease involving native coronary artery of native heart without angina pectoris-  Patient with known CAD s/p  unknown , which is controlled Will continue ASA and Statin and monitor for S/Sx of angina/ACS. Continue to monitor on telemetry. PNDr. Ford, 03/18     Provider, Please clarify if the Type 2 MI diagnosis has been:    [  ] Ruled Out     [ x ] Ruled In, Now Resolved     [  ] Ruled In     [  ] Other/Clarification of findings (please specify): _______________             Please document in your progress notes daily for the duration of treatment, until resolved, and include in your discharge summary.    Form No. [id number]

## 2024-03-18 NOTE — ASSESSMENT & PLAN NOTE
Patient with acute kidney injury/acute renal failure likely due to pre-renal azotemia due to IVVD CHANDNI is currently stable.   Baseline creatinine  - Labs reviewed- Renal function/electrolytes with Estimated Creatinine Clearance: 35.4 mL/min (A) (based on SCr of 1.86 mg/dL (H)). according to latest data.   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   Monitor urine output and serial BMP and adjust therapy as needed. Avoid nephrotoxins and renally dose meds for GFR listed above.

## 2024-03-18 NOTE — ASSESSMENT & PLAN NOTE
"Patient is identified as having Systolic (HFrEF) heart failure that is Acute on chronic. CHF is currently controlled. Latest ECHO performed and demonstrates- Results for orders placed during the hospital encounter of 03/13/24    Echo    Interpretation Summary    Left Ventricle: The left ventricle is mildly dilated. Mildly increased wall thickness. There is concentric hypertrophy. Severe global hypokinesis present. There is reduced systolic function. Ejection fraction by visual approximation is 20%.    Right Ventricle: Moderate right ventricular enlargement. Systolic function is hyperdynamic.    Left Atrium: Left atrium is mildly dilated.    Right Atrium: Right atrium is severely dilated.    Aortic Valve: The aortic valve is a trileaflet valve. Moderately calcified cusps.    Mitral Valve: There is mild bileaflet sclerosis. Mildly thickened leaflets. There is no stenosis. The mean pressure gradient across the mitral valve is 4 mmHg at a heart rate of  bpm. There is moderate regurgitation with an eccentric jet.    Tricuspid Valve: There is mild regurgitation with an eccentrically directed jet.    Pulmonary Artery: The estimated pulmonary artery systolic pressure is 58 mmHg.    IVC/SVC: Elevated venous pressure at 15 mmHg.    Pericardium: There is a trivial effusion. No indication of cardiac tamponade.    Required dobutamine initially, now off- resume home midodrine.   Continue Furosemide and monitor clinical status closely. Monitor on telemetry. Patient is off CHF pathway.  Monitor strict Is&Os and daily weights.  Place on fluid restriction.   Cardiology has been consulted.   Continue to stress to patient importance of self efficacy and  on diet for CHF. Last BNP reviewed- and noted below No results for input(s): "BNP", "BNPTRIAGEBLO" in the last 168 hours.  "

## 2024-03-18 NOTE — ASSESSMENT & PLAN NOTE
Patient found to have moderate pleural effusion on imaging.   I have personally reviewed and interpreted the following imaging: CT. A thoracentesis was deferred to Pulmonology.   Most likely etiology includes  cause not entirely clear but could be from pulmonary infarction/infection .   Management to include  Pulmonology consultation to consider thoracentesis/sampling.   Discussed with Dr. Dong- plan for thoracentesis today.  Continue IV Zosyn.

## 2024-03-18 NOTE — ASSESSMENT & PLAN NOTE
Patient with Paroxysmal (<7 days) atrial fibrillation which is controlled currently without rate controlling med (beta blocker on hold given heart failure/BP). Patient is currently in sinus rhythm.[id number] Score: 4. HASBLED Score: 2. Anticoagulation indicated. Anticoagulation with Lovenox (holding for thoracentesis).

## 2024-03-18 NOTE — ASSESSMENT & PLAN NOTE
Patient's anemia is currently controlled. Has not received any PRBCs to date. Etiology likely d/t chronic disease due to Chronic Kidney Disease  Current CBC reviewed-   Lab Results   Component Value Date    HGB 8.6 (L) 03/18/2024    HCT 28.7 (L) 03/18/2024     Monitor serial CBC and transfuse if patient becomes hemodynamically unstable, symptomatic or H/H drops below 7/21.

## 2024-03-18 NOTE — PROGRESS NOTES
Pharmacokinetic Assessment Follow Up: IV Vancomycin    Vancomycin serum concentration assessment(s):    The random level was drawn correctly and can be used to guide therapy at this time. The measurement is within the desired definitive target range of 15 to 20 mcg/mL.    Vancomycin Regimen Plan:    Change regimen to Vancomycin 1500 mg IV every 48 hours with next serum trough concentration measured at 2030 prior to 2nd dose on 3/20    Drug levels (last 3 results):  Recent Labs   Lab Result Units 03/17/24  0507 03/18/24  0359 03/18/24  1549   Vancomycin, Random µg/mL  --  20.9* 17.7   Vancomycin, Trough µg/mL 28.2*  --   --        Pharmacy will continue to follow and monitor vancomycin.    Please contact pharmacy at extension [phone] for questions regarding this assessment.    Patient brief summary:  Esthela Contreras is a 73 y.o. male initiated on antimicrobial therapy with IV Vancomycin for treatment of  SSTI, sepsis    The patient's current regimen is vanc 1500 mg IV q48h    Drug Allergies:   Review of patient's allergies indicates:  No Known Allergies    Actual Body Weight:   77.7 kg    Renal Function:   Estimated Creatinine Clearance: 35.4 mL/min (A) (based on SCr of 1.86 mg/dL (H)).,     Dialysis Method (if applicable):  N/A    CBC (last 72 hours):  Recent Labs   Lab Result Units 03/16/24  0607 03/17/24  0507 03/18/24  0358   WBC K/uL 6.71 8.18 8.17   Hemoglobin g/dL 7.9* 8.1* 8.6*   Hematocrit % 26.1* 28.7* 28.7*   Platelet Count K/uL 101* 122* 146*   Lymphocytes % % 14.2* 12.5* 18.2*   Lymphocytes, Man % %  --   --  16*   Monocytes % % 8.6* 9.7* 8.3*   Monocytes, Man % %  --   --  5   Eosinophils % % 2.4 2.9 2.6   Eosinophils, Man % %  --   --  2   Basophils % % 0.7 0.6 0.7   Basophils, Man % %  --   --  1   Diff Type  Auto Auto Manual       Metabolic Panel (last 72 hours):  Recent Labs   Lab Result Units 03/16/24  0607 03/17/24  1033 03/18/24  0359 03/18/24  1315   Sodium mmol/L 136 138 138  --    Potassium mmol/L  5.1 4.5 4.7  --    Chloride mmol/L 107 107 107  --    CO2 mmol/L 24 26 23  --    Glucose mg/dL 81 126* 119*  --    Glucose, Body Fluid mg/dL  --   --   --  75   BUN mg/dL 37* 39* 40*  --    Creatinine mg/dL 1.92* 1.87* 1.86*  --        Vancomycin Administrations:  vancomycin given in the last 96 hours                     vancomycin (VANCOCIN) 1,500 mg in dextrose 5 % (D5W) 250 mL IVPB (mg) 1,500 mg New Bag 03/16/24 0606     1,500 mg New Bag 03/15/24 0605                    Microbiologic Results:  Microbiology Results (last 7 days)       Procedure Component Value Units Date/Time    Culture, Body Fluid [[id number]] Collected: 03/18/24 1315    Order Status: Resulted Specimen: Body Fluid from Pleural Fluid Updated: 03/18/24 1329    Culture, Wound [[id number]]  (Abnormal) Collected: 03/16/24 1801    Order Status: Completed Specimen: Wound from Foot, Left Updated: 03/18/24 0937     Culture, Wound/Abscess Moderate Growth Gram-negative Bacilli     Comment: Identification and Susceptibility to Follow         Moderate Growth Gram-negative Bacilli     Comment: Identification and Susceptibility to Follow       Blood culture #2 [[id number]] Collected: 03/13/24 2137    Order Status: Completed Specimen: Blood Updated: 03/18/24 0842     Culture, Blood No Growth At 72 Hours    Blood culture #1 [[id number]]  (Abnormal)  (Susceptibility) Collected: 03/13/24 1942    Order Status: Completed Specimen: Blood Updated: 03/17/24 0647     Culture, Blood Staphylococcus epidermidis     Gram Stain Result Gram positive cocci     Comment: From Aerobic Bottle       Blood culture (site 1) [[id number]]     Order Status: Canceled Specimen: Blood     Blood culture (site 2) [[id number]]     Order Status: Canceled Specimen: Blood     Verigene Gram-positive Blood Culture Test [[id number]]  (Abnormal) Collected: 03/13/24 1942    Order Status: Completed Specimen: Blood Updated: 03/14/24 2333     Verigene Result Methicillin Resistant Staphylococcus  epidermidis

## 2024-03-18 NOTE — SUBJECTIVE & OBJECTIVE
Interval History: Patient seen and examined at the bedside, reports doing Ok, denies any new complaints.     Review of Systems   Respiratory:  Positive for shortness of breath.    Skin:  Positive for wound.     Objective:     Vital Signs (Most Recent):  Temp: 97.3 °F (36.3 °C) (03/18/24 1045)  Pulse: 96 (03/18/24 1045)  Resp: 20 (03/18/24 1045)  BP: 100/65 (03/18/24 1045)  SpO2: 100 % (03/18/24 1111) Vital Signs (24h Range):  Temp:  [97.2 °F (36.2 °C)-98.2 °F (36.8 °C)] 97.3 °F (36.3 °C)  Pulse:  [] 96  Resp:  [18-20] 20  SpO2:  [93 %-100 %] 100 %  BP: ()/(65-81) 100/65     Weight: 77.7 kg (171 lb 4.8 oz)  Body mass index is 24.58 kg/m².    Intake/Output Summary (Last 24 hours) at 3/18/2024 1211  Last data filed at 3/18/2024 0330  Gross per 24 hour   Intake --   Output 800 ml   Net -800 ml           Physical Exam  Constitutional:       Appearance: He is ill-appearing.   HENT:      Head: Normocephalic and atraumatic.      Mouth/Throat:      Mouth: Mucous membranes are moist.   Eyes:      Extraocular Movements: Extraocular movements intact.   Cardiovascular:      Rate and Rhythm: Normal rate. Rhythm irregular.   Pulmonary:      Effort: Pulmonary effort is normal. No respiratory distress.      Breath sounds: Rales present.   Abdominal:      General: Bowel sounds are normal.      Palpations: Abdomen is soft.   Musculoskeletal:      Right lower leg: Edema present.      Left lower leg: Edema present.   Skin:     Findings: Lesion present.   Neurological:      General: No focal deficit present.      Mental Status: He is alert and oriented to person, place, and time.             Significant Labs: All pertinent labs within the past 24 hours have been reviewed.    Significant Imaging: I have reviewed all pertinent imaging results/findings within the past 24 hours.

## 2024-03-18 NOTE — PLAN OF CARE
Problem: Adult Inpatient Plan of Care  Goal: Plan of Care Review  Outcome: Ongoing, Progressing  Goal: Patient-Specific Goal (Individualized)  Outcome: Ongoing, Progressing  Goal: Absence of Hospital-Acquired Illness or Injury  Outcome: Ongoing, Progressing  Goal: Optimal Comfort and Wellbeing  Outcome: Ongoing, Progressing  Goal: Readiness for Transition of Care  Outcome: Ongoing, Progressing     Problem: Diabetes Comorbidity  Goal: Blood Glucose Level Within Targeted Range  Outcome: Ongoing, Progressing     Problem: Adjustment to Illness (Sepsis/Septic Shock)  Goal: Optimal Coping  Outcome: Ongoing, Progressing     Problem: Bleeding (Sepsis/Septic Shock)  Goal: Absence of Bleeding  Outcome: Ongoing, Progressing     Problem: Glycemic Control Impaired (Sepsis/Septic Shock)  Goal: Blood Glucose Level Within Desired Range  Outcome: Ongoing, Progressing     Problem: Infection Progression (Sepsis/Septic Shock)  Goal: Absence of Infection Signs and Symptoms  Outcome: Ongoing, Progressing     Problem: Nutrition Impaired (Sepsis/Septic Shock)  Goal: Optimal Nutrition Intake  Outcome: Ongoing, Progressing     Problem: Infection  Goal: Absence of Infection Signs and Symptoms  Outcome: Ongoing, Progressing     Problem: Impaired Wound Healing  Goal: Optimal Wound Healing  Outcome: Ongoing, Progressing     Problem: Skin Injury Risk Increased  Goal: Skin Health and Integrity  Outcome: Ongoing, Progressing     Problem: Fall Injury Risk  Goal: Absence of Fall and Fall-Related Injury  Outcome: Ongoing, Progressing     Problem: Fluid and Electrolyte Imbalance (Acute Kidney Injury/Impairment)  Goal: Fluid and Electrolyte Balance  Outcome: Ongoing, Progressing     Problem: Oral Intake Inadequate (Acute Kidney Injury/Impairment)  Goal: Optimal Nutrition Intake  Outcome: Ongoing, Progressing     Problem: Renal Function Impairment (Acute Kidney Injury/Impairment)  Goal: Effective Renal Function  Outcome: Ongoing, Progressing

## 2024-03-19 LAB
ANION GAP SERPL CALCULATED.3IONS-SCNC: 12 MMOL/L (ref 7–16)
ANISOCYTOSIS BLD QL SMEAR: ABNORMAL
BACTERIA BLD CULT: NORMAL
BASOPHILS # BLD AUTO: 0.06 K/UL (ref 0–0.2)
BASOPHILS NFR BLD AUTO: 0.6 % (ref 0–1)
BASOPHILS NFR BLD MANUAL: 3 % (ref 0–1)
BUN SERPL-MCNC: 35 MG/DL (ref 7–18)
BUN/CREAT SERPL: 20 (ref 6–20)
CALCIUM SERPL-MCNC: 8.3 MG/DL (ref 8.5–10.1)
CHLORIDE SERPL-SCNC: 109 MMOL/L (ref 98–107)
CO2 SERPL-SCNC: 25 MMOL/L (ref 21–32)
CREAT SERPL-MCNC: 1.74 MG/DL (ref 0.7–1.3)
CRENATED CELLS: ABNORMAL
DIFFERENTIAL METHOD BLD: ABNORMAL
EGFR (NO RACE VARIABLE) (RUSH/TITUS): 40 ML/MIN/1.73M2
EOSINOPHIL # BLD AUTO: 0.16 K/UL (ref 0–0.5)
EOSINOPHIL NFR BLD AUTO: 1.7 % (ref 1–4)
EOSINOPHIL NFR BLD MANUAL: 4 % (ref 1–4)
ERYTHROCYTE [DISTWIDTH] IN BLOOD BY AUTOMATED COUNT: 18.7 % (ref 11.5–14.5)
GLUCOSE SERPL-MCNC: 121 MG/DL (ref 70–105)
GLUCOSE SERPL-MCNC: 137 MG/DL (ref 70–105)
GLUCOSE SERPL-MCNC: 142 MG/DL (ref 70–105)
GLUCOSE SERPL-MCNC: 144 MG/DL (ref 70–105)
GLUCOSE SERPL-MCNC: 93 MG/DL (ref 74–106)
HCT VFR BLD AUTO: 29.1 % (ref 40–54)
HGB BLD-MCNC: 8.2 G/DL (ref 13.5–18)
HYPOCHROMIA BLD QL SMEAR: ABNORMAL
IMM GRANULOCYTES # BLD AUTO: 0.11 K/UL (ref 0–0.04)
IMM GRANULOCYTES NFR BLD: 1.2 % (ref 0–0.4)
INSULIN SERPL-ACNC: NORMAL U[IU]/ML
LAB AP COMMENTS: NORMAL
LAB AP GROSS DESCRIPTION: NORMAL
LAB AP LABORATORY NOTES: NORMAL
LAB AP SPECIMEN A NON-GYN GENERAL CATEGORIZATION: NEGATIVE
LAB AP SPECIMEN A NON-GYN INTERPETATION: NORMAL
LDH SERPL-CCNC: 217 U/L (ref 87–241)
LYMPHOCYTES # BLD AUTO: 1.83 K/UL (ref 1–4.8)
LYMPHOCYTES NFR BLD AUTO: 19.4 % (ref 27–41)
LYMPHOCYTES NFR BLD MANUAL: 17 % (ref 27–41)
MCH RBC QN AUTO: 23.9 PG (ref 27–31)
MCHC RBC AUTO-ENTMCNC: 28.2 G/DL (ref 32–36)
MCV RBC AUTO: 84.8 FL (ref 80–96)
MONOCYTES # BLD AUTO: 0.77 K/UL (ref 0–0.8)
MONOCYTES NFR BLD AUTO: 8.2 % (ref 2–6)
MONOCYTES NFR BLD MANUAL: 5 % (ref 2–6)
MPC BLD CALC-MCNC: 9.8 FL (ref 9.4–12.4)
NEUTROPHILS # BLD AUTO: 6.49 K/UL (ref 1.8–7.7)
NEUTROPHILS NFR BLD AUTO: 68.9 % (ref 53–65)
NEUTS BAND NFR BLD MANUAL: 1 % (ref 1–5)
NEUTS SEG NFR BLD MANUAL: 70 % (ref 50–62)
NRBC # BLD AUTO: 0.02 X10E3/UL
NRBC, AUTO (.00): 0.2 %
PLATELET # BLD AUTO: 191 K/UL (ref 150–400)
PLATELET MORPHOLOGY: ABNORMAL
POTASSIUM SERPL-SCNC: 4.6 MMOL/L (ref 3.5–5.1)
PROT SERPL-MCNC: 7 G/DL (ref 6.4–8.2)
RBC # BLD AUTO: 3.43 M/UL (ref 4.6–6.2)
REACTIVE LYMPHOCYTES: ABNORMAL
SODIUM SERPL-SCNC: 141 MMOL/L (ref 136–145)
TARGETS BLD QL SMEAR: ABNORMAL
WBC # BLD AUTO: 9.42 K/UL (ref 4.5–11)

## 2024-03-19 PROCEDURE — 80048 BASIC METABOLIC PNL TOTAL CA: CPT | Performed by: STUDENT IN AN ORGANIZED HEALTH CARE EDUCATION/TRAINING PROGRAM

## 2024-03-19 PROCEDURE — 97162 PT EVAL MOD COMPLEX 30 MIN: CPT

## 2024-03-19 PROCEDURE — 11000001 HC ACUTE MED/SURG PRIVATE ROOM

## 2024-03-19 PROCEDURE — 25000003 PHARM REV CODE 250: Performed by: INTERNAL MEDICINE

## 2024-03-19 PROCEDURE — 97165 OT EVAL LOW COMPLEX 30 MIN: CPT

## 2024-03-19 PROCEDURE — 85025 COMPLETE CBC W/AUTO DIFF WBC: CPT | Performed by: INTERNAL MEDICINE

## 2024-03-19 PROCEDURE — 99233 SBSQ HOSP IP/OBS HIGH 50: CPT | Mod: ,,, | Performed by: INTERNAL MEDICINE

## 2024-03-19 PROCEDURE — 25000003 PHARM REV CODE 250: Performed by: NURSE PRACTITIONER

## 2024-03-19 PROCEDURE — 63600175 PHARM REV CODE 636 W HCPCS: Performed by: INTERNAL MEDICINE

## 2024-03-19 PROCEDURE — 84155 ASSAY OF PROTEIN SERUM: CPT | Performed by: INTERNAL MEDICINE

## 2024-03-19 PROCEDURE — 82962 GLUCOSE BLOOD TEST: CPT

## 2024-03-19 PROCEDURE — 83615 LACTATE (LD) (LDH) ENZYME: CPT | Performed by: INTERNAL MEDICINE

## 2024-03-19 PROCEDURE — 0W993ZZ DRAINAGE OF RIGHT PLEURAL CAVITY, PERCUTANEOUS APPROACH: ICD-10-PCS | Performed by: STUDENT IN AN ORGANIZED HEALTH CARE EDUCATION/TRAINING PROGRAM

## 2024-03-19 PROCEDURE — 25000003 PHARM REV CODE 250: Performed by: FAMILY MEDICINE

## 2024-03-19 RX ORDER — SILVER SULFADIAZINE 10 G/1000G
CREAM TOPICAL DAILY
Status: DISCONTINUED | OUTPATIENT
Start: 2024-03-20 | End: 2024-03-26 | Stop reason: HOSPADM

## 2024-03-19 RX ADMIN — FUROSEMIDE 40 MG: 40 TABLET ORAL at 08:03

## 2024-03-19 RX ADMIN — ASPIRIN 81 MG: 81 TABLET, COATED ORAL at 08:03

## 2024-03-19 RX ADMIN — MIDODRINE HYDROCHLORIDE 5 MG: 5 TABLET ORAL at 08:03

## 2024-03-19 RX ADMIN — PIPERACILLIN AND TAZOBACTAM 4.5 G: 4; .5 INJECTION, POWDER, FOR SOLUTION INTRAVENOUS; PARENTERAL at 09:03

## 2024-03-19 RX ADMIN — GABAPENTIN 100 MG: 100 CAPSULE ORAL at 03:03

## 2024-03-19 RX ADMIN — HYDROCODONE BITARTRATE AND ACETAMINOPHEN 1 TABLET: 5; 325 TABLET ORAL at 12:03

## 2024-03-19 RX ADMIN — GABAPENTIN 100 MG: 100 CAPSULE ORAL at 08:03

## 2024-03-19 RX ADMIN — HYDRALAZINE HYDROCHLORIDE 10 MG: 10 TABLET, FILM COATED ORAL at 08:03

## 2024-03-19 RX ADMIN — ENOXAPARIN SODIUM 80 MG: 80 INJECTION SUBCUTANEOUS at 02:03

## 2024-03-19 RX ADMIN — PIPERACILLIN AND TAZOBACTAM 4.5 G: 4; .5 INJECTION, POWDER, FOR SOLUTION INTRAVENOUS; PARENTERAL at 05:03

## 2024-03-19 RX ADMIN — MIDODRINE HYDROCHLORIDE 5 MG: 5 TABLET ORAL at 12:03

## 2024-03-19 RX ADMIN — ENOXAPARIN SODIUM 80 MG: 80 INJECTION SUBCUTANEOUS at 03:03

## 2024-03-19 RX ADMIN — ATORVASTATIN CALCIUM 40 MG: 40 TABLET, FILM COATED ORAL at 08:03

## 2024-03-19 RX ADMIN — PIPERACILLIN AND TAZOBACTAM 4.5 G: 4; .5 INJECTION, POWDER, FOR SOLUTION INTRAVENOUS; PARENTERAL at 02:03

## 2024-03-19 RX ADMIN — MIDODRINE HYDROCHLORIDE 5 MG: 5 TABLET ORAL at 05:03

## 2024-03-19 NOTE — ASSESSMENT & PLAN NOTE
Patient with Paroxysmal (<7 days) atrial fibrillation which is controlled currently without rate controlling med (beta blocker on hold given heart failure/BP). Patient is currently in sinus rhythm.[id number] Score: 4. HASBLED Score: 2. Anticoagulation indicated. Anticoagulation with Lovenox.

## 2024-03-19 NOTE — PROGRESS NOTES
[facility] Hospital Medicine  Progress Note    Patient Name: Esthela Contreras  MRN: [id number]  Patient Class: IP- Inpatient   Admission Date: 3/13/2024  Length of Stay: 5 days  Attending Physician: Cortez Ford MD  Primary Care Provider: Yolande Spring FNP        Subjective:     Principal Problem:Sepsis due to cellulitis        HPI:  Patient is a 73-year-old male with a history of combined CHF with last known EF of 35% with chronically elevated proBNP ranging from 4 to 20,000, COPD, type 2 diabetes, and chronic bilateral lower extremity edema coupled with venous stasis dermatitis which has been frequently complicated by cellulitis requiring hospitalization who presented to emergency room as instructed by his home health nurse for evaluation of bilateral lower extremity wound which appeared to have been infected again.  Patient complained of burning type of pain +7/10 involving bilateral lower extremities, but otherwise denied any fever chills cough wheezing shortness of breath chest pain palpitation PND or orthopnea in association.    On initial presentation, patient was tachycardic with a heart rate in the 120s but vital signs were otherwise stable and patient was afebrile.  Workup was notable for chest x-ray demonstrating right-sided pleural effusion with right basilar density representing either atelectasis versus developing infiltrate, chronically elevated troponin in the 100s without any ischemic abnormalities seen on EKG, chronically elevated proBNP along with leukocytosis with left shift.     Patient will be admitted with a working diagnosis of sepsis secondary to bilateral lower extremity cellulitis in the setting of chronic venous stasis dermatitis and type 2 MI associated with acute decompensation of chronic combined heart failure.        Overview/Hospital Course:  No notes on file    Interval History: Patient seen and examined at the bedside, reports doing Ok, denies any new  complaints.     Review of Systems   Respiratory:  Positive for shortness of breath.    Skin:  Positive for wound.     Objective:     Vital Signs (Most Recent):  Temp: 98.4 °F (36.9 °C) (03/19/24 1012)  Pulse: 103 (03/19/24 1012)  Resp: 18 (03/19/24 1215)  BP: 106/72 (03/19/24 1012)  SpO2: 98 % (03/19/24 1012) Vital Signs (24h Range):  Temp:  [97.3 °F (36.3 °C)-98.8 °F (37.1 °C)] 98.4 °F (36.9 °C)  Pulse:  [100-110] 103  Resp:  [18-20] 18  SpO2:  [95 %-100 %] 98 %  BP: ()/(52-82) 106/72     Weight: 77.7 kg (171 lb 4.8 oz)  Body mass index is 24.58 kg/m².    Intake/Output Summary (Last 24 hours) at 3/19/2024 1424  Last data filed at 3/19/2024 1012  Gross per 24 hour   Intake --   Output 1250 ml   Net -1250 ml           Physical Exam  Constitutional:       Appearance: He is ill-appearing.   HENT:      Head: Normocephalic and atraumatic.      Mouth/Throat:      Mouth: Mucous membranes are moist.   Eyes:      Extraocular Movements: Extraocular movements intact.   Cardiovascular:      Rate and Rhythm: Normal rate. Rhythm irregular.   Pulmonary:      Effort: Pulmonary effort is normal. No respiratory distress.      Breath sounds: Rales present.   Abdominal:      General: Bowel sounds are normal.      Palpations: Abdomen is soft.   Musculoskeletal:      Right lower leg: Edema present.      Left lower leg: Edema present.      Comments: BLE dressing in place.    Skin:     Findings: Lesion present.   Neurological:      General: No focal deficit present.      Mental Status: He is alert and oriented to person, place, and time.             Significant Labs: All pertinent labs within the past 24 hours have been reviewed.    Significant Imaging: I have reviewed all pertinent imaging results/findings within the past 24 hours.    Assessment/Plan:      * Sepsis due to cellulitis  This patient does have evidence of infective focus  My overall impression is sepsis.  Source: Skin and Soft Tissue (location lower  extremities)  Antibiotics given-   Antibiotics (72h ago, onward)      Start     Stop Route Frequency Ordered    03/20/24 0900  silver sulfADIAZINE 1% cream         -- Top Daily 03/19/24 1224    03/18/24 2100  vancomycin (VANCOCIN) 1,500 mg in dextrose 5 % (D5W) 250 mL IVPB         -- IV Every 48 hours (non-standard times) 03/18/24 1705    03/14/24 0900  mupirocin 2 % ointment         03/19/24 0859 Nasl 2 times daily 03/14/24 0446    03/14/24 0830  piperacillin-tazobactam (ZOSYN) 4.5 g in dextrose 5 % in water (D5W) 100 mL IVPB (MB+)         -- IV Every 8 hours (non-standard times) 03/14/24 0424    03/14/24 0706  vancomycin - pharmacy to dose         -- IV pharmacy to manage frequency 03/14/24 0606          Source control achieved by: antibiotics, local wound care   In the past, wound grew pseudomonas and acinetobacter.  Current wound cultures with pseudomonas (sensitive to Zosyn) and staph aureus growth.   Unfortunately patient is a poor candidate for non-emergent surgical debridement at this time due to heart failure and pulmonary embolism with infarct.   Continue local wound care.   Continue IV vancomycin and Zosyn- will transition to PO ciprofloxacin and doxycycline after total 7 days of IV and complete total 14 day course.          Pulmonary embolus with infarction  CT PE: Occlusive pulmonary embolus within the branch of the right pulmonary artery extending into the right lower lobe.  Infarct located within the right lower lobe.  Continue weight based Lovenox, transition to Eliquis on discharge.            Chronic HFrEF (heart failure with reduced ejection fraction)  Patient is identified as having Systolic (HFrEF) heart failure that is Acute on chronic. CHF is currently controlled. Latest ECHO performed and demonstrates- Results for orders placed during the hospital encounter of 03/13/24    Echo    Interpretation Summary    Left Ventricle: The left ventricle is mildly dilated. Mildly increased wall thickness.  "There is concentric hypertrophy. Severe global hypokinesis present. There is reduced systolic function. Ejection fraction by visual approximation is 20%.    Right Ventricle: Moderate right ventricular enlargement. Systolic function is hyperdynamic.    Left Atrium: Left atrium is mildly dilated.    Right Atrium: Right atrium is severely dilated.    Aortic Valve: The aortic valve is a trileaflet valve. Moderately calcified cusps.    Mitral Valve: There is mild bileaflet sclerosis. Mildly thickened leaflets. There is no stenosis. The mean pressure gradient across the mitral valve is 4 mmHg at a heart rate of  bpm. There is moderate regurgitation with an eccentric jet.    Tricuspid Valve: There is mild regurgitation with an eccentrically directed jet.    Pulmonary Artery: The estimated pulmonary artery systolic pressure is 58 mmHg.    IVC/SVC: Elevated venous pressure at 15 mmHg.    Pericardium: There is a trivial effusion. No indication of cardiac tamponade.    Required dobutamine initially, now off- resume home midodrine.   Continue Furosemide and monitor clinical status closely. Monitor on telemetry. Patient is off CHF pathway.  Monitor strict Is&Os and daily weights.  Place on fluid restriction.   Cardiology has been consulted.   Continue to stress to patient importance of self efficacy and  on diet for CHF. Last BNP reviewed- and noted below No results for input(s): "BNP", "BNPTRIAGEBLO" in the last 168 hours.    Loculated pleural effusion  Patient found to have moderate pleural effusion on imaging.   I have personally reviewed and interpreted the following imaging: CT. A thoracentesis was deferred to Pulmonology.   Most likely etiology includes  cause not entirely clear but could be from pulmonary infarction/infection .   Management to include  Pulmonology consultation to consider thoracentesis/sampling.   S/p thoracentesis (3/18), exudative per Light's criteria based on serum-fluid LDH ratio, cultures " NGTD.   Continue antibiotics.       CHANDNI (acute kidney injury)  Patient with acute kidney injury/acute renal failure likely due to pre-renal azotemia due to IVVD CHANDNI is currently stable.   Baseline creatinine  - Labs reviewed- Renal function/electrolytes with Estimated Creatinine Clearance: 37.9 mL/min (A) (based on SCr of 1.74 mg/dL (H)). according to latest data.   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   Monitor urine output and serial BMP and adjust therapy as needed. Avoid nephrotoxins and renally dose meds for GFR listed above.    Pulmonary nodules/lesions, multiple  CTA chest showed interval development of a 1.3 cm solid pulmonary nodule located adjacent to the right major fissure. Additional new 1 cm ill-defined and solid pulmonary nodule located near the lingula.   Pulmonology consulted.       Chronic anemia  Patient's anemia is currently controlled. Has not received any PRBCs to date. Etiology likely d/t chronic disease due to Chronic Kidney Disease  Current CBC reviewed-   Lab Results   Component Value Date    HGB 8.2 (L) 03/19/2024    HCT 29.1 (L) 03/19/2024     Monitor serial CBC and transfuse if patient becomes hemodynamically unstable, symptomatic or H/H drops below 7/21.    Cellulitis of lower extremity  Plan as outlined above.       Positive blood culture  Blood culture #1 of 2 positive - Verigene MRSE and Strep species.   Possible contaminant as only one sample with growth to date.   Continue antibiotics as above.       A-fib  Patient with Paroxysmal (<7 days) atrial fibrillation which is controlled currently without rate controlling med (beta blocker on hold given heart failure/BP). Patient is currently in sinus rhythm.[id number] Score: 4. HASBLED Score: 2. Anticoagulation indicated. Anticoagulation with Lovenox.    Type 2 diabetes mellitus  Patient's FSGs are controlled on current medication regimen.  Last A1c  "reviewed-   Lab Results   Component Value Date    HGBA1C 4.8 07/28/2023     Most recent fingerstick glucose reviewed- No results for input(s): "POCTGLUCOSE" in the last 24 hours.  Current correctional scale  Low  Maintain anti-hyperglycemic dose as follows-   Antihyperglycemics (From admission, onward)      Start     Stop Route Frequency Ordered    03/14/24 0520  insulin aspart U-100 injection 0-5 Units         -- SubQ Before meals & nightly PRN 03/14/24 0424          Hold Oral hypoglycemics while patient is in the hospital.    COPD (chronic obstructive pulmonary disease)  Patient's COPD is controlled currently.  Patient is currently off COPD Pathway. Continue PRN nebs, Supplemental oxygen and monitor respiratory status closely.     Coronary artery disease involving native coronary artery of native heart without angina pectoris  Patient with known CAD s/p  unknown , which is controlled Will continue ASA and Statin and monitor for S/Sx of angina/ACS. Continue to monitor on telemetry.       VTE Risk Mitigation (From admission, onward)           Ordered     enoxaparin injection 80 mg  Every 12 hours         03/18/24 1429     Reason for no Mechanical VTE Prophylaxis  Once        Comments: BLE wound and cellulitis   Question:  Reasons:  Answer:  Physician Provided (leave comment)    03/14/24 0424     IP VTE HIGH RISK PATIENT  Once         03/14/24 0424                    Discharge Planning   AMKENNA: 3/21/2024     Code Status: Full Code   Is the patient medically ready for discharge?:     Reason for patient still in hospital (select all that apply): Patient trending condition  Discharge Plan A: Home                  TIFFANIE VENTURA MD  Department of Hospital Medicine   [facility]    " Never smoker

## 2024-03-19 NOTE — PROGRESS NOTES
03/19/24 1208   Wound Care Follow Up   Wound Care Follow-up? Yes   Wound Care- Next Visit Date 03/22/24   Follow Up Plan Yajaira POC

## 2024-03-19 NOTE — NURSING
"Pt refused his dressing changes this AM. Educated pt on the importance of getting his dressing changes done. Explained to pt that dressing changes aid in the healing process. Pt stated that "they do not have to be changed everyday".   "

## 2024-03-19 NOTE — ASSESSMENT & PLAN NOTE
This patient does have evidence of infective focus  My overall impression is sepsis.  Source: Skin and Soft Tissue (location lower extremities)  Antibiotics given-   Antibiotics (72h ago, onward)      Start     Stop Route Frequency Ordered    03/20/24 0900  silver sulfADIAZINE 1% cream         -- Top Daily 03/19/24 1224    03/18/24 2100  vancomycin (VANCOCIN) 1,500 mg in dextrose 5 % (D5W) 250 mL IVPB         -- IV Every 48 hours (non-standard times) 03/18/24 1705    03/14/24 0900  mupirocin 2 % ointment         03/19/24 0859 Nasl 2 times daily 03/14/24 0446    03/14/24 0830  piperacillin-tazobactam (ZOSYN) 4.5 g in dextrose 5 % in water (D5W) 100 mL IVPB (MB+)         -- IV Every 8 hours (non-standard times) 03/14/24 0424    03/14/24 0706  vancomycin - pharmacy to dose         -- IV pharmacy to manage frequency 03/14/24 0606          Source control achieved by: antibiotics, local wound care   In the past, wound grew pseudomonas and acinetobacter.  Current wound cultures with pseudomonas (sensitive to Zosyn) and staph aureus growth.   Unfortunately patient is a poor candidate for non-emergent surgical debridement at this time due to heart failure and pulmonary embolism with infarct.   Continue local wound care.   Continue IV vancomycin and Zosyn- will transition to PO ciprofloxacin and doxycycline after total 7 days of IV and complete total 14 day course.

## 2024-03-19 NOTE — ASSESSMENT & PLAN NOTE
Patient's anemia is currently controlled. Has not received any PRBCs to date. Etiology likely d/t chronic disease due to Chronic Kidney Disease  Current CBC reviewed-   Lab Results   Component Value Date    HGB 8.2 (L) 03/19/2024    HCT 29.1 (L) 03/19/2024     Monitor serial CBC and transfuse if patient becomes hemodynamically unstable, symptomatic or H/H drops below 7/21.

## 2024-03-19 NOTE — ASSESSMENT & PLAN NOTE
Patient with acute kidney injury/acute renal failure likely due to pre-renal azotemia due to IVVD CHANDNI is currently stable.   Baseline creatinine  - Labs reviewed- Renal function/electrolytes with Estimated Creatinine Clearance: 37.9 mL/min (A) (based on SCr of 1.74 mg/dL (H)). according to latest data.   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   Monitor urine output and serial BMP and adjust therapy as needed. Avoid nephrotoxins and renally dose meds for GFR listed above.

## 2024-03-19 NOTE — PLAN OF CARE
Problem: Occupational Therapy  Goal: Occupational Therapy Goal  Description: STG:  Pt will perform grooming with setup  Pt will bathe with Anna with setup at EOB  Pt will perform UE dressing with Harshil  Pt will perform LE dressing with ModA  Pt will sit EOB x 10 min with SBA   Pt will transfer bed/chair/bsc with Anna with sliding board  Pt will tolerate 15 minutes of tx without fatigue      LT.Restore to max I with self care and mobility.      Outcome: Ongoing, Progressing
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
## 2024-03-19 NOTE — ASSESSMENT & PLAN NOTE
Patient found to have moderate pleural effusion on imaging.   I have personally reviewed and interpreted the following imaging: CT. A thoracentesis was deferred to Pulmonology.   Most likely etiology includes  cause not entirely clear but could be from pulmonary infarction/infection .   Management to include  Pulmonology consultation to consider thoracentesis/sampling.   S/p thoracentesis (3/18), exudative per Light's criteria based on serum-fluid LDH ratio, cultures NGTD.   Continue antibiotics.

## 2024-03-19 NOTE — PLAN OF CARE
Problem: Physical Therapy  Goal: Physical Therapy Goal  Description: Short term goals:  1. Supine to sit with MInimal Assistance  2. Sit to supine with MInimal Assistance  3. Bed to chair transfer with Minimal Assistance using Slideboard  4. Wheelchair propulsion x300 feet with Contact Guard Assistance using bilateral upper extremities    Long term goals:  1. Supine to sit with Modified [place]  2. Sit to stand transfer with Contact Guard Assistance  3. Bed to chair transfer with Contact Guard Assistance using Rolling Walker  4. Gait  x 50 feet with Contact Guard Assistance using Rolling Walker.     Outcome: Ongoing, Progressing

## 2024-03-19 NOTE — ASSESSMENT & PLAN NOTE
CT PE: Occlusive pulmonary embolus within the branch of the right pulmonary artery extending into the right lower lobe.  Infarct located within the right lower lobe.  Continue weight based Lovenox, transition to Eliquis on discharge.
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
## 2024-03-19 NOTE — BRIEF OP NOTE
Thoracentesis Procedure Note    The patient and their family understood the risks and possible complications of the procedure, signed informed consent and wished to proceed.    DESCRIPTION OF PROCEDURE:  A time out was performed and the chest imaging was reviewed. The rightside was confirmed and marked.  The patient's vitals were monitored throughout the procedure.    Procedure appropriate sterile precautions were followed. A safe procedural window was selected using ultrasound guidance by identifying the lung, diaphragm and the pleural effusion.  On ultrasound, the fluid appeared to be heterogeneously echogenic. A Chlorhexidine scrub was used to clean the skin.  A Chlorhexidine scrub was used to clean the skin. The patient was prepped and draped in a sterile manner.   1% lidocaine was then used to anesthesize the skin,  subcutaneous tissue, superior aspect of the rib periosteum and the parietal  pleura at the 8th  rib space . A finder needle was then introduced over the superior aspect of  the rib to locate the pleural fluid, Clear yellow fluid was aspirated. A scalpel was used to nick the skin at the insertion site.      The Safe-T-Centesis needle was then  introduced through the skin incision into the pleural space using  negative aspiration pressure to confirm  appropriate positioning of the needle. The thoracentesis catheter was then threaded without difficulty.   A total of 650ml of Clear yellow fluid was removed  without difficulty.  Aspiration of pleural fluid was halted due to complete drainage of pleural fluid. A bandaid was used to cover the insertion site with no post-procedural bleeding. No immediate complications were noted during the procedure.     A post-procedural chest x-ray demonstrated no evidence of pneumothorax and reduction in pleural effusion. The pleural fluid was sent for cell count and differential, cytology, total protein, albumin, LDH, glucose, and cholesterol.  Estimated blood loss is  <5ml.     Pleural fluid studies are pending at this time.     Eloy Dong MD  Interventional Pulmonary and Critical Care  [facility]
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
## 2024-03-19 NOTE — SUBJECTIVE & OBJECTIVE
Interval History: Patient seen and examined at the bedside, reports doing Ok, denies any new complaints.     Review of Systems   Respiratory:  Positive for shortness of breath.    Skin:  Positive for wound.     Objective:     Vital Signs (Most Recent):  Temp: 98.4 °F (36.9 °C) (03/19/24 1012)  Pulse: 103 (03/19/24 1012)  Resp: 18 (03/19/24 1215)  BP: 106/72 (03/19/24 1012)  SpO2: 98 % (03/19/24 1012) Vital Signs (24h Range):  Temp:  [97.3 °F (36.3 °C)-98.8 °F (37.1 °C)] 98.4 °F (36.9 °C)  Pulse:  [100-110] 103  Resp:  [18-20] 18  SpO2:  [95 %-100 %] 98 %  BP: ()/(52-82) 106/72     Weight: 77.7 kg (171 lb 4.8 oz)  Body mass index is 24.58 kg/m².    Intake/Output Summary (Last 24 hours) at 3/19/2024 1424  Last data filed at 3/19/2024 1012  Gross per 24 hour   Intake --   Output 1250 ml   Net -1250 ml           Physical Exam  Constitutional:       Appearance: He is ill-appearing.   HENT:      Head: Normocephalic and atraumatic.      Mouth/Throat:      Mouth: Mucous membranes are moist.   Eyes:      Extraocular Movements: Extraocular movements intact.   Cardiovascular:      Rate and Rhythm: Normal rate. Rhythm irregular.   Pulmonary:      Effort: Pulmonary effort is normal. No respiratory distress.      Breath sounds: Rales present.   Abdominal:      General: Bowel sounds are normal.      Palpations: Abdomen is soft.   Musculoskeletal:      Right lower leg: Edema present.      Left lower leg: Edema present.      Comments: BLE dressing in place.    Skin:     Findings: Lesion present.   Neurological:      General: No focal deficit present.      Mental Status: He is alert and oriented to person, place, and time.             Significant Labs: All pertinent labs within the past 24 hours have been reviewed.    Significant Imaging: I have reviewed all pertinent imaging results/findings within the past 24 hours.

## 2024-03-20 LAB
ANION GAP SERPL CALCULATED.3IONS-SCNC: 10 MMOL/L (ref 7–16)
ANISOCYTOSIS BLD QL SMEAR: ABNORMAL
ATYPICAL LYMPHOCYTES: ABNORMAL
BACTERIA SPEC BFLD CULT: NORMAL
BASOPHILS # BLD AUTO: 0.08 K/UL (ref 0–0.2)
BASOPHILS NFR BLD AUTO: 1 % (ref 0–1)
BASOPHILS NFR BLD MANUAL: 1 % (ref 0–1)
BUN SERPL-MCNC: 33 MG/DL (ref 7–18)
BUN/CREAT SERPL: 19 (ref 6–20)
CALCIUM SERPL-MCNC: 8.3 MG/DL (ref 8.5–10.1)
CHLORIDE SERPL-SCNC: 105 MMOL/L (ref 98–107)
CO2 SERPL-SCNC: 26 MMOL/L (ref 21–32)
CREAT SERPL-MCNC: 1.76 MG/DL (ref 0.7–1.3)
DIFFERENTIAL METHOD BLD: ABNORMAL
EGFR (NO RACE VARIABLE) (RUSH/TITUS): 40 ML/MIN/1.73M2
EOSINOPHIL # BLD AUTO: 0.17 K/UL (ref 0–0.5)
EOSINOPHIL NFR BLD AUTO: 2.2 % (ref 1–4)
EOSINOPHIL NFR BLD MANUAL: 2 % (ref 1–4)
ERYTHROCYTE [DISTWIDTH] IN BLOOD BY AUTOMATED COUNT: 18.7 % (ref 11.5–14.5)
GLUCOSE SERPL-MCNC: 113 MG/DL (ref 70–105)
GLUCOSE SERPL-MCNC: 123 MG/DL (ref 70–105)
GLUCOSE SERPL-MCNC: 146 MG/DL (ref 70–105)
GLUCOSE SERPL-MCNC: 155 MG/DL (ref 74–106)
GLUCOSE SERPL-MCNC: 160 MG/DL (ref 70–105)
HCT VFR BLD AUTO: 28.2 % (ref 40–54)
HGB BLD-MCNC: 7.9 G/DL (ref 13.5–18)
IMM GRANULOCYTES # BLD AUTO: 0.09 K/UL (ref 0–0.04)
IMM GRANULOCYTES NFR BLD: 1.1 % (ref 0–0.4)
LYMPHOCYTES # BLD AUTO: 1.71 K/UL (ref 1–4.8)
LYMPHOCYTES NFR BLD AUTO: 21.8 % (ref 27–41)
LYMPHOCYTES NFR BLD MANUAL: 21 % (ref 27–41)
MCH RBC QN AUTO: 24.3 PG (ref 27–31)
MCHC RBC AUTO-ENTMCNC: 28 G/DL (ref 32–36)
MCV RBC AUTO: 86.8 FL (ref 80–96)
MICROORGANISM SPEC CULT: ABNORMAL
MICROORGANISM SPEC CULT: ABNORMAL
MONOCYTES # BLD AUTO: 0.72 K/UL (ref 0–0.8)
MONOCYTES NFR BLD AUTO: 9.2 % (ref 2–6)
MONOCYTES NFR BLD MANUAL: 9 % (ref 2–6)
MPC BLD CALC-MCNC: 11 FL (ref 9.4–12.4)
NEUTROPHILS # BLD AUTO: 5.06 K/UL (ref 1.8–7.7)
NEUTROPHILS NFR BLD AUTO: 64.7 % (ref 53–65)
NEUTS BAND NFR BLD MANUAL: 2 % (ref 1–5)
NEUTS SEG NFR BLD MANUAL: 65 % (ref 50–62)
NRBC # BLD AUTO: 0.03 X10E3/UL
NRBC, AUTO (.00): 0.4 %
PLATELET # BLD AUTO: 141 K/UL (ref 150–400)
PLATELET MORPHOLOGY: ABNORMAL
POTASSIUM SERPL-SCNC: 4.2 MMOL/L (ref 3.5–5.1)
RBC # BLD AUTO: 3.25 M/UL (ref 4.6–6.2)
SODIUM SERPL-SCNC: 137 MMOL/L (ref 136–145)
WBC # BLD AUTO: 7.83 K/UL (ref 4.5–11)

## 2024-03-20 PROCEDURE — 99232 SBSQ HOSP IP/OBS MODERATE 35: CPT | Mod: ,,, | Performed by: INTERNAL MEDICINE

## 2024-03-20 PROCEDURE — 25000003 PHARM REV CODE 250: Performed by: FAMILY MEDICINE

## 2024-03-20 PROCEDURE — 85025 COMPLETE CBC W/AUTO DIFF WBC: CPT | Performed by: INTERNAL MEDICINE

## 2024-03-20 PROCEDURE — 80048 BASIC METABOLIC PNL TOTAL CA: CPT | Performed by: STUDENT IN AN ORGANIZED HEALTH CARE EDUCATION/TRAINING PROGRAM

## 2024-03-20 PROCEDURE — 63600175 PHARM REV CODE 636 W HCPCS: Performed by: INTERNAL MEDICINE

## 2024-03-20 PROCEDURE — 97110 THERAPEUTIC EXERCISES: CPT

## 2024-03-20 PROCEDURE — 30200315 PPD INTRADERMAL TEST REV CODE 302: Performed by: INTERNAL MEDICINE

## 2024-03-20 PROCEDURE — 99900035 HC TECH TIME PER 15 MIN (STAT)

## 2024-03-20 PROCEDURE — 11000001 HC ACUTE MED/SURG PRIVATE ROOM

## 2024-03-20 PROCEDURE — 82962 GLUCOSE BLOOD TEST: CPT

## 2024-03-20 PROCEDURE — 25000003 PHARM REV CODE 250: Performed by: NURSE PRACTITIONER

## 2024-03-20 PROCEDURE — 86580 TB INTRADERMAL TEST: CPT | Performed by: INTERNAL MEDICINE

## 2024-03-20 PROCEDURE — 94761 N-INVAS EAR/PLS OXIMETRY MLT: CPT

## 2024-03-20 PROCEDURE — 25000003 PHARM REV CODE 250: Performed by: INTERNAL MEDICINE

## 2024-03-20 RX ORDER — CIPROFLOXACIN 500 MG/1
500 TABLET ORAL EVERY 12 HOURS
Status: DISCONTINUED | OUTPATIENT
Start: 2024-03-20 | End: 2024-03-26 | Stop reason: HOSPADM

## 2024-03-20 RX ORDER — LINEZOLID 600 MG/1
600 TABLET, FILM COATED ORAL EVERY 12 HOURS
Status: DISCONTINUED | OUTPATIENT
Start: 2024-03-20 | End: 2024-03-26 | Stop reason: HOSPADM

## 2024-03-20 RX ADMIN — GABAPENTIN 100 MG: 100 CAPSULE ORAL at 09:03

## 2024-03-20 RX ADMIN — HYDROCODONE BITARTRATE AND ACETAMINOPHEN 1 TABLET: 5; 325 TABLET ORAL at 12:03

## 2024-03-20 RX ADMIN — APIXABAN 10 MG: 5 TABLET, FILM COATED ORAL at 10:03

## 2024-03-20 RX ADMIN — PIPERACILLIN AND TAZOBACTAM 4.5 G: 4; .5 INJECTION, POWDER, FOR SOLUTION INTRAVENOUS; PARENTERAL at 03:03

## 2024-03-20 RX ADMIN — HYDROCODONE BITARTRATE AND ACETAMINOPHEN 1 TABLET: 5; 325 TABLET ORAL at 03:03

## 2024-03-20 RX ADMIN — CIPROFLOXACIN 500 MG: 500 TABLET ORAL at 10:03

## 2024-03-20 RX ADMIN — SILVER SULFADIAZINE: 10 CREAM TOPICAL at 10:03

## 2024-03-20 RX ADMIN — TUBERCULIN PURIFIED PROTEIN DERIVATIVE 5 UNITS: 5 INJECTION, SOLUTION INTRADERMAL at 02:03

## 2024-03-20 RX ADMIN — HYDRALAZINE HYDROCHLORIDE 10 MG: 10 TABLET, FILM COATED ORAL at 08:03

## 2024-03-20 RX ADMIN — LINEZOLID 600 MG: 600 TABLET, FILM COATED ORAL at 10:03

## 2024-03-20 RX ADMIN — CIPROFLOXACIN 500 MG: 500 TABLET ORAL at 09:03

## 2024-03-20 RX ADMIN — MIDODRINE HYDROCHLORIDE 5 MG: 5 TABLET ORAL at 08:03

## 2024-03-20 RX ADMIN — APIXABAN 10 MG: 5 TABLET, FILM COATED ORAL at 09:03

## 2024-03-20 RX ADMIN — GABAPENTIN 100 MG: 100 CAPSULE ORAL at 08:03

## 2024-03-20 RX ADMIN — ATORVASTATIN CALCIUM 40 MG: 40 TABLET, FILM COATED ORAL at 08:03

## 2024-03-20 RX ADMIN — LINEZOLID 600 MG: 600 TABLET, FILM COATED ORAL at 09:03

## 2024-03-20 RX ADMIN — MIDODRINE HYDROCHLORIDE 5 MG: 5 TABLET ORAL at 12:03

## 2024-03-20 RX ADMIN — HYDRALAZINE HYDROCHLORIDE 10 MG: 10 TABLET, FILM COATED ORAL at 09:03

## 2024-03-20 RX ADMIN — GABAPENTIN 100 MG: 100 CAPSULE ORAL at 02:03

## 2024-03-20 RX ADMIN — FUROSEMIDE 40 MG: 40 TABLET ORAL at 08:03

## 2024-03-20 RX ADMIN — MIDODRINE HYDROCHLORIDE 5 MG: 5 TABLET ORAL at 05:03

## 2024-03-20 RX ADMIN — ASPIRIN 81 MG: 81 TABLET, COATED ORAL at 08:03

## 2024-03-20 NOTE — ASSESSMENT & PLAN NOTE
CT PE: Occlusive pulmonary embolus within the branch of the right pulmonary artery extending into the right lower lobe.  Infarct located within the right lower lobe.  S/p WB Lovenox, now transitioned to Eliquis (loading plus maintenance dose).

## 2024-03-20 NOTE — PROGRESS NOTES
[facility] Hospital Medicine  Progress Note    Patient Name: Esthela Contreras  MRN: [id number]  Patient Class: IP- Inpatient   Admission Date: 3/13/2024  Length of Stay: 6 days  Attending Physician: Cortez Ford MD  Primary Care Provider: Yolande Spring FNP        Subjective:     Principal Problem:Sepsis due to cellulitis        HPI:  Patient is a 73-year-old male with a history of combined CHF with last known EF of 35% with chronically elevated proBNP ranging from 4 to 20,000, COPD, type 2 diabetes, and chronic bilateral lower extremity edema coupled with venous stasis dermatitis which has been frequently complicated by cellulitis requiring hospitalization who presented to emergency room as instructed by his home health nurse for evaluation of bilateral lower extremity wound which appeared to have been infected again.  Patient complained of burning type of pain +7/10 involving bilateral lower extremities, but otherwise denied any fever chills cough wheezing shortness of breath chest pain palpitation PND or orthopnea in association.    On initial presentation, patient was tachycardic with a heart rate in the 120s but vital signs were otherwise stable and patient was afebrile.  Workup was notable for chest x-ray demonstrating right-sided pleural effusion with right basilar density representing either atelectasis versus developing infiltrate, chronically elevated troponin in the 100s without any ischemic abnormalities seen on EKG, chronically elevated proBNP along with leukocytosis with left shift.     Patient will be admitted with a working diagnosis of sepsis secondary to bilateral lower extremity cellulitis in the setting of chronic venous stasis dermatitis and type 2 MI associated with acute decompensation of chronic combined heart failure.        Overview/Hospital Course:  No notes on file    Interval History: Patient seen and examined at the bedside, reports doing Ok, denies any new  complaints.     Review of Systems   Respiratory:  Positive for shortness of breath.    Skin:  Positive for wound.     Objective:     Vital Signs (Most Recent):  Temp: 98.4 °F (36.9 °C) (03/20/24 0954)  Pulse: 100 (03/20/24 0954)  Resp: 18 (03/20/24 1201)  BP: 102/67 (03/20/24 0954)  SpO2: 98 % (03/20/24 0954) Vital Signs (24h Range):  Temp:  [97.5 °F (36.4 °C)-98.4 °F (36.9 °C)] 98.4 °F (36.9 °C)  Pulse:  [] 100  Resp:  [17-20] 18  SpO2:  [98 %-100 %] 98 %  BP: ()/(63-71) 102/67     Weight: 77.7 kg (171 lb 4.8 oz)  Body mass index is 24.58 kg/m².    Intake/Output Summary (Last 24 hours) at 3/20/2024 1306  Last data filed at 3/20/2024 1202  Gross per 24 hour   Intake --   Output 1000 ml   Net -1000 ml           Physical Exam  Constitutional:       Appearance: He is ill-appearing.   HENT:      Head: Normocephalic and atraumatic.      Mouth/Throat:      Mouth: Mucous membranes are moist.   Eyes:      Extraocular Movements: Extraocular movements intact.   Cardiovascular:      Rate and Rhythm: Normal rate. Rhythm irregular.   Pulmonary:      Effort: Pulmonary effort is normal. No respiratory distress.      Breath sounds: Rales present.   Abdominal:      General: Bowel sounds are normal.      Palpations: Abdomen is soft.   Musculoskeletal:      Right lower leg: Edema present.      Left lower leg: Edema present.      Comments: BLE dressing in place.    Skin:     Findings: Lesion present.   Neurological:      General: No focal deficit present.      Mental Status: He is alert and oriented to person, place, and time.             Significant Labs: All pertinent labs within the past 24 hours have been reviewed.    Significant Imaging: I have reviewed all pertinent imaging results/findings within the past 24 hours.    Assessment/Plan:      * Sepsis due to cellulitis  This patient does have evidence of infective focus  My overall impression is sepsis.  Source: Skin and Soft Tissue (location lower  extremities)  Antibiotics given-   Antibiotics (72h ago, onward)      Start     Stop Route Frequency Ordered    03/20/24 1015  ciprofloxacin HCl tablet 500 mg         03/27/24 0859 Oral Every 12 hours 03/20/24 0902    03/20/24 1015  linezolid tablet 600 mg         03/27/24 0859 Oral Every 12 hours 03/20/24 0902    03/20/24 0900  silver sulfADIAZINE 1% cream         -- Top Daily 03/19/24 1224    03/14/24 0900  mupirocin 2 % ointment         03/19/24 0859 Nasl 2 times daily 03/14/24 0446          Source control achieved by: antibiotics, local wound care   In the past, wound grew pseudomonas and acinetobacter.  Current wound cultures with pseudomonas (sensitive to Zosyn) and MRSA.   Unfortunately patient is a poor candidate for non-emergent surgical debridement at this time due to heart failure and pulmonary embolism with infarct.   S/p 7 days of IV vancomycin and Zosyn, transitioned to PO ciprofloxacin and linezolid x 7 days.    Continue local wound care.   PT/OT consulted, plan for SNF placement on discharge.       Pulmonary embolus with infarction  CT PE: Occlusive pulmonary embolus within the branch of the right pulmonary artery extending into the right lower lobe.  Infarct located within the right lower lobe.  S/p WB Lovenox, now transitioned to Eliquis (loading plus maintenance dose).            Chronic HFrEF (heart failure with reduced ejection fraction)  Patient is identified as having Systolic (HFrEF) heart failure that is Acute on chronic. CHF is currently controlled. Latest ECHO performed and demonstrates- Results for orders placed during the hospital encounter of 03/13/24    Echo    Interpretation Summary    Left Ventricle: The left ventricle is mildly dilated. Mildly increased wall thickness. There is concentric hypertrophy. Severe global hypokinesis present. There is reduced systolic function. Ejection fraction by visual approximation is 20%.    Right Ventricle: Moderate right ventricular enlargement.  "Systolic function is hyperdynamic.    Left Atrium: Left atrium is mildly dilated.    Right Atrium: Right atrium is severely dilated.    Aortic Valve: The aortic valve is a trileaflet valve. Moderately calcified cusps.    Mitral Valve: There is mild bileaflet sclerosis. Mildly thickened leaflets. There is no stenosis. The mean pressure gradient across the mitral valve is 4 mmHg at a heart rate of  bpm. There is moderate regurgitation with an eccentric jet.    Tricuspid Valve: There is mild regurgitation with an eccentrically directed jet.    Pulmonary Artery: The estimated pulmonary artery systolic pressure is 58 mmHg.    IVC/SVC: Elevated venous pressure at 15 mmHg.    Pericardium: There is a trivial effusion. No indication of cardiac tamponade.    Required dobutamine initially, now off- resume home midodrine.   Continue Furosemide and monitor clinical status closely. Monitor on telemetry. Patient is off CHF pathway.  Monitor strict Is&Os and daily weights.  Place on fluid restriction.   Cardiology has been consulted.   Continue to stress to patient importance of self efficacy and  on diet for CHF. Last BNP reviewed- and noted below No results for input(s): "BNP", "BNPTRIAGEBLO" in the last 168 hours.    Loculated pleural effusion  Patient found to have moderate pleural effusion on imaging.   I have personally reviewed and interpreted the following imaging: CT. A thoracentesis was deferred to Pulmonology.   Most likely etiology includes  cause not entirely clear but could be from pulmonary infarction/infection .   Management to include  Pulmonology consultation to consider thoracentesis/sampling.   S/p thoracentesis (3/18), transudate fluid likely related to CHF, cultures NGTD.         CHANDNI (acute kidney injury)  Patient with acute kidney injury/acute renal failure likely due to pre-renal azotemia due to IVVD CHANDNI is currently stable.   Baseline creatinine  - Labs reviewed- Renal function/electrolytes with " "Estimated Creatinine Clearance: 37.4 mL/min (A) (based on SCr of 1.76 mg/dL (H)). according to latest data.   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   Monitor urine output and serial BMP and adjust therapy as needed.   Avoid nephrotoxins and renally dose meds for GFR listed above.    Pulmonary nodules/lesions, multiple  CTA chest showed interval development of a 1.3 cm solid pulmonary nodule located adjacent to the right major fissure. Additional new 1 cm ill-defined and solid pulmonary nodule located near the lingula.   Pulmonology consulted and recommend outpatient follow up.       Chronic anemia  Patient's anemia is currently controlled. Has not received any PRBCs to date. Etiology likely d/t chronic disease due to Chronic Kidney Disease  Current CBC reviewed-   Lab Results   Component Value Date    HGB 7.9 (L) 03/20/2024    HCT 28.2 (L) 03/20/2024     Monitor serial CBC and transfuse if patient becomes hemodynamically unstable, symptomatic or H/H drops below 7/21.    Cellulitis of lower extremity  Plan as outlined above.       Positive blood culture  Blood culture #1 of 2 positive - Verigene MRSE and Strep species.   Possible contaminant as only one sample with growth to date.   Continue antibiotics as above.       A-fib  Patient with Paroxysmal (<7 days) atrial fibrillation which is controlled currently without rate controlling med (beta blocker on hold given heart failure/BP). Patient is currently in sinus rhythm.[id number] Score: 4. HASBLED Score: 2. Anticoagulation indicated. Anticoagulation with Eliquis.    Type 2 diabetes mellitus  Patient's FSGs are controlled on current medication regimen.  Last A1c reviewed-   Lab Results   Component Value Date    HGBA1C 4.8 07/28/2023     Most recent fingerstick glucose reviewed- No results for input(s): "POCTGLUCOSE" in the last 24 hours.  Current correctional scale  Low  Maintain " anti-hyperglycemic dose as follows-   Antihyperglycemics (From admission, onward)      Start     Stop Route Frequency Ordered    03/14/24 0520  insulin aspart U-100 injection 0-5 Units         -- SubQ Before meals & nightly PRN 03/14/24 0424          Hold Oral hypoglycemics while patient is in the hospital.    COPD (chronic obstructive pulmonary disease)  Patient's COPD is controlled currently.    Patient is currently off COPD Pathway.   Continue PRN nebs, Supplemental oxygen and monitor respiratory status closely.     Coronary artery disease involving native coronary artery of native heart without angina pectoris  Patient with known CAD s/p  unknown , which is controlled  Will continue ASA and Statin and monitor for S/Sx of angina/ACS.   Continue to monitor on telemetry.       VTE Risk Mitigation (From admission, onward)           Ordered     apixaban tablet 5 mg  2 times daily         03/20/24 1308     apixaban tablet 10 mg  2 times daily         03/20/24 0917     Reason for no Mechanical VTE Prophylaxis  Once        Comments: BLE wound and cellulitis   Question:  Reasons:  Answer:  Physician Provided (leave comment)    03/14/24 0424     IP VTE HIGH RISK PATIENT  Once         03/14/24 0424                    Discharge Planning   MAKENNA: 3/21/2024     Code Status: Full Code   Is the patient medically ready for discharge?:     Reason for patient still in hospital (select all that apply): Patient trending condition and Pending disposition  Discharge Plan A: Home              TIFFANIE VENTURA MD  Department of Hospital Medicine   [facility]

## 2024-03-20 NOTE — PT/OT/SLP PROGRESS
"Physical Therapy Treatment    Patient Name:  Esthela Contreras   MRN:  [id number]    Recommendations:     Discharge Recommendations: Moderate Intensity Therapy  Discharge Equipment Recommendations: to be determined by next level of care  Barriers to discharge: Inaccessible home and Decreased caregiver support    Assessment:     Esthela Contreras is a 73 y.o. male admitted with a medical diagnosis of Sepsis due to cellulitis.  He presents with the following impairments/functional limitations: weakness, impaired endurance, impaired self care skills, impaired functional mobility, gait instability, impaired balance, decreased lower extremity function, decreased upper extremity function, decreased safety awareness, pain, impaired skin Pt unwilling to attempt bed mobility today after bleeding with sitting at edge of bed yesterday. PT discussed importance of progressing mobility. Pt is fearful of causing any pain and limits his mobility likewise    Rehab Prognosis: Fair; patient would benefit from acute skilled PT services to address these deficits and reach maximum level of function.    Recent Surgery: Procedure(s) (LRB):  DEBRIDEMENT, LOWER EXTREMITY (Bilateral)      Plan:     During this hospitalization, patient to be seen 5 x/week to address the identified rehab impairments via therapeutic activities, therapeutic exercises, gait training, neuromuscular re-education, wheelchair management/training and progress toward the following goals:    Plan of Care Expires:  04/19/24    Subjective     Chief Complaint: BLE wounds  Patient/Family Comments/goals: "sometimes I wish they would just cut my legs off"  Pain/Comfort:  Pain Rating 1: 0/10 (only when they changed my dressings)  Pain Rating Post-Intervention 1: 0/10      Objective:     Communicated with MEME Dykes RN prior to session.  Patient found HOB elevated with PICC line, oxygen upon PT entry to room.     General Precautions: Standard, fall  Orthopedic Precautions: N/A  Braces: " N/A  Respiratory Status: Nasal cannula, flow 3 L/min, however, placed under chin     Functional Mobility:  Not performed      AM-PAC 6 CLICK MOBILITY  Turning over in bed (including adjusting bedclothes, sheets and blankets)?: 3  Sitting down on and standing up from a chair with arms (e.g., wheelchair, bedside commode, etc.): 1  Moving from lying on back to sitting on the side of the bed?: 3  Moving to and from a bed to a chair (including a wheelchair)?: 1  Need to walk in hospital room?: 1  Climbing 3-5 steps with a railing?: 1  Basic Mobility Total Score: 10       Treatment & Education:  AAROM: Pt performed bilateral LE: bed level exercises: heel slides, hip abduction, and SAQ  x 30 each      Patient left HOB elevated with all lines intact and call button in reach..    GOALS:   Multidisciplinary Problems       Physical Therapy Goals          Problem: Physical Therapy    Goal Priority Disciplines Outcome Goal Variances Interventions   Physical Therapy Goal     PT, PT/OT Ongoing, Progressing     Description: Short term goals:  1. Supine to sit with MInimal Assistance  2. Sit to supine with MInimal Assistance  3. Bed to chair transfer with Minimal Assistance using Slideboard  4. Wheelchair propulsion x300 feet with Contact Guard Assistance using bilateral upper extremities    Long term goals:  1. Supine to sit with Modified [place]  2. Sit to stand transfer with Contact Guard Assistance  3. Bed to chair transfer with Contact Guard Assistance using Rolling Walker  4. Gait  x 50 feet with Contact Guard Assistance using Rolling Walker.                          Time Tracking:     PT Received On: 03/20/24  PT Start Time: 1410     PT Stop Time: 1429  PT Total Time (min): 19 min     Billable Minutes: Therapeutic Exercise 14    Treatment Type: Treatment  PT/PTA: PT     Number of PTA visits since last PT visit: 0     03/20/2024

## 2024-03-20 NOTE — ASSESSMENT & PLAN NOTE
CTA chest showed interval development of a 1.3 cm solid pulmonary nodule located adjacent to the right major fissure. Additional new 1 cm ill-defined and solid pulmonary nodule located near the lingula.   Pulmonology consulted and recommend outpatient follow up.

## 2024-03-20 NOTE — ASSESSMENT & PLAN NOTE
Patient with acute kidney injury/acute renal failure likely due to pre-renal azotemia due to IVVD CHANDNI is currently stable.   Baseline creatinine  - Labs reviewed- Renal function/electrolytes with Estimated Creatinine Clearance: 37.4 mL/min (A) (based on SCr of 1.76 mg/dL (H)). according to latest data.   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   Monitor urine output and serial BMP and adjust therapy as needed.   Avoid nephrotoxins and renally dose meds for GFR listed above.

## 2024-03-20 NOTE — ASSESSMENT & PLAN NOTE
Patient with Paroxysmal (<7 days) atrial fibrillation which is controlled currently without rate controlling med (beta blocker on hold given heart failure/BP). Patient is currently in sinus rhythm.[id number] Score: 4. HASBLED Score: 2. Anticoagulation indicated. Anticoagulation with Eliquis.

## 2024-03-20 NOTE — ASSESSMENT & PLAN NOTE
This patient does have evidence of infective focus  My overall impression is sepsis.  Source: Skin and Soft Tissue (location lower extremities)  Antibiotics given-   Antibiotics (72h ago, onward)      Start     Stop Route Frequency Ordered    03/20/24 1015  ciprofloxacin HCl tablet 500 mg         03/27/24 0859 Oral Every 12 hours 03/20/24 0902    03/20/24 1015  linezolid tablet 600 mg         03/27/24 0859 Oral Every 12 hours 03/20/24 0902    03/20/24 0900  silver sulfADIAZINE 1% cream         -- Top Daily 03/19/24 1224    03/14/24 0900  mupirocin 2 % ointment         03/19/24 0859 Nasl 2 times daily 03/14/24 0446          Source control achieved by: antibiotics, local wound care   In the past, wound grew pseudomonas and acinetobacter.  Current wound cultures with pseudomonas (sensitive to Zosyn) and MRSA.   Unfortunately patient is a poor candidate for non-emergent surgical debridement at this time due to heart failure and pulmonary embolism with infarct.   S/p 7 days of IV vancomycin and Zosyn, transitioned to PO ciprofloxacin and linezolid x 7 days.    Continue local wound care.   PT/OT consulted, plan for SNF placement on discharge.

## 2024-03-20 NOTE — PT/OT/SLP PROGRESS
Occupational Therapy   Treatment    Name: Esthela Contreras  MRN: [id number]  Admitting Diagnosis:  Sepsis due to cellulitis       Recommendations:     Discharge Recommendations: Moderate Intensity Therapy  Discharge Equipment Recommendations:  to be determined by next level of care  Barriers to discharge:  Decreased caregiver support, Inaccessible home environment    Assessment:     Esthela Contreras is a 73 y.o. male with a medical diagnosis of Sepsis due to cellulitis.  He presents with weakness and decline in ADLs. Performance deficits affecting function are impaired endurance, weakness, impaired functional mobility, impaired self care skills, gait instability, impaired balance, pain, impaired skin.     Rehab Prognosis:  Good; patient would benefit from acute skilled OT services to address these deficits and reach maximum level of function.       Plan:     Patient to be seen 5 x/week to address the above listed problems via self-care/home management, therapeutic activities, therapeutic exercises  Plan of Care Expires: 04/16/24  Plan of Care Reviewed with: patient    Subjective     Chief Complaint: sepsis d/t cellulitis  Patient/Family Comments/goals: pt agreeable to OT tx  Pain/Comfort:  Pain Rating 1: 3/10  Location - Side 1: Bilateral  Location 1: leg  Pain Addressed 1: Pre-medicate for activity    Objective:     Communicated with: ANAHI Nayak prior to session.  Patient found HOB elevated with PICC line, oxygen, telemetry upon OT entry to room.    General Precautions: Standard, fall    Orthopedic Precautions:N/A  Braces: N/A  Respiratory Status: Nasal cannula, flow 2 L/min     Occupational Performance:     Bed Mobility:    Not performed     Functional Mobility/Transfers:  Not performed    Activities of Daily Living:  Not performed      [facility] 6 Click ADL:      Treatment & Education:  Pt performed 2 sets x 15 reps each bicep curls 2#db, chest press 2#db, IR/ER 2#db, shoulder press 2#db, rows red tband, and tricep press  red tband in order to improve BUE strength and endurance to perform ADL and ADL t/f with less assist.     Patient left HOB elevated with all lines intact and call button in reach    GOALS:   Multidisciplinary Problems       Occupational Therapy Goals          Problem: Occupational Therapy    Goal Priority Disciplines Outcome Interventions   Occupational Therapy Goal     OT, PT/OT Ongoing, Progressing    Description: STG:  Pt will perform grooming with setup  Pt will bathe with Anna with setup at EOB  Pt will perform UE dressing with Harshil  Pt will perform LE dressing with ModA  Pt will sit EOB x 10 min with SBA   Pt will transfer bed/chair/bsc with Anna with sliding board  Pt will tolerate 15 minutes of tx without fatigue      LT.Restore to max I with self care and mobility.                           Time Tracking:     OT Date of Treatment: 24  OT Start Time: 957  OT Stop Time:   OT Total Time (min): 23 min    Billable Minutes:Therapeutic Exercise 23 minutes    OT/KO: OT          3/20/2024

## 2024-03-20 NOTE — ASSESSMENT & PLAN NOTE
Patient's anemia is currently controlled. Has not received any PRBCs to date. Etiology likely d/t chronic disease due to Chronic Kidney Disease  Current CBC reviewed-   Lab Results   Component Value Date    HGB 7.9 (L) 03/20/2024    HCT 28.2 (L) 03/20/2024     Monitor serial CBC and transfuse if patient becomes hemodynamically unstable, symptomatic or H/H drops below 7/21.

## 2024-03-20 NOTE — NURSING
"Attempted Lovenox Injection, pt refused. Educated pt on the need for injection, pt still refused. Pt states,"Those needles hurt, I can't take it." Plan of care ongoing.   "

## 2024-03-20 NOTE — ASSESSMENT & PLAN NOTE
Patient found to have moderate pleural effusion on imaging.   I have personally reviewed and interpreted the following imaging: CT. A thoracentesis was deferred to Pulmonology.   Most likely etiology includes  cause not entirely clear but could be from pulmonary infarction/infection .   Management to include  Pulmonology consultation to consider thoracentesis/sampling.   S/p thoracentesis (3/18), transudate fluid likely related to CHF, cultures NGTD.

## 2024-03-20 NOTE — PLAN OF CARE
SS spoke with niece about discharge plan. Pt has been in swb in recent past at [facility]. They want therapy but do not wish for [facility]. Will make referral to [facility] per family requests.

## 2024-03-20 NOTE — SUBJECTIVE & OBJECTIVE
Interval History: Patient seen and examined at the bedside, reports doing Ok, denies any new complaints.     Review of Systems   Respiratory:  Positive for shortness of breath.    Skin:  Positive for wound.     Objective:     Vital Signs (Most Recent):  Temp: 98.4 °F (36.9 °C) (03/20/24 0954)  Pulse: 100 (03/20/24 0954)  Resp: 18 (03/20/24 1201)  BP: 102/67 (03/20/24 0954)  SpO2: 98 % (03/20/24 0954) Vital Signs (24h Range):  Temp:  [97.5 °F (36.4 °C)-98.4 °F (36.9 °C)] 98.4 °F (36.9 °C)  Pulse:  [] 100  Resp:  [17-20] 18  SpO2:  [98 %-100 %] 98 %  BP: ()/(63-71) 102/67     Weight: 77.7 kg (171 lb 4.8 oz)  Body mass index is 24.58 kg/m².    Intake/Output Summary (Last 24 hours) at 3/20/2024 1306  Last data filed at 3/20/2024 1202  Gross per 24 hour   Intake --   Output 1000 ml   Net -1000 ml           Physical Exam  Constitutional:       Appearance: He is ill-appearing.   HENT:      Head: Normocephalic and atraumatic.      Mouth/Throat:      Mouth: Mucous membranes are moist.   Eyes:      Extraocular Movements: Extraocular movements intact.   Cardiovascular:      Rate and Rhythm: Normal rate. Rhythm irregular.   Pulmonary:      Effort: Pulmonary effort is normal. No respiratory distress.      Breath sounds: Rales present.   Abdominal:      General: Bowel sounds are normal.      Palpations: Abdomen is soft.   Musculoskeletal:      Right lower leg: Edema present.      Left lower leg: Edema present.      Comments: BLE dressing in place.    Skin:     Findings: Lesion present.   Neurological:      General: No focal deficit present.      Mental Status: He is alert and oriented to person, place, and time.             Significant Labs: All pertinent labs within the past 24 hours have been reviewed.    Significant Imaging: I have reviewed all pertinent imaging results/findings within the past 24 hours.

## 2024-03-21 LAB
ANION GAP SERPL CALCULATED.3IONS-SCNC: 11 MMOL/L (ref 7–16)
ANISOCYTOSIS BLD QL SMEAR: ABNORMAL
BASOPHILS # BLD AUTO: 0.04 K/UL (ref 0–0.2)
BASOPHILS NFR BLD AUTO: 0.7 % (ref 0–1)
BUN SERPL-MCNC: 31 MG/DL (ref 7–18)
BUN/CREAT SERPL: 18 (ref 6–20)
CALCIUM SERPL-MCNC: 8.4 MG/DL (ref 8.5–10.1)
CHLORIDE SERPL-SCNC: 106 MMOL/L (ref 98–107)
CO2 SERPL-SCNC: 26 MMOL/L (ref 21–32)
CREAT SERPL-MCNC: 1.77 MG/DL (ref 0.7–1.3)
DIFFERENTIAL METHOD BLD: ABNORMAL
EGFR (NO RACE VARIABLE) (RUSH/TITUS): 40 ML/MIN/1.73M2
EOSINOPHIL # BLD AUTO: 0.12 K/UL (ref 0–0.5)
EOSINOPHIL NFR BLD AUTO: 2 % (ref 1–4)
ERYTHROCYTE [DISTWIDTH] IN BLOOD BY AUTOMATED COUNT: 18.7 % (ref 11.5–14.5)
GLUCOSE SERPL-MCNC: 105 MG/DL (ref 70–105)
GLUCOSE SERPL-MCNC: 105 MG/DL (ref 70–105)
GLUCOSE SERPL-MCNC: 128 MG/DL (ref 70–105)
GLUCOSE SERPL-MCNC: 132 MG/DL (ref 74–106)
GLUCOSE SERPL-MCNC: 140 MG/DL (ref 70–105)
HCT VFR BLD AUTO: 27.1 % (ref 40–54)
HGB BLD-MCNC: 7.9 G/DL (ref 13.5–18)
HYPOCHROMIA BLD QL SMEAR: ABNORMAL
IMM GRANULOCYTES # BLD AUTO: 0.07 K/UL (ref 0–0.04)
IMM GRANULOCYTES NFR BLD: 1.2 % (ref 0–0.4)
LYMPHOCYTES # BLD AUTO: 1.38 K/UL (ref 1–4.8)
LYMPHOCYTES NFR BLD AUTO: 23 % (ref 27–41)
LYMPHOCYTES NFR BLD MANUAL: 24 % (ref 27–41)
MCH RBC QN AUTO: 24 PG (ref 27–31)
MCHC RBC AUTO-ENTMCNC: 29.2 G/DL (ref 32–36)
MCV RBC AUTO: 82.4 FL (ref 80–96)
MONOCYTES # BLD AUTO: 0.51 K/UL (ref 0–0.8)
MONOCYTES NFR BLD AUTO: 8.5 % (ref 2–6)
MONOCYTES NFR BLD MANUAL: 10 % (ref 2–6)
MPC BLD CALC-MCNC: 10.2 FL (ref 9.4–12.4)
NEUTROPHILS # BLD AUTO: 3.89 K/UL (ref 1.8–7.7)
NEUTROPHILS NFR BLD AUTO: 64.6 % (ref 53–65)
NEUTS SEG NFR BLD MANUAL: 66 % (ref 50–62)
NRBC # BLD AUTO: 0.04 X10E3/UL
NRBC, AUTO (.00): 0.7 %
OVALOCYTES BLD QL SMEAR: ABNORMAL
PLATELET # BLD AUTO: 142 K/UL (ref 150–400)
PLATELET MORPHOLOGY: ABNORMAL
POTASSIUM SERPL-SCNC: 4.6 MMOL/L (ref 3.5–5.1)
RBC # BLD AUTO: 3.29 M/UL (ref 4.6–6.2)
SCHISTOCYTES BLD QL AUTO: ABNORMAL
SODIUM SERPL-SCNC: 138 MMOL/L (ref 136–145)
WBC # BLD AUTO: 6.01 K/UL (ref 4.5–11)

## 2024-03-21 PROCEDURE — 25000003 PHARM REV CODE 250: Performed by: NURSE PRACTITIONER

## 2024-03-21 PROCEDURE — 25000003 PHARM REV CODE 250: Performed by: INTERNAL MEDICINE

## 2024-03-21 PROCEDURE — 25000003 PHARM REV CODE 250: Performed by: FAMILY MEDICINE

## 2024-03-21 PROCEDURE — 97110 THERAPEUTIC EXERCISES: CPT

## 2024-03-21 PROCEDURE — 94761 N-INVAS EAR/PLS OXIMETRY MLT: CPT

## 2024-03-21 PROCEDURE — 85025 COMPLETE CBC W/AUTO DIFF WBC: CPT | Performed by: INTERNAL MEDICINE

## 2024-03-21 PROCEDURE — 11000001 HC ACUTE MED/SURG PRIVATE ROOM

## 2024-03-21 PROCEDURE — 99900035 HC TECH TIME PER 15 MIN (STAT)

## 2024-03-21 PROCEDURE — 27000221 HC OXYGEN, UP TO 24 HOURS

## 2024-03-21 PROCEDURE — 99233 SBSQ HOSP IP/OBS HIGH 50: CPT | Mod: ,,, | Performed by: STUDENT IN AN ORGANIZED HEALTH CARE EDUCATION/TRAINING PROGRAM

## 2024-03-21 PROCEDURE — 97535 SELF CARE MNGMENT TRAINING: CPT

## 2024-03-21 PROCEDURE — 99232 SBSQ HOSP IP/OBS MODERATE 35: CPT | Mod: ,,, | Performed by: INTERNAL MEDICINE

## 2024-03-21 PROCEDURE — 82962 GLUCOSE BLOOD TEST: CPT

## 2024-03-21 PROCEDURE — 80048 BASIC METABOLIC PNL TOTAL CA: CPT | Performed by: STUDENT IN AN ORGANIZED HEALTH CARE EDUCATION/TRAINING PROGRAM

## 2024-03-21 RX ADMIN — ASPIRIN 81 MG: 81 TABLET, COATED ORAL at 08:03

## 2024-03-21 RX ADMIN — HYDRALAZINE HYDROCHLORIDE 10 MG: 10 TABLET, FILM COATED ORAL at 08:03

## 2024-03-21 RX ADMIN — CIPROFLOXACIN 500 MG: 500 TABLET ORAL at 09:03

## 2024-03-21 RX ADMIN — LINEZOLID 600 MG: 600 TABLET, FILM COATED ORAL at 08:03

## 2024-03-21 RX ADMIN — MIDODRINE HYDROCHLORIDE 5 MG: 5 TABLET ORAL at 11:03

## 2024-03-21 RX ADMIN — ATORVASTATIN CALCIUM 40 MG: 40 TABLET, FILM COATED ORAL at 08:03

## 2024-03-21 RX ADMIN — GABAPENTIN 100 MG: 100 CAPSULE ORAL at 08:03

## 2024-03-21 RX ADMIN — CIPROFLOXACIN 500 MG: 500 TABLET ORAL at 08:03

## 2024-03-21 RX ADMIN — HYDROCODONE BITARTRATE AND ACETAMINOPHEN 1 TABLET: 5; 325 TABLET ORAL at 11:03

## 2024-03-21 RX ADMIN — APIXABAN 10 MG: 5 TABLET, FILM COATED ORAL at 08:03

## 2024-03-21 RX ADMIN — FUROSEMIDE 40 MG: 40 TABLET ORAL at 08:03

## 2024-03-21 RX ADMIN — HYDRALAZINE HYDROCHLORIDE 10 MG: 10 TABLET, FILM COATED ORAL at 09:03

## 2024-03-21 RX ADMIN — LINEZOLID 600 MG: 600 TABLET, FILM COATED ORAL at 09:03

## 2024-03-21 RX ADMIN — GABAPENTIN 100 MG: 100 CAPSULE ORAL at 09:03

## 2024-03-21 RX ADMIN — MIDODRINE HYDROCHLORIDE 5 MG: 5 TABLET ORAL at 04:03

## 2024-03-21 RX ADMIN — APIXABAN 10 MG: 5 TABLET, FILM COATED ORAL at 09:03

## 2024-03-21 RX ADMIN — SILVER SULFADIAZINE: 10 CREAM TOPICAL at 09:03

## 2024-03-21 RX ADMIN — GABAPENTIN 100 MG: 100 CAPSULE ORAL at 04:03

## 2024-03-21 RX ADMIN — MIDODRINE HYDROCHLORIDE 5 MG: 5 TABLET ORAL at 08:03

## 2024-03-21 NOTE — SUBJECTIVE & OBJECTIVE
Interval History: Patient seen and examined at the bedside, reports doing Ok, denies any new complaints.     Review of Systems   Skin:  Positive for wound.   All other systems reviewed and are negative.    Objective:     Vital Signs (Most Recent):  Temp: 98.2 °F (36.8 °C) (03/21/24 1016)  Pulse: 98 (03/21/24 1016)  Resp: 18 (03/21/24 1158)  BP: 100/66 (03/21/24 1016)  SpO2: 98 % (03/21/24 1016) Vital Signs (24h Range):  Temp:  [97.6 °F (36.4 °C)-98.2 °F (36.8 °C)] 98.2 °F (36.8 °C)  Pulse:  [] 98  Resp:  [17-18] 18  SpO2:  [96 %-99 %] 98 %  BP: ()/(66-73) 100/66     Weight: 77.7 kg (171 lb 4.8 oz)  Body mass index is 24.58 kg/m².    Intake/Output Summary (Last 24 hours) at 3/21/2024 1358  Last data filed at 3/21/2024 0140  Gross per 24 hour   Intake --   Output 500 ml   Net -500 ml           Physical Exam  Constitutional:       Appearance: He is ill-appearing.   HENT:      Head: Normocephalic and atraumatic.      Mouth/Throat:      Mouth: Mucous membranes are moist.   Eyes:      Extraocular Movements: Extraocular movements intact.   Cardiovascular:      Rate and Rhythm: Normal rate. Rhythm irregular.   Pulmonary:      Effort: Pulmonary effort is normal. No respiratory distress.      Breath sounds: Rales present.   Abdominal:      General: Bowel sounds are normal.      Palpations: Abdomen is soft.   Musculoskeletal:      Right lower leg: Edema present.      Left lower leg: Edema present.      Comments: BLE dressing in place.    Skin:     Findings: Lesion present.   Neurological:      General: No focal deficit present.      Mental Status: He is alert and oriented to person, place, and time.             Significant Labs: All pertinent labs within the past 24 hours have been reviewed.    Significant Imaging: I have reviewed all pertinent imaging results/findings within the past 24 hours.

## 2024-03-21 NOTE — SUBJECTIVE & OBJECTIVE
Interval History:  Refer to hospital course      Objective:     Vital Signs (Most Recent):  Temp: 98.2 °F (36.8 °C) (03/21/24 1016)  Pulse: 98 (03/21/24 1016)  Resp: 18 (03/21/24 1158)  BP: 100/66 (03/21/24 1016)  SpO2: 98 % (03/21/24 1016) Vital Signs (24h Range):  Temp:  [97.6 °F (36.4 °C)-98.2 °F (36.8 °C)] 98.2 °F (36.8 °C)  Pulse:  [] 98  Resp:  [17-18] 18  SpO2:  [96 %-99 %] 98 %  BP: ()/(66-73) 100/66     Weight: 77.7 kg (171 lb 4.8 oz)  Body mass index is 24.58 kg/m².      Intake/Output Summary (Last 24 hours) at 3/21/2024 1228  Last data filed at 3/21/2024 0140  Gross per 24 hour   Intake --   Output 500 ml   Net -500 ml        Physical Exam  Constitutional:       Appearance: Normal appearance. He is ill-appearing.   HENT:      Head: Normocephalic and atraumatic.      Mouth/Throat:      Mouth: Mucous membranes are moist.   Eyes:      Extraocular Movements: Extraocular movements intact.   Cardiovascular:      Rate and Rhythm: Normal rate. Rhythm irregular.   Pulmonary:      Effort: Pulmonary effort is normal. No respiratory distress.      Breath sounds: Rales present. No wheezing.   Abdominal:      General: Bowel sounds are normal.      Palpations: Abdomen is soft.   Musculoskeletal:      Right lower leg: Edema present.      Left lower leg: Edema present.   Skin:     Findings: Lesion present.   Neurological:      General: No focal deficit present.      Mental Status: He is alert and oriented to person, place, and time.           Review of Systems    Vents:  Oxygen Concentration (%): 21 (03/21/24 0648)    Lines/Drains/Airways       Peripherally Inserted Central Catheter Line  Duration             PICC Double Lumen 03/14/24 1126 left brachial 7 days                    Significant Labs:    CBC/Anemia Profile:  Recent Labs   Lab 03/20/24  0416 03/21/24  0242   WBC 7.83 6.01   HGB 7.9* 7.9*   HCT 28.2* 27.1*   * 142*   MCV 86.8 82.4   RDW 18.7* 18.7*        Chemistries:  Recent Labs   Lab  03/20/24  0417 03/21/24  0242    138   K 4.2 4.6    106   CO2 26 26   BUN 33* 31*   CREATININE 1.76* 1.77*   CALCIUM 8.3* 8.4*       All pertinent labs within the past 24 hours have been reviewed.  Pseudo exudate, transudative pleural fluid, anemia with hemoglobin 7.9, mildly worsening acute kidney injury.    Significant Imaging:  I have reviewed all pertinent imaging results/findings within the past 24 hours.  No evidence of pneumothorax on 3/19/24 chest x-ray

## 2024-03-21 NOTE — HOSPITAL COURSE
Status post thoracentesis on 3/19/24 with drainage of 650 cc of pleural fluid from the right pleural space.

## 2024-03-21 NOTE — ASSESSMENT & PLAN NOTE
"Patient is identified as having Systolic (HFrEF) heart failure that is Acute on chronic. CHF is currently controlled. Latest ECHO performed and demonstrates- Results for orders placed during the hospital encounter of 03/13/24    Echo    Interpretation Summary    Left Ventricle: The left ventricle is mildly dilated. Mildly increased wall thickness. There is concentric hypertrophy. Severe global hypokinesis present. There is reduced systolic function. Ejection fraction by visual approximation is 20%.    Right Ventricle: Moderate right ventricular enlargement. Systolic function is hyperdynamic.    Left Atrium: Left atrium is mildly dilated.    Right Atrium: Right atrium is severely dilated.    Aortic Valve: The aortic valve is a trileaflet valve. Moderately calcified cusps.    Mitral Valve: There is mild bileaflet sclerosis. Mildly thickened leaflets. There is no stenosis. The mean pressure gradient across the mitral valve is 4 mmHg at a heart rate of  bpm. There is moderate regurgitation with an eccentric jet.    Tricuspid Valve: There is mild regurgitation with an eccentrically directed jet.    Pulmonary Artery: The estimated pulmonary artery systolic pressure is 58 mmHg.    IVC/SVC: Elevated venous pressure at 15 mmHg.    Pericardium: There is a trivial effusion. No indication of cardiac tamponade.    Required dobutamine initially, now off- resume home midodrine.   Continue Furosemide and monitor clinical status closely. Monitor on telemetry. Patient is off CHF pathway.  Monitor strict Is&Os and daily weights.  Place on fluid restriction.   Cardiology has been consulted.   Continue to stress to patient importance of self efficacy and  on diet for CHF. Last BNP reviewed- and noted below No results for input(s): "BNP", "BNPTRIAGEBLO" in the last 168 hours.  " Recommended observation.

## 2024-03-21 NOTE — ASSESSMENT & PLAN NOTE
Patient's anemia is currently controlled. Has not received any PRBCs to date. Etiology likely d/t chronic disease due to Chronic Kidney Disease  Current CBC reviewed-   Lab Results   Component Value Date    HGB 7.9 (L) 03/21/2024    HCT 27.1 (L) 03/21/2024     Monitor serial CBC and transfuse if patient becomes hemodynamically unstable, symptomatic or H/H drops below 7/21.

## 2024-03-21 NOTE — PLAN OF CARE
[facility] unable to accept any wound pt's at this time. SS will notify daughter for another choice. SS attempted to notify niece. Will await a return call.

## 2024-03-21 NOTE — PLAN OF CARE
Problem: Adult Inpatient Plan of Care  Goal: Absence of Hospital-Acquired Illness or Injury  Outcome: Ongoing, Progressing     Problem: Diabetes Comorbidity  Goal: Blood Glucose Level Within Targeted Range  Outcome: Ongoing, Progressing     Problem: Infection Progression (Sepsis/Septic Shock)  Goal: Absence of Infection Signs and Symptoms  Outcome: Ongoing, Progressing

## 2024-03-21 NOTE — PROGRESS NOTES
[facility]  Pulmonology  Progress Note    Patient Name: Esthela Contreras  MRN: [id number]  Admission Date: 3/13/2024  Hospital Length of Stay: 7 days  Code Status: Full Code  Attending Provider: Cortez Ford MD  Primary Care Provider: Yolande Spring FNP   Principal Problem: Sepsis due to cellulitis    Subjective:     Interval History:  Refer to hospital course      Objective:     Vital Signs (Most Recent):  Temp: 98.2 °F (36.8 °C) (03/21/24 1016)  Pulse: 98 (03/21/24 1016)  Resp: 18 (03/21/24 1158)  BP: 100/66 (03/21/24 1016)  SpO2: 98 % (03/21/24 1016) Vital Signs (24h Range):  Temp:  [97.6 °F (36.4 °C)-98.2 °F (36.8 °C)] 98.2 °F (36.8 °C)  Pulse:  [] 98  Resp:  [17-18] 18  SpO2:  [96 %-99 %] 98 %  BP: ()/(66-73) 100/66     Weight: 77.7 kg (171 lb 4.8 oz)  Body mass index is 24.58 kg/m².      Intake/Output Summary (Last 24 hours) at 3/21/2024 1228  Last data filed at 3/21/2024 0140  Gross per 24 hour   Intake --   Output 500 ml   Net -500 ml        Physical Exam  Constitutional:       Appearance: Normal appearance. He is ill-appearing.   HENT:      Head: Normocephalic and atraumatic.      Mouth/Throat:      Mouth: Mucous membranes are moist.   Eyes:      Extraocular Movements: Extraocular movements intact.   Cardiovascular:      Rate and Rhythm: Normal rate. Rhythm irregular.   Pulmonary:      Effort: Pulmonary effort is normal. No respiratory distress.      Breath sounds: Rales present. No wheezing.   Abdominal:      General: Bowel sounds are normal.      Palpations: Abdomen is soft.   Musculoskeletal:      Right lower leg: Edema present.      Left lower leg: Edema present.   Skin:     Findings: Lesion present.   Neurological:      General: No focal deficit present.      Mental Status: He is alert and oriented to person, place, and time.           Review of Systems    Vents:  Oxygen Concentration (%): 21 (03/21/24 0648)    Lines/Drains/Airways       Peripherally Inserted  Central Catheter Line  Duration             PICC Double Lumen 03/14/24 1126 left brachial 7 days                    Significant Labs:    CBC/Anemia Profile:  Recent Labs   Lab 03/20/24  0416 03/21/24  0242   WBC 7.83 6.01   HGB 7.9* 7.9*   HCT 28.2* 27.1*   * 142*   MCV 86.8 82.4   RDW 18.7* 18.7*        Chemistries:  Recent Labs   Lab 03/20/24  0417 03/21/24  0242    138   K 4.2 4.6    106   CO2 26 26   BUN 33* 31*   CREATININE 1.76* 1.77*   CALCIUM 8.3* 8.4*       All pertinent labs within the past 24 hours have been reviewed.  Pseudo exudate, transudative pleural fluid, anemia with hemoglobin 7.9, mildly worsening acute kidney injury.    Significant Imaging:  I have reviewed all pertinent imaging results/findings within the past 24 hours.  No evidence of pneumothorax on 3/19/24 chest x-ray  Assessment/Plan:     Pulmonary  Pulmonary nodules/lesions, multiple  These appear to be new as compared to his CT chest last year.  While there is always concern for this being malignant in etiology, they could also be secondary to infection/rounded atelectasis.  Patient will require additional workup and follow up imaging following resolution of his acute/critical conditions.  Cytology from thoracentesis negative thus far (typically, 40-65% healed for malignant etiology).      We will plan for repeat CT scan in approximately six weeks' time (at which time consideration for holding anticoagulation in the setting of his pulmonary embolism can be considered for any possible intervention).  We will arrange follow-up in Pulmonary Clinic.    Loculated pleural effusion  Patient with right-sided loculated pleural effusion.  Ultrasound shows evidence of septations.  This could be reactive in the setting of his pleural effusion, parapneumonic in the setting of his other lung nodule/CT findings or in the setting of his heart failure with reduced ejection fraction (would be less likely given unilateral, but very  possible).  Status post thoracentesis 3/19/24, with false exudative effusion (patient has a transudative pleural effusion, likely in the setting of his underlying heart failure-secondary to calculated serum effusion protein gradient).  Cultures thus far negative.    COPD (chronic obstructive pulmonary disease)  Reported history of COPD, not wheezing at this time.  No evidence of COPD exacerbation at this time.  We will require outpatient pulmonary function tests.  We will schedule in clinic six weeks from now, with pulmonary function tests, 6 minute walk test and CT chest with IV contrast prior to that.    Hematology  Pulmonary embolus with infarction  Now on anticoagulation, most likely provoked given patient's comorbidities-agree with continuing anticoagulation.  Reasonable to switch over to direct oral anticoagulants (with monitoring of renal function).          We will sign off at this time, thank you for consulting the pulmonary service.  Please feel free to reach out with any questions you may have.  We will arrange outpatient follow-up for the patient.       Eloy Dong MD  Pulmonology  [facility]
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
## 2024-03-21 NOTE — ASSESSMENT & PLAN NOTE
These appear to be new as compared to his CT chest last year.  While there is always concern for this being malignant in etiology, they could also be secondary to infection/rounded atelectasis.  Patient will require additional workup and follow up imaging following resolution of his acute/critical conditions.  Cytology from thoracentesis negative thus far (typically, 40-65% healed for malignant etiology).      We will plan for repeat CT scan in approximately six weeks' time (at which time consideration for holding anticoagulation in the setting of his pulmonary embolism can be considered for any possible intervention).  We will arrange follow-up in Pulmonary Clinic.

## 2024-03-21 NOTE — ASSESSMENT & PLAN NOTE
Patient with Paroxysmal (<7 days) atrial fibrillation which is controlled currently without rate controlling med (beta blocker on hold given heart failure/BP). Patient is currently in sinus rhythm.[id number] Score: 4. HASBLED Score: 2. Anticoagulation indicated. Anticoagulation with Eliquis.

## 2024-03-21 NOTE — PT/OT/SLP PROGRESS
Physical Therapy      Patient Name:  Esthela Contreras   MRN:  [id number]    Patient not seen today secondary to Patient unwilling to participate. Will follow-up 3/21/2024.

## 2024-03-21 NOTE — ASSESSMENT & PLAN NOTE
This patient does have evidence of infective focus  My overall impression is sepsis.  Source: Skin and Soft Tissue (location lower extremities with wounds)  Antibiotics given-   Antibiotics (72h ago, onward)      Start     Stop Route Frequency Ordered    03/20/24 1015  ciprofloxacin HCl tablet 500 mg         03/27/24 0859 Oral Every 12 hours 03/20/24 0902    03/20/24 1015  linezolid tablet 600 mg         03/27/24 0859 Oral Every 12 hours 03/20/24 0902    03/20/24 0900  silver sulfADIAZINE 1% cream         -- Top Daily 03/19/24 1224    03/14/24 0900  mupirocin 2 % ointment         03/19/24 0859 Nasl 2 times daily 03/14/24 0446          Source control achieved by: antibiotics, local wound care   In the past, wound grew pseudomonas and acinetobacter.  Current wound cultures with pseudomonas (sensitive to Zosyn) and MRSA.   Unfortunately patient is a poor candidate for non-emergent surgical debridement at this time due to heart failure and pulmonary embolism with infarct.   S/p 7 days of IV vancomycin and Zosyn, transitioned to PO ciprofloxacin and linezolid x 7 days.    Continue local wound care.   PT/OT consulted, plan for SNF placement on discharge.

## 2024-03-21 NOTE — PLAN OF CARE
Problem: Adult Inpatient Plan of Care  Goal: Plan of Care Review  Outcome: Ongoing, Progressing  Goal: Patient-Specific Goal (Individualized)  Outcome: Ongoing, Progressing  Goal: Absence of Hospital-Acquired Illness or Injury  Outcome: Ongoing, Progressing  Goal: Optimal Comfort and Wellbeing  Outcome: Ongoing, Progressing  Goal: Readiness for Transition of Care  Outcome: Ongoing, Progressing     Problem: Diabetes Comorbidity  Goal: Blood Glucose Level Within Targeted Range  Outcome: Ongoing, Progressing     Problem: Adjustment to Illness (Sepsis/Septic Shock)  Goal: Optimal Coping  Outcome: Ongoing, Progressing     Problem: Bleeding (Sepsis/Septic Shock)  Goal: Absence of Bleeding  Outcome: Ongoing, Progressing     Problem: Glycemic Control Impaired (Sepsis/Septic Shock)  Goal: Blood Glucose Level Within Desired Range  Outcome: Ongoing, Progressing     Problem: Infection Progression (Sepsis/Septic Shock)  Goal: Absence of Infection Signs and Symptoms  Outcome: Ongoing, Progressing     Problem: Nutrition Impaired (Sepsis/Septic Shock)  Goal: Optimal Nutrition Intake  Outcome: Ongoing, Progressing     Problem: Infection  Goal: Absence of Infection Signs and Symptoms  Outcome: Ongoing, Progressing     Problem: Impaired Wound Healing  Goal: Optimal Wound Healing  Outcome: Ongoing, Progressing     Problem: Skin Injury Risk Increased  Goal: Skin Health and Integrity  Outcome: Ongoing, Progressing     Problem: Fall Injury Risk  Goal: Absence of Fall and Fall-Related Injury  Outcome: Ongoing, Progressing     Problem: Fluid and Electrolyte Imbalance (Acute Kidney Injury/Impairment)  Goal: Fluid and Electrolyte Balance  Outcome: Ongoing, Progressing     Problem: Oral Intake Inadequate (Acute Kidney Injury/Impairment)  Goal: Optimal Nutrition Intake  Outcome: Ongoing, Progressing     Problem: Renal Function Impairment (Acute Kidney Injury/Impairment)  Goal: Effective Renal Function  Outcome: Ongoing, Progressing      Problem: Gas Exchange Impaired  Goal: Optimal Gas Exchange  Outcome: Ongoing, Progressing

## 2024-03-21 NOTE — ASSESSMENT & PLAN NOTE
Patient with acute kidney injury/acute renal failure likely due to pre-renal azotemia due to IVVD CHANDNI is currently stable.   Baseline creatinine  - Labs reviewed- Renal function/electrolytes with Estimated Creatinine Clearance: 37.2 mL/min (A) (based on SCr of 1.77 mg/dL (H)). according to latest data.   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   Monitor urine output and serial BMP and adjust therapy as needed.   Avoid nephrotoxins and renally dose meds for GFR listed above.

## 2024-03-21 NOTE — PT/OT/SLP PROGRESS
Occupational Therapy   Treatment    Name: Esthela Contreras  MRN: [id number]  Admitting Diagnosis:  Sepsis due to cellulitis       Recommendations:     Discharge Recommendations: Moderate Intensity Therapy  Discharge Equipment Recommendations:  to be determined by next level of care  Barriers to discharge:  Decreased caregiver support, Inaccessible home environment    Assessment:     Esthela Conterras is a 73 y.o. male with a medical diagnosis of Sepsis due to cellulitis.  He presents with weakness and decline in ADLs. Performance deficits affecting function are impaired endurance, weakness, impaired functional mobility, impaired self care skills, gait instability, impaired balance, pain, impaired skin.     Rehab Prognosis:  Good; patient would benefit from acute skilled OT services to address these deficits and reach maximum level of function.       Plan:     Patient to be seen 5 x/week to address the above listed problems via self-care/home management, therapeutic activities, therapeutic exercises  Plan of Care Expires: 04/16/24  Plan of Care Reviewed with: patient    Subjective     Chief Complaint: sepsis d/t cellulitis  Patient/Family Comments/goals: pt agreeable to OT tx  Pain/Comfort:  Pain Rating 1: 6/10  Location - Side 1: Bilateral  Location 1: foot  Pain Addressed 1: Pre-medicate for activity    Objective:     Communicated with: ANAHI Nayak prior to session.  Patient found HOB elevated with PICC line, oxygen, telemetry upon OT entry to room.    General Precautions: Standard, fall    Orthopedic Precautions:N/A  Braces: N/A  Respiratory Status: Nasal cannula, flow 2 L/min     Occupational Performance:     Bed Mobility:    Not performed     Functional Mobility/Transfers:  Not performed    Activities of Daily Living:  Grooming: modified independence to perform oral care and wash face with setup from bed      AMPAC 6 Click ADL:      Treatment & Education:  Pt performed ADL training as listed above.   Pt perforemd 2 sets  x 15 reps each bicep curls 2#db, chest press 2#db, IR/ER 2#db, and shoulder press 2#db in order to improve BUE strength and endurance to perform ADL and ADL t/f with less assist.     Patient left HOB elevated with all lines intact, call button in reach, and ANAHI Nayak notified    GOALS:   Multidisciplinary Problems       Occupational Therapy Goals          Problem: Occupational Therapy    Goal Priority Disciplines Outcome Interventions   Occupational Therapy Goal     OT, PT/OT Ongoing, Progressing    Description: STG:  Pt will perform grooming with setup  Pt will bathe with Anna with setup at EOB  Pt will perform UE dressing with Harshil  Pt will perform LE dressing with ModA  Pt will sit EOB x 10 min with SBA   Pt will transfer bed/chair/bsc with Anna with sliding board  Pt will tolerate 15 minutes of tx without fatigue      LT.Restore to max I with self care and mobility.                           Time Tracking:     OT Date of Treatment: 24  OT Start Time: 1350  OT Stop Time: 1415  OT Total Time (min): 25 min    Billable Minutes:Self Care/Home Management 8 minutes  Therapeutic Exercise 15 minutes    OT/KO: OT          3/21/2024

## 2024-03-21 NOTE — PROGRESS NOTES
[facility] Hospital Medicine  Progress Note    Patient Name: Esthela Contreras  MRN: [id number]  Patient Class: IP- Inpatient   Admission Date: 3/13/2024  Length of Stay: 7 days  Attending Physician: Cortez Ford MD  Primary Care Provider: Yolande Spring FNP        Subjective:     Principal Problem:Sepsis due to cellulitis        HPI:  Patient is a 73-year-old male with a history of combined CHF with last known EF of 35% with chronically elevated proBNP ranging from 4 to 20,000, COPD, type 2 diabetes, and chronic bilateral lower extremity edema coupled with venous stasis dermatitis which has been frequently complicated by cellulitis requiring hospitalization who presented to emergency room as instructed by his home health nurse for evaluation of bilateral lower extremity wound which appeared to have been infected again.  Patient complained of burning type of pain +7/10 involving bilateral lower extremities, but otherwise denied any fever chills cough wheezing shortness of breath chest pain palpitation PND or orthopnea in association.    On initial presentation, patient was tachycardic with a heart rate in the 120s but vital signs were otherwise stable and patient was afebrile.  Workup was notable for chest x-ray demonstrating right-sided pleural effusion with right basilar density representing either atelectasis versus developing infiltrate, chronically elevated troponin in the 100s without any ischemic abnormalities seen on EKG, chronically elevated proBNP along with leukocytosis with left shift.     Patient will be admitted with a working diagnosis of sepsis secondary to bilateral lower extremity cellulitis in the setting of chronic venous stasis dermatitis and type 2 MI associated with acute decompensation of chronic combined heart failure.        Overview/Hospital Course:  No notes on file    Interval History: Patient seen and examined at the bedside, reports doing Ok, denies any new  complaints.     Review of Systems   Skin:  Positive for wound.   All other systems reviewed and are negative.    Objective:     Vital Signs (Most Recent):  Temp: 98.2 °F (36.8 °C) (03/21/24 1016)  Pulse: 98 (03/21/24 1016)  Resp: 18 (03/21/24 1158)  BP: 100/66 (03/21/24 1016)  SpO2: 98 % (03/21/24 1016) Vital Signs (24h Range):  Temp:  [97.6 °F (36.4 °C)-98.2 °F (36.8 °C)] 98.2 °F (36.8 °C)  Pulse:  [] 98  Resp:  [17-18] 18  SpO2:  [96 %-99 %] 98 %  BP: ()/(66-73) 100/66     Weight: 77.7 kg (171 lb 4.8 oz)  Body mass index is 24.58 kg/m².    Intake/Output Summary (Last 24 hours) at 3/21/2024 1358  Last data filed at 3/21/2024 0140  Gross per 24 hour   Intake --   Output 500 ml   Net -500 ml           Physical Exam  Constitutional:       Appearance: He is ill-appearing.   HENT:      Head: Normocephalic and atraumatic.      Mouth/Throat:      Mouth: Mucous membranes are moist.   Eyes:      Extraocular Movements: Extraocular movements intact.   Cardiovascular:      Rate and Rhythm: Normal rate. Rhythm irregular.   Pulmonary:      Effort: Pulmonary effort is normal. No respiratory distress.      Breath sounds: Rales present.   Abdominal:      General: Bowel sounds are normal.      Palpations: Abdomen is soft.   Musculoskeletal:      Right lower leg: Edema present.      Left lower leg: Edema present.      Comments: BLE dressing in place.    Skin:     Findings: Lesion present.   Neurological:      General: No focal deficit present.      Mental Status: He is alert and oriented to person, place, and time.             Significant Labs: All pertinent labs within the past 24 hours have been reviewed.    Significant Imaging: I have reviewed all pertinent imaging results/findings within the past 24 hours.    Assessment/Plan:      * Sepsis due to cellulitis  This patient does have evidence of infective focus  My overall impression is sepsis.  Source: Skin and Soft Tissue (location lower extremities with  wounds)  Antibiotics given-   Antibiotics (72h ago, onward)      Start     Stop Route Frequency Ordered    03/20/24 1015  ciprofloxacin HCl tablet 500 mg         03/27/24 0859 Oral Every 12 hours 03/20/24 0902    03/20/24 1015  linezolid tablet 600 mg         03/27/24 0859 Oral Every 12 hours 03/20/24 0902    03/20/24 0900  silver sulfADIAZINE 1% cream         -- Top Daily 03/19/24 1224    03/14/24 0900  mupirocin 2 % ointment         03/19/24 0859 Nasl 2 times daily 03/14/24 0446          Source control achieved by: antibiotics, local wound care   In the past, wound grew pseudomonas and acinetobacter.  Current wound cultures with pseudomonas (sensitive to Zosyn) and MRSA.   Unfortunately patient is a poor candidate for non-emergent surgical debridement at this time due to heart failure and pulmonary embolism with infarct.   S/p 7 days of IV vancomycin and Zosyn, transitioned to PO ciprofloxacin and linezolid x 7 days.    Continue local wound care.   PT/OT consulted, plan for SNF placement on discharge.       Pulmonary embolus with infarction  CT PE: Occlusive pulmonary embolus within the branch of the right pulmonary artery extending into the right lower lobe.  Infarct located within the right lower lobe.  S/p WB Lovenox, now transitioned to Eliquis (loading plus maintenance dose).            Chronic HFrEF (heart failure with reduced ejection fraction)  Patient is identified as having Systolic (HFrEF) heart failure that is Acute on chronic. CHF is currently controlled. Latest ECHO performed and demonstrates- Results for orders placed during the hospital encounter of 03/13/24    Echo    Interpretation Summary    Left Ventricle: The left ventricle is mildly dilated. Mildly increased wall thickness. There is concentric hypertrophy. Severe global hypokinesis present. There is reduced systolic function. Ejection fraction by visual approximation is 20%.    Right Ventricle: Moderate right ventricular enlargement. Systolic  "function is hyperdynamic.    Left Atrium: Left atrium is mildly dilated.    Right Atrium: Right atrium is severely dilated.    Aortic Valve: The aortic valve is a trileaflet valve. Moderately calcified cusps.    Mitral Valve: There is mild bileaflet sclerosis. Mildly thickened leaflets. There is no stenosis. The mean pressure gradient across the mitral valve is 4 mmHg at a heart rate of  bpm. There is moderate regurgitation with an eccentric jet.    Tricuspid Valve: There is mild regurgitation with an eccentrically directed jet.    Pulmonary Artery: The estimated pulmonary artery systolic pressure is 58 mmHg.    IVC/SVC: Elevated venous pressure at 15 mmHg.    Pericardium: There is a trivial effusion. No indication of cardiac tamponade.    Required dobutamine initially, now off- resume home midodrine.   Continue Furosemide and monitor clinical status closely. Monitor on telemetry. Patient is off CHF pathway.  Monitor strict Is&Os and daily weights.  Place on fluid restriction.   Cardiology has been consulted.   Continue to stress to patient importance of self efficacy and  on diet for CHF. Last BNP reviewed- and noted below No results for input(s): "BNP", "BNPTRIAGEBLO" in the last 168 hours.    Loculated pleural effusion  Patient found to have moderate pleural effusion on imaging.   I have personally reviewed and interpreted the following imaging: CT. A thoracentesis was deferred to Pulmonology.   Most likely etiology includes  cause not entirely clear but could be from pulmonary infarction/infection .   Management to include  Pulmonology consultation to consider thoracentesis/sampling.   S/p thoracentesis (3/18), transudate fluid likely related to CHF, cultures NGTD.         CHANDNI (acute kidney injury)  Patient with acute kidney injury/acute renal failure likely due to pre-renal azotemia due to IVVD CHANDNI is currently stable.   Baseline creatinine  - Labs reviewed- Renal function/electrolytes with Estimated " "Creatinine Clearance: 37.2 mL/min (A) (based on SCr of 1.77 mg/dL (H)). according to latest data.   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   Monitor urine output and serial BMP and adjust therapy as needed.   Avoid nephrotoxins and renally dose meds for GFR listed above.    Pulmonary nodules/lesions, multiple  CTA chest showed interval development of a 1.3 cm solid pulmonary nodule located adjacent to the right major fissure. Additional new 1 cm ill-defined and solid pulmonary nodule located near the lingula.   Pulmonology consulted and recommend outpatient follow up.       Chronic anemia  Patient's anemia is currently controlled. Has not received any PRBCs to date. Etiology likely d/t chronic disease due to Chronic Kidney Disease  Current CBC reviewed-   Lab Results   Component Value Date    HGB 7.9 (L) 03/21/2024    HCT 27.1 (L) 03/21/2024     Monitor serial CBC and transfuse if patient becomes hemodynamically unstable, symptomatic or H/H drops below 7/21.    Cellulitis of lower extremity  Plan as outlined above.       Positive blood culture  Blood culture #1 of 2 positive - Verigene MRSE and Strep species.   Possible contaminant as only one sample with growth to date.   Continue antibiotics as above.       A-fib  Patient with Paroxysmal (<7 days) atrial fibrillation which is controlled currently without rate controlling med (beta blocker on hold given heart failure/BP). Patient is currently in sinus rhythm.[id number] Score: 4. HASBLED Score: 2. Anticoagulation indicated. Anticoagulation with Eliquis.    Type 2 diabetes mellitus  Patient's FSGs are controlled on current medication regimen.  Last A1c reviewed-   Lab Results   Component Value Date    HGBA1C 4.8 07/28/2023     Most recent fingerstick glucose reviewed- No results for input(s): "POCTGLUCOSE" in the last 24 hours.  Current correctional scale  Low  Maintain " anti-hyperglycemic dose as follows-   Antihyperglycemics (From admission, onward)      Start     Stop Route Frequency Ordered    03/14/24 0520  insulin aspart U-100 injection 0-5 Units         -- SubQ Before meals & nightly PRN 03/14/24 0424          Hold Oral hypoglycemics while patient is in the hospital.    COPD (chronic obstructive pulmonary disease)  Patient's COPD is controlled currently.    Patient is currently off COPD Pathway.   Continue PRN nebs, Supplemental oxygen and monitor respiratory status closely.     Coronary artery disease involving native coronary artery of native heart without angina pectoris  Patient with known CAD s/p  unknown , which is controlled  Will continue ASA and Statin and monitor for S/Sx of angina/ACS.   Continue to monitor on telemetry.       VTE Risk Mitigation (From admission, onward)           Ordered     apixaban tablet 5 mg  2 times daily         03/20/24 1308     apixaban tablet 10 mg  2 times daily         03/20/24 0917     Reason for no Mechanical VTE Prophylaxis  Once        Comments: BLE wound and cellulitis   Question:  Reasons:  Answer:  Physician Provided (leave comment)    03/14/24 0424     IP VTE HIGH RISK PATIENT  Once         03/14/24 0424                    Discharge Planning   MAKENNA: 3/25/2024     Code Status: Full Code   Is the patient medically ready for discharge?:     Reason for patient still in hospital (select all that apply): Patient trending condition and Pending disposition  Discharge Plan A: Home                  TIFFANIE VENTURA MD  Department of Hospital Medicine   [facility]

## 2024-03-21 NOTE — ASSESSMENT & PLAN NOTE
Now on anticoagulation, most likely provoked given patient's comorbidities-agree with continuing anticoagulation.  Reasonable to switch over to direct oral anticoagulants (with monitoring of renal function).

## 2024-03-21 NOTE — ASSESSMENT & PLAN NOTE
Patient with right-sided loculated pleural effusion.  Ultrasound shows evidence of septations.  This could be reactive in the setting of his pleural effusion, parapneumonic in the setting of his other lung nodule/CT findings or in the setting of his heart failure with reduced ejection fraction (would be less likely given unilateral, but very possible).  Status post thoracentesis 3/19/24, with false exudative effusion (patient has a transudative pleural effusion, likely in the setting of his underlying heart failure-secondary to calculated serum effusion protein gradient).  Cultures thus far negative.

## 2024-03-22 LAB
ANION GAP SERPL CALCULATED.3IONS-SCNC: 11 MMOL/L (ref 7–16)
ANISOCYTOSIS BLD QL SMEAR: ABNORMAL
BASOPHILS # BLD AUTO: 0.05 K/UL (ref 0–0.2)
BASOPHILS NFR BLD AUTO: 0.8 % (ref 0–1)
BASOPHILS NFR BLD MANUAL: 2 % (ref 0–1)
BUN SERPL-MCNC: 35 MG/DL (ref 7–18)
BUN/CREAT SERPL: 20 (ref 6–20)
CALCIUM SERPL-MCNC: 8.4 MG/DL (ref 8.5–10.1)
CHLORIDE SERPL-SCNC: 107 MMOL/L (ref 98–107)
CO2 SERPL-SCNC: 26 MMOL/L (ref 21–32)
CREAT SERPL-MCNC: 1.75 MG/DL (ref 0.7–1.3)
CRENATED CELLS: ABNORMAL
DIFFERENTIAL METHOD BLD: ABNORMAL
EGFR (NO RACE VARIABLE) (RUSH/TITUS): 40 ML/MIN/1.73M2
EOSINOPHIL # BLD AUTO: 0.09 K/UL (ref 0–0.5)
EOSINOPHIL NFR BLD AUTO: 1.4 % (ref 1–4)
EOSINOPHIL NFR BLD MANUAL: 1 % (ref 1–4)
ERYTHROCYTE [DISTWIDTH] IN BLOOD BY AUTOMATED COUNT: 18.6 % (ref 11.5–14.5)
GLUCOSE SERPL-MCNC: 113 MG/DL (ref 70–105)
GLUCOSE SERPL-MCNC: 116 MG/DL (ref 70–105)
GLUCOSE SERPL-MCNC: 125 MG/DL (ref 70–105)
GLUCOSE SERPL-MCNC: 78 MG/DL (ref 74–106)
GLUCOSE SERPL-MCNC: 91 MG/DL (ref 70–105)
HCT VFR BLD AUTO: 28 % (ref 40–54)
HGB BLD-MCNC: 7.8 G/DL (ref 13.5–18)
HYPOCHROMIA BLD QL SMEAR: ABNORMAL
IMM GRANULOCYTES # BLD AUTO: 0.07 K/UL (ref 0–0.04)
IMM GRANULOCYTES NFR BLD: 1.1 % (ref 0–0.4)
LYMPHOCYTES # BLD AUTO: 1.6 K/UL (ref 1–4.8)
LYMPHOCYTES NFR BLD AUTO: 25.3 % (ref 27–41)
LYMPHOCYTES NFR BLD MANUAL: 29 % (ref 27–41)
MCH RBC QN AUTO: 24.1 PG (ref 27–31)
MCHC RBC AUTO-ENTMCNC: 27.9 G/DL (ref 32–36)
MCV RBC AUTO: 86.7 FL (ref 80–96)
MONOCYTES # BLD AUTO: 0.72 K/UL (ref 0–0.8)
MONOCYTES NFR BLD AUTO: 11.4 % (ref 2–6)
MONOCYTES NFR BLD MANUAL: 7 % (ref 2–6)
MPC BLD CALC-MCNC: 10 FL (ref 9.4–12.4)
NEUTROPHILS # BLD AUTO: 3.8 K/UL (ref 1.8–7.7)
NEUTROPHILS NFR BLD AUTO: 60 % (ref 53–65)
NEUTS SEG NFR BLD MANUAL: 61 % (ref 50–62)
NRBC # BLD AUTO: 0.04 X10E3/UL
NRBC BLD MANUAL-RTO: 1 /100 WBC
NRBC, AUTO (.00): 0.6 %
OVALOCYTES BLD QL SMEAR: ABNORMAL
PLATELET # BLD AUTO: 110 K/UL (ref 150–400)
PLATELET MORPHOLOGY: ABNORMAL
POLYCHROMASIA BLD QL SMEAR: ABNORMAL
POTASSIUM SERPL-SCNC: 4.8 MMOL/L (ref 3.5–5.1)
RBC # BLD AUTO: 3.23 M/UL (ref 4.6–6.2)
SODIUM SERPL-SCNC: 139 MMOL/L (ref 136–145)
TARGETS BLD QL SMEAR: ABNORMAL
TB INDURATION 48 - 72 HR READ: 0 MM
WBC # BLD AUTO: 6.33 K/UL (ref 4.5–11)

## 2024-03-22 PROCEDURE — 97110 THERAPEUTIC EXERCISES: CPT | Mod: CQ

## 2024-03-22 PROCEDURE — 25000003 PHARM REV CODE 250: Performed by: FAMILY MEDICINE

## 2024-03-22 PROCEDURE — 25000003 PHARM REV CODE 250: Performed by: NURSE PRACTITIONER

## 2024-03-22 PROCEDURE — 27000221 HC OXYGEN, UP TO 24 HOURS

## 2024-03-22 PROCEDURE — 94761 N-INVAS EAR/PLS OXIMETRY MLT: CPT

## 2024-03-22 PROCEDURE — 85025 COMPLETE CBC W/AUTO DIFF WBC: CPT | Performed by: INTERNAL MEDICINE

## 2024-03-22 PROCEDURE — 25000003 PHARM REV CODE 250: Performed by: INTERNAL MEDICINE

## 2024-03-22 PROCEDURE — 11000001 HC ACUTE MED/SURG PRIVATE ROOM

## 2024-03-22 PROCEDURE — 82962 GLUCOSE BLOOD TEST: CPT

## 2024-03-22 PROCEDURE — 99232 SBSQ HOSP IP/OBS MODERATE 35: CPT | Mod: ,,, | Performed by: INTERNAL MEDICINE

## 2024-03-22 PROCEDURE — 99900035 HC TECH TIME PER 15 MIN (STAT)

## 2024-03-22 PROCEDURE — 80048 BASIC METABOLIC PNL TOTAL CA: CPT | Performed by: STUDENT IN AN ORGANIZED HEALTH CARE EDUCATION/TRAINING PROGRAM

## 2024-03-22 RX ORDER — POLYETHYLENE GLYCOL 3350 17 G/17G
17 POWDER, FOR SOLUTION ORAL 2 TIMES DAILY PRN
Status: DISCONTINUED | OUTPATIENT
Start: 2024-03-22 | End: 2024-03-26 | Stop reason: HOSPADM

## 2024-03-22 RX ADMIN — GABAPENTIN 100 MG: 100 CAPSULE ORAL at 08:03

## 2024-03-22 RX ADMIN — GABAPENTIN 100 MG: 100 CAPSULE ORAL at 03:03

## 2024-03-22 RX ADMIN — LINEZOLID 600 MG: 600 TABLET, FILM COATED ORAL at 08:03

## 2024-03-22 RX ADMIN — MIDODRINE HYDROCHLORIDE 5 MG: 5 TABLET ORAL at 04:03

## 2024-03-22 RX ADMIN — APIXABAN 10 MG: 5 TABLET, FILM COATED ORAL at 08:03

## 2024-03-22 RX ADMIN — HYDRALAZINE HYDROCHLORIDE 10 MG: 10 TABLET, FILM COATED ORAL at 08:03

## 2024-03-22 RX ADMIN — SILVER SULFADIAZINE: 10 CREAM TOPICAL at 04:03

## 2024-03-22 RX ADMIN — CIPROFLOXACIN 500 MG: 500 TABLET ORAL at 08:03

## 2024-03-22 RX ADMIN — FUROSEMIDE 40 MG: 40 TABLET ORAL at 08:03

## 2024-03-22 RX ADMIN — ATORVASTATIN CALCIUM 40 MG: 40 TABLET, FILM COATED ORAL at 08:03

## 2024-03-22 RX ADMIN — ASPIRIN 81 MG: 81 TABLET, COATED ORAL at 08:03

## 2024-03-22 RX ADMIN — MIDODRINE HYDROCHLORIDE 5 MG: 5 TABLET ORAL at 08:03

## 2024-03-22 RX ADMIN — POLYETHYLENE GLYCOL 3350 17 G: 17 POWDER, FOR SOLUTION ORAL at 06:03

## 2024-03-22 RX ADMIN — MIDODRINE HYDROCHLORIDE 5 MG: 5 TABLET ORAL at 12:03

## 2024-03-22 NOTE — PT/OT/SLP PROGRESS
Physical Therapy Treatment    Patient Name:  Esthela Contreras   MRN:  [id number]    Recommendations:     Discharge Recommendations: Moderate Intensity Therapy  Discharge Equipment Recommendations: to be determined by next level of care  Barriers to discharge: None    Assessment:     Esthela Contreras is a 73 y.o. male admitted with a medical diagnosis of Sepsis due to cellulitis.  He presents with the following impairments/functional limitations: weakness, impaired endurance, impaired self care skills, impaired functional mobility, gait instability, impaired balance, decreased lower extremity function, decreased upper extremity function, decreased safety awareness, pain, impaired skin . Pt tolerated session well. Focused more on mobility and stretching with some B LOWER EXTREMITY strengthening. Fatigues quickly with L LOWER EXTREMITY exercises. Pt shown how to perform basic bed exercises. Will progress as able.     Rehab Prognosis: Good; patient would benefit from acute skilled PT services to address these deficits and reach maximum level of function.    Recent Surgery: Procedure(s) (LRB):  DEBRIDEMENT, LOWER EXTREMITY (Bilateral)      Plan:     During this hospitalization, patient to be seen 5 x/week to address the identified rehab impairments via gait training, therapeutic activities, therapeutic exercises, neuromuscular re-education, wheelchair management/training and progress toward the following goals:    Plan of Care Expires:  04/19/24    Subjective     Chief Complaint: fatigue and some pain in his foot  Patient/Family Comments/goals: heel pain  Pain/Comfort:  Pain Rating 1: 5/10  Location - Side 1: Bilateral  Location - Orientation 1: lower  Location 1: foot      Objective:     Communicated with nurseHanane prior to session.  Patient found HOB elevated with PICC line, oxygen upon PT entry to room.     General Precautions: Standard, fall  Orthopedic Precautions: N/A  Braces: N/A  Respiratory Status: Room air      Functional Mobility:        AM-PAC 6 CLICK MOBILITY  Turning over in bed (including adjusting bedclothes, sheets and blankets)?: 3  Sitting down on and standing up from a chair with arms (e.g., wheelchair, bedside commode, etc.): 1  Moving from lying on back to sitting on the side of the bed?: 3  Moving to and from a bed to a chair (including a wheelchair)?: 1  Need to walk in hospital room?: 1  Climbing 3-5 steps with a railing?: 1  Basic Mobility Total Score: 10       Treatment & Education:  HS stretch (B)  Calf Stretch (B)  SAQ (B) 20x   SLR (B) 20x  Ankle mobility     Patient left HOB elevated with all lines intact and call button in reach..    GOALS:   Multidisciplinary Problems       Physical Therapy Goals          Problem: Physical Therapy    Goal Priority Disciplines Outcome Goal Variances Interventions   Physical Therapy Goal     PT, PT/OT Ongoing, Progressing     Description: Short term goals:  1. Supine to sit with MInimal Assistance  2. Sit to supine with MInimal Assistance  3. Bed to chair transfer with Minimal Assistance using Slideboard  4. Wheelchair propulsion x300 feet with Contact Guard Assistance using bilateral upper extremities    Long term goals:  1. Supine to sit with Modified [place]  2. Sit to stand transfer with Contact Guard Assistance  3. Bed to chair transfer with Contact Guard Assistance using Rolling Walker  4. Gait  x 50 feet with Contact Guard Assistance using Rolling Walker.                          Time Tracking:     PT Received On:    PT Start Time: 1257     PT Stop Time: 1310  PT Total Time (min): 13 min     Billable Minutes: Therapeutic Exercise 13 minutes    Treatment Type: Treatment  PT/PTA: PTA     Number of PTA visits since last PT visit: 1 03/22/2024

## 2024-03-22 NOTE — PLAN OF CARE
SS spoke with pt's niece. Choice obtained for [facility]. Referral faxed and will await acceptance.

## 2024-03-22 NOTE — PLAN OF CARE
Problem: Adult Inpatient Plan of Care  Goal: Absence of Hospital-Acquired Illness or Injury  Outcome: Ongoing, Progressing     Problem: Adult Inpatient Plan of Care  Goal: Optimal Comfort and Wellbeing  Outcome: Ongoing, Progressing     Problem: Diabetes Comorbidity  Goal: Blood Glucose Level Within Targeted Range  Outcome: Ongoing, Progressing     Problem: Adjustment to Illness (Sepsis/Septic Shock)  Goal: Optimal Coping  Outcome: Ongoing, Progressing     Problem: Glycemic Control Impaired (Sepsis/Septic Shock)  Goal: Blood Glucose Level Within Desired Range  Outcome: Ongoing, Progressing     Problem: Infection Progression (Sepsis/Septic Shock)  Goal: Absence of Infection Signs and Symptoms  Outcome: Ongoing, Progressing     Problem: Infection  Goal: Absence of Infection Signs and Symptoms  Outcome: Ongoing, Progressing     Problem: Impaired Wound Healing  Goal: Optimal Wound Healing  Outcome: Ongoing, Progressing     Problem: Skin Injury Risk Increased  Goal: Skin Health and Integrity  Outcome: Ongoing, Progressing     Problem: Fall Injury Risk  Goal: Absence of Fall and Fall-Related Injury  Outcome: Ongoing, Progressing     Problem: Fluid and Electrolyte Imbalance (Acute Kidney Injury/Impairment)  Goal: Fluid and Electrolyte Balance  Outcome: Ongoing, Progressing

## 2024-03-22 NOTE — SUBJECTIVE & OBJECTIVE
Interval History: Patient seen and examined at the bedside, reports doing Ok, denies any new complaints.     Review of Systems   Skin:  Positive for wound.   All other systems reviewed and are negative.    Objective:     Vital Signs (Most Recent):  Temp: 98.5 °F (36.9 °C) (03/23/24 0649)  Pulse: 90 (03/23/24 0649)  Resp: 18 (03/23/24 0649)  BP: 110/62 (03/23/24 0649)  SpO2: 100 % (03/23/24 0649) Vital Signs (24h Range):  Temp:  [97.8 °F (36.6 °C)-99.4 °F (37.4 °C)] 98.5 °F (36.9 °C)  Pulse:  [90-97] 90  Resp:  [18-20] 18  SpO2:  [93 %-100 %] 100 %  BP: (100-121)/(62-79) 110/62     Weight: 77.7 kg (171 lb 4.8 oz)  Body mass index is 24.58 kg/m².    Intake/Output Summary (Last 24 hours) at 3/23/2024 1013  Last data filed at 3/23/2024 0745  Gross per 24 hour   Intake 711 ml   Output 500 ml   Net 211 ml           Physical Exam  Constitutional:       Appearance: He is ill-appearing.   HENT:      Head: Normocephalic and atraumatic.      Mouth/Throat:      Mouth: Mucous membranes are moist.   Eyes:      Extraocular Movements: Extraocular movements intact.   Cardiovascular:      Rate and Rhythm: Normal rate. Rhythm irregular.   Pulmonary:      Effort: Pulmonary effort is normal. No respiratory distress.      Breath sounds: Rales present.   Abdominal:      General: Bowel sounds are normal.      Palpations: Abdomen is soft.   Musculoskeletal:      Right lower leg: Edema present.      Left lower leg: Edema present.      Comments: BLE dressing in place.    Skin:     Findings: Lesion present.   Neurological:      General: No focal deficit present.      Mental Status: He is alert and oriented to person, place, and time.             Significant Labs: All pertinent labs within the past 24 hours have been reviewed.    Significant Imaging: I have reviewed all pertinent imaging results/findings within the past 24 hours.

## 2024-03-22 NOTE — ASSESSMENT & PLAN NOTE
Patient with Paroxysmal (<7 days) atrial fibrillation which is controlled currently with beta blocker. Patient is currently in sinus rhythm.[id number] Score: 4. HASBLED Score: 2. Anticoagulation indicated. Anticoagulation with Eliquis.

## 2024-03-22 NOTE — ASSESSMENT & PLAN NOTE
Patient's anemia is currently controlled. Has not received any PRBCs to date. Etiology likely d/t chronic disease due to Chronic Kidney Disease  Current CBC reviewed-   Lab Results   Component Value Date    HGB 7.8 (L) 03/22/2024    HCT 28.0 (L) 03/22/2024     Monitor serial CBC and transfuse if patient becomes hemodynamically unstable, symptomatic or H/H drops below 7/21.

## 2024-03-22 NOTE — ASSESSMENT & PLAN NOTE
Patient with acute kidney injury/acute renal failure likely due to pre-renal azotemia due to IVVD CHANDNI is currently stable.   Baseline creatinine  - Labs reviewed- Renal function/electrolytes with Estimated Creatinine Clearance: 37.7 mL/min (A) (based on SCr of 1.75 mg/dL (H)). according to latest data.   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   Stable Cr.   Monitor urine output and serial BMP and adjust therapy as needed.   Avoid nephrotoxins and renally dose meds for GFR listed above.

## 2024-03-22 NOTE — ASSESSMENT & PLAN NOTE
Patient is identified as having Systolic (HFrEF) heart failure that is Acute on chronic. CHF is currently controlled. Latest ECHO performed and demonstrates- Results for orders placed during the hospital encounter of 03/13/24    Echo    Interpretation Summary    Left Ventricle: The left ventricle is mildly dilated. Mildly increased wall thickness. There is concentric hypertrophy. Severe global hypokinesis present. There is reduced systolic function. Ejection fraction by visual approximation is 20%.    Right Ventricle: Moderate right ventricular enlargement. Systolic function is hyperdynamic.    Left Atrium: Left atrium is mildly dilated.    Right Atrium: Right atrium is severely dilated.    Aortic Valve: The aortic valve is a trileaflet valve. Moderately calcified cusps.    Mitral Valve: There is mild bileaflet sclerosis. Mildly thickened leaflets. There is no stenosis. The mean pressure gradient across the mitral valve is 4 mmHg at a heart rate of  bpm. There is moderate regurgitation with an eccentric jet.    Tricuspid Valve: There is mild regurgitation with an eccentrically directed jet.    Pulmonary Artery: The estimated pulmonary artery systolic pressure is 58 mmHg.    IVC/SVC: Elevated venous pressure at 15 mmHg.    Pericardium: There is a trivial effusion. No indication of cardiac tamponade.    Required dobutamine initially, now off- resume home midodrine.   Continue Furosemide, Toprol and monitor clinical status closely. Monitor on telemetry. Patient is off CHF pathway.  Monitor strict Is&Os and daily weights.  Place on fluid restriction.   Cardiology has been consulted.   Continue to stress to patient importance of self efficacy and  on diet for CHF.    Please advise

## 2024-03-22 NOTE — ASSESSMENT & PLAN NOTE
This patient does have evidence of infective focus  My overall impression is sepsis.  Source: Skin and Soft Tissue (location lower extremities with wounds)  Antibiotics given-   Antibiotics (72h ago, onward)    Start     Stop Route Frequency Ordered    03/20/24 1015  ciprofloxacin HCl tablet 500 mg         03/27/24 0859 Oral Every 12 hours 03/20/24 0902    03/20/24 1015  linezolid tablet 600 mg         03/27/24 0859 Oral Every 12 hours 03/20/24 0902    03/20/24 0900  silver sulfADIAZINE 1% cream         -- Top Daily 03/19/24 1224        Source control achieved by: antibiotics, local wound care   In the past, wound grew pseudomonas and acinetobacter.  Current wound cultures with pseudomonas (sensitive to Zosyn) and MRSA.   Unfortunately patient is a poor candidate for non-emergent surgical debridement at this time due to heart failure and pulmonary embolism with infarct.   S/p 7 days of IV vancomycin and Zosyn, transitioned to PO ciprofloxacin and linezolid x 7 days.    Continue local wound care.   PT/OT consulted, plan for SNF placement on discharge.

## 2024-03-22 NOTE — PT/OT/SLP PROGRESS
Occupational Therapy      Patient Name:  Esthela Contreras   MRN:  [id number]    Patient not seen today secondary to  (I did some ex's earlier . I am 73 years old and my arms are sore and I am not going to do any ex's.). Will follow-up on next treatment day.    3/22/2024

## 2024-03-23 PROBLEM — I47.29 NON-SUSTAINED VENTRICULAR TACHYCARDIA: Status: ACTIVE | Noted: 2024-03-23

## 2024-03-23 LAB
GLUCOSE SERPL-MCNC: 115 MG/DL (ref 70–105)
GLUCOSE SERPL-MCNC: 154 MG/DL (ref 70–105)
GLUCOSE SERPL-MCNC: 83 MG/DL (ref 70–105)
GLUCOSE SERPL-MCNC: 99 MG/DL (ref 70–105)
MAGNESIUM SERPL-MCNC: 2.3 MG/DL (ref 1.7–2.3)

## 2024-03-23 PROCEDURE — 25000003 PHARM REV CODE 250: Performed by: NURSE PRACTITIONER

## 2024-03-23 PROCEDURE — 11000001 HC ACUTE MED/SURG PRIVATE ROOM

## 2024-03-23 PROCEDURE — 25000003 PHARM REV CODE 250: Performed by: FAMILY MEDICINE

## 2024-03-23 PROCEDURE — 99900035 HC TECH TIME PER 15 MIN (STAT)

## 2024-03-23 PROCEDURE — 25000003 PHARM REV CODE 250: Performed by: INTERNAL MEDICINE

## 2024-03-23 PROCEDURE — 99232 SBSQ HOSP IP/OBS MODERATE 35: CPT | Mod: ,,, | Performed by: INTERNAL MEDICINE

## 2024-03-23 PROCEDURE — 94761 N-INVAS EAR/PLS OXIMETRY MLT: CPT

## 2024-03-23 PROCEDURE — 82962 GLUCOSE BLOOD TEST: CPT

## 2024-03-23 PROCEDURE — 83735 ASSAY OF MAGNESIUM: CPT | Performed by: HOSPITALIST

## 2024-03-23 RX ADMIN — HYDRALAZINE HYDROCHLORIDE 10 MG: 10 TABLET, FILM COATED ORAL at 09:03

## 2024-03-23 RX ADMIN — POLYETHYLENE GLYCOL 3350 17 G: 17 POWDER, FOR SOLUTION ORAL at 08:03

## 2024-03-23 RX ADMIN — HYDRALAZINE HYDROCHLORIDE 10 MG: 10 TABLET, FILM COATED ORAL at 08:03

## 2024-03-23 RX ADMIN — CIPROFLOXACIN 500 MG: 500 TABLET ORAL at 08:03

## 2024-03-23 RX ADMIN — FUROSEMIDE 40 MG: 40 TABLET ORAL at 08:03

## 2024-03-23 RX ADMIN — GABAPENTIN 100 MG: 100 CAPSULE ORAL at 09:03

## 2024-03-23 RX ADMIN — HYDROCODONE BITARTRATE AND ACETAMINOPHEN 1 TABLET: 5; 325 TABLET ORAL at 04:03

## 2024-03-23 RX ADMIN — APIXABAN 10 MG: 5 TABLET, FILM COATED ORAL at 08:03

## 2024-03-23 RX ADMIN — MIDODRINE HYDROCHLORIDE 5 MG: 5 TABLET ORAL at 08:03

## 2024-03-23 RX ADMIN — ASPIRIN 81 MG: 81 TABLET, COATED ORAL at 08:03

## 2024-03-23 RX ADMIN — ATORVASTATIN CALCIUM 40 MG: 40 TABLET, FILM COATED ORAL at 08:03

## 2024-03-23 RX ADMIN — GABAPENTIN 100 MG: 100 CAPSULE ORAL at 03:03

## 2024-03-23 RX ADMIN — SILVER SULFADIAZINE: 10 CREAM TOPICAL at 01:03

## 2024-03-23 RX ADMIN — LINEZOLID 600 MG: 600 TABLET, FILM COATED ORAL at 08:03

## 2024-03-23 RX ADMIN — MIDODRINE HYDROCHLORIDE 5 MG: 5 TABLET ORAL at 06:03

## 2024-03-23 RX ADMIN — GABAPENTIN 100 MG: 100 CAPSULE ORAL at 08:03

## 2024-03-23 RX ADMIN — APIXABAN 10 MG: 5 TABLET, FILM COATED ORAL at 09:03

## 2024-03-23 RX ADMIN — LINEZOLID 600 MG: 600 TABLET, FILM COATED ORAL at 09:03

## 2024-03-23 RX ADMIN — METOPROLOL SUCCINATE 12.5 MG: 25 TABLET, EXTENDED RELEASE ORAL at 08:03

## 2024-03-23 RX ADMIN — CIPROFLOXACIN 500 MG: 500 TABLET ORAL at 09:03

## 2024-03-23 RX ADMIN — MIDODRINE HYDROCHLORIDE 5 MG: 5 TABLET ORAL at 01:03

## 2024-03-23 NOTE — ASSESSMENT & PLAN NOTE
PVC's and episodes of NSVT on 3/22 and 3/23.  Keep electrolytes in check.   Low dose beta blocker added.

## 2024-03-23 NOTE — PROGRESS NOTES
[facility] Hospital Medicine  Progress Note    Patient Name: Esthela Contreras  MRN: [id number]  Patient Class: IP- Inpatient   Admission Date: 3/13/2024  Length of Stay: 9 days  Attending Physician: Cortez Ford MD  Primary Care Provider: Yolande Spring FNP        Subjective:     Principal Problem:Sepsis due to cellulitis        HPI:  Patient is a 73-year-old male with a history of combined CHF with last known EF of 35% with chronically elevated proBNP ranging from 4 to 20,000, COPD, type 2 diabetes, and chronic bilateral lower extremity edema coupled with venous stasis dermatitis which has been frequently complicated by cellulitis requiring hospitalization who presented to emergency room as instructed by his home health nurse for evaluation of bilateral lower extremity wound which appeared to have been infected again.  Patient complained of burning type of pain +7/10 involving bilateral lower extremities, but otherwise denied any fever chills cough wheezing shortness of breath chest pain palpitation PND or orthopnea in association.    On initial presentation, patient was tachycardic with a heart rate in the 120s but vital signs were otherwise stable and patient was afebrile.  Workup was notable for chest x-ray demonstrating right-sided pleural effusion with right basilar density representing either atelectasis versus developing infiltrate, chronically elevated troponin in the 100s without any ischemic abnormalities seen on EKG, chronically elevated proBNP along with leukocytosis with left shift.     Patient will be admitted with a working diagnosis of sepsis secondary to bilateral lower extremity cellulitis in the setting of chronic venous stasis dermatitis and type 2 MI associated with acute decompensation of chronic combined heart failure.        Overview/Hospital Course:  No notes on file    Interval History: Patient seen and examined at the bedside, reports doing Ok, denies any new  complaints.     Review of Systems   Skin:  Positive for wound.   All other systems reviewed and are negative.    Objective:     Vital Signs (Most Recent):  Temp: 98.5 °F (36.9 °C) (03/23/24 0649)  Pulse: 90 (03/23/24 0649)  Resp: 18 (03/23/24 0649)  BP: 110/62 (03/23/24 0649)  SpO2: 100 % (03/23/24 0649) Vital Signs (24h Range):  Temp:  [97.8 °F (36.6 °C)-99.4 °F (37.4 °C)] 98.5 °F (36.9 °C)  Pulse:  [90-97] 90  Resp:  [18-20] 18  SpO2:  [93 %-100 %] 100 %  BP: (100-121)/(62-79) 110/62     Weight: 77.7 kg (171 lb 4.8 oz)  Body mass index is 24.58 kg/m².    Intake/Output Summary (Last 24 hours) at 3/23/2024 1013  Last data filed at 3/23/2024 0745  Gross per 24 hour   Intake 711 ml   Output 500 ml   Net 211 ml           Physical Exam  Constitutional:       Appearance: He is ill-appearing.   HENT:      Head: Normocephalic and atraumatic.      Mouth/Throat:      Mouth: Mucous membranes are moist.   Eyes:      Extraocular Movements: Extraocular movements intact.   Cardiovascular:      Rate and Rhythm: Normal rate. Rhythm irregular.   Pulmonary:      Effort: Pulmonary effort is normal. No respiratory distress.      Breath sounds: Rales present.   Abdominal:      General: Bowel sounds are normal.      Palpations: Abdomen is soft.   Musculoskeletal:      Right lower leg: Edema present.      Left lower leg: Edema present.      Comments: BLE dressing in place.    Skin:     Findings: Lesion present.   Neurological:      General: No focal deficit present.      Mental Status: He is alert and oriented to person, place, and time.             Significant Labs: All pertinent labs within the past 24 hours have been reviewed.    Significant Imaging: I have reviewed all pertinent imaging results/findings within the past 24 hours.    Assessment/Plan:      * Sepsis due to cellulitis  This patient does have evidence of infective focus  My overall impression is sepsis.  Source: Skin and Soft Tissue (location lower extremities with  wounds)  Antibiotics given-   Antibiotics (72h ago, onward)      Start     Stop Route Frequency Ordered    03/20/24 1015  ciprofloxacin HCl tablet 500 mg         03/27/24 0859 Oral Every 12 hours 03/20/24 0902    03/20/24 1015  linezolid tablet 600 mg         03/27/24 0859 Oral Every 12 hours 03/20/24 0902    03/20/24 0900  silver sulfADIAZINE 1% cream         -- Top Daily 03/19/24 1224          Source control achieved by: antibiotics, local wound care   In the past, wound grew pseudomonas and acinetobacter.  Current wound cultures with pseudomonas (sensitive to Zosyn) and MRSA.   Unfortunately patient is a poor candidate for non-emergent surgical debridement at this time due to heart failure and pulmonary embolism with infarct.   S/p 7 days of IV vancomycin and Zosyn, transitioned to PO ciprofloxacin and linezolid x 7 days.    Continue local wound care.   PT/OT consulted, plan for SNF placement on discharge.       Pulmonary embolus with infarction  CT PE: Occlusive pulmonary embolus within the branch of the right pulmonary artery extending into the right lower lobe.  Infarct located within the right lower lobe.  S/p WB Lovenox, now transitioned to Eliquis (loading plus maintenance dose).            Chronic HFrEF (heart failure with reduced ejection fraction)  Patient is identified as having Systolic (HFrEF) heart failure that is Acute on chronic. CHF is currently controlled. Latest ECHO performed and demonstrates- Results for orders placed during the hospital encounter of 03/13/24    Echo    Interpretation Summary    Left Ventricle: The left ventricle is mildly dilated. Mildly increased wall thickness. There is concentric hypertrophy. Severe global hypokinesis present. There is reduced systolic function. Ejection fraction by visual approximation is 20%.    Right Ventricle: Moderate right ventricular enlargement. Systolic function is hyperdynamic.    Left Atrium: Left atrium is mildly dilated.    Right Atrium: Right  atrium is severely dilated.    Aortic Valve: The aortic valve is a trileaflet valve. Moderately calcified cusps.    Mitral Valve: There is mild bileaflet sclerosis. Mildly thickened leaflets. There is no stenosis. The mean pressure gradient across the mitral valve is 4 mmHg at a heart rate of  bpm. There is moderate regurgitation with an eccentric jet.    Tricuspid Valve: There is mild regurgitation with an eccentrically directed jet.    Pulmonary Artery: The estimated pulmonary artery systolic pressure is 58 mmHg.    IVC/SVC: Elevated venous pressure at 15 mmHg.    Pericardium: There is a trivial effusion. No indication of cardiac tamponade.    Required dobutamine initially, now off- resume home midodrine.   Continue Furosemide, Toprol and monitor clinical status closely. Monitor on telemetry. Patient is off CHF pathway.  Monitor strict Is&Os and daily weights.  Place on fluid restriction.   Cardiology has been consulted.   Continue to stress to patient importance of self efficacy and  on diet for CHF.     Loculated pleural effusion  Patient found to have moderate pleural effusion on imaging.   I have personally reviewed and interpreted the following imaging: CT. A thoracentesis was deferred to Pulmonology.   Most likely etiology includes  cause not entirely clear but could be from pulmonary infarction/infection .   Management to include  Pulmonology consultation to consider thoracentesis/sampling.   S/p thoracentesis (3/18), transudate fluid likely related to CHF, cultures NGTD.         CHANDNI (acute kidney injury)  Patient with acute kidney injury/acute renal failure likely due to pre-renal azotemia due to IVVD CHANDNI is currently stable.   Baseline creatinine  - Labs reviewed- Renal function/electrolytes with Estimated Creatinine Clearance: 37.7 mL/min (A) (based on SCr of 1.75 mg/dL (H)). according to latest data.   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   Stable Cr.   Monitor urine output and serial BMP and adjust therapy as needed.   Avoid nephrotoxins and renally dose meds for GFR listed above.    Pulmonary nodules/lesions, multiple  CTA chest showed interval development of a 1.3 cm solid pulmonary nodule located adjacent to the right major fissure. Additional new 1 cm ill-defined and solid pulmonary nodule located near the lingula.   Pulmonology consulted and recommend outpatient follow up.       Non-sustained ventricular tachycardia  PVC's and episodes of NSVT on 3/22 and 3/23.  Keep electrolytes in check.   Low dose beta blocker added.     Chronic anemia  Patient's anemia is currently controlled. Has not received any PRBCs to date. Etiology likely d/t chronic disease due to Chronic Kidney Disease  Current CBC reviewed-   Lab Results   Component Value Date    HGB 7.8 (L) 03/22/2024    HCT 28.0 (L) 03/22/2024     Monitor serial CBC and transfuse if patient becomes hemodynamically unstable, symptomatic or H/H drops below 7/21.    Cellulitis of lower extremity  Plan as outlined above.       Positive blood culture  Blood culture #1 of 2 positive - Verigene MRSE and Strep species.   Possible contaminant as only one sample with growth to date.   Continue antibiotics as above.       A-fib  Patient with Paroxysmal (<7 days) atrial fibrillation which is controlled currently with beta blocker. Patient is currently in sinus rhythm.[id number] Score: 4. HASBLED Score: 2. Anticoagulation indicated. Anticoagulation with Eliquis.    Type 2 diabetes mellitus  Patient's FSGs are controlled on current medication regimen.  Last A1c reviewed-   Lab Results   Component Value Date    HGBA1C 4.8 07/28/2023     Most recent fingerstick glucose reviewed- No results for input(s): "POCTGLUCOSE" in the last 24 hours.  Current correctional scale  Low  Maintain anti-hyperglycemic dose as follows-   Antihyperglycemics (From admission, onward)      Start     Stop " Route Frequency Ordered    03/14/24 0520  insulin aspart U-100 injection 0-5 Units         -- SubQ Before meals & nightly PRN 03/14/24 0424          Hold Oral hypoglycemics while patient is in the hospital.    COPD (chronic obstructive pulmonary disease)  Patient's COPD is controlled currently.    Patient is currently off COPD Pathway.   Continue PRN nebs, Supplemental oxygen and monitor respiratory status closely.     Coronary artery disease involving native coronary artery of native heart without angina pectoris  Patient with known CAD s/p  unknown , which is controlled  Will continue ASA and Statin and monitor for S/Sx of angina/ACS.   Continue to monitor on telemetry.       VTE Risk Mitigation (From admission, onward)           Ordered     apixaban tablet 5 mg  2 times daily         03/20/24 1308     apixaban tablet 10 mg  2 times daily         03/20/24 0917     Reason for no Mechanical VTE Prophylaxis  Once        Comments: BLE wound and cellulitis   Question:  Reasons:  Answer:  Physician Provided (leave comment)    03/14/24 0424     IP VTE HIGH RISK PATIENT  Once         03/14/24 0424                    Discharge Planning   MAKENNA: 3/25/2024     Code Status: Full Code   Is the patient medically ready for discharge?:     Reason for patient still in hospital (select all that apply): Patient trending condition and Pending disposition  Discharge Plan A: Home              TIFFANIE VENTURA MD  Department of Hospital Medicine   [facility]

## 2024-03-23 NOTE — NURSING
"0307 Monitor showing pt having a run of vtach. Upon assessing patient, pt states, "I felt like my heart was beating out of my chest for a few seconds." Dr. Canales notified of symptomatic episode.   0315 Pt now showing normal sinus on monitor. Pt states, "My heart beat feels normal now." NADN at this time. Care ongoing.   "

## 2024-03-24 LAB
GLUCOSE SERPL-MCNC: 107 MG/DL (ref 70–105)
GLUCOSE SERPL-MCNC: 112 MG/DL (ref 70–105)
GLUCOSE SERPL-MCNC: 146 MG/DL (ref 70–105)
GLUCOSE SERPL-MCNC: 92 MG/DL (ref 70–105)

## 2024-03-24 PROCEDURE — 25000003 PHARM REV CODE 250: Performed by: INTERNAL MEDICINE

## 2024-03-24 PROCEDURE — 25000003 PHARM REV CODE 250: Performed by: NURSE PRACTITIONER

## 2024-03-24 PROCEDURE — 11000001 HC ACUTE MED/SURG PRIVATE ROOM

## 2024-03-24 PROCEDURE — 99900035 HC TECH TIME PER 15 MIN (STAT)

## 2024-03-24 PROCEDURE — 94761 N-INVAS EAR/PLS OXIMETRY MLT: CPT

## 2024-03-24 PROCEDURE — 99232 SBSQ HOSP IP/OBS MODERATE 35: CPT | Mod: ,,, | Performed by: INTERNAL MEDICINE

## 2024-03-24 PROCEDURE — 82962 GLUCOSE BLOOD TEST: CPT

## 2024-03-24 PROCEDURE — 25000003 PHARM REV CODE 250: Performed by: FAMILY MEDICINE

## 2024-03-24 RX ADMIN — LINEZOLID 600 MG: 600 TABLET, FILM COATED ORAL at 08:03

## 2024-03-24 RX ADMIN — LINEZOLID 600 MG: 600 TABLET, FILM COATED ORAL at 10:03

## 2024-03-24 RX ADMIN — GABAPENTIN 100 MG: 100 CAPSULE ORAL at 10:03

## 2024-03-24 RX ADMIN — APIXABAN 10 MG: 5 TABLET, FILM COATED ORAL at 10:03

## 2024-03-24 RX ADMIN — POLYETHYLENE GLYCOL 3350 17 G: 17 POWDER, FOR SOLUTION ORAL at 09:03

## 2024-03-24 RX ADMIN — ASPIRIN 81 MG: 81 TABLET, COATED ORAL at 10:03

## 2024-03-24 RX ADMIN — SILVER SULFADIAZINE: 10 CREAM TOPICAL at 03:03

## 2024-03-24 RX ADMIN — CIPROFLOXACIN 500 MG: 500 TABLET ORAL at 10:03

## 2024-03-24 RX ADMIN — METOPROLOL SUCCINATE 12.5 MG: 25 TABLET, EXTENDED RELEASE ORAL at 10:03

## 2024-03-24 RX ADMIN — GABAPENTIN 100 MG: 100 CAPSULE ORAL at 03:03

## 2024-03-24 RX ADMIN — HYDRALAZINE HYDROCHLORIDE 10 MG: 10 TABLET, FILM COATED ORAL at 08:03

## 2024-03-24 RX ADMIN — HYDROCODONE BITARTRATE AND ACETAMINOPHEN 1 TABLET: 5; 325 TABLET ORAL at 03:03

## 2024-03-24 RX ADMIN — MIDODRINE HYDROCHLORIDE 5 MG: 5 TABLET ORAL at 06:03

## 2024-03-24 RX ADMIN — ATORVASTATIN CALCIUM 40 MG: 40 TABLET, FILM COATED ORAL at 10:03

## 2024-03-24 RX ADMIN — CIPROFLOXACIN 500 MG: 500 TABLET ORAL at 08:03

## 2024-03-24 RX ADMIN — MIDODRINE HYDROCHLORIDE 5 MG: 5 TABLET ORAL at 10:03

## 2024-03-24 RX ADMIN — APIXABAN 10 MG: 5 TABLET, FILM COATED ORAL at 08:03

## 2024-03-24 RX ADMIN — HYDROCODONE BITARTRATE AND ACETAMINOPHEN 1 TABLET: 5; 325 TABLET ORAL at 05:03

## 2024-03-24 RX ADMIN — GABAPENTIN 100 MG: 100 CAPSULE ORAL at 08:03

## 2024-03-24 NOTE — PLAN OF CARE
Problem: Adult Inpatient Plan of Care  Goal: Absence of Hospital-Acquired Illness or Injury  Outcome: Ongoing, Progressing     Problem: Adult Inpatient Plan of Care  Goal: Optimal Comfort and Wellbeing  Outcome: Ongoing, Progressing     Problem: Diabetes Comorbidity  Goal: Blood Glucose Level Within Targeted Range  Outcome: Ongoing, Progressing     Problem: Adjustment to Illness (Sepsis/Septic Shock)  Goal: Optimal Coping  Outcome: Ongoing, Progressing     Problem: Glycemic Control Impaired (Sepsis/Septic Shock)  Goal: Blood Glucose Level Within Desired Range  Outcome: Ongoing, Progressing     Problem: Infection  Goal: Absence of Infection Signs and Symptoms  Outcome: Ongoing, Progressing     Problem: Impaired Wound Healing  Goal: Optimal Wound Healing  Outcome: Ongoing, Progressing     Problem: Skin Injury Risk Increased  Goal: Skin Health and Integrity  Outcome: Ongoing, Progressing

## 2024-03-24 NOTE — NURSING
1048- Dr. Ford at bedside and explained meds to pt and why he is taking them.  Pt agreed to take. Meds given at this time.    Pt very agitated upon med pass.  Stated that he is tired of taking all of these meds.  Believes that none of his meds are helping anyways.  He stated that the Dr talked to him about NH placement but he does not want to go.  He wants to go home. Refused all meds this morning.  Attempted teaching on med adherence and the need to comply with the meds that are ordered, especially antibiotics to help clear infection but reluctant to take.

## 2024-03-24 NOTE — ASSESSMENT & PLAN NOTE
This patient does have evidence of infective focus  My overall impression is sepsis.  Source: Skin and Soft Tissue (location lower extremities with wounds)  Antibiotics given-   Antibiotics (72h ago, onward)      Start     Stop Route Frequency Ordered    03/20/24 1015  ciprofloxacin HCl tablet 500 mg         03/27/24 0859 Oral Every 12 hours 03/20/24 0902    03/20/24 1015  linezolid tablet 600 mg         03/27/24 0859 Oral Every 12 hours 03/20/24 0902    03/20/24 0900  silver sulfADIAZINE 1% cream         -- Top Daily 03/19/24 1224          Source control achieved by: antibiotics, local wound care   In the past, wound grew pseudomonas and acinetobacter.  Current wound cultures with pseudomonas and MRSA.   Unfortunately patient is a poor candidate for non-emergent surgical debridement at this time due to heart failure and pulmonary embolism with infarct.   S/p 7 days of IV vancomycin and Zosyn, transitioned to PO ciprofloxacin and linezolid x 7 days.    Continue local wound care.   PT/OT consulted, plan for SNF placement on discharge.

## 2024-03-24 NOTE — PLAN OF CARE
Problem: Adult Inpatient Plan of Care  Goal: Absence of Hospital-Acquired Illness or Injury  Outcome: Ongoing, Progressing     Problem: Adult Inpatient Plan of Care  Goal: Optimal Comfort and Wellbeing  Outcome: Ongoing, Progressing     Problem: Diabetes Comorbidity  Goal: Blood Glucose Level Within Targeted Range  Outcome: Ongoing, Progressing     Problem: Adjustment to Illness (Sepsis/Septic Shock)  Goal: Optimal Coping  Outcome: Ongoing, Progressing     Problem: Glycemic Control Impaired (Sepsis/Septic Shock)  Goal: Blood Glucose Level Within Desired Range  Outcome: Ongoing, Progressing     Problem: Infection Progression (Sepsis/Septic Shock)  Goal: Absence of Infection Signs and Symptoms  Outcome: Ongoing, Progressing     Problem: Impaired Wound Healing  Goal: Optimal Wound Healing  Outcome: Ongoing, Progressing     Problem: Skin Injury Risk Increased  Goal: Skin Health and Integrity  Outcome: Ongoing, Progressing     Problem: Fall Injury Risk  Goal: Absence of Fall and Fall-Related Injury  Outcome: Ongoing, Progressing

## 2024-03-24 NOTE — ASSESSMENT & PLAN NOTE
Patient is identified as having Systolic (HFrEF) heart failure that is Acute on chronic. CHF is currently controlled. Latest ECHO performed and demonstrates- Results for orders placed during the hospital encounter of 03/13/24    Echo    Interpretation Summary    Left Ventricle: The left ventricle is mildly dilated. Mildly increased wall thickness. There is concentric hypertrophy. Severe global hypokinesis present. There is reduced systolic function. Ejection fraction by visual approximation is 20%.    Right Ventricle: Moderate right ventricular enlargement. Systolic function is hyperdynamic.    Left Atrium: Left atrium is mildly dilated.    Right Atrium: Right atrium is severely dilated.    Aortic Valve: The aortic valve is a trileaflet valve. Moderately calcified cusps.    Mitral Valve: There is mild bileaflet sclerosis. Mildly thickened leaflets. There is no stenosis. The mean pressure gradient across the mitral valve is 4 mmHg at a heart rate of  bpm. There is moderate regurgitation with an eccentric jet.    Tricuspid Valve: There is mild regurgitation with an eccentrically directed jet.    Pulmonary Artery: The estimated pulmonary artery systolic pressure is 58 mmHg.    IVC/SVC: Elevated venous pressure at 15 mmHg.    Pericardium: There is a trivial effusion. No indication of cardiac tamponade.    Required dobutamine initially, now off- resume home midodrine.   Continue Furosemide, Toprol and monitor clinical status closely. Monitor on telemetry. Patient is off CHF pathway.  Monitor strict Is&Os and daily weights.  Place on fluid restriction.   Cardiology has been consulted.   Continue to stress to patient importance of self efficacy and  on diet for CHF.

## 2024-03-24 NOTE — SUBJECTIVE & OBJECTIVE
Interval History: Patient seen and examined at the bedside, reports doing Ok, denies any new complaints. Encouraged to take his meds and explained the reasoning behind it.     Review of Systems   Skin:  Positive for wound.   All other systems reviewed and are negative.    Objective:     Vital Signs (Most Recent):  Temp: 97.7 °F (36.5 °C) (03/24/24 1007)  Pulse: 88 (03/24/24 1007)  Resp: 18 (03/24/24 1007)  BP: 95/62 (03/24/24 1007)  SpO2: 100 % (03/24/24 1007) Vital Signs (24h Range):  Temp:  [97.4 °F (36.3 °C)-98.1 °F (36.7 °C)] 97.7 °F (36.5 °C)  Pulse:  [86-94] 88  Resp:  [18] 18  SpO2:  [99 %-100 %] 100 %  BP: ()/(55-69) 95/62     Weight: 77.7 kg (171 lb 4.8 oz)  Body mass index is 24.58 kg/m².  No intake or output data in the 24 hours ending 03/24/24 1058        Physical Exam  Constitutional:       Appearance: He is ill-appearing.   HENT:      Head: Normocephalic and atraumatic.      Mouth/Throat:      Mouth: Mucous membranes are moist.   Eyes:      Extraocular Movements: Extraocular movements intact.   Cardiovascular:      Rate and Rhythm: Normal rate. Rhythm irregular.   Pulmonary:      Effort: Pulmonary effort is normal. No respiratory distress.      Breath sounds: Rales present.   Abdominal:      General: Bowel sounds are normal.      Palpations: Abdomen is soft.   Musculoskeletal:      Right lower leg: Edema present.      Left lower leg: Edema present.      Comments: BLE dressing in place.    Skin:     Findings: Lesion present.   Neurological:      General: No focal deficit present.      Mental Status: He is alert and oriented to person, place, and time.             Significant Labs: All pertinent labs within the past 24 hours have been reviewed.    Significant Imaging: I have reviewed all pertinent imaging results/findings within the past 24 hours.

## 2024-03-24 NOTE — ASSESSMENT & PLAN NOTE
Patient found to have moderate pleural effusion on imaging.   I have personally reviewed and interpreted the following imaging: CT. A thoracentesis was deferred to Pulmonology.   Most likely etiology includes  heart failure .   Management to include  Pulmonology consultation to consider thoracentesis/sampling.   S/p thoracentesis (3/18), transudate fluid likely related to CHF, cultures NGTD.

## 2024-03-24 NOTE — PROGRESS NOTES
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  Patient complained of burning type of pain +7/10 involving bilateral lower extremities, but otherwise denied any fever chills cough wheezing shortness of breath chest pain palpitation PND or orthopnea in association.    On initial presentation, patient was tachycardic with a heart rate in the 120s but vital signs were otherwise stable and patient was afebrile.  Workup was notable for chest x-ray demonstrating right-sided pleural effusion with right basilar density representing either atelectasis versus developing infiltrate, chronically elevated troponin in the 100s without any ischemic abnormalities seen on EKG, chronically elevated proBNP along with leukocytosis with left shift.     Patient will be admitted with a working diagnosis of sepsis secondary to bilateral lower extremity cellulitis in the setting of chronic venous stasis dermatitis and type 2 MI associated with acute decompensation of chronic combined heart failure.        Overview/Hospital Course:  No notes on file    Interval History: Patient seen and examined at the bedside, reports doing Ok, denies any new  complaints. Encouraged to take his meds and explained the reasoning behind it.     Review of Systems   Skin:  Positive for wound.   All other systems reviewed and are negative.    Objective:     Vital Signs (Most Recent):  Temp: 97.7 °F (36.5 °C) (03/24/24 1007)  Pulse: 88 (03/24/24 1007)  Resp: 18 (03/24/24 1007)  BP: 95/62 (03/24/24 1007)  SpO2: 100 % (03/24/24 1007) Vital Signs (24h Range):  Temp:  [97.4 °F (36.3 °C)-98.1 °F (36.7 °C)] 97.7 °F (36.5 °C)  Pulse:  [86-94] 88  Resp:  [18] 18  SpO2:  [99 %-100 %] 100 %  BP: ()/(55-69) 95/62     Weight: 77.7 kg (171 lb 4.8 oz)  Body mass index is 24.58 kg/m².  No intake or output data in the 24 hours ending 03/24/24 1058        Physical Exam  Constitutional:       Appearance: He is ill-appearing.   HENT:      Head: Normocephalic and atraumatic.      Mouth/Throat:      Mouth: Mucous membranes are moist.   Eyes:      Extraocular Movements: Extraocular movements intact.   Cardiovascular:      Rate and Rhythm: Normal rate. Rhythm irregular.   Pulmonary:      Effort: Pulmonary effort is normal. No respiratory distress.      Breath sounds: Rales present.   Abdominal:      General: Bowel sounds are normal.      Palpations: Abdomen is soft.   Musculoskeletal:      Right lower leg: Edema present.      Left lower leg: Edema present.      Comments: BLE dressing in place.    Skin:     Findings: Lesion present.   Neurological:      General: No focal deficit present.      Mental Status: He is alert and oriented to person, place, and time.             Significant Labs: All pertinent labs within the past 24 hours have been reviewed.    Significant Imaging: I have reviewed all pertinent imaging results/findings within the past 24 hours.    Assessment/Plan:      * Sepsis due to cellulitis  This patient does have evidence of infective focus  My overall impression is sepsis.  Source: Skin and Soft Tissue (location lower extremities with wounds)  Antibiotics given-   Antibiotics  (72h ago, onward)      Start     Stop Route Frequency Ordered    03/20/24 1015  ciprofloxacin HCl tablet 500 mg         03/27/24 0859 Oral Every 12 hours 03/20/24 0902    03/20/24 1015  linezolid tablet 600 mg         03/27/24 0859 Oral Every 12 hours 03/20/24 0902    03/20/24 0900  silver sulfADIAZINE 1% cream         -- Top Daily 03/19/24 1224          Source control achieved by: antibiotics, local wound care   In the past, wound grew pseudomonas and acinetobacter.  Current wound cultures with pseudomonas and MRSA.   Unfortunately patient is a poor candidate for non-emergent surgical debridement at this time due to heart failure and pulmonary embolism with infarct.   S/p 7 days of IV vancomycin and Zosyn, transitioned to PO ciprofloxacin and linezolid x 7 days.    Continue local wound care.   PT/OT consulted, plan for SNF placement on discharge.       Pulmonary embolus with infarction  CT PE: Occlusive pulmonary embolus within the branch of the right pulmonary artery extending into the right lower lobe.  Infarct located within the right lower lobe.  S/p WB Lovenox, now transitioned to Eliquis (loading plus maintenance dose).            Chronic HFrEF (heart failure with reduced ejection fraction)  Patient is identified as having Systolic (HFrEF) heart failure that is Acute on chronic. CHF is currently controlled. Latest ECHO performed and demonstrates- Results for orders placed during the hospital encounter of 03/13/24    Echo    Interpretation Summary    Left Ventricle: The left ventricle is mildly dilated. Mildly increased wall thickness. There is concentric hypertrophy. Severe global hypokinesis present. There is reduced systolic function. Ejection fraction by visual approximation is 20%.    Right Ventricle: Moderate right ventricular enlargement. Systolic function is hyperdynamic.    Left Atrium: Left atrium is mildly dilated.    Right Atrium: Right atrium is severely dilated.    Aortic Valve: The aortic valve  is a trileaflet valve. Moderately calcified cusps.    Mitral Valve: There is mild bileaflet sclerosis. Mildly thickened leaflets. There is no stenosis. The mean pressure gradient across the mitral valve is 4 mmHg at a heart rate of  bpm. There is moderate regurgitation with an eccentric jet.    Tricuspid Valve: There is mild regurgitation with an eccentrically directed jet.    Pulmonary Artery: The estimated pulmonary artery systolic pressure is 58 mmHg.    IVC/SVC: Elevated venous pressure at 15 mmHg.    Pericardium: There is a trivial effusion. No indication of cardiac tamponade.    Required dobutamine initially, now off- resume home midodrine.   Continue Furosemide, Toprol and monitor clinical status closely. Monitor on telemetry. Patient is off CHF pathway.  Monitor strict Is&Os and daily weights.  Place on fluid restriction.   Cardiology has been consulted.   Continue to stress to patient importance of self efficacy and  on diet for CHF.     Loculated pleural effusion  Patient found to have moderate pleural effusion on imaging.   I have personally reviewed and interpreted the following imaging: CT. A thoracentesis was deferred to Pulmonology.   Most likely etiology includes  heart failure .   Management to include  Pulmonology consultation to consider thoracentesis/sampling.   S/p thoracentesis (3/18), transudate fluid likely related to CHF, cultures NGTD.         CHANDNI (acute kidney injury)  Patient with acute kidney injury/acute renal failure likely due to pre-renal azotemia due to IVVD CHANDNI is currently stable.   Baseline creatinine  - Labs reviewed- Renal function/electrolytes with Estimated Creatinine Clearance: 37.7 mL/min (A) (based on SCr of 1.75 mg/dL (H)). according to latest data.   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   Stable Cr.   Monitor urine output and serial BMP and adjust therapy as needed.   Avoid  "nephrotoxins and renally dose meds for GFR listed above.    Pulmonary nodules/lesions, multiple  CTA chest showed interval development of a 1.3 cm solid pulmonary nodule located adjacent to the right major fissure. Additional new 1 cm ill-defined and solid pulmonary nodule located near the lingula.   Pulmonology consulted and recommend outpatient follow up.       Non-sustained ventricular tachycardia  PVC's and episodes of NSVT on 3/22 and 3/23.  Keep electrolytes in check.   Low dose beta blocker added.     Chronic anemia  Patient's anemia is currently controlled. Has not received any PRBCs to date. Etiology likely d/t chronic disease due to Chronic Kidney Disease  Current CBC reviewed-   Lab Results   Component Value Date    HGB 7.8 (L) 03/22/2024    HCT 28.0 (L) 03/22/2024     Monitor serial CBC and transfuse if patient becomes hemodynamically unstable, symptomatic or H/H drops below 7/21.    Cellulitis of lower extremity  Plan as outlined above.       Positive blood culture  Blood culture #1 of 2 positive - Verigene MRSE and Strep species.   Possible contaminant as only one sample with growth to date.   Continue antibiotics as above.       A-fib  Patient with Paroxysmal (<7 days) atrial fibrillation which is controlled currently with beta blocker. Patient is currently in sinus rhythm.[id number] Score: 4. HASBLED Score: 2. Anticoagulation indicated. Anticoagulation with Eliquis.    Type 2 diabetes mellitus  Patient's FSGs are controlled on current medication regimen.  Last A1c reviewed-   Lab Results   Component Value Date    HGBA1C 4.8 07/28/2023     Most recent fingerstick glucose reviewed- No results for input(s): "POCTGLUCOSE" in the last 24 hours.  Current correctional scale  Low  Maintain anti-hyperglycemic dose as follows-   Antihyperglycemics (From admission, onward)      Start     Stop Route Frequency Ordered    03/14/24 0520  insulin aspart U-100 injection 0-5 Units         -- SubQ Before meals & " nightly PRN 03/14/24 0424          Hold Oral hypoglycemics while patient is in the hospital.    COPD (chronic obstructive pulmonary disease)  Patient's COPD is controlled currently.    Patient is currently off COPD Pathway.   Continue PRN nebs, Supplemental oxygen and monitor respiratory status closely.     Coronary artery disease involving native coronary artery of native heart without angina pectoris  Patient with known CAD s/p  unknown , which is controlled  Will continue ASA and Statin and monitor for S/Sx of angina/ACS.   Continue to monitor on telemetry.       VTE Risk Mitigation (From admission, onward)           Ordered     apixaban tablet 5 mg  2 times daily         03/20/24 1308     apixaban tablet 10 mg  2 times daily         03/20/24 0917     Reason for no Mechanical VTE Prophylaxis  Once        Comments: BLE wound and cellulitis   Question:  Reasons:  Answer:  Physician Provided (leave comment)    03/14/24 0424     IP VTE HIGH RISK PATIENT  Once         03/14/24 0424                    Discharge Planning   MAKENNA: 3/25/2024     Code Status: Full Code   Is the patient medically ready for discharge?:     Reason for patient still in hospital (select all that apply): Pending disposition  Discharge Plan A: Home                  TIFFANIE VENTURA MD  Department of Hospital Medicine   [facility]

## 2024-03-25 LAB
ANION GAP SERPL CALCULATED.3IONS-SCNC: 15 MMOL/L (ref 7–16)
BASOPHILS # BLD AUTO: 0.04 K/UL (ref 0–0.2)
BASOPHILS NFR BLD AUTO: 0.6 % (ref 0–1)
BUN SERPL-MCNC: 31 MG/DL (ref 7–18)
BUN/CREAT SERPL: 19 (ref 6–20)
CALCIUM SERPL-MCNC: 8.4 MG/DL (ref 8.5–10.1)
CHLORIDE SERPL-SCNC: 104 MMOL/L (ref 98–107)
CO2 SERPL-SCNC: 23 MMOL/L (ref 21–32)
CREAT SERPL-MCNC: 1.64 MG/DL (ref 0.7–1.3)
DIFFERENTIAL METHOD BLD: ABNORMAL
EGFR (NO RACE VARIABLE) (RUSH/TITUS): 43 ML/MIN/1.73M2
EOSINOPHIL # BLD AUTO: 0.05 K/UL (ref 0–0.5)
EOSINOPHIL NFR BLD AUTO: 0.8 % (ref 1–4)
ERYTHROCYTE [DISTWIDTH] IN BLOOD BY AUTOMATED COUNT: 18.7 % (ref 11.5–14.5)
GLUCOSE SERPL-MCNC: 138 MG/DL (ref 74–106)
GLUCOSE SERPL-MCNC: 146 MG/DL (ref 70–105)
GLUCOSE SERPL-MCNC: 166 MG/DL (ref 70–105)
GLUCOSE SERPL-MCNC: 166 MG/DL (ref 70–105)
GLUCOSE SERPL-MCNC: 94 MG/DL (ref 70–105)
HCT VFR BLD AUTO: 32.1 % (ref 40–54)
HGB BLD-MCNC: 8.9 G/DL (ref 13.5–18)
IMM GRANULOCYTES # BLD AUTO: 0.03 K/UL (ref 0–0.04)
IMM GRANULOCYTES NFR BLD: 0.5 % (ref 0–0.4)
LYMPHOCYTES # BLD AUTO: 1.06 K/UL (ref 1–4.8)
LYMPHOCYTES NFR BLD AUTO: 16.4 % (ref 27–41)
MCH RBC QN AUTO: 23.8 PG (ref 27–31)
MCHC RBC AUTO-ENTMCNC: 27.7 G/DL (ref 32–36)
MCV RBC AUTO: 85.8 FL (ref 80–96)
MONOCYTES # BLD AUTO: 0.48 K/UL (ref 0–0.8)
MONOCYTES NFR BLD AUTO: 7.4 % (ref 2–6)
MPC BLD CALC-MCNC: 10.2 FL (ref 9.4–12.4)
NEUTROPHILS # BLD AUTO: 4.79 K/UL (ref 1.8–7.7)
NEUTROPHILS NFR BLD AUTO: 74.3 % (ref 53–65)
NRBC # BLD AUTO: 0 X10E3/UL
NRBC, AUTO (.00): 0 %
PLATELET # BLD AUTO: 214 K/UL (ref 150–400)
POTASSIUM SERPL-SCNC: 4.2 MMOL/L (ref 3.5–5.1)
RBC # BLD AUTO: 3.74 M/UL (ref 4.6–6.2)
SODIUM SERPL-SCNC: 138 MMOL/L (ref 136–145)
WBC # BLD AUTO: 6.45 K/UL (ref 4.5–11)

## 2024-03-25 PROCEDURE — 80048 BASIC METABOLIC PNL TOTAL CA: CPT | Performed by: HOSPITALIST

## 2024-03-25 PROCEDURE — 25000003 PHARM REV CODE 250: Performed by: FAMILY MEDICINE

## 2024-03-25 PROCEDURE — 82962 GLUCOSE BLOOD TEST: CPT

## 2024-03-25 PROCEDURE — 94761 N-INVAS EAR/PLS OXIMETRY MLT: CPT

## 2024-03-25 PROCEDURE — 25000003 PHARM REV CODE 250: Performed by: NURSE PRACTITIONER

## 2024-03-25 PROCEDURE — 11000001 HC ACUTE MED/SURG PRIVATE ROOM

## 2024-03-25 PROCEDURE — 99231 SBSQ HOSP IP/OBS SF/LOW 25: CPT | Mod: ,,, | Performed by: INTERNAL MEDICINE

## 2024-03-25 PROCEDURE — 97110 THERAPEUTIC EXERCISES: CPT | Mod: CO

## 2024-03-25 PROCEDURE — 25000003 PHARM REV CODE 250: Performed by: INTERNAL MEDICINE

## 2024-03-25 PROCEDURE — 97530 THERAPEUTIC ACTIVITIES: CPT | Mod: CQ

## 2024-03-25 PROCEDURE — 85025 COMPLETE CBC W/AUTO DIFF WBC: CPT | Performed by: HOSPITALIST

## 2024-03-25 RX ADMIN — LINEZOLID 600 MG: 600 TABLET, FILM COATED ORAL at 08:03

## 2024-03-25 RX ADMIN — HYDROCODONE BITARTRATE AND ACETAMINOPHEN 1 TABLET: 5; 325 TABLET ORAL at 08:03

## 2024-03-25 RX ADMIN — SILVER SULFADIAZINE: 10 CREAM TOPICAL at 08:03

## 2024-03-25 RX ADMIN — APIXABAN 10 MG: 5 TABLET, FILM COATED ORAL at 08:03

## 2024-03-25 RX ADMIN — HYDRALAZINE HYDROCHLORIDE 10 MG: 10 TABLET, FILM COATED ORAL at 08:03

## 2024-03-25 RX ADMIN — ASPIRIN 81 MG: 81 TABLET, COATED ORAL at 08:03

## 2024-03-25 RX ADMIN — ATORVASTATIN CALCIUM 40 MG: 40 TABLET, FILM COATED ORAL at 08:03

## 2024-03-25 RX ADMIN — MIDODRINE HYDROCHLORIDE 5 MG: 5 TABLET ORAL at 08:03

## 2024-03-25 RX ADMIN — METOPROLOL SUCCINATE 12.5 MG: 25 TABLET, EXTENDED RELEASE ORAL at 08:03

## 2024-03-25 RX ADMIN — GABAPENTIN 100 MG: 100 CAPSULE ORAL at 03:03

## 2024-03-25 RX ADMIN — HYDROCODONE BITARTRATE AND ACETAMINOPHEN 1 TABLET: 5; 325 TABLET ORAL at 05:03

## 2024-03-25 RX ADMIN — GABAPENTIN 100 MG: 100 CAPSULE ORAL at 08:03

## 2024-03-25 RX ADMIN — CIPROFLOXACIN 500 MG: 500 TABLET ORAL at 08:03

## 2024-03-25 RX ADMIN — MIDODRINE HYDROCHLORIDE 5 MG: 5 TABLET ORAL at 11:03

## 2024-03-25 RX ADMIN — MIDODRINE HYDROCHLORIDE 5 MG: 5 TABLET ORAL at 05:03

## 2024-03-25 NOTE — ASSESSMENT & PLAN NOTE
Patient's anemia is currently controlled. Has not received any PRBCs to date. Etiology likely d/t chronic disease due to Chronic Kidney Disease  Current CBC reviewed-   Lab Results   Component Value Date    HGB 8.9 (L) 03/25/2024    HCT 32.1 (L) 03/25/2024     Monitor serial CBC and transfuse if patient becomes hemodynamically unstable, symptomatic or H/H drops below 7/21.

## 2024-03-25 NOTE — PLAN OF CARE
SS consulted again for discharge planning. As per previously documented still awaiting insurance approval for [facility].

## 2024-03-25 NOTE — ASSESSMENT & PLAN NOTE
Patient with Paroxysmal (<7 days) atrial fibrillation which is controlled currently with beta blocker. Patient is currently in sinus rhythm.[id number] Score: 4. HASBLED Score: 2. Anticoagulation indicated. Anticoagulation with Eliquis.

## 2024-03-25 NOTE — PROGRESS NOTES
[facility] Medicine  Progress Note    Patient Name: Esthela Contreras  MRN: [id number]  Patient Class: IP- Inpatient   Admission Date: 3/13/2024  Length of Stay: 11 days  Attending Physician: Cortez Ford MD  Primary Care Provider: Yolande Spring FNP        Subjective:     Principal Problem:Sepsis due to cellulitis        HPI:  Patient is a 73-year-old male with a history of combined CHF with last known EF of 35% with chronically elevated proBNP ranging from 4 to 20,000, COPD, type 2 diabetes, and chronic bilateral lower extremity edema coupled with venous stasis dermatitis which has been frequently complicated by cellulitis requiring hospitalization who presented to emergency room as instructed by his home health nurse for evaluation of bilateral lower extremity wound which appeared to have been infected again.  Patient complained of burning type of pain +7/10 involving bilateral lower extremities, but otherwise denied any fever chills cough wheezing shortness of breath chest pain palpitation PND or orthopnea in association.    On initial presentation, patient was tachycardic with a heart rate in the 120s but vital signs were otherwise stable and patient was afebrile.  Workup was notable for chest x-ray demonstrating right-sided pleural effusion with right basilar density representing either atelectasis versus developing infiltrate, chronically elevated troponin in the 100s without any ischemic abnormalities seen on EKG, chronically elevated proBNP along with leukocytosis with left shift.     Patient will be admitted with a working diagnosis of sepsis secondary to bilateral lower extremity cellulitis in the setting of chronic venous stasis dermatitis and type 2 MI associated with acute decompensation of chronic combined heart failure.        Overview/Hospital Course:  No notes on file    Interval History: Patient seen and examined at the bedside, reports doing Ok, denies any new  complaints. Encouraged for placement to SNF and he is agreeable.     Review of Systems   Skin:  Positive for wound.   All other systems reviewed and are negative.    Objective:     Vital Signs (Most Recent):  Temp: 97.9 °F (36.6 °C) (03/25/24 1438)  Pulse: 95 (03/25/24 1438)  Resp: 18 (03/25/24 1438)  BP: 111/70 (03/25/24 1438)  SpO2: 98 % (03/25/24 1438) Vital Signs (24h Range):  Temp:  [97.5 °F (36.4 °C)-97.9 °F (36.6 °C)] 97.9 °F (36.6 °C)  Pulse:  [86-97] 95  Resp:  [16-19] 18  SpO2:  [98 %-100 %] 98 %  BP: ()/(56-71) 111/70     Weight: 77.7 kg (171 lb 4.8 oz)  Body mass index is 24.58 kg/m².    Intake/Output Summary (Last 24 hours) at 3/25/2024 1510  Last data filed at 3/25/2024 0222  Gross per 24 hour   Intake --   Output 700 ml   Net -700 ml           Physical Exam  Constitutional:       Appearance: He is ill-appearing.   HENT:      Head: Normocephalic and atraumatic.      Mouth/Throat:      Mouth: Mucous membranes are moist.   Eyes:      Extraocular Movements: Extraocular movements intact.   Cardiovascular:      Rate and Rhythm: Normal rate. Rhythm irregular.   Pulmonary:      Effort: Pulmonary effort is normal. No respiratory distress.      Breath sounds: Rales present.   Abdominal:      General: Bowel sounds are normal.      Palpations: Abdomen is soft.   Musculoskeletal:      Right lower leg: Edema present.      Left lower leg: Edema present.      Comments: BLE dressing in place.    Skin:     Findings: Lesion present.   Neurological:      General: No focal deficit present.      Mental Status: He is alert and oriented to person, place, and time.             Significant Labs: All pertinent labs within the past 24 hours have been reviewed.    Significant Imaging: I have reviewed all pertinent imaging results/findings within the past 24 hours.    Assessment/Plan:      * Sepsis due to cellulitis  This patient does have evidence of infective focus  My overall impression is sepsis.  Source: Skin and Soft  Tissue (location lower extremities with wounds)  Antibiotics given-   Antibiotics (72h ago, onward)      Start     Stop Route Frequency Ordered    03/20/24 1015  ciprofloxacin HCl tablet 500 mg         03/27/24 0859 Oral Every 12 hours 03/20/24 0902    03/20/24 1015  linezolid tablet 600 mg         03/27/24 0859 Oral Every 12 hours 03/20/24 0902    03/20/24 0900  silver sulfADIAZINE 1% cream         -- Top Daily 03/19/24 1224          Source control achieved by: antibiotics, local wound care   In the past, wound grew pseudomonas and acinetobacter.  Current wound cultures with pseudomonas and MRSA.   Unfortunately patient is a poor candidate for non-emergent surgical debridement at this time due to heart failure and pulmonary embolism with infarct.   S/p 7 days of IV vancomycin and Zosyn, transitioned to PO ciprofloxacin and linezolid x 7 days.    Continue local wound care.   PT/OT consulted, plan for SNF placement on discharge.       Pulmonary embolus with infarction  CT PE: Occlusive pulmonary embolus within the branch of the right pulmonary artery extending into the right lower lobe.  Infarct located within the right lower lobe.  S/p WB Lovenox, now transitioned to Eliquis (loading plus maintenance dose).            Chronic HFrEF (heart failure with reduced ejection fraction)  Patient is identified as having Systolic (HFrEF) heart failure that is Acute on chronic. CHF is currently controlled. Latest ECHO performed and demonstrates- Results for orders placed during the hospital encounter of 03/13/24    Echo    Interpretation Summary    Left Ventricle: The left ventricle is mildly dilated. Mildly increased wall thickness. There is concentric hypertrophy. Severe global hypokinesis present. There is reduced systolic function. Ejection fraction by visual approximation is 20%.    Right Ventricle: Moderate right ventricular enlargement. Systolic function is hyperdynamic.    Left Atrium: Left atrium is mildly dilated.     Right Atrium: Right atrium is severely dilated.    Aortic Valve: The aortic valve is a trileaflet valve. Moderately calcified cusps.    Mitral Valve: There is mild bileaflet sclerosis. Mildly thickened leaflets. There is no stenosis. The mean pressure gradient across the mitral valve is 4 mmHg at a heart rate of  bpm. There is moderate regurgitation with an eccentric jet.    Tricuspid Valve: There is mild regurgitation with an eccentrically directed jet.    Pulmonary Artery: The estimated pulmonary artery systolic pressure is 58 mmHg.    IVC/SVC: Elevated venous pressure at 15 mmHg.    Pericardium: There is a trivial effusion. No indication of cardiac tamponade.    Required dobutamine initially, now off- resume home midodrine.   Continue Furosemide, Toprol and monitor clinical status closely. Monitor on telemetry. Patient is off CHF pathway.  Monitor strict Is&Os and daily weights.  Place on fluid restriction.   Cardiology has been consulted.   Continue to stress to patient importance of self efficacy and  on diet for CHF.     Loculated pleural effusion  Patient found to have moderate pleural effusion on imaging.   I have personally reviewed and interpreted the following imaging: CT. A thoracentesis was deferred to Pulmonology.   Most likely etiology includes  heart failure .   Management to include  Pulmonology consultation to consider thoracentesis/sampling.   S/p thoracentesis (3/18), transudate fluid likely related to CHF, cultures NGTD.         CHANDNI (acute kidney injury)  Patient with acute kidney injury/acute renal failure likely due to pre-renal azotemia due to IVVD CHANDNI is currently stable.   Baseline creatinine  - Labs reviewed- Renal function/electrolytes with Estimated Creatinine Clearance: 40.2 mL/min (A) (based on SCr of 1.64 mg/dL (H)). according to latest data.   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   Stable Cr.  "  Monitor urine output and serial BMP and adjust therapy as needed.   Avoid nephrotoxins and renally dose meds for GFR listed above.    Pulmonary nodules/lesions, multiple  CTA chest showed interval development of a 1.3 cm solid pulmonary nodule located adjacent to the right major fissure. Additional new 1 cm ill-defined and solid pulmonary nodule located near the lingula.   Pulmonology consulted and recommend outpatient follow up.       Non-sustained ventricular tachycardia  PVC's and episodes of NSVT on 3/22 and 3/23.  Keep electrolytes in check.   Low dose beta blocker added.     Chronic anemia  Patient's anemia is currently controlled. Has not received any PRBCs to date. Etiology likely d/t chronic disease due to Chronic Kidney Disease  Current CBC reviewed-   Lab Results   Component Value Date    HGB 8.9 (L) 03/25/2024    HCT 32.1 (L) 03/25/2024     Monitor serial CBC and transfuse if patient becomes hemodynamically unstable, symptomatic or H/H drops below 7/21.    Cellulitis of lower extremity  Plan as outlined above.       Positive blood culture  Blood culture #1 of 2 positive - Verigene MRSE and Strep species.   Possible contaminant as only one sample with growth to date.   Continue antibiotics as above.       A-fib  Patient with Paroxysmal (<7 days) atrial fibrillation which is controlled currently with beta blocker. Patient is currently in sinus rhythm.[id number] Score: 4. HASBLED Score: 2. Anticoagulation indicated. Anticoagulation with Eliquis.    Type 2 diabetes mellitus  Patient's FSGs are controlled on current medication regimen.  Last A1c reviewed-   Lab Results   Component Value Date    HGBA1C 4.8 07/28/2023     Most recent fingerstick glucose reviewed- No results for input(s): "POCTGLUCOSE" in the last 24 hours.  Current correctional scale  Low  Maintain anti-hyperglycemic dose as follows-   Antihyperglycemics (From admission, onward)      Start     Stop Route Frequency Ordered    03/14/24 0520  " insulin aspart U-100 injection 0-5 Units         -- SubQ Before meals & nightly PRN 03/14/24 0424          Hold Oral hypoglycemics while patient is in the hospital.    COPD (chronic obstructive pulmonary disease)  Patient's COPD is controlled currently.    Patient is currently off COPD Pathway.   Continue PRN nebs, Supplemental oxygen and monitor respiratory status closely.     Coronary artery disease involving native coronary artery of native heart without angina pectoris  Patient with known CAD s/p  unknown , which is controlled  Will continue ASA and Statin and monitor for S/Sx of angina/ACS.   Continue to monitor on telemetry.       VTE Risk Mitigation (From admission, onward)           Ordered     apixaban tablet 5 mg  2 times daily         03/20/24 1308     apixaban tablet 10 mg  2 times daily         03/20/24 0917     Reason for no Mechanical VTE Prophylaxis  Once        Comments: BLE wound and cellulitis   Question:  Reasons:  Answer:  Physician Provided (leave comment)    03/14/24 0424     IP VTE HIGH RISK PATIENT  Once         03/14/24 0424                    Discharge Planning   MAKENNA: 3/26/2024     Code Status: Full Code   Is the patient medically ready for discharge?:     Reason for patient still in hospital (select all that apply): Pending disposition  Discharge Plan A: Home                  TIFFANIE VENTURA MD  Department of Hospital Medicine   [facility]

## 2024-03-25 NOTE — SUBJECTIVE & OBJECTIVE
Interval History: Patient seen and examined at the bedside, reports doing Ok, denies any new complaints. Encouraged for placement to SNF and he is agreeable.     Review of Systems   Skin:  Positive for wound.   All other systems reviewed and are negative.    Objective:     Vital Signs (Most Recent):  Temp: 97.9 °F (36.6 °C) (03/25/24 1438)  Pulse: 95 (03/25/24 1438)  Resp: 18 (03/25/24 1438)  BP: 111/70 (03/25/24 1438)  SpO2: 98 % (03/25/24 1438) Vital Signs (24h Range):  Temp:  [97.5 °F (36.4 °C)-97.9 °F (36.6 °C)] 97.9 °F (36.6 °C)  Pulse:  [86-97] 95  Resp:  [16-19] 18  SpO2:  [98 %-100 %] 98 %  BP: ()/(56-71) 111/70     Weight: 77.7 kg (171 lb 4.8 oz)  Body mass index is 24.58 kg/m².    Intake/Output Summary (Last 24 hours) at 3/25/2024 1510  Last data filed at 3/25/2024 0222  Gross per 24 hour   Intake --   Output 700 ml   Net -700 ml           Physical Exam  Constitutional:       Appearance: He is ill-appearing.   HENT:      Head: Normocephalic and atraumatic.      Mouth/Throat:      Mouth: Mucous membranes are moist.   Eyes:      Extraocular Movements: Extraocular movements intact.   Cardiovascular:      Rate and Rhythm: Normal rate. Rhythm irregular.   Pulmonary:      Effort: Pulmonary effort is normal. No respiratory distress.      Breath sounds: Rales present.   Abdominal:      General: Bowel sounds are normal.      Palpations: Abdomen is soft.   Musculoskeletal:      Right lower leg: Edema present.      Left lower leg: Edema present.      Comments: BLE dressing in place.    Skin:     Findings: Lesion present.   Neurological:      General: No focal deficit present.      Mental Status: He is alert and oriented to person, place, and time.             Significant Labs: All pertinent labs within the past 24 hours have been reviewed.    Significant Imaging: I have reviewed all pertinent imaging results/findings within the past 24 hours.

## 2024-03-25 NOTE — PHYSICIAN QUERY
PT Name: Esthela Contreras  MR #: [id number]     DOCUMENTATION CLARIFICATION      CDS/: PEÑA AMARAL MSN RN              Contact information:[email or web address]    This form is a permanent document in the medical record.     Query Date: March 25, 2024    Dear Provider,  By submitting this query, we are merely seeking further clarification of documentation.  Please utilize your independent clinical judgment when addressing the question(s) below.     The Medical Record contains the following:    Supporting Clinical Findings Location in Medical Record   ED Course:  CBC shows elevated white count of 14.6    Because of pneumonia, elevated white count, and rising troponin, I made decision to admit patient and discussed case with internal medicine hospitalist on-call who agrees with admission.   ED PNDr. Nieto, 03/13   Chest xray  Impression:     Small right pleural effusion.  Right basilar density may reflect atelectasis or developing infiltrate.   Radiology results, 03/13   HPI:Workup was notable for chest x-ray demonstrating right-sided pleural effusion with right basilar density representing either atelectasis versus developing infiltrate, chronically elevated troponin in the 100s without any ischemic abnormalities seen on EKG, chronically elevated proBNP along with leukocytosis with left shift.    H&P, Dr. Lee, 03/14   Pulmonary embolus with infarction  CT PE: Occlusive pulmonary embolus within the branch of the right pulmonary artery extending into the right lower lobe.  Infarct located within the right lower lobe possibly associated with superimposed pneumonia.   Dr. Ben NELSON, 03/15   Pulmonary embolus with infarction  CT PE: Occlusive pulmonary embolus within the branch of the right pulmonary artery extending into the right lower lobe.  Infarct located within the right lower lobe.  Continue weight based Lovenox, transition to Eliquis on discharge.   Dr. Logan NELSON, 03/19     Please clarify if the  Pneumonia diagnosis has been:    [  ] Ruled In   [  ] Ruled In, Now Resolved   [  x] Ruled Out   [  ] Other/Clarification of findings (please specify): _______________           Please document in your progress notes daily for the duration of treatment, until resolved, and include in your discharge summary.    Form No. [id number]

## 2024-03-25 NOTE — PT/OT/SLP PROGRESS
Physical Therapy Treatment    Patient Name:  Esthela Contreras   MRN:  [id number]    Recommendations:     Discharge Recommendations: Moderate Intensity Therapy  Discharge Equipment Recommendations: to be determined by next level of care  Barriers to discharge:  ongoing medical treatment    Assessment:     Esthela Contreras is a 73 y.o. male admitted with a medical diagnosis of Sepsis due to cellulitis.  He presents with the following impairments/functional limitations: impaired endurance, impaired skin, impaired self care skills, impaired functional mobility.    Pt agreeable to sitting EOB and able to maintain same without LOB.  Pt with bleeding B LE while sitting EOB, no additional exercise attempted    PT POC discussed with Jony Patel,PT     Rehab Prognosis: Fair; patient would benefit from acute skilled PT services to address these deficits and reach maximum level of function.    Recent Surgery: Procedure(s) (LRB):  DEBRIDEMENT, LOWER EXTREMITY (Bilateral)      Plan:     During this hospitalization, patient to be seen 5 x/week to address the identified rehab impairments via gait training, therapeutic activities, therapeutic exercises, neuromuscular re-education and progress toward the following goals:    Plan of Care Expires:  04/19/24    Subjective     Chief Complaint: Sepsis due to cellulitis   Patient/Family Comments/goals: pt agreeable  Pain/Comfort:         Objective:     Communicated with Jasmin Malik RN prior to session.  Patient found supine with PICC line upon PT entry to room.     General Precautions: Standard, fall  Orthopedic Precautions: N/A  Braces: N/A  Respiratory Status: Room air     Functional Mobility:  Bed Mobility:     Supine to Sit: modified independence  Sit to Supine: contact guard assistance with B LE management      AM-PAC 6 CLICK MOBILITY  Turning over in bed (including adjusting bedclothes, sheets and blankets)?: 4  Sitting down on and standing up from a chair with arms (e.g.,  wheelchair, bedside commode, etc.): 1  Moving from lying on back to sitting on the side of the bed?: 4  Moving to and from a bed to a chair (including a wheelchair)?: 1  Need to walk in hospital room?: 1  Climbing 3-5 steps with a railing?: 1  Basic Mobility Total Score: 12       Treatment & Education:  Standby assist sitting EOB x 12 minutes - performed long arc quad 2 x 15 reps during trial    Patient left HOB elevated with all lines intact and call button in reach..    GOALS:   Multidisciplinary Problems       Physical Therapy Goals          Problem: Physical Therapy    Goal Priority Disciplines Outcome Goal Variances Interventions   Physical Therapy Goal     PT, PT/OT Ongoing, Progressing     Description: Short term goals:  1. Supine to sit with MInimal Assistance  2. Sit to supine with MInimal Assistance  3. Bed to chair transfer with Minimal Assistance using Slideboard  4. Wheelchair propulsion x300 feet with Contact Guard Assistance using bilateral upper extremities    Long term goals:  1. Supine to sit with Modified [place]  2. Sit to stand transfer with Contact Guard Assistance  3. Bed to chair transfer with Contact Guard Assistance using Rolling Walker  4. Gait  x 50 feet with Contact Guard Assistance using Rolling Walker.                          Time Tracking:     PT Received On: 03/25/24  PT Start Time: 1136     PT Stop Time: 1157  PT Total Time (min): 21 min     Billable Minutes: Therapeutic Activity 18    Treatment Type: Treatment  PT/PTA: PTA     Number of PTA visits since last PT visit: 2 03/25/2024

## 2024-03-25 NOTE — PT/OT/SLP PROGRESS
Occupational Therapy   Treatment    Name: Esthela Contreras  MRN: [id number]  Admitting Diagnosis:  Sepsis due to cellulitis       Recommendations:     Discharge Recommendations: Moderate Intensity Therapy  Discharge Equipment Recommendations:  to be determined by next level of care  Barriers to discharge:       Assessment:     Esthela Contreras is a 73 y.o. male with a medical diagnosis of Sepsis due to cellulitis.Performance deficits affecting function are weakness, impaired endurance, impaired self care skills.     Rehab Prognosis:  Good; patient would benefit from acute skilled OT services to address these deficits and reach maximum level of function.       Plan:     Patient to be seen 5 x/week to address the above listed problems via self-care/home management, therapeutic activities, therapeutic exercises  Plan of Care Expires: 04/16/24  Plan of Care Reviewed with: patient    Subjective     Chief Complaint:   Patient/Family Comments/goals:   Pain/Comfort:  Pain Rating 1: 0/10    Objective:     Communicated with:HILARIA HAN RN prior to session.  Patient found HOB elevated with pressure relief boots, peripheral IV upon OT entry to room.    General Precautions: Standard, fall    Orthopedic Precautions:N/A  Braces: N/A  Respiratory Status: Room air     Occupational Performance:     Bed Mobility:         Functional Mobility/Transfers:    Functional Mobility:     Activities of Daily Living:        [facility] 6 Click ADL:      Treatment & Education:  Pt performed hand helper with 3 rubber band resistances 20 reps x 2,blue t ball ex's 20 reps x 2, rom ex's with 2 lb wt 15 reps x 2 B elbow flex/ext,abd/add 10 reps x 2 R shld flex, 10 reps L shld flex, red t band 15 reps x 2 B elbow flex.abd/add      Patient left HOB elevated with all lines intact and call button in reach    GOALS:   Multidisciplinary Problems       Occupational Therapy Goals          Problem: Occupational Therapy    Goal Priority Disciplines Outcome  Interventions   Occupational Therapy Goal     OT, PT/OT Ongoing, Progressing    Description: STG:  Pt will perform grooming with setup  Pt will bathe with Anna with setup at EOB  Pt will perform UE dressing with Harshil  Pt will perform LE dressing with ModA  Pt will sit EOB x 10 min with SBA   Pt will transfer bed/chair/bsc with Anna with sliding board  Pt will tolerate 15 minutes of tx without fatigue      LT.Restore to max I with self care and mobility.                           Time Tracking:     OT Date of Treatment: 24  OT Start Time: 1049  OT Stop Time: 1112  OT Total Time (min): 23 min    Billable Minutes:Therapeutic Exercise 20    OT/KO: KO          3/25/2024

## 2024-03-25 NOTE — ASSESSMENT & PLAN NOTE
Patient with acute kidney injury/acute renal failure likely due to pre-renal azotemia due to IVVD CHANDNI is currently stable.   Baseline creatinine  - Labs reviewed- Renal function/electrolytes with Estimated Creatinine Clearance: 40.2 mL/min (A) (based on SCr of 1.64 mg/dL (H)). according to latest data.   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   Stable Cr.   Monitor urine output and serial BMP and adjust therapy as needed.   Avoid nephrotoxins and renally dose meds for GFR listed above.

## 2024-03-26 VITALS
HEART RATE: 96 BPM | DIASTOLIC BLOOD PRESSURE: 68 MMHG | RESPIRATION RATE: 18 BRPM | SYSTOLIC BLOOD PRESSURE: 108 MMHG | HEIGHT: 70 IN | TEMPERATURE: 98 F | WEIGHT: 171.31 LBS | OXYGEN SATURATION: 99 % | BODY MASS INDEX: 24.52 KG/M2

## 2024-03-26 LAB
GLUCOSE SERPL-MCNC: 105 MG/DL (ref 70–105)
GLUCOSE SERPL-MCNC: 110 MG/DL (ref 70–105)

## 2024-03-26 PROCEDURE — 82962 GLUCOSE BLOOD TEST: CPT

## 2024-03-26 PROCEDURE — 25000003 PHARM REV CODE 250: Performed by: INTERNAL MEDICINE

## 2024-03-26 PROCEDURE — 1111F DSCHRG MED/CURRENT MED MERGE: CPT | Mod: CPTII,,, | Performed by: INTERNAL MEDICINE

## 2024-03-26 PROCEDURE — 25000003 PHARM REV CODE 250: Performed by: FAMILY MEDICINE

## 2024-03-26 PROCEDURE — 97110 THERAPEUTIC EXERCISES: CPT | Mod: CQ

## 2024-03-26 PROCEDURE — 36416 COLLJ CAPILLARY BLOOD SPEC: CPT

## 2024-03-26 PROCEDURE — 94761 N-INVAS EAR/PLS OXIMETRY MLT: CPT

## 2024-03-26 PROCEDURE — 25000003 PHARM REV CODE 250: Performed by: NURSE PRACTITIONER

## 2024-03-26 PROCEDURE — 97110 THERAPEUTIC EXERCISES: CPT | Mod: CO

## 2024-03-26 PROCEDURE — 99239 HOSP IP/OBS DSCHRG MGMT >30: CPT | Mod: ,,, | Performed by: INTERNAL MEDICINE

## 2024-03-26 RX ORDER — HYDROCODONE BITARTRATE AND ACETAMINOPHEN 5; 325 MG/1; MG/1
1 TABLET ORAL EVERY 6 HOURS PRN
Qty: 15 TABLET | Refills: 0 | Status: SHIPPED | OUTPATIENT
Start: 2024-03-26

## 2024-03-26 RX ORDER — FUROSEMIDE 40 MG/1
40 TABLET ORAL DAILY
Qty: 30 TABLET | Refills: 11 | Status: SHIPPED | OUTPATIENT
Start: 2024-03-27 | End: 2025-03-27

## 2024-03-26 RX ORDER — METOPROLOL SUCCINATE 25 MG/1
12.5 TABLET, EXTENDED RELEASE ORAL DAILY
Qty: 15 TABLET | Refills: 11 | Status: SHIPPED | OUTPATIENT
Start: 2024-03-27 | End: 2025-03-27

## 2024-03-26 RX ORDER — CIPROFLOXACIN 500 MG/1
500 TABLET ORAL EVERY 12 HOURS
Qty: 4 TABLET | Refills: 0 | Status: SHIPPED | OUTPATIENT
Start: 2024-03-26 | End: 2024-03-28

## 2024-03-26 RX ORDER — LINEZOLID 600 MG/1
600 TABLET, FILM COATED ORAL EVERY 12 HOURS
Qty: 4 TABLET | Refills: 0 | Status: SHIPPED | OUTPATIENT
Start: 2024-03-26 | End: 2024-03-28

## 2024-03-26 RX ORDER — GABAPENTIN 100 MG/1
100 CAPSULE ORAL 3 TIMES DAILY
Qty: 90 CAPSULE | Refills: 11 | Status: SHIPPED | OUTPATIENT
Start: 2024-03-26 | End: 2025-03-26

## 2024-03-26 RX ORDER — HYDRALAZINE HYDROCHLORIDE 10 MG/1
10 TABLET, FILM COATED ORAL EVERY 12 HOURS
Qty: 60 TABLET | Refills: 11 | Status: SHIPPED | OUTPATIENT
Start: 2024-03-26 | End: 2025-03-26

## 2024-03-26 RX ORDER — ASPIRIN 81 MG/1
81 TABLET ORAL DAILY
Qty: 360 TABLET | Refills: 0 | Status: SHIPPED | OUTPATIENT
Start: 2024-03-27 | End: 2025-03-27

## 2024-03-26 RX ADMIN — FUROSEMIDE 40 MG: 40 TABLET ORAL at 08:03

## 2024-03-26 RX ADMIN — SILVER SULFADIAZINE: 10 CREAM TOPICAL at 08:03

## 2024-03-26 RX ADMIN — CIPROFLOXACIN 500 MG: 500 TABLET ORAL at 08:03

## 2024-03-26 RX ADMIN — MIDODRINE HYDROCHLORIDE 5 MG: 5 TABLET ORAL at 11:03

## 2024-03-26 RX ADMIN — ASPIRIN 81 MG: 81 TABLET, COATED ORAL at 08:03

## 2024-03-26 RX ADMIN — HYDRALAZINE HYDROCHLORIDE 10 MG: 10 TABLET, FILM COATED ORAL at 08:03

## 2024-03-26 RX ADMIN — MIDODRINE HYDROCHLORIDE 5 MG: 5 TABLET ORAL at 07:03

## 2024-03-26 RX ADMIN — HYDROCODONE BITARTRATE AND ACETAMINOPHEN 1 TABLET: 5; 325 TABLET ORAL at 06:03

## 2024-03-26 RX ADMIN — GABAPENTIN 100 MG: 100 CAPSULE ORAL at 08:03

## 2024-03-26 RX ADMIN — ATORVASTATIN CALCIUM 40 MG: 40 TABLET, FILM COATED ORAL at 08:03

## 2024-03-26 RX ADMIN — APIXABAN 10 MG: 5 TABLET, FILM COATED ORAL at 08:03

## 2024-03-26 RX ADMIN — GABAPENTIN 100 MG: 100 CAPSULE ORAL at 04:03

## 2024-03-26 RX ADMIN — MIDODRINE HYDROCHLORIDE 5 MG: 5 TABLET ORAL at 04:03

## 2024-03-26 RX ADMIN — LINEZOLID 600 MG: 600 TABLET, FILM COATED ORAL at 08:03

## 2024-03-26 RX ADMIN — HYDROCODONE BITARTRATE AND ACETAMINOPHEN 1 TABLET: 5; 325 TABLET ORAL at 04:03

## 2024-03-26 RX ADMIN — METOPROLOL SUCCINATE 12.5 MG: 25 TABLET, EXTENDED RELEASE ORAL at 08:03

## 2024-03-26 NOTE — CARE UPDATE
Patient will require head of bed to be elevated more than 30 degrees due to CHF and will require frequent changes in body positioning to prevent skin breakdown. This is not feasible with a standard bed.

## 2024-03-26 NOTE — PLAN OF CARE
Problem: Adult Inpatient Plan of Care  Goal: Plan of Care Review  Outcome: Ongoing, Progressing     Problem: Diabetes Comorbidity  Goal: Blood Glucose Level Within Targeted Range  Outcome: Ongoing, Progressing  Intervention: Monitor and Manage Glycemia  Flowsheets (Taken 3/26/2024 0644)  Glycemic Management: blood glucose monitored     Problem: Infection Progression (Sepsis/Septic Shock)  Goal: Absence of Infection Signs and Symptoms  Outcome: Ongoing, Progressing  Intervention: Initiate Sepsis Management  Flowsheets (Taken 3/26/2024 0644)  Infection Prevention: rest/sleep promoted  Isolation Precautions: precautions maintained     Problem: Impaired Wound Healing  Goal: Optimal Wound Healing  Outcome: Ongoing, Progressing  Intervention: Promote Wound Healing  Flowsheets (Taken 3/26/2024 0644)  Oral Nutrition Promotion: rest periods promoted  Activity Management: Rolling - L1  Pain Management Interventions: care clustered

## 2024-03-26 NOTE — PT/OT/SLP PROGRESS
"Physical Therapy Treatment    Patient Name:  Esthela Contreras   MRN:  [id number]    Recommendations:     Discharge Recommendations: Moderate Intensity Therapy  Discharge Equipment Recommendations: to be determined by next level of care  Barriers to discharge:  ongoing medical treatment    Assessment:     Esthela Contreras is a 73 y.o. male admitted with a medical diagnosis of Sepsis due to cellulitis.  He presents with the following impairments/functional limitations: impaired endurance, impaired skin, impaired self care skills, impaired functional mobility.    Pt agreeable to exercise at bedside and able to complete same with assist.  Pt declined sitting EOB not wanting "legs to bleed". Pt with possible discharge this date    Rehab Prognosis: Fair; patient would benefit from acute skilled PT services to address these deficits and reach maximum level of function.    Recent Surgery: Procedure(s) (LRB):  DEBRIDEMENT, LOWER EXTREMITY (Bilateral)      Plan:     During this hospitalization, patient to be seen 5 x/week to address the identified rehab impairments via gait training, therapeutic activities, therapeutic exercises, neuromuscular re-education and progress toward the following goals:    Plan of Care Expires:  04/19/24    Subjective     Chief Complaint: sepsis due to cellulitis  Patient/Family Comments/goals: "I'm not sitting up to day, I don't want my legs to bleed"  Pain/Comfort:         Objective:     Communicated with  prior to session.  Patient found HOB elevated with peripheral IV upon PT entry to room.     General Precautions: Standard, fall  Orthopedic Precautions: N/A  Braces: N/A  Respiratory Status: Room air     Functional Mobility:  NT      AM-PAC 6 CLICK MOBILITY  Turning over in bed (including adjusting bedclothes, sheets and blankets)?: 4  Sitting down on and standing up from a chair with arms (e.g., wheelchair, bedside commode, etc.): 1  Moving from lying on back to sitting on the side of the bed?: " 4  Moving to and from a bed to a chair (including a wheelchair)?: 1  Need to walk in hospital room?: 1  Climbing 3-5 steps with a railing?: 1  Basic Mobility Total Score: 12       Treatment & Education:  B LE exercise 2 x 15 reps - ankle pump, quad set, glut set, straight leg raise, hip ab/adduction, heel slide with assist and rest as needed    Patient left HOB elevated with all lines intact and call button in reach..    GOALS:   Multidisciplinary Problems       Physical Therapy Goals          Problem: Physical Therapy    Goal Priority Disciplines Outcome Goal Variances Interventions   Physical Therapy Goal     PT, PT/OT Ongoing, Progressing     Description: Short term goals:  1. Supine to sit with MInimal Assistance  2. Sit to supine with MInimal Assistance  3. Bed to chair transfer with Minimal Assistance using Slideboard  4. Wheelchair propulsion x300 feet with Contact Guard Assistance using bilateral upper extremities    Long term goals:  1. Supine to sit with Modified [place]  2. Sit to stand transfer with Contact Guard Assistance  3. Bed to chair transfer with Contact Guard Assistance using Rolling Walker  4. Gait  x 50 feet with Contact Guard Assistance using Rolling Walker.                          Time Tracking:     PT Received On: 03/26/24  PT Start Time: 0928     PT Stop Time: 0945  PT Total Time (min): 17 min     Billable Minutes: Therapeutic Exercise 15    Treatment Type: Treatment  PT/PTA: PTA     Number of PTA visits since last PT visit: 3     03/26/2024

## 2024-03-26 NOTE — NURSING
[facility] arrived to  patient. Discharge instructions discussed and PICC line removed with no complications and pressure applied. Informed family patient was on his way home and family stated they were at the house waiting on him.

## 2024-03-26 NOTE — PLAN OF CARE
[facility]  Discharge Final Note    Primary Care Provider: Yolande Spring FNP    Expected Discharge Date: 3/26/2024    Final Discharge Note (most recent)       Final Note - 03/26/24 1214          Final Note    Assessment Type Final Discharge Note     Anticipated Discharge Disposition Home-Health Care Svc        Post-Acute Status    Post-Acute Authorization HME;Home Health     HME Status Set-up Complete/Auth obtained     Home Health Status Set-up Complete/Auth obtained     Patient choice form signed by patient/caregiver List with quality metrics by geographic area provided;List from CMS Compare     Discharge Delays None known at this time                     Important Message from Medicare  Important Message from Medicare regarding Discharge Appeal Rights: Given to patient/caregiver, Explained to patient/caregiver, Signed/date by patient/caregiver     Date IMM was signed: 03/26/24  Time IMM was signed: 1215     Follow-up providers       Meera Morley FNP   Specialty: Cardiology    [address] [place] [address] [facility]  [place] [address]   Phone: [phone]       Next Steps: Follow up in 2 day(s)    Instructions: Schedule follow up with CHAU Morley in 2 weeks; Hospital discharge, seen inpatient by Dr. Cobos    [facility]   Specialty: Wound Care    [address] [facility] [address]   Phone: [phone]       Next Steps: Follow up    Instructions: For wound re-check    Yolande Spring FNP   Specialty: Family Medicine   Relationship: PCP - General    [address]  [facility]  [place] [address]   Phone: [phone]       Next Steps: Schedule an appointment as soon as possible for a visit in 1 week(s)    Eloy Dong MD   Specialty: Pulmonary Disease, Critical Care Medicine    [address] [place] [address]   Phone: [phone]       Next Steps: Schedule an appointment as soon as possible for a visit in 4  week(s)    Instructions: Please follow up on April 22 at 2:20              After-discharge care                Home Medical Care       [facility]   Service: Home Health Services    [address] [place] [address]   Phone: [phone]                           [facility] with [facility] , hospital bed to be delivered by [facility], notified festus with [facility] and faxed clinicals

## 2024-03-26 NOTE — PLAN OF CARE
Spoke with mayra at the [facility] and pt has been denied for snf/swingbed as evelia says pt has no skilled days left, called and spoke with figueroa Bagley and pt is to dc home with choice of [facility] and needs hospital bed from [facility], ss updated dr fitzgerald and he will order hh and hospital bed and then ss will send to [facility]

## 2024-03-26 NOTE — ASSESSMENT & PLAN NOTE
Patient is identified as having Systolic (HFrEF) heart failure that is Acute on chronic. CHF is currently controlled. Latest ECHO performed and demonstrates- Results for orders placed during the hospital encounter of 03/13/24    Echo    Interpretation Summary    Left Ventricle: The left ventricle is mildly dilated. Mildly increased wall thickness. There is concentric hypertrophy. Severe global hypokinesis present. There is reduced systolic function. Ejection fraction by visual approximation is 20%.    Right Ventricle: Moderate right ventricular enlargement. Systolic function is hyperdynamic.    Left Atrium: Left atrium is mildly dilated.    Right Atrium: Right atrium is severely dilated.    Aortic Valve: The aortic valve is a trileaflet valve. Moderately calcified cusps.    Mitral Valve: There is mild bileaflet sclerosis. Mildly thickened leaflets. There is no stenosis. The mean pressure gradient across the mitral valve is 4 mmHg at a heart rate of  bpm. There is moderate regurgitation with an eccentric jet.    Tricuspid Valve: There is mild regurgitation with an eccentrically directed jet.    Pulmonary Artery: The estimated pulmonary artery systolic pressure is 58 mmHg.    IVC/SVC: Elevated venous pressure at 15 mmHg.    Pericardium: There is a trivial effusion. No indication of cardiac tamponade.    Required dobutamine initially, now off- resume home midodrine.   Continue Furosemide, Toprol, hydralazine.   Cardiology has been consulted- outpatient follow up recommended. .   Continue to stress to patient importance of self efficacy and  on diet for CHF.

## 2024-03-26 NOTE — ASSESSMENT & PLAN NOTE
Patient with Paroxysmal (<7 days) atrial fibrillation which is controlled currently with beta blocker. Patient is currently in sinus rhythm.[id number] Score: 4. HASBLED Score: 2. Anticoagulation indicated. Anticoagulation with Eliquis.

## 2024-03-26 NOTE — PT/OT/SLP PROGRESS
Occupational Therapy   Treatment    Name: Esthela Contreras  MRN: [id number]  Admitting Diagnosis:  Sepsis due to cellulitis       Recommendations:     Discharge Recommendations: Moderate Intensity Therapy  Discharge Equipment Recommendations:  to be determined by next level of care  Barriers to discharge:       Assessment:     Esthela Contreras is a 73 y.o. male with a medical diagnosis of Sepsis due to cellulitis.Performance deficits affecting function are weakness, impaired endurance, impaired self care skills.     Rehab Prognosis:  Good; patient would benefit from acute skilled OT services to address these deficits and reach maximum level of function.       Plan:     Patient to be seen 5 x/week to address the above listed problems via self-care/home management, therapeutic activities, therapeutic exercises  Plan of Care Expires: 04/16/24  Plan of Care Reviewed with: patient    Subjective     Chief Complaint:   Patient/Family Comments/goals:   Pain/Comfort:  Pain Rating 1: 0/10    Objective:     Communicated with:HILARIA HAN RN    prior to session.  Patient found HOB elevated with peripheral IV upon OT entry to room.    General Precautions: Standard, fall    Orthopedic Precautions:N/A  Braces: N/A  Respiratory Status: Room air     Occupational Performance:     Bed Mobility:         Functional Mobility/Transfers:    Functional Mobility:     Activities of Daily Living:        [facility] 6 Click ADL:      Treatment & Education:  Pt performed hand helper with 3 rubber band resistances 20 reps x 2, blue t ball ex's 50 reps. Rom ex's with 2 lb wt 15 reps x 2 B elbow flex/ext,abd/add, aarom with 2 lb wt 15 reps x 2 B shld flex,red t band 15 reps x 2 B elbow flex.abd/add    Patient left HOB elevated with all lines intact and call button in reach    GOALS:   Multidisciplinary Problems       Occupational Therapy Goals          Problem: Occupational Therapy    Goal Priority Disciplines Outcome Interventions   Occupational Therapy  Goal     OT, PT/OT Ongoing, Progressing    Description: STG:  Pt will perform grooming with setup  Pt will bathe with Anna with setup at EOB  Pt will perform UE dressing with Harshil  Pt will perform LE dressing with ModA  Pt will sit EOB x 10 min with SBA   Pt will transfer bed/chair/bsc with Anna with sliding board  Pt will tolerate 15 minutes of tx without fatigue      LT.Restore to max I with self care and mobility.                           Time Tracking:     OT Date of Treatment: 24  OT Start Time: 1343  OT Stop Time: 1411  OT Total Time (min): 28 min    Billable Minutes:Therapeutic Exercise 27    OT/KO: KO          3/26/2024

## 2024-03-26 NOTE — NURSING
Pt attempted to refuse all night time medications. Was able to get pt to take eliquis and antibiotics. Refused to take gabapentin and hydrlazine. Education provided, questions answered but pt still refused.

## 2024-03-26 NOTE — ASSESSMENT & PLAN NOTE
This patient does have evidence of infective focus  My overall impression is sepsis.  Source: Skin and Soft Tissue (location lower extremities with wounds)  Antibiotics given-   Antibiotics (72h ago, onward)      Start     Stop Route Frequency Ordered    03/20/24 1015  ciprofloxacin HCl tablet 500 mg         03/27/24 0859 Oral Every 12 hours 03/20/24 0902    03/20/24 1015  linezolid tablet 600 mg         03/27/24 0859 Oral Every 12 hours 03/20/24 0902    03/20/24 0900  silver sulfADIAZINE 1% cream         -- Top Daily 03/19/24 1224          Source control achieved by: antibiotics, local wound care   In the past, wound grew pseudomonas and acinetobacter.  Current wound cultures with pseudomonas and MRSA.   Unfortunately patient is a poor candidate for non-emergent surgical debridement at this time due to heart failure and pulmonary embolism with infarct.   S/p 7 days of IV vancomycin and Zosyn, transitioned to PO ciprofloxacin and linezolid x 7 days.    Continue local wound care and home health on discharge.

## 2024-03-26 NOTE — ASSESSMENT & PLAN NOTE
Patient with acute kidney injury/acute renal failure likely due to pre-renal azotemia due to IVVD CHANDNI is currently stable.   Baseline creatinine  - Labs reviewed- Renal function/electrolytes with Estimated Creatinine Clearance: 40.2 mL/min (A) (based on SCr of 1.64 mg/dL (H)). according to latest data.   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   Stable Cr.   Monitor urine output and serial BMP and adjust therapy as needed.   Avoid nephrotoxins and renally dose meds for GFR listed above.   Verbal/written post procedure instructions were given to patient/caregiver

## 2024-03-27 ENCOUNTER — PATIENT OUTREACH (OUTPATIENT)
Dept: ADMINISTRATIVE | Facility: CLINIC | Age: 74
End: 2024-03-27

## 2024-03-27 LAB
OHS QRS DURATION: 144 MS
OHS QTC CALCULATION: 447 MS

## 2024-04-20 NOTE — SUBJECTIVE & OBJECTIVE
Interval History:  No SOB.  No new symptoms.  Pressors have been tapered off    Review of patient's allergies indicates:  No Known Allergies  Current Facility-Administered Medications   Medication Frequency    albuterol-ipratropium 2.5 mg-0.5 mg/3 mL nebulizer solution 3 mL Q4H PRN    apixaban tablet 5 mg BID    aspirin EC tablet 81 mg Daily    atorvastatin tablet 40 mg Daily    dextrose 10% bolus 125 mL 125 mL PRN    dextrose 10% bolus 250 mL 250 mL PRN    dextrose 40 % gel 15,000 mg PRN    dextrose 40 % gel 30,000 mg PRN    dextrose 5 % and 0.9 % NaCl infusion Continuous    glucagon (human recombinant) injection 1 mg PRN    insulin aspart U-100 injection 0-5 Units QID (AC + HS) PRN    melatonin tablet 6 mg Nightly PRN    metoprolol succinate (TOPROL-XL) 24 hr tablet 25 mg Daily    midodrine tablet 5 mg TID WM    morphine injection 2 mg Q4H PRN    mupirocin 2 % ointment BID    naloxone 0.4 mg/mL injection 0.02 mg PRN    NORepinephrine 4 mg in dextrose 5% 250 mL infusion (premix) (titrating) Continuous    ondansetron injection 4 mg Q8H PRN    oxyCODONE immediate release tablet 5 mg Q4H PRN    piperacillin-tazobactam (ZOSYN) 4.5 g in dextrose 5 % in water (D5W) 5 % 100 mL IVPB (MB+) Q8H    prochlorperazine injection Soln 5 mg Q6H PRN    silver sulfADIAZINE 1% cream Daily    sodium chloride 0.9% flush 10 mL PRN       Objective:     Vital Signs (Most Recent):  Temp: 98.2 °F (36.8 °C) (06/06/23 1101)  Pulse: 74 (06/06/23 1245)  Resp: 13 (06/06/23 1245)  BP: (!) 105/55 (06/06/23 1245)  SpO2: 100 % (06/06/23 1245) Vital Signs (24h Range):  Temp:  [97.4 °F (36.3 °C)-99 °F (37.2 °C)] 98.2 °F (36.8 °C)  Pulse:  [67-93] 74  Resp:  [8-19] 13  SpO2:  [97 %-100 %] 100 %  BP: ()/(48-75) 105/55     Weight: 80.4 kg (177 lb 4 oz) (06/06/23 0308)  Body mass index is 27.76 kg/m².  Body surface area is 1.95 meters squared.    I/O last 3 completed shifts:  In: 3850.2 [P.O.:490; I.V.:2947.2; IV Piggyback:412.9]  Out: 3805  [Urine:2675]     Physical Exam  HENT:      Mouth/Throat:      Mouth: Mucous membranes are dry.   Eyes:      Pupils: Pupils are equal, round, and reactive to light.   Cardiovascular:      Rate and Rhythm: Regular rhythm. Tachycardia present.   Pulmonary:      Effort: No respiratory distress.      Breath sounds: No wheezing or rales.   Abdominal:      Palpations: Abdomen is soft.      Tenderness: There is no abdominal tenderness. There is no guarding.   Musculoskeletal:      Cervical back: Neck supple.      Comments: Chronic wounds both lower extremities are dressed   Neurological:      General: No focal deficit present.      Mental Status: He is alert.        Significant Labs:  BMP:   Recent Labs   Lab 06/03/23  0906 06/05/23  0720 06/06/23  0437      < > 109*   *   < > 142   K 4.3   < > 3.2*   CL 95*   < > 107   CO2 28   < > 25   BUN 94*   < > 40*   CREATININE 4.29*   < > 1.10   CALCIUM 7.6*   < > 6.5*   MG 2.5*  --   --     < > = values in this interval not displayed.        Significant Imaging:     ,

## 2024-06-17 PROBLEM — N17.9 AKI (ACUTE KIDNEY INJURY): Status: RESOLVED | Noted: 2023-06-01 | Resolved: 2024-06-17

## 2024-06-17 PROBLEM — A41.9 SEPSIS DUE TO CELLULITIS: Status: RESOLVED | Noted: 2023-06-01 | Resolved: 2024-06-17

## 2024-06-17 PROBLEM — L03.90 SEPSIS DUE TO CELLULITIS: Status: RESOLVED | Noted: 2023-06-01 | Resolved: 2024-06-17

## 2024-06-17 PROBLEM — I26.99 PULMONARY EMBOLUS WITH INFARCTION: Status: RESOLVED | Noted: 2024-03-15 | Resolved: 2024-06-17

## 2024-12-10 NOTE — ASSESSMENT & PLAN NOTE
Patient came to clinic for anticoagulation monitoring.  Last INR on 11/8/24 was 2.52.  Dose maintained.     Today's INR is 2.0 and is within goal range.  No changes to medications or diet since last anticoagulation visit, he did miss 1 done about 7-8 days ago, unsure of which date, reminded him he has 12 hour window to take missed dose, declined to return in 2 weeks, will continue current maintenance plan dose and repeat INR in 4 weeks.      Current warfarin total weekly dose of 28.5 mg verified.  Informed the INR result is within therapeutic range and instructed to maintain current dose per protocol. Discussed dose and return date of 1/7/25 for next INR. See Anticoagulation flowsheet.    MARCUS Mejía is in the office today supervising the treatment.    Instructed to contact the clinic with any unusual bleeding or bruising, any changes in medications, diet, health status, lifestyle, or any other changes, questions or concerns. Verbalized understanding of all discussed.      Patient with Paroxysmal (<7 days) atrial fibrillation which is controlled currently with beta blocker. Patient is currently in sinus rhythm.[id number] Score: 4. HASBLED Score: 2. Anticoagulation indicated. Anticoagulation with Eliquis.
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
## (undated) DEVICE — SEE MEDLINE ITEM 159592

## (undated) DEVICE — Device

## (undated) DEVICE — ELECTRODE BLADE INSULATED 1 IN

## (undated) DEVICE — GLOVE 8.5 PROTEXIS PI BLUE

## (undated) DEVICE — GLOVE 6.5 PROTEXIS PI BLUE

## (undated) DEVICE — BANDAGE GAUZE COT STRL 4.5X4.1

## (undated) DEVICE — SYR 10CC LUER LOCK

## (undated) DEVICE — COLLECTOR SPECIMEN ANAEROBIC

## (undated) DEVICE — SPONGE COTTON TRAY 4X4IN

## (undated) DEVICE — SOL NACL IRR 1000ML BTL

## (undated) DEVICE — GLOVE PROTEXIS PI SYN SURG 8.0

## (undated) DEVICE — DRESSING XEROFORM 5X9IN

## (undated) DEVICE — GLOVE PROTEXIS PI SYN SURG 6.0

## (undated) DEVICE — GLOVE PROTEXIS PI SYN SURG 7

## (undated) DEVICE — SUT CHROMIC 2-0 SH 27IN BRN

## (undated) DEVICE — DERM CURETTE 5MM DISP

## (undated) DEVICE — SWAB AEROBIC CULTURETTE

## (undated) DEVICE — GLOVE PROTEXIS PI SYN SURG 6.5